# Patient Record
Sex: MALE | Race: WHITE | NOT HISPANIC OR LATINO | Employment: FULL TIME | URBAN - METROPOLITAN AREA
[De-identification: names, ages, dates, MRNs, and addresses within clinical notes are randomized per-mention and may not be internally consistent; named-entity substitution may affect disease eponyms.]

---

## 2018-04-12 ENCOUNTER — OFFICE VISIT (OUTPATIENT)
Dept: FAMILY MEDICINE | Facility: CLINIC | Age: 48
End: 2018-04-12
Payer: COMMERCIAL

## 2018-04-12 VITALS
SYSTOLIC BLOOD PRESSURE: 142 MMHG | RESPIRATION RATE: 18 BRPM | HEIGHT: 71 IN | TEMPERATURE: 98 F | WEIGHT: 315 LBS | HEART RATE: 84 BPM | DIASTOLIC BLOOD PRESSURE: 94 MMHG | BODY MASS INDEX: 44.1 KG/M2

## 2018-04-12 DIAGNOSIS — I10 ESSENTIAL HYPERTENSION: ICD-10-CM

## 2018-04-12 DIAGNOSIS — R31.29 MICROSCOPIC HEMATURIA: ICD-10-CM

## 2018-04-12 DIAGNOSIS — E66.01 MORBID OBESITY (H): ICD-10-CM

## 2018-04-12 DIAGNOSIS — Z86.0100 HISTORY OF COLONIC POLYPS: ICD-10-CM

## 2018-04-12 DIAGNOSIS — K76.0 HEPATIC STEATOSIS: ICD-10-CM

## 2018-04-12 DIAGNOSIS — R07.9 RIGHT-SIDED CHEST PAIN: ICD-10-CM

## 2018-04-12 DIAGNOSIS — R10.84 ABDOMINAL PAIN, GENERALIZED: Primary | ICD-10-CM

## 2018-04-12 PROCEDURE — 99214 OFFICE O/P EST MOD 30 MIN: CPT | Performed by: FAMILY MEDICINE

## 2018-04-12 RX ORDER — DULOXETIN HYDROCHLORIDE 30 MG/1
30 CAPSULE, DELAYED RELEASE ORAL DAILY
Qty: 30 CAPSULE | Refills: 1 | Status: SHIPPED | OUTPATIENT
Start: 2018-04-12 | End: 2018-06-01

## 2018-04-12 NOTE — PROGRESS NOTES
SUBJECTIVE:   Antonio Canseco is a 47 year old male who presents to clinic today for the following health issues:  Abdominal pain, generalized  (primary encounter diagnosis)  ABDOMINAL PAIN     Onset: x 1 week actually many years     Description:   Character: burning and sharp pain at times  Location: right and left flank  Radiation: into back    Intensity: 7-8/10-at its worst, 2-3/10-currently     Progression of Symptoms:  Worsening-more consistant    Accompanying Signs & Symptoms:  Fever/Chills?: no   Gas/Bloating: YES  Nausea: no   Vomitting: no   Diarrhea?: no   Constipation:no   Dysuria or Hematuria: YES-troubles emptying bladder at times    History:   Trauma: no   Previous similar pain: YES-kidney stones-   Previous tests done: CT    Precipitating factors:   Does the pain change with:     Food: no      BM: no     Urination: no     Therapies Tried and outcome: none          Pain    Onset: x 1 year actually many years    Description:   Location: under or in  right breast  Character: Sharp    Intensity: 3-4/10    Progression of Symptoms: worse    Accompanying Signs & Symptoms:  Other symptoms: none    History:   Previous similar pain: no       Precipitating factors:   Trauma or overuse: no     Alleviating factors:  Improved by: nothing palliatives and provocatives not noted    Therapies Tried and outcome: none    Morbid obesity (H)  Wt Readings from Last 5 Encounters:   04/12/18 (!) 151 kg (333 lb)   01/18/16 (!) 148.6 kg (327 lb 8 oz)   08/25/15 (!) 145.1 kg (319 lb 14.4 oz)   12/22/14 (!) 142.9 kg (315 lb)   10/25/14 (!) 140.2 kg (309 lb)     Microscopic hematuria this is been evaluated in the past.  He has forgotten his visit for cystoscopy.  Evaluation was negative  Hepatic steatosis found on previous studies  History of colonic polyps found on previous studies.    Essential hypertension  BP Readings from Last 6 Encounters:   04/12/18 (!) 142/94   01/18/16 148/88   08/25/15 128/83   12/22/14 156/90    10/25/14 120/80   06/25/14 134/84       Problem list and histories reviewed & adjusted, as indicated.  Additional history: as documented      Reviewed and updated as needed this visit by clinical staff  Tobacco  Allergies  Meds  Med Hx  Surg Hx  Fam Hx  Soc Hx       Past Medical History:   Diagnosis Date     CARDIOVASCULAR SCREENING; LDL GOAL LESS THAN 160 3/27/2012     Colon adenoma      Cough 2/4/2014     Degeneration of thoracic intervertebral disc 12/11/2013     Elevated blood pressure 12/22/2014     Hepatic steatosis 12/11/2013     History of colonic polyps 12/11/2013     Microscopic hematuria 12/19/2013     Nephrolithiasis      Obesity 3/27/2012     BRIAN on CPAP 3/27/2012     Pain in thoracic spine 5/9/2013     Sleep apnea        Past Surgical History:   Procedure Laterality Date     COLONOSCOPY       CYSTOSCOPY  2/11/2014    Procedure: CYSTOSCOPY;   Video Cystoscopy with Exam Under Anesthesia;  Surgeon: Chente Moeller MD;  Location: [facility] OR     wisdom teeth         Family History   Problem Relation Age of Onset     Family History Negative Mother      CANCER Father      lymphoma       Social History   Substance Use Topics     Smoking status: Former Smoker     Packs/day: 1.00     Types: Cigarettes     Quit date: 4/23/2013     Smokeless tobacco: Never Used      Comment: 1 sd/day     Alcohol use Yes      Comment: rarely       Reviewed and updated as needed this visit by Provider         ROS:  As above    This document serves as a record of the services and decisions personally performed and made by Mynor Mason MD. It was created on his behalf by Gisele Eden, a trained medical scribe.  The creation of this document is based on the scribe's personal observations and the provider's statements to the medical scribe.  Gisele Eden, April 12, 2018 8:14 AM    OBJECTIVE:     BP (!) 142/94 (BP Location: Right arm, Patient Position: Chair, Cuff Size: Adult Large)  Pulse 84  Temp 98  F (36.7  C) (Oral)   "Resp 18  Ht 1.803 m (5' 11\")  Wt (!) 151 kg (333 lb)  BMI 46.44 kg/m2  Body mass index is 46.44 kg/(m^2).    Exam  ABDOMEN without organomegaly nor tenderness to palpation  No gynecomastia  chest clear  Chest wall non tender    ASSESSMENT/PLAN:   ASSESSMENT / PLAN:  (R10.84) Abdominal pain, generalized  (primary encounter diagnosis)  Comment: This is most likely IBS.  Some of his evaluation has been duplicated over the years, all of it is becoming remote  Plan: CT Abdomen Pelvis w Contrast, CBC with         platelets and differential, Comprehensive         metabolic panel, TSH with free T4 reflex,         Lipase, *UA reflex to Microscopic, treat as IBS with DULoxetine         (CYMBALTA) 30 MG EC capsule            (E66.01) Morbid obesity (H)  Comment: increasing  Wt Readings from Last 5 Encounters:   04/12/18 (!) 151 kg (333 lb)   01/18/16 (!) 148.6 kg (327 lb 8 oz)   08/25/15 (!) 145.1 kg (319 lb 14.4 oz)   12/22/14 (!) 142.9 kg (315 lb)   10/25/14 (!) 140.2 kg (309 lb)       (R31.29) Microscopic hematuria  Comment: Evaluated previously, repeatedly  Plan: Likely to occur again    (K76.0) Hepatic steatosis  Comment: Incidental finding  Plan:     (Z86.010) History of colonic polyps  Comment: his colonoscopy repeat is due this year  Plan: He would like to delay until summer.  That is reasonable    (I10) Essential hypertension  Comment: No interventions for now, recheck with institution of therapy        (R07.9) Right-sided chest pain  Comment: likely referred. No gallstones at last imaging.  Plan: assess Duloxetine response      RTC 1 mo    Mynor Mason MD        The information in this document, created by the medical scribe for me, accurately reflects the services I personally performed and the decisions made by me. I have reviewed and approved this document for accuracy prior to leaving the patient care area.  Mynor Mason MD April 12, 2018 8:14 AM    Mynor Mason MD  [facility]"

## 2018-04-12 NOTE — MR AVS SNAPSHOT
After Visit Summary   4/12/2018    Antonio Canseco    MRN: [id number]           Patient Information     Date Of Birth          1970        Visit Information        Provider Department      4/12/2018 8:00 AM Mynor Mason MD [facility]        Today's Diagnoses     Abdominal pain, generalized    -  1    Microscopic hematuria        Hepatic steatosis        History of colonic polyps        Essential hypertension        Morbid obesity (H)           Follow-ups after your visit        Follow-up notes from your care team     Return in about 4 weeks (around 5/10/2018).      Future tests that were ordered for you today     Open Future Orders        Priority Expected Expires Ordered    CBC with platelets and differential Routine  4/12/2019 4/12/2018    Comprehensive metabolic panel Routine  4/12/2019 4/12/2018    TSH with free T4 reflex Routine  4/12/2019 4/12/2018    Lipase Routine  4/12/2019 4/12/2018    *UA reflex to Microscopic Routine  4/12/2019 4/12/2018    CT Abdomen Pelvis w Contrast Routine  4/12/2019 4/12/2018            Who to contact     If you have questions or need follow up information about today's clinic visit or your schedule please contact [facility] directly at [phone].  Normal or non-critical lab and imaging results will be communicated to you by MyChart, letter or phone within 4 business days after the clinic has received the results. If you do not hear from us within 7 days, please contact the clinic through fastDovehart or phone. If you have a critical or abnormal lab result, we will notify you by phone as soon as possible.  Submit refill requests through [employer]ortia or call your pharmacy and they will forward the refill request to us. Please allow 3 business days for your refill to be completed.          Additional Information About Your Visit        MyChart Information     [employer] gives you secure access to your electronic health record. If you see  "a primary care provider, you can also send messages to your care team and make appointments. If you have questions, please call your primary care clinic.  If you do not have a primary care provider, please call [phone] and they will assist you.        Care EveryWhere ID     This is your Care EveryWhere ID. This could be used by other organizations to access your [facility] medical records  [id number]        Your Vitals Were     Pulse Temperature Respirations Height BMI (Body Mass Index)       84 98  F (36.7  C) (Oral) 18 5' 11\" (1.803 m) 46.44 kg/m2        Blood Pressure from Last 3 Encounters:   04/12/18 (!) 142/94   01/18/16 148/88   08/25/15 128/83    Weight from Last 3 Encounters:   04/12/18 (!) 333 lb (151 kg)   01/18/16 (!) 327 lb 8 oz (148.6 kg)   08/25/15 (!) 319 lb 14.4 oz (145.1 kg)                 Today's Medication Changes          These changes are accurate as of 4/12/18  8:41 PM.  If you have any questions, ask your nurse or doctor.               Start taking these medicines.        Dose/Directions    DULoxetine 30 MG EC capsule   Commonly known as:  CYMBALTA   Used for:  Abdominal pain, generalized   Started by:  Mynor Mason MD        Dose:  30 mg   Take 1 capsule (30 mg) by mouth daily   Quantity:  30 capsule   Refills:  1            Where to get your medicines      These medications were sent to [facility] [address]  [id number] [place] [address]     Phone:  [phone]     DULoxetine 30 MG EC capsule                Primary Care Provider Office Phone # Fax #    Mynor Mason [phone] [address] [facility] [address]        Equal Access to Services     MARILU ALBRIGHT : Lisa Perdomo, waaxda luqadaha, qaybta kaalmada lauren, brad hodgse. So [facility] [phone].    ATENCIÓN: Si habla español, tiene a mora disposición servicios gratuitos de asistencia lingüística. " Misti garcia [phone].    We comply with applicable federal civil rights laws and [place] laws. We do not discriminate on the basis of race, color, national origin, age, disability, sex, sexual orientation, or gender identity.            Thank you!     Thank you for choosing [facility]  for your care. Our goal is always to provide you with excellent care. Hearing back from our patients is one way we can continue to improve our services. Please take a few minutes to complete the written survey that you may receive in the mail after your visit with us. Thank you!             Your Updated Medication List - Protect others around you: Learn how to safely use, store and throw away your medicines at www.disposemymeds.org.          This list is accurate as of 4/12/18  8:41 PM.  Always use your most recent med list.                   Brand Name Dispense Instructions for use Diagnosis    DULoxetine 30 MG EC capsule    CYMBALTA    30 capsule    Take 1 capsule (30 mg) by mouth daily    Abdominal pain, generalized

## 2018-04-13 PROBLEM — R07.9 RIGHT-SIDED CHEST PAIN: Status: ACTIVE | Noted: 2018-04-13

## 2018-05-21 ENCOUNTER — HOSPITAL ENCOUNTER (OUTPATIENT)
Dept: CT IMAGING | Facility: CLINIC | Age: 48
Discharge: HOME | End: 2018-05-21
Attending: FAMILY MEDICINE | Admitting: FAMILY MEDICINE
Payer: COMMERCIAL

## 2018-05-21 DIAGNOSIS — R10.84 ABDOMINAL PAIN, GENERALIZED: ICD-10-CM

## 2018-05-21 PROCEDURE — 25000128 H RX IP 250 OP 636: Performed by: RADIOLOGY

## 2018-05-21 PROCEDURE — 74177 CT ABD & PELVIS W/CONTRAST: CPT

## 2018-05-21 RX ORDER — IOPAMIDOL 755 MG/ML
500 INJECTION, SOLUTION INTRAVASCULAR ONCE
Status: COMPLETED | OUTPATIENT
Start: 2018-05-21 | End: 2018-05-21

## 2018-05-21 RX ADMIN — SODIUM CHLORIDE 55 ML: 9 INJECTION, SOLUTION INTRAVENOUS at 08:08

## 2018-05-21 RX ADMIN — IOPAMIDOL 100 ML: 755 INJECTION, SOLUTION INTRAVENOUS at 08:08

## 2018-05-22 PROBLEM — K76.89 HEPATIC CYST: Status: ACTIVE | Noted: 2018-05-22

## 2018-05-22 PROBLEM — K58.8 OTHER IRRITABLE BOWEL SYNDROME: Status: ACTIVE | Noted: 2018-05-22

## 2018-06-01 ENCOUNTER — OFFICE VISIT (OUTPATIENT)
Dept: FAMILY MEDICINE | Facility: CLINIC | Age: 48
End: 2018-06-01
Payer: COMMERCIAL

## 2018-06-01 VITALS
HEART RATE: 98 BPM | TEMPERATURE: 98.3 F | RESPIRATION RATE: 18 BRPM | WEIGHT: 315 LBS | SYSTOLIC BLOOD PRESSURE: 152 MMHG | DIASTOLIC BLOOD PRESSURE: 98 MMHG | BODY MASS INDEX: 45.89 KG/M2

## 2018-06-01 DIAGNOSIS — R91.1 PULMONARY NODULE: ICD-10-CM

## 2018-06-01 DIAGNOSIS — R10.84 ABDOMINAL PAIN, GENERALIZED: ICD-10-CM

## 2018-06-01 DIAGNOSIS — K58.9 IRRITABLE BOWEL SYNDROME WITHOUT DIARRHEA: ICD-10-CM

## 2018-06-01 DIAGNOSIS — K76.0 HEPATIC STEATOSIS: Primary | ICD-10-CM

## 2018-06-01 DIAGNOSIS — I10 ESSENTIAL HYPERTENSION: ICD-10-CM

## 2018-06-01 DIAGNOSIS — K76.89 HEPATIC CYST: ICD-10-CM

## 2018-06-01 LAB — FERRITIN SERPL-MCNC: 103 NG/ML (ref 26–388)

## 2018-06-01 PROCEDURE — 99214 OFFICE O/P EST MOD 30 MIN: CPT | Performed by: FAMILY MEDICINE

## 2018-06-01 PROCEDURE — 36415 COLL VENOUS BLD VENIPUNCTURE: CPT | Performed by: FAMILY MEDICINE

## 2018-06-01 PROCEDURE — 82728 ASSAY OF FERRITIN: CPT | Performed by: FAMILY MEDICINE

## 2018-06-01 PROCEDURE — 82390 ASSAY OF CERULOPLASMIN: CPT | Performed by: FAMILY MEDICINE

## 2018-06-01 PROCEDURE — 86704 HEP B CORE ANTIBODY TOTAL: CPT | Performed by: FAMILY MEDICINE

## 2018-06-01 PROCEDURE — 86803 HEPATITIS C AB TEST: CPT | Performed by: FAMILY MEDICINE

## 2018-06-01 RX ORDER — DULOXETIN HYDROCHLORIDE 60 MG/1
60 CAPSULE, DELAYED RELEASE ORAL DAILY
Qty: 30 CAPSULE | Refills: 3 | Status: SHIPPED | OUTPATIENT
Start: 2018-06-01 | End: 2018-10-05

## 2018-06-01 RX ORDER — LISINOPRIL 20 MG/1
20 TABLET ORAL DAILY
Qty: 30 TABLET | Refills: 3 | Status: SHIPPED | OUTPATIENT
Start: 2018-06-01 | End: 2018-10-05

## 2018-06-01 NOTE — MR AVS SNAPSHOT
After Visit Summary   6/1/2018    Antonio Canseco    MRN: [id number]           Patient Information     Date Of Birth          1970        Visit Information        Provider Department      6/1/2018 8:15 AM Mynor Mason MD [facility]        Today's Diagnoses     Hepatic steatosis    -  1    Hepatic cyst        Irritable bowel syndrome without diarrhea        Essential hypertension        Abdominal pain, generalized        Pulmonary nodule           Follow-ups after your visit        Follow-up notes from your care team     Return in about 6 weeks (around 7/13/2018).      Who to contact     If you have questions or need follow up information about today's clinic visit or your schedule please contact [facility] directly at [phone].  Normal or non-critical lab and imaging results will be communicated to you by MyChart, letter or phone within 4 business days after the clinic has received the results. If you do not hear from us within 7 days, please contact the clinic through Sweeperyhart or phone. If you have a critical or abnormal lab result, we will notify you by phone as soon as possible.  Submit refill requests through GiftLauncher or call your pharmacy and they will forward the refill request to us. Please allow 3 business days for your refill to be completed.          Additional Information About Your Visit        MyChart Information     G[employer] gives you secure access to your electronic health record. If you see a primary care provider, you can also send messages to your care team and make appointments. If you have questions, please call your primary care clinic.  If you do not have a primary care provider, please call [phone] and they will assist you.        Care EveryWhere ID     This is your Care EveryWhere ID. This could be used by other organizations to access your [facility] medical records  [id number]        Your Vitals Were     Pulse Temperature  Respirations BMI (Body Mass Index)          98 98.3  F (36.8  C) (Oral) 18 45.89 kg/m2         Blood Pressure from Last 3 Encounters:   06/01/18 (!) 152/98   04/12/18 (!) 142/94   01/18/16 148/88    Weight from Last 3 Encounters:   06/01/18 329 lb (149.2 kg)   04/12/18 (!) 333 lb (151 kg)   01/18/16 (!) 327 lb 8 oz (148.6 kg)              We Performed the Following     Ceruloplasmin     Ferritin     Hepatitis B core antibody     Hepatitis C antibody          Today's Medication Changes          These changes are accurate as of 6/1/18 11:59 PM.  If you have any questions, ask your nurse or doctor.               Start taking these medicines.        Dose/Directions    lisinopril 20 MG tablet   Commonly known as:  PRINIVIL/ZESTRIL   Used for:  Essential hypertension   Started by:  Mynor Mason MD        Dose:  20 mg   Take 1 tablet (20 mg) by mouth daily   Quantity:  30 tablet   Refills:  3         These medicines have changed or have updated prescriptions.        Dose/Directions    DULoxetine 60 MG EC capsule   Commonly known as:  CYMBALTA   This may have changed:    - medication strength  - how much to take   Used for:  Abdominal pain, generalized, Irritable bowel syndrome without diarrhea   Changed by:  Mynor Mason MD        Dose:  60 mg   Take 1 capsule (60 mg) by mouth daily   Quantity:  30 capsule   Refills:  3            Where to get your medicines      These medications were sent to [address]  [address] [facility] [address]     Phone:  [phone]     DULoxetine 60 MG EC capsule    lisinopril 20 MG tablet                Primary Care Provider Office Phone # Fax #    Mynor Mason [phone]  [phone]        Equal Access to Services     CARLTON ALBRIGHT : Lisa dubois Somichele, waaxda luqadaha, qaybta kaalmada adeegyada, brad hodges. So [facility]  [phone].    ATENCIÓN: Si bobla kaylan, tiene a mora disposición servicios gratuitos de asistencia lingüística. Misti garcia [phone].    We comply with applicable federal civil rights laws and [place] laws. We do not discriminate on the basis of race, color, national origin, age, disability, sex, sexual orientation, or gender identity.            Thank you!     Thank you for choosing [facility]  for your care. Our goal is always to provide you with excellent care. Hearing back from our patients is one way we can continue to improve our services. Please take a few minutes to complete the written survey that you may receive in the mail after your visit with us. Thank you!             Your Updated Medication List - Protect others around you: Learn how to safely use, store and throw away your medicines at www.disposemymeds.org.          This list is accurate as of 6/1/18 11:59 PM.  Always use your most recent med list.                   Brand Name Dispense Instructions for use Diagnosis    DULoxetine 60 MG EC capsule    CYMBALTA    30 capsule    Take 1 capsule (60 mg) by mouth daily    Abdominal pain, generalized, Irritable bowel syndrome without diarrhea       lisinopril 20 MG tablet    PRINIVIL/ZESTRIL    30 tablet    Take 1 tablet (20 mg) by mouth daily    Essential hypertension

## 2018-06-01 NOTE — PROGRESS NOTES
SUBJECTIVE:   Antonio Canseco is a 47 year old male who presents to clinic today for the following health issues:    He had abdominal imaging  Hepatic steatosis  (primary encounter diagnosis), nature discussed  Hepatic cyst, radiology feels this may be expanding  Pulmonary nodule,  incidental, a low risk dditional scan in 6-12  Mo  is recommended  Irritable bowel syndrome without diarrhea, is his diagnosis  Essential hypertension, BP elevated    BP Readings from Last 6 Encounters:   06/01/18 (!) 152/98   04/12/18 (!) 142/94   01/18/16 148/88   08/25/15 128/83   12/22/14 156/90   10/25/14 120/80       Abdominal pain, generalized, Follow up on previous visit.   Somewhat better with low-dose duloxetine    Problem list and histories reviewed & adjusted, as indicated.  Additional history: as documented    Past Medical History:   Diagnosis Date     CARDIOVASCULAR SCREENING; LDL GOAL LESS THAN 160 3/27/2012     Colon adenoma      Cough 2/4/2014     Degeneration of thoracic intervertebral disc 12/11/2013     Elevated blood pressure 12/22/2014     Hepatic cyst 5/22/2018     Hepatic steatosis 12/11/2013     History of colonic polyps 12/11/2013     Irritable bowel syndrome without diarrhea 6/1/2018     Microscopic hematuria 12/19/2013     Nephrolithiasis      Obesity 3/27/2012     BRIAN on CPAP 3/27/2012     Other irritable bowel syndrome 5/22/2018     Pain in thoracic spine 5/9/2013     Pulmonary nodule 5/22/2018     Right-sided chest pain 4/13/2018     Sleep apnea        Past Surgical History:   Procedure Laterality Date     COLONOSCOPY       CYSTOSCOPY  2/11/2014    Procedure: CYSTOSCOPY;   Video Cystoscopy with Exam Under Anesthesia;  Surgeon: Chente Moeller MD;  Location: [facility] OR     wisdom teeth         Family History   Problem Relation Age of Onset     Family History Negative Mother      CANCER Father      lymphoma       Social History   Substance Use Topics     Smoking status: Former Smoker     Packs/day: 1.00      Types: Cigarettes     Quit date: 4/23/2013     Smokeless tobacco: Never Used      Comment: 1 sd/day     Alcohol use Yes      Comment: rarely         Reviewed and updated as needed this visit by clinical staff  Tobacco  Allergies  Meds  Med Hx  Surg Hx  Fam Hx  Soc Hx      Reviewed and updated as needed this visit by Provider         ROS:  POSITIVE for sharp burning intermittent abdominal pain in patients right and left flank. Negative for diarrhea or constipation.  No dysuria no icterus    This document serves as a record of the services and decisions personally performed and made by Mynor Mason MD. It was created on his behalf by Blair Sahu, a trained medical scribe.  The creation of this document is based on the scribe's personal observations and the provider's statements to the medical scribe.  Blair Sahu, June 1, 2018 8:36 AM    OBJECTIVE:     BP (!) 152/98 (BP Location: Right arm, Patient Position: Chair, Cuff Size: Adult Large)  Pulse 98  Temp 98.3  F (36.8  C) (Oral)  Resp 18  Wt 149.2 kg (329 lb)  BMI 45.89 kg/m2  Body mass index is 45.89 kg/(m^2).    GENERAL: Healthy, Alert, No acute distress       Lab work   ASSESSMENT/PLAN:   ASSESSMENT / PLAN:  (K76.0) Hepatic steatosis  (primary encounter diagnosis)  Comment: CT shows fatty liver. Will check Iron, copper metabolism, evidence of Hep C and B.   Plan: Ferritin, Ceruloplasmin, Hepatitis C antibody,         Hepatitis B core antibody            (K76.89) Hepatic cyst  Comment: Likely congenital  Plan: Continue to monitor     (K58.9) Irritable bowel syndrome without diarrhea  Comment: Increase duloxetine   Plan: DULoxetine (CYMBALTA) 60 MG EC capsule            (I10) Essential hypertension  Comment: Diagnosis treat  BP Readings from Last 3 Encounters:   06/01/18 (!) 152/98   04/12/18 (!) 142/94   01/18/16 148/88   Plan: lisinopril (PRINIVIL/ZESTRIL) 20 MG tablet            (R10.84) Abdominal pain, generalized  Comment: This is irritable  bowel  Plan: DULoxetine (CYMBALTA) 60 MG EC capsule            (R91.1) Pulmonary nodule  Comment:  incidental lung shefali.  Plan: We shall repeat in 6 months, shorter timeframe      RTC 6 weeks check duloxetine response    Mynor Mason MD      The information in this document, created by the medical scribe for me, accurately reflects the services I personally performed and the decisions made by me. I have reviewed and approved this document for accuracy prior to leaving the patient care area.  Mynor Mason MD June 1, 2018 8:41 AM

## 2018-06-04 LAB
HBV CORE AB SERPL QL IA: NONREACTIVE
HCV AB SERPL QL IA: NONREACTIVE

## 2018-06-07 LAB — CERULOPLASMIN SERPL-MCNC: 37 MG/DL (ref 23–53)

## 2018-07-30 ENCOUNTER — HEALTH MAINTENANCE LETTER (OUTPATIENT)
Age: 48
End: 2018-07-30

## 2018-09-06 ENCOUNTER — TELEPHONE (OUTPATIENT)
Dept: FAMILY MEDICINE | Facility: CLINIC | Age: 48
End: 2018-09-06

## 2018-09-06 NOTE — TELEPHONE ENCOUNTER
Panel Management Review      Patient has the following on his problem list:     Hypertension   Last three blood pressure readings:  BP Readings from Last 3 Encounters:   06/01/18 (!) 152/98   04/12/18 (!) 142/94   01/18/16 148/88     Blood pressure: FAILED    HTN Guidelines:  Age 18-59 BP range:  Less than 140/90  Age 60-85 with Diabetes:  Less than 140/90  Age 60-85 without Diabetes:  less than 150/90      Composite cancer screening  Chart review shows that this patient is due/due soon for the following None  Summary:    Patient is due/failing the following:   BP CHECK    Action needed:   Patient needs office visit for f/u BP check.    Type of outreach:    panel pool    Questions for provider review:    None                                                                                                                                    Amber Weber/BAM  [place]---[facility]       Chart routed to Care Team .

## 2018-09-07 NOTE — TELEPHONE ENCOUNTER
Type of outreach:  Phone, spoke to patient.  he will schedule at a later date needs to check his schedule first.    Hilda Eldridge MA on 9/7/2018 at 9:46 AM

## 2018-10-05 ENCOUNTER — OFFICE VISIT (OUTPATIENT)
Dept: FAMILY MEDICINE | Facility: CLINIC | Age: 48
End: 2018-10-05
Payer: COMMERCIAL

## 2018-10-05 VITALS
SYSTOLIC BLOOD PRESSURE: 117 MMHG | HEART RATE: 64 BPM | WEIGHT: 315 LBS | DIASTOLIC BLOOD PRESSURE: 78 MMHG | TEMPERATURE: 97.9 F | BODY MASS INDEX: 46.72 KG/M2 | OXYGEN SATURATION: 98 % | RESPIRATION RATE: 16 BRPM

## 2018-10-05 DIAGNOSIS — I10 ESSENTIAL HYPERTENSION: ICD-10-CM

## 2018-10-05 DIAGNOSIS — E66.01 MORBID OBESITY (H): Primary | ICD-10-CM

## 2018-10-05 DIAGNOSIS — Z11.4 SCREENING FOR HIV (HUMAN IMMUNODEFICIENCY VIRUS): ICD-10-CM

## 2018-10-05 DIAGNOSIS — K58.9 IRRITABLE BOWEL SYNDROME WITHOUT DIARRHEA: ICD-10-CM

## 2018-10-05 DIAGNOSIS — Z86.0100 HISTORY OF COLONIC POLYPS: ICD-10-CM

## 2018-10-05 PROBLEM — K58.8 OTHER IRRITABLE BOWEL SYNDROME: Status: RESOLVED | Noted: 2018-05-22 | Resolved: 2018-10-05

## 2018-10-05 PROCEDURE — 99214 OFFICE O/P EST MOD 30 MIN: CPT | Performed by: FAMILY MEDICINE

## 2018-10-05 RX ORDER — DULOXETIN HYDROCHLORIDE 60 MG/1
60 CAPSULE, DELAYED RELEASE ORAL DAILY
Qty: 90 CAPSULE | Refills: 1 | Status: SHIPPED | OUTPATIENT
Start: 2018-10-05 | End: 2019-01-07

## 2018-10-05 RX ORDER — LISINOPRIL 20 MG/1
20 TABLET ORAL DAILY
Qty: 90 TABLET | Refills: 1 | Status: SHIPPED | OUTPATIENT
Start: 2018-10-05 | End: 2019-01-07

## 2018-10-05 RX ORDER — BUPROPION HYDROCHLORIDE 150 MG/1
150 TABLET ORAL EVERY MORNING
Qty: 90 TABLET | Refills: 0 | Status: SHIPPED | OUTPATIENT
Start: 2018-10-05 | End: 2019-01-07

## 2018-10-05 NOTE — MR AVS SNAPSHOT
After Visit Summary   10/5/2018    Antonio Canseco    MRN: [id number]           Patient Information     Date Of Birth          1970        Visit Information        Provider Department      10/5/2018 2:45 PM Mynor Mason MD [facility]        Today's Diagnoses     Morbid obesity (H)    -  1    Irritable bowel syndrome without diarrhea        Essential hypertension        History of colonic polyps        Screening for HIV (human immunodeficiency virus)          Care Instructions    Multidisciplinary program.   30 grams fiber (3 servings vegetables /  fruits each meal)  1 serving carbohydrate (bread potatoes) daily            Follow-ups after your visit        Additional Services     BARIATRIC ADULT REFERRAL       Your provider has referred you to: [facility]: [facility]  [place] [phone]  [email or web address]    Please be aware that coverage of these services is subject to the terms and limitations of your health insurance plan.  Call member services at your health plan with any benefit or coverage questions.      Please bring the following with you to your appointment:      (1) List of current medications   (2) This referral request   (3) Any documents/labs given to you for this referral            GASTROENTEROLOGY ADULT REF PROCEDURE ONLY Morenita Cotaierge [phone]; No Provider Preference       Last Lab Result: Creatinine (mg/dL)       Date                     Value                 12/22/2014               0.86             ----------  Body mass index is 46.72 kg/(m^2).     Needed:  No  Language:  English    Patient will be contacted to schedule procedure.     Please be aware that coverage of these services is subject to the terms and limitations of your health insurance plan.  Call member services at your health plan with any benefit or coverage  questions.  Any procedures must be performed at a [facility] facility OR coordinated by your clinic's referral office.    Please bring the following with you to your appointment:    (1) Any X-Rays, CTs or MRIs which have been performed.  Contact the facility where they were done to arrange for  prior to your scheduled appointment.    (2) List of current medications   (3) This referral request   (4) Any documents/labs given to you for this referral                  Who to contact     If you have questions or need follow up information about today's clinic visit or your schedule please contact [facility] directly at [phone].  Normal or non-critical lab and imaging results will be communicated to you by Acrisurehart, letter or phone within 4 business days after the clinic has received the results. If you do not hear from us within 7 days, please contact the clinic through Acrisurehart or phone. If you have a critical or abnormal lab result, we will notify you by phone as soon as possible.  Submit refill requests through [employer] or call your pharmacy and they will forward the refill request to us. Please allow 3 business days for your refill to be completed.          Additional Information About Your Visit        [email or web address] Information     [employer] gives you secure access to your electronic health record. If you see a primary care provider, you can also send messages to your care team and make appointments. If you have questions, please call your primary care clinic.  If you do not have a primary care provider, please call [phone] and they will assist you.        Care EveryWhere ID     This is your Care EveryWhere ID. This could be used by other organizations to access your [facility] medical records  [id number]        Your Vitals Were     Pulse Temperature Respirations Pulse Oximetry BMI (Body Mass Index)       64 97.9  F (36.6  C) (Oral) 16 98% 46.72 kg/m2        Blood Pressure from Last 3  Encounters:   10/05/18 117/78   06/01/18 (!) 152/98   04/12/18 (!) 142/94    Weight from Last 3 Encounters:   10/05/18 (!) 152 kg (335 lb)   06/01/18 149.2 kg (329 lb)   04/12/18 (!) 151 kg (333 lb)              We Performed the Following     BARIATRIC ADULT REFERRAL     Comprehensive metabolic panel     GASTROENTEROLOGY ADULT REF PROCEDURE ONLY Morenita Garcia [phone]; No Provider Preference     HIV Antigen Antibody Combo          Today's Medication Changes          These changes are accurate as of 10/5/18  3:03 PM.  If you have any questions, ask your nurse or doctor.               Start taking these medicines.        Dose/Directions    buPROPion 150 MG 24 hr tablet   Commonly known as:  WELLBUTRIN XL   Used for:  Irritable bowel syndrome without diarrhea   Started by:  Mynor Mason MD        Dose:  150 mg   Take 1 tablet (150 mg) by mouth every morning   Quantity:  90 tablet   Refills:  0            Where to get your medicines      These medications were sent to [facility] [address]  [address] [place], [place] [id number]     Phone:  [phone]     buPROPion 150 MG 24 hr tablet    DULoxetine 60 MG EC capsule    lisinopril 20 MG tablet                Primary Care Provider Office Phone # Fax #    Mynor Mason [phone] [address] [facility] [address]        Equal Access to Services     MARILU [facility] Center[facility]AYASHREE AH: Hadii aad ku hadasho Soomaali, waaxda luqadaha, qaybta kaalmada adeegyada, waxay rosettain hayaan dianelys barrera . So [facility] [phone].    ATENCIÓN: Si habla español, tiene a mora disposición servicios gratuitos de asistencia lingüística. Llame al [phone].    We comply with applicable federal civil rights laws and [place] laws. We do not discriminate on the basis of race, color, national origin, age, disability, sex, sexual orientation, or gender identity.            Thank you!     Thank you for choosing [facility]  for your care. Our goal is always to provide you with excellent care. Hearing back from our patients is one way we can continue to improve our services. Please take a few minutes to complete the written survey that you may receive in the mail after your visit with us. Thank you!             Your Updated Medication List - Protect others around you: Learn how to safely use, store and throw away your medicines at www.disposemymeds.org.          This list is accurate as of 10/5/18  3:03 PM.  Always use your most recent med list.                   Brand Name Dispense Instructions for use Diagnosis    buPROPion 150 MG 24 hr tablet    WELLBUTRIN XL    90 tablet    Take 1 tablet (150 mg) by mouth every morning    Irritable bowel syndrome without diarrhea       DULoxetine 60 MG EC capsule    CYMBALTA    90 capsule    Take 1 capsule (60 mg) by mouth daily    Irritable bowel syndrome without diarrhea       lisinopril 20 MG tablet    PRINIVIL/ZESTRIL    90 tablet    Take 1 tablet (20 mg) by mouth daily    Essential hypertension

## 2018-10-05 NOTE — PATIENT INSTRUCTIONS
Multidisciplinary program.   30 grams fiber (3 servings vegetables /  fruits each meal)  1 serving carbohydrate (bread potatoes) daily

## 2018-10-22 ENCOUNTER — TELEPHONE (OUTPATIENT)
Dept: FAMILY MEDICINE | Facility: CLINIC | Age: 48
End: 2018-10-22

## 2018-10-22 DIAGNOSIS — R91.8 PULMONARY NODULES: Primary | ICD-10-CM

## 2018-11-01 ENCOUNTER — HOSPITAL ENCOUNTER (OUTPATIENT)
Dept: CT IMAGING | Facility: CLINIC | Age: 48
Discharge: HOME | End: 2018-11-01
Attending: FAMILY MEDICINE | Admitting: FAMILY MEDICINE
Payer: COMMERCIAL

## 2018-11-01 DIAGNOSIS — R91.8 PULMONARY NODULES: ICD-10-CM

## 2018-11-01 PROCEDURE — 71260 CT THORAX DX C+: CPT

## 2018-11-01 PROCEDURE — 25000128 H RX IP 250 OP 636: Performed by: RADIOLOGY

## 2018-11-01 RX ORDER — IOPAMIDOL 755 MG/ML
500 INJECTION, SOLUTION INTRAVASCULAR ONCE
Status: COMPLETED | OUTPATIENT
Start: 2018-11-01 | End: 2018-11-01

## 2018-11-01 RX ADMIN — SODIUM CHLORIDE 60 ML: 9 INJECTION, SOLUTION INTRAVENOUS at 08:08

## 2018-11-01 RX ADMIN — IOPAMIDOL 80 ML: 755 INJECTION, SOLUTION INTRAVENOUS at 08:08

## 2018-11-05 NOTE — PROGRESS NOTES
Everything is stable. You will need a final CT in 1 year. If that remains stable, no further follow up is needed  Mynor Mason MD

## 2018-12-20 ENCOUNTER — OFFICE VISIT (OUTPATIENT)
Dept: FAMILY MEDICINE | Facility: CLINIC | Age: 48
End: 2018-12-20
Payer: COMMERCIAL

## 2018-12-20 VITALS
RESPIRATION RATE: 16 BRPM | DIASTOLIC BLOOD PRESSURE: 82 MMHG | HEART RATE: 94 BPM | BODY MASS INDEX: 46.3 KG/M2 | WEIGHT: 315 LBS | TEMPERATURE: 98.3 F | SYSTOLIC BLOOD PRESSURE: 122 MMHG

## 2018-12-20 DIAGNOSIS — H93.13 TINNITUS, BILATERAL: Primary | ICD-10-CM

## 2018-12-20 DIAGNOSIS — Z12.11 SCREEN FOR COLON CANCER: ICD-10-CM

## 2018-12-20 LAB
ALBUMIN SERPL-MCNC: 3.6 G/DL (ref 3.4–5)
ALP SERPL-CCNC: 41 U/L (ref 40–150)
ALT SERPL W P-5'-P-CCNC: 37 U/L (ref 0–70)
ANION GAP SERPL CALCULATED.3IONS-SCNC: 8 MMOL/L (ref 3–14)
AST SERPL W P-5'-P-CCNC: 22 U/L (ref 0–45)
BILIRUB SERPL-MCNC: 0.7 MG/DL (ref 0.2–1.3)
BUN SERPL-MCNC: 12 MG/DL (ref 7–30)
CALCIUM SERPL-MCNC: 8.9 MG/DL (ref 8.5–10.1)
CHLORIDE SERPL-SCNC: 106 MMOL/L (ref 94–109)
CO2 SERPL-SCNC: 24 MMOL/L (ref 20–32)
CREAT SERPL-MCNC: 0.83 MG/DL (ref 0.66–1.25)
ERYTHROCYTE [DISTWIDTH] IN BLOOD BY AUTOMATED COUNT: 14.5 % (ref 10–15)
GFR SERPL CREATININE-BSD FRML MDRD: >90 ML/MIN/{1.73_M2}
GLUCOSE SERPL-MCNC: 147 MG/DL (ref 70–99)
HCT VFR BLD AUTO: 47 % (ref 40–53)
HGB BLD-MCNC: 15 G/DL (ref 13.3–17.7)
MCH RBC QN AUTO: 29.2 PG (ref 26.5–33)
MCHC RBC AUTO-ENTMCNC: 31.9 G/DL (ref 31.5–36.5)
MCV RBC AUTO: 91 FL (ref 78–100)
PLATELET # BLD AUTO: 249 10E9/L (ref 150–450)
POTASSIUM SERPL-SCNC: 4.4 MMOL/L (ref 3.4–5.3)
PROT SERPL-MCNC: 7.1 G/DL (ref 6.8–8.8)
RBC # BLD AUTO: 5.14 10E12/L (ref 4.4–5.9)
SODIUM SERPL-SCNC: 138 MMOL/L (ref 133–144)
TSH SERPL DL<=0.005 MIU/L-ACNC: 2.73 MU/L (ref 0.4–4)
WBC # BLD AUTO: 7.3 10E9/L (ref 4–11)

## 2018-12-20 PROCEDURE — 80053 COMPREHEN METABOLIC PANEL: CPT | Performed by: PHYSICIAN ASSISTANT

## 2018-12-20 PROCEDURE — 85027 COMPLETE CBC AUTOMATED: CPT | Performed by: PHYSICIAN ASSISTANT

## 2018-12-20 PROCEDURE — 84443 ASSAY THYROID STIM HORMONE: CPT | Performed by: PHYSICIAN ASSISTANT

## 2018-12-20 PROCEDURE — 36415 COLL VENOUS BLD VENIPUNCTURE: CPT | Performed by: PHYSICIAN ASSISTANT

## 2018-12-20 PROCEDURE — 99214 OFFICE O/P EST MOD 30 MIN: CPT | Performed by: PHYSICIAN ASSISTANT

## 2018-12-20 RX ORDER — FLUTICASONE PROPIONATE 50 MCG
1-2 SPRAY, SUSPENSION (ML) NASAL DAILY
Qty: 16 G | Refills: 3 | Status: SHIPPED | OUTPATIENT
Start: 2018-12-20 | End: 2019-09-06

## 2018-12-20 NOTE — PROGRESS NOTES
SUBJECTIVE:   Antonio Canseco is a 48 year old male who presents to clinic today for the following health issues:      Acute Illness   Acute illness concerns: Ears ringing  Onset: ~1 month or so    Fever: no     Chills/Sweats: no     Headache (location?): no     Sinus Pressure:no    Conjunctivitis:  no    Ear Pain: YES- bilateral ears ringing; worse at pm    Rhinorrhea: no     Congestion: no     Sore Throat: no      Cough: no    Wheeze: no     Decreased Appetite: no     Nausea: no     Vomiting: no    Diarrhea:  no    Dysuria/Freq.: no    Fatigue/Achiness: no    Sick/Strep Exposure: no     Therapies Tried and outcome: none      wellbutrin started in October. Improved abdominal pain symptoms.     Problem list and histories reviewed & adjusted, as indicated.  Additional history: as documented    Patient Active Problem List   Diagnosis     CARDIOVASCULAR SCREENING; LDL GOAL LESS THAN 160     BRIAN on CPAP     Degeneration of thoracic intervertebral disc     History of colonic polyps     Hepatic steatosis     Microscopic hematuria     Cough     Plantar fasciitis     Morbid obesity (H)     Right-sided chest pain     Pulmonary nodule     Hepatic cyst     Irritable bowel syndrome without diarrhea     Past Surgical History:   Procedure Laterality Date     COLONOSCOPY       CYSTOSCOPY  2014    Procedure: CYSTOSCOPY;   Video Cystoscopy with Exam Under Anesthesia;  Surgeon: Chente Moeller MD;  Location: [facility] OR     wisdom teeth         Social History     Tobacco Use     Smoking status: Former Smoker     Packs/day: 1.00     Types: Cigarettes     Last attempt to quit: 2013     Years since quittin.6     Smokeless tobacco: Never Used     Tobacco comment: 1 sd/day   Substance Use Topics     Alcohol use: Yes     Comment: rarely     Family History   Problem Relation Age of Onset     Family History Negative Mother      Cancer Father         lymphoma         Current Outpatient Medications   Medication Sig Dispense Refill      buPROPion (WELLBUTRIN XL) 150 MG 24 hr tablet Take 1 tablet (150 mg) by mouth every morning 90 tablet 0     DULoxetine (CYMBALTA) 60 MG EC capsule Take 1 capsule (60 mg) by mouth daily 90 capsule 1     fluticasone (FLONASE) 50 MCG/ACT nasal spray Spray 1-2 sprays into both nostrils daily 16 g 3     lisinopril (PRINIVIL/ZESTRIL) 20 MG tablet Take 1 tablet (20 mg) by mouth daily 90 tablet 1     Allergies   Allergen Reactions     Kiwi Itching     BP Readings from Last 3 Encounters:   12/20/18 122/82   10/05/18 117/78   06/01/18 (!) 152/98    Wt Readings from Last 3 Encounters:   12/20/18 (!) 150.6 kg (332 lb)   10/05/18 (!) 152 kg (335 lb)   06/01/18 149.2 kg (329 lb)                    Reviewed and updated as needed this visit by clinical staff  Tobacco  Allergies  Meds  Med Hx  Surg Hx  Fam Hx  Soc Hx      Reviewed and updated as needed this visit by Provider         ROS:  Constitutional, HEENT, neuro, psych, cardiovascular, pulmonary, gi and gu systems are negative, except as otherwise noted.    OBJECTIVE:     /82 (BP Location: Right arm, Patient Position: Chair, Cuff Size: Adult Large)   Pulse 94   Temp 98.3  F (36.8  C) (Oral)   Resp 16   Wt (!) 150.6 kg (332 lb)   BMI 46.30 kg/m    Body mass index is 46.3 kg/m .  GENERAL: healthy, alert and no distress  EYES: Eyes grossly normal to inspection, PERRL and conjunctivae and sclerae normal  HENT: normal cephalic/atraumatic, both ears: clear effusion, nose and mouth without ulcers or lesions, oropharynx clear and oral mucous membranes moist  RESP: lungs clear to auscultation - no rales, rhonchi or wheezes  CV: regular rates and rhythm, normal S1 S2, no S3 or S4 and no murmur, click or rub  MS: no gross musculoskeletal defects noted, no edema  NEURO: Normal strength and tone, sensory exam grossly normal, mentation intact, speech normal, cranial nerves 2-12 intact, gait normal including heel/toe/tandem walking, Romberg normal, rapid alternating  movements normal and finger to nose normal. EOM grossly intact.   PSYCH: mentation appears normal, affect normal/bright    Diagnostic Test Results:  none     ASSESSMENT/PLAN:     (H93.13) Tinnitus, bilateral  (primary encounter diagnosis)  Comment: unclear of exact cause. Differentials vast. No evidence of mass effect at this time on exam. Does have fluid on ears, eustachian tube dysfunction is possible. wellbutrin does rarely produce tinnitus and given recent start, this is possible as well. Presbycusis is possible also along with metabolic disorder or possible thyroid dysfunction. Labs pending. Recommending flonase daily. If no improvement in 2 weeks, 1 week holiday from wellbutrin. If no improvement with these changes, will have see ENT.   Plan: COMPREHENSIVE METABOLIC PANEL, GASTROENTEROLOGY        ADULT REF PROCEDURE ONLY Morenita Garcia [phone]; [facility], TSH with         free T4 reflex, fluticasone (FLONASE) 50         MCG/ACT nasal spray        -Medication use and side effects discussed with the patient. Patient is in complete understanding and agreement with plan.       (Z12.11) Screen for colon cancer  Comment:   Plan: CBC with platelets              Follow up: as above     Fortunato Guzman PA-C  [facility]

## 2018-12-20 NOTE — PATIENT INSTRUCTIONS
For the ringing in the ears. It is felt in order of likely to be eustachian tube dysfunction, possibly wellbutrin side effect, or possible hearing loss. We are getting labs today to further rule out other causes. I have sent flonase to your pharmacy to be taken once daily for eustachain tube dysfunction. If no improvement in 2 weeks, I recommend taking a holiday from your wellbutrin for 1 week. If no improvement with this, we will have you see ENT. Please call and I will give you the number.

## 2019-01-07 ENCOUNTER — MYC REFILL (OUTPATIENT)
Dept: FAMILY MEDICINE | Facility: CLINIC | Age: 49
End: 2019-01-07

## 2019-01-07 DIAGNOSIS — I10 ESSENTIAL HYPERTENSION: ICD-10-CM

## 2019-01-07 DIAGNOSIS — K58.9 IRRITABLE BOWEL SYNDROME WITHOUT DIARRHEA: ICD-10-CM

## 2019-01-07 NOTE — TELEPHONE ENCOUNTER
"Requested Prescriptions   Pending Prescriptions Disp Refills     DULoxetine (CYMBALTA) 60 MG capsule  Last Written Prescription Date:  10/5/2018  Last Fill Quantity: 90 capsule,  # refills: 1   Last office visit: 12/20/2018 with prescribing provider:  Megan   Future Office Visit:     90 capsule 1     Sig: Take 1 capsule (60 mg) by mouth daily    Serotonin-Norepinephrine Reuptake Inhibitors  Passed - 1/7/2019  4:41 PM       Passed - Blood pressure under 140/90 in past 12 months    BP Readings from Last 3 Encounters:   12/20/18 122/82   10/05/18 117/78   06/01/18 (!) 152/98                Passed - Recent (12 mo) or future (30 days) visit within the authorizing provider's specialty    Patient had office visit in the last 12 months or has a visit in the next 30 days with authorizing provider or within the authorizing provider's specialty.  See \"Patient Info\" tab in inbasket, or \"Choose Columns\" in Meds & Orders section of the refill encounter.             Passed - Medication is active on med list       Passed - Patient is age 18 or older        lisinopril (PRINIVIL/ZESTRIL) 20 MG tablet  Last Written Prescription Date:  10/5/2018  Last Fill Quantity: 90 tablet,  # refills: 1   Last office visit: 12/20/2018 with prescribing provider:  Megan   Future Office Visit:     90 tablet 1     Sig: Take 1 tablet (20 mg) by mouth daily    ACE Inhibitors (Including Combos) Protocol Passed - 1/7/2019  4:41 PM       Passed - Blood pressure under 140/90 in past 12 months    BP Readings from Last 3 Encounters:   12/20/18 122/82   10/05/18 117/78   06/01/18 (!) 152/98                Passed - Recent (12 mo) or future (30 days) visit within the authorizing provider's specialty    Patient had office visit in the last 12 months or has a visit in the next 30 days with authorizing provider or within the authorizing provider's specialty.  See \"Patient Info\" tab in inbasket, or \"Choose Columns\" in Meds & Orders section of the refill encounter.  " "           Passed - Medication is active on med list       Passed - Patient is age 18 or older       Passed - Normal serum creatinine on file in past 12 months    Recent Labs   Lab Test 12/20/18  0823   CR 0.83            Passed - Normal serum potassium on file in past 12 months    Recent Labs   Lab Test 12/20/18  0823   POTASSIUM 4.4             buPROPion (WELLBUTRIN XL) 150 MG 24 hr tablet  Last Written Prescription Date:  10/5/2018  Last Fill Quantity: 90 tablet,  # refills: 0   Last office visit: 12/20/2018 with prescribing provider:  Megan   Future Office Visit:     90 tablet 0     Sig: Take 1 tablet (150 mg) by mouth every morning    SSRIs Protocol Passed - 1/7/2019  4:41 PM       Passed - Recent (12 mo) or future (30 days) visit within the authorizing provider's specialty    Patient had office visit in the last 12 months or has a visit in the next 30 days with authorizing provider or within the authorizing provider's specialty.  See \"Patient Info\" tab in inbasket, or \"Choose Columns\" in Meds & Orders section of the refill encounter.             Passed - Medication is Bupropion    If the medication is Bupropion (Wellbutrin), and the patient is taking for smoking cessation; OK to refill.         Passed - Medication is active on med list       Passed - Patient is age 18 or older          "

## 2019-01-08 RX ORDER — BUPROPION HYDROCHLORIDE 150 MG/1
150 TABLET ORAL EVERY MORNING
Qty: 90 TABLET | Refills: 0 | Status: SHIPPED | OUTPATIENT
Start: 2019-01-08 | End: 2019-04-05

## 2019-01-08 RX ORDER — LISINOPRIL 20 MG/1
20 TABLET ORAL DAILY
Status: SHIPPED
Start: 2019-01-08 | End: 2019-04-12

## 2019-01-08 RX ORDER — DULOXETIN HYDROCHLORIDE 60 MG/1
60 CAPSULE, DELAYED RELEASE ORAL DAILY
Status: SHIPPED
Start: 2019-01-08 | End: 2019-04-12

## 2019-01-08 NOTE — TELEPHONE ENCOUNTER
6 month RX sent of Duloxetine and Lisinopril 10/5/18.  Bupropion just 3 month RX sent 10/5/18.  Not PSO diagnosis.  Please advise when you want to see next.  Shayna Olivo RN    12/20/18  ASSESSMENT/PLAN:      (H93.13) Tinnitus, bilateral  (primary encounter diagnosis)  Comment: unclear of exact cause. Differentials vast. No evidence of mass effect at this time on exam. Does have fluid on ears, eustachian tube dysfunction is possible. wellbutrin does rarely produce tinnitus and given recent start, this is possible as well. Presbycusis is possible also along with metabolic disorder or possible thyroid dysfunction. Labs pending. Recommending flonase daily. If no improvement in 2 weeks, 1 week holiday from wellbutrin. If no improvement with these changes, will have see ENT.   Plan: COMPREHENSIVE METABOLIC PANEL, GASTROENTEROLOGY        ADULT REF PROCEDURE ONLY Morenita Garcia [phone]; [facility], TSH with         free T4 reflex, fluticasone (FLONASE) 50         MCG/ACT nasal spray        -Medication use and side effects discussed with the patient. Patient is in complete understanding and agreement with plan.         (Z12.11) Screen for colon cancer  Comment:   Plan: CBC with platelets                Follow up: as above      Fortunato Guzman PA-C  [facility]         Instructions         Return in about 4 months (around 4/20/2019) for Routine Visit, with pcp.       10/5/18  ASSESSMENT/PLAN:   (E66.01) Morbid obesity (H)  (primary encounter diagnosis)  Comment: Multidisciplinary weight loss program discussed.  Plan: BARIATRIC ADULT REFERRAL        30 grams fiber (3 servings vegetables /  fruits each meal)        1 serving carbohydrate (bread potatoes) daily     (K58.9) Irritable bowel syndrome without diarrhea  Comment: Refilled   Plan: DULoxetine (CYMBALTA) 60 MG EC capsule,        Add BuPROPion (WELLBUTRIN XL) 150 MG 24 hr tablet          (I10) Essential  hypertension  Comment: Controlled   Plan: lisinopril (PRINIVIL/ZESTRIL) 20 MG tablet,         Comprehensive metabolic panel          (Z86.010) History of colonic polyps  Comment: Patient agreed to schedule   Plan: GASTROENTEROLOGY ADULT REF PROCEDURE ONLY         Morenita Garcia [phone]; No Provider         Preference          (Z11.4) Screening for HIV (human immunodeficiency virus)  Comment: Universal HIV testing introduced, rationale reviewed, all questions answered, permission granted to test  Plan: HIV Antigen Antibody Combo          The information in this document, created by the medical scribe for me, accurately reflects the services I personally performed and the decisions made by me. I have reviewed and approved this document for accuracy prior to leaving the patient care area.  Mynor Mason MD October 5, 2018 2:57 PM     Mynor Mason MD  [facility]       Instructions         Return in about 3 months (around 1/5/2019).

## 2019-04-05 DIAGNOSIS — I10 ESSENTIAL HYPERTENSION: ICD-10-CM

## 2019-04-05 DIAGNOSIS — K58.9 IRRITABLE BOWEL SYNDROME WITHOUT DIARRHEA: ICD-10-CM

## 2019-04-05 RX ORDER — DULOXETIN HYDROCHLORIDE 60 MG/1
CAPSULE, DELAYED RELEASE ORAL
Status: SHIPPED
Start: 2019-04-05 | End: 2019-04-12

## 2019-04-05 RX ORDER — LISINOPRIL 20 MG/1
TABLET ORAL
Status: SHIPPED
Start: 2019-04-05 | End: 2019-04-12

## 2019-04-05 RX ORDER — BUPROPION HYDROCHLORIDE 150 MG/1
TABLET ORAL
Status: SHIPPED
Start: 2019-04-05 | End: 2019-04-12

## 2019-04-05 NOTE — TELEPHONE ENCOUNTER
Visit was due around 1/5/19.  Called patient.  Scheduled for 4/12/19 8 am.  Has enough medications til visit.  Will address refills at visit.  Note to pharmacy.  Shayna Olivo RN    Associated Diagnoses     Irritable bowel syndrome without diarrhea [K58.9]         10/5/18  ASSESSMENT/PLAN:   (E66.01) Morbid obesity (H)  (primary encounter diagnosis)  Comment: Multidisciplinary weight loss program discussed.  Plan: BARIATRIC ADULT REFERRAL        30 grams fiber (3 servings vegetables /  fruits each meal)        1 serving carbohydrate (bread potatoes) daily     (K58.9) Irritable bowel syndrome without diarrhea  Comment: Refilled   Plan: DULoxetine (CYMBALTA) 60 MG EC capsule,        Add BuPROPion (WELLBUTRIN XL) 150 MG 24 hr tablet          (I10) Essential hypertension  Comment: Controlled   Plan: lisinopril (PRINIVIL/ZESTRIL) 20 MG tablet,         Comprehensive metabolic panel          (Z86.010) History of colonic polyps  Comment: Patient agreed to schedule   Plan: GASTROENTEROLOGY ADULT REF PROCEDURE ONLY         Morenita Garcia [phone]; No Provider         Preference          (Z11.4) Screening for HIV (human immunodeficiency virus)  Comment: Universal HIV testing introduced, rationale reviewed, all questions answered, permission granted to test  Plan: HIV Antigen Antibody Combo          The information in this document, created by the medical scribe for me, accurately reflects the services I personally performed and the decisions made by me. I have reviewed and approved this document for accuracy prior to leaving the patient care area.  Mynor Mason MD October 5, 2018 2:57 PM     Mynor Mason MD  [facility]       Instructions         Return in about 3 months (around 1/5/2019).   Multidisciplinary program.   30 grams fiber (3 servings vegetables /  fruits each meal)  1 serving carbohydrate (bread potatoes) daily

## 2019-04-05 NOTE — TELEPHONE ENCOUNTER
"Requested Prescriptions   Pending Prescriptions Disp Refills     lisinopril (PRINIVIL/ZESTRIL) 20 MG tablet [Pharmacy Med Name: LISINOPRIL 20MG TABS] 90 tablet 1     Sig: TAKE ONE TABLET BY MOUTH EVERY DAY    ACE Inhibitors (Including Combos) Protocol Passed - 4/5/2019 11:22 AM       Passed - Blood pressure under 140/90 in past 12 months    BP Readings from Last 3 Encounters:   12/20/18 122/82   10/05/18 117/78   06/01/18 (!) 152/98                Passed - Recent (12 mo) or future (30 days) visit within the authorizing provider's specialty    Patient had office visit in the last 12 months or has a visit in the next 30 days with authorizing provider or within the authorizing provider's specialty.  See \"Patient Info\" tab in inbasket, or \"Choose Columns\" in Meds & Orders section of the refill encounter.             Passed - Medication is active on med list       Passed - Patient is age 18 or older       Passed - Normal serum creatinine on file in past 12 months    Recent Labs   Lab Test 12/20/18  0823   CR 0.83            Passed - Normal serum potassium on file in past 12 months    Recent Labs   Lab Test 12/20/18  0823   POTASSIUM 4.4             DULoxetine (CYMBALTA) 60 MG capsule [Pharmacy Med Name: DULOXETINE HCL 60MG CPEP] 90 capsule 1     Sig: TAKE ONE CAPSULE BY MOUTH EVERY DAY    Serotonin-Norepinephrine Reuptake Inhibitors  Passed - 4/5/2019 11:22 AM       Passed - Blood pressure under 140/90 in past 12 months    BP Readings from Last 3 Encounters:   12/20/18 122/82   10/05/18 117/78   06/01/18 (!) 152/98                Passed - Recent (12 mo) or future (30 days) visit within the authorizing provider's specialty    Patient had office visit in the last 12 months or has a visit in the next 30 days with authorizing provider or within the authorizing provider's specialty.  See \"Patient Info\" tab in inbasket, or \"Choose Columns\" in Meds & Orders section of the refill encounter.             Passed - Medication is " "active on med list       Passed - Patient is age 18 or older        buPROPion (WELLBUTRIN XL) 150 MG 24 hr tablet [Pharmacy Med Name: BUPROPION HCL ER (XL) 150MG TB24] 90 tablet 0     Sig: TAKE ONE TABLET BY MOUTH EVERY MORNING    SSRIs Protocol Passed - 4/5/2019 11:22 AM       Passed - Recent (12 mo) or future (30 days) visit within the authorizing provider's specialty    Patient had office visit in the last 12 months or has a visit in the next 30 days with authorizing provider or within the authorizing provider's specialty.  See \"Patient Info\" tab in inbasket, or \"Choose Columns\" in Meds & Orders section of the refill encounter.             Passed - Medication is Bupropion    If the medication is Bupropion (Wellbutrin), and the patient is taking for smoking cessation; OK to refill.         Passed - Medication is active on med list       Passed - Patient is age 18 or older        Last Written Prescription Date:  10/08/19-Lisinopril  Last Fill Quantity: Oral,  # refills: Oral   Last office visit: 12/20/2018 with prescribing provider:  Levi Chacko Office Visit:      Last Written Prescription Date:  01/08/19-Duloxetine  Last Fill Quantity: Oral,  # refills: Oral   Last office visit: 12/20/2018 with prescribing provider:  Levi Chacko Office Visit:      Last Written Prescription Date:  01/08/19-Bupropion  Last Fill Quantity: 90,  # refills: 0   Last office visit: 12/20/2018 with prescribing provider:  Levi Chacko Office Visit:          "

## 2019-04-12 ENCOUNTER — OFFICE VISIT (OUTPATIENT)
Dept: FAMILY MEDICINE | Facility: CLINIC | Age: 49
End: 2019-04-12
Payer: COMMERCIAL

## 2019-04-12 VITALS
DIASTOLIC BLOOD PRESSURE: 85 MMHG | RESPIRATION RATE: 16 BRPM | SYSTOLIC BLOOD PRESSURE: 128 MMHG | TEMPERATURE: 98.1 F | WEIGHT: 315 LBS | BODY MASS INDEX: 44.1 KG/M2 | HEIGHT: 71 IN | HEART RATE: 99 BPM

## 2019-04-12 DIAGNOSIS — Z86.0100 HISTORY OF COLONIC POLYPS: ICD-10-CM

## 2019-04-12 DIAGNOSIS — R91.1 PULMONARY NODULE: ICD-10-CM

## 2019-04-12 DIAGNOSIS — K58.9 IRRITABLE BOWEL SYNDROME WITHOUT DIARRHEA: Primary | ICD-10-CM

## 2019-04-12 DIAGNOSIS — H69.93 DYSFUNCTION OF BOTH EUSTACHIAN TUBES: ICD-10-CM

## 2019-04-12 DIAGNOSIS — Z12.11 SPECIAL SCREENING FOR MALIGNANT NEOPLASMS, COLON: ICD-10-CM

## 2019-04-12 DIAGNOSIS — H93.19 TINNITUS, UNSPECIFIED LATERALITY: ICD-10-CM

## 2019-04-12 DIAGNOSIS — I10 ESSENTIAL HYPERTENSION: ICD-10-CM

## 2019-04-12 DIAGNOSIS — R68.82 LOW LIBIDO: ICD-10-CM

## 2019-04-12 PROCEDURE — 99214 OFFICE O/P EST MOD 30 MIN: CPT | Performed by: FAMILY MEDICINE

## 2019-04-12 RX ORDER — LISINOPRIL 20 MG/1
20 TABLET ORAL DAILY
Qty: 90 TABLET | Refills: 1 | Status: SHIPPED | OUTPATIENT
Start: 2019-04-12 | End: 2019-09-16

## 2019-04-12 RX ORDER — DULOXETIN HYDROCHLORIDE 60 MG/1
60 CAPSULE, DELAYED RELEASE ORAL DAILY
Qty: 90 CAPSULE | Refills: 1 | Status: SHIPPED | OUTPATIENT
Start: 2019-04-12 | End: 2019-09-16

## 2019-04-12 ASSESSMENT — MIFFLIN-ST. JEOR: SCORE: 2443.43

## 2019-04-12 NOTE — PROGRESS NOTES
SUBJECTIVE:   Antonio Canseco is a 48 year old male who presents to clinic today for the following health issues:      HPI  Medication Followup     Taking Medication as prescribed: yes    Side Effects:  None    Medication Helping Symptoms:  yes       Irritable bowel syndrome without diarrhea  (primary encounter diagnosis)- Patient is feeling well today. No problems at this time. Using stomach medication every other day.    Dysfunction of both eustachian tubes- Not using Flonase at this time.     Tinnitus, unspecified laterality- Recurring slight ringing in ears.    Essential hypertension-   BP Readings from Last 3 Encounters:   04/12/19 128/85   12/20/18 122/82   10/05/18 117/78       Low libido- Concerns about recent low libido, wonders if medications are the cause.    Pulmonary nodule-he will need a repeat CT scan in future however these are all incidental    Special screening for malignant neoplasms, colon- Due.     History of colonic polyps-due        Additional history: none    Reviewed and updated as needed this visit by clinical staff  Tobacco  Allergies  Meds  Med Hx  Surg Hx  Fam Hx  Soc Hx        Reviewed and updated as needed this visit by Provider       Past Medical History:   Diagnosis Date     CARDIOVASCULAR SCREENING; LDL GOAL LESS THAN 160 3/27/2012     Colon adenoma      Cough 2/4/2014     Degeneration of thoracic intervertebral disc 12/11/2013     Dysfunction of both eustachian tubes 4/12/2019     Elevated blood pressure 12/22/2014     Hepatic cyst 5/22/2018     Hepatic steatosis 12/11/2013     History of colonic polyps 12/11/2013     Irritable bowel syndrome without diarrhea 6/1/2018     Microscopic hematuria 12/19/2013     Nephrolithiasis      Obesity 3/27/2012     BRIAN on CPAP 3/27/2012     Other irritable bowel syndrome 5/22/2018     Pain in thoracic spine 5/9/2013     Pulmonary nodule 5/22/2018     Right-sided chest pain 4/13/2018     Sleep apnea      Tinnitus, unspecified laterality  "2019       Past Surgical History:   Procedure Laterality Date     COLONOSCOPY       CYSTOSCOPY  2014    Procedure: CYSTOSCOPY;   Video Cystoscopy with Exam Under Anesthesia;  Surgeon: Chente Moeller MD;  Location: [facility] OR     wisdom teeth         Family History   Problem Relation Age of Onset     Family History Negative Mother      Cancer Father         lymphoma       Social History     Tobacco Use     Smoking status: Former Smoker     Packs/day: 1.00     Types: Cigarettes     Last attempt to quit: 2013     Years since quittin.9     Smokeless tobacco: Never Used     Tobacco comment: 1 sd/day   Substance Use Topics     Alcohol use: Yes     Comment: rarely         ROS:  GI: NEGATIVE for nausea, abdominal pain, heartburn, or change in bowel habits, fevers    This document serves as a record of the services and decisions personally performed and made by Mynor Mason MD. It was created on his behalf by Gary Dinh, a trained medical scribe. The creation of this document is based on the provider's statements to the medical scribe.  Gary Dinh 2019 8:20 AM    OBJECTIVE:     /85 (BP Location: Right arm, Patient Position: Chair, Cuff Size: Adult Large)   Pulse 99   Temp 98.1  F (36.7  C) (Oral)   Resp 16   Ht 1.803 m (5' 11\")   Wt (!) 155.1 kg (342 lb)   BMI 47.70 kg/m    Body mass index is 47.7 kg/m .  Abdomen benign  HENT: ear canals and TM's normal, nose and mouth without ulcers or lesions    ASSESSMENT/PLAN:   (K58.9) Irritable bowel syndrome without diarrhea  (primary encounter diagnosis)  Comment: Therapy is effective  Plan: DULoxetine (CYMBALTA) 60 MG capsule stop Wellbutrin.  See below             (H69.83) Dysfunction of both eustachian tubes  Comment: Clinically not at this time      (H93.19) Tinnitus, unspecified laterality  Comment: Discussed relationship of the above to this and hearing loss no interventions      (I10) Essential hypertension  Comment: Controlled, " continue  Plan: lisinopril (PRINIVIL/ZESTRIL) 20 MG tablet            (R68.82) Low libido  Comment: Discussed medical causes of low libido interventions available for erectile dysfunction.  We should stop his Wellbutrin at this time      (R91.1) Pulmonary nodule  Comment: Discussed.      (Z12.11) Special screening for malignant neoplasms, colon  Comment: Due, Referred  Plan: GASTROENTEROLOGY ADULT REF PROCEDURE ONLY         [facility]  [phone]; No Provider         Preference            (Z86.010) History of colonic polyps  Comment: Referred  Plan: GASTROENTEROLOGY ADULT REF PROCEDURE ONLY         [facility]  [phone]; No Provider         Preference             The information in this document, created by the medical scribe for me, accurately reflects the services I personally performed and the decisions made by me. I have reviewed and approved this document for accuracy prior to leaving the patient care area.  April 12, 2019 8:20 AM      Mynor Mason MD  [facility]

## 2019-08-29 ENCOUNTER — OFFICE VISIT (OUTPATIENT)
Dept: FAMILY MEDICINE | Facility: CLINIC | Age: 49
End: 2019-08-29
Payer: COMMERCIAL

## 2019-08-29 VITALS
DIASTOLIC BLOOD PRESSURE: 87 MMHG | TEMPERATURE: 98.4 F | BODY MASS INDEX: 44.1 KG/M2 | WEIGHT: 315 LBS | HEART RATE: 112 BPM | SYSTOLIC BLOOD PRESSURE: 129 MMHG | OXYGEN SATURATION: 95 % | RESPIRATION RATE: 16 BRPM | HEIGHT: 71 IN

## 2019-08-29 DIAGNOSIS — K60.30 ANAL FISTULA: ICD-10-CM

## 2019-08-29 PROCEDURE — 10060 I&D ABSCESS SIMPLE/SINGLE: CPT | Performed by: FAMILY MEDICINE

## 2019-08-29 RX ORDER — CEPHALEXIN 500 MG/1
CAPSULE ORAL
COMMUNITY
Start: 2019-08-27 | End: 2019-09-06

## 2019-08-29 RX ORDER — CLINDAMYCIN HCL 300 MG
300 CAPSULE ORAL 4 TIMES DAILY
Qty: 40 CAPSULE | Refills: 0 | Status: SHIPPED | OUTPATIENT
Start: 2019-08-29 | End: 2019-09-06

## 2019-08-29 RX ORDER — HYDROCODONE BITARTRATE AND ACETAMINOPHEN 5; 325 MG/1; MG/1
1 TABLET ORAL EVERY 6 HOURS PRN
Qty: 25 TABLET | Refills: 0 | Status: SHIPPED | OUTPATIENT
Start: 2019-08-29 | End: 2019-09-06

## 2019-08-29 ASSESSMENT — MIFFLIN-ST. JEOR: SCORE: 2452.04

## 2019-08-29 NOTE — PROGRESS NOTES
"Subjective     Antonio Canseco is a 49 year old male who presents to clinic today for the following health issues:    HPI   Concern - INFECTION ON LEFT THIGH   Onset: 3 DAYS     Description:   SORE, WARM TO THE TOUCH     Intensity:     Progression of Symptoms:  worsening    Accompanying Signs & Symptoms:      Previous history of similar problem:       Precipitating factors:   Worsened by:     Alleviating factors:  Improved by:     Therapies Tried and outcome:       Patient complains of a left thigh infection for 3 days. It is sore on palpation, warm to the touch. He was evaluated at urgent care and told to see PCP if symptoms do not resolve. Rx there cephalexin    ROS Positive unmeasured fevers.  No pain on urination.               Past Medical History:   Diagnosis Date     Anal fistula 8/29/2019     CARDIOVASCULAR SCREENING; LDL GOAL LESS THAN 160 3/27/2012     Colon adenoma      Cough 2/4/2014     Degeneration of thoracic intervertebral disc 12/11/2013     Dysfunction of both eustachian tubes 4/12/2019     Elevated blood pressure 12/22/2014     Hepatic cyst 5/22/2018     Hepatic steatosis 12/11/2013     History of colonic polyps 12/11/2013     Irritable bowel syndrome without diarrhea 6/1/2018     Low libido 4/12/2019     Microscopic hematuria 12/19/2013     Nephrolithiasis      Obesity 3/27/2012     BRIAN on CPAP 3/27/2012     Other irritable bowel syndrome 5/22/2018     Pain in thoracic spine 5/9/2013     Pulmonary nodule 5/22/2018     Right-sided chest pain 4/13/2018     Sleep apnea      Tinnitus, unspecified laterality 4/12/2019     He reports previous episodes of same symptoms, always minor, \"a pimple\" resolved post squeeze  Past Surgical History:   Procedure Laterality Date     COLONOSCOPY       CYSTOSCOPY  2/11/2014    Procedure: CYSTOSCOPY;   Video Cystoscopy with Exam Under Anesthesia;  Surgeon: Chente Moeller MD;  Location: [facility] OR     wisdom teeth         Family History   Problem Relation Age of Onset " "    Family History Negative Mother      Cancer Father         lymphoma       Social History     Tobacco Use     Smoking status: Former Smoker     Packs/day: 1.00     Types: Cigarettes     Last attempt to quit: 2013     Years since quittin.3     Smokeless tobacco: Never Used     Tobacco comment: 1 sd/day   Substance Use Topics     Alcohol use: Yes     Comment: rarely         Reviewed and updated as needed this visit by Provider       Review of Systems       This document serves as a record of the services and decisions personally performed and made by Mynor Mason MD. It was created on his behalf by Gary Dinh, a trained medical scribe. The creation of this document is based on the provider's statements to the medical scribe.  Gary Dinh 2019 11:10 AM          Objective    /87 (BP Location: Right arm, Patient Position: Chair, Cuff Size: Adult Large)   Pulse 112   Temp 98.4  F (36.9  C) (Oral)   Resp 16   Ht 1.803 m (5' 11\")   Wt (!) 156.5 kg (345 lb)   SpO2 95%   BMI 48.12 kg/m    Body mass index is 48.12 kg/m .     Physical Exam   SKIN: L inner thigh with large indurated area, red, 2 papules with subcut white material. Similar old papular scar noted    Spontaneous bleeding. Informed consent reviewed, questions answered, and signed  Time Out.  Marcaine anesthesia, 2 papule lanced, yielding blood, feculunt discharge. Drained, loculations broken, dressed    Diagnostic Test Results:  Labs reviewed in Epic  No results found for this or any previous visit (from the past 24 hour(s)).        Assessment & Plan   Problem List Items Addressed This Visit        Digestive    Anal fistula     Recurrent. Referred, change antibiotic. Pathophysiology siri history discussed. To ED if unresponsive or systemic symptoms recur         Relevant Medications    HYDROcodone-acetaminophen (NORCO) 5-325 MG tablet    clindamycin (CLEOCIN) 300 MG capsule    Other Relevant Orders    COLORECTAL SURGERY REFERRAL " "   DRAIN SKIN ABSCESS SIMPLE/SINGLE (Completed)             BMI:   Estimated body mass index is 48.12 kg/m  as calculated from the following:    Height as of this encounter: 1.803 m (5' 11\").    Weight as of this encounter: 156.5 kg (345 lb).     No follow-ups on file.    The information in this document, created by the medical scribe for me, accurately reflects the services I personally performed and the decisions made by me. I have reviewed and approved this document for accuracy prior to leaving the patient care area.  August 29, 2019 11:11 AM    Mynor Mason MD  [facility]  "

## 2019-08-30 NOTE — ASSESSMENT & PLAN NOTE
Recurrent. Referred, change antibiotic. Pathophysiology siri history discussed. To ED if unresponsive or systemic symptoms recur

## 2019-08-31 ENCOUNTER — TRANSFERRED RECORDS (OUTPATIENT)
Dept: HEALTH INFORMATION MANAGEMENT | Facility: CLINIC | Age: 49
End: 2019-08-31

## 2019-09-05 NOTE — PROGRESS NOTES
"Future Appointments   Date Time Provider Department Center   9/6/2019  2:30 PM Frantz Ramirez MD [facility]     Appointment Notes for this encounter:   Er surgery follow up    Admitted 8/31 - 9/2 abscess of left thigh/cellulitis     Health Maintenance Due   Topic Date Due     ANNUAL REVIEW OF HM ORDERS  1970     HIV SCREENING  07/06/1985     PREVENTIVE CARE VISIT  05/03/2014     COLONOSCOPY  05/29/2018     PHQ-2  01/01/2019     INFLUENZA VACCINE (1) 09/01/2019     Pre-visit planning reviewed items:   Reviewed [facility]BP for upcoming orders in Health Maintenance., Reviewed [facility] for due questionnaires. and BestPractice Alerts.    Patient preferred phone number: [phone]   Patient contacted. did not contact patient - visit same day     Actions completed:   Confirmed that the visit type and provider(s) are appropriate for the pt's reason for visit.  Assigned any additional questionnaires.  Marked \"Pre-visit Planning Complete\" via Schedule activity.  My Chart Active      Janis Morris, Registered Nurse   [facility]   Subjective     Antonio Canseco is a 49 year old male who presents to clinic today for the following health issues:    History of Present Illness        He eats 0-1 servings of fruits and vegetables daily.He consumes 3 sweetened beverage(s) daily.  He is taking medications regularly.     ED/UC Followup: Abscess, Left Thigh    Facility: [facility] ER  Date of visit: 8/31/2019  Reason for visit: Abscess rupture, previously evaluated by Dr. Mason  Current Status: post surgery, recovering well.  Site is still open, changing dressing 2 X daily, still taking anti biotic.            Hospital Follow-up Visit:    Hospital/Nursing Home/IP Rehab Facility: [facility].  Date of Admission: 8/31  Date of Discharge: 9/2  Reason(s) for Admission: abscess, s/p surgical I&D with cellulitis of the leg.            Problems taking medications regularly:  None       Medication changes since discharge: None       Problems " adhering to non-medication therapy:  None    Summary of hospitalization:  CareEverywhere information obtained and reviewed  Diagnostic Tests/Treatments reviewed.  Follow up needed: surgery specialty   Other Healthcare Providers Involved in Patient s Care:         wife is a nurse and she is changing his dressing regularly.  Update since discharge: improved.     Post Discharge Medication Reconciliation: discharge medications reconciled, continue medications without change.  Plan of care communicated with patient     Coding guidelines for this visit:  Type of Medical   Decision Making Face-to-Face Visit       within 7 Days of discharge Face-to-Face Visit        within 14 days of discharge   Moderate Complexity [id number]   High Complexity 27548 [id number]              Patient Active Problem List   Diagnosis     CARDIOVASCULAR SCREENING; LDL GOAL LESS THAN 160     BRIAN on CPAP     Degeneration of thoracic intervertebral disc     History of colonic polyps     Hepatic steatosis     Microscopic hematuria     Cough     Plantar fasciitis     Morbid obesity (H)     Right-sided chest pain     Pulmonary nodule     Hepatic cyst     Irritable bowel syndrome without diarrhea     Tinnitus, unspecified laterality     Dysfunction of both eustachian tubes     Low libido     Anal fistula     Past Surgical History:   Procedure Laterality Date     COLONOSCOPY       CYSTOSCOPY  2014    Procedure: CYSTOSCOPY;   Video Cystoscopy with Exam Under Anesthesia;  Surgeon: Chente Moeller MD;  Location:  OR     wisdom teeth         Social History     Tobacco Use     Smoking status: Former Smoker     Packs/day: 1.00     Types: Cigarettes     Last attempt to quit: 2013     Years since quittin.3     Smokeless tobacco: Never Used     Tobacco comment: 1 sd/day   Substance Use Topics     Alcohol use: Yes     Comment: rarely     Family History   Problem Relation Age of Onset     Family History Negative Mother      Cancer Father          lymphoma         Current Outpatient Medications   Medication Sig Dispense Refill     sulfamethoxazole-trimethoprim (BACTRIM DS/SEPTRA DS) 800-160 MG tablet Take 1 tablet by mouth 2 times daily       DULoxetine (CYMBALTA) 60 MG capsule Take 1 capsule (60 mg) by mouth daily 90 capsule 1     lisinopril (PRINIVIL/ZESTRIL) 20 MG tablet Take 1 tablet (20 mg) by mouth daily 90 tablet 1     Allergies   Allergen Reactions     Kiwi Itching     Recent Labs   Lab Test 09/06/19  1532 12/20/18  0823 08/25/15  0902 12/22/14  1543  12/11/13  1044 06/01/13  0448 05/03/13  1043 03/27/12  0937   A1C 7.1*  --  5.6  --   --  6.0  --   --   --    LDL  --   --  56  --   --   --   --  65 58   HDL  --   --  45  --   --   --   --  42 41   TRIG  --   --  133  --   --   --   --  105 105   ALT  --  37  --  44  --   --  57 46 59   CR  --  0.83  --  0.86   < >  --  0.85 0.90 0.96   GFRESTIMATED  --  >90  --  >90  Non  GFR Calc     < >  --  >90 >90 86   GFRESTBLACK  --  >90  --  >90  African American GFR Calc     < >  --  >90 >90 >90   POTASSIUM  --  4.4  --  3.8   < >  --  4.3 4.5 4.7   TSH  --  2.73 3.45  --    < >  --   --  3.67 3.52    < > = values in this interval not displayed.        Reviewed and updated as needed this visit by Provider         Review of Systems   ROS COMP: CONSTITUTIONAL: NEGATIVE for fever, chills, change in weight  RESP: NEGATIVE for significant cough or SOB  CV: NEGATIVE for chest pain, palpitations or peripheral edema      Objective    There were no vitals taken for this visit.  There is no height or weight on file to calculate BMI.  Physical Exam   GENERAL: healthy, alert and no distress  RESP: lungs clear to auscultation - no rales, rhonchi or wheezes  CV: regular rate and rhythm, normal S1 S2, no S3 or S4, no murmur, click or rub, no peripheral edema and peripheral pulses strong  There is a surgical wound with wet /dry dressing in place, about 5 cm long with 2 cm width, on the Lt upper thigh.with  "surrounding mild erythema that is much improved from last time according to patient, the erythema spread on 15 cm diameter approximately     Diagnostic Test Results:  Results for orders placed or performed in visit on 09/06/19 (from the past 24 hour(s))   Hemoglobin A1c   Result Value Ref Range    Hemoglobin A1C 7.1 (H) 0 - 5.6 %           Assessment & Plan     1. Screen for colon cancer    - REVIEW OF HEALTH MAINTENANCE PROTOCOL ORDERS    2. Screening for HIV (human immunodeficiency virus)    - REVIEW OF HEALTH MAINTENANCE PROTOCOL ORDERS  - HIV Antigen Antibody Combo    3. Need for influenza vaccination    - REVIEW OF HEALTH MAINTENANCE PROTOCOL ORDERS    4. Essential hypertension  Controlled,     5. Abscess of anal and rectal regions  Much improved, continue on bactrim, wet to dry dressing, and follow up with surgery  Noticed that pt does have new onset DM, pt to follow up with primary in 1 week or so.  - Hemoglobin A1c     BMI:   Estimated body mass index is 48.4 kg/m  as calculated from the following:    Height as of 8/29/19: 1.803 m (5' 11\").    Weight as of this encounter: 157.4 kg (347 lb).               Return in about 5 days (around 9/11/2019), or if symptoms worsen or fail to improve.    Frantz Ramirez MD  [facility]"

## 2019-09-06 ENCOUNTER — OFFICE VISIT (OUTPATIENT)
Dept: FAMILY MEDICINE | Facility: CLINIC | Age: 49
End: 2019-09-06
Payer: COMMERCIAL

## 2019-09-06 VITALS
WEIGHT: 315 LBS | TEMPERATURE: 98.6 F | BODY MASS INDEX: 48.4 KG/M2 | DIASTOLIC BLOOD PRESSURE: 89 MMHG | SYSTOLIC BLOOD PRESSURE: 139 MMHG | RESPIRATION RATE: 16 BRPM | HEART RATE: 105 BPM

## 2019-09-06 DIAGNOSIS — Z23 NEED FOR INFLUENZA VACCINATION: ICD-10-CM

## 2019-09-06 DIAGNOSIS — Z12.11 SCREEN FOR COLON CANCER: Primary | ICD-10-CM

## 2019-09-06 DIAGNOSIS — K61.2 ABSCESS OF ANAL AND RECTAL REGIONS: ICD-10-CM

## 2019-09-06 DIAGNOSIS — Z11.4 SCREENING FOR HIV (HUMAN IMMUNODEFICIENCY VIRUS): ICD-10-CM

## 2019-09-06 DIAGNOSIS — I10 ESSENTIAL HYPERTENSION: ICD-10-CM

## 2019-09-06 LAB — HBA1C MFR BLD: 7.1 % (ref 0–5.6)

## 2019-09-06 PROCEDURE — 36415 COLL VENOUS BLD VENIPUNCTURE: CPT | Performed by: FAMILY MEDICINE

## 2019-09-06 PROCEDURE — 87389 HIV-1 AG W/HIV-1&-2 AB AG IA: CPT | Performed by: FAMILY MEDICINE

## 2019-09-06 PROCEDURE — 83036 HEMOGLOBIN GLYCOSYLATED A1C: CPT | Performed by: FAMILY MEDICINE

## 2019-09-06 PROCEDURE — 99214 OFFICE O/P EST MOD 30 MIN: CPT | Performed by: FAMILY MEDICINE

## 2019-09-06 RX ORDER — LISINOPRIL 20 MG/1
20 TABLET ORAL DAILY
Qty: 90 TABLET | Refills: 1 | Status: CANCELLED | OUTPATIENT
Start: 2019-09-06

## 2019-09-06 RX ORDER — SULFAMETHOXAZOLE/TRIMETHOPRIM 800-160 MG
1 TABLET ORAL 2 TIMES DAILY
COMMUNITY
Start: 2019-09-02 | End: 2019-09-16

## 2019-09-09 LAB — HIV 1+2 AB+HIV1 P24 AG SERPL QL IA: NONREACTIVE

## 2019-09-16 ENCOUNTER — OFFICE VISIT (OUTPATIENT)
Dept: FAMILY MEDICINE | Facility: CLINIC | Age: 49
End: 2019-09-16
Payer: COMMERCIAL

## 2019-09-16 VITALS
HEART RATE: 84 BPM | TEMPERATURE: 98.3 F | OXYGEN SATURATION: 96 % | DIASTOLIC BLOOD PRESSURE: 80 MMHG | SYSTOLIC BLOOD PRESSURE: 134 MMHG | WEIGHT: 315 LBS | BODY MASS INDEX: 47.7 KG/M2

## 2019-09-16 DIAGNOSIS — K58.9 IRRITABLE BOWEL SYNDROME WITHOUT DIARRHEA: ICD-10-CM

## 2019-09-16 DIAGNOSIS — Z98.890 HISTORY OF DRAINAGE OF ABSCESS: ICD-10-CM

## 2019-09-16 DIAGNOSIS — R73.09 ELEVATED HEMOGLOBIN A1C: ICD-10-CM

## 2019-09-16 DIAGNOSIS — I10 ESSENTIAL HYPERTENSION: ICD-10-CM

## 2019-09-16 PROBLEM — K60.30 ANAL FISTULA: Status: RESOLVED | Noted: 2019-08-29 | Resolved: 2019-09-16

## 2019-09-16 PROCEDURE — 99214 OFFICE O/P EST MOD 30 MIN: CPT | Performed by: FAMILY MEDICINE

## 2019-09-16 RX ORDER — DULOXETIN HYDROCHLORIDE 60 MG/1
60 CAPSULE, DELAYED RELEASE ORAL DAILY
Qty: 90 CAPSULE | Refills: 1 | Status: SHIPPED | OUTPATIENT
Start: 2019-09-16 | End: 2019-12-09

## 2019-09-16 RX ORDER — LISINOPRIL 20 MG/1
20 TABLET ORAL DAILY
Qty: 90 TABLET | Refills: 1 | Status: SHIPPED | OUTPATIENT
Start: 2019-09-16 | End: 2019-12-09

## 2019-09-16 NOTE — PROGRESS NOTES
Subjective     Antonio Canseco is a 49 year old male who presents to clinic today for the following health issues:    Getting Flu vaccine at work tomorrow  Declines Colonoscopy    HPI       Hospital Follow-up Visit:    Hospital/Nursing Home/IP Rehab Facility:  [facility]  Date of Admission: 8/31/19  Date of Discharge: 9/2/19  Reason(s) for Admission: Abscess of left thigh              Problems taking medications regularly:  None       Medication changes since discharge: None       Problems adhering to non-medication therapy:  None  Summary of hospitalization:  [facility] discharge summary reviewed  Diagnostic Tests/Treatments reviewed.  Follow up needed: none  Other Healthcare Providers Involved in Patient s Care:         None  Update since discharge: improved.     Post Discharge Medication Reconciliation: discharge medications reconciled, continue medications without change.  Plan of care communicated with patient     Coding guidelines for this visit:  Type of Medical   Decision Making Face-to-Face Visit       within 7 Days of discharge Face-to-Face Visit        within 14 days of discharge   Moderate Complexity [id number]   High Complexity [id number]          Irritable bowel syndrome without diarrhea improved lately.  He blames his antibiotics  Essential hypertension   BP Readings from Last 6 Encounters:   09/16/19 134/80   09/06/19 139/89   08/29/19 129/87   04/12/19 128/85   12/20/18 122/82   10/05/18 117/78       History of drainage of abscess it was the opinion of colorectal that this was not an anal fistula.  He is having episodes yearly.  This is consistent with hidradenitis suppurativa  Elevated hemoglobin A1c   Lab Results   Component Value Date    A1C 7.1 09/06/2019    A1C 5.6 08/25/2015    A1C 6.0 12/11/2013         Past Medical History:   Diagnosis Date     CARDIOVASCULAR SCREENING; LDL GOAL LESS THAN 160 3/27/2012     Colon adenoma      Cough 2/4/2014     Degeneration of thoracic  intervertebral disc 2013     Dysfunction of both eustachian tubes 2019     Elevated blood pressure 2014     Elevated hemoglobin A1c 9/16/2019    X 1     Hepatic cyst 2018     Hepatic steatosis 2013     History of colonic polyps 2013     History of drainage of abscess 2019     Irritable bowel syndrome without diarrhea 2018     Low libido 2019     Microscopic hematuria 2013     Nephrolithiasis      Obesity 3/27/2012     BRIAN on CPAP 3/27/2012     Other irritable bowel syndrome 2018     Pain in thoracic spine 2013     Pulmonary nodule 2018     Right-sided chest pain 2018     Sleep apnea      Tinnitus, unspecified laterality 2019       Past Surgical History:   Procedure Laterality Date     COLONOSCOPY       CYSTOSCOPY  2014    Procedure: CYSTOSCOPY;   Video Cystoscopy with Exam Under Anesthesia;  Surgeon: Chente Moeller MD;  Location: [facility] OR     wisdom teeth         Family History   Problem Relation Age of Onset     Family History Negative Mother      Cancer Father         lymphoma       Social History     Tobacco Use     Smoking status: Former Smoker     Packs/day: 1.00     Types: Cigarettes     Last attempt to quit: 2013     Years since quittin.4     Smokeless tobacco: Never Used     Tobacco comment: 1 sd/day   Substance Use Topics     Alcohol use: Yes     Comment: rarely     Reviewed and updated as needed this visit by Provider       Review of Systems   ROS COMP: No fevers no bloating.  His wife is packing his wound daily.  She is a nurse.  Pain reduced      Objective    /80 (BP Location: Right arm, Patient Position: Sitting, Cuff Size: Adult Large)   Pulse 84   Temp 98.3  F (36.8  C) (Oral)   Wt (!) 155.1 kg (342 lb)   SpO2 96%   BMI 47.70 kg/m    Body mass index is 47.7 kg/m .   /80 (BP Location: Right arm, Patient Position: Sitting, Cuff Size: Adult Large)   Pulse 84   Temp 98.3  F (36.8  C) (Oral)    "Wt (!) 155.1 kg (342 lb)   SpO2 96%   BMI 47.70 kg/m      Physical Exam   GENERAL: healthy, alert and no distress  SKIN: Medial thigh post inflammatory; no induration, no drainage.  Well packed wound    PSYCH: mentation appears normal, affect normal/bright    Diagnostic Test Results:  Labs reviewed in Epic  No results found for this or any previous visit (from the past 24 hour(s)).        Assessment & Plan   Problem List Items Addressed This Visit        Digestive    Irritable bowel syndrome without diarrhea     Discussed. Continue with current regimen.         Relevant Medications    DULoxetine (CYMBALTA) 60 MG capsule       Circulatory    Essential hypertension     Slightly elevated BP, continue current regimen    BP Readings from Last 6 Encounters:   09/16/19 134/80   09/06/19 139/89   08/29/19 129/87   04/12/19 128/85   12/20/18 122/82   10/05/18 117/78              Relevant Medications    lisinopril (PRINIVIL/ZESTRIL) 20 MG tablet       Hematologic    Elevated hemoglobin A1c     Formal diagnosis of diabetes requires 2 abnormals.  Plan repeat 6 weeks            Other    History of drainage of abscess     post inflammatory; no induration, no drainage. Dressing changed. Patient reports twice daily dressing changes at home.                   BMI:   Estimated body mass index is 47.7 kg/m  as calculated from the following:    Height as of 8/29/19: 1.803 m (5' 11\").    Weight as of this encounter: 155.1 kg (342 lb).     Return in about 3 weeks (around 10/7/2019) for appt, go.  Scribe Disclosure:   I, Med Christiansen RN, am serving as a scribe; to document services personally performed by Mynor Mason MD- -based on data collection and the provider's statements to me.     Provider Disclosure:  I agree with above History, Review of Systems, Physical exam and Plan.  I have reviewed the content of the documentation and have edited it as needed. I have personally performed the services documented here and the " documentation accurately represents those services and the decisions I have made.      Electronically signed by:  Mynor Mason MD  [facility]

## 2019-09-16 NOTE — ASSESSMENT & PLAN NOTE
Slightly elevated BP, continue current regimen    BP Readings from Last 6 Encounters:   09/16/19 134/80   09/06/19 139/89   08/29/19 129/87   04/12/19 128/85   12/20/18 122/82   10/05/18 117/78

## 2019-09-16 NOTE — ASSESSMENT & PLAN NOTE
post inflammatory; no induration, no drainage. Dressing changed. Patient reports twice daily dressing changes at home.

## 2019-09-18 ENCOUNTER — NURSE TRIAGE (OUTPATIENT)
Dept: NURSING | Facility: CLINIC | Age: 49
End: 2019-09-18

## 2019-09-19 ENCOUNTER — OFFICE VISIT (OUTPATIENT)
Dept: FAMILY MEDICINE | Facility: CLINIC | Age: 49
End: 2019-09-19
Payer: COMMERCIAL

## 2019-09-19 VITALS
DIASTOLIC BLOOD PRESSURE: 79 MMHG | HEART RATE: 95 BPM | BODY MASS INDEX: 48.12 KG/M2 | TEMPERATURE: 98.6 F | SYSTOLIC BLOOD PRESSURE: 129 MMHG | OXYGEN SATURATION: 96 % | WEIGHT: 315 LBS

## 2019-09-19 DIAGNOSIS — L03.116 CELLULITIS OF LEFT LOWER EXTREMITY: ICD-10-CM

## 2019-09-19 DIAGNOSIS — Z98.890 HISTORY OF DRAINAGE OF ABSCESS: Primary | ICD-10-CM

## 2019-09-19 PROCEDURE — 99214 OFFICE O/P EST MOD 30 MIN: CPT | Performed by: PHYSICIAN ASSISTANT

## 2019-09-19 RX ORDER — CEPHALEXIN 500 MG/1
500 CAPSULE ORAL 3 TIMES DAILY
Qty: 21 CAPSULE | Refills: 0 | Status: SHIPPED | OUTPATIENT
Start: 2019-09-19 | End: 2019-10-04

## 2019-09-19 RX ORDER — SULFAMETHOXAZOLE/TRIMETHOPRIM 800-160 MG
1 TABLET ORAL 2 TIMES DAILY
Qty: 14 TABLET | Refills: 0 | Status: SHIPPED | OUTPATIENT
Start: 2019-09-19 | End: 2019-10-04

## 2019-09-19 NOTE — TELEPHONE ENCOUNTER
Wife calls to say patient is discharged from the hospital after a 3 days stay for abscess/cellulitis of left leg.  Patient was on Keflex and Bactrim and is off antibiotics since Saturday. Wife is continuuing to do wet to dry dressing and seems to be healing.  Wife noticed increase serosanguinous discharg and fishy odor.  Patient reports it being a bit more sensitive; wife says pain is around 2-3/10.  She is requesting an antibiotic to be sent to [facility] (in [place] on [address]) tonight.  Reviewed care advice with caller that patient could be seen tomorrow which is within the guideline.  Wife would like patient to be started on antibiotic tonight and requested on-call doctor be paged.    Paged on-call provider, Dr. Love, at 10:13 pm via Smart Web to call FNA back re: Antonio Canseco MRN: [id number] : 70 PCP: Marlon. Wife is requesting abx tonight for increase wound drainage and odor. Marni [phone].  Dr. Love returned call and says patient needs to be seen first.  FNA advised wife of doctor's recommendation and she verbalized understanding.        Reason for Disposition    [1] Pus or cloudy fluid draining from wound AND [2] no fever    Additional Information    Negative: [1] Widespread rash AND [2] bright red, sunburn-like AND [3] too weak to stand    Negative: Sounds like a life-threatening emergency to the triager    Negative: Stitches (or staples) and  not  infected    Negative: Skin glue used to close wound and not infected    Negative:  surgical wound infection suspected    Negative: Surgical wound infection suspected (post-op)    Negative: Animal bite wound infection suspected    Negative: [1] Widespread rash AND [2] bright red, sunburn-like    Negative: Severe pain in the wound    Negative: Black (necrotic) or blisters develop in wound    Negative: Patient sounds very sick or weak to the triager    Negative: [1] Looks infected (spreading redness, red streak, pus) AND [2]  fever    Negative: [1] Red streak runs from the wound AND [2] longer than 1 inch (2.5 cm)    Negative: [1] Skin around the wound has become red AND [2] larger than 2 inches (5 cm)    Negative: [1] Face wound AND [2] looks infected (e.g., spreading redness)    Negative: [1] Finger wound AND [2] entire finger swollen    Negative: [1] Red area or streak AND [2] no fever    Protocols used: WOUND INFECTION-A-AH

## 2019-10-07 ENCOUNTER — OFFICE VISIT (OUTPATIENT)
Dept: FAMILY MEDICINE | Facility: CLINIC | Age: 49
End: 2019-10-07
Payer: COMMERCIAL

## 2019-10-07 VITALS
RESPIRATION RATE: 16 BRPM | HEART RATE: 87 BPM | OXYGEN SATURATION: 96 % | DIASTOLIC BLOOD PRESSURE: 88 MMHG | TEMPERATURE: 98.7 F | BODY MASS INDEX: 44.1 KG/M2 | WEIGHT: 315 LBS | SYSTOLIC BLOOD PRESSURE: 129 MMHG | HEIGHT: 71 IN

## 2019-10-07 DIAGNOSIS — Z23 NEED FOR PROPHYLACTIC VACCINATION AND INOCULATION AGAINST INFLUENZA: ICD-10-CM

## 2019-10-07 DIAGNOSIS — E11.9 CONTROLLED TYPE 2 DIABETES MELLITUS WITHOUT COMPLICATION, WITHOUT LONG-TERM CURRENT USE OF INSULIN (H): ICD-10-CM

## 2019-10-07 DIAGNOSIS — G47.33 OSA ON CPAP: ICD-10-CM

## 2019-10-07 DIAGNOSIS — Z12.11 SCREEN FOR COLON CANCER: ICD-10-CM

## 2019-10-07 DIAGNOSIS — E66.01 MORBID OBESITY (H): Primary | ICD-10-CM

## 2019-10-07 DIAGNOSIS — I10 ESSENTIAL HYPERTENSION: ICD-10-CM

## 2019-10-07 DIAGNOSIS — Z98.890 HISTORY OF DRAINAGE OF ABSCESS: ICD-10-CM

## 2019-10-07 LAB
CHOLEST SERPL-MCNC: 138 MG/DL
HBA1C MFR BLD: 6.6 % (ref 0–5.6)
HDLC SERPL-MCNC: 47 MG/DL
LDLC SERPL CALC-MCNC: 71 MG/DL
NONHDLC SERPL-MCNC: 91 MG/DL
TRIGL SERPL-MCNC: 102 MG/DL
TSH SERPL DL<=0.005 MIU/L-ACNC: 3.07 MU/L (ref 0.4–4)

## 2019-10-07 PROCEDURE — 36415 COLL VENOUS BLD VENIPUNCTURE: CPT | Performed by: FAMILY MEDICINE

## 2019-10-07 PROCEDURE — 90471 IMMUNIZATION ADMIN: CPT | Performed by: FAMILY MEDICINE

## 2019-10-07 PROCEDURE — 83036 HEMOGLOBIN GLYCOSYLATED A1C: CPT | Performed by: FAMILY MEDICINE

## 2019-10-07 PROCEDURE — 80061 LIPID PANEL: CPT | Performed by: FAMILY MEDICINE

## 2019-10-07 PROCEDURE — 84443 ASSAY THYROID STIM HORMONE: CPT | Performed by: FAMILY MEDICINE

## 2019-10-07 PROCEDURE — 99214 OFFICE O/P EST MOD 30 MIN: CPT | Mod: 25 | Performed by: FAMILY MEDICINE

## 2019-10-07 PROCEDURE — 90686 IIV4 VACC NO PRSV 0.5 ML IM: CPT | Performed by: FAMILY MEDICINE

## 2019-10-07 RX ORDER — ASPIRIN 81 MG/1
81 TABLET, CHEWABLE ORAL DAILY
Qty: 100 TABLET | Refills: 3 | Status: SHIPPED | OUTPATIENT
Start: 2019-10-07 | End: 2021-02-11

## 2019-10-07 ASSESSMENT — MIFFLIN-ST. JEOR: SCORE: 2452.04

## 2019-10-07 NOTE — PROGRESS NOTES
Subjective     Antonio Canseco is a 49 year old male who presents to clinic today for the following health issues:    HPI   Concern - Leg Infection  Onset: 8/31/2019      Description: Cellulitis of the Left leg    Progression of Symptoms: resolved    Therapies Tried and outcome: surgery on 8/31/2019  Elevated hemoglobin A1c  (primary encounter diagnosis) DM dx today  Morbid obesity (H) Body mass index is 48.12 kg/m .    Controlled type 2 diabetes mellitus without complication, without long-term current use of insulin (H) dx today    Screen for colon cancer discussed  Need for prophylactic vaccination and inoculation against influenza offered  Essential hypertension   BP Readings from Last 1 Encounters:   10/07/19 129/88       BRIAN on CPAP compliant      Past Medical History:   Diagnosis Date     CARDIOVASCULAR SCREENING; LDL GOAL LESS THAN 160 3/27/2012     Colon adenoma      Controlled type 2 diabetes mellitus without complication, without long-term current use of insulin (H) 9/16/2019    X 1     Cough 2/4/2014     Degeneration of thoracic intervertebral disc 12/11/2013     Dysfunction of both eustachian tubes 4/12/2019     Elevated blood pressure 12/22/2014     Elevated hemoglobin A1c 9/16/2019    X 1     Hepatic cyst 5/22/2018     Hepatic steatosis 12/11/2013     History of colonic polyps 12/11/2013     History of drainage of abscess 9/16/2019     Irritable bowel syndrome without diarrhea 6/1/2018     Low libido 4/12/2019     Microscopic hematuria 12/19/2013     Nephrolithiasis      Obesity 3/27/2012     BRIAN on CPAP 3/27/2012     Other irritable bowel syndrome 5/22/2018     Pain in thoracic spine 5/9/2013     Pulmonary nodule 5/22/2018     Right-sided chest pain 4/13/2018     Sleep apnea      Tinnitus, unspecified laterality 4/12/2019       Past Surgical History:   Procedure Laterality Date     COLONOSCOPY       CYSTOSCOPY  2/11/2014    Procedure: CYSTOSCOPY;   Video Cystoscopy with Exam Under Anesthesia;  Surgeon:  "Chente Moeller MD;  Location: [facility] OR     wisdom teeth         Family History   Problem Relation Age of Onset     Family History Negative Mother      Cancer Father         lymphoma       Social History     Tobacco Use     Smoking status: Former Smoker     Packs/day: 1.00     Types: Cigarettes     Last attempt to quit: 2013     Years since quittin.4     Smokeless tobacco: Never Used     Tobacco comment: 1 sd/day   Substance Use Topics     Alcohol use: Yes     Comment: rarely         Reviewed and updated as needed this visit by Provider         Review of Systems   No fever, drainage pain      Objective    There were no vitals taken for this visit.  There is no height or weight on file to calculate BMI.  Physical Exam   /88 (BP Location: Right arm, Patient Position: Chair, Cuff Size: Adult Large)   Pulse 87   Temp 98.7  F (37.1  C) (Oral)   Resp 16   Ht 1.803 m (5' 11\")   Wt (!) 156.5 kg (345 lb)   SpO2 96%   BMI 48.12 kg/m      Infection site has closed. No induration, no residual            Assessment & Plan   Problem List Items Addressed This Visit        Respiratory    BRIAN on CPAP     Compliant. May resolve with successful weight loss            Digestive    Morbid obesity (H)     Body mass index is 48.12 kg/m .DM BRIAN. Refer to bariatric surgery. Discussed           Relevant Orders    BARIATRIC ADULT REFERRAL       Endocrine    Controlled type 2 diabetes mellitus without complication, without long-term current use of insulin (H) - Primary     Dx today. Refer for education, recommend eye exam.         Relevant Medications    aspirin (ASA) 81 MG chewable tablet    Other Relevant Orders    Hemoglobin A1c (Completed)    TSH with free T4 reflex (Completed)    BARIATRIC ADULT REFERRAL    AMBULATORY ADULT DIABETES EDUCATOR REFERRAL (Completed)    Lipid panel reflex to direct LDL Non-fasting (Completed)    Albumin Random Urine Quantitative with Creat Ratio       Circulatory    Essential " "hypertension    Relevant Orders    Albumin Random Urine Quantitative with Creat Ratio       Other    History of drainage of abscess     Not anal gland. Recurrence suggests sebaceous cyst           Other Visit Diagnoses     Screen for colon cancer        Relevant Orders    Fecal colorectal cancer screen FIT - Future (S+30)    Need for prophylactic vaccination and inoculation against influenza        Relevant Orders    INFLUENZA VACCINE IM > 6 MONTHS VALENT IIV4 [70567] (Completed)    Vaccine Administration, Initial [71843] (Completed)             BMI:   Estimated body mass index is 48.12 kg/m  as calculated from the following:    Height as of this encounter: 1.803 m (5' 11\").    Weight as of this encounter: 156.5 kg (345 lb).             Return in about 3 months (around 1/7/2020).    Mynor Mason MD  [facility]    "

## 2019-11-04 ENCOUNTER — ALLIED HEALTH/NURSE VISIT (OUTPATIENT)
Dept: EDUCATION SERVICES | Facility: CLINIC | Age: 49
End: 2019-11-04
Payer: COMMERCIAL

## 2019-11-04 VITALS — WEIGHT: 315 LBS | BODY MASS INDEX: 48.3 KG/M2

## 2019-11-04 DIAGNOSIS — E11.9 CONTROLLED TYPE 2 DIABETES MELLITUS WITHOUT COMPLICATION, WITHOUT LONG-TERM CURRENT USE OF INSULIN (H): Primary | ICD-10-CM

## 2019-11-04 PROCEDURE — G0108 DIAB MANAGE TRN  PER INDIV: HCPCS

## 2019-11-04 RX ORDER — LANCETS
1 EACH MISCELLANEOUS DAILY
Qty: 100 EACH | Refills: 11 | Status: SHIPPED | OUTPATIENT
Start: 2019-11-04 | End: 2020-10-29

## 2019-11-04 NOTE — LETTER
"    11/4/2019         RE: Antonio Canseco  [address] [address]        Dear Colleague,    Thank you for referring your patient, Antonio Canseco, to the [facility]. Please see a copy of my visit note below.    Diabetes Self-Management Education & Support    Diabetes Education Self Management & Training    SUBJECTIVE/OBJECTIVE:  Presents for: Initial Assessment for new diagnosis  Accompanied by: Self  Diabetes education in the past 24mo: No  Focus of Visit: Patient Unsure  Diabetes type: Type 2  Date of diagnosis: October 2019  Disease course: Stable  How confident are you filling out medical forms by yourself:: Quite a bit  Diabetes management related comments/concerns: no  Transportation concerns: No  Other concerns:: None- has a referral to bariatric surgery  Cultural Influences/Ethnic Background:  American    Diabetes Symptoms & Complications  Blurred vision: Yes  Fatigue: Yes  Neuropathy: No  Foot ulcerations: No  Polydipsia: No  Polyphagia: No  Polyuria: No  Visual change: No  Weakness: No  Weight loss: No  Slow healing wounds: No  Weight trend: Stable  Autonomic neuropathy: No  CVA: No  Heart disease: No  Nephropathy: No  Peripheral neuropathy: No  Peripheral Vascular Disease: No  Retinopathy: No  Sexual dysfunction: Yes    Patient Problem List and Family Medical History reviewed for relevant medical history, current medical status, and diabetes risk factors.    Vitals:  Wt (!) 157.1 kg (346 lb 4.8 oz)   BMI 48.30 kg/m     Estimated body mass index is 48.3 kg/m  as calculated from the following:    Height as of 10/7/19: 1.803 m (5' 11\").    Weight as of this encounter: 157.1 kg (346 lb 4.8 oz).   Last 3 BP:   BP Readings from Last 3 Encounters:   10/07/19 129/88   09/19/19 129/79   09/16/19 134/80       History   Smoking Status     Former Smoker     Packs/day: 1.00     Types: Cigarettes     Quit date: 4/23/2013   Smokeless Tobacco     Never Used     Comment: 1 " sd/day       Labs:  Lab Results   Component Value Date    A1C 6.6 10/07/2019     Lab Results   Component Value Date     12/20/2018     Lab Results   Component Value Date    LDL 71 10/07/2019     HDL Cholesterol   Date Value Ref Range Status   10/07/2019 47 >39 mg/dL Final   ]  GFR Estimate   Date Value Ref Range Status   12/20/2018 >90 >60 mL/min/[1.73_m2] Final     Comment:     Non  GFR Calc  Starting 12/18/2018, serum creatinine based estimated GFR (eGFR) will be   calculated using the Chronic Kidney Disease Epidemiology Collaboration   (CKD-EPI) equation.       GFR Estimate If Black   Date Value Ref Range Status   12/20/2018 >90 >60 mL/min/[1.73_m2] Final     Comment:      GFR Calc  Starting 12/18/2018, serum creatinine based estimated GFR (eGFR) will be   calculated using the Chronic Kidney Disease Epidemiology Collaboration   (CKD-EPI) equation.       Lab Results   Component Value Date    CR 0.83 12/20/2018     No results found for: MICROALBUMIN    Healthy Eating  Healthy Eating Assessed Today: Yes  Cultural/Episcopalian diet restrictions?: No  Patient on a regular basis: Eats 3 meals a day, Eats out more than once a week  Meal planning: None  Meals include: Breakfast, Lunch, Dinner, Other  Breakfast: gas station-bbreakfast sandwich and fruit cup, coffee/energy drink  Lunch: soup and sandwich   Dinner: steak, baked pot, beans Or chinese food Or eat out  Beverages: Coffee, Milk, Soda, Energy drinks  Has patient met with a dietitian in the past?: No    Being Active  Being Active Assessed Today: Yes  Exercise:: Currently not exercising  Barrier to exercise: None, Time, Physical limitation    Monitoring  Monitoring Assessed Today: Yes  Did patient bring glucose meter to appointment? : No(new dx - needs meter)  Home Glucose (Sugar) Monitoring: Never      Taking Medications  Taking Medication Assessed Today: Yes  Current Treatments: None    Problem Solving  Problem Solving  Assessed Today: Yes  Patient carries a carbohydrate source: No  Medical alert: No  Severe weather/disaster plan for diabetes management?: No  DKA prevention plan?: No  Sick day plan for diabetes management?: (P) No    Hypoglycemia symptoms  Confusion: No  Dizziness or Light-Headedness: No  Headaches: No  Hunger: No  Mood changes: No  Nervousness/Anxiety: No  Sleepiness: No  Speech difficulty: No  Sweats: No  Tremors: No    Hypoglycemia Complications  Blackouts: No  Hospitalization: No  Nocturnal hypoglycemia: No  Required assistance: No  Required glucagon injection: No  Seizures: No    Reducing Risks  Reducing Risks Assessed Today: Yes  Diabetes Risks: Age over 45 years, Sedentary Lifestyle  CAD Risks: Obesity, Sedentary lifestyle, Diabetes Mellitus, Male sex  Has dilated eye exam at least once a year?: No  Sees dentist every 6 months?: Yes  Sees podiatrist (foot doctor)?: No    Healthy Coping  Healthy Coping Assessed Today: Yes  Emotional response to diabetes: Ready to learn  Informal Support system:: Spouse  Stage of change: PREPARATION (Decided to change - considering how)  Patient Activation Measure Survey Score:  SARAH Score (Last Two) 3/23/2012   SARAH Raw Score 45   Activation Score 73.1   SARAH Level 4       ASSESSMENT:  Patient motivated to make lifestyle change to improve diabetes, he is also considering bariatric surgery and has an appointment this week.  He would benefit from healthy eating and adding activity.     Goals Addressed as of 11/4/2019 at 9:48 AM       Being Active (pt-stated)     Added 11/4/19 by Teresa Ray RN     My Goal: I will add 15 minutes of activity daily    What I need to meet my goal: walk or use exercise gym at work    I plan to meet my goal by this date: 12/2/19            Patient's most recent   Lab Results   Component Value Date    A1C 6.6 10/07/2019    is meeting goal of <7.0    INTERVENTION:   Diabetes knowledge and skills assessment:     Patient is knowledgeable in diabetes  management concepts related to: NA    Patient needs further education on the following diabetes management concepts: Healthy Eating, Being Active, Monitoring, Taking Medication, Problem Solving, Reducing Risks and Healthy Coping    Based on learning assessment above, most appropriate setting for further diabetes education would be: Group class or Individual setting.    Education provided today on:  AADE Self-Care Behaviors:  Diabetes Pathophysiology  Healthy Eating: consistency in amount, composition, and timing of food intake, weight reduction, portion control and plate planning method  Being Active: relationship to blood glucose, describe appropriate activity program and precautions to take  Monitoring: purpose, proper technique, log and interpret results, individual blood glucose targets, frequency of monitoring, use of glucose control solution and proper sharps disposal  Reducing Risks: A1C - goals, relating to blood glucose levels, how often to check  Patient was instructed on Accu-Chek Guide meter and was able to provide an accurate return demonstration. Patient's blood glucose reading today was 141 mg/dL.    Opportunities for ongoing education and support in diabetes-self management were discussed.    Pt verbalized understanding of concepts discussed and recommendations provided today.       Education Materials Provided:  [place] Understanding Diabetes Booklet, Safe Disposal Options for Needles & Syringes, BG Log Sheet and My Plate Planner    PLAN:  See Patient Instructions for co-developed, patient-stated behavior change goals.  AVS printed and provided to patient today. See Follow-Up section for recommended follow-up.    Teresa Ray RN,CDE   Time Spent: 70 minutes  Encounter Type: Individual    Any diabetes medication dose changes were made via the CDE Protocol and Collaborative Practice Agreement with the patient's referring provider. A copy of this encounter was shared with the provider.

## 2019-11-04 NOTE — PATIENT INSTRUCTIONS
Care Plan:  Meal Plan Recommendation: eat 3 meals a day, have small snacks between meals, if needed, use portion control and use plate planning method  - avoid sugar sweetened beverages- diet is ok  Exercise / activity plan: add 15 minutes daily- try walking at work or going the the gym at work   Check blood sugars 1-2 times a week- test before a meals and 2 hours after the start of that meal (rotate between breakfast, lunch and supper)       Follow up:  Follow-up diabetes education appointment scheduled on 12/2/19 at 8:30am.      Bring blood glucose meter and logbook with you to all doctor and follow-up appointments.     [facility] for the [facility] Area:  For Your Diabetes or Nutrition Education Appointments Call:  [phone]   For Diabetes or Nutrition Related Questions Call or Email:   [phone]  [email or web address]  Fax: [phone]   If you need a medication refill please contact your pharmacy. Please allow 3 business days for your refills to be completed.     Instructions for emailing the Diabetes Educators    If you need to communicate a non-urgent message to a Diabetes Educator via email, please send to [email or web address].    Please follow the following email guidelines:    Subject line: Secure: your clinic name (example: Secure: [email or web address])  In the email please include: First name, middle initial, last name and date of birth.    We will be in touch with you within one (1) business day.

## 2019-11-04 NOTE — PROGRESS NOTES
"Diabetes Self-Management Education & Support    Diabetes Education Self Management & Training    SUBJECTIVE/OBJECTIVE:  Presents for: Initial Assessment for new diagnosis  Accompanied by: Self  Diabetes education in the past 24mo: No  Focus of Visit: Patient Unsure  Diabetes type: Type 2  Date of diagnosis: October 2019  Disease course: Stable  How confident are you filling out medical forms by yourself:: Quite a bit  Diabetes management related comments/concerns: no  Transportation concerns: No  Other concerns:: None- has a referral to bariatric surgery  Cultural Influences/Ethnic Background:  American    Diabetes Symptoms & Complications  Blurred vision: Yes  Fatigue: Yes  Neuropathy: No  Foot ulcerations: No  Polydipsia: No  Polyphagia: No  Polyuria: No  Visual change: No  Weakness: No  Weight loss: No  Slow healing wounds: No  Weight trend: Stable  Autonomic neuropathy: No  CVA: No  Heart disease: No  Nephropathy: No  Peripheral neuropathy: No  Peripheral Vascular Disease: No  Retinopathy: No  Sexual dysfunction: Yes    Patient Problem List and Family Medical History reviewed for relevant medical history, current medical status, and diabetes risk factors.    Vitals:  Wt (!) 157.1 kg (346 lb 4.8 oz)   BMI 48.30 kg/m    Estimated body mass index is 48.3 kg/m  as calculated from the following:    Height as of 10/7/19: 1.803 m (5' 11\").    Weight as of this encounter: 157.1 kg (346 lb 4.8 oz).   Last 3 BP:   BP Readings from Last 3 Encounters:   10/07/19 129/88   09/19/19 129/79   09/16/19 134/80       History   Smoking Status     Former Smoker     Packs/day: 1.00     Types: Cigarettes     Quit date: 4/23/2013   Smokeless Tobacco     Never Used     Comment: 1 sd/day       Labs:  Lab Results   Component Value Date    A1C 6.6 10/07/2019     Lab Results   Component Value Date     12/20/2018     Lab Results   Component Value Date    LDL 71 10/07/2019     HDL Cholesterol   Date Value Ref Range Status "   10/07/2019 47 >39 mg/dL Final   ]  GFR Estimate   Date Value Ref Range Status   12/20/2018 >90 >60 mL/min/[1.73_m2] Final     Comment:     Non  GFR Calc  Starting 12/18/2018, serum creatinine based estimated GFR (eGFR) will be   calculated using the Chronic Kidney Disease Epidemiology Collaboration   (CKD-EPI) equation.       GFR Estimate If Black   Date Value Ref Range Status   12/20/2018 >90 >60 mL/min/[1.73_m2] Final     Comment:      GFR Calc  Starting 12/18/2018, serum creatinine based estimated GFR (eGFR) will be   calculated using the Chronic Kidney Disease Epidemiology Collaboration   (CKD-EPI) equation.       Lab Results   Component Value Date    CR 0.83 12/20/2018     No results found for: MICROALBUMIN    Healthy Eating  Healthy Eating Assessed Today: Yes  Cultural/Jain diet restrictions?: No  Patient on a regular basis: Eats 3 meals a day, Eats out more than once a week  Meal planning: None  Meals include: Breakfast, Lunch, Dinner, Other  Breakfast: gas station-bbreakfast sandwich and fruit cup, coffee/energy drink  Lunch: soup and sandwich   Dinner: steak, baked pot, beans Or chinese food Or eat out  Beverages: Coffee, Milk, Soda, Energy drinks  Has patient met with a dietitian in the past?: No    Being Active  Being Active Assessed Today: Yes  Exercise:: Currently not exercising  Barrier to exercise: None, Time, Physical limitation    Monitoring  Monitoring Assessed Today: Yes  Did patient bring glucose meter to appointment? : No(new dx - needs meter)  Home Glucose (Sugar) Monitoring: Never      Taking Medications  Taking Medication Assessed Today: Yes  Current Treatments: None    Problem Solving  Problem Solving Assessed Today: Yes  Patient carries a carbohydrate source: No  Medical alert: No  Severe weather/disaster plan for diabetes management?: No  DKA prevention plan?: No  Sick day plan for diabetes management?: (P) No    Hypoglycemia symptoms  Confusion:  No  Dizziness or Light-Headedness: No  Headaches: No  Hunger: No  Mood changes: No  Nervousness/Anxiety: No  Sleepiness: No  Speech difficulty: No  Sweats: No  Tremors: No    Hypoglycemia Complications  Blackouts: No  Hospitalization: No  Nocturnal hypoglycemia: No  Required assistance: No  Required glucagon injection: No  Seizures: No    Reducing Risks  Reducing Risks Assessed Today: Yes  Diabetes Risks: Age over 45 years, Sedentary Lifestyle  CAD Risks: Obesity, Sedentary lifestyle, Diabetes Mellitus, Male sex  Has dilated eye exam at least once a year?: No  Sees dentist every 6 months?: Yes  Sees podiatrist (foot doctor)?: No    Healthy Coping  Healthy Coping Assessed Today: Yes  Emotional response to diabetes: Ready to learn  Informal Support system:: Spouse  Stage of change: PREPARATION (Decided to change - considering how)  Patient Activation Measure Survey Score:  SARAH Score (Last Two) 3/23/2012   SARAH Raw Score 45   Activation Score 73.1   SARAH Level 4       ASSESSMENT:  Patient motivated to make lifestyle change to improve diabetes, he is also considering bariatric surgery and has an appointment this week.  He would benefit from healthy eating and adding activity.     Goals Addressed as of 11/4/2019 at 9:48 AM       Being Active (pt-stated)     Added 11/4/19 by Teresa Ray RN     My Goal: I will add 15 minutes of activity daily    What I need to meet my goal: walk or use exercise gym at work    I plan to meet my goal by this date: 12/2/19            Patient's most recent   Lab Results   Component Value Date    A1C 6.6 10/07/2019    is meeting goal of <7.0    INTERVENTION:   Diabetes knowledge and skills assessment:     Patient is knowledgeable in diabetes management concepts related to: NA    Patient needs further education on the following diabetes management concepts: Healthy Eating, Being Active, Monitoring, Taking Medication, Problem Solving, Reducing Risks and Healthy Coping    Based on learning  assessment above, most appropriate setting for further diabetes education would be: Group class or Individual setting.    Education provided today on:  AADE Self-Care Behaviors:  Diabetes Pathophysiology  Healthy Eating: consistency in amount, composition, and timing of food intake, weight reduction, portion control and plate planning method  Being Active: relationship to blood glucose, describe appropriate activity program and precautions to take  Monitoring: purpose, proper technique, log and interpret results, individual blood glucose targets, frequency of monitoring, use of glucose control solution and proper sharps disposal  Reducing Risks: A1C - goals, relating to blood glucose levels, how often to check  Patient was instructed on Accu-Chek Guide meter and was able to provide an accurate return demonstration. Patient's blood glucose reading today was 141 mg/dL.    Opportunities for ongoing education and support in diabetes-self management were discussed.    Pt verbalized understanding of concepts discussed and recommendations provided today.       Education Materials Provided:  [employer] Understanding Diabetes Booklet, Safe Disposal Options for Needles & Syringes, BG Log Sheet and My Plate Planner    PLAN:  See Patient Instructions for co-developed, patient-stated behavior change goals.  AVS printed and provided to patient today. See Follow-Up section for recommended follow-up.    Teresa Ray RN,CDE   Time Spent: 70 minutes  Encounter Type: Individual    Any diabetes medication dose changes were made via the CDE Protocol and Collaborative Practice Agreement with the patient's referring provider. A copy of this encounter was shared with the provider.

## 2019-11-07 ENCOUNTER — HEALTH MAINTENANCE LETTER (OUTPATIENT)
Age: 49
End: 2019-11-07

## 2019-11-22 ENCOUNTER — TELEPHONE (OUTPATIENT)
Dept: SURGERY | Facility: CLINIC | Age: 49
End: 2019-11-22

## 2019-11-22 NOTE — TELEPHONE ENCOUNTER
Called pt in regards to his appt with sarkis horton on 11/25/2019. There is a questionnare that needs to be completed before his appointment at 8:30. If he can sign into his mychart to do those that would be great. Please help pt get set up on mychart if he cannot login. Thank you    Edilma Zacarias MA

## 2019-11-25 ENCOUNTER — OFFICE VISIT (OUTPATIENT)
Dept: SURGERY | Facility: CLINIC | Age: 49
End: 2019-11-25
Payer: COMMERCIAL

## 2019-11-25 VITALS — WEIGHT: 315 LBS | BODY MASS INDEX: 44.1 KG/M2 | HEIGHT: 71 IN

## 2019-11-25 VITALS
TEMPERATURE: 97.8 F | DIASTOLIC BLOOD PRESSURE: 65 MMHG | WEIGHT: 315 LBS | BODY MASS INDEX: 44.1 KG/M2 | HEART RATE: 75 BPM | OXYGEN SATURATION: 99 % | SYSTOLIC BLOOD PRESSURE: 114 MMHG | HEIGHT: 71 IN

## 2019-11-25 DIAGNOSIS — G47.33 OSA ON CPAP: ICD-10-CM

## 2019-11-25 DIAGNOSIS — Z13.29 SCREENING FOR ENDOCRINE, NUTRITIONAL, METABOLIC AND IMMUNITY DISORDER: ICD-10-CM

## 2019-11-25 DIAGNOSIS — K76.0 FATTY LIVER: ICD-10-CM

## 2019-11-25 DIAGNOSIS — I10 ESSENTIAL HYPERTENSION: ICD-10-CM

## 2019-11-25 DIAGNOSIS — Z13.228 SCREENING FOR ENDOCRINE, NUTRITIONAL, METABOLIC AND IMMUNITY DISORDER: ICD-10-CM

## 2019-11-25 DIAGNOSIS — E66.01 MORBID OBESITY (H): Primary | ICD-10-CM

## 2019-11-25 DIAGNOSIS — Z13.21 SCREENING FOR ENDOCRINE, NUTRITIONAL, METABOLIC AND IMMUNITY DISORDER: ICD-10-CM

## 2019-11-25 DIAGNOSIS — E66.01 MORBID OBESITY (H): ICD-10-CM

## 2019-11-25 DIAGNOSIS — E11.9 CONTROLLED TYPE 2 DIABETES MELLITUS WITHOUT COMPLICATION, WITHOUT LONG-TERM CURRENT USE OF INSULIN (H): ICD-10-CM

## 2019-11-25 DIAGNOSIS — Z13.0 SCREENING FOR IRON DEFICIENCY ANEMIA: ICD-10-CM

## 2019-11-25 DIAGNOSIS — Z13.0 SCREENING FOR ENDOCRINE, NUTRITIONAL, METABOLIC AND IMMUNITY DISORDER: ICD-10-CM

## 2019-11-25 PROCEDURE — 97802 MEDICAL NUTRITION INDIV IN: CPT | Performed by: DIETITIAN, REGISTERED

## 2019-11-25 PROCEDURE — 99204 OFFICE O/P NEW MOD 45 MIN: CPT | Performed by: PHYSICIAN ASSISTANT

## 2019-11-25 ASSESSMENT — MIFFLIN-ST. JEOR
SCORE: 2466.1
SCORE: 2466.1

## 2019-11-25 NOTE — Clinical Note
Thank you for referring this patient to our bariatric clinic for an initial evaluation.  I look forward to working with you during this process.  Here is a copy of my visit.  Sincerely,Shanel Vincent PA-C

## 2019-11-25 NOTE — PATIENT INSTRUCTIONS
Please refer to your task list created today at your appointment.  Make appointment to return to clinic in 1 month to see the dietician and JUN.

## 2019-11-25 NOTE — PROGRESS NOTES
"New Bariatric Surgery Consultation Note    2019    RE: Antonio Canseco  MR#: [id number]  : 1970      Referring provider:       2019   Who referred you? Dr Mason       Chief Complaint/Reason for visit: evaluation for possible weight loss surgery    Dear Marlon, Mynor STEPHEN MD (General),    I had the pleasure of seeing your patient, Antonio Canseco, to evaluate his obesity and consider him for possible weight loss surgery. As you know, Antonio Canseco is 49 year old.  He has a height of 5' 11\", a weight of 348 lbs 1.6 oz, and calculated Body mass index is 48.55 kg/m .    HISTORY OF PRESENT ILLNESS:  Weight Loss History Reviewed with Patient 2019   How long have you been overweight? Since late 20's to early 40's   What is the most that you have ever weighed? 350   What is the most weight you have lost? 45   I have tried the following methods to lose weight Watching portions or calories   I have tried the following weight loss medications? (Check all that apply) None   Have you ever had weight loss surgery? No       CO-MORBIDITIES OF OBESITY INCLUDE:     2019   I have the following co-morbidities associated with obesity: Type II Diabetes, Sleep Apnea   What was your most recent hemoglobin a1c  6.3   How many years have you had diabetes? 1, diet controlled   Do you use a CPAP? Yes   Are you taking daily medication for heartburn, acid reflux, or GERD (acid reflux disease)? Yes       PAST MEDICAL HISTORY:  Past Medical History:   Diagnosis Date     CARDIOVASCULAR SCREENING; LDL GOAL LESS THAN 160 3/27/2012     Colon adenoma      Controlled type 2 diabetes mellitus without complication, without long-term current use of insulin (H) 9/16/2019    X 1     Cough 2014     Degeneration of thoracic intervertebral disc 2013     Dysfunction of both eustachian tubes 2019     Elevated blood pressure 2014     Elevated hemoglobin A1c 9/16/2019    X 1     Hepatic cyst 2018     " Hepatic steatosis- CT scan 12/11/2013     History of colonic polyps 12/11/2013     History of drainage of abscess 9/16/2019     Low libido 4/12/2019     Microscopic hematuria 12/19/2013     Nephrolithiasis      Obesity 3/27/2012     BRIAN on CPAP 3/27/2012     Other irritable bowel syndrome 5/22/2018     Pain in thoracic spine 5/9/2013     Pulmonary nodule 5/22/2018     Right-sided chest pain 4/13/2018     Sleep apnea      Tinnitus, unspecified laterality 4/12/2019       PAST SURGICAL HISTORY: No history of bleeding, clotting, malignant hyperthermia, or other anesthesia problems with surgery. Denies PONV.  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 Yes, 2 kids 13 and 10 year old   Who do you have in your support network that can be available to help you for the first 2 weeks after surgery? yes   Who can you count on for support throughout your weight loss surgery journey? wife   Can you afford 3 meals a day?  Yes   Can you afford 50-60 dollars a month for vitamins? Yes       HABITS:     11/24/2019   How often do you drink alcohol? 2-4 times a month   If you do drink alcohol, how many drinks might you have in a day? (one drink = 5 oz. wine, 1 can/bottle of beer, 1 shot liquor) 1 or 2   Have you ever used any of the following nicotine products? Cigarettes    If you previously used any of these products, what year did you quit? 2014   Have you or are you currently using street drugs or prescription strength medication for which you do not have a prescription for? No   Do you have a history of chemical dependency (alcohol or drug abuse)? No       PSYCHOLOGICAL HISTORY:   Psychological History Reviewed With Patient 11/24/2019   Have you ever attempted suicide? Never.   Have you had thoughts of suicide in the past year? No   Have you ever been hospitalized for mental illness or a suicide attempt? Never.   Do you have a history of chronic pain? Yes, Thoracic back pain.  Cymbalta helps.   Have you ever been diagnosed with fibromyalgia? No   Are you currently seeing a therapist or counselor?  No   Are you currently seeing a psychiatrist? No       ROS:     11/24/2019   Skin:  None of the above   HEENT: Headaches   Musculoskeletal: None   Cardiovascular: Shortness of breath with activity   Pulmonary: Shortness of breath with activity, Snoring   Gastrointestinal: None of the above   Genitourinary: Erectile dysfunction   Hematological: None of the above   Neurological: None of the above     EATING BEHAVIORS:     11/24/2019   Have you or anyone else thought that you had an eating disorder? No   Do you currently binge eat (eat a large amount of food in a short time)? No   Are you an emotional eater? No   Do you get up to eat after falling asleep? No       EXERCISE:     11/24/2019   How often do you exercise? Less than 1 time per week   What is the duration of your exercise (in minutes)? 45 Minutes   What types of exercise do you do? walking, climbing stairs at work   What keeps you from being more active?  Shortness of breath       MEDICATIONS:  Current Outpatient Medications   Medication Sig Dispense Refill     aspirin (ASA) 81 MG chewable tablet Take 1 tablet (81 mg) by mouth daily 100 tablet 3     blood glucose (NO BRAND SPECIFIED) test strip accuchek guide- Use to test blood  "sugar 1 times daily or as directed. 100 strip 11     blood glucose monitoring (ACCU-CHEK FASTCLIX) lancets 1 each daily Accuchek guide 100 each 11     DULoxetine (CYMBALTA) 60 MG capsule Take 1 capsule (60 mg) by mouth daily 90 capsule 1     lisinopril (PRINIVIL/ZESTRIL) 20 MG tablet Take 1 tablet (20 mg) by mouth daily 90 tablet 1       ALLERGIES:  Allergies   Allergen Reactions     Kiwi Itching       LABS/IMAGING/MEDICAL RECORDS REVIEW:   Component      Latest Ref Rng & Units 12/20/2018 9/6/2019 10/7/2019   Sodium      133 - 144 mmol/L 138     Potassium      3.4 - 5.3 mmol/L 4.4     Chloride      94 - 109 mmol/L 106     Carbon Dioxide      20 - 32 mmol/L 24     Anion Gap      3 - 14 mmol/L 8     Glucose      70 - 99 mg/dL 147 (H)     Urea Nitrogen      7 - 30 mg/dL 12     Creatinine      0.66 - 1.25 mg/dL 0.83     GFR Estimate      >60 mL/min/1.73:m2 >90     GFR Estimate If Black      >60 mL/min/1.73:m2 >90     Calcium      8.5 - 10.1 mg/dL 8.9     Bilirubin Total      0.2 - 1.3 mg/dL 0.7     Albumin      3.4 - 5.0 g/dL 3.6     Protein Total      6.8 - 8.8 g/dL 7.1     Alkaline Phosphatase      40 - 150 U/L 41     ALT      0 - 70 U/L 37     AST      0 - 45 U/L 22     Cholesterol      <200 mg/dL   138   Triglycerides      <150 mg/dL   102   HDL Cholesterol      >39 mg/dL   47   LDL Cholesterol Calculated      <100 mg/dL   71   Non HDL Cholesterol      <130 mg/dL   91   Hemoglobin A1C      0 - 5.6 %  7.1 (H) 6.6 (H)   TSH      0.40 - 4.00 mU/L   3.07       PHYSICAL EXAM:  /65   Pulse 75   Temp 97.8  F (36.6  C) (Oral)   Ht 5' 11\" (1.803 m)   Wt (!) 348 lb 1.6 oz (157.9 kg)   SpO2 99%   BMI 48.55 kg/m     GENERAL:  Good development and normal affect in no acute distress. Patient is alert and orientated x 3 and answers all questions appropriately.  HEENT: Head is normocephalic, nontraumatic. Pupils equal and round without conjunctival injection. Neck is supple without lymphadenopathy, thyroidmegaly, or " mass. Trachea midline. Dentition WNL.   CARDIOVASCULAR:  Regular rate and rhythm without murmurs, rubs, or gallops.  RESPIRATORY: Lungs are clear to auscultation bilaterally with good breath sounds.  GASTROINTESTINAL: Abdomen is obese, nondistended, soft, nontender, without organomegaly or masses. No bruits.  LOWER EXTREMITIES: 2+ pitting LE edema bilaterally, no cyanosis, ulceration, or chronic venous stasis noted.  MUSCULOSKELETAL:  Moves all 4 extremities equal and strong. Has a normal gait.   NEUROLOGIC: Cranial nerves II-XII grossly intact.  SKIN: No intertriginous irritation or rash.       Lose 5 lbs prior to surgery.  Goal Weight: 343.1 lbs    Have preoperative laboratory tests drawn.     Psychological Evaluation with MMPI and clearance for weight loss surgery.    Achieve clearance from dietitian to see surgeon.     phone call program    Once the patient has completed the requirements in the above tasklist and there are no further recommendations, pt will see the surgeon for consultation for bariatric surgery.  Patient verbalizes understanding of the process to surgery and the post operative schedule. He felt a little overwhelmed near the end of the visit so we ended early. He will see me in follow up next month to review bariatric education regarding the gastric sleeve and/or gastric bypass surgery.  He is leaning towards the sleeve surgery. All questions were answered.  A goal sheet and support group handout were given to the patient.  Patient contract was signed.     Sincerely,       Shanel Vincent PA-C    I spent a total of 50 minutes face to face with the patient during today's office visit. Over 50% of this time was spent counseling the patient and/or coordinating care.

## 2019-11-25 NOTE — LETTER
Antonio Canseco  November 25, 2019        Bariatric Task List      Required Weight loss:    Lose 5 lbs prior to surgery.  Goal Weight: 343.1 lbs  Tasks:  Have preoperative laboratory tests drawn.     Psychological Evaluation with MMPI and clearance for weight loss surgery.    Achieve clearance from dietitian to see surgeon.     phone call program

## 2019-11-25 NOTE — PROGRESS NOTES
"New Bariatric Nutrition Consultation Note    Reason For Visit: Nutrition Assessment    Antonio Canseco is a 49 year old presenting today for new bariatric nutrition consult.  Pt is interested in laparoscopic (undecided).  Patient is accompanied by self.    Support System Reviewed With Patient 11/24/2019   Who do you have in your support network that can be available to help you for the first 2 weeks after surgery? yes   Who can you count on for support throughout your weight loss surgery journey? wife       ANTHROPOMETRICS:  Estimated body mass index is 48.55 kg/m  as calculated from the following:    Height as of this encounter: 1.803 m (5' 11\").    Weight as of this encounter: 157.9 kg (348 lb 1.6 oz).    Required weight loss goal pre-op: 5 lbs from initial consult weight (goal weight 343.1 lbs or less before surgery)       11/24/2019   I have tried the following methods to lose weight Watching portions or calories       Weight Loss Questions Reviewed With Patient 11/24/2019   How long have you been overweight? Since late 20's to early 40's       NUTRITION HISTORY:  Recall Diet Questions Reviewed With Patient 11/24/2019   Describe what you typically consume for breakfast (typical or most recent): breakfast sandwich, coffee   Describe what you typically consume for lunch (typical or most recent): soup & sandwich   Describe what you typically consume for supper (typical or most recent): meatloaf stuffing green beans   Describe what you typically consume as snacks (typical or most recent): meat stick   How many ounces of water, or other low calorie drinks, do you drink daily (8 oz=1 glass)? 32 oz   How many ounces of caffeine (coffee, tea, pop) do you drink daily (8 oz=1 glass)? 32 oz   How many ounces of carbonated (pop, beer, sparkling water) drinks do you drinky daily (8 oz=1 glass)? 16 oz   How many ounces of milk do you drink daily (8 oz=1 glass) 16 oz   Please indicate the type of milk: 2%   How often do you " "drink alcohol? 2-4 times a month   If you do drink alcohol, how many drinks might you have in a day? (one drink = 5 oz. wine, 1 can/bottle of beer, 1 shot liquor) 1 or 2       Eating Habits 11/24/2019   Do you have any dietary restrictions? No   Do you currently binge eat (eat a large amount of food in a short time)? Yes   Are you an emotional eater? No   Do you get up to eat after falling asleep? No       ADDITIONAL INFORMATION:  Pt has been considering weight loss surgery per PCP's advisement r/t new diagnosis of T2DM. Answered \"yes\" to binge eating on questionnaire; does not meet criteria for BED per PA-C - overeats but does not binge. Note allergy to kiwi.     Dining Out History Reviewed With Patient 11/24/2019   How often do you dine out? Nearly every day.   Where do you dine out? (select all that apply) other   What types of food do you order when you dine out? whatever sounds good       Physical Activity Reviewed With Patient 11/24/2019   How often do you exercise? Less than 1 time per week   What is the duration of your exercise (in minutes)? 45 Minutes   What types of exercise do you do? walking, climbing stairs at work   What keeps you from being more active?  Shortness of breath       NUTRITION DIAGNOSIS:  Obesity r/t long history of self-monitoring deficit and excessive energy intake aeb BMI >30 kg/m2.    INTERVENTION:  Intervention Provided/Education Provided on post-op diet guidelines, vitamins/minerals essential post-operatively, GI anatomy of bariatric surgeries, ways to help prepare for post-op diet guidelines pre-operatively, portion/calorie-control, mindful eating.  Provided pt with list of goals RD contact information.      Questions Reviewed With Patient 11/24/2019   How ready are you to make changes regarding your weight? Number 1 = Not ready at all to make changes up to 10 = very ready. 9   How confident are you that you can change? 1 = Not confident that you will be successful making changes " up to 10 = very confident. 7       Patient Understanding: good  Expected Compliance: good    GOALS:  Begin taking multivitamin, calcium and vitamin D per guidelines  Separate fluids and meals by 30 minutes  Practice non-food alternatives to evening snacking    Follow-Up:   Recommend 2-3 follow up visits to assist with lifestyle changes or per insurance.      Time spent with patient: 55 minutes.    Christina Parks RD, LD  Clinical Dietitian

## 2019-12-02 ENCOUNTER — ALLIED HEALTH/NURSE VISIT (OUTPATIENT)
Dept: EDUCATION SERVICES | Facility: CLINIC | Age: 49
End: 2019-12-02
Payer: COMMERCIAL

## 2019-12-02 VITALS — BODY MASS INDEX: 48.48 KG/M2 | WEIGHT: 315 LBS

## 2019-12-02 DIAGNOSIS — E11.9 CONTROLLED TYPE 2 DIABETES MELLITUS WITHOUT COMPLICATION, WITHOUT LONG-TERM CURRENT USE OF INSULIN (H): Primary | ICD-10-CM

## 2019-12-02 PROCEDURE — G0108 DIAB MANAGE TRN  PER INDIV: HCPCS

## 2019-12-02 NOTE — PATIENT INSTRUCTIONS
Care Plan:  Meal Plan Recommendation: eat 3 meals a day, have small snacks between meals, if needed, use portion control and use plate planning method  - continue to avoid sugar sweetened beverages- diet is ok  - add more non-starchy veggies to meals and snacks  Exercise / activity plan: add 15 minutes daily- try walking at work or going to the gym at work     Check blood sugars 1-2 times a week- test before a meals and 2 hours after the start of that meal (rotate between breakfast, lunch and supper)      Review the medication information on Ozempic - this medication will improve blood sugar and often aids in weight loss    Schedule your eye exam  Check your feet daily     Follow up:  Follow-up diabetes education appointment 2-3 months or sooner if needed     Bring blood glucose meter and logbook with you to all doctor and follow-up appointments.     [facility]:  For Your Diabetes or Nutrition Education Appointments Call:  [phone]   For Diabetes or Nutrition Related Questions Call or Email:   [phone]  [email or web address]  Fax: [phone]   If you need a medication refill please contact your pharmacy. Please allow 3 business days for your refills to be completed.     Instructions for emailing the Diabetes Educators    If you need to communicate a non-urgent message to a Diabetes Educator via email, please send to [email or web address].    Please follow the following email guidelines:    Subject line: Secure: your clinic name (example: Secure: [email or web address])  In the email please include: First name, middle initial, last name and date of birth.    We will be in touch with you within one (1) business day.

## 2019-12-02 NOTE — LETTER
"    12/2/2019         RE: Antonio Canseco  [address] [facility] [address]        Dear Colleague,    Thank you for referring your patient, Antonio Canseco, to the [facility]. Please see a copy of my visit note below.    Diabetes Self-Management Education & Support    Diabetes Education Self Management & Training    SUBJECTIVE/OBJECTIVE:  Presents for: Follow-up  Accompanied by: Self  Diabetes education in the past 24mo: Yes  Focus of Visit: Patient Unsure  Diabetes type: Type 2  Date of diagnosis: October 2019  Disease course: Stable  How confident are you filling out medical forms by yourself:: Quite a bit  Diabetes management related comments/concerns: not sure about having the bariatric surgery forweight loss. Working on some diet changes  Transportation concerns: No  Other concerns:: None  Cultural Influences/Ethnic Background:  American    Diabetes Symptoms & Complications  Blurred vision: Yes  Fatigue: Yes  Neuropathy: No  Foot ulcerations: No  Polydipsia: No  Polyphagia: No  Polyuria: No  Visual change: No  Weakness: No  Weight loss: No  Slow healing wounds: No  Weight trend: Stable  Autonomic neuropathy: No  CVA: No  Heart disease: No  Nephropathy: No  Peripheral neuropathy: No  Peripheral Vascular Disease: No  Retinopathy: No  Sexual dysfunction: Yes    Patient Problem List and Family Medical History reviewed for relevant medical history, current medical status, and diabetes risk factors.    Vitals:  Wt (!) 157.7 kg (347 lb 9.6 oz)   BMI 48.48 kg/m     Estimated body mass index is 48.48 kg/m  as calculated from the following:    Height as of 11/25/19: 1.803 m (5' 11\").    Weight as of this encounter: 157.7 kg (347 lb 9.6 oz).   Last 3 BP:   BP Readings from Last 3 Encounters:   11/25/19 114/65   10/07/19 129/88   09/19/19 129/79       History   Smoking Status     Former Smoker     Packs/day: 1.00     Years: 25.00     Types: Cigarettes     Start date: 4/23/1988     " Quit date: 4/23/2013   Smokeless Tobacco     Never Used     Comment: 1 sd/day       Labs:  Lab Results   Component Value Date    A1C 6.6 10/07/2019     Lab Results   Component Value Date     12/20/2018     Lab Results   Component Value Date    LDL 71 10/07/2019     HDL Cholesterol   Date Value Ref Range Status   10/07/2019 47 >39 mg/dL Final   ]  GFR Estimate   Date Value Ref Range Status   12/20/2018 >90 >60 mL/min/[1.73_m2] Final     Comment:     Non  GFR Calc  Starting 12/18/2018, serum creatinine based estimated GFR (eGFR) will be   calculated using the Chronic Kidney Disease Epidemiology Collaboration   (CKD-EPI) equation.       GFR Estimate If Black   Date Value Ref Range Status   12/20/2018 >90 >60 mL/min/[1.73_m2] Final     Comment:      GFR Calc  Starting 12/18/2018, serum creatinine based estimated GFR (eGFR) will be   calculated using the Chronic Kidney Disease Epidemiology Collaboration   (CKD-EPI) equation.       Lab Results   Component Value Date    CR 0.83 12/20/2018     No results found for: MICROALBUMIN    Healthy Eating  Healthy Eating Assessed Today: Yes  Cultural/Jew diet restrictions?: No  Meal planning: None  Meals include: Breakfast, Lunch, Dinner, Other  Breakfast: gas station-breakfast sandwich and fruit cup, coffee/energy drink-unsweetened  Lunch: soup and sandwich   Dinner: steak, baked pot, beans Or chinese food Or eat out  Snacks: almonds  Beverages: Coffee, Milk, Energy drinks, Diet soda(sugar free)  Has patient met with a dietitian in the past?: No    Being Active  Being Active Assessed Today: Yes  Exercise:: Yes  Days per week of moderate to strenuous exercise (like a brisk walk): 4  On average, minutes per day of exercise at this level: 20  How intense was your typical exercise? : Light (like stretching or slow walking)  Exercise Minutes per Week: 80  Barrier to exercise: None, Time, Physical limitation    Monitoring  Monitoring Assessed  Today: Yes  Did patient bring glucose meter to appointment? : No(new dx - needs meter)  Blood Glucose Meter: Accu-check  Home Glucose (Sugar) Monitorin-2 times per week          Taking Medications      Taking Medication Assessed Today: Yes  Current Treatments: None, Diet    Problem Solving  Problem Solving Assessed Today: Yes  Hypoglycemia Frequency: Never  Patient carries a carbohydrate source: No  Medical alert: No  Severe weather/disaster plan for diabetes management?: No  DKA prevention plan?: No    Hypoglycemia symptoms  Confusion: No  Dizziness or Light-Headedness: No  Headaches: No  Hunger: No  Mood changes: No  Nervousness/Anxiety: No  Sleepiness: No  Speech difficulty: No  Sweats: No  Tremors: No    Hypoglycemia Complications  Blackouts: No  Hospitalization: No  Nocturnal hypoglycemia: No  Required assistance: No  Required glucagon injection: No  Seizures: No    Reducing Risks  Reducing Risks Assessed Today: Yes  Diabetes Risks: Age over 45 years, Sedentary Lifestyle  CAD Risks: Obesity, Sedentary lifestyle, Diabetes Mellitus, Male sex  Has dilated eye exam at least once a year?: No  Sees dentist every 6 months?: Yes  Sees podiatrist (foot doctor)?: No    Healthy Coping  Healthy Coping Assessed Today: Yes  Emotional response to diabetes: Ready to learn  Informal Support system:: Spouse  Stage of change: ACTION (Actively working towards change)  Patient Activation Measure Survey Score:  SARAH Score (Last Two) 3/23/2012   SARAH Raw Score 45   Activation Score 73.1   SARAH Level 4       ASSESSMENT:  Patient glucose average doing well, he did have 2 of the 4 post meal glucose readings above goal.   He is working on some dietary changes and adding in some activity.  He would like to lose weight but is unsure about bariatric surgery. Recommend he discuss concerns with his doctor. May want to consider medication such as GLP-1 Ozempic for glucose and weight management.         Patient's most recent   Lab Results    Component Value Date    A1C 6.6 10/07/2019    is meeting goal of <7.0    INTERVENTION:   Diabetes knowledge and skills assessment:     Patient is knowledgeable in diabetes management concepts related to: Healthy Eating- working towards, Monitoring     Patient needs further education on the following diabetes management concepts: Healthy Eating, Being Active, Monitoring, Taking Medication, Problem Solving, Reducing Risks and Healthy Coping    Based on learning assessment above, most appropriate setting for further diabetes education would be: Group class or Individual setting.    Education provided today on:  AADE Self-Care Behaviors:  Healthy Eating: consistency in amount, composition, and timing of food intake, weight reduction, portion control and plate planning method  Being Active: relationship to blood glucose  Monitoring: individual blood glucose targets and frequency of monitoring  Medication: benefits of medication interventions if needed  Problem Solving: high blood glucose - causes, signs/symptoms, treatment and prevention and low blood glucose - causes, signs/symptoms, treatment and prevention  Reducing Risks: major complications of diabetes, prevention, early diagnostic measures and treatment of complications, foot care, appropriate dental care, annual eye exam, A1C - goals, relating to blood glucose levels, how often to check and lipids levels and goals  Healthy Coping: benefits of making appropriate lifestyle changes and utilize support systems  Opportunities for ongoing education and support in diabetes-self management were discussed.    Pt verbalized understanding of concepts discussed and recommendations provided today.       Education Materials Provided:  BG Log Sheet, My Plate Planner and foot care tips    PLAN:  See Patient Instructions for co-developed, patient-stated behavior change goals.  AVS printed and provided to patient today. See Follow-Up section for recommended follow-up.    Teresa  Cory BAI,CDE   Time Spent: 60 minutes  Encounter Type: Individual    Any diabetes medication dose changes were made via the CDE Protocol and Collaborative Practice Agreement with the patient's referring provider. A copy of this encounter was shared with the provider.

## 2019-12-02 NOTE — PROGRESS NOTES
"Diabetes Self-Management Education & Support    Diabetes Education Self Management & Training    SUBJECTIVE/OBJECTIVE:  Presents for: Follow-up  Accompanied by: Self  Diabetes education in the past 24mo: Yes  Focus of Visit: Patient Unsure  Diabetes type: Type 2  Date of diagnosis: October 2019  Disease course: Stable  How confident are you filling out medical forms by yourself:: Quite a bit  Diabetes management related comments/concerns: not sure about having the bariatric surgery forweight loss. Working on some diet changes  Transportation concerns: No  Other concerns:: None  Cultural Influences/Ethnic Background:  American    Diabetes Symptoms & Complications  Blurred vision: Yes  Fatigue: Yes  Neuropathy: No  Foot ulcerations: No  Polydipsia: No  Polyphagia: No  Polyuria: No  Visual change: No  Weakness: No  Weight loss: No  Slow healing wounds: No  Weight trend: Stable  Autonomic neuropathy: No  CVA: No  Heart disease: No  Nephropathy: No  Peripheral neuropathy: No  Peripheral Vascular Disease: No  Retinopathy: No  Sexual dysfunction: Yes    Patient Problem List and Family Medical History reviewed for relevant medical history, current medical status, and diabetes risk factors.    Vitals:  Wt (!) 157.7 kg (347 lb 9.6 oz)   BMI 48.48 kg/m    Estimated body mass index is 48.48 kg/m  as calculated from the following:    Height as of 11/25/19: 1.803 m (5' 11\").    Weight as of this encounter: 157.7 kg (347 lb 9.6 oz).   Last 3 BP:   BP Readings from Last 3 Encounters:   11/25/19 114/65   10/07/19 129/88   09/19/19 129/79       History   Smoking Status     Former Smoker     Packs/day: 1.00     Years: 25.00     Types: Cigarettes     Start date: 4/23/1988     Quit date: 4/23/2013   Smokeless Tobacco     Never Used     Comment: 1 sd/day       Labs:  Lab Results   Component Value Date    A1C 6.6 10/07/2019     Lab Results   Component Value Date     12/20/2018     Lab Results   Component Value Date    LDL 71 " 10/07/2019     HDL Cholesterol   Date Value Ref Range Status   10/07/2019 47 >39 mg/dL Final   ]  GFR Estimate   Date Value Ref Range Status   2018 >90 >60 mL/min/[1.73_m2] Final     Comment:     Non  GFR Calc  Starting 2018, serum creatinine based estimated GFR (eGFR) will be   calculated using the Chronic Kidney Disease Epidemiology Collaboration   (CKD-EPI) equation.       GFR Estimate If Black   Date Value Ref Range Status   2018 >90 >60 mL/min/[1.73_m2] Final     Comment:      GFR Calc  Starting 2018, serum creatinine based estimated GFR (eGFR) will be   calculated using the Chronic Kidney Disease Epidemiology Collaboration   (CKD-EPI) equation.       Lab Results   Component Value Date    CR 0.83 2018     No results found for: MICROALBUMIN    Healthy Eating  Healthy Eating Assessed Today: Yes  Cultural/[employer] diet restrictions?: No  Meal planning: None  Meals include: Breakfast, Lunch, Dinner, Other  Breakfast: gas station-breakfast sandwich and fruit cup, coffee/energy drink-unsweetened  Lunch: soup and sandwich   Dinner: steak, baked pot, beans Or chinese food Or eat out  Snacks: almonds  Beverages: Coffee, Milk, Energy drinks, Diet soda(sugar free)  Has patient met with a dietitian in the past?: No    Being Active  Being Active Assessed Today: Yes  Exercise:: Yes  Days per week of moderate to strenuous exercise (like a brisk walk): 4  On average, minutes per day of exercise at this level: 20  How intense was your typical exercise? : Light (like stretching or slow walking)  Exercise Minutes per Week: 80  Barrier to exercise: None, Time, Physical limitation    Monitoring  Monitoring Assessed Today: Yes  Did patient bring glucose meter to appointment? : No(new dx - needs meter)  Blood Glucose Meter: Accu-check  Home Glucose (Sugar) Monitorin-2 times per week          Taking Medications      Taking Medication Assessed Today: Yes  Current  Treatments: None, Diet    Problem Solving  Problem Solving Assessed Today: Yes  Hypoglycemia Frequency: Never  Patient carries a carbohydrate source: No  Medical alert: No  Severe weather/disaster plan for diabetes management?: No  DKA prevention plan?: No    Hypoglycemia symptoms  Confusion: No  Dizziness or Light-Headedness: No  Headaches: No  Hunger: No  Mood changes: No  Nervousness/Anxiety: No  Sleepiness: No  Speech difficulty: No  Sweats: No  Tremors: No    Hypoglycemia Complications  Blackouts: No  Hospitalization: No  Nocturnal hypoglycemia: No  Required assistance: No  Required glucagon injection: No  Seizures: No    Reducing Risks  Reducing Risks Assessed Today: Yes  Diabetes Risks: Age over 45 years, Sedentary Lifestyle  CAD Risks: Obesity, Sedentary lifestyle, Diabetes Mellitus, Male sex  Has dilated eye exam at least once a year?: No  Sees dentist every 6 months?: Yes  Sees podiatrist (foot doctor)?: No    Healthy Coping  Healthy Coping Assessed Today: Yes  Emotional response to diabetes: Ready to learn  Informal Support system:: Spouse  Stage of change: ACTION (Actively working towards change)  Patient Activation Measure Survey Score:  SARAH Score (Last Two) 3/23/2012   SARAH Raw Score 45   Activation Score 73.1   SARAH Level 4       ASSESSMENT:  Patient glucose average doing well, he did have 2 of the 4 post meal glucose readings above goal.   He is working on some dietary changes and adding in some activity.  He would like to lose weight but is unsure about bariatric surgery. Recommend he discuss concerns with his doctor. May want to consider medication such as GLP-1 Ozempic for glucose and weight management.         Patient's most recent   Lab Results   Component Value Date    A1C 6.6 10/07/2019    is meeting goal of <7.0    INTERVENTION:   Diabetes knowledge and skills assessment:     Patient is knowledgeable in diabetes management concepts related to: Healthy Eating- working towards, Monitoring      Patient needs further education on the following diabetes management concepts: Healthy Eating, Being Active, Monitoring, Taking Medication, Problem Solving, Reducing Risks and Healthy Coping    Based on learning assessment above, most appropriate setting for further diabetes education would be: Group class or Individual setting.    Education provided today on:  AADE Self-Care Behaviors:  Healthy Eating: consistency in amount, composition, and timing of food intake, weight reduction, portion control and plate planning method  Being Active: relationship to blood glucose  Monitoring: individual blood glucose targets and frequency of monitoring  Medication: benefits of medication interventions if needed  Problem Solving: high blood glucose - causes, signs/symptoms, treatment and prevention and low blood glucose - causes, signs/symptoms, treatment and prevention  Reducing Risks: major complications of diabetes, prevention, early diagnostic measures and treatment of complications, foot care, appropriate dental care, annual eye exam, A1C - goals, relating to blood glucose levels, how often to check and lipids levels and goals  Healthy Coping: benefits of making appropriate lifestyle changes and utilize support systems  Opportunities for ongoing education and support in diabetes-self management were discussed.    Pt verbalized understanding of concepts discussed and recommendations provided today.       Education Materials Provided:  BG Log Sheet, My Plate Planner and foot care tips    PLAN:  See Patient Instructions for co-developed, patient-stated behavior change goals.  AVS printed and provided to patient today. See Follow-Up section for recommended follow-up.    Teresa Ray RN,CDE   Time Spent: 60 minutes  Encounter Type: Individual    Any diabetes medication dose changes were made via the CDE Protocol and Collaborative Practice Agreement with the patient's referring provider. A copy of this encounter was shared  with the provider.

## 2019-12-09 ENCOUNTER — MYC REFILL (OUTPATIENT)
Dept: FAMILY MEDICINE | Facility: CLINIC | Age: 49
End: 2019-12-09

## 2019-12-09 DIAGNOSIS — I10 ESSENTIAL HYPERTENSION: ICD-10-CM

## 2019-12-09 DIAGNOSIS — K58.9 IRRITABLE BOWEL SYNDROME WITHOUT DIARRHEA: ICD-10-CM

## 2019-12-10 RX ORDER — LISINOPRIL 20 MG/1
20 TABLET ORAL DAILY
Qty: 90 TABLET | Refills: 1 | Status: SHIPPED | OUTPATIENT
Start: 2019-12-10 | End: 2020-06-15

## 2019-12-10 RX ORDER — DULOXETIN HYDROCHLORIDE 60 MG/1
60 CAPSULE, DELAYED RELEASE ORAL DAILY
Qty: 90 CAPSULE | Refills: 1 | Status: SHIPPED | OUTPATIENT
Start: 2019-12-10 | End: 2020-06-15

## 2019-12-10 NOTE — TELEPHONE ENCOUNTER
"Requested Prescriptions   Pending Prescriptions Disp Refills     DULoxetine (CYMBALTA) 60 MG capsule 90 capsule 1     Sig: Take 1 capsule (60 mg) by mouth daily   Last Written Prescription Date:  9/16/19  Last Fill Quantity: 90,  # refills: 1   Last office visit: 10/7/2019 with prescribing provider   Future Office Visit:        Serotonin-Norepinephrine Reuptake Inhibitors  Passed - 12/9/2019  1:19 PM        Passed - Blood pressure under 140/90 in past 12 months     BP Readings from Last 3 Encounters:   11/25/19 114/65   10/07/19 129/88 09/19/19 129/79                 Passed - Recent (12 mo) or future (30 days) visit within the authorizing provider's specialty     Patient has had an office visit with the authorizing provider or a provider within the authorizing providers department within the previous 12 mos or has a future within next 30 days. See \"Patient Info\" tab in inbasket, or \"Choose Columns\" in Meds & Orders section of the refill encounter.              Passed - Medication is active on med list        Passed - Patient is age 18 or older        lisinopril (PRINIVIL/ZESTRIL) 20 MG tablet 90 tablet 1     Sig: Take 1 tablet (20 mg) by mouth daily   Last Written Prescription Date:  9/16/19  Last Fill Quantity: 90,  # refills: 0   Last office visit: 10/7/2019 with prescribing provider   Future Office Visit:        ACE Inhibitors (Including Combos) Protocol Passed - 12/9/2019  1:19 PM        Passed - Blood pressure under 140/90 in past 12 months     BP Readings from Last 3 Encounters:   11/25/19 114/65   10/07/19 129/88 09/19/19 129/79                 Passed - Recent (12 mo) or future (30 days) visit within the authorizing provider's specialty     Patient has had an office visit with the authorizing provider or a provider within the authorizing providers department within the previous 12 mos or has a future within next 30 days. See \"Patient Info\" tab in inbasket, or \"Choose Columns\" in Meds & Orders section of " the refill encounter.              Passed - Medication is active on med list        Passed - Patient is age 18 or older        Passed - Normal serum creatinine on file in past 12 months     Recent Labs   Lab Test 12/20/18  0823   CR 0.83             Passed - Normal serum potassium on file in past 12 months     Recent Labs   Lab Test 12/20/18  0823   POTASSIUM 4.4             Prescription approved per [facility] [place] [place] Refill Protocol.  Esther Quezada RN on 12/10/2019 at 10:58 AM

## 2019-12-26 DIAGNOSIS — Z13.0 SCREENING FOR IRON DEFICIENCY ANEMIA: ICD-10-CM

## 2019-12-26 DIAGNOSIS — Z13.228 SCREENING FOR ENDOCRINE, NUTRITIONAL, METABOLIC AND IMMUNITY DISORDER: ICD-10-CM

## 2019-12-26 DIAGNOSIS — E66.01 MORBID OBESITY (H): ICD-10-CM

## 2019-12-26 DIAGNOSIS — Z13.0 SCREENING FOR ENDOCRINE, NUTRITIONAL, METABOLIC AND IMMUNITY DISORDER: ICD-10-CM

## 2019-12-26 DIAGNOSIS — E11.9 CONTROLLED TYPE 2 DIABETES MELLITUS WITHOUT COMPLICATION, WITHOUT LONG-TERM CURRENT USE OF INSULIN (H): ICD-10-CM

## 2019-12-26 DIAGNOSIS — Z13.21 SCREENING FOR ENDOCRINE, NUTRITIONAL, METABOLIC AND IMMUNITY DISORDER: ICD-10-CM

## 2019-12-26 DIAGNOSIS — Z13.29 SCREENING FOR ENDOCRINE, NUTRITIONAL, METABOLIC AND IMMUNITY DISORDER: ICD-10-CM

## 2019-12-26 DIAGNOSIS — G47.33 OSA ON CPAP: ICD-10-CM

## 2019-12-26 LAB
ERYTHROCYTE [DISTWIDTH] IN BLOOD BY AUTOMATED COUNT: 14.3 % (ref 10–15)
HCT VFR BLD AUTO: 44.6 % (ref 40–53)
HGB BLD-MCNC: 14.2 G/DL (ref 13.3–17.7)
MCH RBC QN AUTO: 28.6 PG (ref 26.5–33)
MCHC RBC AUTO-ENTMCNC: 31.8 G/DL (ref 31.5–36.5)
MCV RBC AUTO: 90 FL (ref 78–100)
PLATELET # BLD AUTO: 237 10E9/L (ref 150–450)
RBC # BLD AUTO: 4.96 10E12/L (ref 4.4–5.9)
WBC # BLD AUTO: 8.6 10E9/L (ref 4–11)

## 2019-12-26 PROCEDURE — 82306 VITAMIN D 25 HYDROXY: CPT | Performed by: PHYSICIAN ASSISTANT

## 2019-12-26 PROCEDURE — 85027 COMPLETE CBC AUTOMATED: CPT | Performed by: PHYSICIAN ASSISTANT

## 2019-12-26 PROCEDURE — 80053 COMPREHEN METABOLIC PANEL: CPT | Performed by: PHYSICIAN ASSISTANT

## 2019-12-26 PROCEDURE — 36415 COLL VENOUS BLD VENIPUNCTURE: CPT | Performed by: PHYSICIAN ASSISTANT

## 2019-12-27 LAB
ALBUMIN SERPL-MCNC: 3.5 G/DL (ref 3.4–5)
ALP SERPL-CCNC: 48 U/L (ref 40–150)
ALT SERPL W P-5'-P-CCNC: 47 U/L (ref 0–70)
ANION GAP SERPL CALCULATED.3IONS-SCNC: 6 MMOL/L (ref 3–14)
AST SERPL W P-5'-P-CCNC: 21 U/L (ref 0–45)
BILIRUB SERPL-MCNC: 0.5 MG/DL (ref 0.2–1.3)
BUN SERPL-MCNC: 14 MG/DL (ref 7–30)
CALCIUM SERPL-MCNC: 8.4 MG/DL (ref 8.5–10.1)
CHLORIDE SERPL-SCNC: 105 MMOL/L (ref 94–109)
CO2 SERPL-SCNC: 27 MMOL/L (ref 20–32)
CREAT SERPL-MCNC: 0.92 MG/DL (ref 0.66–1.25)
DEPRECATED CALCIDIOL+CALCIFEROL SERPL-MC: 19 UG/L (ref 20–75)
GFR SERPL CREATININE-BSD FRML MDRD: >90 ML/MIN/{1.73_M2}
GLUCOSE SERPL-MCNC: 140 MG/DL (ref 70–99)
POTASSIUM SERPL-SCNC: 4 MMOL/L (ref 3.4–5.3)
PROT SERPL-MCNC: 7.3 G/DL (ref 6.8–8.8)
SODIUM SERPL-SCNC: 138 MMOL/L (ref 133–144)

## 2019-12-30 ENCOUNTER — OFFICE VISIT (OUTPATIENT)
Dept: SURGERY | Facility: CLINIC | Age: 49
End: 2019-12-30
Payer: COMMERCIAL

## 2019-12-30 VITALS
BODY MASS INDEX: 44.1 KG/M2 | DIASTOLIC BLOOD PRESSURE: 74 MMHG | WEIGHT: 315 LBS | SYSTOLIC BLOOD PRESSURE: 138 MMHG | HEIGHT: 71 IN | HEART RATE: 105 BPM | OXYGEN SATURATION: 97 %

## 2019-12-30 VITALS — HEIGHT: 71 IN | BODY MASS INDEX: 44.1 KG/M2 | WEIGHT: 315 LBS

## 2019-12-30 DIAGNOSIS — E11.9 DIABETES MELLITUS WITHOUT COMPLICATION (H): ICD-10-CM

## 2019-12-30 DIAGNOSIS — E55.9 VITAMIN D DEFICIENCY: ICD-10-CM

## 2019-12-30 DIAGNOSIS — E66.01 MORBID OBESITY (H): Primary | ICD-10-CM

## 2019-12-30 DIAGNOSIS — Z71.89 ENCOUNTER FOR PRE-BARIATRIC SURGERY COUNSELING AND EDUCATION: ICD-10-CM

## 2019-12-30 DIAGNOSIS — E66.01 MORBID OBESITY (H): ICD-10-CM

## 2019-12-30 PROCEDURE — 97803 MED NUTRITION INDIV SUBSEQ: CPT | Performed by: DIETITIAN, REGISTERED

## 2019-12-30 PROCEDURE — 99215 OFFICE O/P EST HI 40 MIN: CPT | Performed by: PHYSICIAN ASSISTANT

## 2019-12-30 ASSESSMENT — MIFFLIN-ST. JEOR
SCORE: 2460.66
SCORE: 2460.66

## 2019-12-30 NOTE — PROGRESS NOTES
Bariatric Education Visit    CC: Obesity    HPI: I had the pleasure of seeing Antonio in the office today to go over bariatric education.  I saw the patient last month to evaluate obesity and consider them for possible weight loss surgery.  [place] last visit was a little overwhelming so we will review the process to surgery and post op follow up today. Was leaning towards the sleeve but will review both sleeve and bypass today.    Wt Readings from Last 4 Encounters:   12/30/19 (!) 346 lb 14.4 oz (157.4 kg)   12/30/19 (!) 346 lb 14.4 oz (157.4 kg)   12/02/19 (!) 347 lb 9.6 oz (157.7 kg)   11/25/19 (!) 348 lb 1.6 oz (157.9 kg)       BP Readings from Last 3 Encounters:   12/30/19 138/74   11/25/19 114/65   10/07/19 129/88     BARIATRIC METRICS:  Current Weight: (!) 346 lb 14.4 oz (157.4 kg)  Body mass index is 48.38 kg/m .   Wt change since last visit (lbs): -1.2  Cumulative weight loss (lbs): 1.2    LABS:  Component      Latest Ref Rng & Units 12/26/2019   Sodium      133 - 144 mmol/L 138   Potassium      3.4 - 5.3 mmol/L 4.0   Chloride      94 - 109 mmol/L 105   Carbon Dioxide      20 - 32 mmol/L 27   Anion Gap      3 - 14 mmol/L 6   Glucose      70 - 99 mg/dL 140 (H)   Urea Nitrogen      7 - 30 mg/dL 14   Creatinine      0.66 - 1.25 mg/dL 0.92   GFR Estimate      >60 mL/min/1.73:m2 >90   GFR Estimate If Black      >60 mL/min/1.73:m2 >90   Calcium      8.5 - 10.1 mg/dL 8.4 (L)   Bilirubin Total      0.2 - 1.3 mg/dL 0.5   Albumin      3.4 - 5.0 g/dL 3.5   Protein Total      6.8 - 8.8 g/dL 7.3   Alkaline Phosphatase      40 - 150 U/L 48   ALT      0 - 70 U/L 47   AST      0 - 45 U/L 21   WBC      4.0 - 11.0 10e9/L 8.6   RBC Count      4.4 - 5.9 10e12/L 4.96   Hemoglobin      13.3 - 17.7 g/dL 14.2   Hematocrit      40.0 - 53.0 % 44.6   MCV      78 - 100 fl 90   MCH      26.5 - 33.0 pg 28.6   MCHC      31.5 - 36.5 g/dL 31.8   RDW      10.0 - 15.0 % 14.3   Platelet Count      150 - 450 10e9/L 237   Vitamin D Deficiency  "screening      20 - 75 ug/L 19 (L)       PE:  /74   Pulse 105   Ht 5' 11\" (1.803 m)   Wt (!) 346 lb 14.4 oz (157.4 kg)   SpO2 97%   BMI 48.38 kg/m    GENERAL Pt in NAD.  HEART: No JVD  LUNGS: Breathing unlabored.  MUSC: Gait normal  NEURO: Alert and oriented x3.     ASSESSMENT AND PLAN:      Encounter for pre-bariatric surgery counseling and education  Morbid obesity (H)    Reviewed tasklist     Lose 5 lbs prior to surgery.  Goal Weight: 343.1 lbs pending, down 2 lbs      Have preoperative laboratory tests drawn. - completed       Psychological Evaluation with MMPI and clearance for weight loss surgery. -pending      Achieve clearance from dietitian to see surgeon.-pending      HP phone call program -pending-RICHARDSON Berg sent message to Edilma to check that pt is enrolled in program today    Today in the office we discussed gastric sleeve and gastric bypass surgery. Preoperative, perioperative, and postoperative processes, management, and follow up were addressed.  Risks and benefits were outlined including the risk of death, PE, DVT, ulcer, worsening GERD with the sleeve, N/V, stricture, hernia, wound infection, weight regain, and vitamin deficiencies. I emphasized exercise and activity along with appropriate food choice as the main foundation for weight loss with surgery providing surgical reinforcement of this.      Once the patient has completed their task list and there are no further recommendations, they will see the surgeon for consultation for bariatric surgery.  All questions were answered. Patient verbalizes understanding of the process to surgery and expectations for the postoperative period including the need for lifelong lifestyle changes, vitamin supplementation, and laboratory monitoring.    Vitamin D deficiency    Vitamin D is low. Start vitamin D to 5000 Int Units daily.   Repeat Vit D in 3 months.    - Vitamin D Deficiency; Future    This was a 40 minute visit with greater than 50% spent " on counseling and care coordination.

## 2019-12-30 NOTE — PROGRESS NOTES
"PRE SURGICAL WEIGHT LOSS NUTRITION APPOINTMENT    Antonio Canseco  1970  male  [id number]  49 year old    ASSESSMENT    Desired Surgical Procedure: undecided    REASON FOR VISIT:  Antonio Canseco is a 49 year old year old male presents today for a pre-surgical weight loss follow-up appointment. Patient accompanied by self.    DIAGNOSIS:  Weight Status Obesity Grade III BMI >40    ANTHROPOMETRICS:  Height: 180.3 cm (5' 11\")  Initial Weight: 348.1 lbs     Weight last visit: 346.9 lbs    Current weight: (!) 157.4 kg (346 lb 14.4 oz)  BMI: 48.38 kg/(m^2).    VITAMINS AND MINERALS:  None     NUTRITION HISTORY:  Breakfast: [wakes at 7:30am, eats at 8:30-9am] breakfast sandwich (sausage, egg, cheese on croissant) + fruit cup   Lunch: [1pm] restaurant - soup and sandwich   Supper: [7pm] steak + green beans   Snacks: [afternoons] salami, nuts  [evenings] cheese and crackers   Fluids Consumed: Water (rare), coffee (16-24oz, black), SF energy drinks (occasional), diet soda (occasional)  Eating slower: Yes/Sometimes  Chewing foods thoroughly: No  Take 20-30 minutes to consume each meal: No  Fluids and meals separate by at least 30 minutes: No  Carbonation: yes  Caffeine: yes  Additional Information: Pt has been working on avoiding second portions and reducing pop. Admits initial evaluation was somewhat overwhelming regarding the amount of information. Reviewed rational behind  fluids and meals. Pt would benefit from meeting with TABITHA SOSA for mor extensive education on BG management and diet.      PHYSICAL ACTIVITY:  None    DIAGNOSIS:  Previous Nutrition Diagnosis: Obesity related to long history of self- monitoring deficit and excessive energy intake evidenced by BMI of 48.55 kg/m2  No change, modified below    Previous goals:   Begin taking multivitamin, calcium and vitamin D per guidelines - not met  Separate fluids and meals by 30 minutes - not met  Practice non-food alternatives to evening snacking - " improving    Current Nutrition Diagnosis: Obesity related to long history of self-monitoring deficit and excessive energy intake as evidenced by BMI of 48.38 kg/m2.    INTERVENTION:  Nutrition Prescription: Recommended energy/nutrient modification.    GOALS:  Begin taking multivitamin, calcium and vitamin D per guidelines  Separate fluids and meals by 30 minutes  Chew food to applesauce consistency    Intervention:  - Discussed progress towards previous goals.  - Reinforced importance of making behavior changes in preparation for bariatric surgery.   - Assessed learning needs and learning preferences       NUTRITION MONITORING AND EVALUATION:  Anticipated compliance: fair-good  Patient demonstrated fair-good understanding.     Follow up: Continue to monitor patient closely regarding weight loss and diet.  # of visits needed: 3-4  Cleared by RD: No     TIME SPENT WITH PATIENT: 25 minutes    Christina Parks RD, LD  Clinical Dietitian

## 2020-01-31 ENCOUNTER — OFFICE VISIT (OUTPATIENT)
Dept: SURGERY | Facility: CLINIC | Age: 50
End: 2020-01-31
Payer: COMMERCIAL

## 2020-01-31 VITALS — HEIGHT: 71 IN | BODY MASS INDEX: 44.1 KG/M2 | WEIGHT: 315 LBS

## 2020-01-31 DIAGNOSIS — E66.01 MORBID OBESITY (H): ICD-10-CM

## 2020-01-31 PROCEDURE — 97803 MED NUTRITION INDIV SUBSEQ: CPT | Performed by: DIETITIAN, REGISTERED

## 2020-01-31 ASSESSMENT — MIFFLIN-ST. JEOR: SCORE: 2473.36
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
## 2020-02-17 ENCOUNTER — HEALTH MAINTENANCE LETTER (OUTPATIENT)
Age: 50
End: 2020-02-17

## 2020-02-19 NOTE — PROGRESS NOTES
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 Day 14 of Induction cycle.     APML  -BMB at OSH positive for 15/17 translocation, defining her as APML, standard risk  -Continue ATRA/Arsenic. Day 14  -Daily CBC, PT/PTT,  CMP every 3 days, Weekly EKG and lipids on thursdays      R MCA stroke and venous thrombosis of legs  -monitor for changes, currently neurologically in tact  -seen by stroke team here  - holding anticoagulants     Pancytopenia  - ANC is 300 today  -Check CBC daily  - if febrile , do blood culture and start cefepime.   - transfusion goal is Hb<7 and Plt < 20 (  7.7 and 62  today )    CINV  - no zofran as EKG had prolonged QT with arsenic.   -benadryl and ativan PRN  -can start kytril if persistent vomiting    Chest pain  - left sided chest pain. The pretest probability for PE according to wells criteria is more than 4. But CTA negative for any thromboemboli in the proximal main vessels. EKG shows sinus tachy, with [id number].   - will repeat CTA today if pain is persistent. Also will follow D Dimer and coag profile.    Subjective     Antonio Canseco is a 49 year old male who presents to clinic today for the following health issues:    HPI   Concern - Wound infection  Onset: 09/18/19    Description:   Cloudy and fishy odor   Location- Left upper thigh    Intensity: not sure     Progression of Symptoms:  improving    Accompanying Signs & Symptoms:  Wound is puffy     Previous history of similar problem:   None     Precipitating factors:   Worsened by: none     Alleviating factors:  Improved by: none     Therapies Tried and outcome: antibiotic after the surgery     Patient is a 49-year-old male who presents today for follow-up hospital visit.  The patient had a incision and drainage at the end of August.  Initially he was started on Keflex.  He was also referred to colorectal surgery due to concern for perirectal abscess.  Patient was seen by colorectal surgery who did not believe patient's abscess was a perirectal abscess however his antibiotics were changed to clindamycin.  Patient presented to the ER due to increasing redness thigh on 8/31/2019.  He was admitted and discharged on 9/2/2019.  Patient was treated with vancomycin during his hospital stay.  He was discharged on cephalexin and Bactrim at the end of his hospital stay.    He has had improvement of the wound and followed up in primary care clinic as well as with surgery. Wife has been packing the wound twice daily. She noticed a foul smelling discharge and more swelling and thus recommended patient come to be evaluated today. He denies any significant pain in the left thigh.    Of note patient was found to have an elevated AC1 to 7.1% 2 weeks ago.    Reviewed and updated as needed this visit by Provider  Tobacco  Allergies  Meds  Problems         Review of Systems   GENERAL:  No fevers  MUSCULOSKELETAL: As noted in HPI        Objective    /79 (BP Location: Right arm, Patient Position: Chair, Cuff Size: Adult Large)   Pulse 95   Temp 98.6  F (37  C) (Oral)   Wt  (!) 156.5 kg (345 lb)   SpO2 96%   BMI 48.12 kg/m    Body mass index is 48.12 kg/m .  Physical Exam   GENERAL: No acute distress  HEENT: Normocephalic  SKIN: 3 cm incision with packing over the posterior and superior left thigh. Mild edema without significant erythema or warmth surrounding.  NEURO: Alert and non-focal          Assessment & Plan     1. History of drainage of abscess  Patient's wound appears to be healing well. I recommended he continue with packing twice daily and plans for follow up in several weeks. There is a small amount of edema and minimal erythema on exam. Wife noted a fishy odor to the discharge. Due to this I did opt to go ahead and treat patient empirically for possible return of infection. Follow up as needed if worsening, otherwise plan on follow up as previously scheduled.  - cephALEXin (KEFLEX) 500 MG capsule; Take 1 capsule (500 mg) by mouth 3 times daily for 7 days  Dispense: 21 capsule; Refill: 0  - sulfamethoxazole-trimethoprim (BACTRIM DS/SEPTRA DS) 800-160 MG tablet; Take 1 tablet by mouth 2 times daily for 7 days  Dispense: 14 tablet; Refill: 0    2. Cellulitis of left lower extremity  As noted above.  - cephALEXin (KEFLEX) 500 MG capsule; Take 1 capsule (500 mg) by mouth 3 times daily for 7 days  Dispense: 21 capsule; Refill: 0  - sulfamethoxazole-trimethoprim (BACTRIM DS/SEPTRA DS) 800-160 MG tablet; Take 1 tablet by mouth 2 times daily for 7 days  Dispense: 14 tablet; Refill: 0    Nancy Darby PA-C  [facility]

## 2020-06-08 DIAGNOSIS — K58.9 IRRITABLE BOWEL SYNDROME WITHOUT DIARRHEA: ICD-10-CM

## 2020-06-08 DIAGNOSIS — I10 ESSENTIAL HYPERTENSION: ICD-10-CM

## 2020-06-09 NOTE — TELEPHONE ENCOUNTER
Routing to MA/TC pool. The Pt is due for an OV with PCP. Please call and help them schedule. Please assess if the Pt has enough medication to get them through until their upcoming future visit, or if they need a refill.     Please route back to refill pool with response    Liya Rudolph RN on 6/9/2020 at 9:58 AM

## 2020-06-15 RX ORDER — LISINOPRIL 20 MG/1
TABLET ORAL
Qty: 90 TABLET | Refills: 1 | Status: SHIPPED | OUTPATIENT
Start: 2020-06-15 | End: 2020-10-29

## 2020-06-15 RX ORDER — DULOXETIN HYDROCHLORIDE 60 MG/1
CAPSULE, DELAYED RELEASE ORAL
Qty: 90 CAPSULE | Refills: 1 | Status: SHIPPED | OUTPATIENT
Start: 2020-06-15 | End: 2020-10-29

## 2020-06-15 NOTE — TELEPHONE ENCOUNTER
These are still pending from 6/8    Thanks    Mihaela Chicas, Pharmacy [facility]  [phone]

## 2020-09-03 ENCOUNTER — TELEPHONE (OUTPATIENT)
Dept: FAMILY MEDICINE | Facility: CLINIC | Age: 50
End: 2020-09-03

## 2020-09-03 NOTE — TELEPHONE ENCOUNTER
Patient Quality Outreach Summary      Summary:    Patient is due/failing the following:   Eye Exam and Diabetic Follow-Up Visit    Type of outreach:    Phone, left message for patient/parent to call back.    Questions for provider review:    None                                                                                                                    FRANSISCA Lilly       Chart routed to Care Team.

## 2020-10-29 ENCOUNTER — MYC REFILL (OUTPATIENT)
Dept: FAMILY MEDICINE | Facility: CLINIC | Age: 50
End: 2020-10-29

## 2020-10-29 DIAGNOSIS — I10 ESSENTIAL HYPERTENSION: ICD-10-CM

## 2020-10-29 DIAGNOSIS — K58.9 IRRITABLE BOWEL SYNDROME WITHOUT DIARRHEA: ICD-10-CM

## 2020-10-29 DIAGNOSIS — E11.9 CONTROLLED TYPE 2 DIABETES MELLITUS WITHOUT COMPLICATION, WITHOUT LONG-TERM CURRENT USE OF INSULIN (H): ICD-10-CM

## 2020-10-29 RX ORDER — LISINOPRIL 20 MG/1
20 TABLET ORAL DAILY
Qty: 90 TABLET | Refills: 0 | Status: SHIPPED | OUTPATIENT
Start: 2020-10-29 | End: 2021-01-29

## 2020-10-29 RX ORDER — DULOXETIN HYDROCHLORIDE 60 MG/1
60 CAPSULE, DELAYED RELEASE ORAL DAILY
Qty: 90 CAPSULE | Refills: 0 | Status: SHIPPED | OUTPATIENT
Start: 2020-10-29 | End: 2021-02-11

## 2020-10-29 RX ORDER — LANCETS
1 EACH MISCELLANEOUS DAILY
Qty: 100 EACH | Refills: 0 | Status: SHIPPED | OUTPATIENT
Start: 2020-10-29 | End: 2023-02-23 | Stop reason: ALTCHOICE

## 2020-10-29 NOTE — TELEPHONE ENCOUNTER
Annual visit due, see mychart request, sent appointment message  Medication is being filled for 1 time refill only due to:  Patient needs to be seen because it has been more than one year since last visit.   Prescription approved per [facility] [place].  Rosa Maria Gutierrez RN, BSN  Message handled by CLINIC NURSE.

## 2020-11-29 ENCOUNTER — HEALTH MAINTENANCE LETTER (OUTPATIENT)
Age: 50
End: 2020-11-29

## 2021-01-21 DIAGNOSIS — I10 ESSENTIAL HYPERTENSION: ICD-10-CM

## 2021-01-21 DIAGNOSIS — K58.9 IRRITABLE BOWEL SYNDROME WITHOUT DIARRHEA: ICD-10-CM

## 2021-01-21 RX ORDER — LISINOPRIL 20 MG/1
20 TABLET ORAL DAILY
Qty: 90 TABLET | Refills: 0 | OUTPATIENT
Start: 2021-01-21

## 2021-01-21 RX ORDER — DULOXETIN HYDROCHLORIDE 60 MG/1
CAPSULE, DELAYED RELEASE ORAL
Qty: 90 CAPSULE | Refills: 0 | OUTPATIENT
Start: 2021-01-21

## 2021-01-21 NOTE — TELEPHONE ENCOUNTER
Please call patient. He is overdue for a follow-up appointment. I will give limited fill if he schedules an appointment.    Michael Shaw PA-C on 1/21/2021 at 4:08 PM

## 2021-01-21 NOTE — TELEPHONE ENCOUNTER
Routing refill request to provider for review/approval because:  Marta given x1 and patient did not follow up, please advise  Labs not current:  All  Patient needs to be seen because it has been more than 1 year since last office visit.  BP not in parameters for [facility] protocol for RN to refill  PHQ-9 out of [facility] protocol range for RN to refill

## 2021-01-21 NOTE — TELEPHONE ENCOUNTER
lvm for pt to call back to the clinic and schedule a F2F with PCP for further refills.  Holly Ha MA on 1/21/2021 at 4:12 PM

## 2021-01-27 DIAGNOSIS — I10 ESSENTIAL HYPERTENSION: ICD-10-CM

## 2021-01-27 NOTE — TELEPHONE ENCOUNTER
Pt scheduled for 2/11 with PCP for F2F follow up and med check. No further questions at this time.    Sadaf Hester on 1/27/2021 at 2:10 PM

## 2021-01-29 RX ORDER — LISINOPRIL 20 MG/1
20 TABLET ORAL DAILY
Qty: 14 TABLET | Refills: 0 | Status: SHIPPED | OUTPATIENT
Start: 2021-01-29 | End: 2021-02-11

## 2021-01-29 NOTE — TELEPHONE ENCOUNTER
Limited supply sent per below.  RICHARDSON Day, Michael LOPEZ PA-C         4:09 PM  Note     Please call patient. He is overdue for a follow-up appointment. I will give limited fill if he schedules an appointment.     Michael Shaw PA-C on 1/21/2021 at 4:08 PM

## 2021-02-11 ENCOUNTER — OFFICE VISIT (OUTPATIENT)
Dept: FAMILY MEDICINE | Facility: CLINIC | Age: 51
End: 2021-02-11
Payer: COMMERCIAL

## 2021-02-11 VITALS
WEIGHT: 315 LBS | BODY MASS INDEX: 47.74 KG/M2 | DIASTOLIC BLOOD PRESSURE: 60 MMHG | OXYGEN SATURATION: 99 % | HEIGHT: 68 IN | SYSTOLIC BLOOD PRESSURE: 100 MMHG | HEART RATE: 78 BPM | RESPIRATION RATE: 18 BRPM | TEMPERATURE: 98.2 F

## 2021-02-11 DIAGNOSIS — Z23 NEED FOR PROPHYLACTIC VACCINATION AND INOCULATION AGAINST INFLUENZA: ICD-10-CM

## 2021-02-11 DIAGNOSIS — Z12.11 SCREEN FOR COLON CANCER: ICD-10-CM

## 2021-02-11 DIAGNOSIS — B35.3 TINEA PEDIS OF BOTH FEET: ICD-10-CM

## 2021-02-11 DIAGNOSIS — I10 ESSENTIAL HYPERTENSION: ICD-10-CM

## 2021-02-11 DIAGNOSIS — Z13.6 CARDIOVASCULAR SCREENING; LDL GOAL LESS THAN 160: ICD-10-CM

## 2021-02-11 DIAGNOSIS — Z86.0100 HISTORY OF COLONIC POLYPS: ICD-10-CM

## 2021-02-11 DIAGNOSIS — E11.9 CONTROLLED TYPE 2 DIABETES MELLITUS WITHOUT COMPLICATION, WITHOUT LONG-TERM CURRENT USE OF INSULIN (H): Primary | ICD-10-CM

## 2021-02-11 DIAGNOSIS — K58.9 IRRITABLE BOWEL SYNDROME WITHOUT DIARRHEA: ICD-10-CM

## 2021-02-11 DIAGNOSIS — L91.8 ACROCHORDON: ICD-10-CM

## 2021-02-11 DIAGNOSIS — E66.01 MORBID OBESITY (H): ICD-10-CM

## 2021-02-11 LAB — HBA1C MFR BLD: 6.6 % (ref 0–5.6)

## 2021-02-11 PROCEDURE — 17110 DESTRUCTION B9 LES UP TO 14: CPT | Performed by: FAMILY MEDICINE

## 2021-02-11 PROCEDURE — 99214 OFFICE O/P EST MOD 30 MIN: CPT | Mod: 25 | Performed by: FAMILY MEDICINE

## 2021-02-11 PROCEDURE — 80053 COMPREHEN METABOLIC PANEL: CPT | Performed by: FAMILY MEDICINE

## 2021-02-11 PROCEDURE — 80061 LIPID PANEL: CPT | Performed by: FAMILY MEDICINE

## 2021-02-11 PROCEDURE — 36415 COLL VENOUS BLD VENIPUNCTURE: CPT | Performed by: FAMILY MEDICINE

## 2021-02-11 PROCEDURE — 82043 UR ALBUMIN QUANTITATIVE: CPT | Performed by: FAMILY MEDICINE

## 2021-02-11 PROCEDURE — 99207 PR FOOT EXAM NO CHARGE: CPT | Mod: 25 | Performed by: FAMILY MEDICINE

## 2021-02-11 PROCEDURE — 90471 IMMUNIZATION ADMIN: CPT | Performed by: FAMILY MEDICINE

## 2021-02-11 PROCEDURE — 83036 HEMOGLOBIN GLYCOSYLATED A1C: CPT | Performed by: FAMILY MEDICINE

## 2021-02-11 PROCEDURE — 90686 IIV4 VACC NO PRSV 0.5 ML IM: CPT | Performed by: FAMILY MEDICINE

## 2021-02-11 RX ORDER — LISINOPRIL 20 MG/1
20 TABLET ORAL DAILY
Qty: 90 TABLET | Refills: 1 | Status: SHIPPED | OUTPATIENT
Start: 2021-02-11 | End: 2021-08-06

## 2021-02-11 RX ORDER — ASPIRIN 81 MG/1
81 TABLET, CHEWABLE ORAL DAILY
Qty: 100 TABLET | Refills: 3 | Status: SHIPPED | OUTPATIENT
Start: 2021-02-11 | End: 2022-05-17

## 2021-02-11 RX ORDER — DULOXETIN HYDROCHLORIDE 60 MG/1
60 CAPSULE, DELAYED RELEASE ORAL DAILY
Qty: 90 CAPSULE | Refills: 1 | Status: SHIPPED | OUTPATIENT
Start: 2021-02-11 | End: 2021-10-28

## 2021-02-11 ASSESSMENT — MIFFLIN-ST. JEOR: SCORE: 2376.73

## 2021-02-11 NOTE — PROGRESS NOTES
Assessment & Plan   Problem List Items Addressed This Visit     Acrochordon     Right flank, typical.  Discussed pathophysiology, treatment options.  He elects treatment today with cryotherapy         Relevant Orders    DESTRUCT BENIGN LESION, UP TO 14 (Completed)    CARDIOVASCULAR SCREENING; LDL GOAL LESS THAN 160     LDL Cholesterol Calculated   Date Value Ref Range Status   10/07/2019 71 <100 mg/dL Final     Comment:     Desirable:       <100 mg/dl     No statin         Controlled type 2 diabetes mellitus without complication, without long-term current use of insulin (H) - Primary     Status unknown. Broaden data base  .         Relevant Medications    STATIN NOT PRESCRIBED (INTENTIONAL)    aspirin (ASA) 81 MG chewable tablet    Other Relevant Orders    Albumin Random Urine Quantitative with Creat Ratio (Completed)    HEMOGLOBIN A1C (Completed)    COMPREHENSIVE METABOLIC PANEL (Completed)    FOOT EXAM (Completed)    Lipid panel reflex to direct LDL Non-fasting (Completed)    Essential hypertension     Controlled, perhaps overly so.  No symptoms.  Continue         Relevant Medications    lisinopril (ZESTRIL) 20 MG tablet    History of colonic polyps     He is a bit overdue for his repeat colonoscopy         Irritable bowel syndrome without diarrhea     Completely controlled.  Continue duloxetine         Relevant Medications    DULoxetine (CYMBALTA) 60 MG capsule    Morbid obesity (H)     Previously in process for bariatric surgery, this was interrupted by the pandemic.  Rerefer         Relevant Orders    COMPREHENSIVE WEIGHT MANAGEMENT    Tinea pedis of both feet     Asymptomatic.  Recommend OTC antifungals.  Discussed diabetic foot care         Relevant Medications    aspirin (ASA) 81 MG chewable tablet      Other Visit Diagnoses     Screen for colon cancer        Relevant Orders    GASTROENTEROLOGY ADULT REF PROCEDURE ONLY    Need for prophylactic vaccination and inoculation against influenza        Relevant  "Orders    INFLUENZA VACCINE IM > 6 MONTHS VALENT IIV4 [53010] (Completed)                    BMI:   Estimated body mass index is 51.7 kg/m  as calculated from the following:    Height as of this encounter: 1.727 m (5' 8\").    Weight as of this encounter: 154.2 kg (340 lb).   Weight management plan: Specific weight management program called Bariatric surgery discussed        Return in about 6 months (around 2021) for recheck.    Mynor Mason MD  [facility]    Joyce Alvarez is a 50 year old who presents for the following health issues     History of Present Illness       He eats 0-1 servings of fruits and vegetables daily.He consumes 3 sweetened beverage(s) daily.He exercises with enough effort to increase his heart rate 9 or less minutes per day.  He exercises with enough effort to increase his heart rate 3 or less days per week. He is missing 2 dose(s) of medications per week.  He is not taking prescribed medications regularly due to remembering to take.         Hypertension Follow-up      Do you check your blood pressure regularly outside of the clinic? No     Are you following a low salt diet? Yes    Are your blood pressures ever more than 140 on the top number (systolic) OR more   than 90 on the bottom number (diastolic), for example 140/90? No          Social History     Tobacco Use     Smoking status: Former Smoker     Packs/day: 1.00     Years: 25.00     Pack years: 25.00     Types: Cigarettes     Start date: 1988     Quit date: 2013     Years since quittin.8     Smokeless tobacco: Never Used     Tobacco comment: 1 sd/day   Substance Use Topics     Alcohol use: Yes     Comment: rarely     Drug use: No     No flowsheet data found.  No flowsheet data found.  No flowsheet data found.  No flowsheet data found.            Review of Systems   No visual symptoms no systemic symptoms no pulmonary symptoms no neuropathic symptoms.  He has a skin tag longstanding on " "his right flank.  Like removed        Objective    There were no vitals taken for this visit.  There is no height or weight on file to calculate BMI.   /60 (BP Location: Right arm, Patient Position: Sitting, Cuff Size: Adult Large)   Pulse 78   Temp 98.2  F (36.8  C) (Oral)   Resp 18   Ht 1.727 m (5' 8\")   Wt (!) 154.2 kg (340 lb)   SpO2 99%   BMI 51.70 kg/m      Physical Exam   Large typical acrochordon right flank.  Feet warm and dry.  Pulses not palpable.  Tinea pedis between his lateral toes bilaterally    After discussion, cryotherapy applied to acrochordon.  Care discussed    He will accept flu shot    Mynor Mason MD          "

## 2021-02-11 NOTE — ASSESSMENT & PLAN NOTE
Right flank, typical.  Discussed pathophysiology, treatment options.  He elects treatment today with cryotherapy

## 2021-02-13 LAB
ALBUMIN SERPL-MCNC: 3.7 G/DL (ref 3.4–5)
ALP SERPL-CCNC: 43 U/L (ref 40–150)
ALT SERPL W P-5'-P-CCNC: 43 U/L (ref 0–70)
ANION GAP SERPL CALCULATED.3IONS-SCNC: 6 MMOL/L (ref 3–14)
AST SERPL W P-5'-P-CCNC: 23 U/L (ref 0–45)
BILIRUB SERPL-MCNC: 0.7 MG/DL (ref 0.2–1.3)
BUN SERPL-MCNC: 13 MG/DL (ref 7–30)
CALCIUM SERPL-MCNC: 9.1 MG/DL (ref 8.5–10.1)
CHLORIDE SERPL-SCNC: 107 MMOL/L (ref 94–109)
CHOLEST SERPL-MCNC: 139 MG/DL
CO2 SERPL-SCNC: 27 MMOL/L (ref 20–32)
CREAT SERPL-MCNC: 0.94 MG/DL (ref 0.66–1.25)
CREAT UR-MCNC: 171 MG/DL
GFR SERPL CREATININE-BSD FRML MDRD: >90 ML/MIN/{1.73_M2}
GLUCOSE SERPL-MCNC: 135 MG/DL (ref 70–99)
HDLC SERPL-MCNC: 46 MG/DL
LDLC SERPL CALC-MCNC: 49 MG/DL
MICROALBUMIN UR-MCNC: 11 MG/L
MICROALBUMIN/CREAT UR: 6.43 MG/G CR (ref 0–17)
NONHDLC SERPL-MCNC: 93 MG/DL
POTASSIUM SERPL-SCNC: 4.4 MMOL/L (ref 3.4–5.3)
PROT SERPL-MCNC: 7.3 G/DL (ref 6.8–8.8)
SODIUM SERPL-SCNC: 140 MMOL/L (ref 133–144)
TRIGL SERPL-MCNC: 222 MG/DL

## 2021-02-24 DIAGNOSIS — Z11.59 ENCOUNTER FOR SCREENING FOR OTHER VIRAL DISEASES: ICD-10-CM

## 2021-03-07 SDOH — ECONOMIC STABILITY: INCOME INSECURITY: IN THE LAST 12 MONTHS, WAS THERE A TIME WHEN YOU WERE NOT ABLE TO PAY THE MORTGAGE OR RENT ON TIME?: NO

## 2021-03-09 DIAGNOSIS — Z11.59 ENCOUNTER FOR SCREENING FOR OTHER VIRAL DISEASES: ICD-10-CM

## 2021-03-09 LAB
SARS-COV-2 RNA RESP QL NAA+PROBE: NORMAL
SPECIMEN SOURCE: NORMAL

## 2021-03-09 PROCEDURE — U0005 INFEC AGEN DETEC AMPLI PROBE: HCPCS | Performed by: SURGERY

## 2021-03-09 PROCEDURE — U0003 INFECTIOUS AGENT DETECTION BY NUCLEIC ACID (DNA OR RNA); SEVERE ACUTE RESPIRATORY SYNDROME CORONAVIRUS 2 (SARS-COV-2) (CORONAVIRUS DISEASE [COVID-19]), AMPLIFIED PROBE TECHNIQUE, MAKING USE OF HIGH THROUGHPUT TECHNOLOGIES AS DESCRIBED BY CMS-2020-01-R: HCPCS | Performed by: SURGERY

## 2021-03-10 LAB
LABORATORY COMMENT REPORT: NORMAL
SARS-COV-2 RNA RESP QL NAA+PROBE: NEGATIVE
SPECIMEN SOURCE: NORMAL

## 2021-03-11 ENCOUNTER — OFFICE VISIT (OUTPATIENT)
Dept: FAMILY MEDICINE | Facility: CLINIC | Age: 51
End: 2021-03-11
Payer: COMMERCIAL

## 2021-03-11 VITALS
WEIGHT: 315 LBS | TEMPERATURE: 98.3 F | DIASTOLIC BLOOD PRESSURE: 62 MMHG | HEART RATE: 88 BPM | SYSTOLIC BLOOD PRESSURE: 110 MMHG | OXYGEN SATURATION: 97 % | BODY MASS INDEX: 51.26 KG/M2

## 2021-03-11 DIAGNOSIS — E11.9 CONTROLLED TYPE 2 DIABETES MELLITUS WITHOUT COMPLICATION, WITHOUT LONG-TERM CURRENT USE OF INSULIN (H): ICD-10-CM

## 2021-03-11 DIAGNOSIS — Z86.0100 HISTORY OF COLONIC POLYPS: ICD-10-CM

## 2021-03-11 DIAGNOSIS — G47.33 OSA ON CPAP: ICD-10-CM

## 2021-03-11 DIAGNOSIS — Z01.818 PREOP GENERAL PHYSICAL EXAM: Primary | ICD-10-CM

## 2021-03-11 PROCEDURE — 99214 OFFICE O/P EST MOD 30 MIN: CPT | Performed by: FAMILY MEDICINE

## 2021-03-11 NOTE — PATIENT INSTRUCTIONS

## 2021-03-11 NOTE — ASSESSMENT & PLAN NOTE
Hemoglobin A1C   Date Value Ref Range Status   02/11/2021 6.6 (H) 0 - 5.6 % Final     Comment:     Normal <5.7% Prediabetes 5.7-6.4%  Diabetes 6.5% or higher - adopted from ADA   consensus guidelines.     Diet

## 2021-03-11 NOTE — H&P (VIEW-ONLY)
[facility]  [address] [place] [id number]  Phone: [phone]  Primary Provider: Mynor Mason        PREOPERATIVE EVALUATION:  Today's date: 3/11/2021    Antonio Canseco is a 50 year old male who presents for a preoperative evaluation.    Surgical Information:  Surgery/Procedure: colonoscopy  Surgery Location: [facility]  Surgeon: Dr. Alcantara  Surgery Date: 03/12/21  Time of Surgery: 11:00am   Where patient plans to recover: At home with family  Fax number for surgical facility: Note does not need to be faxed, will be available electronically in Epic.    Type of Anesthesia Anticipated: to be determined    Assessment & Plan     The proposed surgical procedure is considered LOW risk.    Problem List Items Addressed This Visit     Controlled type 2 diabetes mellitus without complication, without long-term current use of insulin (H)     Hemoglobin A1C   Date Value Ref Range Status   02/11/2021 6.6 (H) 0 - 5.6 % Final     Comment:     Normal <5.7% Prediabetes 5.7-6.4%  Diabetes 6.5% or higher - adopted from ADA   consensus guidelines.     Diet           History of colonic polyps     Periodic colonoscopy recommended         BRIAN on CPAP     Advised to bring his CPAP to facilitate recovery           Other Visit Diagnoses     Preop general physical exam    -  Primary                   Patient will hold all meds AM of surgery    RECOMMENDATION:    Recommend proceed with colonoscopy as planned                    Subjective     HPI related to upcoming procedure: History of colonic polyps    Preop Questions 3/7/2021   1. Have you ever had a heart attack or stroke? No   2. Have you ever had surgery on your heart or blood vessels, such as a stent placement, a coronary artery bypass, or surgery on an artery in your head, neck, heart, or legs? No   3. Do you have chest pain with activity? No   4. Do you have a history of  heart failure? No   5. Do you  currently have a cold, bronchitis or symptoms of other infection? No   6. Do you have a cough, shortness of breath, or wheezing? No   7. Do you or anyone in your family have previous history of blood clots? No   8. Do you or does anyone in your family have a serious bleeding problem such as prolonged bleeding following surgeries or cuts? No   9. Have you ever had problems with anemia or been told to take iron pills? No   10. Have you had any abnormal blood loss such as black, tarry or bloody stools? No   11. Have you ever had a blood transfusion? No   12. Are you willing to have a blood transfusion if it is medically needed before, during, or after your surgery? Yes   13. Have you or any of your relatives ever had problems with anesthesia? No   14. Do you have sleep apnea, excessive snoring or daytime drowsiness? YES - BRIAN   14a. Do you have a CPAP machine? Yes   15. Do you have any artifical heart valves or other implanted medical devices like a pacemaker, defibrillator, or continuous glucose monitor? No   16. Do you have artificial joints? No   17. Are you allergic to latex? No       Health Care Directive:  Patient does not have a Health Care Directive or Living Will:     Preoperative Review of :   reviewed - no record of controlled substances prescribed.      Past Medical History:   Diagnosis Date     Acrochordon 2/11/2021     CARDIOVASCULAR SCREENING; LDL GOAL LESS THAN 160 3/27/2012     Colon adenoma      Controlled type 2 diabetes mellitus without complication, without long-term current use of insulin (H) 9/16/2019    X 1     Cough 2/4/2014     Degeneration of thoracic intervertebral disc 12/11/2013     Dysfunction of both eustachian tubes 4/12/2019     Elevated blood pressure 12/22/2014     Elevated hemoglobin A1c 9/16/2019    X 1     Gastroesophageal reflux disease      Hepatic cyst 5/22/2018     Hepatic steatosis 12/11/2013     History of colonic polyps 12/11/2013     History of drainage of abscess  2019     Hypertension      Irritable bowel syndrome without diarrhea 2018     Low libido 2019     Microscopic hematuria 2013     Nephrolithiasis      Obesity 3/27/2012     BRIAN on CPAP 3/27/2012     Other irritable bowel syndrome 2018     Pain in thoracic spine 2013     Pulmonary nodule 2018     Right-sided chest pain 2018     Sleep apnea      Tinea pedis of both feet 2021     Tinnitus, unspecified laterality 2019       Past Surgical History:   Procedure Laterality Date     COLONOSCOPY       CYSTOSCOPY  2014    Procedure: CYSTOSCOPY;   Video Cystoscopy with Exam Under Anesthesia;  Surgeon: Chente Moeller MD;  Location: [facility] OR     wisdom teeth         Family History   Problem Relation Age of Onset     Family History Negative Mother      Obesity Mother      Cancer Father         lymphoma     Other Cancer Father      Other Cancer Paternal Grandfather        Social History     Tobacco Use     Smoking status: Former Smoker     Packs/day: 1.00     Years: 25.00     Pack years: 25.00     Types: Cigarettes     Start date: 1988     Quit date: 2013     Years since quittin.8     Smokeless tobacco: Never Used     Tobacco comment: 1 sd/day   Substance Use Topics     Alcohol use: Yes     Frequency: Monthly or less     Drinks per session: 1 or 2     Binge frequency: Never     Comment: one drink every 6 months         Review of Systems  CONSTITUTIONAL: NEGATIVE for fever, chills, change in weight  ENT/MOUTH: NEGATIVE for ear, mouth and throat problems  RESP: NEGATIVE for significant cough or SOB  CV: NEGATIVE for chest pain, palpitations or peripheral edema    Patient Active Problem List    Diagnosis Date Noted     Acrochordon 2021     Priority: Medium     Tinea pedis of both feet 2021     Priority: Medium     Essential hypertension 2019     Priority: Medium     Controlled type 2 diabetes mellitus without complication, without long-term current  use of insulin (H) 09/16/2019     Priority: Medium              Tinnitus, unspecified laterality 04/12/2019     Priority: Medium     Dysfunction of both eustachian tubes 04/12/2019     Priority: Medium     Low libido 04/12/2019     Priority: Medium     Irritable bowel syndrome without diarrhea 06/01/2018     Priority: Medium     Pulmonary nodule 05/22/2018     Priority: Medium     Repeat CT Nov 2019       Hepatic cyst 05/22/2018     Priority: Medium     Right-sided chest pain 04/13/2018     Priority: Medium     Plantar fasciitis 10/25/2014     Priority: Medium     Morbid obesity (H) 10/25/2014     Priority: Medium     Cough 02/04/2014     Priority: Medium     BRIAN NESTOR       Microscopic hematuria 12/19/2013     Priority: Medium     Degeneration of thoracic intervertebral disc 12/11/2013     Priority: Medium     History of colonic polyps 12/11/2013     Priority: Medium     Hepatic steatosis 12/11/2013     Priority: Medium     CARDIOVASCULAR SCREENING; LDL GOAL LESS THAN 160 03/27/2012     Priority: Medium     BRIAN on CPAP 03/27/2012     Priority: Medium      Past Medical History:   Diagnosis Date     Acrochordon 2/11/2021     CARDIOVASCULAR SCREENING; LDL GOAL LESS THAN 160 3/27/2012     Colon adenoma      Controlled type 2 diabetes mellitus without complication, without long-term current use of insulin (H) 9/16/2019    X 1     Cough 2/4/2014     Degeneration of thoracic intervertebral disc 12/11/2013     Dysfunction of both eustachian tubes 4/12/2019     Elevated blood pressure 12/22/2014     Elevated hemoglobin A1c 9/16/2019    X 1     Gastroesophageal reflux disease      Hepatic cyst 5/22/2018     Hepatic steatosis 12/11/2013     History of colonic polyps 12/11/2013     History of drainage of abscess 9/16/2019     Hypertension      Irritable bowel syndrome without diarrhea 6/1/2018     Low libido 4/12/2019     Microscopic hematuria 12/19/2013     Nephrolithiasis      Obesity 3/27/2012     BRIAN on CPAP 3/27/2012      Other irritable bowel syndrome 2018     Pain in thoracic spine 2013     Pulmonary nodule 2018     Right-sided chest pain 2018     Sleep apnea      Tinea pedis of both feet 2021     Tinnitus, unspecified laterality 2019     Past Surgical History:   Procedure Laterality Date     COLONOSCOPY       CYSTOSCOPY  2014    Procedure: CYSTOSCOPY;   Video Cystoscopy with Exam Under Anesthesia;  Surgeon: Chente Moeller MD;  Location: [facility] OR     wisdom teeth       Current Outpatient Medications   Medication Sig Dispense Refill     aspirin (ASA) 81 MG chewable tablet Take 1 tablet (81 mg) by mouth daily 100 tablet 3     blood glucose (NO BRAND SPECIFIED) test strip accuchek guide- Use to test blood sugar 1 times daily or as directed. 100 strip 0     blood glucose monitoring (ACCU-CHEK FASTCLIX) lancets 1 each daily Accuchek guide 100 each 0     DULoxetine (CYMBALTA) 60 MG capsule Take 1 capsule (60 mg) by mouth daily 90 capsule 1     lisinopril (ZESTRIL) 20 MG tablet Take 1 tablet (20 mg) by mouth daily 90 tablet 1     STATIN NOT PRESCRIBED (INTENTIONAL) Please choose reason not prescribed from choices below.         Allergies   Allergen Reactions     Kiwi Itching        Social History     Tobacco Use     Smoking status: Former Smoker     Packs/day: 1.00     Years: 25.00     Pack years: 25.00     Types: Cigarettes     Start date: 1988     Quit date: 2013     Years since quittin.8     Smokeless tobacco: Never Used     Tobacco comment: 1 sd/day   Substance Use Topics     Alcohol use: Yes     Frequency: Monthly or less     Drinks per session: 1 or 2     Binge frequency: Never     Comment: one drink every 6 months       History   Drug Use No         Objective     /62 (BP Location: Right arm, Patient Position: Chair, Cuff Size: Adult Large)   Pulse 88   Temp 98.3  F (36.8  C) (Oral)   Wt (!) 152.9 kg (337 lb 2 oz)   SpO2 97%   BMI 51.26 kg/m      Physical  Exam  Constitutional:       Appearance: Normal appearance. He is obese.   HENT:      Head: Atraumatic.      Right Ear: Tympanic membrane normal.      Left Ear: Tympanic membrane normal.      Nose: Nose normal.      Mouth/Throat:      Mouth: Mucous membranes are moist.   Eyes:      Pupils: Pupils are equal, round, and reactive to light.   Neck:      Musculoskeletal: Neck supple.   Cardiovascular:      Rate and Rhythm: Normal rate and regular rhythm.      Heart sounds: Normal heart sounds.   Pulmonary:      Effort: Pulmonary effort is normal.      Breath sounds: Normal breath sounds.   Abdominal:      General: Bowel sounds are normal.   Skin:     General: Skin is warm and dry.   Neurological:      General: No focal deficit present.      Mental Status: He is alert.   Psychiatric:         Mood and Affect: Mood normal.           Recent Labs   Lab Test 02/11/21  1622 12/26/19  1745 10/07/19  0903   HGB  --  14.2  --    PLT  --  237  --     138  --    POTASSIUM 4.4 4.0  --    CR 0.94 0.92  --    A1C 6.6*  --  6.6*        Diagnostics:  No labs were ordered during this visit.   No EKG required, no history of coronary heart disease, significant arrhythmia, peripheral arterial disease or other structural heart disease.    Revised Cardiac Risk Index (RCRI):  The patient has the following serious cardiovascular risks for perioperative complications:   - No serious cardiac risks = 0 points     RCRI Interpretation: 0 points: Class I (very low risk - 0.4% complication rate)           Signed Electronically by: Mynor Mason MD  Copy of this evaluation report is provided to requesting physician.

## 2021-03-11 NOTE — PROGRESS NOTES
Pre-Visit Planning :     Future Appointments   Date Time Provider Department Center   3/11/2021  4:10 PM Mynor Mason MD [facility]      Arrival Time for this Appointment:  3:45 PM      Appointment Notes for this encounter:    Required physical   DOS: 3/12/2021 [facility]; PROCEDURE: Colonoscopy;Screen for colon cancer ( with possible biopsy and/or polypectomy ); Ean Alcantara MD     Questionnaires Reviewed/Assigned:   N/A    Are there any additional questions or concerns you'd like to review with your provider during your visit? N/A      Last OV with provider:  2/11/2021; Controlled type 2 diabetes mellitus without complication, without long-term current use of insulin (H); Screen for colon cancer; Morbid obesity (H); Essential hypertension; History of colonic polyps; Irritable bowel syndrome without diarrhea; Need for prophylactic vaccination and inoculation against influenza; Acrochordon; Tinea pedis of both feet; CARDIOVASCULAR SCREENING; LDL GOAL LESS THAN 160        Hospital ER Visits:  1/6/2020; [facility]/Trauma/Neuro 2;Mynor High MD:Glenn Atkinson MD: Diagnoses:  Closed fracture of temporal bone, initial encounter (**)      Is the visit preventive? No~ Pre-op    Specialty Visits:  1/31/2020;[facility] ; Jorgito Love RD, LD (Registered Dietitian)     Nutrition; Morbid obesity (H); PRE SURGICAL WEIGHT LOSS NUTRITION APPOINTMENT      Imaging and Lab Review:2/13/2021; Trig-[facility] High ;                                              2/11/2021; Hgb A1C- 6.6 %High;             Recent Procedures:  9/1/2019;[facility]; Jefry Worrell MD ;  IRRIGATION AND DEBRIDEMENT LOWER EXTREMITY;4 INFECTED/DIRTY  ;Discharge Diagnosis:1. Left Thigh Abscess and associated cellulitis 2. Hypertension         3. Morbid Obesity     MEDS ;    Current Outpatient Medications   Medication     aspirin (ASA) 81 MG chewable tablet     blood glucose (NO BRAND  "SPECIFIED) test strip     blood glucose monitoring (ACCU-CHEK FASTCLIX) lancets     DULoxetine (CYMBALTA) 60 MG capsule     lisinopril (ZESTRIL) 20 MG tablet     STATIN NOT PRESCRIBED (INTENTIONAL)     No current facility-administered medications for this visit.       Is there anything on your medication list that needs to be updated?  Ask pt @ check-in     Health Maintenance Due   Topic Date Due     EYE EXAM  Never done     PREVENTIVE CARE VISIT  05/03/2014     COLORECTAL CANCER SCREENING  05/29/2018     ZOSTER IMMUNIZATION (1 of 2) 07/06/2020     Preferred pharmacy: [facility] [id number]     MyChart:  YES    Questionnaire Review :  Lastly, it appears there are some questions your care team has for you.    If MyChart ACTIVE \"If you get a chance to do your questions on [email or web address], your appointment will be much faster and smoother so feel free to sign in and go through those, otherwise please plan on arriving early so that you have time to complete them.    If [employer] INACTIVE \"Please plan on arriving early so that you have time to complete your questionnaires prior to seeing your provider.      Patient preferred phone number: [phone]    Katty Sparrow, Registered Nurse, Patient [facility]   [phone]    "

## 2021-03-11 NOTE — PROGRESS NOTES
[facility]  [id number] [place] [address]  Phone: [phone]  Primary Provider: Mynor Mason        PREOPERATIVE EVALUATION:  Today's date: 3/11/2021    Antonio Canseco is a 50 year old male who presents for a preoperative evaluation.    Surgical Information:  Surgery/Procedure: colonoscopy  Surgery Location: [facility]  Surgeon: Dr. Alcantara  Surgery Date: 03/12/21  Time of Surgery: 11:00am   Where patient plans to recover: At home with family  Fax number for surgical facility: Note does not need to be faxed, will be available electronically in Epic.    Type of Anesthesia Anticipated: to be determined    Assessment & Plan     The proposed surgical procedure is considered LOW risk.    Problem List Items Addressed This Visit     Controlled type 2 diabetes mellitus without complication, without long-term current use of insulin (H)     Hemoglobin A1C   Date Value Ref Range Status   02/11/2021 6.6 (H) 0 - 5.6 % Final     Comment:     Normal <5.7% Prediabetes 5.7-6.4%  Diabetes 6.5% or higher - adopted from ADA   consensus guidelines.     Diet           History of colonic polyps     Periodic colonoscopy recommended         BRIAN on CPAP     Advised to bring his CPAP to facilitate recovery           Other Visit Diagnoses     Preop general physical exam    -  Primary                   Patient will hold all meds AM of surgery    RECOMMENDATION:    Recommend proceed with colonoscopy as planned                    Subjective     HPI related to upcoming procedure: History of colonic polyps    Preop Questions 3/7/2021   1. Have you ever had a heart attack or stroke? No   2. Have you ever had surgery on your heart or blood vessels, such as a stent placement, a coronary artery bypass, or surgery on an artery in your head, neck, heart, or legs? No   3. Do you have chest pain with activity? No   4. Do you have a history of  heart failure? No   5. Do you  currently have a cold, bronchitis or symptoms of other infection? No   6. Do you have a cough, shortness of breath, or wheezing? No   7. Do you or anyone in your family have previous history of blood clots? No   8. Do you or does anyone in your family have a serious bleeding problem such as prolonged bleeding following surgeries or cuts? No   9. Have you ever had problems with anemia or been told to take iron pills? No   10. Have you had any abnormal blood loss such as black, tarry or bloody stools? No   11. Have you ever had a blood transfusion? No   12. Are you willing to have a blood transfusion if it is medically needed before, during, or after your surgery? Yes   13. Have you or any of your relatives ever had problems with anesthesia? No   14. Do you have sleep apnea, excessive snoring or daytime drowsiness? YES - BRIAN   14a. Do you have a CPAP machine? Yes   15. Do you have any artifical heart valves or other implanted medical devices like a pacemaker, defibrillator, or continuous glucose monitor? No   16. Do you have artificial joints? No   17. Are you allergic to latex? No       Health Care Directive:  Patient does not have a Health Care Directive or Living Will:     Preoperative Review of :   reviewed - no record of controlled substances prescribed.      Past Medical History:   Diagnosis Date     Acrochordon 2/11/2021     CARDIOVASCULAR SCREENING; LDL GOAL LESS THAN 160 3/27/2012     Colon adenoma      Controlled type 2 diabetes mellitus without complication, without long-term current use of insulin (H) 9/16/2019    X 1     Cough 2/4/2014     Degeneration of thoracic intervertebral disc 12/11/2013     Dysfunction of both eustachian tubes 4/12/2019     Elevated blood pressure 12/22/2014     Elevated hemoglobin A1c 9/16/2019    X 1     Gastroesophageal reflux disease      Hepatic cyst 5/22/2018     Hepatic steatosis 12/11/2013     History of colonic polyps 12/11/2013     History of drainage of abscess  2019     Hypertension      Irritable bowel syndrome without diarrhea 2018     Low libido 2019     Microscopic hematuria 2013     Nephrolithiasis      Obesity 3/27/2012     BRIAN on CPAP 3/27/2012     Other irritable bowel syndrome 2018     Pain in thoracic spine 2013     Pulmonary nodule 2018     Right-sided chest pain 2018     Sleep apnea      Tinea pedis of both feet 2021     Tinnitus, unspecified laterality 2019       Past Surgical History:   Procedure Laterality Date     COLONOSCOPY       CYSTOSCOPY  2014    Procedure: CYSTOSCOPY;   Video Cystoscopy with Exam Under Anesthesia;  Surgeon: Chente Moeller MD;  Location: [facility] OR     wisdom teeth         Family History   Problem Relation Age of Onset     Family History Negative Mother      Obesity Mother      Cancer Father         lymphoma     Other Cancer Father      Other Cancer Paternal Grandfather        Social History     Tobacco Use     Smoking status: Former Smoker     Packs/day: 1.00     Years: 25.00     Pack years: 25.00     Types: Cigarettes     Start date: 1988     Quit date: 2013     Years since quittin.8     Smokeless tobacco: Never Used     Tobacco comment: 1 sd/day   Substance Use Topics     Alcohol use: Yes     Frequency: Monthly or less     Drinks per session: 1 or 2     Binge frequency: Never     Comment: one drink every 6 months         Review of Systems  CONSTITUTIONAL: NEGATIVE for fever, chills, change in weight  ENT/MOUTH: NEGATIVE for ear, mouth and throat problems  RESP: NEGATIVE for significant cough or SOB  CV: NEGATIVE for chest pain, palpitations or peripheral edema    Patient Active Problem List    Diagnosis Date Noted     Acrochordon 2021     Priority: Medium     Tinea pedis of both feet 2021     Priority: Medium     Essential hypertension 2019     Priority: Medium     Controlled type 2 diabetes mellitus without complication, without long-term current  use of insulin (H) 09/16/2019     Priority: Medium              Tinnitus, unspecified laterality 04/12/2019     Priority: Medium     Dysfunction of both eustachian tubes 04/12/2019     Priority: Medium     Low libido 04/12/2019     Priority: Medium     Irritable bowel syndrome without diarrhea 06/01/2018     Priority: Medium     Pulmonary nodule 05/22/2018     Priority: Medium     Repeat CT Nov 2019       Hepatic cyst 05/22/2018     Priority: Medium     Right-sided chest pain 04/13/2018     Priority: Medium     Plantar fasciitis 10/25/2014     Priority: Medium     Morbid obesity (H) 10/25/2014     Priority: Medium     Cough 02/04/2014     Priority: Medium     BRIAN NESTOR       Microscopic hematuria 12/19/2013     Priority: Medium     Degeneration of thoracic intervertebral disc 12/11/2013     Priority: Medium     History of colonic polyps 12/11/2013     Priority: Medium     Hepatic steatosis 12/11/2013     Priority: Medium     CARDIOVASCULAR SCREENING; LDL GOAL LESS THAN 160 03/27/2012     Priority: Medium     BRIAN on CPAP 03/27/2012     Priority: Medium      Past Medical History:   Diagnosis Date     Acrochordon 2/11/2021     CARDIOVASCULAR SCREENING; LDL GOAL LESS THAN 160 3/27/2012     Colon adenoma      Controlled type 2 diabetes mellitus without complication, without long-term current use of insulin (H) 9/16/2019    X 1     Cough 2/4/2014     Degeneration of thoracic intervertebral disc 12/11/2013     Dysfunction of both eustachian tubes 4/12/2019     Elevated blood pressure 12/22/2014     Elevated hemoglobin A1c 9/16/2019    X 1     Gastroesophageal reflux disease      Hepatic cyst 5/22/2018     Hepatic steatosis 12/11/2013     History of colonic polyps 12/11/2013     History of drainage of abscess 9/16/2019     Hypertension      Irritable bowel syndrome without diarrhea 6/1/2018     Low libido 4/12/2019     Microscopic hematuria 12/19/2013     Nephrolithiasis      Obesity 3/27/2012     BRIAN on CPAP 3/27/2012      Other irritable bowel syndrome 2018     Pain in thoracic spine 2013     Pulmonary nodule 2018     Right-sided chest pain 2018     Sleep apnea      Tinea pedis of both feet 2021     Tinnitus, unspecified laterality 2019     Past Surgical History:   Procedure Laterality Date     COLONOSCOPY       CYSTOSCOPY  2014    Procedure: CYSTOSCOPY;   Video Cystoscopy with Exam Under Anesthesia;  Surgeon: Chente Moeller MD;  Location: [facility] OR     wisdom teeth       Current Outpatient Medications   Medication Sig Dispense Refill     aspirin (ASA) 81 MG chewable tablet Take 1 tablet (81 mg) by mouth daily 100 tablet 3     blood glucose (NO BRAND SPECIFIED) test strip accuchek guide- Use to test blood sugar 1 times daily or as directed. 100 strip 0     blood glucose monitoring (ACCU-CHEK FASTCLIX) lancets 1 each daily Accuchek guide 100 each 0     DULoxetine (CYMBALTA) 60 MG capsule Take 1 capsule (60 mg) by mouth daily 90 capsule 1     lisinopril (ZESTRIL) 20 MG tablet Take 1 tablet (20 mg) by mouth daily 90 tablet 1     STATIN NOT PRESCRIBED (INTENTIONAL) Please choose reason not prescribed from choices below.         Allergies   Allergen Reactions     Kiwi Itching        Social History     Tobacco Use     Smoking status: Former Smoker     Packs/day: 1.00     Years: 25.00     Pack years: 25.00     Types: Cigarettes     Start date: 1988     Quit date: 2013     Years since quittin.8     Smokeless tobacco: Never Used     Tobacco comment: 1 sd/day   Substance Use Topics     Alcohol use: Yes     Frequency: Monthly or less     Drinks per session: 1 or 2     Binge frequency: Never     Comment: one drink every 6 months       History   Drug Use No         Objective     /62 (BP Location: Right arm, Patient Position: Chair, Cuff Size: Adult Large)   Pulse 88   Temp 98.3  F (36.8  C) (Oral)   Wt (!) 152.9 kg (337 lb 2 oz)   SpO2 97%   BMI 51.26 kg/m      Physical  Exam  Constitutional:       Appearance: Normal appearance. He is obese.   HENT:      Head: Atraumatic.      Right Ear: Tympanic membrane normal.      Left Ear: Tympanic membrane normal.      Nose: Nose normal.      Mouth/Throat:      Mouth: Mucous membranes are moist.   Eyes:      Pupils: Pupils are equal, round, and reactive to light.   Neck:      Musculoskeletal: Neck supple.   Cardiovascular:      Rate and Rhythm: Normal rate and regular rhythm.      Heart sounds: Normal heart sounds.   Pulmonary:      Effort: Pulmonary effort is normal.      Breath sounds: Normal breath sounds.   Abdominal:      General: Bowel sounds are normal.   Skin:     General: Skin is warm and dry.   Neurological:      General: No focal deficit present.      Mental Status: He is alert.   Psychiatric:         Mood and Affect: Mood normal.           Recent Labs   Lab Test 02/11/21  1622 12/26/19  1745 10/07/19  0903   HGB  --  14.2  --    PLT  --  237  --     138  --    POTASSIUM 4.4 4.0  --    CR 0.94 0.92  --    A1C 6.6*  --  6.6*        Diagnostics:  No labs were ordered during this visit.   No EKG required, no history of coronary heart disease, significant arrhythmia, peripheral arterial disease or other structural heart disease.    Revised Cardiac Risk Index (RCRI):  The patient has the following serious cardiovascular risks for perioperative complications:   - No serious cardiac risks = 0 points     RCRI Interpretation: 0 points: Class I (very low risk - 0.4% complication rate)           Signed Electronically by: Mynor Mason MD  Copy of this evaluation report is provided to requesting physician.

## 2021-03-12 ENCOUNTER — ANESTHESIA (OUTPATIENT)
Dept: SURGERY | Facility: CLINIC | Age: 51
End: 2021-03-12
Payer: COMMERCIAL

## 2021-03-12 ENCOUNTER — HOSPITAL ENCOUNTER (OUTPATIENT)
Facility: CLINIC | Age: 51
Discharge: HOME | End: 2021-03-12
Attending: SURGERY | Admitting: SURGERY
Payer: COMMERCIAL

## 2021-03-12 ENCOUNTER — ANESTHESIA EVENT (OUTPATIENT)
Dept: SURGERY | Facility: CLINIC | Age: 51
End: 2021-03-12
Payer: COMMERCIAL

## 2021-03-12 VITALS
BODY MASS INDEX: 47.74 KG/M2 | WEIGHT: 315 LBS | TEMPERATURE: 97.6 F | DIASTOLIC BLOOD PRESSURE: 84 MMHG | SYSTOLIC BLOOD PRESSURE: 143 MMHG | HEIGHT: 68 IN | HEART RATE: 92 BPM | OXYGEN SATURATION: 98 % | RESPIRATION RATE: 15 BRPM

## 2021-03-12 DIAGNOSIS — Z86.0100 HISTORY OF COLONIC POLYPS: Primary | ICD-10-CM

## 2021-03-12 LAB
COLONOSCOPY: NORMAL
GLUCOSE BLDC GLUCOMTR-MCNC: 80 MG/DL (ref 70–99)
GLUCOSE BLDC GLUCOMTR-MCNC: 97 MG/DL (ref 70–99)

## 2021-03-12 PROCEDURE — 710N000012 HC RECOVERY PHASE 2, PER MINUTE: Performed by: SURGERY

## 2021-03-12 PROCEDURE — 258N000003 HC RX IP 258 OP 636: Performed by: ANESTHESIOLOGY

## 2021-03-12 PROCEDURE — 250N000009 HC RX 250: Performed by: NURSE ANESTHETIST, CERTIFIED REGISTERED

## 2021-03-12 PROCEDURE — 45381 COLONOSCOPY SUBMUCOUS NJX: CPT | Mod: PT | Performed by: SURGERY

## 2021-03-12 PROCEDURE — 999N000141 HC STATISTIC PRE-PROCEDURE NURSING ASSESSMENT: Performed by: SURGERY

## 2021-03-12 PROCEDURE — 93010 ELECTROCARDIOGRAM REPORT: CPT | Performed by: INTERNAL MEDICINE

## 2021-03-12 PROCEDURE — 45385 COLONOSCOPY W/LESION REMOVAL: CPT | Mod: PT | Performed by: SURGERY

## 2021-03-12 PROCEDURE — 272N000001 HC OR GENERAL SUPPLY STERILE: Performed by: SURGERY

## 2021-03-12 PROCEDURE — 88305 TISSUE EXAM BY PATHOLOGIST: CPT | Mod: TC | Performed by: SURGERY

## 2021-03-12 PROCEDURE — 370N000017 HC ANESTHESIA TECHNICAL FEE, PER MIN: Performed by: SURGERY

## 2021-03-12 PROCEDURE — 360N000075 HC SURGERY LEVEL 2, PER MIN: Performed by: SURGERY

## 2021-03-12 PROCEDURE — 250N000011 HC RX IP 250 OP 636: Performed by: NURSE ANESTHETIST, CERTIFIED REGISTERED

## 2021-03-12 PROCEDURE — 88305 TISSUE EXAM BY PATHOLOGIST: CPT | Mod: 26 | Performed by: PATHOLOGY

## 2021-03-12 PROCEDURE — 82962 GLUCOSE BLOOD TEST: CPT

## 2021-03-12 PROCEDURE — 99152 MOD SED SAME PHYS/QHP 5/>YRS: CPT | Mod: 59 | Performed by: SURGERY

## 2021-03-12 RX ORDER — SODIUM CHLORIDE, SODIUM LACTATE, POTASSIUM CHLORIDE, CALCIUM CHLORIDE 600; 310; 30; 20 MG/100ML; MG/100ML; MG/100ML; MG/100ML
INJECTION, SOLUTION INTRAVENOUS CONTINUOUS
Status: DISCONTINUED | OUTPATIENT
Start: 2021-03-12 | End: 2021-03-12 | Stop reason: HOSPADM

## 2021-03-12 RX ORDER — NALOXONE HYDROCHLORIDE 0.4 MG/ML
0.4 INJECTION, SOLUTION INTRAMUSCULAR; INTRAVENOUS; SUBCUTANEOUS
Status: DISCONTINUED | OUTPATIENT
Start: 2021-03-12 | End: 2021-03-12 | Stop reason: HOSPADM

## 2021-03-12 RX ORDER — PROPOFOL 10 MG/ML
INJECTION, EMULSION INTRAVENOUS PRN
Status: DISCONTINUED | OUTPATIENT
Start: 2021-03-12 | End: 2021-03-12

## 2021-03-12 RX ORDER — ONDANSETRON 4 MG/1
4 TABLET, ORALLY DISINTEGRATING ORAL EVERY 30 MIN PRN
Status: DISCONTINUED | OUTPATIENT
Start: 2021-03-12 | End: 2021-03-12 | Stop reason: HOSPADM

## 2021-03-12 RX ORDER — FLUMAZENIL 0.1 MG/ML
0.2 INJECTION, SOLUTION INTRAVENOUS
Status: DISCONTINUED | OUTPATIENT
Start: 2021-03-12 | End: 2021-03-12 | Stop reason: HOSPADM

## 2021-03-12 RX ORDER — KETAMINE HYDROCHLORIDE 10 MG/ML
INJECTION INTRAMUSCULAR; INTRAVENOUS PRN
Status: DISCONTINUED | OUTPATIENT
Start: 2021-03-12 | End: 2021-03-12

## 2021-03-12 RX ORDER — NALOXONE HYDROCHLORIDE 0.4 MG/ML
0.2 INJECTION, SOLUTION INTRAMUSCULAR; INTRAVENOUS; SUBCUTANEOUS
Status: DISCONTINUED | OUTPATIENT
Start: 2021-03-12 | End: 2021-03-12 | Stop reason: HOSPADM

## 2021-03-12 RX ORDER — LIDOCAINE 40 MG/G
CREAM TOPICAL
Status: DISCONTINUED | OUTPATIENT
Start: 2021-03-12 | End: 2021-03-12 | Stop reason: HOSPADM

## 2021-03-12 RX ORDER — MEPERIDINE HYDROCHLORIDE 25 MG/ML
12.5 INJECTION INTRAMUSCULAR; INTRAVENOUS; SUBCUTANEOUS
Status: DISCONTINUED | OUTPATIENT
Start: 2021-03-12 | End: 2021-03-12 | Stop reason: HOSPADM

## 2021-03-12 RX ORDER — ONDANSETRON 4 MG/1
4 TABLET, ORALLY DISINTEGRATING ORAL EVERY 6 HOURS PRN
Status: DISCONTINUED | OUTPATIENT
Start: 2021-03-12 | End: 2021-03-12 | Stop reason: HOSPADM

## 2021-03-12 RX ORDER — LIDOCAINE HYDROCHLORIDE 10 MG/ML
INJECTION, SOLUTION INFILTRATION; PERINEURAL PRN
Status: DISCONTINUED | OUTPATIENT
Start: 2021-03-12 | End: 2021-03-12

## 2021-03-12 RX ORDER — PROPOFOL 10 MG/ML
INJECTION, EMULSION INTRAVENOUS CONTINUOUS PRN
Status: DISCONTINUED | OUTPATIENT
Start: 2021-03-12 | End: 2021-03-12

## 2021-03-12 RX ORDER — ONDANSETRON 2 MG/ML
4 INJECTION INTRAMUSCULAR; INTRAVENOUS EVERY 6 HOURS PRN
Status: DISCONTINUED | OUTPATIENT
Start: 2021-03-12 | End: 2021-03-12 | Stop reason: HOSPADM

## 2021-03-12 RX ORDER — ONDANSETRON 2 MG/ML
4 INJECTION INTRAMUSCULAR; INTRAVENOUS EVERY 30 MIN PRN
Status: DISCONTINUED | OUTPATIENT
Start: 2021-03-12 | End: 2021-03-12 | Stop reason: HOSPADM

## 2021-03-12 RX ORDER — ONDANSETRON 2 MG/ML
4 INJECTION INTRAMUSCULAR; INTRAVENOUS
Status: DISCONTINUED | OUTPATIENT
Start: 2021-03-12 | End: 2021-03-12 | Stop reason: HOSPADM

## 2021-03-12 RX ORDER — PROCHLORPERAZINE MALEATE 10 MG
10 TABLET ORAL EVERY 6 HOURS PRN
Status: DISCONTINUED | OUTPATIENT
Start: 2021-03-12 | End: 2021-03-12 | Stop reason: HOSPADM

## 2021-03-12 RX ADMIN — Medication 20 MG: at 14:05

## 2021-03-12 RX ADMIN — SODIUM CHLORIDE, POTASSIUM CHLORIDE, SODIUM LACTATE AND CALCIUM CHLORIDE: 600; 310; 30; 20 INJECTION, SOLUTION INTRAVENOUS at 14:05

## 2021-03-12 RX ADMIN — PROPOFOL 30 MG: 10 INJECTION, EMULSION INTRAVENOUS at 14:10

## 2021-03-12 RX ADMIN — LIDOCAINE HYDROCHLORIDE 30 MG: 10 INJECTION, SOLUTION INFILTRATION; PERINEURAL at 14:10

## 2021-03-12 RX ADMIN — PROPOFOL 140 MCG/KG/MIN: 10 INJECTION, EMULSION INTRAVENOUS at 14:10

## 2021-03-12 RX ADMIN — MIDAZOLAM 2 MG: 1 INJECTION INTRAMUSCULAR; INTRAVENOUS at 14:05

## 2021-03-12 ASSESSMENT — MIFFLIN-ST. JEOR: SCORE: 2355.86

## 2021-03-12 ASSESSMENT — LIFESTYLE VARIABLES: TOBACCO_USE: 1

## 2021-03-12 NOTE — LETTER
February 15, 2021      Antonio Canseco  [address] [address]        Dear Antonio,     Thank you for choosing [facility]. You are scheduled for the following service(s).   Please be aware that coverage of these services is subject to the terms and limitations of your health insurance plan.  Call member services at your health plan with any benefit or coverage questions.    You will need to have a History and Physical Exam done within 30 days of this scheduled procedure. Please arrange this with your primary care physician.    Date:  3/12/2021   Friday    Procedure:   COLONOSCOPY    Doctor:  Quinton                     Arrival Time:  11:00 am *check in at the Surgery Center desk*     Procedure Time: 12:30 pm      Location:   [facility]      Surgery Center (on the south side of the [facility])       [address] [place], [place] [address]              MIRALAX -GATORADE  PREP  Colonoscopy is the most accurate test to detect colon polyps and colon cancer; and the only test where polyps can be removed. During this procedure, a doctor examines the lining of your large intestine and rectum through a flexible tube.     Transportation  You must arrange for a ride for the day of your procedure with a responsible adult. A taxi , Uber, etc, is not an option unless you are accompanied by a responsible adult. If you fail to arrange transportation with a responsible adult, your procedure will be cancelled and rescheduled.      Purchase the  following supplies at your local pharmacy:  - 2 (two) bisacodyl tablets: each tablet contains 5 mg.  (Dulcolax  laxative NOT Dulcolax  stool softener)   - 1 (one) 8.3 oz bottle of Polyethylene Glycol (PEG) 3350 Powder   (MiraLAX , Smooth LAX , ClearLAX  or equivalent)  - 64 oz Gatorade    Regular Gatorade, Gatorade G2 , Powerade , Powerade Zero  or Pedialyte  is acceptable. Red colored flavors are not allowed; all other  colors (yellow, green, orange, purple and blue) are okay. It is also okay to buy two 2.12 oz packets of powdered Gatorade that can be mixed with water to a total volume of 64 oz of liquid.  - 1 (one) 10 oz bottle of Magnesium Citrate (Red colored flavors are not allowed)  It is also okay for you to use a 0.5 oz package of powdered magnesium citrate (17 g) mixed with 10 oz of water.      PREPARATION FOR COLONOSCOPY    7 days before:    Discontinue fiber supplements and medications containing iron. This includes Metamucil  and Fibercon ; and multivitamins with iron.  3 days before:    Begin a low-fiber diet. A low-fiber diet helps making the cleanout more effective.     Examples of a low-fiber diet include (but are not limited to): white bread, white rice, pasta, crackers, fish, chicken, eggs, ground beef, creamy peanut butter, cooked/steamed/boiled vegetables, canned fruit, bananas, melons, milk, plain yogurt cheese, salad dressing and other condiments.     The following are not allowed on a low-fiber diet: seeds, nuts, popcorn, bran, whole wheat, corn, quinoa, raw fruits and vegetables, berries and dried fruit, beans and lentils.    For additional details on low-fiber diet, please refer to the table on the last page.  2 days before:    Continue the low-fiber diet.     Drink at least 8 glasses of water throughout the day.     Stop eating solid foods at 11:45 pm.  1 day before:    In the morning: begin a clear liquid diet (liquids you can see through).     Examples of a clear liquid diet include: water, clear broth or bouillon, Gatorade, Pedialyte or Powerade, carbonated and non-carbonated soft drinks (Sprite , 7-Up , ginger ale), strained fruit juices without pulp (apple, white grape, white cranberry), Jell-O  and popsicles.     The following are not allowed on a clear liquid diet: red liquids, alcoholic beverages,  dairy products (milk, creamer, and yogurt), protein shakes,  juice with pulp and chewing  tobacco.    At noon: take 2 (two) bisacodyl tablets     At 4 (and no later than 6pm): start drinking the Miralax-Gatorade preparation (8.3 oz of Miralax mixed with 64 oz of Gatorade in a large pitcher). Drink 1(one) 8 oz glass every 15 minutes thereafter, until the mixture is gone.    COLON CLEANSING TIPS: drink adequate amounts of fluids before and after your colon cleansing to prevent dehydration. Stay near a toilet because you will have diarrhea. Even if you are sitting on the toilet, continue to drink the cleansing solution every 15 minutes. If you feel nauseous or vomit, rinse your mouth with water, take a 15 to 30-minute-break and then continue drinking the solution. You will be uncomfortable until the stool has flushed from your colon (in about 2 to 4 hours). You may feel chilled.    Day of your procedure  You may take all of your morning medications including blood pressure medications, blood thinners (if you have not been instructed to stop these by our office), methadone, anti-seizure medications with sips of water 3 hours prior to your procedure or earlier. Do not take insulin or vitamins prior to your procedure. Continue the clear liquid diet.   4 hours prior: drink 10 oz of magnesium citrate. It may be easier to drink it with a straw.    STOP consuming all liquids after that.     Do not take anything by mouth during this time.     Allow extra time to travel to your procedure as you may need to stop and use a restroom along the way.  You are ready for the procedure, if you followed all instructions and your stool is no longer formed, but clear or yellow liquid. If you are unsure whether your colon is clean, please call our office at [phone] before you leave for your appointment.    Bring the following to your procedure:  - Insurance Card/Photo ID.   - List of current medications including over-the-counter medications and supplements.   - Your rescue inhaler if you currently use one to control  asthma.    Canceling or rescheduling your appointment:   If you must cancel or reschedule your appointment, please call [phone] as soon as possible.    COLONOSCOPY PRE-PROCEDURE CHECKLIST  If you have diabetes, ask your regular doctor for diet and medication restrictions.  If you take an anticoagulant or anti-platelet medication (such as Coumadin , Lovenox , Pradaxa , Xarelto , Eliquis , etc.), please call your primary doctor for advice on holding this medication.  If you take aspirin you may continue to do so.  If you are or may be pregnant, please discuss the risks and benefits of this procedure with your doctor.    What happens during a colonoscopy?  Plan to spend up to two hours, starting at registration time, at the endoscopy center the day of your procedure. The colonoscopy takes an average of 15 to 30 minutes. Recovery time is about 30 minutes.    Before the exam:    You will change into a gown.    Your medical history and medication list will be reviewed with you, unless that has been done over the phone prior to the procedure.     A nurse will insert an intravenous (IV) line into your hand or arm.    The doctor will meet with you and will give you a consent form to sign.    During the exam:     Medicine will be given through the IV line to help you relax.     Your heart rate and oxygen levels will be monitored. If your blood pressure is low, you may be given fluids through the IV line.     The doctor will insert a flexible hollow tube, called a colonoscope, into your rectum. The scope will be advanced slowly through the large intestine (colon).    You may have a feeling of fullness or pressure.     If an abnormal tissue or a polyp is found, the doctor may remove it through the endoscope for closer examination, or biopsy. Tissue removal is painless    After the exam:           Any tissue samples removed during the exam will be sent to a lab for evaluation. It may take 5-7 working days for you to be  notified of the results.     A nurse will provide you with complete discharge instructions before you leave the endoscopy center. Be sure to ask the nurse for specific instructions if you take blood thinners such as Aspirin, Coumadin or Plavix.     The doctor will prepare a full report for you and for the physician who referred you for the procedure.     Your doctor will talk with you about the initial results of your exam.      Medication given during the exam will prohibit you from driving for the rest of the day.     Following the exam, you may resume your normal diet. Your first meal should be light, no greasy foods. Avoid alcohol until the next day.     You may resume your regular activities the day after the procedure.     LOW-FIBER DIET  Foods RECOMMENDED Foods to AVOID   Breads, Cereal, Rice and Pasta:   White bread, rolls, biscuits, croissant and karen toast.   Waffles, Nepalese toast and pancakes.   White rice, noodles, pasta, macaroni and peeled cooked potatoes.   Plain crackers and saltines.   Cooked cereals: farina, cream of rice.   Cold cereals: Puffed Rice , Rice Krispies , Corn Flakes  and Special K    Breads, Cereal, Rice and Pasta:   Breads or rolls with nuts, seeds or fruit.   Whole wheat, pumpernickel, rye breads and cornbread.   Potatoes with skin, brown or wild rice, and kasha (buckwheat).     Vegetables:   Tender cooked and canned vegetables without seeds: carrots, asparagus tips, green or wax beans, pumpkin, spinach, lima beans. Vegetables:   Raw or steamed vegetables.   Vegetables with seeds.   Sauerkraut.   Winter squash, peas, broccoli, Brussel sprouts, cabbage, onions, cauliflower, baked beans, peas and corn.   Fruits:   Strained fruit juice.   Canned fruit, except pineapple.   Ripe bananas and melon. Fruits:   Prunes and prune juice.   Raw fruits.   Dried fruits: figs, dates and raisins.   Milk/Dairy:   Milk: plain or flavored.   Yogurt, custard and ice cream.   Cheese and cottage cheese  Milk/Dairy:     Meat and other proteins:   ground, well-cooked tender beef, lamb, ham, veal, pork, fish, poultry and organ meats.   Eggs.   Peanut butter without nuts. Meat and other proteins:   Tough, fibrous meats with gristle.   Dry beans, peas and lentils.   Peanut butter with nuts.   Tofu.   Fats, Snack, Sweets, Condiments and Beverages:   Margarine, butter, oils, mayonnaise, sour cream and salad dressing, plain gravy.   Sugar, hard candy, clear jelly, honey and syrup.   Spices, cooked herbs, bouillon, broth and soups made with allowed vegetable, ketchup and mustard.   Coffee, tea and carbonated drinks.   Plain cakes, cookies and pretzels.   Gelatin, plain puddings, custard, ice cream, sherbet and popsicles. Fats, Snack, Sweets, Condiments and Beverages:   Nuts, seeds and coconut.   Jam, marmalade and preserves.   Pickles, olives, relish and horseradish.   All desserts containing nuts, seeds, dried fruit and coconut; or made from whole grains or bran.   Candy made with nuts or seeds.   Popcorn.     DIRECTIONS TO THE SURGERY CENTER  From the north ([facility])  Take [address] south, exit on [address] [address]. Get into the left hand inder, turn left (east), go one-half mile to [address] and turn left. Go north to the first stoplight, take a right on [address] and follow it to the Surgery Center entrance.  From the south ([facility])  Take 35N to the 35E split and exit on [address] [address]. On [address] [address], turn left (west) to [address]. Turn right (north) on [address]. Go north to the first stoplight, take a right on [address] and follow it to the Surgery Center entrance.  From the east via 35E ([facility])  Take [address]E south to [address] [address] exit. Turn right on [address] [address]. Go west to [address]. Turn right (north) on [address]. Go to the first stoplight, take a right and follow on [address] to the Surgery Center entrance.  From the east  via [address] ([place])  Take [address] west to [address]. Turn left (south) on [address] to [address]. Turn left (east) on [address] and follow it to the Surgery Center entrance.  From the west via [address] ([place])  Take [address] east to [address]. Turn right (south) on [address] to [address]. Turn left (east) on [address] and follow it to the Surgery Center entrance.

## 2021-03-12 NOTE — ANESTHESIA PREPROCEDURE EVALUATION
Anesthesia Pre-Procedure Evaluation    Patient: Antonio Canseco   MRN: [id number] : 1970        Preoperative Diagnosis: Screen for colon cancer [Z12.11]   Procedure : Procedure(s):  COLONOSCOPY (fv)     Past Medical History:   Diagnosis Date     Acrochordon 2021     CARDIOVASCULAR SCREENING; LDL GOAL LESS THAN 160 3/27/2012     Colon adenoma      Controlled type 2 diabetes mellitus without complication, without long-term current use of insulin (H) 9/16/2019    X 1     Cough 2014     Degeneration of thoracic intervertebral disc 2013     Dysfunction of both eustachian tubes 2019     Elevated blood pressure 2014     Elevated hemoglobin A1c 9/16/2019    X 1     Gastroesophageal reflux disease      Hepatic cyst 2018     Hepatic steatosis 2013     History of colonic polyps 2013     History of drainage of abscess 2019     Hypertension      Irritable bowel syndrome without diarrhea 2018     Low libido 2019     Microscopic hematuria 2013     Nephrolithiasis      Obesity 3/27/2012     BRIAN on CPAP 3/27/2012     Other irritable bowel syndrome 2018     Pain in thoracic spine 2013     Pulmonary nodule 2018     Right-sided chest pain 2018     Sleep apnea      Tinea pedis of both feet 2021     Tinnitus, unspecified laterality 2019      Past Surgical History:   Procedure Laterality Date     COLONOSCOPY       CYSTOSCOPY  2014    Procedure: CYSTOSCOPY;   Video Cystoscopy with Exam Under Anesthesia;  Surgeon: Chente Moeller MD;  Location: [facility]     wisdom teeth        Allergies   Allergen Reactions     Kiwi Itching      Social History     Tobacco Use     Smoking status: Former Smoker     Packs/day: 1.00     Years: 25.00     Pack years: 25.00     Types: Cigarettes     Start date: 1988     Quit date: 2013     Years since quittin.8     Smokeless tobacco: Never Used     Tobacco comment: 1 sd/day   Substance Use Topics      Alcohol use: Yes     Frequency: Monthly or less     Drinks per session: 1 or 2     Binge frequency: Never     Comment: one drink every 6 months      Wt Readings from Last 1 Encounters:   03/12/21 (!) 152.1 kg (335 lb 6.4 oz)        Anesthesia Evaluation            ROS/MED HX  ENT/Pulmonary:     (+) sleep apnea, uses CPAP, tobacco use, Past use,     Neurologic:  - neg neurologic ROS     Cardiovascular:  - neg cardiovascular ROS   (+) hypertension-----    METS/Exercise Tolerance:     Hematologic:  - neg hematologic  ROS     Musculoskeletal:  - neg musculoskeletal ROS     GI/Hepatic:     (+) liver disease,     Renal/Genitourinary:     (+) Nephrolithiasis ,     Endo: Comment: .Body mass index is 51 kg/m .\   - neg endo ROS   (+) type II DM, Obesity,     Psychiatric/Substance Use:  - neg psychiatric ROS     Infectious Disease:  - neg infectious disease ROS     Malignancy:  - neg malignancy ROS     Other: Comment: .Lab Test        12/26/19 12/20/18 02/11/14                       1745          0823          1340          WBC          8.6          7.3          7.5           HGB          14.2         15.0         15.5          MCV          90           91           90            PLT          237          249          250            Lab Test        02/11/21 12/26/19 12/20/18                       1622          1745          0823          NA           140          138          138           POTASSIUM    4.4          4.0          4.4           CHLORIDE     107          105          106           CO2          27           27           24            BUN          13           14           12            CR           0.94         0.92         0.83          ANIONGAP     6            6            8             DORIAN          9.1          8.4*         8.9           GLC          135*         140*         147*                     Physical Exam    Airway        Mallampati: III    Neck ROM: full     Respiratory Devices  and Support         Dental           Cardiovascular   cardiovascular exam normal       Rhythm and rate: regular     Pulmonary           (+) decreased breath sounds           OUTSIDE LABS:  CBC:   Lab Results   Component Value Date    WBC 8.6 12/26/2019    WBC 7.3 12/20/2018    HGB 14.2 12/26/2019    HGB 15.0 12/20/2018    HCT 44.6 12/26/2019    HCT 47.0 12/20/2018     12/26/2019     12/20/2018     BMP:   Lab Results   Component Value Date     02/11/2021     12/26/2019    POTASSIUM 4.4 02/11/2021    POTASSIUM 4.0 12/26/2019    CHLORIDE 107 02/11/2021    CHLORIDE 105 12/26/2019    CO2 27 02/11/2021    CO2 27 12/26/2019    BUN 13 02/11/2021    BUN 14 12/26/2019    CR 0.94 02/11/2021    CR 0.92 12/26/2019     (H) 02/11/2021     (H) 12/26/2019     COAGS: No results found for: PTT, INR, FIBR  POC: No results found for: BGM, HCG, HCGS  HEPATIC:   Lab Results   Component Value Date    ALBUMIN 3.7 02/11/2021    PROTTOTAL 7.3 02/11/2021    ALT 43 02/11/2021    AST 23 02/11/2021    ALKPHOS 43 02/11/2021    BILITOTAL 0.7 02/11/2021     OTHER:   Lab Results   Component Value Date    A1C 6.6 (H) 02/11/2021    DORIAN 9.1 02/11/2021    TSH 3.07 10/07/2019       Anesthesia Plan    ASA Status:  3      Anesthesia Type: General.   Induction: Intravenous, Propofol.   Maintenance: Balanced.        Consents    Anesthesia Plan(s) and associated risks, benefits, and realistic alternatives discussed. Questions answered and patient/representative(s) expressed understanding.     - Discussed with:  Patient         Postoperative Care    Pain management: IV analgesics, Oral pain medications, Multi-modal analgesia.   PONV prophylaxis: Ondansetron (or other 5HT-3), Dexamethasone or Solumedrol     Comments:                Scooby Brooks DO

## 2021-03-12 NOTE — ANESTHESIA CARE TRANSFER NOTE
Patient: Antonio Canseco    Procedure(s):  COLONOSCOPY (fv)    Diagnosis: Screen for colon cancer [Z12.11]  Diagnosis Additional Information: No value filed.    Anesthesia Type:   General     Note:    Oropharynx: spontaneously breathing  Level of Consciousness: drowsy  Oxygen Supplementation: face mask  Level of Supplemental Oxygen (L/min / FiO2): 4  Independent Airway: airway patency satisfactory and stable  Dentition: dentition unchanged  Vital Signs Stable: post-procedure vital signs reviewed and stable  Report to RN Given: handoff report given  Patient transferred to: Phase II    Handoff Report: Identifed the Patient, Identified the Reponsible Provider, Reviewed the pertinent medical history, Discussed the surgical course, Reviewed Intra-OP anesthesia mangement and issues during anesthesia, Set expectations for post-procedure period and Allowed opportunity for questions and acknowledgement of understanding      Vitals: (Last set prior to Anesthesia Care Transfer)  CRNA VITALS  3/12/2021 1410 - 3/12/2021 1444      3/12/2021             NIBP:  126/70    Pulse:  76    NIBP Mean:  89    SpO2:  97 %        Electronically Signed By: JEFFREY Alegria CRNA  March 12, 2021  2:44 PM

## 2021-03-12 NOTE — ANESTHESIA POSTPROCEDURE EVALUATION
Patient: Antonio Canseco    Procedure(s):  COLONOSCOPY (fv)    Diagnosis:Screen for colon cancer [Z12.11]  Diagnosis Additional Information: No value filed.    Anesthesia Type:  General    Note:     Postop Pain Control: Uneventful            Sign Out: Well controlled pain   PONV: No   Neuro/Psych: Uneventful            Sign Out: Acceptable/Baseline neuro status   Airway/Respiratory: Uneventful            Sign Out: Acceptable/Baseline resp. status   CV/Hemodynamics: Uneventful            Sign Out: Acceptable CV status   Other NRE: NONE   DID A NON-ROUTINE EVENT OCCUR? No         Last vitals:  Vitals:    03/12/21 1207 03/12/21 1443 03/12/21 1445   BP: 135/81 120/75 124/82   Pulse: 80 71 68   Resp: 16 16 17   Temp: 97.5  F (36.4  C) 97.5  F (36.4  C)    SpO2: 95% 98% 100%       Last vitals prior to Anesthesia Care Transfer:  CRNA VITALS  3/12/2021 1410 - 3/12/2021 1452      3/12/2021             NIBP:  126/70    Pulse:  76    NIBP Mean:  89    SpO2:  97 %          Electronically Signed By: Scooby Brooks DO  March 12, 2021  2:52 PM

## 2021-03-12 NOTE — LETTER
"March 22, 2021      Angeles Loya  [address] [facility] [address]        Dear ,    We are writing to inform you of your test results.    All the polyps from the colon ended up being normal colon tissue or hyperplastic polyps which do not have any cancer risk. Next colonoscopy can be in 5 years due to the history of the adenoma type polyp previously    Resulted Orders   Surgical pathology exam   Result Value Ref Range    Copath Report       Patient Name: ANGELES LOYA  MR#: [id number]  Specimen #: [id number]  Collected: 3/12/2021  Received: 3/12/2021  Reported: 3/15/2021 14:16  Ordering Phy(s): DAVIDA MCCARTY    For improved result formatting, select 'View Enhanced Report Format' under   Linked Documents section.    SPECIMEN(S):  A: Colon polyp, dstal transverse  B: Splenic flexure polyps x 5  C: Sigmoid colon polyps x 9    FINAL DIAGNOSIS:  A.  Colon, distal transverse, polypectomy:  - No evidence of hyperplastic change, adenoma or malignancy.  - Lymphoid aggregates.    B.  Colon, splenic flexure, polypectomies (5):  - Fragments of hyperplastic polyps.  - Negative for adenoma and malignancy.  - Lymphoid aggregates.    C.  Colon, sigmoid, polypectomies (9):  - Multiple hyperplastic polyps.  - Negative for adenoma and malignancy.    Electronically signed out by:    Rob Hyde M.D.    CLINICAL HISTORY:  Screening for colon cancer.    GROSS:  A: The specimen is received in formalin with proper patient identif ication   labeled \"distal transverse colon  polyp\".  The specimen consists of three tan polypoid structures ranging   from 0.3 cm up to 0.7 x 0.4 x 0.2 cm.  The specimen is entirely submitted in one cassette.    B: The specimen is received in formalin with proper patient identification   labeled \"splenic flexure polyp x5\".   The specimen consists of five pink-red polypoid structures ranging from   0.3 cm up to 0.7 x 0.4 x 0.3 cm.  The  specimen is entirely submitted in one " "cassette.    C: The specimen is received in formalin with proper patient identification   labeled \"sigmoid polyp x9\".  The  specimen consists of numerous tan polypoid structure is ranging from 0.2   cm up to 1.3 x 0.2 x 0.2 cm.  The  specimen is entirely submitted in one cassette. (Dictated by: Rubens Bolton 3/12/2021 03:24 PM)    MICROSCOPIC:  A, B and C.  Microscopic examination was performed.    The technical component of this testing was completed at the [facility], with the professional component performed   at the [facility], [address], [place]  [address]   ([phone])    CPT Codes:  A: 56842-WW6  B: 65792-PT4  C: 08725-ZQ9    COLLECTION SITE:  Client: [facility]  Location: [facility] (R)         If you have any questions or concerns, please call the clinic at the number listed above.       Sincerely,      Ean Alcantara MD          "

## 2021-03-12 NOTE — DISCHARGE INSTRUCTIONS
Dr. Pandya [phone]    COLONOSCOPY DISCHARGE INSTRUCTIONS    You may not drive, use heavy equipment or consume alcohol for 24 hours because the drugs you were given may cause dizziness, drowsiness, forgetfulness and slower reaction time.    You may resume your regular diet and medications.    If you had a biopsy or polypectomy done, do not take aspirin, aleve (naproxen) or ibuprofen products for the next 10 days.  Tylenol (acetaminophen) is safe to take.    What to watch for:    Problems rarely occur after the exam.  It is important for you to be aware of the early signs of a possible complication.  Call your doctor immediately if you notice any of the followin.  Unusual or persistent abdominal pain (pain that does not move around like a gas pain).    2.  Passing bright red blood from your rectum.    3.  Black or bloody stools.    4.  Temperature above 100.6 degrees F (37.5 degrees C)    If you feel this has become a medical emergency please call 911.      GENERAL ANESTHESIA OR SEDATION ADULT DISCHARGE INSTRUCTIONS   SPECIAL PRECAUTIONS FOR 24 HOURS AFTER SURGERY    IT IS NOT UNUSUAL TO FEEL LIGHT-HEADED OR FAINT, UP TO 24 HOURS AFTER SURGERY OR WHILE TAKING PAIN MEDICATION.  IF YOU HAVE THESE SYMPTOMS; SIT FOR A FEW MINUTES BEFORE STANDING AND HAVE SOMEONE ASSIST YOU WHEN YOU GET UP TO WALK OR USE THE BATHROOM.    YOU SHOULD REST AND RELAX FOR THE NEXT 24 HOURS AND YOU MUST MAKE ARRANGEMENTS TO HAVE SOMEONE STAY WITH YOU FOR AT LEAST 24 HOURS AFTER YOUR DISCHARGE.  AVOID HAZARDOUS AND STRENUOUS ACTIVITIES.  DO NOT MAKE IMPORTANT DECISIONS FOR 24 HOURS.    DO NOT DRIVE ANY VEHICLE OR OPERATE MECHANICAL EQUIPMENT FOR 24 HOURS FOLLOWING THE END OF YOUR SURGERY.  EVEN THOUGH YOU MAY FEEL NORMAL, YOUR REACTIONS MAY BE AFFECTED BY THE MEDICATION YOU HAVE RECEIVED.    DO NOT DRINK ALCOHOLIC BEVERAGES FOR 24 HOURS FOLLOWING YOUR SURGERY.    DRINK CLEAR LIQUIDS (APPLE JUICE, GINGER ALE, 7-UP, BROTH, ETC.).   PROGRESS TO YOUR REGULAR DIET AS YOU FEEL ABLE.    YOU MAY HAVE A DRY MOUTH, A SORE THROAT, MUSCLES ACHES OR TROUBLE SLEEPING.  THESE SHOULD GO AWAY AFTER 24 HOURS.    CALL YOUR DOCTOR FOR ANY OF THE FOLLOWING:  SIGNS OF INFECTION (FEVER, GROWING TENDERNESS AT THE SURGERY SITE, A LARGE AMOUNT OF DRAINAGE OR BLEEDING, SEVERE PAIN, FOUL-SMELLING DRAINAGE, REDNESS OR SWELLING.    IT HAS BEEN OVER 8 TO 10 HOURS SINCE SURGERY AND YOU ARE STILL NOT ABLE TO URINATE (PASS WATER).

## 2021-03-15 LAB — COPATH REPORT: NORMAL

## 2021-03-17 LAB — INTERPRETATION ECG - MUSE: NORMAL

## 2021-03-24 ENCOUNTER — TRANSFERRED RECORDS (OUTPATIENT)
Dept: HEALTH INFORMATION MANAGEMENT | Facility: CLINIC | Age: 51
End: 2021-03-24

## 2021-03-24 LAB — RETINOPATHY: NEGATIVE

## 2021-04-02 ENCOUNTER — MYC MEDICAL ADVICE (OUTPATIENT)
Dept: FAMILY MEDICINE | Facility: CLINIC | Age: 51
End: 2021-04-02

## 2021-04-02 ENCOUNTER — TELEPHONE (OUTPATIENT)
Dept: FAMILY MEDICINE | Facility: CLINIC | Age: 51
End: 2021-04-02

## 2021-04-02 NOTE — TELEPHONE ENCOUNTER
Summary:    Patient is due/failing the following:   Eye exam    Reviewed:  [x] CARE EVERYWHERE  [x] LAST OV NOTE INCLUDING ENDO  [x] FYI TAB  [x] MYCHART ACTIVE?  [x] LAST PANEL ENCOUNTER  [x] FUTURE APPTS  [x] IMMUNIZATIONS          Action needed:   Patient needs office visit for eye exam.    Type of outreach:    Sent [employer] message.                                                                               Amber Weber/[facility]---[facility]

## 2021-04-06 NOTE — TELEPHONE ENCOUNTER
Pt states had eye exam at [facility] in [place] [phone]    Contacted clinic and they state will fax over report    Amber Weber/[facility]  [place]---[facility]

## 2021-04-27 NOTE — TELEPHONE ENCOUNTER
Report received and was signed off for negative for diabetic retinopathy and sent to abstraction    Amber Weber/[facility]---[facility]

## 2021-04-27 NOTE — TELEPHONE ENCOUNTER
Faxed over form to be completed from eye clinic on status of retinopathy    Amber Weber/[facility]  [place]---[facility]

## 2021-05-11 NOTE — TELEPHONE ENCOUNTER
Pt currently passing eye exam per quality    Amber Weber/[facility]  [place]---[facility]

## 2021-05-25 ENCOUNTER — RECORDS - HEALTHEAST (OUTPATIENT)
Dept: ADMINISTRATIVE | Facility: CLINIC | Age: 51
End: 2021-05-25

## 2021-06-05 ENCOUNTER — HEALTH MAINTENANCE LETTER (OUTPATIENT)
Age: 51
End: 2021-06-05

## 2021-08-06 DIAGNOSIS — I10 ESSENTIAL HYPERTENSION: ICD-10-CM

## 2021-08-06 RX ORDER — LISINOPRIL 20 MG/1
20 TABLET ORAL DAILY
Qty: 90 TABLET | Refills: 0 | Status: SHIPPED | OUTPATIENT
Start: 2021-08-06 | End: 2021-11-05

## 2021-08-06 NOTE — TELEPHONE ENCOUNTER
Routing refill request to provider for review/approval because:  Failing bp    Shayna Olivo, RN

## 2021-09-25 ENCOUNTER — HEALTH MAINTENANCE LETTER (OUTPATIENT)
Age: 51
End: 2021-09-25

## 2021-11-05 DIAGNOSIS — I10 ESSENTIAL HYPERTENSION: ICD-10-CM

## 2021-11-05 RX ORDER — LISINOPRIL 20 MG/1
20 TABLET ORAL DAILY
Qty: 30 TABLET | Refills: 0 | Status: SHIPPED | OUTPATIENT
Start: 2021-11-05 | End: 2021-12-28

## 2021-11-05 NOTE — TELEPHONE ENCOUNTER
Patient due for F2F visit per PCP.   Covering for Dr. Mason while out of office.   JEFFREY Story CNP on 11/5/2021 at 5:26 PM

## 2021-11-05 NOTE — TELEPHONE ENCOUNTER
Routing refill request to provider for review/approval because:  Marta given x1 and patient did not follow up, please advise    Failing BP goal.     Mary Lenz, RN   [facility]  -- Triage Nurse

## 2021-12-07 DIAGNOSIS — K58.9 IRRITABLE BOWEL SYNDROME WITHOUT DIARRHEA: ICD-10-CM

## 2021-12-07 DIAGNOSIS — I10 ESSENTIAL HYPERTENSION: ICD-10-CM

## 2021-12-08 RX ORDER — DULOXETIN HYDROCHLORIDE 60 MG/1
60 CAPSULE, DELAYED RELEASE ORAL DAILY
Qty: 30 CAPSULE | Refills: 0 | OUTPATIENT
Start: 2021-12-08

## 2021-12-08 RX ORDER — LISINOPRIL 20 MG/1
20 TABLET ORAL DAILY
Qty: 30 TABLET | Refills: 0 | OUTPATIENT
Start: 2021-12-08

## 2021-12-08 NOTE — TELEPHONE ENCOUNTER
Routing refill request to provider for review/approval because:  Marta given x1 and patient did not follow up, please advise  A break in medication  Patient needs to be seen because:  Failing visit  Failing bp    Shayna Olivo RN

## 2021-12-28 ENCOUNTER — OFFICE VISIT (OUTPATIENT)
Dept: FAMILY MEDICINE | Facility: CLINIC | Age: 51
End: 2021-12-28
Payer: COMMERCIAL

## 2021-12-28 VITALS
RESPIRATION RATE: 16 BRPM | TEMPERATURE: 98.6 F | BODY MASS INDEX: 52.46 KG/M2 | SYSTOLIC BLOOD PRESSURE: 131 MMHG | WEIGHT: 315 LBS | DIASTOLIC BLOOD PRESSURE: 80 MMHG | OXYGEN SATURATION: 94 % | HEART RATE: 98 BPM

## 2021-12-28 DIAGNOSIS — Z23 COVID-19 VACCINE ADMINISTERED: ICD-10-CM

## 2021-12-28 DIAGNOSIS — K58.9 IRRITABLE BOWEL SYNDROME WITHOUT DIARRHEA: ICD-10-CM

## 2021-12-28 DIAGNOSIS — I10 ESSENTIAL HYPERTENSION: ICD-10-CM

## 2021-12-28 DIAGNOSIS — E66.01 MORBID OBESITY (H): ICD-10-CM

## 2021-12-28 DIAGNOSIS — Z87.891 PERSONAL HISTORY OF TOBACCO USE: ICD-10-CM

## 2021-12-28 DIAGNOSIS — E11.9 CONTROLLED TYPE 2 DIABETES MELLITUS WITHOUT COMPLICATION, WITHOUT LONG-TERM CURRENT USE OF INSULIN (H): Primary | ICD-10-CM

## 2021-12-28 LAB
ALBUMIN SERPL-MCNC: 3.5 G/DL (ref 3.4–5)
ALP SERPL-CCNC: 47 U/L (ref 40–150)
ALT SERPL W P-5'-P-CCNC: 50 U/L (ref 0–70)
ANION GAP SERPL CALCULATED.3IONS-SCNC: 5 MMOL/L (ref 3–14)
AST SERPL W P-5'-P-CCNC: 22 U/L (ref 0–45)
BILIRUB SERPL-MCNC: 0.8 MG/DL (ref 0.2–1.3)
BUN SERPL-MCNC: 13 MG/DL (ref 7–30)
CALCIUM SERPL-MCNC: 8.9 MG/DL (ref 8.5–10.1)
CHLORIDE BLD-SCNC: 106 MMOL/L (ref 94–109)
CHOLEST SERPL-MCNC: 150 MG/DL
CO2 SERPL-SCNC: 28 MMOL/L (ref 20–32)
CREAT SERPL-MCNC: 0.85 MG/DL (ref 0.66–1.25)
FASTING STATUS PATIENT QL REPORTED: NO
GFR SERPL CREATININE-BSD FRML MDRD: >90 ML/MIN/1.73M2
GLUCOSE BLD-MCNC: 177 MG/DL (ref 70–99)
HBA1C MFR BLD: 7.1 % (ref 0–5.6)
HDLC SERPL-MCNC: 51 MG/DL
LDLC SERPL CALC-MCNC: 74 MG/DL
NONHDLC SERPL-MCNC: 99 MG/DL
POTASSIUM BLD-SCNC: 4.8 MMOL/L (ref 3.4–5.3)
PROT SERPL-MCNC: 7.6 G/DL (ref 6.8–8.8)
SODIUM SERPL-SCNC: 139 MMOL/L (ref 133–144)
TRIGL SERPL-MCNC: 123 MG/DL
TSH SERPL DL<=0.005 MIU/L-ACNC: 2.11 MU/L (ref 0.4–4)

## 2021-12-28 PROCEDURE — 99214 OFFICE O/P EST MOD 30 MIN: CPT | Performed by: FAMILY MEDICINE

## 2021-12-28 PROCEDURE — 0064A COVID-19,PF,MODERNA (18+ YRS BOOSTER .25ML): CPT | Performed by: FAMILY MEDICINE

## 2021-12-28 PROCEDURE — 80053 COMPREHEN METABOLIC PANEL: CPT | Performed by: FAMILY MEDICINE

## 2021-12-28 PROCEDURE — 36415 COLL VENOUS BLD VENIPUNCTURE: CPT | Performed by: FAMILY MEDICINE

## 2021-12-28 PROCEDURE — 83036 HEMOGLOBIN GLYCOSYLATED A1C: CPT | Performed by: FAMILY MEDICINE

## 2021-12-28 PROCEDURE — 80061 LIPID PANEL: CPT | Performed by: FAMILY MEDICINE

## 2021-12-28 PROCEDURE — 91306 COVID-19,PF,MODERNA (18+ YRS BOOSTER .25ML): CPT | Performed by: FAMILY MEDICINE

## 2021-12-28 PROCEDURE — 84443 ASSAY THYROID STIM HORMONE: CPT | Performed by: FAMILY MEDICINE

## 2021-12-28 RX ORDER — LISINOPRIL 20 MG/1
20 TABLET ORAL DAILY
Qty: 90 TABLET | Refills: 1 | Status: SHIPPED | OUTPATIENT
Start: 2021-12-28 | End: 2022-06-16

## 2021-12-28 RX ORDER — DULOXETIN HYDROCHLORIDE 60 MG/1
60 CAPSULE, DELAYED RELEASE ORAL DAILY
Qty: 90 CAPSULE | Refills: 1 | Status: SHIPPED | OUTPATIENT
Start: 2021-12-28 | End: 2022-06-16

## 2021-12-28 NOTE — PROGRESS NOTES
Lung Cancer Screening Shared Decision Making Visit     Antonio Canseco is eligible for lung cancer screening on the basis of the information provided in my signed lung cancer screening order.     I have discussed with patient the risks and benefits of screening for lung cancer with low-dose CT.     The risks include:  radiation exposure: one low dose chest CT has as much ionizing radiation as about 15 chest x-rays or 6 months of background radiation living in [place]    false positives: 96% of positive findings/nodules are NOT cancer, but some might still require additional diagnostic evaluation, including biopsy  over-diagnosis: some slow growing cancers that might never have been clinically significant will be detected and treated unnecessarily     The benefit of early detection of lung cancer is contingent upon adherence to annual screening or more frequent follow up if indicated.     Furthermore, reaping the benefits of screening requires Antonio Canseco to be willing and physically able to undergo diagnostic procedures, if indicated. Although no specific guide is available for determining severity of comorbidities, it is reasonable to withhold screening in patients who have greater mortality risk from other diseases.     We did discuss that the only way to prevent lung cancer is to not smoke. Smoking cessation counseling was not given.      I did not offer risk estimation using a calculator such as this one:    ShouldIScreen        Answers for HPI/ROS submitted by the patient on 12/27/2021  How many servings of fruits and vegetables do you eat daily?: 2-3  On average, how many sweetened beverages do you drink each day (Examples: soda, juice, sweet tea, etc.  Do NOT count diet or artificially sweetened beverages)?: 0  How many minutes a day do you exercise enough to make your heart beat faster?: 10 to 19  How many days a week do you exercise enough to make your heart beat faster?: 3 or less  How many days per  week do you miss taking your medication?: 1  What makes it hard for you to take your medication every day?: remembering to take      Lung Cancer Screening Shared Decision Making Visit     Antonio Canseco is eligible for lung cancer screening on the basis of the information provided in my signed lung cancer screening order.     I have discussed with patient the risks and benefits of screening for lung cancer with low-dose CT.     The risks include:  radiation exposure: one low dose chest CT has as much ionizing radiation as about 15 chest x-rays or 6 months of background radiation living in [place]    false positives: 96% of positive findings/nodules are NOT cancer, but some might still require additional diagnostic evaluation, including biopsy  over-diagnosis: some slow growing cancers that might never have been clinically significant will be detected and treated unnecessarily     The benefit of early detection of lung cancer is contingent upon adherence to annual screening or more frequent follow up if indicated.     Furthermore, reaping the benefits of screening requires Antonio Canseco to be willing and physically able to undergo diagnostic procedures, if indicated. Although no specific guide is available for determining severity of comorbidities, it is reasonable to withhold screening in patients who have greater mortality risk from other diseases.     We did discuss that the only way to prevent lung cancer is to not smoke. Smoking cessation counseling was not given.      I Did not offer risk estimation using a calculator such as this one:    ShouldIScreen      Mynor Mason MD

## 2021-12-28 NOTE — PATIENT INSTRUCTIONS
Lung Cancer Screening   Frequently Asked Questions  If you are at high-risk for lung cancer, getting screened with low-dose computed tomography (LDCT) every year can help save your life. This handout offers answers to some of the most common questions about lung cancer screening. If you have other questions, please call [phone] ([phone]).     What is it?  Lung cancer screening uses special X-ray technology to create an image of your lung tissue. The exam is quick and easy and takes less than 10 seconds. We don t give you any medicine or use any needles. You can eat before and after the exam. You don t need to change your clothes as long as the clothing on your chest doesn t contain metal. But, you do need to be able to hold your breath for at least 6 seconds during the exam.    What is the goal of lung cancer screening?  The goal of lung cancer screening is to save lives. Many times, lung cancer is not found until a person starts having physical symptoms. Lung cancer screening can help detect lung cancer in the earliest stages when it may be easier to treat.    Who should be screened for lung cancer?  We suggest lung cancer screening for anyone who is at high-risk for lung cancer. You are in the high-risk group if you:      are between the ages of 55 and 79, and    have smoked at least 1 pack of cigarettes a day for 30 or more years, and    still smoke or have quit within the past 15 years.    However, if you have a new cough or shortness of breath, you should talk to your doctor before being screened.    Some national lung health advocacy groups also recommend screening for people ages 50 to 79 who have smoked an average of 1 pack of cigarettes a day for 20 years. They must also have at least 1 other risk factor for lung cancer, not including exposure to secondhand smoke. Other risk factors are having had cancer in the past, emphysema, pulmonary fibrosis, COPD, a family history of lung cancer, or  exposure to certain materials such as arsenic, asbestos, beryllium, cadmium, chromium, diesel fumes, nickel, radon or silica. Your care team can help you know if you have one of these risk factors.     Why does it matter if I have symptoms?  Certain symptoms can be a sign that you have a condition in your lungs that should be checked and treated by your doctor. These symptoms include fever, chest pain, a new or changing cough, shortness of breath that you have never felt before, coughing up blood or unexplained weight loss. Having any of these symptoms can greatly affect the results of lung cancer screening.       Should all smokers get an LDCT lung cancer screening exam?  It depends. Lung cancer screening is for a very specific group of men and women who have a history of heavy smoking over a long period of time (see  Who should be screened for lung cancer  above).  I am in the high-risk group, but have been diagnosed with cancer in the past. Is LDCT lung cancer screening right for me?  In some cases, you should not have LDCT lung screening, such as when your doctor is already following your cancer with CT scan studies. Your doctor will help you decide if LDCT lung screening is right for you.  Do I need to have a screening exam every year?  Yes. If you are in the high-risk group described earlier, you should get an LDCT lung cancer screening exam every year until you are 79, or are no longer willing or able to undergo screening and possible procedures to diagnose and treat lung cancer.  How effective is LDCT at preventing death from lung cancer?  Studies have shown that LDCT lung cancer screening can lower the risk of death from lung cancer by 20 percent in people who are at high-risk.  What are the risks?  There are some risks and limitations of LDCT lung cancer screening. We want to make sure you understand the risks and benefits, so please let us know if you have any questions. Your doctor may want to talk with  you more about these risks.    Radiation exposure: As with any exam that uses radiation, there is a very small increased risk of cancer. The amount of radiation in LDCT is small--about the same amount a person would get from a mammogram. Your doctor orders the exam when he or she feels the potential benefits outweigh the risks.    False negatives: No test is perfect, including LDCT. It is possible that you may have a medical condition, including lung cancer, that is not found during your exam. This is called a false negative result.    False positives and more testing: LDCT very often finds something in the lung that could be cancer, but in fact is not. This is called a false positive result. False positive tests often cause anxiety. To make sure these findings are not cancer, you may need to have more tests. These tests will be done only if you give us permission. Sometimes patients need a treatment that can have side effects, such as a biopsy. For more information on false positives, see  What can I expect from the results?     Findings not related to lung cancer: Your LDCT exam also takes pictures of areas of your body next to your lungs. In a very small number of cases, the CT scan will show an abnormal finding in one of these areas, such as your kidneys, adrenal glands, liver or thyroid. This finding may not be serious, but you may need more tests. Your doctor can help you decide what other tests you may need, if any.  What can I expect from the results?  About 1 out of 4 LDCT exams will find something that may need more tests. Most of the time, these findings are lung nodules. Lung nodules are very small collections of tissue in the lung. These nodules are very common, and the vast majority--more than 97 percent--are not cancer (benign). Most are normal lymph nodes or small areas of scarring from past infections.  But, if a small lung nodule is found to be cancer, the cancer can be cured more than 90 percent  of the time. To know if the nodule is cancer, we may need to get more images before your next yearly screening exam. If the nodule has suspicious features (for example, it is large, has an odd shape or grows over time), we will refer you to a specialist for further testing.  Will my doctor also get the results?  Yes. Your doctor will get a copy of your results.  Is it okay to keep smoking now that there s a cancer screening exam?  No. Tobacco is one of the strongest cancer-causing agents. It causes not only lung cancer, but other cancers and cardiovascular (heart) diseases as well. The damage caused by smoking builds over time. This means that the longer you smoke, the higher your risk of disease. While it is never too late to quit, the sooner you quit, the better.  Where can I find help to quit smoking?  The best way to prevent lung cancer is to stop smoking. If you have already quit smoking, congratulations and keep it up! For help on quitting smoking, please call [employer] at 4-940-654-USHH ([phone]) or the American Cancer Society at 1-514.708.4534 to find local resources near you.  One-on-one health coaching:  If you d prefer to work individually with a health care provider on tobacco cessation, we offer:      Medication Therapy Management:  Our specially trained pharmacists work closely with you and your doctor to help you quit smoking.  Call [phone] or [phone] (toll free).     Can Do: Health coaching offered by [facility].  [email or web address]    Lung Cancer Screening   Frequently Asked Questions  If you are at high-risk for lung cancer, getting screened with low-dose computed tomography (LDCT) every year can help save your life. This handout offers answers to some of the most common questions about lung cancer screening. If you have other questions, please call [phone] ([phone]).     What is it?  Lung cancer screening uses special X-ray technology to create  an image of your lung tissue. The exam is quick and easy and takes less than 10 seconds. We don t give you any medicine or use any needles. You can eat before and after the exam. You don t need to change your clothes as long as the clothing on your chest doesn t contain metal. But, you do need to be able to hold your breath for at least 6 seconds during the exam.    What is the goal of lung cancer screening?  The goal of lung cancer screening is to save lives. Many times, lung cancer is not found until a person starts having physical symptoms. Lung cancer screening can help detect lung cancer in the earliest stages when it may be easier to treat.    Who should be screened for lung cancer?  We suggest lung cancer screening for anyone who is at high-risk for lung cancer. You are in the high-risk group if you:      are between the ages of 55 and 79, and    have smoked at least 1 pack of cigarettes a day for 30 or more years, and    still smoke or have quit within the past 15 years.    However, if you have a new cough or shortness of breath, you should talk to your doctor before being screened.    Some national lung health advocacy groups also recommend screening for people ages 50 to 79 who have smoked an average of 1 pack of cigarettes a day for 20 years. They must also have at least 1 other risk factor for lung cancer, not including exposure to secondhand smoke. Other risk factors are having had cancer in the past, emphysema, pulmonary fibrosis, COPD, a family history of lung cancer, or exposure to certain materials such as arsenic, asbestos, beryllium, cadmium, chromium, diesel fumes, nickel, radon or silica. Your care team can help you know if you have one of these risk factors.     Why does it matter if I have symptoms?  Certain symptoms can be a sign that you have a condition in your lungs that should be checked and treated by your doctor. These symptoms include fever, chest pain, a new or changing cough,  shortness of breath that you have never felt before, coughing up blood or unexplained weight loss. Having any of these symptoms can greatly affect the results of lung cancer screening.       Should all smokers get an LDCT lung cancer screening exam?  It depends. Lung cancer screening is for a very specific group of men and women who have a history of heavy smoking over a long period of time (see  Who should be screened for lung cancer  above).  I am in the high-risk group, but have been diagnosed with cancer in the past. Is LDCT lung cancer screening right for me?  In some cases, you should not have LDCT lung screening, such as when your doctor is already following your cancer with CT scan studies. Your doctor will help you decide if LDCT lung screening is right for you.  Do I need to have a screening exam every year?  Yes. If you are in the high-risk group described earlier, you should get an LDCT lung cancer screening exam every year until you are 79, or are no longer willing or able to undergo screening and possible procedures to diagnose and treat lung cancer.  How effective is LDCT at preventing death from lung cancer?  Studies have shown that LDCT lung cancer screening can lower the risk of death from lung cancer by 20 percent in people who are at high-risk.  What are the risks?  There are some risks and limitations of LDCT lung cancer screening. We want to make sure you understand the risks and benefits, so please let us know if you have any questions. Your doctor may want to talk with you more about these risks.    Radiation exposure: As with any exam that uses radiation, there is a very small increased risk of cancer. The amount of radiation in LDCT is small--about the same amount a person would get from a mammogram. Your doctor orders the exam when he or she feels the potential benefits outweigh the risks.    False negatives: No test is perfect, including LDCT. It is possible that you may have a medical  condition, including lung cancer, that is not found during your exam. This is called a false negative result.    False positives and more testing: LDCT very often finds something in the lung that could be cancer, but in fact is not. This is called a false positive result. False positive tests often cause anxiety. To make sure these findings are not cancer, you may need to have more tests. These tests will be done only if you give us permission. Sometimes patients need a treatment that can have side effects, such as a biopsy. For more information on false positives, see  What can I expect from the results?     Findings not related to lung cancer: Your LDCT exam also takes pictures of areas of your body next to your lungs. In a very small number of cases, the CT scan will show an abnormal finding in one of these areas, such as your kidneys, adrenal glands, liver or thyroid. This finding may not be serious, but you may need more tests. Your doctor can help you decide what other tests you may need, if any.  What can I expect from the results?  About 1 out of 4 LDCT exams will find something that may need more tests. Most of the time, these findings are lung nodules. Lung nodules are very small collections of tissue in the lung. These nodules are very common, and the vast majority--more than 97 percent--are not cancer (benign). Most are normal lymph nodes or small areas of scarring from past infections.  But, if a small lung nodule is found to be cancer, the cancer can be cured more than 90 percent of the time. To know if the nodule is cancer, we may need to get more images before your next yearly screening exam. If the nodule has suspicious features (for example, it is large, has an odd shape or grows over time), we will refer you to a specialist for further testing.  Will my doctor also get the results?  Yes. Your doctor will get a copy of your results.  Is it okay to keep smoking now that there s a cancer screening  exam?  No. Tobacco is one of the strongest cancer-causing agents. It causes not only lung cancer, but other cancers and cardiovascular (heart) diseases as well. The damage caused by smoking builds over time. This means that the longer you smoke, the higher your risk of disease. While it is never too late to quit, the sooner you quit, the better.  Where can I find help to quit smoking?  The best way to prevent lung cancer is to stop smoking. If you have already quit smoking, congratulations and keep it up! For help on quitting smoking, please call [employer] at 5-432-513-ITGY ([phone]) or the American Cancer Society at 1-333.397.9696 to find local resources near you.  One-on-one health coaching:  If you d prefer to work individually with a health care provider on tobacco cessation, we offer:      Medication Therapy Management:  Our specially trained pharmacists work closely with you and your doctor to help you quit smoking.  Call [phone] or [phone] (toll free).     Can Do: Health coaching offered by [facility].  [email or web address]

## 2021-12-28 NOTE — PROGRESS NOTES
"  Assessment & Plan   Problem List Items Addressed This Visit     Controlled type 2 diabetes mellitus without complication, without long-term current use of insulin (H) - Primary     Controlled with diet.  ASA not being taken, recommended.  On ACE.  Discussed weight loss.  Assess twice yearly if controlled         Relevant Orders    HEMOGLOBIN A1C (Completed)    TSH WITH FREE T4 REFLEX (Completed)    Comprehensive metabolic panel (BMP + Alb, Alk Phos, ALT, AST, Total. Bili, TP) (Completed)    Lipid panel reflex to direct LDL Non-fasting (Completed)    COVID-19 vaccine administered     Discussed, offered, accepted         Relevant Orders    COVID-19,PF,MODERNA (18+ YRS BOOSTER .25ML) (Completed)    Essential hypertension     Controlled.  No changes         Relevant Medications    lisinopril (ZESTRIL) 20 MG tablet    Irritable bowel syndrome without diarrhea     Quiescent.  No changes         Relevant Medications    DULoxetine (CYMBALTA) 60 MG capsule    Morbid obesity (H)     He eschews weight loss surgery.  He will consider lifestyle changes.         Relevant Orders    Comprehensive Weight Management    Personal history of tobacco use     Discussed lung cancer screening.  He is interested.  Computer suggest both that insurance covers and does not.  We shall order, suggest he contact his insurance and verify coverage before proceeding         Relevant Orders    Prof Fee: Shared Decision Making Visit for Lung Cancer Screening (Completed)    Prof Fee: Shared Decision Making Visit for Lung Cancer Screening (Completed)    CT Chest Lung Cancer Scrn Low Dose wo                    BMI:   Estimated body mass index is 52.46 kg/m  as calculated from the following:    Height as of 3/12/21: 1.727 m (5' 8\").    Weight as of this encounter: 156.5 kg (345 lb).   Weight management plan: He is skeptical.  He will accept information about programs.  He eschews the idea of surgery        Return in about 6 months (around 6/28/2022) for " Moderna # 3 lab appt go, Physical Exam.    Mynor Mason MD  [facility] ACE Alvarez is a 51 year old who presents for the following health issues     History of Present Illness       He eats 2-3 servings of fruits and vegetables daily.He consumes 0 sweetened beverage(s) daily.He exercises with enough effort to increase his heart rate 10 to 19 minutes per day.  He exercises with enough effort to increase his heart rate 3 or less days per week. He is missing 1 dose(s) of medications per week.  He is not taking prescribed medications regularly due to remembering to take.       Hypertension Follow-up      Do you check your blood pressure regularly outside of the clinic? No     Are you following a low salt diet? No    Are your blood pressures ever more than 140 on the top number (systolic) OR more   than 90 on the bottom number (diastolic), for example 140/90? nOT SURE DOES NOT CHECK         Review of Systems   No complaints      Objective    /80 (BP Location: Right arm, Patient Position: Sitting, Cuff Size: Adult Large)   Pulse 98   Temp 98.6  F (37  C) (Oral)   Resp 16   Wt (!) 156.5 kg (345 lb)   SpO2 94%   BMI 52.46 kg/m    Body mass index is 52.46 kg/m .  Physical Exam   Obese  No distress  No edema          Mynor Mason MD

## 2021-12-29 NOTE — ASSESSMENT & PLAN NOTE
Controlled with diet.  ASA not being taken, recommended.  On ACE.  Discussed weight loss.  Assess twice yearly if controlled

## 2021-12-29 NOTE — ASSESSMENT & PLAN NOTE
Discussed lung cancer screening.  He is interested.  Computer suggest both that insurance covers and does not.  We shall order, suggest he contact his insurance and verify coverage before proceeding

## 2022-01-15 ENCOUNTER — HEALTH MAINTENANCE LETTER (OUTPATIENT)
Age: 52
End: 2022-01-15

## 2022-01-20 ENCOUNTER — TRANSFERRED RECORDS (OUTPATIENT)
Dept: HEALTH INFORMATION MANAGEMENT | Facility: CLINIC | Age: 52
End: 2022-01-20
Payer: COMMERCIAL

## 2022-03-08 ENCOUNTER — HOSPITAL ENCOUNTER (OUTPATIENT)
Dept: CT IMAGING | Facility: CLINIC | Age: 52
Discharge: HOME | End: 2022-03-08
Attending: FAMILY MEDICINE | Admitting: FAMILY MEDICINE
Payer: COMMERCIAL

## 2022-03-08 DIAGNOSIS — Z87.891 PERSONAL HISTORY OF TOBACCO USE: ICD-10-CM

## 2022-03-08 PROCEDURE — 71271 CT THORAX LUNG CANCER SCR C-: CPT

## 2022-04-24 ENCOUNTER — HOSPITAL ENCOUNTER (EMERGENCY)
Facility: CLINIC | Age: 52
Discharge: HOME | End: 2022-04-24
Attending: EMERGENCY MEDICINE | Admitting: EMERGENCY MEDICINE
Payer: COMMERCIAL

## 2022-04-24 ENCOUNTER — APPOINTMENT (OUTPATIENT)
Dept: GENERAL RADIOLOGY | Facility: CLINIC | Age: 52
End: 2022-04-24
Attending: EMERGENCY MEDICINE
Payer: COMMERCIAL

## 2022-04-24 VITALS
TEMPERATURE: 99.6 F | OXYGEN SATURATION: 93 % | BODY MASS INDEX: 44.1 KG/M2 | RESPIRATION RATE: 15 BRPM | WEIGHT: 315 LBS | DIASTOLIC BLOOD PRESSURE: 79 MMHG | HEIGHT: 71 IN | SYSTOLIC BLOOD PRESSURE: 129 MMHG | HEART RATE: 90 BPM

## 2022-04-24 DIAGNOSIS — J20.9 ACUTE BRONCHITIS, UNSPECIFIED ORGANISM: ICD-10-CM

## 2022-04-24 DIAGNOSIS — J44.1 COPD EXACERBATION (H): ICD-10-CM

## 2022-04-24 LAB
ALBUMIN SERPL-MCNC: 3.4 G/DL (ref 3.4–5)
ALP SERPL-CCNC: 44 U/L (ref 40–150)
ALT SERPL W P-5'-P-CCNC: 43 U/L (ref 0–70)
ANION GAP SERPL CALCULATED.3IONS-SCNC: 2 MMOL/L (ref 3–14)
AST SERPL W P-5'-P-CCNC: 21 U/L (ref 0–45)
BASOPHILS # BLD AUTO: 0 10E3/UL (ref 0–0.2)
BASOPHILS NFR BLD AUTO: 1 %
BILIRUB SERPL-MCNC: 1.7 MG/DL (ref 0.2–1.3)
BUN SERPL-MCNC: 11 MG/DL (ref 7–30)
CALCIUM SERPL-MCNC: 8.8 MG/DL (ref 8.5–10.1)
CHLORIDE BLD-SCNC: 101 MMOL/L (ref 94–109)
CO2 SERPL-SCNC: 29 MMOL/L (ref 20–32)
CREAT SERPL-MCNC: 0.78 MG/DL (ref 0.66–1.25)
EOSINOPHIL # BLD AUTO: 0.2 10E3/UL (ref 0–0.7)
EOSINOPHIL NFR BLD AUTO: 3 %
ERYTHROCYTE [DISTWIDTH] IN BLOOD BY AUTOMATED COUNT: 14.5 % (ref 10–15)
GFR SERPL CREATININE-BSD FRML MDRD: >90 ML/MIN/1.73M2
GLUCOSE BLD-MCNC: 143 MG/DL (ref 70–99)
HCT VFR BLD AUTO: 47.7 % (ref 40–53)
HGB BLD-MCNC: 14.8 G/DL (ref 13.3–17.7)
HOLD SPECIMEN: NORMAL
HOLD SPECIMEN: NORMAL
IMM GRANULOCYTES # BLD: 0 10E3/UL
IMM GRANULOCYTES NFR BLD: 0 %
LYMPHOCYTES # BLD AUTO: 1 10E3/UL (ref 0.8–5.3)
LYMPHOCYTES NFR BLD AUTO: 16 %
MCH RBC QN AUTO: 28.3 PG (ref 26.5–33)
MCHC RBC AUTO-ENTMCNC: 31 G/DL (ref 31.5–36.5)
MCV RBC AUTO: 91 FL (ref 78–100)
MONOCYTES # BLD AUTO: 0.9 10E3/UL (ref 0–1.3)
MONOCYTES NFR BLD AUTO: 14 %
NEUTROPHILS # BLD AUTO: 4.3 10E3/UL (ref 1.6–8.3)
NEUTROPHILS NFR BLD AUTO: 66 %
NRBC # BLD AUTO: 0 10E3/UL
NRBC BLD AUTO-RTO: 0 /100
NT-PROBNP SERPL-MCNC: 9 PG/ML (ref 0–900)
PLATELET # BLD AUTO: 228 10E3/UL (ref 150–450)
POTASSIUM BLD-SCNC: 4.1 MMOL/L (ref 3.4–5.3)
PROT SERPL-MCNC: 7.8 G/DL (ref 6.8–8.8)
RBC # BLD AUTO: 5.23 10E6/UL (ref 4.4–5.9)
SARS-COV-2 RNA RESP QL NAA+PROBE: NEGATIVE
SODIUM SERPL-SCNC: 132 MMOL/L (ref 133–144)
TROPONIN I SERPL HS-MCNC: 5 NG/L
WBC # BLD AUTO: 6.4 10E3/UL (ref 4–11)

## 2022-04-24 PROCEDURE — 94640 AIRWAY INHALATION TREATMENT: CPT

## 2022-04-24 PROCEDURE — 99285 EMERGENCY DEPT VISIT HI MDM: CPT | Mod: 25

## 2022-04-24 PROCEDURE — U0005 INFEC AGEN DETEC AMPLI PROBE: HCPCS | Performed by: EMERGENCY MEDICINE

## 2022-04-24 PROCEDURE — 250N000009 HC RX 250: Performed by: EMERGENCY MEDICINE

## 2022-04-24 PROCEDURE — 85025 COMPLETE CBC W/AUTO DIFF WBC: CPT | Performed by: EMERGENCY MEDICINE

## 2022-04-24 PROCEDURE — 80053 COMPREHEN METABOLIC PANEL: CPT | Performed by: EMERGENCY MEDICINE

## 2022-04-24 PROCEDURE — 250N000013 HC RX MED GY IP 250 OP 250 PS 637: Performed by: EMERGENCY MEDICINE

## 2022-04-24 PROCEDURE — 93005 ELECTROCARDIOGRAM TRACING: CPT

## 2022-04-24 PROCEDURE — C9803 HOPD COVID-19 SPEC COLLECT: HCPCS

## 2022-04-24 PROCEDURE — 71045 X-RAY EXAM CHEST 1 VIEW: CPT

## 2022-04-24 PROCEDURE — 250N000012 HC RX MED GY IP 250 OP 636 PS 637: Performed by: EMERGENCY MEDICINE

## 2022-04-24 PROCEDURE — 84484 ASSAY OF TROPONIN QUANT: CPT | Performed by: EMERGENCY MEDICINE

## 2022-04-24 PROCEDURE — 83880 ASSAY OF NATRIURETIC PEPTIDE: CPT | Performed by: EMERGENCY MEDICINE

## 2022-04-24 PROCEDURE — 36415 COLL VENOUS BLD VENIPUNCTURE: CPT | Performed by: EMERGENCY MEDICINE

## 2022-04-24 RX ORDER — IPRATROPIUM BROMIDE AND ALBUTEROL SULFATE 2.5; .5 MG/3ML; MG/3ML
3 SOLUTION RESPIRATORY (INHALATION) ONCE
Status: COMPLETED | OUTPATIENT
Start: 2022-04-24 | End: 2022-04-24

## 2022-04-24 RX ORDER — PREDNISONE 50 MG/1
50 TABLET ORAL DAILY
Qty: 5 TABLET | Refills: 0 | Status: SHIPPED | OUTPATIENT
Start: 2022-04-24 | End: 2022-04-29

## 2022-04-24 RX ORDER — ACETAMINOPHEN 500 MG
1000 TABLET ORAL ONCE
Status: COMPLETED | OUTPATIENT
Start: 2022-04-24 | End: 2022-04-24

## 2022-04-24 RX ORDER — PREDNISONE 20 MG/1
60 TABLET ORAL ONCE
Status: COMPLETED | OUTPATIENT
Start: 2022-04-24 | End: 2022-04-24

## 2022-04-24 RX ORDER — ALBUTEROL SULFATE 90 UG/1
2 AEROSOL, METERED RESPIRATORY (INHALATION) EVERY 6 HOURS PRN
Qty: 18 G | Refills: 0 | Status: SHIPPED | OUTPATIENT
Start: 2022-04-24 | End: 2023-01-18

## 2022-04-24 RX ADMIN — PREDNISONE 60 MG: 20 TABLET ORAL at 14:17

## 2022-04-24 RX ADMIN — IPRATROPIUM BROMIDE AND ALBUTEROL SULFATE 3 ML: 2.5; .5 SOLUTION RESPIRATORY (INHALATION) at 14:17

## 2022-04-24 RX ADMIN — ACETAMINOPHEN 1000 MG: 500 TABLET, FILM COATED ORAL at 12:47

## 2022-04-24 ASSESSMENT — ENCOUNTER SYMPTOMS
COUGH: 1
SHORTNESS OF BREATH: 1
FEVER: 1
WHEEZING: 0

## 2022-04-24 NOTE — ED TRIAGE NOTES
A&O x4, ABCs intact. Pt presents with concern for SOB and other cold symptoms that started last Thursday. Pt states that he's had bloody nose several times, cough, congestion, and SOB. He is unsure if he's been exposed to COVID, but he is vaccinated for COVID. Pt denies chest pain

## 2022-04-24 NOTE — ED PROVIDER NOTES
"  History     Chief Complaint:  Shortness of Breath       HPI   Antonio Canseco is a 51 year old male who presents with shortness of breath and a cough is been present for about 1 to 2 weeks.  He tells me that he has emphysema at baseline, with a mild cough, but the cough is gotten worse.  He tells me he is here \"because my wife made me come in\".  He denies any chest pain, no fainting, does endorse worsening shortness of breath and tells me that he normally is able to go up the stairs in their home without being short of breath, but now he has been quite short of breath.    Denies any fainting, does endorse a fever from yesterday.  Is vaccinated against COVID.  No sore throat, no orthopnea.    ROS:  Review of Systems   Constitutional: Positive for fever.   Respiratory: Positive for cough and shortness of breath. Negative for wheezing.    Cardiovascular: Negative for chest pain.   All other systems reviewed and are negative.       Allergies:  Kiwi     Medications:    aspirin (ASA) 81 MG chewable tablet  blood glucose (NO BRAND SPECIFIED) test strip  blood glucose monitoring (ACCU-CHEK FASTCLIX) lancets  DULoxetine (CYMBALTA) 60 MG capsule  lisinopril (ZESTRIL) 20 MG tablet  STATIN NOT PRESCRIBED (INTENTIONAL)        Past Medical History:    Past Medical History:   Diagnosis Date     Acrochordon 2/11/2021     CARDIOVASCULAR SCREENING; LDL GOAL LESS THAN 160 3/27/2012     Colon adenoma      Controlled type 2 diabetes mellitus without complication, without long-term current use of insulin (H) 9/16/2019     Cough 2/4/2014     Degeneration of thoracic intervertebral disc 12/11/2013     Dysfunction of both eustachian tubes 4/12/2019     Elevated blood pressure 12/22/2014     Elevated hemoglobin A1c 9/16/2019     Gastroesophageal reflux disease      Hepatic cyst 5/22/2018     Hepatic steatosis 12/11/2013     History of colonic polyps 12/11/2013     History of drainage of abscess 9/16/2019     Hypertension      Irritable " bowel syndrome without diarrhea 6/1/2018     Low libido 4/12/2019     Microscopic hematuria 12/19/2013     Nephrolithiasis      Obesity 3/27/2012     BRIAN on CPAP 3/27/2012     Other irritable bowel syndrome 5/22/2018     Pain in thoracic spine 5/9/2013     Pulmonary nodule 5/22/2018     Right-sided chest pain 4/13/2018     Sleep apnea      Tinea pedis of both feet 2/11/2021     Tinnitus, unspecified laterality 4/12/2019     Patient Active Problem List   Diagnosis     CARDIOVASCULAR SCREENING; LDL GOAL LESS THAN 160     BRIAN on CPAP     Degeneration of thoracic intervertebral disc     History of colonic polyps     Hepatic steatosis     Microscopic hematuria     Cough     Plantar fasciitis     Morbid obesity (H)     Right-sided chest pain     Pulmonary nodule     Hepatic cyst     Irritable bowel syndrome without diarrhea     Tinnitus, unspecified laterality     Dysfunction of both eustachian tubes     Low libido     Essential hypertension     Controlled type 2 diabetes mellitus without complication, without long-term current use of insulin (H)     Acrochordon     Tinea pedis of both feet     COVID-19 vaccine administered     Personal history of tobacco use        Past Surgical History:    Past Surgical History:   Procedure Laterality Date     COLONOSCOPY       COLONOSCOPY N/A 3/12/2021    Procedure: COLONOSCOPY (fv);  Surgeon: Ean Alcantara MD;  Location: [facility] OR     CYSTOSCOPY  2/11/2014    Procedure: CYSTOSCOPY;   Video Cystoscopy with Exam Under Anesthesia;  Surgeon: Chente Moeller MD;  Location: [facility] OR     wisdom teeth          Family History:    family history includes Cancer in his father; Family History Negative in his mother; Obesity in his mother; Other Cancer in his father and paternal grandfather.    Social History:   reports that he quit smoking about 9 years ago. His smoking use included cigarettes. He started smoking about 34 years ago. He has a 25.00 pack-year smoking history. He has never used  "smokeless tobacco. He reports current alcohol use. He reports that he does not use drugs.  PCP: Mynor Mason     Physical Exam     Patient Vitals for the past 24 hrs:   BP Temp Temp src Pulse Resp SpO2 Height Weight   04/24/22 1158 (!) 158/95 99.6  F (37.6  C) Oral 106 20 98 % 1.803 m (5' 11\") (!) 158.8 kg (350 lb)        Physical Exam  Vitals: reviewed by me  General: Pt seen on [facility], cooperative, and alert to conversation  Eyes: Tracking well, clear conjunctiva BL  ENT: MMM, midline trachea.   Lungs: Minimal tachypnea, no accessory muscle use, no significant respiratory distress.  Good air movement in both sides, faint scattered rhonchi noted  CV: Rate as above, normal S1-S2, no additional heart sounds heard  Abd: Soft, non tender, no guarding, no rebound. Non distended  MSK: no joint effusion.  No evidence of trauma  Skin: No rash  Neuro: Clear speech and no facial droop.  Psych: Not RIS, no e/o AH/VH      Emergency Department Course   ECG:  ECG taken at 1234, ECG read at 1234  Normal sinus rhythm    Normal ECG   Rate 97 bpm. RI interval 124 ms. QRS duration 96 ms. QT/QTc 346/439 ms. P-R-T axes 57 11 49.     Imaging:  XR Chest Port 1 View   Final Result   IMPRESSION: Negative chest.  The lungs are clear and there are no pleural effusions. Normal heart size.           Laboratory:  Labs Ordered and Resulted from Time of ED Arrival to Time of ED Departure   COMPREHENSIVE METABOLIC PANEL - Abnormal       Result Value    Sodium 132 (*)     Potassium 4.1      Chloride 101      Carbon Dioxide (CO2) 29      Anion Gap 2 (*)     Urea Nitrogen 11      Creatinine 0.78      Calcium 8.8      Glucose 143 (*)     Alkaline Phosphatase 44      AST 21      ALT 43      Protein Total 7.8      Albumin 3.4      Bilirubin Total 1.7 (*)     GFR Estimate >90     CBC WITH PLATELETS AND DIFFERENTIAL - Abnormal    WBC Count 6.4      RBC Count 5.23      Hemoglobin 14.8      Hematocrit 47.7      MCV 91      MCH 28.3      " MCHC 31.0 (*)     RDW 14.5      Platelet Count 228      % Neutrophils 66      % Lymphocytes 16      % Monocytes 14      % Eosinophils 3      % Basophils 1      % Immature Granulocytes 0      NRBCs per 100 WBC 0      Absolute Neutrophils 4.3      Absolute Lymphocytes 1.0      Absolute Monocytes 0.9      Absolute Eosinophils 0.2      Absolute Basophils 0.0      Absolute Immature Granulocytes 0.0      Absolute NRBCs 0.0     TROPONIN I - Normal    Troponin I High Sensitivity 5     COVID-19 VIRUS (CORONAVIRUS) BY PCR - Normal    SARS CoV2 PCR Negative     NT PROBNP INPATIENT - Normal    N terminal Pro BNP Inpatient 9          Emergency Department Course:    Reviewed:  I reviewed nursing notes, vitals and past medical history    Interventions:  Medications   acetaminophen (TYLENOL) tablet 1,000 mg (1,000 mg Oral Given 4/24/22 1247)   ipratropium - albuterol 0.5 mg/2.5 mg/3 mL (DUONEB) neb solution 3 mL (3 mLs Nebulization Given 4/24/22 1417)   predniSONE (DELTASONE) tablet 60 mg (60 mg Oral Given 4/24/22 1417)        Disposition:  The patient was discharged to home.     Impression & Plan      Covid-19  Antonio Canseco was evaluated during a global COVID-19 pandemic, which necessitated consideration that the patient might be at risk for infection with the SARS-CoV-2 virus that causes COVID-19.   Applicable protocols for evaluation were followed during the patient's care.   COVID-19 was considered as part of the patient's evaluation. The plan for testing is:  a test was obtained during this visit.    Medical Decision Making:  This is a pleasant 51-year-old male presents emergency room with shortness of breath and cough.  He has no chest pain, certainly has respiratory symptoms like runny nose, sinus pressure and a fever.  No evidence of pneumonia found, and since he has a history of COPD, I did give him a nebulizer here as well as oral prednisone.  He feels comfortable going home, and is thankful that his work-up is  negative.  His COVID test is negative as well and I do think that there is likely a viral trigger causing a mild COPD exacerbation here.  He has no white count, no purulent cough, and no x-ray findings to indicate that antibiotics would be beneficial.  We went over red flags for when to come back to the ER, and we discussed that his oxygen saturation here is still not 100% even after the DuoNeb.  He still feels comfortable going home, and I gave him a pulse oximeter to go home with as well.  He knows to come back if his pulse oximeter shows 90% or below, or if he has any other concerns or if he develops any chest pain.  We will plan for discharge as above, red flags for when to come back to the ER were discussed.    Diagnosis:    ICD-10-CM    1. Acute bronchitis, unspecified organism  J20.9    2. COPD exacerbation (H)  J44.1         Discharge Medications:  New Prescriptions    ALBUTEROL (PROAIR HFA/PROVENTIL HFA/VENTOLIN HFA) 108 (90 BASE) MCG/ACT INHALER    Inhale 2 puffs into the lungs every 6 hours as needed for shortness of breath / dyspnea or wheezing    PREDNISONE (DELTASONE) 50 MG TABLET    Take 1 tablet (50 mg) by mouth daily for 5 days        4/24/2022   Yoan Boudreaux*        Yoan Boudreaux MD  04/24/22 8231

## 2022-04-24 NOTE — DISCHARGE INSTRUCTIONS
As we discussed, no clear cause of your symptoms was found, though I do suspect you likely have a viral illness causing bronchitis.  This would explain your cough and shortness of breath as well, however here in the ER we can tell you that your COVID test is negative, and your labs are otherwise reassuring.  Since you do not need oxygen here, you are stable to go home, do come back to the ER with any worsening symptoms, or if you have any new symptoms like chest pain or if you become too short of breath to do your normal activities at home.  Please use the pulse oximeter that we have given you, and if your oxygen saturation gets to be 90% or below, he should come back to the ER as you may need to be admitted at that point.  We are also giving you steroids given your history of emphysema.

## 2022-04-25 LAB
ATRIAL RATE - MUSE: 97 BPM
DIASTOLIC BLOOD PRESSURE - MUSE: NORMAL MMHG
INTERPRETATION ECG - MUSE: NORMAL
P AXIS - MUSE: 57 DEGREES
PR INTERVAL - MUSE: 124 MS
QRS DURATION - MUSE: 96 MS
QT - MUSE: 346 MS
QTC - MUSE: 439 MS
R AXIS - MUSE: 11 DEGREES
SYSTOLIC BLOOD PRESSURE - MUSE: NORMAL MMHG
T AXIS - MUSE: 49 DEGREES
VENTRICULAR RATE- MUSE: 97 BPM

## 2022-05-17 ENCOUNTER — OFFICE VISIT (OUTPATIENT)
Dept: FAMILY MEDICINE | Facility: CLINIC | Age: 52
End: 2022-05-17
Payer: COMMERCIAL

## 2022-05-17 ENCOUNTER — TELEPHONE (OUTPATIENT)
Dept: FAMILY MEDICINE | Facility: CLINIC | Age: 52
End: 2022-05-17

## 2022-05-17 VITALS — HEART RATE: 96 BPM | TEMPERATURE: 98.6 F | OXYGEN SATURATION: 96 %

## 2022-05-17 DIAGNOSIS — E11.9 CONTROLLED TYPE 2 DIABETES MELLITUS WITHOUT COMPLICATION, WITHOUT LONG-TERM CURRENT USE OF INSULIN (H): ICD-10-CM

## 2022-05-17 DIAGNOSIS — E66.01 MORBID OBESITY (H): ICD-10-CM

## 2022-05-17 DIAGNOSIS — G47.33 OSA ON CPAP: ICD-10-CM

## 2022-05-17 DIAGNOSIS — Z23 COVID-19 VACCINE ADMINISTERED: ICD-10-CM

## 2022-05-17 DIAGNOSIS — R05.9 COUGH: Primary | ICD-10-CM

## 2022-05-17 PROCEDURE — 99214 OFFICE O/P EST MOD 30 MIN: CPT | Mod: 25 | Performed by: FAMILY MEDICINE

## 2022-05-17 PROCEDURE — 0064A COVID-19,PF,MODERNA (18+ YRS BOOSTER .25ML): CPT | Performed by: FAMILY MEDICINE

## 2022-05-17 PROCEDURE — 91306 COVID-19,PF,MODERNA (18+ YRS BOOSTER .25ML): CPT | Performed by: FAMILY MEDICINE

## 2022-05-17 RX ORDER — PANTOPRAZOLE SODIUM 40 MG/1
40 TABLET, DELAYED RELEASE ORAL DAILY
Qty: 30 TABLET | Refills: 3 | Status: SHIPPED | OUTPATIENT
Start: 2022-05-17 | End: 2022-12-19

## 2022-05-17 RX ORDER — FLUTICASONE PROPIONATE 50 MCG
1 SPRAY, SUSPENSION (ML) NASAL DAILY
Qty: 16 G | Refills: 1 | Status: SHIPPED | OUTPATIENT
Start: 2022-05-17 | End: 2022-09-15

## 2022-05-17 RX ORDER — BENZONATATE 200 MG/1
200 CAPSULE ORAL 3 TIMES DAILY PRN
Qty: 30 CAPSULE | Refills: 1 | Status: SHIPPED | OUTPATIENT
Start: 2022-05-17 | End: 2022-12-19

## 2022-05-17 NOTE — PROGRESS NOTES
Assessment & Plan   Problem List Items Addressed This Visit     Controlled type 2 diabetes mellitus without complication, without long-term current use of insulin (H)     Controlled at last measure           Cough - Primary     Longstanding.  Restrictive pattern on previous spirometry, not COPD.  Attributed to NESTOR postnasal drip.  Recent illness with negative chest x-ray.  Cough continues.  Discussed differential.  Multipronged treatment           Relevant Medications    fluticasone (FLONASE) 50 MCG/ACT nasal spray    pantoprazole (PROTONIX) 40 MG EC tablet    benzonatate (TESSALON) 200 MG capsule    fluticasone-vilanterol (BREO ELLIPTA) 100-25 MCG/INH inhaler    COVID-19 vaccine administered     offered           Relevant Orders    COVID-19,PF,MODERNA (18+ YRS BOOSTER .25ML) (Completed)    Morbid obesity (H)     Weight decreased, has returned           BRIAN on CPAP     He is compliant with his CPAP                               Return in about 6 weeks (around 6/28/2022) for Physical Exam.    Mynor Mason MD  [facility]    Joyce Alvaerz is a 51 year old who presents for the following health issues     HPI     Acute Illness  Acute illness concerns: Cough   Onset/Duration: over one month   Symptoms:  Fever: no  Chills/Sweats: no  Headache (location?): no  Sinus Pressure: no  Conjunctivitis:  no  Ear Pain: no  Rhinorrhea: YES  Congestion: no  Sore Throat: YES- from coughing   Cough: YES-non-productive, productive of clear sputum  Wheeze: no  Decreased Appetite: no  Nausea: no  Vomiting: no  Diarrhea: no  Dysuria/Freq.: no  Dysuria or Hematuria: no  Fatigue/Achiness: no  Sick/Strep Exposure: no  Therapies tried and outcome: prednisone, inhaler with no relief       Review of Systems   Fevers have resolved      Objective    Pulse 96   Temp 98.6  F (37  C) (Oral)   SpO2 96%   There is no height or weight on file to calculate BMI.  Physical Exam   Chest ckeqar  Ears clear        Mynor  WILLIAM Mason MD

## 2022-05-17 NOTE — ASSESSMENT & PLAN NOTE
Longstanding.  Restrictive pattern on previous spirometry, not COPD.  Attributed to NESTOR postnasal drip.  Recent illness with negative chest x-ray.  Cough continues.  Discussed differential.  Multipronged treatment

## 2022-05-17 NOTE — TELEPHONE ENCOUNTER
This type of medication will need a prior authorization.    Please do not close this encounter until this has been addressed.  (prior auth approved/denied, prescriber refusal to complete prior auth or medication changed/discontinued)    Prior Authorization needed on: pantoprazole 40mg  Drug NDC: [address]-98    Insurance: drewa  Bin;  [id number]  Pcn:  [id number]  Member ID: [id number]   Insurance phone #: [phone]    Pharmacy NPI: [id number]  Pharmacy Phone #: [phone]  Pharmacy Fax #: [phone]    Please let us know if the PA gets approved or denied or if medication is changed    Thanks,  Jesika Lopez CPhT  [facility]  [phone]

## 2022-05-23 NOTE — TELEPHONE ENCOUNTER
PRIOR AUTHORIZATION DENIED    Medication: pantoprazole 40 MG EC tablet- not covered for patients age 13 and over.- PA DENIED    Denial Date: 5/23/2022    Denial Rational: Not covered for patient age-   No alternatives given.    Appeal Information:

## 2022-05-23 NOTE — TELEPHONE ENCOUNTER
PA Initiation    Medication: pantoprazole (PROTONIX) 40 MG EC tablet- Pantoprazole not covered for patients age 13 and over.  Insurance Company:    Pharmacy Filling the Rx: [place], [place] - [address]  Filling Pharmacy Phone: [phone]  Filling Pharmacy Fax:    Start Date: 5/23/2022

## 2022-06-16 DIAGNOSIS — I10 ESSENTIAL HYPERTENSION: ICD-10-CM

## 2022-06-16 DIAGNOSIS — K58.9 IRRITABLE BOWEL SYNDROME WITHOUT DIARRHEA: ICD-10-CM

## 2022-06-16 RX ORDER — DULOXETIN HYDROCHLORIDE 60 MG/1
60 CAPSULE, DELAYED RELEASE ORAL DAILY
Qty: 90 CAPSULE | Refills: 0 | Status: SHIPPED | OUTPATIENT
Start: 2022-06-16 | End: 2022-09-15

## 2022-06-16 RX ORDER — LISINOPRIL 20 MG/1
20 TABLET ORAL DAILY
Qty: 90 TABLET | Refills: 0 | Status: SHIPPED | OUTPATIENT
Start: 2022-06-16 | End: 2022-09-15

## 2022-06-16 NOTE — TELEPHONE ENCOUNTER
Medication is being filled for 1 time refill only due to:  Patient needs to be seen because due for px, diabetic exam..   05/17/2022 Mynor Mason MD Office Visit  Return in about 6 weeks (around 6/28/2022) for Physical Exam.     Juanita Weems RN

## 2022-07-17 DIAGNOSIS — R05.9 COUGH: ICD-10-CM

## 2022-07-20 NOTE — TELEPHONE ENCOUNTER
Prescription approved per [facility] Refill Protocol.    Miriam Regan RN on 7/20/2022 at 10:53 AM

## 2022-08-21 ENCOUNTER — HEALTH MAINTENANCE LETTER (OUTPATIENT)
Age: 52
End: 2022-08-21

## 2022-09-15 DIAGNOSIS — I10 ESSENTIAL HYPERTENSION: ICD-10-CM

## 2022-09-15 DIAGNOSIS — R05.9 COUGH: ICD-10-CM

## 2022-09-15 DIAGNOSIS — K58.9 IRRITABLE BOWEL SYNDROME WITHOUT DIARRHEA: ICD-10-CM

## 2022-09-15 RX ORDER — DULOXETIN HYDROCHLORIDE 60 MG/1
60 CAPSULE, DELAYED RELEASE ORAL DAILY
Qty: 90 CAPSULE | Refills: 1 | Status: SHIPPED | OUTPATIENT
Start: 2022-09-15 | End: 2023-01-23

## 2022-09-15 RX ORDER — FLUTICASONE PROPIONATE 50 MCG
1 SPRAY, SUSPENSION (ML) NASAL DAILY
Qty: 16 G | Refills: 5 | Status: SHIPPED | OUTPATIENT
Start: 2022-09-15 | End: 2022-12-19

## 2022-09-15 RX ORDER — LISINOPRIL 20 MG/1
20 TABLET ORAL DAILY
Qty: 90 TABLET | Refills: 1 | Status: SHIPPED | OUTPATIENT
Start: 2022-09-15 | End: 2023-01-23

## 2022-09-15 NOTE — TELEPHONE ENCOUNTER
Prescription approved per [facility] Refill Protocol.  Rebel HINOJOSA RN

## 2022-12-18 ASSESSMENT — PATIENT HEALTH QUESTIONNAIRE - PHQ9
SUM OF ALL RESPONSES TO PHQ QUESTIONS 1-9: 9
10. IF YOU CHECKED OFF ANY PROBLEMS, HOW DIFFICULT HAVE THESE PROBLEMS MADE IT FOR YOU TO DO YOUR WORK, TAKE CARE OF THINGS AT HOME, OR GET ALONG WITH OTHER PEOPLE: SOMEWHAT DIFFICULT
SUM OF ALL RESPONSES TO PHQ QUESTIONS 1-9: 9

## 2022-12-19 ENCOUNTER — PATIENT OUTREACH (OUTPATIENT)
Dept: FAMILY MEDICINE | Facility: CLINIC | Age: 52
End: 2022-12-19

## 2022-12-19 ENCOUNTER — VIRTUAL VISIT (OUTPATIENT)
Dept: FAMILY MEDICINE | Facility: CLINIC | Age: 52
End: 2022-12-19
Payer: COMMERCIAL

## 2022-12-19 DIAGNOSIS — Z23 ENCOUNTER FOR IMMUNIZATION: ICD-10-CM

## 2022-12-19 DIAGNOSIS — S76.311D HAMSTRING STRAIN, RIGHT, SUBSEQUENT ENCOUNTER: ICD-10-CM

## 2022-12-19 DIAGNOSIS — I10 ESSENTIAL HYPERTENSION: ICD-10-CM

## 2022-12-19 DIAGNOSIS — E11.9 CONTROLLED TYPE 2 DIABETES MELLITUS WITHOUT COMPLICATION, WITHOUT LONG-TERM CURRENT USE OF INSULIN (H): ICD-10-CM

## 2022-12-19 DIAGNOSIS — M54.16 LUMBAR RADICULOPATHY: Primary | ICD-10-CM

## 2022-12-19 DIAGNOSIS — E66.01 MORBID OBESITY (H): ICD-10-CM

## 2022-12-19 PROCEDURE — 99214 OFFICE O/P EST MOD 30 MIN: CPT | Mod: 95 | Performed by: FAMILY MEDICINE

## 2022-12-19 RX ORDER — GABAPENTIN 300 MG/1
300 CAPSULE ORAL 3 TIMES DAILY
Qty: 90 CAPSULE | Refills: 3 | Status: ON HOLD | OUTPATIENT
Start: 2022-12-19 | End: 2023-02-05

## 2022-12-19 RX ORDER — MELOXICAM 15 MG/1
15 TABLET ORAL DAILY
Qty: 90 TABLET | Refills: 0 | Status: ON HOLD | OUTPATIENT
Start: 2022-12-19 | End: 2023-02-05

## 2022-12-19 ASSESSMENT — PATIENT HEALTH QUESTIONNAIRE - PHQ9
SUM OF ALL RESPONSES TO PHQ QUESTIONS 1-9: 9
10. IF YOU CHECKED OFF ANY PROBLEMS, HOW DIFFICULT HAVE THESE PROBLEMS MADE IT FOR YOU TO DO YOUR WORK, TAKE CARE OF THINGS AT HOME, OR GET ALONG WITH OTHER PEOPLE: SOMEWHAT DIFFICULT

## 2022-12-19 NOTE — ASSESSMENT & PLAN NOTE
Orthopedic Dx. PT/NSAID with some  Improvement, deteriorated with NSAID cessation. Little/no back pain. Broaden data base refer

## 2022-12-19 NOTE — ASSESSMENT & PLAN NOTE
"Pain is in hamstring. Pt describes a \"lump\" in hamstring, still present, not appreciated by orthopedics. Broaden data base    "

## 2022-12-19 NOTE — TELEPHONE ENCOUNTER
SB 3 PAL Welcome Letter Sent    Chrissy LOPEZ RN   Patient Advocate Hannah HENSON RN  [facility]  [phone]

## 2022-12-19 NOTE — PROGRESS NOTES
"Antonio is a 52 year old who is being evaluated via a billable video visit.      How would you like to obtain your AVS? MyChart  If the video visit is dropped, the invitation should be resent by: Text to cell phone: [phone]  Will anyone else be joining your video visit? No        Assessment & Plan   Problem List Items Addressed This Visit     Controlled type 2 diabetes mellitus without complication, without long-term current use of insulin (H)     Database out dated. Update         Relevant Orders    Lipid panel reflex to direct LDL Non-fasting    Hemoglobin A1c    Albumin Random Urine Quantitative with Creat Ratio    Comprehensive metabolic panel (BMP + Alb, Alk Phos, ALT, AST, Total. Bili, TP)    Encounter for immunization     Discussed COVID vaccination         Relevant Orders    COVID-19 VACCINE BIVALENT BOOSTER 18+ (MODERNA)    INFLUENZA VACCINE >6 MONTHS (AFLURIA/FLUZONE)    Essential hypertension     Controlled at last measure Reassess         Hamstring strain, right, subsequent encounter     Pain is in hamstring. Pt describes a \"lump\" in hamstring, still present, not appreciated by orthopedics. Broaden data base           Relevant Orders    MR Femur Thigh Right wo & w Contrast    Lumbar radiculopathy - Primary     Orthopedic Dx. PT/NSAID with some  Improvement, deteriorated with NSAID cessation. Little/no back pain. Broaden data base refer          Relevant Orders    MR Lumbar Spine w Contrast    Spine  Referral    Morbid obesity (H)     Successful weight loss would be beneficial for DM Radiculopathy HTN. Discussed                      BMI:   Estimated body mass index is 48.82 kg/m  as calculated from the following:    Height as of 4/24/22: 1.803 m (5' 11\").    Weight as of 4/24/22: 158.8 kg (350 lb).   Weight management plan: introduced        No follow-ups on file.    Mynor Mason MD  [facility]    Subjective   Antonio is a 52 year old presenting for the following " health issues:  Back Pain      History of Present Illness       Back Pain:  He presents for follow up of back pain. Patient's back pain is a chronic problem.  Location of back pain:  Right lower back, right buttock and right hip  Description of back pain: cramping, dull ache and sharp  Back pain spreads: right buttocks, right thigh, right knee and right foot    Since patient first noticed back pain, pain is: always present, but gets better and worse  Does back pain interfere with his job:  Yes      He eats 0-1 servings of fruits and vegetables daily.He consumes 0 sweetened beverage(s) daily.He exercises with enough effort to increase his heart rate 9 or less minutes per day.  He exercises with enough effort to increase his heart rate 4 days per week. He is missing 1 dose(s) of medications per week.  He is not taking prescribed medications regularly due to remembering to take.    Today's PHQ-9         PHQ-9 Total Score: 9    PHQ-9 Q9 Thoughts of better off dead/self-harm past 2 weeks :   Not at all    How difficult have these problems made it for you to do your work, take care of things at home, or get along with other people: Somewhat difficult       Pain History:  Where in your body do you have pain? Back Pain  Onset/Duration: beginning of June  Description:   Location of pain: low back right and hip right  Character of pain: sharp and dull ache  Pain radiation: radiates into the right leg  New numbness or weakness in legs, not attributed to pain: YES- weaker  Intensity: at its worse 8/10, severe  Progression of Symptoms: intermittent  History:   Specific cause: all started like a big knot on my hamstring which is still there  Pain interferes with job: YES  History of back problems: went to urgent care and thought it was a herniated disc. Has gone to physical therapy and it has not improved  Any previous MRI or X-rays: Yes-- patient cant remember location .  Date early Augut  Sees a specialist for back pain:  "No  Alleviating factors:   Improved by: Advil. Meloxicam    Precipitating factors:  Worsened by: Sitting  Therapies tried and outcome: Tylenol, Advil, Meloxicam and  PT. The only thing that helped is Meloxicam     Accompanying Signs & Symptoms:  Risk of Fracture:   Risk of Cauda Equina:   Risk of Infection:   Risk of Cancer:   Risk of Ankylosing Spondylitis: Onset at age <35, male, AND morning back stiffness       On duloxetine        Review of Systems   No   sx.      Objective    Vitals - Patient Reported  Height (Patient Reported): 180.3 cm (5' 11\")      Vitals:  No vitals were obtained today due to virtual visit.    Physical Exam   GENERAL: Healthy, alert and no distress  EYES: Eyes grossly normal to inspection.  No discharge or erythema, or obvious scleral/conjunctival abnormalities.  RESP: No audible wheeze, cough, or visible cyanosis.  No visible retractions or increased work of breathing.    SKIN: Visible skin clear. No significant rash, abnormal pigmentation or lesions.  NEURO: Cranial nerves grossly intact.  Mentation and speech appropriate for age.  PSYCH: Mentation appears normal, affect normal/bright, judgement and insight intact, normal speech and appearance well-groomed.                Video-Visit Details    Video Start Time: 8:36    Type of service:  Video Visit    Video End Time:9:08 AM    Originating Location (pt. Location): Home    Distant Location (provider location):  Off-site    Platform used for Video Visit: Vishal Mason MD    "

## 2022-12-19 NOTE — LETTER
Antonio Canseco  [address] [place] [id number]    Neftali Alvarez,    Thank you for choosing [facility] today for your health care needs.     [facility] is transforming primary care  At [facility], we re dedicated to constantly improve how we serve the health care needs of our patients and communities. We re currently making changes to the way we deliver care.     Changes you ll notice include:    An emphasis on building a relationship with a primary care provider    Access to a PAL (patient advocate and liaison) to help guide you with your care needs    Appointment lengths tailored to your specific needs and greater access to a care team to help you and your provider improve and maintain your health and well-being    Improved online access to your care team    Benefits of a primary care provider  If you don t have a designated primary care provider, we encourage you to get to know our care team online and find a provider you d like to see. Most of our providers have a short video on their online provider page. Visit [email or web address] to explore our providers and locations.    Benefits of having a primary care provider include:      They get to know you - your health history, family history and goals, making it easier to make a health plan together.     You get to know them - making health-related conversations and decisions easier      Primary care doctors help you when you re sick or hurt - but also focus on keeping you healthy with preventive care and screenings.      A doctor who sees you regularly is more likely to notice changes in your health.     You ll be connected to a broad care team who partners with your provider to support you.    Patient Advocate Liaison (PAL)   To help make sure you get the right care, at the right time, we include PALs, or Patient Advocate Liaisons, as part of your care team. Your PAL will be your first line of contact. They ll advocate for your needs and  help you navigate our services, connecting you with care team members and community resources to ensure your care is well coordinated. You ll be introduced to a PAL in an upcoming visit.     Expanded care team access with tailored appointment lengths  Depending on your health care needs, you may have longer or shorter appointments and see additional care team providers - including Medication Therapy Management (MTM) pharmacists, diabetes educators, behavioral health clinicians, or social workers. At times, they may be included in your visit with your provider, or you may see them individually.     Online access to your health care records and care team  [employer] is our online tool that makes it easy to see your health care information and communicate with your care team.     [employer] allows you to:     View your health maintenance plan so you know when you re due for a preventive screening    Send secure messages to your care team    View your health history and visit summaries     Schedule appointments     Complete questionnaires and eCheck-in before appointments      Get care from your provider with an e-visit      View and pay your bill     Sign up at [employer]. Once you have an account, you also can download the mobile sean.     Connecting to fast and convenient care  When you need fast, convenient care - consider one of the following options:       Video Visit: A convenient care option for visiting with your provider out of the comfort of your own home. Most of the things you come to the clinic to address with your provider can now be done virtually through a video. This includes your chronic medication follow up, questions or concerns you may have, and even your annual Medicare Wellness Visit.       Phone Visit: Another convenient option for follow up of common problems that may require a more in-depth discussion with your provider.       E-visit: When you need acute care quickly, or have a quick  question about your medication, an E-visit is completed through [employer] and your provider will respond within one business day.                  Chrissy LOPEZ RN  Patient Advocate Hannah HENSON RN  [facility]  [phone]

## 2022-12-26 ENCOUNTER — HEALTH MAINTENANCE LETTER (OUTPATIENT)
Age: 52
End: 2022-12-26

## 2023-01-06 ENCOUNTER — HOSPITAL ENCOUNTER (OUTPATIENT)
Dept: MRI IMAGING | Facility: CLINIC | Age: 53
Discharge: HOME | End: 2023-01-06
Attending: FAMILY MEDICINE
Payer: COMMERCIAL

## 2023-01-06 ENCOUNTER — ANCILLARY ORDERS (OUTPATIENT)
Dept: FAMILY MEDICINE | Facility: CLINIC | Age: 53
End: 2023-01-06

## 2023-01-06 ENCOUNTER — TELEPHONE (OUTPATIENT)
Dept: FAMILY MEDICINE | Facility: CLINIC | Age: 53
End: 2023-01-06

## 2023-01-06 DIAGNOSIS — M54.16 LUMBAR RADICULOPATHY: ICD-10-CM

## 2023-01-06 DIAGNOSIS — S76.311D HAMSTRING STRAIN, RIGHT, SUBSEQUENT ENCOUNTER: ICD-10-CM

## 2023-01-06 DIAGNOSIS — C49.21 MALIGNANT NEOPLASM OF SOFT TISSUE OF RIGHT LOWER EXTREMITY (H): Primary | ICD-10-CM

## 2023-01-06 LAB — RADIOLOGIST FLAGS: NORMAL

## 2023-01-06 PROCEDURE — A9585 GADOBUTROL INJECTION: HCPCS | Performed by: FAMILY MEDICINE

## 2023-01-06 PROCEDURE — 255N000002 HC RX 255 OP 636: Performed by: FAMILY MEDICINE

## 2023-01-06 PROCEDURE — 73720 MRI LWR EXTREMITY W/O&W/DYE: CPT | Mod: 26 | Performed by: RADIOLOGY

## 2023-01-06 PROCEDURE — 73720 MRI LWR EXTREMITY W/O&W/DYE: CPT | Mod: RT

## 2023-01-06 PROCEDURE — 72158 MRI LUMBAR SPINE W/O & W/DYE: CPT

## 2023-01-06 RX ORDER — GADOBUTROL 604.72 MG/ML
15 INJECTION INTRAVENOUS ONCE
Status: COMPLETED | OUTPATIENT
Start: 2023-01-06 | End: 2023-01-06

## 2023-01-06 RX ADMIN — GADOBUTROL 15 ML: 604.72 INJECTION INTRAVENOUS at 07:08

## 2023-01-06 NOTE — TELEPHONE ENCOUNTER
Unable to reach patient. Basic call back message left.   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   Oncology referral placed   JEFFREY Story CNP on 1/6/2023 at 5:29 PM

## 2023-01-06 NOTE — TELEPHONE ENCOUNTER
[facility] calls re: imaging today, has FLAGGED for urgent review, BW out of office today, ROUTED TO , PLEASE REVIEW AND ADVISE    Rosa Maria Gutierrez RN, BSN  [facility]

## 2023-01-09 PROBLEM — C49.21: Status: ACTIVE | Noted: 2023-01-09

## 2023-01-09 NOTE — TELEPHONE ENCOUNTER
Pt calls, BW out in am and in clinic this pm, pt did view Mychart result message, pt leaving for flight in 3 hours and will be back Thursday night, pt now wonders if should cancel flight, will send BW teams message to see it he could call pt now to discuss/inform, routed high priority to BW, please call pt on cell  Telephone Information:   Mobile [phone]     Rosa Maria Gutierrez RN, BSN  [facility]

## 2023-01-10 ENCOUNTER — PRE VISIT (OUTPATIENT)
Dept: ORTHOPEDICS | Facility: CLINIC | Age: 53
End: 2023-01-10

## 2023-01-10 ENCOUNTER — VIRTUAL VISIT (OUTPATIENT)
Dept: ORTHOPEDICS | Facility: CLINIC | Age: 53
End: 2023-01-10
Payer: COMMERCIAL

## 2023-01-10 DIAGNOSIS — C49.9 SARCOMA OF SOFT TISSUE (H): Primary | ICD-10-CM

## 2023-01-10 PROCEDURE — 99203 OFFICE O/P NEW LOW 30 MIN: CPT | Mod: 95 | Performed by: ORTHOPAEDIC SURGERY

## 2023-01-10 ASSESSMENT — ENCOUNTER SYMPTOMS
TASTE DISTURBANCE: 0
HEMOPTYSIS: 0
NECK PAIN: 0
SPUTUM PRODUCTION: 1
BLOOD IN STOOL: 1
DYSPNEA ON EXERTION: 1
ABDOMINAL PAIN: 0
DIARRHEA: 0
FEVER: 0
SINUS PAIN: 0
HALLUCINATIONS: 0
SNORES LOUDLY: 1
DECREASED CONCENTRATION: 1
NAUSEA: 0
ARTHRALGIAS: 0
DIFFICULTY URINATING: 1
HOARSE VOICE: 0
SMELL DISTURBANCE: 0
COUGH: 1
DEPRESSION: 0
PANIC: 0
NERVOUS/ANXIOUS: 0
NIGHT SWEATS: 1
CHILLS: 0
INCREASED ENERGY: 1
NECK MASS: 0
HEARTBURN: 1
RECTAL PAIN: 0
ALTERED TEMPERATURE REGULATION: 1
COUGH DISTURBING SLEEP: 1
JOINT SWELLING: 0
MUSCLE CRAMPS: 1
POLYDIPSIA: 0
DYSURIA: 0
JAUNDICE: 0
CONSTIPATION: 0
SORE THROAT: 0
TROUBLE SWALLOWING: 0
VOMITING: 0
BACK PAIN: 0
MUSCLE WEAKNESS: 1
HEMATURIA: 0
BOWEL INCONTINENCE: 0
STIFFNESS: 0
POSTURAL DYSPNEA: 0
DECREASED APPETITE: 1
BLOATING: 1
FLANK PAIN: 0
INSOMNIA: 0
WEIGHT LOSS: 1
FATIGUE: 1
POLYPHAGIA: 0
WHEEZING: 1
SHORTNESS OF BREATH: 1
SINUS CONGESTION: 1
MYALGIAS: 1

## 2023-01-10 NOTE — PROGRESS NOTES
Patient is a 52-year-old male who noticed cramping or what he thought was a muscle pull in the right hamstrings in June.  Since that time he was evaluated in urgent care where an x-ray was interpreted to be negative.    His pain is gotten progressively worse and he contacted his primary care clinic insisting that further evaluation be performed.  A lumbar MRI scan and an MRI scan of the right thigh were performed.  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    His past history is outlined in the electronic health record as are his medications.  His medications do include Mobic and his comorbidities include obesity and diabetes.    I reported to Antonio that he has a large tumor in his posterior thigh and that a biopsy is the next step.  He understands this.  All his questions were answered.    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    Plan: 1.  Miles to arrange for the patient to see me on Thursday at noon for a Nguyễn-Cut biopsy or preferably to see Mr. Nascimento if he is available Thursday or Friday to perform the biopsy.    The visit began at 3:16 PM with review of imaging till 3:22 PM.  The video visit plus documentation ended at 3:34 p.m.  Total visit was 18 minutes.

## 2023-01-10 NOTE — TELEPHONE ENCOUNTER
DIAGNOSIS: RT Thigh Mass   APPOINTMENT DATE: 01/10/2023   NOTES STATUS DETAILS   OFFICE NOTE from referring provider Internal 12/19/2022 - Mynor Mason MD - [facility]   OFFICE NOTE from other specialist Care Everywhere 08/04/2022 - Marti Hughes MD - [facility]    07/23/2022 - Vitaliy Barrera DO - [facility]    OPERATIVE REPORT Care Everywhere 09/01/2019 - [facility] Thigh Abscess   MEDICATION LIST Internal  Care Everywhere    LABS     CBC/DIFF Internal 04/24/2022   MRI Internal

## 2023-01-10 NOTE — PROGRESS NOTES
Antonio is a 52 year old who is being evaluated via a billable video visit.      How would you like to obtain your AVS? [employer]  If the video visit is dropped, the invitation should be resent by: Text to cell phone: [phone]  Will anyone else be joining your video visit? No

## 2023-01-10 NOTE — LETTER
1/10/2023         RE: Antonio Canseco  [address] [address]        Dear Colleague,    Thank you for referring your patient, Antonio Canseco, to the [facility] [place]. Please see a copy of my visit note below.    Antonio is a 52 year old who is being evaluated via a billable video visit.      How would you like to obtain your AVS? MyChart  If the video visit is dropped, the invitation should be resent by: Text to cell phone: [phone]  Will anyone else be joining your video visit? No          Patient is a 52-year-old male who noticed cramping or what he thought was a muscle pull in the right hamstrings in June.  Since that time he was evaluated in urgent care where an x-ray was interpreted to be negative.    His pain is gotten progressively worse and he contacted his primary care clinic insisting that further evaluation be performed.  A lumbar MRI scan and an MRI scan of the right thigh were performed.  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    His past history is outlined in the electronic health record as are his medications.  His medications do include Mobic and his comorbidities include obesity and diabetes.    I reported to Antonio that he has a large tumor in his posterior thigh and that a biopsy is the next step.  He understands this.  All his questions were answered.    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    Plan: 1.  Miles to arrange for the patient to see me on Thursday at noon for a Nguyễn-Cut biopsy or preferably to see Mr. Nascimento if he is available Thursday or Friday to perform the biopsy.    The visit began at 3:16 PM with review of imaging till 3:22 PM.  The video visit plus documentation ended at 3:34 p.m.  Total visit was 18 minutes.      Again, thank you for allowing me to participate in the care of  your patient.        Sincerely,        Zach Salgado MD

## 2023-01-12 ENCOUNTER — OFFICE VISIT (OUTPATIENT)
Dept: ORTHOPEDICS | Facility: CLINIC | Age: 53
End: 2023-01-12
Payer: COMMERCIAL

## 2023-01-12 DIAGNOSIS — C49.9 SARCOMA OF SOFT TISSUE (H): Primary | ICD-10-CM

## 2023-01-12 PROCEDURE — 88368 INSITU HYBRIDIZATION MANUAL: CPT | Mod: 26 | Performed by: MEDICAL GENETICS

## 2023-01-12 PROCEDURE — 88307 TISSUE EXAM BY PATHOLOGIST: CPT | Mod: 26 | Performed by: PATHOLOGY

## 2023-01-12 PROCEDURE — 88341 IMHCHEM/IMCYTCHM EA ADD ANTB: CPT | Mod: 26 | Performed by: PATHOLOGY

## 2023-01-12 PROCEDURE — 88342 IMHCHEM/IMCYTCHM 1ST ANTB: CPT | Mod: 26 | Performed by: PATHOLOGY

## 2023-01-12 PROCEDURE — 88275 CYTOGENETICS 100-300: CPT | Performed by: PHYSICIAN ASSISTANT

## 2023-01-12 PROCEDURE — 88307 TISSUE EXAM BY PATHOLOGIST: CPT | Mod: TC | Performed by: PHYSICIAN ASSISTANT

## 2023-01-12 PROCEDURE — 99213 OFFICE O/P EST LOW 20 MIN: CPT | Mod: 25 | Performed by: PHYSICIAN ASSISTANT

## 2023-01-12 PROCEDURE — 20206 BIOPSY MUSCLE PERQ NEEDLE: CPT | Mod: RT | Performed by: PHYSICIAN ASSISTANT

## 2023-01-12 NOTE — LETTER
1/12/2023         RE: Antonio Canseco  [address] [facility] [address]        Dear Colleague,    Thank you for referring your patient, Antonio Canseco, to the [facility] [place]. Please see a copy of my visit note below.        [facility]  Orthopaedic Surgery  by Ari Nascimento PA-C    Antonio Canseco MRN# [id number]    YOB: 1970               Clinical History:   52 year old male with history of large soft tissue mass in the right medial hamstrings who presents today for core needle biopsy per request from Dr. Salgado.            Data:   All imaging studies reviewed by me    1/6/2023 MRI right femur thigh: Large soft tissue mass in the posterior compartment of the right thigh measuring 27 x 7.4 x 12 cm.  The mass lies primarily in the hamstring muscle belly.  Heterogeneous signal with areas of necrosis noted        Procedure:   After informed consent repeat scanning was performed to identify the mass and adjacent anatomical structures. The skin was prepped and draped in sterile fashion.  10 mL of 1% lidocaine/local anesthesia was administered.  Ultrasound guidance was used to pass a 14-gauge core biopsy needle into the mass for tissue biopsy. 6 passes were made. The specimen was immediately placed in formalin and sent to the lab. The patient tolerated the procedure well and there were no immediate complications following the procedure.    10 mL of local 1% lidocaine hydrochloride injection  Batch: [id number]  Expiration October 2024         Assessment and Plan:   Assessment:  52-year-old male with history of large soft tissue mass in the right medial hamstrings who presents today for core needle biopsy. The patient tolerated the procedure well     Plan:  The biopsy was performed today as above.  Patient tolerated the procedure well.  Patient may remove the bandage in 24 hours.  It is okay to get it wet after 24 hours but should avoid soaking for a week.   Instructed him to observe for signs of infection including redness, drainage and increased pain.  Given he is only been off meloxicam for 2 days I suspect he will have bruising and potentially hematoma formation.  I recommend he ice and compress for swelling.  I explained that the surgeon pathology can take up to 2 weeks to finalize.  A member of Dr. Salgado's team will contact them with the results and further treatment options.  All questions were answered and the patient and his wife were in agreement with the plan.     Ari Nascimento PA-C  Physician Assistant   Oncology and Adult Reconstructive Surgery  Dept Orthopaedic Surgery, [facility]     Total combined visit time and work time before and after the clinic visit equals 45 minutes.

## 2023-01-12 NOTE — PROGRESS NOTES
[facility]  Orthopaedic Surgery  by Ari Hailekaran Canseco MRN# [id number]    YOB: 1970               Clinical History:   52 year old male with history of large soft tissue mass in the right medial hamstrings who presents today for core needle biopsy per request from Dr. Salgado.            Data:   All imaging studies reviewed by me    1/6/2023 MRI right femur thigh: Large soft tissue mass in the posterior compartment of the right thigh measuring 27 x 7.4 x 12 cm.  The mass lies primarily in the hamstring muscle belly.  Heterogeneous signal with areas of necrosis noted        Procedure:   After informed consent repeat scanning was performed to identify the mass and adjacent anatomical structures. The skin was prepped and draped in sterile fashion.  10 mL of 1% lidocaine/local anesthesia was administered.  Ultrasound guidance was used to pass a 14-gauge core biopsy needle into the mass for tissue biopsy. 6 passes were made. The specimen was immediately placed in formalin and sent to the lab. The patient tolerated the procedure well and there were no immediate complications following the procedure.    10 mL of local 1% lidocaine hydrochloride injection  Batch: [id number]  Expiration October 2024         Assessment and Plan:   Assessment:  52-year-old male with history of large soft tissue mass in the right medial hamstrings who presents today for core needle biopsy. The patient tolerated the procedure well     Plan:  The biopsy was performed today as above.  Patient tolerated the procedure well.  Patient may remove the bandage in 24 hours.  It is okay to get it wet after 24 hours but should avoid soaking for a week.  Instructed him to observe for signs of infection including redness, drainage and increased pain.  Given he is only been off meloxicam for 2 days I suspect he will have bruising and potentially hematoma formation.  I recommend he ice and compress for swelling.  I  explained that the surgeon pathology can take up to 2 weeks to finalize.  A member of Dr. Salgado's team will contact them with the results and further treatment options.  All questions were answered and the patient and his wife were in agreement with the plan.     Ari Nascimento PA-C  Physician Assistant   Oncology and Adult Reconstructive Surgery  Dept Orthopaedic Surgery, [facility]     Total combined visit time and work time before and after the clinic visit equals 45 minutes.

## 2023-01-17 ENCOUNTER — TELEPHONE (OUTPATIENT)
Dept: ORTHOPEDICS | Facility: CLINIC | Age: 53
End: 2023-01-17
Payer: COMMERCIAL

## 2023-01-17 ENCOUNTER — PATIENT OUTREACH (OUTPATIENT)
Dept: ONCOLOGY | Facility: CLINIC | Age: 53
End: 2023-01-17
Payer: COMMERCIAL

## 2023-01-17 DIAGNOSIS — C49.9 SARCOMA OF SOFT TISSUE (H): Primary | ICD-10-CM

## 2023-01-17 LAB
PATH REPORT.COMMENTS IMP SPEC: ABNORMAL
PATH REPORT.COMMENTS IMP SPEC: YES
PATH REPORT.FINAL DX SPEC: ABNORMAL
PATH REPORT.GROSS SPEC: ABNORMAL
PATH REPORT.MICROSCOPIC SPEC OTHER STN: ABNORMAL
PATH REPORT.RELEVANT HX SPEC: ABNORMAL
PHOTO IMAGE: ABNORMAL

## 2023-01-17 NOTE — PROGRESS NOTES
New Patient Hematology / Oncology Nurse Navigator Note     Referral Date: 1/17/23    Referring provider: Ari Nascimento PA-C in Orthopedics    Referring Clinic/Organization: [facility]     Referred to: Medical Oncology    Requested provider (if applicable): First available - did not specify     Evaluation for :      Clinical History (per Nurse review of records provided):      1/12 Path and FISH (FISH still in process) -- BOOKMARKED    1/10 clinic visit with Dr Salgado and 1/12 visit with Shaq Nascimento for biopsy -- BOOKMARKED    Clinical Assessment / Barriers to Care (Per Nurse):    N/A      Records Location: [facility]     Records Needed:     PET-CT scheduled for 9:30 am at [facility]    Additional testing needed prior to consult:     See above    Referral updates and Plan:     Triage instructions updated and call warm transferred to NPS for completion.      Jessica Fraire, JARVISN, RN, PHN, OCN  Hematology/Oncology Nurse Navigator   [facility]   [phone]   [email or web address]

## 2023-01-17 NOTE — TELEPHONE ENCOUNTER
Contacted patient.  Advised that I have him set up for a PET scan tomorrow morning at the [address]    Arrival time 900 am, Scan at 930 am  Patient is not taking any type of insulin or diabetes medications.  Patient instructed to be NPO 6 hours prior to the exam.  He can drink water up until he arrives.  Patient advised that he should also expect a call from radiation oncology and medical oncology.  Patient had no further questions at this time.

## 2023-01-17 NOTE — PROGRESS NOTES
RECORDS STATUS - ALL OTHER DIAGNOSIS      RECORDS RECEIVED FROM: [facility]   DATE RECEIVED: 1/18/2023   NOTES STATUS DETAILS   OFFICE NOTE from referring provider Complete Epic   Ari Nascimento, PAYunierC   OPERATIVE REPORT Complete See Biopsy in EPIC   MEDICATION LIST Complete [facility]   CLINICAL TRIAL TREATMENTS TO DATE     LABS     PATHOLOGY REPORTS Internal biopsy in [facility] 1/12/2023  A.  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

## 2023-01-18 ENCOUNTER — ANCILLARY ORDERS (OUTPATIENT)
Dept: ORTHOPEDICS | Facility: CLINIC | Age: 53
End: 2023-01-18

## 2023-01-18 ENCOUNTER — PRE VISIT (OUTPATIENT)
Dept: ONCOLOGY | Facility: CLINIC | Age: 53
End: 2023-01-18
Payer: COMMERCIAL

## 2023-01-18 ENCOUNTER — ONCOLOGY VISIT (OUTPATIENT)
Dept: ONCOLOGY | Facility: CLINIC | Age: 53
End: 2023-01-18
Attending: PHYSICIAN ASSISTANT
Payer: COMMERCIAL

## 2023-01-18 VITALS
WEIGHT: 307 LBS | RESPIRATION RATE: 16 BRPM | BODY MASS INDEX: 42.82 KG/M2 | DIASTOLIC BLOOD PRESSURE: 85 MMHG | TEMPERATURE: 98 F | OXYGEN SATURATION: 98 % | SYSTOLIC BLOOD PRESSURE: 126 MMHG | HEART RATE: 98 BPM

## 2023-01-18 DIAGNOSIS — E11.65 UNCONTROLLED TYPE 2 DIABETES MELLITUS WITH HYPERGLYCEMIA, WITHOUT LONG-TERM CURRENT USE OF INSULIN (H): ICD-10-CM

## 2023-01-18 DIAGNOSIS — C49.9 SARCOMA OF SOFT TISSUE (H): Primary | ICD-10-CM

## 2023-01-18 DIAGNOSIS — C49.9 SARCOMA OF SOFT TISSUE (H): ICD-10-CM

## 2023-01-18 LAB
ALBUMIN SERPL BCG-MCNC: 3.6 G/DL (ref 3.5–5.2)
ALP SERPL-CCNC: 72 U/L (ref 40–129)
ALT SERPL W P-5'-P-CCNC: 10 U/L (ref 10–50)
ANION GAP SERPL CALCULATED.3IONS-SCNC: 10 MMOL/L (ref 7–15)
AST SERPL W P-5'-P-CCNC: 9 U/L (ref 10–50)
BASOPHILS # BLD AUTO: 0 10E3/UL (ref 0–0.2)
BASOPHILS NFR BLD AUTO: 0 %
BILIRUB SERPL-MCNC: 0.5 MG/DL
BUN SERPL-MCNC: 13.2 MG/DL (ref 6–20)
CALCIUM SERPL-MCNC: 9.6 MG/DL (ref 8.6–10)
CHLORIDE SERPL-SCNC: 98 MMOL/L (ref 98–107)
CREAT SERPL-MCNC: 0.68 MG/DL (ref 0.67–1.17)
DEPRECATED HCO3 PLAS-SCNC: 27 MMOL/L (ref 22–29)
EOSINOPHIL # BLD AUTO: 0.3 10E3/UL (ref 0–0.7)
EOSINOPHIL NFR BLD AUTO: 3 %
ERYTHROCYTE [DISTWIDTH] IN BLOOD BY AUTOMATED COUNT: 16.9 % (ref 10–15)
GFR SERPL CREATININE-BSD FRML MDRD: >90 ML/MIN/1.73M2
GLUCOSE SERPL-MCNC: 241 MG/DL (ref 70–99)
HBA1C MFR BLD: 11.7 %
HCT VFR BLD AUTO: 36.9 % (ref 40–53)
HGB BLD-MCNC: 11.1 G/DL (ref 13.3–17.7)
IMM GRANULOCYTES # BLD: 0 10E3/UL
IMM GRANULOCYTES NFR BLD: 0 %
LYMPHOCYTES # BLD AUTO: 2.1 10E3/UL (ref 0.8–5.3)
LYMPHOCYTES NFR BLD AUTO: 22 %
MAGNESIUM SERPL-MCNC: 1.9 MG/DL (ref 1.7–2.3)
MCH RBC QN AUTO: 23.1 PG (ref 26.5–33)
MCHC RBC AUTO-ENTMCNC: 30.1 G/DL (ref 31.5–36.5)
MCV RBC AUTO: 77 FL (ref 78–100)
MONOCYTES # BLD AUTO: 0.8 10E3/UL (ref 0–1.3)
MONOCYTES NFR BLD AUTO: 8 %
NEUTROPHILS # BLD AUTO: 6.4 10E3/UL (ref 1.6–8.3)
NEUTROPHILS NFR BLD AUTO: 67 %
NRBC # BLD AUTO: 0 10E3/UL
NRBC BLD AUTO-RTO: 0 /100
PHOSPHATE SERPL-MCNC: 3.5 MG/DL (ref 2.5–4.5)
PLATELET # BLD AUTO: 539 10E3/UL (ref 150–450)
POTASSIUM SERPL-SCNC: 4.8 MMOL/L (ref 3.4–5.3)
PROT SERPL-MCNC: 8.4 G/DL (ref 6.4–8.3)
RBC # BLD AUTO: 4.8 10E6/UL (ref 4.4–5.9)
SODIUM SERPL-SCNC: 135 MMOL/L (ref 136–145)
WBC # BLD AUTO: 9.7 10E3/UL (ref 4–11)

## 2023-01-18 PROCEDURE — G0463 HOSPITAL OUTPT CLINIC VISIT: HCPCS

## 2023-01-18 PROCEDURE — 99205 OFFICE O/P NEW HI 60 MIN: CPT | Performed by: STUDENT IN AN ORGANIZED HEALTH CARE EDUCATION/TRAINING PROGRAM

## 2023-01-18 PROCEDURE — 36415 COLL VENOUS BLD VENIPUNCTURE: CPT | Performed by: STUDENT IN AN ORGANIZED HEALTH CARE EDUCATION/TRAINING PROGRAM

## 2023-01-18 PROCEDURE — 83036 HEMOGLOBIN GLYCOSYLATED A1C: CPT | Performed by: STUDENT IN AN ORGANIZED HEALTH CARE EDUCATION/TRAINING PROGRAM

## 2023-01-18 PROCEDURE — 80053 COMPREHEN METABOLIC PANEL: CPT | Performed by: STUDENT IN AN ORGANIZED HEALTH CARE EDUCATION/TRAINING PROGRAM

## 2023-01-18 PROCEDURE — 83735 ASSAY OF MAGNESIUM: CPT | Performed by: STUDENT IN AN ORGANIZED HEALTH CARE EDUCATION/TRAINING PROGRAM

## 2023-01-18 PROCEDURE — 85025 COMPLETE CBC W/AUTO DIFF WBC: CPT | Performed by: STUDENT IN AN ORGANIZED HEALTH CARE EDUCATION/TRAINING PROGRAM

## 2023-01-18 PROCEDURE — 80061 LIPID PANEL: CPT | Performed by: STUDENT IN AN ORGANIZED HEALTH CARE EDUCATION/TRAINING PROGRAM

## 2023-01-18 PROCEDURE — 84100 ASSAY OF PHOSPHORUS: CPT | Performed by: STUDENT IN AN ORGANIZED HEALTH CARE EDUCATION/TRAINING PROGRAM

## 2023-01-18 RX ORDER — OXYCODONE HYDROCHLORIDE 5 MG/1
5 TABLET ORAL EVERY 6 HOURS PRN
Qty: 6 TABLET | Refills: 0 | Status: SHIPPED | OUTPATIENT
Start: 2023-01-18 | End: 2023-01-21

## 2023-01-18 ASSESSMENT — PAIN SCALES - GENERAL: PAINLEVEL: NO PAIN (0)

## 2023-01-18 NOTE — NURSING NOTE
"Oncology Rooming Note    January 18, 2023 10:54 AM   Antonio Canseco is a 52 year old male who presents for:    Chief Complaint   Patient presents with     Oncology Clinic Visit     Sarcoma of tissue      Initial Vitals: /85 (BP Location: Right arm, Patient Position: Sitting, Cuff Size: Adult Large)   Pulse 98   Temp 98  F (36.7  C) (Oral)   Resp 16   Wt 139.3 kg (307 lb)   SpO2 98%   BMI 42.82 kg/m   Estimated body mass index is 42.82 kg/m  as calculated from the following:    Height as of 4/24/22: 1.803 m (5' 11\").    Weight as of this encounter: 139.3 kg (307 lb). Body surface area is 2.64 meters squared.  No Pain (0) Comment: Data Unavailable   No LMP for male patient.  Allergies reviewed: Yes  Medications reviewed: Yes    Medications: Medication refills not needed today.  Pharmacy name entered into [facility]: [facility] [place], [place] - [address]    Clinical concerns: None stated.        Dina Greenwood LPN January 18, 2023 10:54 AM              "

## 2023-01-18 NOTE — NURSING NOTE
Patient labs drawn in clinic via venipuncture. Patient tolerated well and was discharged. Specimen(s) sent to first floor lab for processing. See flowsheet for details.      Marti Smith CMA on 1/18/2023 at 12:40 PM     No

## 2023-01-18 NOTE — LETTER
1/18/2023         RE: Antonio Canseco  [address] [facility] [address]        Dear Colleague,    Thank you for referring your patient, Antonio Canseco, to the [facility]. Please see a copy of my visit note below.    [facility]    NEW PATIENT VISIT NOTE    PATIENT NAME: Antonio Canseco MRN # [id number]  DATE OF VISIT: January 17, 2023 YOB: 1970    REFERRING PROVIDER: Ari Nascimento PA-C  [facility] [place] [address]    CANCER TYPE: High grade sarcoma       CHIEF COMPLAIN   Thigh pain     HISTORY OF PRESENT ILLNESS   52 year old male with PMH of DM and recent Dx of large 27 cm mass in the posterior R thigh. He reports that he first noticed a node in the posterior thigh on 5/2022, we was evaluated at OSH where he got an Xray and he was told that this was sciatica and was started on physical therapy. Tumor continued to grow rapidly during this time. He underwent biopsy by Dr. Salgado.   He reports having pain and limit mobility. He has been taking daily meloxican with some relief, however he needs to be resting most of the time to avoid pain.      PAST ONCOLOGIC HISTORY   None     PAST MEDICAL HISTORY   DM - untreated   Fatty liver disease  Hydradenitis supporativa    PAST SURGICAL HISTORY   HS infected surgery 2018     FAMILY HISTORY   Father: lymphoma, grandfather lung cancer     ALLERGIES      Allergies   Allergen Reactions     Kiwi Itching          SOCIAL HISTORY     Social History     Social History Narrative     Not on file     , no alcohol, tobacco, 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       CURRENT OUTPATIENT MEDICATIONS     Current Outpatient Medications   Medication Sig Dispense Refill     albuterol (PROAIR HFA/PROVENTIL HFA/VENTOLIN HFA) 108 (90 Base) MCG/ACT inhaler Inhale 2 puffs into the lungs every 6 hours as needed for shortness of breath / dyspnea or wheezing (Patient not taking: Reported on  2022) 18 g 0     blood glucose (NO BRAND SPECIFIED) test strip accuchek guide- Use to test blood sugar 1 times daily or as directed. 100 strip 0     blood glucose monitoring (ACCU-CHEK FASTCLIX) lancets 1 each daily Accuchek guide 100 each 0     BREO ELLIPTA 100-25 MCG/INH inhaler INHALE 1 PUFF INTO THE LUNGS DAILY 1 each 6     DULoxetine (CYMBALTA) 60 MG capsule Take 1 capsule (60 mg) by mouth daily 90 capsule 1     gabapentin (NEURONTIN) 300 MG capsule Take 1 capsule (300 mg) by mouth 3 times daily Take 1 at bedtime X 7 days, then 1 twice daily X 7 days, then 1 tid 90 capsule 3     lisinopril (ZESTRIL) 20 MG tablet Take 1 tablet (20 mg) by mouth daily 90 tablet 1     meloxicam (MOBIC) 15 MG tablet Take 1 tablet (15 mg) by mouth daily 90 tablet 0     STATIN NOT PRESCRIBED (INTENTIONAL) Please choose reason not prescribed from choices below.            REVIEW OF SYSTEMS   As above in the HPI, o/w complete 12-point ROS was negative.     PHYSICAL EXAM   There were no vitals taken for this visit.    EGO  GEN: NAD  HEENT: PERRL, EOMI, no icterus, injection or pallor. Oropharynx is clear.  NECK: no cervical or supraclavicular lymphadenopathy  LUNGS: clear bilaterally  CV: regular, no murmurs, rubs, or gallops  ABDOMEN: soft, non-tender, non-distended, normal bowel sounds, no hepatosplenomegaly by percussion or palpation  EXT: warm, well perfused, no edema, R leg circumference of 29.5 in.   NEURO: alert  SKIN: no rashes     LABORATORY AND IMAGING STUDIES     Recent Labs   Lab Test 22  1245 21  1202   * 139   POTASSIUM 4.1 4.8   CHLORIDE 101 106   CO2 29 28   ANIONGAP 2* 5   BUN 11 13   CR 0.78 0.85   * 177*   DORIAN 8.8 8.9     Recent Labs   Lab Test 22  1245 19  1745 18  0823   WBC 6.4 8.6 7.3   HGB 14.8 14.2 15.0    237 249   MCV 91 90 91   NEUTROPHIL 66  --   --      Recent Labs   Lab Test 22  1245 21  1202 21  1622   BILITOTAL 1.7* 0.8 0.7    ALKPHOS 44 47 43   ALT 43 50 43   AST 21 22 23   ALBUMIN 3.4 3.5 3.7     TSH   Date Value Ref Range Status   12/28/2021 2.11 0.40 - 4.00 mU/L Final   10/07/2019 3.07 0.40 - 4.00 mU/L Final       Results for orders placed or performed during the hospital encounter of 01/06/23   MR Femur Thigh Right wo & w Contrast     Value    Radiologist flags Large thigh mass    Narrative    EXAMINATION: MR FEMUR THIGH RIGHT W/O & W CONTRAST  1/6/2023  9:07 AM     CLINICAL HISTORY:  Hamstring strain, right, subsequent encounter     COMPARISON:    TECHNIQUE: Multiplanar multi-sequence MR imaging was obtained using  standard sequences in three orthogonal planes the administration of  intravenous or intra-articular gadolinium contrast agent.    FINDINGS:   Right femur:    There is a very large soft tissue mass measuring 27 x 7.4 x 12 cm in  maximal caudocranial, AP and transverse dimensions (series 20, image  21 and series 38, image 32), respectively.    Mass is originating just distal to the conjoined hamstring tendon and  the mass mostly occupies the space of the hamstring musculature.  Proximally the mass is located medial to the gluteus maximums muscle  belly and distally occupies partially the space of the biceps femoris  and the semimembranosus muscle bellies and entirely the  semitendinosus. The semitendinosus muscle belly is also involved the  furthest distally. Proximally the lesion protrudes anteriorly and  appears to invade the obturator externus muscle (series 38, image 27)    The large lesion reveals highly heterogeneous signal with mostly T2  hyperintense areas, mildly T1 hyperintense areas and septations  throughout. Following the administration of intravenous gadolinium  contrast, there is a central necrotic area occupying an area of about  7 x 5 x 14 cm in transverse, AP and CC dimensions.    At the level of the midshaft of the femur, there is very close  proximity and adherence to multiple prominent vessels (series  38,  image 49).    The sciatic nerve travels alongside the anterior margins of the lesion  over a distance of about 20 cm. However, there is a preserved fat  plane between the sciatic nerve and the tibial border of the mass.    Osseous structures:    The bilateral femora appear normal, this red marrow conversion. No  distinct osseous lesions are identified..    Hip joint: The hip joints are not well assessed at the edge of the  field-of-view. However, no significant abnormalities are identified.    Knee joint: The knee joint is not well assessed.    The left femur appears unremarkable. Including musculatures.      Impression    IMPRESSION:   1. Large soft tissue mass in the posterior compartment of the right  thigh measuring 27 x 7.4 x 12 cm in maximal caudocranial, AP and  transverse dimensions, respectively. The mass originates just distal  to the conjoined hamstring tendon, infiltrates the hamstring muscle  bellies and extends distally within the semitendinosus muscle belly  proximal to the knee joint. However, the semitendinosus tendon sheaths  appears to have increased signal to the level of the knee joint. A  dedicated knee MRI could be useful to identify at the most distal  extent of the lesion.  2. The lesion is heterogeneous, septated, with myxoid and lipoid  components, vividly enhancing with a large central area of necrosis  which measures 7 x 5 x 14 cm. Imaging findings are highly suggestive  of a malignant mass from the spectrum of myxoid liposarcomatous type.  Further evaluation at the [facility] Orthopedic oncology  is recommended.  3. The lesion occupies the posterior muscle compartment of the thigh  just posterior to the sciatic nerve with a preserved fat plane in  between.  4. At the level of the mid shaft of the femur there is very close  proximity and adherence to multiple vessels, that entered the mass,  best seen on series 38 image 49.    Mass is originating just distal to the  conjoined hamstring tendon and  the mass mostly occupies the space of the hamstring musculature.  Proximally the mass is located medial to the gluteus maximums muscle  belly and distally occupies partially the space of the biceps femoris  and the semimembranosus muscle bellies and entirely the  semitendinosus. The semitendinosus muscle belly is also involved the  furthest distally. Proximally the lesion protrudes anteriorly and  appears to invade the obturator externus muscle (series 38, image 27).    [Consider Follow Up: Large thigh mass     This report will be copied to the [facility] to ensure a  provider acknowledges the finding. Access Center is available Monday  through Friday 8am-3:30 pm.    JUTTA ELLERMANN, MD         SYSTEM ID:  [id number]     1/6/2023 MRI right femur thigh: Large soft tissue mass in the posterior compartment of the right thigh measuring 27 x 7.4 x 12 cm.  The mass lies primarily in the hamstring muscle belly.  Heterogeneous signal with areas of necrosis noted     PATHOLOGY         Final Diagnosis   A.  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   Electronically signed by Vineet Coello MD on 1/17/2023 at 10:17 AM   Comment   UUMAYO   The neoplasm is comprised of highly pleomorphic spindle cells with stromal hyalinization and extensive areas of necrosis.  Focal areas with scattered lipoblasts are seen.  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  MDM-2 gene amplification study by FISH is in progress and result will be provided separately.       I reviewed the medical records including medical records, laboratory studies, and have personally reviewed imaging including MRI of the R thigh which demonstrates a large 27 cm mass in the posterior compartment of the R thigh.      ASSESSMENT AND PLAN     Mr. Canseco is  a 51 yo male with PMH of untreated DM, obesity, fatty liver and recent Dx of large 27 cm high grade sarcoma in the posterior R thigh.     I discussed with the patient that sarcomas are rare tumors only representing 1% of all cancers and that they are very heterogeneous with over 170 different subtypes. When they present with localized disease they main approach to therapy is surgical resection. However, there are factors that influence the chances of recurrence and development of distant metastasis. In general tumors that are big in size (over 5 cm) and high grade (rapid cell division observed under the microscope) have higher probability of recurrence.   Given large size of the tumor, I recommend neoadjuvant chemotherapy with the goal of decreasing risk of metastatic disease and decrease tumor size to facilitate local control with surgery and radiation therapy.   I explained that chemotherapy in sarcomas is effective in about 4 out of 10 patients with sarcoma, however tumors that are high grade as in his case have better chances of response. The plan will be for 1-2 cycles of chemotherapy with doxil and ifosfamide and depending on response and tolerability we would consider completing a total of 4 cycles prior to surgery. If no adequate response, then he should proceed with local control with radiation and surgery. Case was reviewed on 1/19/23 on sarcoma tumor board and this plan in on agreement with the recommendations from the board.   I stressed the importance of diabetes control in order to decrease toxicities associated with chemotherapy.     Patient and wife agreed with this plan:    -STAT CT CAP (patient unable to get PET due to uncontrolled DM)  -Plan for tunneled cath placement, 2D ECHO   -Plan for C1 of chemo with Doxil/Ifosfamide on 1/26   -He has a lesion from HS in the R groin area, advised for topical antibiotic cream.   -After chemotherapy he should be on prophylaxis with levaquin and augmentin due  to his history of HS with an active lesion.   -Plan for labs 3xweek after chemo  -Prescribed PRN oxycodone 5 mg for pain.     60 minutes spent on the date of the encounter doing chart review, review of test results, interpretation of tests, patient visit, documentation and discussion with other provider(s)     Michael Laboy MD   of Medicine  Hematology, Oncology and Transplantation

## 2023-01-20 ENCOUNTER — TELEPHONE (OUTPATIENT)
Dept: FAMILY MEDICINE | Facility: CLINIC | Age: 53
End: 2023-01-20
Payer: COMMERCIAL

## 2023-01-20 ENCOUNTER — PATIENT OUTREACH (OUTPATIENT)
Dept: ONCOLOGY | Facility: CLINIC | Age: 53
End: 2023-01-20

## 2023-01-20 DIAGNOSIS — C49.9 SARCOMA (H): Primary | ICD-10-CM

## 2023-01-20 NOTE — TELEPHONE ENCOUNTER
Reason for Call:  Same Day Appointment, Requested Provider:  Marlon STEPHEN    PCP: Mynor Mason    Reason for visit: diabetes check    Duration of symptoms: n/a    Have you been treated for this in the past? No    Additional comments: needs sooner than March    Can we leave a detailed message on this number? YES    Phone number patient can be reached at: Cell number on file:    Telephone Information:   Mobile [phone]       Best Time: anytime    Call taken on 1/20/2023 at 2:48 PM by Bela Mancini

## 2023-01-22 NOTE — PROGRESS NOTES
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    Patient reports that he noticed pain in the back of his right thigh at the start of last .  At the time the patient thought he pulled a muscle and sought care at a local urgent care.  Workup at the time included an xray and the thought was that the pain was secondary to a herniated vertebral disc and/or sciatica.  The patient then worked with PT for a few months.  After no improvement in his pain, the patient sought out his PCPin 2022 who ultimately worked up the with patient with an MRI in January.    Prior to all of this,the patient lost approximately 45 pounds since the last spring.  The patient also reported night sweat which occurred this past summer and . However the patient's wife, Yari, confirmed the patient is typically a hot sleeper.   His was originally 345-350 pounds and is currently down to 305 pounds.  Per Yari, the patient actually lost roughly 40 pounds over the course of a month.      2023, MRI right thigh, MRI L-spine: Large soft tissue mass in the posterior component of the right thigh measuring 27 x 7.4 x 12 cm.  Mass lies primarily in the hamstring muscle belly.          1/10/2023, telephone encounter: The patient was referred to orthopedic oncology regarding the malignant neoplasm of the soft tissue in the right lower extremity    1/10/2023, virtual visit with Dr. Salgado: At the initial appointment, the patient reported that he initially thought he had pulled his right hamstring in May 2022.  He  followed this up with an urgent [facility] evaluation during which an x-ray was undertaken and showed to be negative.  The patient was told that it was just sciatica and was referred to physical therapy.  The tumor continued to grow rapidly during this time.  Over time his pain progressively worsened during which he contacted his PCP for further evaluation.  Further work-up included a lumbar scan and a right thigh MRI that identified a large mass in the posterior compartment of the right thigh most consistent with a soft tissue sarcoma.     1/12/2023: Ultrasound-guided core needle biopsy of the mass    1/17/2023, surgical pathology: High-grade sarcoma with extensive necrosis and hyalinization.  Specifically, the neoplasm is comprised of highly pleomorphic spindle cells with stromal hyalinization with extensive areas of necrosis.  There were focal areas with scattered lipoblasts.  MDM2 immunohistochemistry is negative.  Currently and MDM2 gene amplification via FISH is in progress, results pending.    1/18/2023, appointment with Dr. Price: Dr. Price recommended 4 cycles of neoadjuvant chemotherapy (Doxil/ifosfamide) to decrease tumor size to facilitate local control with surgery and radiation.  Further recommendations include a stat CT chest abdomen and pelvis.  Of note the patient was unable to get up PET scan due to uncontrolled diabetes.  Additionally the patient was noted to have a herpes simplex lesion in the right groin area and was advised to use topical antibiotic cream.  After chemotherapy the patient was recommended to be on prophylaxis with Levaquin and Augmentin due to his history of herpes simplex with an active lesion.  Additionally the patient was prescribed oxycodone 5 mg as needed for pain.    1/19/2023, sarcoma tumor board: Consensus was in agreement with Dr. Price's initial recommendation    1/26/2023, course 1 of Doxil/ifosfamide    1/26/2023, CT chest abdomen pelvis with contrast: Results  pending    On interview, the patient reports that his pain ranges from 3-8/10.  He treats the pain with gabapentin and meloxicam.  Describes the pain as dull and achey.  The pain radiates from his hip to his knee.     PMH:  Past Medical History:   Diagnosis Date     Acrochordon 02/11/2021     CARDIOVASCULAR SCREENING; LDL GOAL LESS THAN 160 03/27/2012     Colon adenoma      Controlled type 2 diabetes mellitus without complication, without long-term current use of insulin (H) 09/16/2019    X 1     Cough 02/04/2014     Degeneration of thoracic intervertebral disc 12/11/2013     Dysfunction of both eustachian tubes 04/12/2019     Elevated blood pressure 12/22/2014     Elevated hemoglobin A1c 09/16/2019    X 1     Gastroesophageal reflux disease      Hamstring strain, right, subsequent encounter 12/19/2022     Hepatic cyst 05/22/2018     Hepatic steatosis 12/11/2013     History of colonic polyps 12/11/2013     History of drainage of abscess 09/16/2019     Hypertension      Irritable bowel syndrome without diarrhea 06/01/2018     Low libido 04/12/2019     Lumbar radiculopathy 12/19/2022     Microscopic hematuria 12/19/2013     Nephrolithiasis      Obesity 03/27/2012     BRIAN on CPAP 03/27/2012     Other irritable bowel syndrome 05/22/2018     Pain in thoracic spine 05/09/2013     Pulmonary nodule 05/22/2018     Right-sided chest pain 04/13/2018     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     Sleep apnea      Tinea pedis of both feet 02/11/2021     Tinnitus, unspecified laterality 04/12/2019     Uncontrolled diabetes mellitus with hyperglycemia, without long-term current use of insulin (H) 01/23/2023     PSH:  Past Surgical History:   Procedure Laterality Date     COLONOSCOPY       COLONOSCOPY N/A 03/12/2021    Procedure: COLONOSCOPY (fv);  Surgeon: Ean Alcantara MD;  Location:  OR     CYSTOSCOPY  02/11/2014    Procedure: CYSTOSCOPY;   Video Cystoscopy with Exam Under Anesthesia;  Surgeon: Chente Moeller MD;  Location:  OR      LEG SURGERY Left 2019    Suregy r/t infection     wisdom teeth       FAMILY HISTORY:  Family History   Problem Relation Age of Onset     Family History Negative Mother      Obesity Mother      Cancer Father         lymphoma     Other Cancer Father      Other Cancer Paternal Grandfather      MEDICATIONS:   Current Outpatient Medications   Medication     omeprazole 20 MG tablet     blood glucose (NO BRAND SPECIFIED) test strip     blood glucose monitoring (ACCU-CHEK FASTCLIX) lancets     DULoxetine (CYMBALTA) 60 MG capsule     gabapentin (NEURONTIN) 300 MG capsule     lisinopril (ZESTRIL) 20 MG tablet     meloxicam (MOBIC) 15 MG tablet     metFORMIN (GLUCOPHAGE) 500 MG tablet     No current facility-administered medications for this visit.     ALLERGY:       Allergies   Allergen Reactions     Kiwi Itching     SH:   Social History     Tobacco Use     Smoking status: Former     Packs/day: 1.00     Years: 25.00     Pack years: 25.00     Types: Cigarettes     Start date: 1988     Quit date: 2013     Years since quittin.7     Smokeless tobacco: Never     Tobacco comments:     1 sd/day   Vaping Use     Vaping Use: Never used   Substance Use Topics     Alcohol use: Yes     Comment: one drink every 6 months     Drug use: No     ROS: The review of system was done by the nurse on this visit. The information on EPIC records were reviewed from today.  PE: Vitals: /75   Pulse 117   Wt 137.9 kg (304 lb)   SpO2 97%   BMI 42.40 kg/m    BMI= Body mass index is 42.4 kg/m .  GENERAL: alert and cooperative, and appears to be in no acute distress.   HEENT: normocephalic, EOMI, no nasal discharge/epistaxis.  LYMPH: no supraclavicular LAD  Lungs: Breathing room air in comfort.  CARD: Extremities are warm and well perfused.  SKIN: Exposed skin normal in color, texture and turgor with no lesions or eruptions.  NEURO: CN II-XII intact. Strength and sensation symmetric and intact throughout. Gait normal.  PSYCH:  "appropriate behavior, mood, and affect  MSK: firm, mobile palpable nodule approximately 5 cm in diameter on the posterior thigh    LAB:   CBC RESULTS:   Recent Labs   Lab Test 01/18/23  1236   WBC 9.7   RBC 4.80   HGB 11.1*   HCT 36.9*   MCV 77*   MCH 23.1*   MCHC 30.1*   RDW 16.9*   *     Recent Labs   Lab Test 01/18/23  1236 04/24/22  1245   * 132*   POTASSIUM 4.8 4.1   CHLORIDE 98 101   CO2 27 29   ANIONGAP 10 2*   * 143*   BUN 13.2 11   CR 0.68 0.78   DORIAN 9.6 8.8     IMAGING:  MR FEMUR THIGH RIGHT W/O & W CONTRAST  1/6/2023  IMPRESSION:   1. Large soft tissue mass in the posterior compartment of the right  thigh measuring 27 x 7.4 x 12 cm in maximal caudocranial, AP and  transverse dimensions, respectively. The mass originates just distal  to the conjoined hamstring tendon, infiltrates the hamstring muscle  bellies and extends distally within the semitendinosus muscle belly  proximal to the knee joint. However, the semitendinosus tendon sheaths  appears to have increased signal to the level of the knee joint. A  dedicated knee MRI could be useful to identify at the most distal  extent of the lesion.  2. The lesion is heterogeneous, septated, with myxoid and lipoid  components, vividly enhancing with a large central area of necrosis  which measures 7 x 5 x 14 cm. Imaging findings are highly suggestive  of a malignant mass from the spectrum of myxoid liposarcomatous type.  Further evaluation at the [facility] Orthopedic oncology  is recommended.+98  3. The lesion occupies the posterior muscle compartment of the thigh  just posterior to the sciatic nerve with a preserved fat plane in  between.  4. At the level of the mid shaft of the femur there is very close  proximity and adherence to multiple vessels, that entered the mass,  best seen on series 38 image 49.        \"extends distally within the semitendinosus muscle belly proximal to the knee joint. However, the semitendinosus " "tendon sheaths appears to have increased signal to the level of the knee joint.\"     IMPRESSION:  Antonio Canseco is a 52-year-old male with a past medical history of uncontrolled diabetes, and hydratinits supporotiva, who presents for adjuvant radiotherapy for a biopsy-confirmed 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    RECOMMENDATIONS:   Given the location of this tumor (right lower extremity), we recommend preoperative neoadjuvant radiotherapy consisting of 50 Gray in 25 fractions, as recommended in the Lancet 2002 study (O'Castillo et al). This will occur after the patient concludes his chemotherapy with medical oncology. The first step, however, will be to obtain an updated CT CAP to rule out metastatic disease.  Afterwards, presuming no metastatic disease sites,  the plan will be for the patient to return to clinic for a CT Simulation.  At the CT simulation the plan will be to scan down to the patient's right knee, given the above radiographic findings.  The risks and benefits of radiotherapy, long term and acute were explained to the patient.  The process of radiotherapy was explained to the patient.  The patient appeared to understand all components to his proposed radiotherapy.  All questions were answered.  Informed consent was obtained.    The patient was seen and discussed with my attending, Dr. Valle.    José Luis Moser MD MS  Resident, PGY-2  Department of Radiation Oncology  [facility]  Pager: [phone]    "

## 2023-01-23 ENCOUNTER — MYC MEDICAL ADVICE (OUTPATIENT)
Dept: INTERVENTIONAL RADIOLOGY/VASCULAR | Facility: CLINIC | Age: 53
End: 2023-01-23

## 2023-01-23 ENCOUNTER — OFFICE VISIT (OUTPATIENT)
Dept: FAMILY MEDICINE | Facility: CLINIC | Age: 53
End: 2023-01-23
Payer: COMMERCIAL

## 2023-01-23 VITALS
RESPIRATION RATE: 20 BRPM | HEIGHT: 71 IN | HEART RATE: 116 BPM | SYSTOLIC BLOOD PRESSURE: 130 MMHG | OXYGEN SATURATION: 97 % | BODY MASS INDEX: 42.56 KG/M2 | DIASTOLIC BLOOD PRESSURE: 85 MMHG | TEMPERATURE: 97.4 F | WEIGHT: 304 LBS

## 2023-01-23 DIAGNOSIS — E11.65 UNCONTROLLED TYPE 2 DIABETES MELLITUS WITH HYPERGLYCEMIA, WITHOUT LONG-TERM CURRENT USE OF INSULIN (H): ICD-10-CM

## 2023-01-23 DIAGNOSIS — E66.01 MORBID OBESITY (H): ICD-10-CM

## 2023-01-23 DIAGNOSIS — I10 ESSENTIAL HYPERTENSION: ICD-10-CM

## 2023-01-23 DIAGNOSIS — K58.9 IRRITABLE BOWEL SYNDROME WITHOUT DIARRHEA: ICD-10-CM

## 2023-01-23 DIAGNOSIS — Z23 ENCOUNTER FOR IMMUNIZATION: ICD-10-CM

## 2023-01-23 DIAGNOSIS — E11.9 DIABETES MELLITUS WITHOUT COMPLICATION (H): Primary | ICD-10-CM

## 2023-01-23 DIAGNOSIS — E11.65 UNCONTROLLED DIABETES MELLITUS WITH HYPERGLYCEMIA, WITHOUT LONG-TERM CURRENT USE OF INSULIN (H): ICD-10-CM

## 2023-01-23 DIAGNOSIS — C49.9 SARCOMA OF SOFT TISSUE (H): Primary | ICD-10-CM

## 2023-01-23 PROBLEM — S76.311D HAMSTRING STRAIN, RIGHT, SUBSEQUENT ENCOUNTER: Status: RESOLVED | Noted: 2022-12-19 | Resolved: 2023-01-23

## 2023-01-23 PROBLEM — C49.21: Status: RESOLVED | Noted: 2023-01-09 | Resolved: 2023-01-23

## 2023-01-23 PROBLEM — M54.16 LUMBAR RADICULOPATHY: Status: RESOLVED | Noted: 2022-12-19 | Resolved: 2023-01-23

## 2023-01-23 LAB
CHOLEST SERPL-MCNC: 133 MG/DL
HDLC SERPL-MCNC: 38 MG/DL
LDLC SERPL CALC-MCNC: 74 MG/DL
NONHDLC SERPL-MCNC: 95 MG/DL
TRIGL SERPL-MCNC: 103 MG/DL

## 2023-01-23 PROCEDURE — 90472 IMMUNIZATION ADMIN EACH ADD: CPT | Performed by: FAMILY MEDICINE

## 2023-01-23 PROCEDURE — 90471 IMMUNIZATION ADMIN: CPT | Performed by: FAMILY MEDICINE

## 2023-01-23 PROCEDURE — 90750 HZV VACC RECOMBINANT IM: CPT | Performed by: FAMILY MEDICINE

## 2023-01-23 PROCEDURE — 91313 COVID-19 VACCINE BIVALENT BOOSTER 18+ (MODERNA): CPT | Performed by: FAMILY MEDICINE

## 2023-01-23 PROCEDURE — 0134A COVID-19 VACCINE BIVALENT BOOSTER 18+ (MODERNA): CPT | Performed by: FAMILY MEDICINE

## 2023-01-23 PROCEDURE — 99214 OFFICE O/P EST MOD 30 MIN: CPT | Mod: 25 | Performed by: FAMILY MEDICINE

## 2023-01-23 PROCEDURE — 90682 RIV4 VACC RECOMBINANT DNA IM: CPT | Performed by: FAMILY MEDICINE

## 2023-01-23 RX ORDER — DULOXETIN HYDROCHLORIDE 60 MG/1
60 CAPSULE, DELAYED RELEASE ORAL DAILY
Qty: 90 CAPSULE | Refills: 1 | Status: SHIPPED | OUTPATIENT
Start: 2023-01-23 | End: 2023-03-13

## 2023-01-23 RX ORDER — LISINOPRIL 20 MG/1
20 TABLET ORAL DAILY
Qty: 90 TABLET | Refills: 1 | Status: ON HOLD | OUTPATIENT
Start: 2023-01-23 | End: 2023-02-05

## 2023-01-23 NOTE — PROGRESS NOTES
/  Assessment & Plan   Problem List Items Addressed This Visit     RESOLVED: Controlled type 2 diabetes mellitus without complication, without long-term current use of insulin (H)    Encounter for immunization     Discussed rationale, offered         Relevant Orders    ZOSTER VACCINE RECOMBINANT ADJUVANTED (SHINGRIX) (Completed)    INFLUENZA VACCINE 50-64 OR 18-64 W/EGG ALLERGY (FLUBLOK) (Completed)    COVID-19,PF,MODERNA BIVALENT (18+YRS) (Completed)    Essential hypertension     Controlled.  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  Oncology notes reviewed         Uncontrolled diabetes mellitus with hyperglycemia, without long-term current use of insulin (H)     Diet controlled at remote measure, now out of control.  Metformin has been started by oncology.  PET scan was delayed because of elevated A1c.  Refer to diabetic education for rapid glucose control so as to not delay his chemo.                          Return for immunizations, go.   Follow-up Visit   Expected date:  Mar 23, 2023 (Approximate)      Follow Up Appointment Details:     Follow-up with whom?: Me    Follow-Up for what?: Chronic Disease f/u    Chronic Disease f/u: Diabetes    How?: In Person    Is this an as-needed follow-up?: No                    Mynor Mason MD  [facility]    Joyce Alvarez is a 52 year old, presenting for the following health issues:    History of Present Illness       Diabetes:   He presents for follow up of diabetes.  He is checking home blood glucose two times daily. He checks blood glucose before and after meals.  Blood  "glucose is sometimes over 200 and never under 70. He is aware of hypoglycemia symptoms including shakiness. He is concerned about blood sugar frequently over 200.  He is having weight loss. The patient has not had a diabetic eye exam in the last 12 months.         He eats 0-1 servings of fruits and vegetables daily.He consumes 0 sweetened beverage(s) daily.He exercises with enough effort to increase his heart rate 9 or less minutes per day.  He exercises with enough effort to increase his heart rate 3 or less days per week. He is missing 1 dose(s) of medications per week.  He is not taking prescribed medications regularly due to remembering to take.           Review of Systems   He is maintaining his equilibrium      Objective    /85 (BP Location: Right arm, Patient Position: Sitting, Cuff Size: Adult Large)   Pulse 116   Temp 97.4  F (36.3  C) (Temporal)   Resp 20   Ht 1.803 m (5' 11\")   Wt 137.9 kg (304 lb)   SpO2 97%   BMI 42.40 kg/m    Body mass index is 42.4 kg/m .  Physical Exam   No distress        Mynor Mason MD              "

## 2023-01-24 ENCOUNTER — OFFICE VISIT (OUTPATIENT)
Dept: RADIATION ONCOLOGY | Facility: CLINIC | Age: 53
End: 2023-01-24
Attending: PHYSICIAN ASSISTANT
Payer: COMMERCIAL

## 2023-01-24 ENCOUNTER — TELEPHONE (OUTPATIENT)
Dept: FAMILY MEDICINE | Facility: CLINIC | Age: 53
End: 2023-01-24
Payer: COMMERCIAL

## 2023-01-24 VITALS
DIASTOLIC BLOOD PRESSURE: 75 MMHG | WEIGHT: 304 LBS | SYSTOLIC BLOOD PRESSURE: 128 MMHG | BODY MASS INDEX: 42.4 KG/M2 | OXYGEN SATURATION: 97 % | HEART RATE: 117 BPM

## 2023-01-24 DIAGNOSIS — C49.9 SARCOMA OF SOFT TISSUE (H): ICD-10-CM

## 2023-01-24 PROCEDURE — G0463 HOSPITAL OUTPT CLINIC VISIT: HCPCS | Performed by: RADIOLOGY

## 2023-01-24 PROCEDURE — 99205 OFFICE O/P NEW HI 60 MIN: CPT | Mod: GC | Performed by: RADIOLOGY

## 2023-01-24 RX ORDER — PROCHLORPERAZINE MALEATE 10 MG
10 TABLET ORAL EVERY 6 HOURS PRN
Status: CANCELLED
Start: 2023-01-30

## 2023-01-24 RX ORDER — LORAZEPAM 2 MG/ML
.5-1 INJECTION INTRAMUSCULAR EVERY 6 HOURS PRN
Status: CANCELLED | OUTPATIENT
Start: 2023-01-30

## 2023-01-24 RX ORDER — MEPERIDINE HYDROCHLORIDE 25 MG/ML
25 INJECTION INTRAMUSCULAR; INTRAVENOUS; SUBCUTANEOUS EVERY 30 MIN PRN
Status: CANCELLED | OUTPATIENT
Start: 2023-01-30

## 2023-01-24 RX ORDER — NICOTINE POLACRILEX 4 MG/1
20 GUM, CHEWING ORAL DAILY PRN
Status: ON HOLD | COMMUNITY
End: 2023-05-22

## 2023-01-24 RX ORDER — DIPHENHYDRAMINE HYDROCHLORIDE 50 MG/ML
50 INJECTION INTRAMUSCULAR; INTRAVENOUS
Status: CANCELLED
Start: 2023-01-30

## 2023-01-24 RX ORDER — DEXTROSE MONOHYDRATE 50 MG/ML
10-20 INJECTION, SOLUTION INTRAVENOUS
Status: CANCELLED | OUTPATIENT
Start: 2023-02-06

## 2023-01-24 RX ORDER — DEXAMETHASONE SODIUM PHOSPHATE 10 MG/ML
4 INJECTION, SOLUTION INTRAMUSCULAR; INTRAVENOUS ONCE
Status: CANCELLED
Start: 2023-01-30

## 2023-01-24 RX ORDER — EPINEPHRINE 1 MG/ML
0.3 INJECTION, SOLUTION INTRAMUSCULAR; SUBCUTANEOUS EVERY 5 MIN PRN
Status: CANCELLED | OUTPATIENT
Start: 2023-01-30

## 2023-01-24 RX ORDER — METHYLPREDNISOLONE SODIUM SUCCINATE 125 MG/2ML
125 INJECTION, POWDER, LYOPHILIZED, FOR SOLUTION INTRAMUSCULAR; INTRAVENOUS
Status: CANCELLED
Start: 2023-01-30

## 2023-01-24 RX ORDER — LORAZEPAM 0.5 MG/1
.5-1 TABLET ORAL EVERY 6 HOURS PRN
Status: CANCELLED
Start: 2023-01-30

## 2023-01-24 RX ORDER — ONDANSETRON 8 MG/1
8 TABLET, FILM COATED ORAL EVERY 8 HOURS
Status: CANCELLED
Start: 2023-01-30

## 2023-01-24 RX ORDER — ALBUTEROL SULFATE 0.83 MG/ML
2.5 SOLUTION RESPIRATORY (INHALATION)
Status: CANCELLED | OUTPATIENT
Start: 2023-01-30

## 2023-01-24 RX ORDER — ALBUTEROL SULFATE 90 UG/1
1-2 AEROSOL, METERED RESPIRATORY (INHALATION)
Status: CANCELLED
Start: 2023-01-30

## 2023-01-24 ASSESSMENT — ENCOUNTER SYMPTOMS
NERVOUS/ANXIOUS: 1
HEMATURIA: 0
INSOMNIA: 0
NAUSEA: 0
SEIZURES: 0
CONSTIPATION: 0
BLOOD IN STOOL: 0
FREQUENCY: 1
BACK PAIN: 0
SORE THROAT: 0
BLURRED VISION: 0
VOMITING: 0
NECK PAIN: 0
DOUBLE VISION: 0
CHILLS: 1
EYE PAIN: 0
SHORTNESS OF BREATH: 0
DIZZINESS: 0
HEADACHES: 0
WEIGHT LOSS: 1
FEVER: 1
TINGLING: 0
COUGH: 0
BRUISES/BLEEDS EASILY: 0
DIAPHORESIS: 1
DIARRHEA: 1
FALLS: 0
DEPRESSION: 0

## 2023-01-24 NOTE — LETTER
2023         RE: Antonio Canseco  [address] [facility] [address]        Dear Colleague,    Thank you for referring your patient, Antonio Canseco, to the [facility]. Please see a copy of my visit note below.    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    Patient reports that he noticed pain in the back of his right thigh at the start of last .  At the time the patient thought he pulled a muscle and sought care at a local urgent care.  Workup at the time included an xray and the thought was that the pain was secondary to a herniated vertebral disc and/or sciatica.  The patient then worked with PT for a few months.  After no improvement in his pain, the patient sought out his PCPin 2022 who ultimately worked up the with patient with an MRI in January.    Prior to all of this,the patient lost approximately 45 pounds since the last spring.  The patient also reported night sweat which occurred this past summer and . However the patient's wife, Yari, confirmed the patient is typically a hot sleeper.   His was originally 345-350 pounds and is currently down to 305 pounds.  Per Yari, the patient actually lost roughly 40 pounds over the course of a month.      2023, MRI right thigh, MRI L-spine: Large soft tissue mass in the posterior component of the right thigh measuring 27 x 7.4 x 12 cm.  Mass lies primarily in the hamstring muscle belly.          1/10/2023, telephone encounter: The patient was referred to orthopedic  oncology regarding the malignant neoplasm of the soft tissue in the right lower extremity    1/10/2023, virtual visit with Dr. Salgado: At the initial appointment, the patient reported that he initially thought he had pulled his right hamstring in May 2022.  He followed this up with an urgent [facility] evaluation during which an x-ray was undertaken and showed to be negative.  The patient was told that it was just sciatica and was referred to physical therapy.  The tumor continued to grow rapidly during this time.  Over time his pain progressively worsened during which he contacted his PCP for further evaluation.  Further work-up included a lumbar scan and a right thigh MRI that identified a large mass in the posterior compartment of the right thigh most consistent with a soft tissue sarcoma.     1/12/2023: Ultrasound-guided core needle biopsy of the mass    1/17/2023, surgical pathology: High-grade sarcoma with extensive necrosis and hyalinization.  Specifically, the neoplasm is comprised of highly pleomorphic spindle cells with stromal hyalinization with extensive areas of necrosis.  There were focal areas with scattered lipoblasts.  MDM2 immunohistochemistry is negative.  Currently and MDM2 gene amplification via FISH is in progress, results pending.    1/18/2023, appointment with Dr. Price: Dr. Price recommended 4 cycles of neoadjuvant chemotherapy (Doxil/ifosfamide) to decrease tumor size to facilitate local control with surgery and radiation.  Further recommendations include a stat CT chest abdomen and pelvis.  Of note the patient was unable to get up PET scan due to uncontrolled diabetes.  Additionally the patient was noted to have a herpes simplex lesion in the right groin area and was advised to use topical antibiotic cream.  After chemotherapy the patient was recommended to be on prophylaxis with Levaquin and Augmentin due to his history of herpes simplex with an active lesion.  Additionally the patient was  prescribed oxycodone 5 mg as needed for pain.    1/19/2023, sarcoma tumor board: Consensus was in agreement with Dr. Price's initial recommendation    1/26/2023, course 1 of Doxil/ifosfamide    1/26/2023, CT chest abdomen pelvis with contrast: Results pending    On interview, the patient reports that his pain ranges from 3-8/10.  He treats the pain with gabapentin and meloxicam.  Describes the pain as dull and achey.  The pain radiates from his hip to his knee.     PMH:  Past Medical History:   Diagnosis Date     Acrochordon 02/11/2021     CARDIOVASCULAR SCREENING; LDL GOAL LESS THAN 160 03/27/2012     Colon adenoma      Controlled type 2 diabetes mellitus without complication, without long-term current use of insulin (H) 09/16/2019    X 1     Cough 02/04/2014     Degeneration of thoracic intervertebral disc 12/11/2013     Dysfunction of both eustachian tubes 04/12/2019     Elevated blood pressure 12/22/2014     Elevated hemoglobin A1c 09/16/2019    X 1     Gastroesophageal reflux disease      Hamstring strain, right, subsequent encounter 12/19/2022     Hepatic cyst 05/22/2018     Hepatic steatosis 12/11/2013     History of colonic polyps 12/11/2013     History of drainage of abscess 09/16/2019     Hypertension      Irritable bowel syndrome without diarrhea 06/01/2018     Low libido 04/12/2019     Lumbar radiculopathy 12/19/2022     Microscopic hematuria 12/19/2013     Nephrolithiasis      Obesity 03/27/2012     BRIAN on CPAP 03/27/2012     Other irritable bowel syndrome 05/22/2018     Pain in thoracic spine 05/09/2013     Pulmonary nodule 05/22/2018     Right-sided chest pain 04/13/2018     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     Sleep apnea      Tinea pedis of both feet 02/11/2021     Tinnitus, unspecified laterality 04/12/2019     Uncontrolled diabetes mellitus with hyperglycemia, without long-term current use of insulin (H) 01/23/2023     PSH:  Past Surgical History:   Procedure Laterality Date     COLONOSCOPY       COLONOSCOPY  N/A 2021    Procedure: COLONOSCOPY (fv);  Surgeon: Ean Alcantara MD;  Location: [facility] OR     CYSTOSCOPY  2014    Procedure: CYSTOSCOPY;   Video Cystoscopy with Exam Under Anesthesia;  Surgeon: Chente Moeller MD;  Location: [facility] OR     LEG SURGERY Left 2019    Suregy r/t infection     wisdom teeth       FAMILY HISTORY:  Family History   Problem Relation Age of Onset     Family History Negative Mother      Obesity Mother      Cancer Father         lymphoma     Other Cancer Father      Other Cancer Paternal Grandfather      MEDICATIONS:   Current Outpatient Medications   Medication     omeprazole 20 MG tablet     blood glucose (NO BRAND SPECIFIED) test strip     blood glucose monitoring (ACCU-CHEK FASTCLIX) lancets     DULoxetine (CYMBALTA) 60 MG capsule     gabapentin (NEURONTIN) 300 MG capsule     lisinopril (ZESTRIL) 20 MG tablet     meloxicam (MOBIC) 15 MG tablet     metFORMIN (GLUCOPHAGE) 500 MG tablet     No current facility-administered medications for this visit.     ALLERGY:       Allergies   Allergen Reactions     Kiwi Itching     SH:   Social History     Tobacco Use     Smoking status: Former     Packs/day: 1.00     Years: 25.00     Pack years: 25.00     Types: Cigarettes     Start date: 1988     Quit date: 2013     Years since quittin.7     Smokeless tobacco: Never     Tobacco comments:     1 sd/day   Vaping Use     Vaping Use: Never used   Substance Use Topics     Alcohol use: Yes     Comment: one drink every 6 months     Drug use: No     ROS: The review of system was done by the nurse on this visit. The information on EPIC records were reviewed from today.  PE: Vitals: /75   Pulse 117   Wt 137.9 kg (304 lb)   SpO2 97%   BMI 42.40 kg/m    BMI= Body mass index is 42.4 kg/m .  GENERAL: alert and cooperative, and appears to be in no acute distress.   HEENT: normocephalic, EOMI, no nasal discharge/epistaxis.  LYMPH: no supraclavicular LAD  Lungs: Breathing room air  in comfort.  CARD: Extremities are warm and well perfused.  SKIN: Exposed skin normal in color, texture and turgor with no lesions or eruptions.  NEURO: CN II-XII intact. Strength and sensation symmetric and intact throughout. Gait normal.  PSYCH: appropriate behavior, mood, and affect  MSK: firm, mobile palpable nodule approximately 5 cm in diameter on the posterior thigh    LAB:   CBC RESULTS:   Recent Labs   Lab Test 01/18/23  1236   WBC 9.7   RBC 4.80   HGB 11.1*   HCT 36.9*   MCV 77*   MCH 23.1*   MCHC 30.1*   RDW 16.9*   *     Recent Labs   Lab Test 01/18/23  1236 04/24/22  1245   * 132*   POTASSIUM 4.8 4.1   CHLORIDE 98 101   CO2 27 29   ANIONGAP 10 2*   * 143*   BUN 13.2 11   CR 0.68 0.78   DORIAN 9.6 8.8     IMAGING:  MR FEMUR THIGH RIGHT W/O & W CONTRAST  1/6/2023  IMPRESSION:   1. Large soft tissue mass in the posterior compartment of the right  thigh measuring 27 x 7.4 x 12 cm in maximal caudocranial, AP and  transverse dimensions, respectively. The mass originates just distal  to the conjoined hamstring tendon, infiltrates the hamstring muscle  bellies and extends distally within the semitendinosus muscle belly  proximal to the knee joint. However, the semitendinosus tendon sheaths  appears to have increased signal to the level of the knee joint. A  dedicated knee MRI could be useful to identify at the most distal  extent of the lesion.  2. The lesion is heterogeneous, septated, with myxoid and lipoid  components, vividly enhancing with a large central area of necrosis  which measures 7 x 5 x 14 cm. Imaging findings are highly suggestive  of a malignant mass from the spectrum of myxoid liposarcomatous type.  Further evaluation at the [facility]  is recommended.+98  3. The lesion occupies the posterior muscle compartment of the thigh  just posterior to the sciatic nerve with a preserved fat plane in  between.  4. At the level of the mid shaft of the femur  "there is very close  proximity and adherence to multiple vessels, that entered the mass,  best seen on series 38 image 49.        \"extends distally within the semitendinosus muscle belly proximal to the knee joint. 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    RECOMMENDATIONS:   Given the location of this tumor (right lower extremity), we recommend preoperative neoadjuvant radiotherapy consisting of 50 Gray in 25 fractions, as recommended in the Lancet 2002 study (O'Castillo et al). This will occur after the patient concludes his chemotherapy with medical oncology. The first step, however, will be to obtain an updated CT CAP to rule out metastatic disease.  Afterwards, presuming no metastatic disease sites,  the plan will be for the patient to return to clinic for a CT Simulation.  At the CT simulation the plan will be to scan down to the patient's right knee, given the above radiographic findings.  The risks and benefits of radiotherapy, long term and acute were explained to the patient.  The process of radiotherapy was explained to the patient.  The patient appeared to understand all components to his proposed radiotherapy.  All questions were answered.  Informed consent was obtained.    The patient was seen and discussed with my attending, Dr. Valle.    José Luis Moser MD MS  Resident, PGY-2  Department of Radiation Oncology  [facility]  Pager: [phone]      Attestation signed by Sydney Valle at 2/1/2023  9:10 AM:  .      HPI  INITIAL PATIENT ASSESSMENT    Diagnosis: Sarcoma    Prior radiation therapy: None    Prior chemotherapy: None    Prior hormonal therapy:No    Pain Eval:  Current history " of pain associated with this visit:   Intensity: 8/10, without medication. 3-4 post medication  Current: dull, aching, feels like it digs. Some nerve type pain as well  Location: Most intense attumor site and radiates some above and below  Treatment: Melexicam and gabapentin    Psychosocial  Living arrangements: With family  Fall Risk: independent   referral needs: Not needed    Advanced Directive: No  Implantable Cardiac Device? No    Nurse face-to-face time: Level 4:  15 min face to face time    Review of Systems   Constitutional: Positive for chills, diaphoresis (Has been better, couple times per week), fever (Intermitent fevers), malaise/fatigue (Fatigue started in the fall 2022) and weight loss (Since last spring has lost about 45 lbs).   HENT: Negative for ear pain, nosebleeds and sore throat.    Eyes: Negative for blurred vision, double vision and pain.   Respiratory: Negative for cough and shortness of breath.    Cardiovascular: Negative for chest pain and leg swelling.   Gastrointestinal: Positive for diarrhea (Recently started metformin). Negative for blood in stool, constipation, nausea and vomiting.   Genitourinary: Positive for frequency. Negative for hematuria and urgency.        Dark urin recently but related it to being dehydrated   Musculoskeletal: Negative for back pain, falls, joint pain and neck pain.   Skin: Negative for rash.   Neurological: Negative for dizziness, tingling, seizures and headaches.   Endo/Heme/Allergies: Does not bruise/bleed easily.   Psychiatric/Behavioral: Negative for depression. The patient is nervous/anxious (With appointment and unknown). The patient does not have insomnia.                  Physician Attestation   I, Sydney Valle, saw this patient and agree with the findings and plan of care as documented in the note.   I was present for key portions of the history and physical exam. Briefly, this a 52 year old gentleman with a high-grade sarcoma in the  "right hamstring, cT4 (at least 27 cm), cN0, cM0, status post biopsy.  There are plans for preoperative chemotherapy.    We reviewed the natural history of this diagnosis and the role of radiation in the management of sarcomas. We had a discussion regarding the rationale of, logistics of, benefits of, alternatives to, and risks of radiation, both short and long term. We discussed that radiation therapy provides a local control benefit for soft tissue sarcoma but randomized data suggests that it does not provide an OS benefit (Nelson et al., JCO 1998). We discussed pre-operative vs post-operative radiotherapy, and that pre-operative radiotherapy was associated with lower rates of acute skin erythema, late fibrosis (31 vs 48%), joint stiffness (18 vs. 23%), edema (15% vs. 23%), albeit none were statistically significant, but has higher rates of acute wound complications (35 vs. 17%) (O'Castillo et al., Lancet 2002; Gene et al., Radiother Oncol 2005).    As was reviewed in multidisciplinary tumor board and visualized in the below MRI, the tumor appears to \"extend distally within the semitendinosus muscle belly proximal to the knee joint. However, the semitendinosus tendon sheaths appears to have increased signal to the level of the knee joint.\"  Given this extent of disease, I would specifically recommend preoperative radiotherapy in order to provide adequate margin around this extent of disease, which may be proved difficult to remove surgically.  Moreover extent of disease of this nature would involve radiating the knee joint, with dose constraints to the knee joint likely to be exceeded in the postoperative but not the preoperative setting.          We discussed the risks (short and long term as listed on consent) and alternatives of radiotherapy.  The risks of radiation include but are not limited to: skin irritation which may be dry or moist desquamation, hair loss in the area treated, fibrosis, joint stiffness, " edema, wound healing difficulties, sterility, increased risk of fracture, weakness of the extremity, and tumors caused by radiation.  We provided educational material regarding self care of the most common side effects.     In the pre-operative setting, we would prescribe radiation to 50 Gy in 25 fractions, daily, Monday-Friday, approximately 15-30 minutes per day. We would use daily image guidance and IMRT in an effort to reduce incidence of late toxicities as per RTOG 0630 (Cerda et al., JCO 2015).     Plan:  1) proceed with chemotherapy, with interval imaging and transition to radiotherapy after anticipated number of cycles or evidence of progression  2) Simulation will be performed in in the supine position, with the right leg straight on the left leg in frog-leg position.   3) We will likely need to repeat his MRI prior to initiating radiation planning      We appreciate the opportunity to participate in Mr. Canseco's care. He was encouraged to contact me with questions or concerns should they arise.    I personally reviewed the available MRI images. Laboratory data and pathology as noted above was reviewed. I reviewed previous medical records, which are summarized in the HPI. I spent a total of 60 minutes of time including face to face time and indirect time during this visit, and more than 50% of the time was spent in counseling and coordination of care.      Sydney Valle MD MPH PhD

## 2023-01-24 NOTE — PROGRESS NOTES
HPI  INITIAL PATIENT ASSESSMENT    Diagnosis: Sarcoma    Prior radiation therapy: None    Prior chemotherapy: None    Prior hormonal therapy:No    Pain Eval:  Current history of pain associated with this visit:   Intensity: 8/10, without medication. 3-4 post medication  Current: dull, aching, feels like it digs. Some nerve type pain as well  Location: Most intense attumor site and radiates some above and below  Treatment: Melexicam and gabapentin    Psychosocial  Living arrangements: With family  Fall Risk: independent   referral needs: Not needed    Advanced Directive: No  Implantable Cardiac Device? No    Nurse face-to-face time: Level 4:  15 min face to face time    Review of Systems   Constitutional: Positive for chills, diaphoresis (Has been better, couple times per week), fever (Intermitent fevers), malaise/fatigue (Fatigue started in the fall 2022) and weight loss (Since last spring has lost about 45 lbs).   HENT: Negative for ear pain, nosebleeds and sore throat.    Eyes: Negative for blurred vision, double vision and pain.   Respiratory: Negative for cough and shortness of breath.    Cardiovascular: Negative for chest pain and leg swelling.   Gastrointestinal: Positive for diarrhea (Recently started metformin). Negative for blood in stool, constipation, nausea and vomiting.   Genitourinary: Positive for frequency. Negative for hematuria and urgency.        Dark urin recently but related it to being dehydrated   Musculoskeletal: Negative for back pain, falls, joint pain and neck pain.   Skin: Negative for rash.   Neurological: Negative for dizziness, tingling, seizures and headaches.   Endo/Heme/Allergies: Does not bruise/bleed easily.   Psychiatric/Behavioral: Negative for depression. The patient is nervous/anxious (With appointment and unknown). The patient does not have insomnia.

## 2023-01-24 NOTE — TELEPHONE ENCOUNTER
Diabetes Education Scheduling Outreach #1:    Call to patient to schedule. Left message with phone number to call to schedule.    Also sent [employer] message for second attempt. Requested patient to call to schedule.    Anna Hemphill OnCall  Diabetes and Nutrition Scheduling

## 2023-01-24 NOTE — PROGRESS NOTES
[facility]: Cancer Care Initial Note                                    Discussion with Patient:                                                         Goals        General     Being Active (pt-stated)      Notes - Note created  11/4/2019  9:48 AM by Teresa Ray RN     My Goal: I will add 15 minutes of activity daily    What I need to meet my goal: walk or use exercise gym at work    I plan to meet my goal by this date: 12/2/19              Assessment:                                                      Initial       No assessment indicated    Intervention/Education provided during outreach:                                                       The patient was provided an  [facility] new patient folder as well as  [facility] hand-outs on doxil, ifosfamide, mesna and neulasta,  and contact phone numbers for the patient's RNCC, Triage and after-hours care.      Possible side effects were reviewed including neutropenia, anemia, thrombocytopenia, nausea, vomiting, diarrhea, constipation, fatigue, peripheral neuropathy, and hair loss.     The patient was encouraged to call for any questions or concerns.  The patient was encouraged to call for T > 100.4, cough, congestion, shortness of breath, feeling feverish/chilled, shaking chills, uncontrolled nausea, vomiting, diarrhea, constipation, soreness/redness/peeling of the hands or feet and for overwhelming fatigue.  The patient was encouraged to apply cream or lotion to his hands or feel 2 times per day.      Discussed that the first infusion will be done as an inpatient and if everything is tolerated well, then subsequent ones will be done as outpatient.  Let him know that he will be coming into the clinic to get the doxil and he will go home on a 7 day pump of ifosfamide and mesna.  He will also be getting neulasta at home.  Benefits will be checked to see if he has coverage for this.    He will need a Bob catheter and that will be done prior to admission.   If one can't be placed in a timely manner, then he will get a picc line.  The Bob will need to be flushed daily and a dressing change will need to be done weekly.    Follow up call in 1-2 weeks  Patient to follow up as scheduled at next appt  Patient to call/MyChart message with updates  Confirmed patient has clinic and triage numbers    Signature:  Ortiz Hastings RN

## 2023-01-24 NOTE — ASSESSMENT & PLAN NOTE
Diet controlled at remote measure, now out of control.  Metformin has been started by oncology.  PET scan was delayed because of elevated A1c.  Refer to diabetic education for rapid glucose control so as to not delay his chemo.

## 2023-01-24 NOTE — PROGRESS NOTES
"Physician Attestation   I, Sydney Valle, saw this patient and agree with the findings and plan of care as documented in the note.   I was present for key portions of the history and physical exam. 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  There are plans for preoperative chemotherapy.    We reviewed the natural history of this diagnosis and the role of radiation in the management of sarcomas. We had a discussion regarding the rationale of, logistics of, benefits of, alternatives to, and risks of radiation, both short and long term. We discussed that radiation therapy provides a local control benefit for soft tissue sarcoma but randomized data suggests that it does not provide an OS benefit (Nelson et al., JCO 1998). We discussed pre-operative vs post-operative radiotherapy, and that pre-operative radiotherapy was associated with lower rates of acute skin erythema, late fibrosis (31 vs 48%), joint stiffness (18 vs. 23%), edema (15% vs. 23%), albeit none were statistically significant, but has higher rates of acute wound complications (35 vs. 17%) (O'Castillo et al., Lancet 2002; Gene et al., Radiother Oncol 2005).    As was reviewed in multidisciplinary tumor board and visualized in the below MRI, the tumor appears to \"extend distally within the semitendinosus muscle belly proximal to the knee joint. However, the semitendinosus tendon sheaths appears to have increased signal to the level of the knee joint.\"  Given this extent of disease, I would specifically recommend preoperative radiotherapy in order to provide adequate margin around this extent of disease, which may be proved difficult to remove surgically.  Moreover extent of disease of this nature would involve radiating the knee joint, with dose constraints to the knee joint likely to be exceeded in the postoperative but not the preoperative setting.          We " discussed the risks (short and long term as listed on consent) and alternatives of radiotherapy.  The risks of radiation include but are not limited to: skin irritation which may be dry or moist desquamation, hair loss in the area treated, fibrosis, joint stiffness, edema, wound healing difficulties, sterility, increased risk of fracture, weakness of the extremity, and tumors caused by radiation.  We provided educational material regarding self care of the most common side effects.     In the pre-operative setting, we would prescribe radiation to 50 Gy in 25 fractions, daily, Monday-Friday, approximately 15-30 minutes per day. We would use daily image guidance and IMRT in an effort to reduce incidence of late toxicities as per RTOG 0630 (Cerda et al., JCO 2015).     Plan:  1) proceed with chemotherapy, with interval imaging and transition to radiotherapy after anticipated number of cycles or evidence of progression  2) Simulation will be performed in in the supine position, with the right leg straight on the left leg in frog-leg position.   3) We will likely need to repeat his MRI prior to initiating radiation planning      We appreciate the opportunity to participate in Mr. Canseco's care. He was encouraged to contact me with questions or concerns should they arise.    I personally reviewed the available MRI images. Laboratory data and pathology as noted above was reviewed. I reviewed previous medical records, which are summarized in the HPI. I spent a total of 60 minutes of time including face to face time and indirect time during this visit, and more than 50% of the time was spent in counseling and coordination of care.      Sydney Valle MD MPH PhD

## 2023-01-25 NOTE — TELEPHONE ENCOUNTER
Writer has spoken with Antonio regarding planned procedure with IR via telephone.  Antonio acknowledges understanding of pre-procedure instructions.     I have provided Antonio with IR number ([phone]) for questions or concerns in his [employer] messsage.    Marti MORGAN  Interventional Radiology RN   [phone]                 Abdominal Pain, N/V/D (Pediatric)

## 2023-01-26 ENCOUNTER — LAB (OUTPATIENT)
Dept: LAB | Facility: CLINIC | Age: 53
End: 2023-01-26
Payer: COMMERCIAL

## 2023-01-26 ENCOUNTER — APPOINTMENT (OUTPATIENT)
Dept: LAB | Facility: CLINIC | Age: 53
End: 2023-01-26
Payer: COMMERCIAL

## 2023-01-26 ENCOUNTER — VIRTUAL VISIT (OUTPATIENT)
Dept: ONCOLOGY | Facility: CLINIC | Age: 53
End: 2023-01-26
Attending: INTERNAL MEDICINE
Payer: COMMERCIAL

## 2023-01-26 ENCOUNTER — HOSPITAL ENCOUNTER (OUTPATIENT)
Dept: CARDIOLOGY | Facility: CLINIC | Age: 53
Discharge: HOME | End: 2023-01-26
Attending: STUDENT IN AN ORGANIZED HEALTH CARE EDUCATION/TRAINING PROGRAM
Payer: COMMERCIAL

## 2023-01-26 ENCOUNTER — HOSPITAL ENCOUNTER (OUTPATIENT)
Dept: CT IMAGING | Facility: CLINIC | Age: 53
Discharge: HOME | End: 2023-01-26
Attending: STUDENT IN AN ORGANIZED HEALTH CARE EDUCATION/TRAINING PROGRAM
Payer: COMMERCIAL

## 2023-01-26 VITALS — HEIGHT: 71 IN | BODY MASS INDEX: 42.56 KG/M2 | WEIGHT: 304 LBS

## 2023-01-26 DIAGNOSIS — C49.9 SARCOMA OF SOFT TISSUE (H): ICD-10-CM

## 2023-01-26 DIAGNOSIS — D64.9 ANEMIA, UNSPECIFIED TYPE: ICD-10-CM

## 2023-01-26 DIAGNOSIS — R50.9 FEVER, UNSPECIFIED FEVER CAUSE: ICD-10-CM

## 2023-01-26 DIAGNOSIS — C49.9 SARCOMA OF SOFT TISSUE (H): Primary | ICD-10-CM

## 2023-01-26 DIAGNOSIS — E11.65 UNCONTROLLED TYPE 2 DIABETES MELLITUS WITH HYPERGLYCEMIA, WITHOUT LONG-TERM CURRENT USE OF INSULIN (H): ICD-10-CM

## 2023-01-26 LAB
ALBUMIN SERPL BCG-MCNC: 3.3 G/DL (ref 3.5–5.2)
ALP SERPL-CCNC: 63 U/L (ref 40–129)
ALT SERPL W P-5'-P-CCNC: 9 U/L (ref 10–50)
ANION GAP SERPL CALCULATED.3IONS-SCNC: 13 MMOL/L (ref 7–15)
AST SERPL W P-5'-P-CCNC: 16 U/L (ref 10–50)
BILIRUB SERPL-MCNC: 0.5 MG/DL
BUN SERPL-MCNC: 11.5 MG/DL (ref 6–20)
CALCIUM SERPL-MCNC: 9.5 MG/DL (ref 8.6–10)
CHLORIDE SERPL-SCNC: 99 MMOL/L (ref 98–107)
CREAT SERPL-MCNC: 0.82 MG/DL (ref 0.67–1.17)
DEPRECATED HCO3 PLAS-SCNC: 26 MMOL/L (ref 22–29)
ERYTHROCYTE [DISTWIDTH] IN BLOOD BY AUTOMATED COUNT: 16.9 % (ref 10–15)
FERRITIN SERPL-MCNC: 1109 NG/ML (ref 31–409)
GFR SERPL CREATININE-BSD FRML MDRD: >90 ML/MIN/1.73M2
GLUCOSE SERPL-MCNC: 171 MG/DL (ref 70–99)
HCT VFR BLD AUTO: 35 % (ref 40–53)
HGB BLD-MCNC: 10.4 G/DL (ref 13.3–17.7)
INTERPRETATION: NORMAL
IRON BINDING CAPACITY (ROCHE): 148 UG/DL (ref 240–430)
IRON SATN MFR SERPL: 11 % (ref 15–46)
IRON SERPL-MCNC: 16 UG/DL (ref 61–157)
LVEF ECHO: NORMAL
MAGNESIUM SERPL-MCNC: 1.7 MG/DL (ref 1.7–2.3)
MCH RBC QN AUTO: 23 PG (ref 26.5–33)
MCHC RBC AUTO-ENTMCNC: 29.7 G/DL (ref 31.5–36.5)
MCV RBC AUTO: 77 FL (ref 78–100)
PHOSPHATE SERPL-MCNC: 3.6 MG/DL (ref 2.5–4.5)
PLATELET # BLD AUTO: 518 10E3/UL (ref 150–450)
POTASSIUM SERPL-SCNC: 4.6 MMOL/L (ref 3.4–5.3)
PROT SERPL-MCNC: 8.2 G/DL (ref 6.4–8.3)
RBC # BLD AUTO: 4.53 10E6/UL (ref 4.4–5.9)
SODIUM SERPL-SCNC: 138 MMOL/L (ref 136–145)
WBC # BLD AUTO: 8.7 10E3/UL (ref 4–11)

## 2023-01-26 PROCEDURE — 83550 IRON BINDING TEST: CPT | Mod: GT,95 | Performed by: STUDENT IN AN ORGANIZED HEALTH CARE EDUCATION/TRAINING PROGRAM

## 2023-01-26 PROCEDURE — 99417 PROLNG OP E/M EACH 15 MIN: CPT | Performed by: STUDENT IN AN ORGANIZED HEALTH CARE EDUCATION/TRAINING PROGRAM

## 2023-01-26 PROCEDURE — 99215 OFFICE O/P EST HI 40 MIN: CPT | Mod: 95 | Performed by: STUDENT IN AN ORGANIZED HEALTH CARE EDUCATION/TRAINING PROGRAM

## 2023-01-26 PROCEDURE — G0463 HOSPITAL OUTPT CLINIC VISIT: HCPCS | Mod: PN,GT | Performed by: STUDENT IN AN ORGANIZED HEALTH CARE EDUCATION/TRAINING PROGRAM

## 2023-01-26 PROCEDURE — 82728 ASSAY OF FERRITIN: CPT | Performed by: STUDENT IN AN ORGANIZED HEALTH CARE EDUCATION/TRAINING PROGRAM

## 2023-01-26 PROCEDURE — 83735 ASSAY OF MAGNESIUM: CPT

## 2023-01-26 PROCEDURE — 93306 TTE W/DOPPLER COMPLETE: CPT | Mod: 26 | Performed by: INTERNAL MEDICINE

## 2023-01-26 PROCEDURE — 999N000208 ECHOCARDIOGRAM COMPLETE

## 2023-01-26 PROCEDURE — 250N000011 HC RX IP 250 OP 636: Performed by: STUDENT IN AN ORGANIZED HEALTH CARE EDUCATION/TRAINING PROGRAM

## 2023-01-26 PROCEDURE — 84100 ASSAY OF PHOSPHORUS: CPT

## 2023-01-26 PROCEDURE — 250N000009 HC RX 250: Performed by: STUDENT IN AN ORGANIZED HEALTH CARE EDUCATION/TRAINING PROGRAM

## 2023-01-26 PROCEDURE — 85027 COMPLETE CBC AUTOMATED: CPT

## 2023-01-26 PROCEDURE — 80053 COMPREHEN METABOLIC PANEL: CPT

## 2023-01-26 PROCEDURE — 255N000002 HC RX 255 OP 636: Performed by: STUDENT IN AN ORGANIZED HEALTH CARE EDUCATION/TRAINING PROGRAM

## 2023-01-26 PROCEDURE — 74177 CT ABD & PELVIS W/CONTRAST: CPT

## 2023-01-26 PROCEDURE — 36415 COLL VENOUS BLD VENIPUNCTURE: CPT

## 2023-01-26 RX ORDER — IOPAMIDOL 755 MG/ML
135 INJECTION, SOLUTION INTRAVASCULAR ONCE
Status: COMPLETED | OUTPATIENT
Start: 2023-01-26 | End: 2023-01-26

## 2023-01-26 RX ORDER — FERROUS SULFATE 325(65) MG
325 TABLET ORAL
Qty: 30 TABLET | Refills: 1 | Status: SHIPPED | OUTPATIENT
Start: 2023-01-26 | End: 2023-02-20

## 2023-01-26 RX ADMIN — HUMAN ALBUMIN MICROSPHERES AND PERFLUTREN 9 ML: 10; .22 INJECTION, SOLUTION INTRAVENOUS at 10:22

## 2023-01-26 RX ADMIN — SODIUM CHLORIDE 79 ML: 9 INJECTION, SOLUTION INTRAVENOUS at 12:39

## 2023-01-26 RX ADMIN — IOPAMIDOL 135 ML: 755 INJECTION, SOLUTION INTRAVENOUS at 12:39

## 2023-01-26 ASSESSMENT — PAIN SCALES - GENERAL: PAINLEVEL: MILD PAIN (2)

## 2023-01-26 NOTE — PROGRESS NOTES
Antonio is a 52 year old who is being evaluated via a billable video visit.      How would you like to obtain your AVS? MyChart  If the video visit is dropped, the invitation should be resent by: Send to e-mail at: [email or web address]  Will anyone else be joining your video visit? Yes: N/A. How would they like to receive their invitation? Other e-mail: N/A--wife present at home for visit      Jannie Anthoyn      Video-Visit Details    Type of service:  Video Visit     Originating Location (pt. Location): Home  Distant Location (provider location):  On-site   Platform used for Video Visit: [facility]    ONCOLOGY VISIT NOTE    PATIENT NAME: Antonio Canseco MRN # [id number]  DATE OF VISIT: Jan 26, 2023   YOB: 1970    CANCER TYPE: High grade sarcoma      ONCOLOGY HISTORY   52 year old male with PMH of DM and recent Dx of large 27 cm mass in the posterior R thigh. He reports that he first noticed a node in the posterior thigh on 5/2022, we was evaluated at OSH where he got an Xray and he was told that this was sciatica and was started on physical therapy. Tumor continued to grow rapidly during this time. He underwent biopsy by Dr. Salgado. 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 Plan is to begin treatment with Doxil and ifosfamide.    Interval History  -Antonio presents today via video visit with his spouse.   -Antonio is feeling ok today. Somewhat tired in general. Had the covid booster and shingles vaccine on Monday.  -Right leg pain is about the same. Has stopped meloxicam for port placement. Now taking gabapentin and oxycodone. Pain is tolerable but does limit mobility if he doesn't take pain medication. No associated numbness or tingling in the right leg.  -States he has had some low grade temperatures, generally around 100.8 for about 1 month. This week a bit higher at 101 (max of 101.8) which he attributed to the vaccine. Denies any  "new cough (has had chronic cough for about 1 year). No shortness of breath or chest pain. No sore throat or ear pain. No dysuria. No rashes.   -Not taking metformin. Was not sure what he was supposed to take after taking this. Saw his PCP and got diabetic educator referral but no openings soon.  -No bleeding in stool or urine.  -No overt symptoms of reflux but does have occasional phlegm. Takes omeprazole once daily.   -Mood is ok.         CURRENT OUTPATIENT MEDICATIONS     Current Outpatient Medications   Medication Sig Dispense Refill     blood glucose (NO BRAND SPECIFIED) test strip accuchek guide- Use to test blood sugar 1 times daily or as directed. 100 strip 0     blood glucose monitoring (ACCU-CHEK FASTCLIX) lancets 1 each daily Accuchek guide 100 each 0     DULoxetine (CYMBALTA) 60 MG capsule Take 1 capsule (60 mg) by mouth daily 90 capsule 1     ferrous sulfate (FEROSUL) 325 (65 Fe) MG tablet Take 1 tablet (325 mg) by mouth daily (with breakfast) 30 tablet 1     gabapentin (NEURONTIN) 300 MG capsule Take 1 capsule (300 mg) by mouth 3 times daily Take 1 at bedtime X 7 days, then 1 twice daily X 7 days, then 1 tid 90 capsule 3     lisinopril (ZESTRIL) 20 MG tablet Take 1 tablet (20 mg) by mouth daily 90 tablet 1     meloxicam (MOBIC) 15 MG tablet Take 1 tablet (15 mg) by mouth daily 90 tablet 0     metFORMIN (GLUCOPHAGE) 500 MG tablet Take 2 tablets (1,000 mg) by mouth 2 times daily (with meals) 30 tablet 3     omeprazole 20 MG tablet Take 20 mg by mouth daily            REVIEW OF SYSTEMS     Patient denies any of the following except if noted above: fevers, chills, difficulty with energy, vision or hearing changes, chest pain, dyspnea, abdominal pain, nausea, vomiting, diarrhea, constipation, urinary concerns, headaches, numbness, tingling, issues with sleep or mood. He also denies lumps, bumps, rashes or skin lesions, bleeding or bruising issues.     PHYSICAL EXAM   Ht 1.803 m (5' 11\")   Wt 137.9 kg (304 " lb)   BMI 42.40 kg/m    Video physical exam  General: Patient appears well in no acute distress.   Skin: No visualized rash or lesions on visualized skin  Eyes: EOMI, no erythema, sclera icterus or discharge noted. Wearing glasses.  Resp: Appears to be breathing comfortably without accessory muscle usage, speaking in full sentences, no cough  MSK: Appears to have normal range of motion based on visualized movements  Neurologic: No apparent tremors, facial movements symmetric  Psych: affect appropriate, alert and oriented.     LABORATORY AND IMAGING STUDIES     Most Recent 3 CBC's:  Recent Labs   Lab Test 01/26/23  1101 01/18/23  1236 04/24/22  1245   WBC 8.7 9.7 6.4   HGB 10.4* 11.1* 14.8   MCV 77* 77* 91   * 539* 228   ANEUTAUTO  --  6.4 4.3     Most Recent 3 BMP's:  Recent Labs   Lab Test 01/26/23  1101 01/18/23  1236 04/24/22  1245    135* 132*   POTASSIUM 4.6 4.8 4.1   CHLORIDE 99 98 101   CO2 26 27 29   BUN 11.5 13.2 11   CR 0.82 0.68 0.78   ANIONGAP 13 10 2*   DORIAN 9.5 9.6 8.8   * 241* 143*   PROTTOTAL 8.2 8.4* 7.8   ALBUMIN 3.3* 3.6 3.4    Most Recent 3 LFT's:  Recent Labs   Lab Test 01/26/23  1101 01/18/23  1236 04/24/22  1245   AST 16 9* 21   ALT 9* 10 43   ALKPHOS 63 72 44   BILITOTAL 0.5 0.5 1.7*    Most Recent 2 TSH and T4:  Recent Labs   Lab Test 12/28/21  1202 10/07/19  0903   TSH 2.11 3.07      Notes    Component Ref Range & Units 11:01 AM 4 yr ago    Ferritin 31 - 409 ng/mL 1,109 High          0 Result Notes   Component Ref Range & Units 11:01 AM     Iron 61 - 157 ug/dL 16 Low      Iron Binding Capacity 240 - 430 ug/dL 148 Low      Iron Sat Index 15 - 46 % 11 Low         Phosphorus: 3.6  Magnesium: 1.7    I reviewed the above labs today.      ASSESSMENT AND PLAN     Mr. 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     Tentative plan will be for 1-2 cycles of chemotherapy with doxil  and ifosfamide and depending on response and tolerability we would consider completing a total of 4 cycles prior to surgery. If no adequate response, then he should proceed with local control with radiation and surgery. He is planned to be admitted inpatient for cycle 1 on 1/30/23.     Echo today with EF of 55%.    CT CAP from today awaiting final read but otherwise still with pulmonary nodules in chest but no evidence of disease in abdomen or pelvis. We briefly reviewed these findings during our visit but noted that I am awaiting final read from radiology.     Antonio is doing ok today but is noting 1 month history of low grade temperatures and his Hgb has dropped in the last couple of weeks.     Plan:  -Work up of fever and anemia as detailed below; will confer with Dr. Price on timeline of workup given plan for chemo on Monday  -Proceed with port placement tomorrow  -Admission on 1/30 for chemo with cycle 1 of Doxil/ifosfamide  -Following chemo needs toxicity check with [facility] or Dr. Price 1 week after chemo  (inpatient team, please request)  -Needs labs 3 times weekly (CBC with diff, CMP, Phosphorus, Magnesium) for 2-3 weeks after discharge to monitor for lab abnormalities (inpatient team, please request)  -After chemotherapy he should be on prophylaxis with levaquin and augmentin due to his history of HS with an active lesion (IB message sent to Dr. Price to clarify dosing) (inpatient team please ensure these are ordered at discharge)    Anemia  -Decreased in the last 2 weeks. Asymptomatic at present with exception of fatigue which is likely multifactorial. Has not had treatment yet. Down to 10.4 today from 11.1 last week. Labs consistent with iron deficiency anemia except elevated ferritin. Expect ferritin is elevated from cancer. Unclear source of iron loss. Denies overt bleeding. Could be slow bleed of GI or tumor origin, does have some chronic phlegm and cough which could be reflux. On omeprazole. Was  recently started on metformin. Will check haptoglobin to r/o hemolysis from metformin and FIT test to check for blood in stool. Educated to seek care if evidence of bleeding.  -Can recheck Hgb when admitted on Monday and follow trend.     ADDENDUM:  Spoke with Dr. Price after visit. Plan will be to proceed with port line placement tomorrow given low suspicion for bacteremia. Obtain blood culture with labs tomorrow, if negative, suspect tumor fever. If positive need to treat accordingly and hold chemo Monday. While checking labs tomorrow will also check haptoglobin and FIT test and in the meantime will send in prescription for oral iron (ferrous sulfate 325mg) once daily while we continue work up. May need to change dosing or switch to IV pending tolerance during chemo and pending work up. Will have patient restart metformin if ok with IR team post port placement tomorrow. Would recommend endocrinology consult while inpatient for further recs on BG management. My Chart message sent to patient to update him on plan as I was not able to reach him by phone and prefers messages as communication.     Fevers  Unclear etiology. Does not appear to have infectious concerns on CT today. No other localizing symptoms. Given that they have been present for 1 month, seems more likely for tumor fever but will check blood cultures. If blood cultures negative, likely suspect tumor fever.     Diabetes  Currently off metformin. Will reach out to PCP to advise on recommendations for BG management    Right leg pain  Continue gapapentin and oxycodone prn for pain.     Amber Scheierl, CNP    100 minutes spent on the date of the encounter doing chart review, review of test results, interpretation of tests, patient visit, documentation and discussion with other provider(s)

## 2023-01-26 NOTE — LETTER
1/26/2023         RE: Antonio Canseco  [address] [facility] [address]        Dear Colleague,    Thank you for referring your patient, Antonio Canseco, to the [facility]. Please see a copy of my visit note below.    Antonio is a 52 year old who is being evaluated via a billable video visit.      How would you like to obtain your AVS? MyChart  If the video visit is dropped, the invitation should be resent by: Send to e-mail at: [email or web address]  Will anyone else be joining your video visit? Yes: N/A. How would they like to receive their invitation? Other e-mail: N/A--wife present at home for visit      Jannie Cruz      Video-Visit Details    Type of service:  Video Visit     Originating Location (pt. Location): Home  Distant Location (provider location):  On-site   Platform used for Video Visit: [facility]    ONCOLOGY VISIT NOTE    PATIENT NAME: Antonio Canseco MRN # [id number]  DATE OF VISIT: Jan 26, 2023   YOB: 1970    CANCER TYPE: High grade sarcoma      ONCOLOGY HISTORY   52 year old male with PMH of DM and recent Dx of large 27 cm mass in the posterior R thigh. He reports that he first noticed a node in the posterior thigh on 5/2022, we was evaluated at OSH where he got an Xray and he was told that this was sciatica and was started on physical therapy. Tumor continued to grow rapidly during this time. He underwent biopsy by Dr. Salgado. 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 Plan is to begin treatment with Doxil and ifosfamide.    Interval History  -Antonio presents today via video visit with his spouse.   -Antonio is feeling ok today. Somewhat tired in general. Had the covid booster and shingles vaccine on Monday.  -Right leg pain is about the same. Has stopped meloxicam for port placement. Now taking gabapentin and oxycodone. Pain is tolerable but does limit mobility if he doesn't  take pain medication. No associated numbness or tingling in the right leg.  -States he has had some low grade temperatures, generally around 100.8 for about 1 month. This week a bit higher at 101 (max of 101.8) which he attributed to the vaccine. Denies any new cough (has had chronic cough for about 1 year). No shortness of breath or chest pain. No sore throat or ear pain. No dysuria. No rashes.   -Not taking metformin. Was not sure what he was supposed to take after taking this. Saw his PCP and got diabetic educator referral but no openings soon.  -No bleeding in stool or urine.  -No overt symptoms of reflux but does have occasional phlegm. Takes omeprazole once daily.   -Mood is ok.         CURRENT OUTPATIENT MEDICATIONS     Current Outpatient Medications   Medication Sig Dispense Refill     blood glucose (NO BRAND SPECIFIED) test strip accuchek guide- Use to test blood sugar 1 times daily or as directed. 100 strip 0     blood glucose monitoring (ACCU-CHEK FASTCLIX) lancets 1 each daily Accuchek guide 100 each 0     DULoxetine (CYMBALTA) 60 MG capsule Take 1 capsule (60 mg) by mouth daily 90 capsule 1     ferrous sulfate (FEROSUL) 325 (65 Fe) MG tablet Take 1 tablet (325 mg) by mouth daily (with breakfast) 30 tablet 1     gabapentin (NEURONTIN) 300 MG capsule Take 1 capsule (300 mg) by mouth 3 times daily Take 1 at bedtime X 7 days, then 1 twice daily X 7 days, then 1 tid 90 capsule 3     lisinopril (ZESTRIL) 20 MG tablet Take 1 tablet (20 mg) by mouth daily 90 tablet 1     meloxicam (MOBIC) 15 MG tablet Take 1 tablet (15 mg) by mouth daily 90 tablet 0     metFORMIN (GLUCOPHAGE) 500 MG tablet Take 2 tablets (1,000 mg) by mouth 2 times daily (with meals) 30 tablet 3     omeprazole 20 MG tablet Take 20 mg by mouth daily            REVIEW OF SYSTEMS     Patient denies any of the following except if noted above: fevers, chills, difficulty with energy, vision or hearing changes, chest pain, dyspnea, abdominal pain,  "nausea, vomiting, diarrhea, constipation, urinary concerns, headaches, numbness, tingling, issues with sleep or mood. He also denies lumps, bumps, rashes or skin lesions, bleeding or bruising issues.     PHYSICAL EXAM   Ht 1.803 m (5' 11\")   Wt 137.9 kg (304 lb)   BMI 42.40 kg/m    Video physical exam  General: Patient appears well in no acute distress.   Skin: No visualized rash or lesions on visualized skin  Eyes: EOMI, no erythema, sclera icterus or discharge noted. Wearing glasses.  Resp: Appears to be breathing comfortably without accessory muscle usage, speaking in full sentences, no cough  MSK: Appears to have normal range of motion based on visualized movements  Neurologic: No apparent tremors, facial movements symmetric  Psych: affect appropriate, alert and oriented.     LABORATORY AND IMAGING STUDIES     Most Recent 3 CBC's:  Recent Labs   Lab Test 01/26/23  1101 01/18/23  1236 04/24/22  1245   WBC 8.7 9.7 6.4   HGB 10.4* 11.1* 14.8   MCV 77* 77* 91   * 539* 228   ANEUTAUTO  --  6.4 4.3     Most Recent 3 BMP's:  Recent Labs   Lab Test 01/26/23  1101 01/18/23  1236 04/24/22  1245    135* 132*   POTASSIUM 4.6 4.8 4.1   CHLORIDE 99 98 101   CO2 26 27 29   BUN 11.5 13.2 11   CR 0.82 0.68 0.78   ANIONGAP 13 10 2*   DORIAN 9.5 9.6 8.8   * 241* 143*   PROTTOTAL 8.2 8.4* 7.8   ALBUMIN 3.3* 3.6 3.4    Most Recent 3 LFT's:  Recent Labs   Lab Test 01/26/23  1101 01/18/23  1236 04/24/22  1245   AST 16 9* 21   ALT 9* 10 43   ALKPHOS 63 72 44   BILITOTAL 0.5 0.5 1.7*    Most Recent 2 TSH and T4:  Recent Labs   Lab Test 12/28/21  1202 10/07/19  0903   TSH 2.11 3.07      Notes    Component Ref Range & Units 11:01 AM 4 yr ago    Ferritin 31 - 409 ng/mL 1,109 High          0 Result Notes   Component Ref Range & Units 11:01 AM     Iron 61 - 157 ug/dL 16 Low      Iron Binding Capacity 240 - 430 ug/dL 148 Low      Iron Sat Index 15 - 46 % 11 Low         Phosphorus: 3.6  Magnesium: 1.7    I reviewed the above " labs today.      ASSESSMENT AND PLAN     Mr. 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     Tentative plan will be for 1-2 cycles of chemotherapy with doxil and ifosfamide and depending on response and tolerability we would consider completing a total of 4 cycles prior to surgery. If no adequate response, then he should proceed with local control with radiation and surgery. He is planned to be admitted inpatient for cycle 1 on 1/30/23.     Echo today with EF of 55%.    CT CAP from today awaiting final read but otherwise still with pulmonary nodules in chest but no evidence of disease in abdomen or pelvis. We briefly reviewed these findings during our visit but noted that I am awaiting final read from radiology.     Antonio is doing ok today but is noting 1 month history of low grade temperatures and his Hgb has dropped in the last couple of weeks.     Plan:  -Work up of fever and anemia as detailed below; will confer with Dr. Price on timeline of workup given plan for chemo on Monday  -Proceed with port placement tomorrow  -Admission on 1/30 for chemo with cycle 1 of Doxil/ifosfamide  -Following chemo needs toxicity check with [facility] or Dr. Price 1 week after chemo  (inpatient team, please request)  -Needs labs 3 times weekly (CBC with diff, CMP, Phosphorus, Magnesium) for 2-3 weeks after discharge to monitor for lab abnormalities (inpatient team, please request)  -After chemotherapy he should be on prophylaxis with levaquin and augmentin due to his history of HS with an active lesion (IB message sent to Dr. Price to clarify dosing) (inpatient team please ensure these are ordered at discharge)    Anemia  -Decreased in the last 2 weeks. Asymptomatic at present with exception of fatigue which is likely multifactorial. Has not had treatment yet. Down to 10.4 today from 11.1 last week. Labs consistent with iron deficiency  anemia except elevated ferritin. Expect ferritin is elevated from cancer. Unclear source of iron loss. Denies overt bleeding. Could be slow bleed of GI or tumor origin, does have some chronic phlegm and cough which could be reflux. On omeprazole. Was recently started on metformin. Will check haptoglobin to r/o hemolysis from metformin and FIT test to check for blood in stool. Educated to seek care if evidence of bleeding.  -Can recheck Hgb when admitted on Monday and follow trend.     ADDENDUM:  Spoke with Dr. Price after visit. Plan will be to proceed with port line placement tomorrow given low suspicion for bacteremia. Obtain blood culture with labs tomorrow, if negative, suspect tumor fever. If positive need to treat accordingly and hold chemo Monday. While checking labs tomorrow will also check haptoglobin and FIT test and in the meantime will send in prescription for oral iron (ferrous sulfate 325mg) once daily while we continue work up. May need to change dosing or switch to IV pending tolerance during chemo and pending work up. Will have patient restart metformin if ok with IR team post port placement tomorrow. Would recommend endocrinology consult while inpatient for further recs on BG management. My Chart message sent to patient to update him on plan as I was not able to reach him by phone and prefers messages as communication.     Fevers  Unclear etiology. Does not appear to have infectious concerns on CT today. No other localizing symptoms. Given that they have been present for 1 month, seems more likely for tumor fever but will check blood cultures. If blood cultures negative, likely suspect tumor fever.     Diabetes  Currently off metformin. Will reach out to PCP to advise on recommendations for BG management    Right leg pain  Continue gapapentin and oxycodone prn for pain.     Amber Scheierl, CNP    100 minutes spent on the date of the encounter doing chart review, review of test results,  interpretation of tests, patient visit, documentation and discussion with other provider(s)

## 2023-01-27 ENCOUNTER — APPOINTMENT (OUTPATIENT)
Dept: INTERVENTIONAL RADIOLOGY/VASCULAR | Facility: CLINIC | Age: 53
End: 2023-01-27
Attending: STUDENT IN AN ORGANIZED HEALTH CARE EDUCATION/TRAINING PROGRAM
Payer: COMMERCIAL

## 2023-01-27 ENCOUNTER — APPOINTMENT (OUTPATIENT)
Dept: MEDSURG UNIT | Facility: CLINIC | Age: 53
End: 2023-01-27
Attending: RADIOLOGY
Payer: COMMERCIAL

## 2023-01-27 ENCOUNTER — HOSPITAL ENCOUNTER (OUTPATIENT)
Facility: CLINIC | Age: 53
Discharge: HOME | End: 2023-01-27
Attending: RADIOLOGY | Admitting: RADIOLOGY
Payer: COMMERCIAL

## 2023-01-27 VITALS
SYSTOLIC BLOOD PRESSURE: 113 MMHG | RESPIRATION RATE: 18 BRPM | TEMPERATURE: 98.9 F | DIASTOLIC BLOOD PRESSURE: 72 MMHG | HEIGHT: 71 IN | OXYGEN SATURATION: 93 % | BODY MASS INDEX: 42.42 KG/M2 | WEIGHT: 303 LBS | HEART RATE: 89 BPM

## 2023-01-27 DIAGNOSIS — C49.9 SARCOMA OF SOFT TISSUE (H): ICD-10-CM

## 2023-01-27 LAB
GLUCOSE BLDC GLUCOMTR-MCNC: 158 MG/DL (ref 70–99)
INR PPP: 1.35 (ref 0.85–1.15)
SARS-COV-2 RNA RESP QL NAA+PROBE: NEGATIVE

## 2023-01-27 PROCEDURE — 250N000011 HC RX IP 250 OP 636: Performed by: PHYSICIAN ASSISTANT

## 2023-01-27 PROCEDURE — 76937 US GUIDE VASCULAR ACCESS: CPT | Mod: 26 | Performed by: RADIOLOGY

## 2023-01-27 PROCEDURE — C1769 GUIDE WIRE: HCPCS

## 2023-01-27 PROCEDURE — 85610 PROTHROMBIN TIME: CPT | Performed by: NURSE PRACTITIONER

## 2023-01-27 PROCEDURE — 36415 COLL VENOUS BLD VENIPUNCTURE: CPT | Performed by: NURSE PRACTITIONER

## 2023-01-27 PROCEDURE — 99152 MOD SED SAME PHYS/QHP 5/>YRS: CPT | Mod: GC | Performed by: RADIOLOGY

## 2023-01-27 PROCEDURE — 272N000504 HC NEEDLE CR4

## 2023-01-27 PROCEDURE — 258N000003 HC RX IP 258 OP 636: Performed by: NURSE PRACTITIONER

## 2023-01-27 PROCEDURE — 99152 MOD SED SAME PHYS/QHP 5/>YRS: CPT

## 2023-01-27 PROCEDURE — 250N000011 HC RX IP 250 OP 636: Performed by: RADIOLOGY

## 2023-01-27 PROCEDURE — 77001 FLUOROGUIDE FOR VEIN DEVICE: CPT | Mod: 26 | Performed by: RADIOLOGY

## 2023-01-27 PROCEDURE — 250N000009 HC RX 250: Performed by: PHYSICIAN ASSISTANT

## 2023-01-27 PROCEDURE — C1751 CATH, INF, PER/CENT/MIDLINE: HCPCS

## 2023-01-27 PROCEDURE — 272N000602 HC WOUND GLUE CR1

## 2023-01-27 PROCEDURE — 82962 GLUCOSE BLOOD TEST: CPT

## 2023-01-27 PROCEDURE — U0003 INFECTIOUS AGENT DETECTION BY NUCLEIC ACID (DNA OR RNA); SEVERE ACUTE RESPIRATORY SYNDROME CORONAVIRUS 2 (SARS-COV-2) (CORONAVIRUS DISEASE [COVID-19]), AMPLIFIED PROBE TECHNIQUE, MAKING USE OF HIGH THROUGHPUT TECHNOLOGIES AS DESCRIBED BY CMS-2020-01-R: HCPCS | Performed by: STUDENT IN AN ORGANIZED HEALTH CARE EDUCATION/TRAINING PROGRAM

## 2023-01-27 PROCEDURE — 36558 INSERT TUNNELED CV CATH: CPT | Mod: RT | Performed by: RADIOLOGY

## 2023-01-27 RX ORDER — NALOXONE HYDROCHLORIDE 0.4 MG/ML
0.2 INJECTION, SOLUTION INTRAMUSCULAR; INTRAVENOUS; SUBCUTANEOUS
Status: DISCONTINUED | OUTPATIENT
Start: 2023-01-27 | End: 2023-01-27 | Stop reason: HOSPADM

## 2023-01-27 RX ORDER — FLUMAZENIL 0.1 MG/ML
0.2 INJECTION, SOLUTION INTRAVENOUS
Status: DISCONTINUED | OUTPATIENT
Start: 2023-01-27 | End: 2023-01-27 | Stop reason: HOSPADM

## 2023-01-27 RX ORDER — SODIUM CHLORIDE 9 MG/ML
INJECTION, SOLUTION INTRAVENOUS CONTINUOUS
Status: DISCONTINUED | OUTPATIENT
Start: 2023-01-27 | End: 2023-01-27 | Stop reason: HOSPADM

## 2023-01-27 RX ORDER — CEFAZOLIN SODIUM IN 0.9 % NACL 3 G/100 ML
3 INTRAVENOUS SOLUTION, PIGGYBACK (ML) INTRAVENOUS
Status: DISCONTINUED | OUTPATIENT
Start: 2023-01-27 | End: 2023-01-27

## 2023-01-27 RX ORDER — NALOXONE HYDROCHLORIDE 0.4 MG/ML
0.4 INJECTION, SOLUTION INTRAMUSCULAR; INTRAVENOUS; SUBCUTANEOUS
Status: DISCONTINUED | OUTPATIENT
Start: 2023-01-27 | End: 2023-01-27 | Stop reason: HOSPADM

## 2023-01-27 RX ORDER — FENTANYL CITRATE 50 UG/ML
25-50 INJECTION, SOLUTION INTRAMUSCULAR; INTRAVENOUS EVERY 5 MIN PRN
Status: DISCONTINUED | OUTPATIENT
Start: 2023-01-27 | End: 2023-01-27 | Stop reason: HOSPADM

## 2023-01-27 RX ORDER — LIDOCAINE 40 MG/G
CREAM TOPICAL
Status: DISCONTINUED | OUTPATIENT
Start: 2023-01-27 | End: 2023-01-27 | Stop reason: HOSPADM

## 2023-01-27 RX ORDER — DEXTROSE MONOHYDRATE 25 G/50ML
25-50 INJECTION, SOLUTION INTRAVENOUS
Status: DISCONTINUED | OUTPATIENT
Start: 2023-01-27 | End: 2023-01-27 | Stop reason: HOSPADM

## 2023-01-27 RX ORDER — HEPARIN SODIUM (PORCINE) LOCK FLUSH IV SOLN 100 UNIT/ML 100 UNIT/ML
5 SOLUTION INTRAVENOUS
Status: COMPLETED | OUTPATIENT
Start: 2023-01-27 | End: 2023-01-27

## 2023-01-27 RX ORDER — HEPARIN SODIUM,PORCINE 10 UNIT/ML
5-20 VIAL (ML) INTRAVENOUS EVERY 24 HOURS
Status: DISCONTINUED | OUTPATIENT
Start: 2023-01-27 | End: 2023-01-27 | Stop reason: HOSPADM

## 2023-01-27 RX ORDER — HEPARIN SODIUM,PORCINE 10 UNIT/ML
5 VIAL (ML) INTRAVENOUS
Status: DISCONTINUED | OUTPATIENT
Start: 2023-01-27 | End: 2023-01-27 | Stop reason: DRUGHIGH

## 2023-01-27 RX ORDER — METFORMIN HCL 500 MG
1000 TABLET, EXTENDED RELEASE 24 HR ORAL
Qty: 60 TABLET | Refills: 3 | Status: SHIPPED | OUTPATIENT
Start: 2023-01-27 | End: 2023-02-23

## 2023-01-27 RX ORDER — HEPARIN SODIUM,PORCINE 10 UNIT/ML
5-20 VIAL (ML) INTRAVENOUS
Status: DISCONTINUED | OUTPATIENT
Start: 2023-01-27 | End: 2023-01-27 | Stop reason: HOSPADM

## 2023-01-27 RX ORDER — OXYCODONE HYDROCHLORIDE 5 MG/1
5 TABLET ORAL EVERY 6 HOURS PRN
Status: ON HOLD | COMMUNITY
End: 2023-02-05

## 2023-01-27 RX ORDER — NICOTINE POLACRILEX 4 MG
15-30 LOZENGE BUCCAL
Status: DISCONTINUED | OUTPATIENT
Start: 2023-01-27 | End: 2023-01-27 | Stop reason: HOSPADM

## 2023-01-27 RX ORDER — CEFAZOLIN SODIUM/WATER 3 G/30 ML
3 SYRINGE (ML) INTRAVENOUS
Status: COMPLETED | OUTPATIENT
Start: 2023-01-27 | End: 2023-01-27

## 2023-01-27 RX ADMIN — Medication 2 ML: at 10:20

## 2023-01-27 RX ADMIN — Medication 3 G: at 08:40

## 2023-01-27 RX ADMIN — MIDAZOLAM HYDROCHLORIDE 1 MG: 1 INJECTION, SOLUTION INTRAMUSCULAR; INTRAVENOUS at 09:53

## 2023-01-27 RX ADMIN — Medication 2 ML: at 10:17

## 2023-01-27 RX ADMIN — LIDOCAINE HYDROCHLORIDE 8 ML: 10 INJECTION, SOLUTION EPIDURAL; INFILTRATION; INTRACAUDAL; PERINEURAL at 10:02

## 2023-01-27 RX ADMIN — SODIUM CHLORIDE: 9 INJECTION, SOLUTION INTRAVENOUS at 08:40

## 2023-01-27 RX ADMIN — MIDAZOLAM HYDROCHLORIDE 1 MG: 1 INJECTION, SOLUTION INTRAMUSCULAR; INTRAVENOUS at 09:58

## 2023-01-27 RX ADMIN — FENTANYL CITRATE 50 MCG: 50 INJECTION, SOLUTION INTRAMUSCULAR; INTRAVENOUS at 09:53

## 2023-01-27 RX ADMIN — FENTANYL CITRATE 50 MCG: 50 INJECTION, SOLUTION INTRAMUSCULAR; INTRAVENOUS at 09:58

## 2023-01-27 ASSESSMENT — ACTIVITIES OF DAILY LIVING (ADL)
ADLS_ACUITY_SCORE: 35
ADLS_ACUITY_SCORE: 35

## 2023-01-27 NOTE — PROGRESS NOTES
Pt arrived to 2A from home for CVC tunnel placement. VSS. Report right leg pain. Consent obtained  . Awaiting on result. H&P current. Allergies reviewed with pt. Appropriately NPO.  Prep completed. Yari wife at bedside; will be transporting patient to home. [phone]

## 2023-01-27 NOTE — PROCEDURES
[facility]    Procedure: IR CVC TUNNEL PLACEMENT    Date/Time: 1/27/2023 10:31 AM  Performed by: Marcelo Martinez MD  Authorized by: Renaldo Falcon PA-C IR Fellow Physician:  Other(s) attending procedure: ISREAL Vergara attending, Terrie assisting      UNIVERSAL PROTOCOL   Site Marked: No  Prior Images Obtained and Reviewed:  Yes  Required items: Required blood products, implants, devices and special equipment available    Patient identity confirmed:  Verbally with patient, arm band, provided demographic data and hospital-assigned identification number  Patient was reevaluated immediately before administering moderate or deep sedation or anesthesia  Confirmation Checklist:  Patient's identity using two indicators, relevant allergies, procedure was appropriate and matched the consent or emergent situation and correct equipment/implants were available  Time out: Immediately prior to the procedure a time out was called    Universal Protocol: the Joint Commission Universal Protocol was followed    Preparation: Patient was prepped and draped in usual sterile fashion       ANESTHESIA    Anesthesia: Local infiltration  Local Anesthetic:  Lidocaine 1% without epinephrine  Anesthetic Total (mL):  10      SEDATION  Patient Sedated: Yes    Sedation Type:  Moderate (conscious) sedation  Sedation:  Fentanyl and midazolam  Vital signs: Vital signs monitored during sedation    Fluoroscopy Time: 1 minute(s)  See dictated procedure note for full details.  Findings: 100 mcg and 2 mg, 30 minutes, tolerated well    Specimens: none    Complications: None    Condition: Stable    Plan: 1 hour monitored recovery.      PROCEDURE  Describe Procedure: 9.5 Fr 24 cm double lumen tunneled central venous catheter via right IJ with tip in high right atrium. Functions well, heparin locked and ready for use.  Patient Tolerance:  Patient tolerated the procedure well with no immediate complications  Length  of time physician/provider present for 1:1 monitoring during sedation: 30

## 2023-01-27 NOTE — PRE-PROCEDURE
GENERAL PRE-PROCEDURE:   Procedure:  IR CVC TUNNEL PLACEMENT  Date/Time:  1/27/2023 8:18 AM    Verbal consent obtained?: Yes    Written consent obtained?: Yes    Risks and benefits: Risks, benefits and alternatives were discussed    Consent given by:  Patient  Patient states understanding of procedure being performed: Yes    Patient's understanding of procedure matches consent: Yes    Procedure consent matches procedure scheduled: Yes    Expected level of sedation:  Moderate  Appropriately NPO:  Yes  ASA Class:  2  Mallampati  :  Grade 1- soft palate, uvula, tonsillar pillars, and posterior pharyngeal wall visible  Lungs:  Lungs clear with good breath sounds bilaterally  Heart:  Normal heart sounds and rate  History & Physical reviewed:  History and physical reviewed and no updates needed  Statement of review:  I have reviewed the lab findings, diagnostic data, medications, and the plan for sedation

## 2023-01-27 NOTE — PROGRESS NOTES
Patient Name: Antonio Canseco  Medical Record Number: [id number]  Today's Date: 1/27/2023    Procedure: Tunneled Central Venous Catheter Placement   Proceduralist: Dr. Vergara, Dr. Martinez, Renaldo Falcon PA-C   Pathology present: N/A    Procedure Start: 09:52  Procedure end: 10:21    Sedation Start: 09:53  Sedation End: 10:21  Total Sedation Time: 28 minutes    Sedation medications administered:  Midazolam 2 mg, Fentanyl 100 mcg    Other medication administered by MD:  Heparin 2 mL in each lumen  (100 units/mL concentration per Renaldo Falcon PA-C d/t outpatient status).     Report given to: RICHARDSON Castillo 2A  : N/A    Other Notes: Pt arrived to IR room 2 from . Consent reviewed. Pt denies any questions or concerns regarding procedure. Pt positioned supine and monitored per protocol. Right Double-Lumen Bob placed.    Pt tolerated procedure without any noted complications. Pt transferred back to .

## 2023-01-27 NOTE — PROGRESS NOTES
PIV removed. Discharge paperwork reviewed with patient, pt stated understanding. Yari (spouce) will transport patient home

## 2023-01-27 NOTE — DISCHARGE INSTRUCTIONS
Interventional Radiology                                                                   Discharge Instructions                                                                Following Tunneled Catheter Placement    Your site(s) has been closed with:     The Catheter is sutured  to skin at  insertion site    Derma Baker (Skin Glue) on neck incision  Do not apply any ointments over site  This thin layer will slough off in 7-10 days               May gently remove Derma Baker in 10 days if still present         Tunneled catheter is always covered with a Sterile Transparent dressing  Keep site clean and dry   Cover with an occlusive dressing for showering  Weekly transparent dressing changes or when it becomes wet or dirty     If there is any oozing or bleeding from the site, apply direct pressure for 5-10 minutes with a gauze pad.  If bleeding continues after 10 minutes call your primary doctor.  If bleeding cannot be controlled with direct pressure, call 911.    Call your Doctor if:  Bleeding as above  Swelling in your neck or arm  Sudden onset of shortness of breath, lightheadedness, or heart palpitations..  Fever greater than 100.5  F  Other signs of infection such as, redness, tenderness, or drainage from the wound.    If you were given sedation:  We recommend an adult stay with you for the first 24 hours.  No driving or alcoholic beverages for 24 hours.    ADDITIONAL INSTRUCTIONS:          If you are on Coumadin you may restart tonight.  Follow up Coumadin Clinic or Primary Care MD         To have your INR rechecked.           No heavy lifting greater than 10 lbs for 3 days.           May use ice pack for pain or minor swelling for 15 min 3-4 times per day.    [facility] Interventional Radiology Department    Physician:   Juan                                 Date:January 27, 2023  Telephone Numbers:  [phone]      Monday-Friday 7:30 am to 4:00  pm  Hospital : [phone]....After hours, Weekends, & Holidays.  Ask for the Interventional Radiologist on call.  Someone is on call 24 hours a day.   Emergency Department:  [phone]

## 2023-01-27 NOTE — PROGRESS NOTES
Patient arrived to room via litter with IR RN s/p CVC tunnel placement . VSS. Deniespain. Pt alert and oriented x4. CVC incretion site CDI.

## 2023-01-30 ENCOUNTER — HOSPITAL ENCOUNTER (INPATIENT)
Facility: CLINIC | Age: 53
LOS: 7 days | Discharge: HOME | DRG: 847 | End: 2023-02-06
Attending: STUDENT IN AN ORGANIZED HEALTH CARE EDUCATION/TRAINING PROGRAM | Admitting: INTERNAL MEDICINE
Payer: COMMERCIAL

## 2023-01-30 DIAGNOSIS — D70.1 CHEMOTHERAPY-INDUCED NEUTROPENIA (H): ICD-10-CM

## 2023-01-30 DIAGNOSIS — G89.3 CANCER RELATED PAIN: ICD-10-CM

## 2023-01-30 DIAGNOSIS — M54.16 LUMBAR RADICULOPATHY: ICD-10-CM

## 2023-01-30 DIAGNOSIS — C49.9 SARCOMA (H): ICD-10-CM

## 2023-01-30 DIAGNOSIS — Z45.2 ENCOUNTER FOR CENTRAL LINE CARE: ICD-10-CM

## 2023-01-30 DIAGNOSIS — C49.9 SARCOMA OF SOFT TISSUE (H): Primary | ICD-10-CM

## 2023-01-30 DIAGNOSIS — R11.0 CHEMOTHERAPY-INDUCED NAUSEA: ICD-10-CM

## 2023-01-30 DIAGNOSIS — D64.9 ANEMIA, UNSPECIFIED TYPE: ICD-10-CM

## 2023-01-30 DIAGNOSIS — T45.1X5A CHEMOTHERAPY-INDUCED NEUTROPENIA (H): ICD-10-CM

## 2023-01-30 DIAGNOSIS — T45.1X5A CHEMOTHERAPY-INDUCED NAUSEA: ICD-10-CM

## 2023-01-30 DIAGNOSIS — I10 ESSENTIAL HYPERTENSION: ICD-10-CM

## 2023-01-30 DIAGNOSIS — L73.2 HIDRADENITIS SUPPURATIVA: ICD-10-CM

## 2023-01-30 LAB
ABO/RH(D): NORMAL
ALBUMIN SERPL BCG-MCNC: 2.9 G/DL (ref 3.5–5.2)
ALBUMIN UR-MCNC: 100 MG/DL
ALP SERPL-CCNC: 64 U/L (ref 40–129)
ALT SERPL W P-5'-P-CCNC: 9 U/L (ref 10–50)
ANION GAP SERPL CALCULATED.3IONS-SCNC: 13 MMOL/L (ref 7–15)
ANTIBODY SCREEN: NEGATIVE
APPEARANCE UR: CLEAR
AST SERPL W P-5'-P-CCNC: 15 U/L (ref 10–50)
BASOPHILS # BLD AUTO: 0.1 10E3/UL (ref 0–0.2)
BASOPHILS # BLD AUTO: 0.1 10E3/UL (ref 0–0.2)
BASOPHILS NFR BLD AUTO: 1 %
BASOPHILS NFR BLD AUTO: 1 %
BILIRUB SERPL-MCNC: 0.4 MG/DL
BILIRUB UR QL STRIP: NEGATIVE
BUN SERPL-MCNC: 10.8 MG/DL (ref 6–20)
CALCIUM SERPL-MCNC: 8.6 MG/DL (ref 8.6–10)
CHLORIDE SERPL-SCNC: 101 MMOL/L (ref 98–107)
COLOR UR AUTO: YELLOW
CREAT SERPL-MCNC: 0.75 MG/DL (ref 0.67–1.17)
DEPRECATED HCO3 PLAS-SCNC: 24 MMOL/L (ref 22–29)
EOSINOPHIL # BLD AUTO: 0.2 10E3/UL (ref 0–0.7)
EOSINOPHIL # BLD AUTO: 0.3 10E3/UL (ref 0–0.7)
EOSINOPHIL NFR BLD AUTO: 3 %
EOSINOPHIL NFR BLD AUTO: 3 %
ERYTHROCYTE [DISTWIDTH] IN BLOOD BY AUTOMATED COUNT: 17.2 % (ref 10–15)
ERYTHROCYTE [DISTWIDTH] IN BLOOD BY AUTOMATED COUNT: 17.4 % (ref 10–15)
FIBRINOGEN PPP-MCNC: 1123 MG/DL (ref 170–490)
FOLATE SERPL-MCNC: 4 NG/ML (ref 4.6–34.8)
GFR SERPL CREATININE-BSD FRML MDRD: >90 ML/MIN/1.73M2
GLUCOSE BLDC GLUCOMTR-MCNC: 236 MG/DL (ref 70–99)
GLUCOSE BLDC GLUCOMTR-MCNC: 297 MG/DL (ref 70–99)
GLUCOSE SERPL-MCNC: 214 MG/DL (ref 70–99)
GLUCOSE UR STRIP-MCNC: NEGATIVE MG/DL
HCT VFR BLD AUTO: 29.8 % (ref 40–53)
HCT VFR BLD AUTO: 30.9 % (ref 40–53)
HGB BLD-MCNC: 8.5 G/DL (ref 13.3–17.7)
HGB BLD-MCNC: 8.8 G/DL (ref 13.3–17.7)
HGB UR QL STRIP: ABNORMAL
IMM GRANULOCYTES # BLD: 0 10E3/UL
IMM GRANULOCYTES # BLD: 0.1 10E3/UL
IMM GRANULOCYTES NFR BLD: 1 %
IMM GRANULOCYTES NFR BLD: 1 %
INR PPP: 1.37 (ref 0.85–1.15)
KETONES UR STRIP-MCNC: 20 MG/DL
LDH SERPL L TO P-CCNC: 193 U/L (ref 0–250)
LEUKOCYTE ESTERASE UR QL STRIP: NEGATIVE
LYMPHOCYTES # BLD AUTO: 1.1 10E3/UL (ref 0.8–5.3)
LYMPHOCYTES # BLD AUTO: 1.3 10E3/UL (ref 0.8–5.3)
LYMPHOCYTES NFR BLD AUTO: 13 %
LYMPHOCYTES NFR BLD AUTO: 15 %
MAGNESIUM SERPL-MCNC: 1.8 MG/DL (ref 1.7–2.3)
MCH RBC QN AUTO: 22.6 PG (ref 26.5–33)
MCH RBC QN AUTO: 22.7 PG (ref 26.5–33)
MCHC RBC AUTO-ENTMCNC: 28.5 G/DL (ref 31.5–36.5)
MCHC RBC AUTO-ENTMCNC: 28.5 G/DL (ref 31.5–36.5)
MCV RBC AUTO: 79 FL (ref 78–100)
MCV RBC AUTO: 80 FL (ref 78–100)
MONOCYTES # BLD AUTO: 0.9 10E3/UL (ref 0–1.3)
MONOCYTES # BLD AUTO: 0.9 10E3/UL (ref 0–1.3)
MONOCYTES NFR BLD AUTO: 10 %
MONOCYTES NFR BLD AUTO: 10 %
MUCOUS THREADS #/AREA URNS LPF: PRESENT /LPF
NEUTROPHILS # BLD AUTO: 6.2 10E3/UL (ref 1.6–8.3)
NEUTROPHILS # BLD AUTO: 6.2 10E3/UL (ref 1.6–8.3)
NEUTROPHILS NFR BLD AUTO: 70 %
NEUTROPHILS NFR BLD AUTO: 72 %
NITRATE UR QL: NEGATIVE
NRBC # BLD AUTO: 0 10E3/UL
NRBC # BLD AUTO: 0 10E3/UL
NRBC BLD AUTO-RTO: 0 /100
NRBC BLD AUTO-RTO: 0 /100
PH UR STRIP: 6 [PH] (ref 5–7)
PHOSPHATE SERPL-MCNC: 3.4 MG/DL (ref 2.5–4.5)
PLATELET # BLD AUTO: 506 10E3/UL (ref 150–450)
PLATELET # BLD AUTO: 530 10E3/UL (ref 150–450)
POTASSIUM SERPL-SCNC: 4.2 MMOL/L (ref 3.4–5.3)
PROT SERPL-MCNC: 7.1 G/DL (ref 6.4–8.3)
RBC # BLD AUTO: 3.74 10E6/UL (ref 4.4–5.9)
RBC # BLD AUTO: 3.9 10E6/UL (ref 4.4–5.9)
RBC URINE: 7 /HPF
RETICS # AUTO: 0.06 10E6/UL (ref 0.03–0.1)
RETICS/RBC NFR AUTO: 1.7 % (ref 0.5–2)
SODIUM SERPL-SCNC: 138 MMOL/L (ref 136–145)
SP GR UR STRIP: 1.03 (ref 1–1.03)
SPECIMEN EXPIRATION DATE: NORMAL
SQUAMOUS EPITHELIAL: 1 /HPF
UROBILINOGEN UR STRIP-MCNC: 2 MG/DL
VIT B12 SERPL-MCNC: 515 PG/ML (ref 232–1245)
WBC # BLD AUTO: 8.5 10E3/UL (ref 4–11)
WBC # BLD AUTO: 8.8 10E3/UL (ref 4–11)
WBC URINE: 2 /HPF

## 2023-01-30 PROCEDURE — 258N000003 HC RX IP 258 OP 636: Performed by: STUDENT IN AN ORGANIZED HEALTH CARE EDUCATION/TRAINING PROGRAM

## 2023-01-30 PROCEDURE — 250N000013 HC RX MED GY IP 250 OP 250 PS 637: Performed by: STUDENT IN AN ORGANIZED HEALTH CARE EDUCATION/TRAINING PROGRAM

## 2023-01-30 PROCEDURE — 83735 ASSAY OF MAGNESIUM: CPT | Performed by: PHYSICIAN ASSISTANT

## 2023-01-30 PROCEDURE — 3E04305 INTRODUCTION OF OTHER ANTINEOPLASTIC INTO CENTRAL VEIN, PERCUTANEOUS APPROACH: ICD-10-PCS | Performed by: INTERNAL MEDICINE

## 2023-01-30 PROCEDURE — 120N000002 HC R&B MED SURG/OB UMMC

## 2023-01-30 PROCEDURE — G0463 HOSPITAL OUTPT CLINIC VISIT: HCPCS

## 2023-01-30 PROCEDURE — 36415 COLL VENOUS BLD VENIPUNCTURE: CPT | Performed by: PHYSICIAN ASSISTANT

## 2023-01-30 PROCEDURE — 250N000012 HC RX MED GY IP 250 OP 636 PS 637: Performed by: PHYSICIAN ASSISTANT

## 2023-01-30 PROCEDURE — 86901 BLOOD TYPING SEROLOGIC RH(D): CPT | Performed by: PHYSICIAN ASSISTANT

## 2023-01-30 PROCEDURE — 86850 RBC ANTIBODY SCREEN: CPT | Performed by: PHYSICIAN ASSISTANT

## 2023-01-30 PROCEDURE — 81001 URINALYSIS AUTO W/SCOPE: CPT | Performed by: PHYSICIAN ASSISTANT

## 2023-01-30 PROCEDURE — 83010 ASSAY OF HAPTOGLOBIN QUANT: CPT | Performed by: PHYSICIAN ASSISTANT

## 2023-01-30 PROCEDURE — 250N000013 HC RX MED GY IP 250 OP 250 PS 637: Performed by: PHYSICIAN ASSISTANT

## 2023-01-30 PROCEDURE — 250N000011 HC RX IP 250 OP 636: Performed by: STUDENT IN AN ORGANIZED HEALTH CARE EDUCATION/TRAINING PROGRAM

## 2023-01-30 PROCEDURE — 93010 ELECTROCARDIOGRAM REPORT: CPT | Performed by: INTERNAL MEDICINE

## 2023-01-30 PROCEDURE — 83615 LACTATE (LD) (LDH) ENZYME: CPT | Performed by: PHYSICIAN ASSISTANT

## 2023-01-30 PROCEDURE — 87040 BLOOD CULTURE FOR BACTERIA: CPT | Performed by: PHYSICIAN ASSISTANT

## 2023-01-30 PROCEDURE — 82607 VITAMIN B-12: CPT | Performed by: PHYSICIAN ASSISTANT

## 2023-01-30 PROCEDURE — 82746 ASSAY OF FOLIC ACID SERUM: CPT | Performed by: PHYSICIAN ASSISTANT

## 2023-01-30 PROCEDURE — 85025 COMPLETE CBC W/AUTO DIFF WBC: CPT | Performed by: PHYSICIAN ASSISTANT

## 2023-01-30 PROCEDURE — 84100 ASSAY OF PHOSPHORUS: CPT | Performed by: PHYSICIAN ASSISTANT

## 2023-01-30 PROCEDURE — 80053 COMPREHEN METABOLIC PANEL: CPT | Performed by: PHYSICIAN ASSISTANT

## 2023-01-30 PROCEDURE — 93005 ELECTROCARDIOGRAM TRACING: CPT

## 2023-01-30 PROCEDURE — 250N000009 HC RX 250: Performed by: STUDENT IN AN ORGANIZED HEALTH CARE EDUCATION/TRAINING PROGRAM

## 2023-01-30 PROCEDURE — 85045 AUTOMATED RETICULOCYTE COUNT: CPT | Performed by: PHYSICIAN ASSISTANT

## 2023-01-30 PROCEDURE — 250N000011 HC RX IP 250 OP 636: Performed by: PHYSICIAN ASSISTANT

## 2023-01-30 PROCEDURE — 85384 FIBRINOGEN ACTIVITY: CPT | Performed by: PHYSICIAN ASSISTANT

## 2023-01-30 PROCEDURE — 258N000002 HC RX IP 258 OP 250: Performed by: STUDENT IN AN ORGANIZED HEALTH CARE EDUCATION/TRAINING PROGRAM

## 2023-01-30 PROCEDURE — 85610 PROTHROMBIN TIME: CPT | Performed by: PHYSICIAN ASSISTANT

## 2023-01-30 PROCEDURE — 85060 BLOOD SMEAR INTERPRETATION: CPT | Performed by: PATHOLOGY

## 2023-01-30 RX ORDER — PROCHLORPERAZINE MALEATE 5 MG
5 TABLET ORAL EVERY 6 HOURS PRN
Status: DISCONTINUED | OUTPATIENT
Start: 2023-01-30 | End: 2023-01-30

## 2023-01-30 RX ORDER — PANTOPRAZOLE SODIUM 40 MG/1
40 TABLET, DELAYED RELEASE ORAL DAILY
Status: DISCONTINUED | OUTPATIENT
Start: 2023-01-30 | End: 2023-02-06 | Stop reason: HOSPADM

## 2023-01-30 RX ORDER — SIMETHICONE 80 MG
80 TABLET,CHEWABLE ORAL EVERY 6 HOURS PRN
Status: DISCONTINUED | OUTPATIENT
Start: 2023-01-30 | End: 2023-02-06 | Stop reason: HOSPADM

## 2023-01-30 RX ORDER — ONDANSETRON 2 MG/ML
4-8 INJECTION INTRAMUSCULAR; INTRAVENOUS EVERY 8 HOURS PRN
Status: DISCONTINUED | OUTPATIENT
Start: 2023-01-30 | End: 2023-02-06 | Stop reason: HOSPADM

## 2023-01-30 RX ORDER — HEPARIN SODIUM,PORCINE 10 UNIT/ML
5-10 VIAL (ML) INTRAVENOUS
Status: DISCONTINUED | OUTPATIENT
Start: 2023-01-30 | End: 2023-02-06 | Stop reason: HOSPADM

## 2023-01-30 RX ORDER — OLANZAPINE 2.5 MG/1
5 TABLET, FILM COATED ORAL AT BEDTIME
Status: DISCONTINUED | OUTPATIENT
Start: 2023-01-30 | End: 2023-02-06 | Stop reason: HOSPADM

## 2023-01-30 RX ORDER — EPINEPHRINE 1 MG/ML
0.3 INJECTION, SOLUTION, CONCENTRATE INTRAVENOUS EVERY 5 MIN PRN
Status: DISCONTINUED | OUTPATIENT
Start: 2023-01-30 | End: 2023-02-06 | Stop reason: HOSPADM

## 2023-01-30 RX ORDER — NALOXONE HYDROCHLORIDE 0.4 MG/ML
0.4 INJECTION, SOLUTION INTRAMUSCULAR; INTRAVENOUS; SUBCUTANEOUS
Status: DISCONTINUED | OUTPATIENT
Start: 2023-01-30 | End: 2023-02-06 | Stop reason: HOSPADM

## 2023-01-30 RX ORDER — HEPARIN SODIUM,PORCINE 10 UNIT/ML
5-10 VIAL (ML) INTRAVENOUS EVERY 24 HOURS
Status: DISCONTINUED | OUTPATIENT
Start: 2023-01-30 | End: 2023-02-06 | Stop reason: HOSPADM

## 2023-01-30 RX ORDER — DEXTROSE MONOHYDRATE 25 G/50ML
25-50 INJECTION, SOLUTION INTRAVENOUS
Status: DISCONTINUED | OUTPATIENT
Start: 2023-01-30 | End: 2023-02-06 | Stop reason: HOSPADM

## 2023-01-30 RX ORDER — HEPARIN SODIUM (PORCINE) LOCK FLUSH IV SOLN 100 UNIT/ML 100 UNIT/ML
5-10 SOLUTION INTRAVENOUS
Status: DISCONTINUED | OUTPATIENT
Start: 2023-01-30 | End: 2023-02-06 | Stop reason: HOSPADM

## 2023-01-30 RX ORDER — ONDANSETRON 8 MG/1
8 TABLET, FILM COATED ORAL EVERY 8 HOURS
Status: COMPLETED | OUTPATIENT
Start: 2023-01-30 | End: 2023-02-05

## 2023-01-30 RX ORDER — LORAZEPAM 2 MG/ML
.5-1 INJECTION INTRAMUSCULAR EVERY 6 HOURS PRN
Status: DISCONTINUED | OUTPATIENT
Start: 2023-01-30 | End: 2023-02-06 | Stop reason: HOSPADM

## 2023-01-30 RX ORDER — LORAZEPAM 0.5 MG/1
.5-1 TABLET ORAL EVERY 6 HOURS PRN
Status: DISCONTINUED | OUTPATIENT
Start: 2023-01-30 | End: 2023-02-06 | Stop reason: HOSPADM

## 2023-01-30 RX ORDER — DEXAMETHASONE SODIUM PHOSPHATE 4 MG/ML
4 INJECTION, SOLUTION INTRA-ARTICULAR; INTRALESIONAL; INTRAMUSCULAR; INTRAVENOUS; SOFT TISSUE ONCE
Status: COMPLETED | OUTPATIENT
Start: 2023-01-30 | End: 2023-01-30

## 2023-01-30 RX ORDER — PROCHLORPERAZINE MALEATE 10 MG
10 TABLET ORAL EVERY 6 HOURS PRN
Status: DISCONTINUED | OUTPATIENT
Start: 2023-01-30 | End: 2023-02-06 | Stop reason: HOSPADM

## 2023-01-30 RX ORDER — CLINDAMYCIN PHOSPHATE 10 MG/G
GEL TOPICAL 2 TIMES DAILY
Status: DISCONTINUED | OUTPATIENT
Start: 2023-01-30 | End: 2023-02-06 | Stop reason: HOSPADM

## 2023-01-30 RX ORDER — ONDANSETRON 4 MG/1
4-8 TABLET, ORALLY DISINTEGRATING ORAL EVERY 8 HOURS PRN
Status: DISCONTINUED | OUTPATIENT
Start: 2023-01-30 | End: 2023-02-06 | Stop reason: HOSPADM

## 2023-01-30 RX ORDER — ALBUTEROL SULFATE 90 UG/1
1-2 AEROSOL, METERED RESPIRATORY (INHALATION)
Status: DISCONTINUED | OUTPATIENT
Start: 2023-01-30 | End: 2023-02-06 | Stop reason: HOSPADM

## 2023-01-30 RX ORDER — FERROUS SULFATE 325(65) MG
325 TABLET ORAL
Status: DISCONTINUED | OUTPATIENT
Start: 2023-01-30 | End: 2023-02-06 | Stop reason: HOSPADM

## 2023-01-30 RX ORDER — METHYLPREDNISOLONE SODIUM SUCCINATE 125 MG/2ML
125 INJECTION, POWDER, LYOPHILIZED, FOR SOLUTION INTRAMUSCULAR; INTRAVENOUS
Status: DISCONTINUED | OUTPATIENT
Start: 2023-01-30 | End: 2023-02-06 | Stop reason: HOSPADM

## 2023-01-30 RX ORDER — AMOXICILLIN 250 MG
2 CAPSULE ORAL 2 TIMES DAILY PRN
Status: DISCONTINUED | OUTPATIENT
Start: 2023-01-30 | End: 2023-02-06 | Stop reason: HOSPADM

## 2023-01-30 RX ORDER — DEXTROMETHORPHAN POLISTIREX 30 MG/5ML
30 SUSPENSION ORAL 2 TIMES DAILY PRN
Status: DISCONTINUED | OUTPATIENT
Start: 2023-01-30 | End: 2023-02-06 | Stop reason: HOSPADM

## 2023-01-30 RX ORDER — DIPHENHYDRAMINE HYDROCHLORIDE 50 MG/ML
50 INJECTION INTRAMUSCULAR; INTRAVENOUS
Status: COMPLETED | OUTPATIENT
Start: 2023-01-30 | End: 2023-01-30

## 2023-01-30 RX ORDER — ONDANSETRON 4 MG/1
4-8 TABLET, FILM COATED ORAL EVERY 8 HOURS PRN
Status: DISCONTINUED | OUTPATIENT
Start: 2023-01-30 | End: 2023-02-06 | Stop reason: HOSPADM

## 2023-01-30 RX ORDER — NICOTINE POLACRILEX 4 MG
15-30 LOZENGE BUCCAL
Status: DISCONTINUED | OUTPATIENT
Start: 2023-01-30 | End: 2023-02-06 | Stop reason: HOSPADM

## 2023-01-30 RX ORDER — AMOXICILLIN 250 MG
1 CAPSULE ORAL 2 TIMES DAILY PRN
Status: DISCONTINUED | OUTPATIENT
Start: 2023-01-30 | End: 2023-02-06 | Stop reason: HOSPADM

## 2023-01-30 RX ORDER — LANOLIN ALCOHOL/MO/W.PET/CERES
400 CREAM (GRAM) TOPICAL DAILY
Status: DISCONTINUED | OUTPATIENT
Start: 2023-01-30 | End: 2023-02-06 | Stop reason: HOSPADM

## 2023-01-30 RX ORDER — MEPERIDINE HYDROCHLORIDE 25 MG/ML
25 INJECTION INTRAMUSCULAR; INTRAVENOUS; SUBCUTANEOUS EVERY 30 MIN PRN
Status: DISCONTINUED | OUTPATIENT
Start: 2023-01-30 | End: 2023-02-06 | Stop reason: HOSPADM

## 2023-01-30 RX ORDER — OXYCODONE HYDROCHLORIDE 5 MG/1
5 TABLET ORAL EVERY 6 HOURS PRN
Status: DISCONTINUED | OUTPATIENT
Start: 2023-01-30 | End: 2023-02-03

## 2023-01-30 RX ORDER — GABAPENTIN 300 MG/1
300 CAPSULE ORAL 3 TIMES DAILY
Status: DISCONTINUED | OUTPATIENT
Start: 2023-01-30 | End: 2023-02-06 | Stop reason: HOSPADM

## 2023-01-30 RX ORDER — BENZONATATE 100 MG/1
100 CAPSULE ORAL 3 TIMES DAILY PRN
Status: DISCONTINUED | OUTPATIENT
Start: 2023-01-30 | End: 2023-02-06 | Stop reason: HOSPADM

## 2023-01-30 RX ORDER — LISINOPRIL 20 MG/1
20 TABLET ORAL DAILY
Status: DISCONTINUED | OUTPATIENT
Start: 2023-01-30 | End: 2023-02-06 | Stop reason: HOSPADM

## 2023-01-30 RX ORDER — DEXTROSE MONOHYDRATE 50 MG/ML
10-20 INJECTION, SOLUTION INTRAVENOUS
Status: DISCONTINUED | OUTPATIENT
Start: 2023-02-06 | End: 2023-02-06 | Stop reason: HOSPADM

## 2023-01-30 RX ORDER — ACETAMINOPHEN 325 MG/1
650 TABLET ORAL EVERY 4 HOURS PRN
Status: DISCONTINUED | OUTPATIENT
Start: 2023-01-30 | End: 2023-02-06 | Stop reason: HOSPADM

## 2023-01-30 RX ORDER — LANOLIN ALCOHOL/MO/W.PET/CERES
3-6 CREAM (GRAM) TOPICAL
Status: DISCONTINUED | OUTPATIENT
Start: 2023-01-30 | End: 2023-02-06 | Stop reason: HOSPADM

## 2023-01-30 RX ORDER — NALOXONE HYDROCHLORIDE 0.4 MG/ML
0.2 INJECTION, SOLUTION INTRAMUSCULAR; INTRAVENOUS; SUBCUTANEOUS
Status: DISCONTINUED | OUTPATIENT
Start: 2023-01-30 | End: 2023-02-06 | Stop reason: HOSPADM

## 2023-01-30 RX ORDER — POLYETHYLENE GLYCOL 3350 17 G/17G
17 POWDER, FOR SOLUTION ORAL DAILY PRN
Status: DISCONTINUED | OUTPATIENT
Start: 2023-01-30 | End: 2023-02-06 | Stop reason: HOSPADM

## 2023-01-30 RX ORDER — ENOXAPARIN SODIUM 100 MG/ML
40 INJECTION SUBCUTANEOUS EVERY 12 HOURS
Status: DISCONTINUED | OUTPATIENT
Start: 2023-01-31 | End: 2023-02-06 | Stop reason: HOSPADM

## 2023-01-30 RX ORDER — ALBUTEROL SULFATE 0.83 MG/ML
2.5 SOLUTION RESPIRATORY (INHALATION)
Status: DISCONTINUED | OUTPATIENT
Start: 2023-01-30 | End: 2023-02-06 | Stop reason: HOSPADM

## 2023-01-30 RX ORDER — SENNOSIDES 8.6 MG
1 TABLET ORAL DAILY PRN
COMMUNITY
End: 2023-06-23

## 2023-01-30 RX ORDER — DULOXETIN HYDROCHLORIDE 60 MG/1
60 CAPSULE, DELAYED RELEASE ORAL DAILY
Status: DISCONTINUED | OUTPATIENT
Start: 2023-01-30 | End: 2023-02-06 | Stop reason: HOSPADM

## 2023-01-30 RX ORDER — ENOXAPARIN SODIUM 100 MG/ML
40 INJECTION SUBCUTANEOUS EVERY 24 HOURS
Status: DISCONTINUED | OUTPATIENT
Start: 2023-01-30 | End: 2023-01-30

## 2023-01-30 RX ADMIN — INSULIN ASPART 2 UNITS: 100 INJECTION, SOLUTION INTRAVENOUS; SUBCUTANEOUS at 18:17

## 2023-01-30 RX ADMIN — OXYCODONE HYDROCHLORIDE 5 MG: 5 TABLET ORAL at 21:47

## 2023-01-30 RX ADMIN — THIAMINE HCL TAB 100 MG 100 MG: 100 TAB at 12:06

## 2023-01-30 RX ADMIN — Medication 400 MCG: at 16:24

## 2023-01-30 RX ADMIN — DEXAMETHASONE SODIUM PHOSPHATE 4 MG: 4 INJECTION, SOLUTION INTRA-ARTICULAR; INTRALESIONAL; INTRAMUSCULAR; INTRAVENOUS; SOFT TISSUE at 14:00

## 2023-01-30 RX ADMIN — GABAPENTIN 300 MG: 300 CAPSULE ORAL at 16:23

## 2023-01-30 RX ADMIN — CLINDAMYCIN PHOSPHATE: 10 GEL TOPICAL at 16:39

## 2023-01-30 RX ADMIN — DULOXETINE HYDROCHLORIDE 60 MG: 60 CAPSULE, DELAYED RELEASE ORAL at 12:06

## 2023-01-30 RX ADMIN — ONDANSETRON HYDROCHLORIDE 8 MG: 8 TABLET, FILM COATED ORAL at 14:05

## 2023-01-30 RX ADMIN — THIAMINE HCL TAB 100 MG 100 MG: 100 TAB at 21:46

## 2023-01-30 RX ADMIN — ONDANSETRON HYDROCHLORIDE 8 MG: 8 TABLET, FILM COATED ORAL at 21:46

## 2023-01-30 RX ADMIN — Medication 5 ML: at 10:19

## 2023-01-30 RX ADMIN — MESNA 3780 MG: 100 INJECTION, SOLUTION INTRAVENOUS at 19:48

## 2023-01-30 RX ADMIN — DOXORUBICIN HYDROCHLORIDE 113 MG: 2 INJECTABLE, LIPOSOMAL INTRAVENOUS at 16:16

## 2023-01-30 RX ADMIN — FERROUS SULFATE TAB 325 MG (65 MG ELEMENTAL FE) 325 MG: 325 (65 FE) TAB at 12:06

## 2023-01-30 RX ADMIN — FOSAPREPITANT 150 MG: 150 INJECTION, POWDER, LYOPHILIZED, FOR SOLUTION INTRAVENOUS at 13:58

## 2023-01-30 RX ADMIN — METHYLPREDNISOLONE SODIUM SUCCINATE 125 MG: 125 INJECTION, POWDER, FOR SOLUTION INTRAMUSCULAR; INTRAVENOUS at 18:12

## 2023-01-30 RX ADMIN — Medication 5 ML: at 21:42

## 2023-01-30 RX ADMIN — PANTOPRAZOLE SODIUM 40 MG: 40 TABLET, DELAYED RELEASE ORAL at 12:06

## 2023-01-30 RX ADMIN — LISINOPRIL 20 MG: 20 TABLET ORAL at 12:06

## 2023-01-30 RX ADMIN — DIPHENHYDRAMINE HYDROCHLORIDE 50 MG: 50 INJECTION, SOLUTION INTRAMUSCULAR; INTRAVENOUS at 14:10

## 2023-01-30 RX ADMIN — GABAPENTIN 300 MG: 300 CAPSULE ORAL at 19:41

## 2023-01-30 RX ADMIN — POTASSIUM CHLORIDE: 2 INJECTION, SOLUTION, CONCENTRATE INTRAVENOUS at 17:02

## 2023-01-30 RX ADMIN — OLANZAPINE 5 MG: 2.5 TABLET, FILM COATED ORAL at 21:50

## 2023-01-30 ASSESSMENT — ACTIVITIES OF DAILY LIVING (ADL)
ADLS_ACUITY_SCORE: 20

## 2023-01-30 NOTE — PROGRESS NOTES
Nursing Focus: Chemotherapy  D: Positive brisk bright red blood return via PICC. Insertion site is clean/dry/intact, dressing intact with no complaints of pain.  Urine output is recorded in intake Doc Flowsheet.    I: Premedications given per order (see electronic medical administration record). Dose #1 of Doxil started to infuse @ 100 ml/hr. Reviewed pt teaching on chemotherapy side effects.  Pt denies need for further teaching. Chemotherapy double checked per protocol by two chemotherapy competent RN's.   A: Tolerating chemo well. Denies nausea.   P: Continue to monitor urine output and symptoms of nausea. Screen for symptoms of toxicity.

## 2023-01-30 NOTE — CONSULTS
[facility]  WO Nurse Inpatient Assessment     Consulted for: Right thigh/groin       Patient History (according to provider note(s):      Antonio Canseco is a 52 year old male who presents with past medical history of T2DM, HTN, GERD, Hidrenitis Suppurativa, IBS and 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 Currently being admitted for Cycle 1 Doxil/Ifosfamide.     Areas Assessed:      Areas visualized during today's visit: Right rear thigh    Wound location: Right Rear Thigh      Last photo: 1/30/23  Wound due to: Hidradenitis Suppurativa  Wound history/plan of care: Wife reports there is an active lesion on the right side, at home they were using topical clindamycin that wife had on hand and she has noted improvement in size of lesion. Per Hem/onc Continue topical Clindamycin BID. Patient also has sarcoma on the right posterior thigh- not visible, but may be contributing to some additional 'swelling' noted in this region.  Wound base: lesion     Palpation of the wound bed: firm      Drainage: none     Description of drainage: none     Measurements (length x width x depth, in cm): 1.5  x 1  x  0 cm   Periwound skin: Intact      Color: normal and consistent with surrounding tissue      Temperature: normal   Odor: none  Pain: denies , none  Pain interventions prior to dressing change: N/A  Treatment goal: Infection control/prevention and Protection  STATUS: initial assessment  Supplies ordered: ordered vashe    Treatment Plan:     Right rear thigh lesion wound(s): BID  Apply vashe moistened guaze and allow to soak for 10min. Then pat dry. Cover with dry gauze as needed if draining.     Orders: Written    RECOMMEND PRIMARY TEAM ORDER: Dermatology consult already talked with provider.   Education provided: plan of care and Infection prevention   Discussed plan of care with: Patient and Family  [facility] nurse follow-up plan: weekly Recommend consult to  dermatology Hidrenitis Suppurativa lesion on back of right thigh. Provider   Notify WOC if wound(s) deteriorate.  Nursing to notify the Provider(s) and re-consult the WOC Nurse if new skin concern.    DATA:     Current support surface: Standard  Standard gel/foam mattress (IsoFlex, Atmos air, etc)  Containment of urine/stool: Continent of bladder and Continent of bowel  BMI: Body mass index is 41.83 kg/m .   Active diet order: Orders Placed This Encounter      Regular Diet Adult     Output: No intake/output data recorded.     Labs: Recent Labs   Lab 01/30/23  1152 01/30/23  1025   ALBUMIN  --  2.9*   HGB 8.5* 8.8*   INR  --  1.37*   WBC 8.8 8.5     Pressure injury risk assessment:   Sensory Perception: 4-->no impairment  Moisture: 4-->rarely moist  Activity: 4-->walks frequently  Mobility: 4-->no limitation  Nutrition: 3-->adequate  Friction and Shear: 3-->no apparent problem  Ger Score: 22    Gisele Griffith RN CWOCN  Dept. Pager: [phone]  Dept. Office Number: [phone]

## 2023-01-30 NOTE — PROGRESS NOTES
Rapid Response Team Note    Assessment   In assessment a rapid response was called on Antonio Canseco due to hypersensitivity reaction. This presentation is likely due to Emend infusion.     Plan   -  Agree with IV benadryl and stopping Emend. Low threshold to call another RRT with any clinical changes  -  The Hematology/Oncology primary team was at bedside.  -  Disposition: The patient will remain on the current unit. We will continue to monitor this patient closely.  -  Reassessment and plan follow-up will be performed by the primary team    Lena Monge PA-C  [facility] RRT [facility] Job Code Contact #[phone]  [facility] Paging/Directory    Hospital Course   Brief Summary of events leading to rapid response:   Patient developed flushing and either passed out or nearly passed out in the setting of getting Emend. Medication was stopped and patient was treated with IV Benadryl. Denies ongoing issues with AMS, chest pain, dyspnea, chest tightness, difficulty breathing/swallowing/cough. Has chronic cough which is at BL    Admission Diagnosis:   Sarcoma (H) [C49.9]    Physical Exam   Temp: 98.6  F (37  C) Temp  Min: 98.6  F (37  C)  Max: 98.6  F (37  C)  Resp: 20 Resp  Min: 16  Max: 20  SpO2: 96 % SpO2  Min: 89 %  Max: 96 %  Pulse: 84 Pulse  Min: 84  Max: 91    No data recorded  BP: 109/73 Systolic (24hrs), Av , Min:108 , Max:162   Diastolic (24hrs), Av, Min:62, Max:96     I/Os: No intake/output data recorded.     Exam:   General: acutely ill appearing  Mental Status: AAOx4.  CV: RRR, S1, S2  Resp: CTAB  Abdomen: Soft, non-tender with active bowel sounds  Skin: Well perfused, warm. No diaphoresis or rash  HEENT: No edema of the tongue or oropharynx noted    Significant Results and Procedures   Lactic Acid: No results for input(s): LACT, LACTS in the last 56811 hours.  CBC:   Recent Labs   Lab Test 23  1152 23  1025 23  1101   WBC 8.8 8.5 8.7   HGB 8.5* 8.8* 10.4*   HCT 29.8* 30.9* 35.0*   PLT  506* 530* 518*        Sepsis Evaluation   The patient is not known to have an infection.  NO EVIDENCE OF SEPSIS at this time.  Vital sign, physical exam, and lab findings are due to see above.

## 2023-01-30 NOTE — PLAN OF CARE
Goal Outcome Evaluation:      Plan of Care Reviewed With: patient, spouse        Pt admitted for C1D1 Doxil Ifosfamide/[place]. Right CVC in place. Pt and wife oriented to the unit and plan of care. Education provided on chemotherapy and side effects HGB 8.5- down from previous labs. Blood cultures drawn as he has been febrile at home. Afebrile upon admission. UA ordered. WOC and dermatology consults ordered for HS lesion management.     5 min after starting Emend, pt was swallowing his Zofran pill and started coughing and wasn't catching his breath. He then turned very pale and passed out for a few seconds. Emend was stopped, staff alert was pressed, and hypersensitivity kit was obtained. Within 3 seconds he opened his eyes and was talking. Stated he was SOB. Oxygen applied and IV Benadryl given. RRT called and assessed patient. Sats were 88% on RA and up to the high 90's on O2. Emend was added to his allergy list in Epic. This didn't seem like an aspiration event-just coincided with swallowing his pill.     Antonio is now looking back to his baseline. Denies SOB. Plan to hold off on Doxil until around 1600 to give him time to recover. He notified his wife of the event.

## 2023-01-30 NOTE — PROGRESS NOTES
BRIEF EVENT NOTE  Notified by RN that patient was having an allergic reaction with Emend infusion. During the infusion patient began having more intense cough, SOB and per bedside RN appeared of have passed out briefly.    I went to evaluate patient immediately who was alert and orientated upon entering the room. He was pale and clammy. RRR, no increased work of breathing on 3L NC, lungs CTA bilaterally and throughout, continued to have clearing cough. Mucus membranes dry but no tongue swelling appreciated. Patient was talking in full sentences.  Patient endorse ongoing SOB. Patient denies chest pain, difficulty swallowing, swelling of throat or tongue.    Patient received IV benadryl and rapid response was called. Discussed with bedside RN that we will terminate Emend infusion and not re-trial. Patient was onboard with this plan.     Erika Mar PA-C ([facility])  Hematology/Oncology  Pager #[phone]

## 2023-01-30 NOTE — PROVIDER NOTIFICATION
01/30/23 1400   Call Information   Date of Call 01/30/23   Time of Call 1409   Name of person requesting the team Yany DORSEY   Title of person requesting team RN   RRT Arrival time 1411   Time RRT ended 1432   Reason for call   Type of RRT Adult   Primary reason for call Sudden or unanticipated change in patient condition   Was patient transferred from the ED, ICU, or PACU within last 24 hours prior to RRT call? No   SBAR   Situation Hypersensitivity reaction to premedicaton   Background Recent diagnosis of sarcoma in thigh. Admitted for first cycle of chemotherapy   Notable History/Conditions Cancer   Assessment Pt recovered quickly after bedside RN stopped the Emend infusion. A+O, VSS, RA, NAD   Interventions Meds  (Emend stopped. Benadryl given)   Patient Outcome   Patient Outcome Stabilized on unit   RRT Team   Attending/Primary/Covering Physician Heme/Onc   Date Attending Physician notified 01/30/23   Time Attending Physician notified 1408   Physician(s) Lena Monge PA-C   Lead RICHARDSON LOPEZ   RT n/a   Post RRT Intervention Assessment   Post RRT Assessment Stable/Improved   Date Follow Up Done 01/30/23   Time Follow Up Done 4255

## 2023-01-30 NOTE — PHARMACY-ADMISSION MEDICATION HISTORY
Admission Medication History Completed by Pharmacy    See [facility] Admission Navigator for allergy information, preferred outpatient pharmacy, prior to admission medications and immunization status.     Medication History Sources:     Patient, fill history    Changes made to PTA medication list (reason):    Added: None    Deleted: Metformin IR tablets - No longer taking and takes ER tablets    Changed:   o Omeprazole - 20 mg PO daily PRN heartburn    Additional Information:    Current gabapentin dose is 300 mg PO TID    Breo Ellipta was prescribed last year as a trial for SOB and coughing; no longer takes    Prior to Admission medications    Medication Sig Last Dose Taking? Auth Provider Long Term End Date   DULoxetine (CYMBALTA) 60 MG capsule Take 1 capsule (60 mg) by mouth daily 1/29/2023 at PM Yes Mynor Mason MD Yes    ferrous sulfate (FEROSUL) 325 (65 Fe) MG tablet Take 1 tablet (325 mg) by mouth daily (with breakfast) 1/29/2023 at AM Yes Scheierl, Amber J, APRN CNP     gabapentin (NEURONTIN) 300 MG capsule Take 1 capsule (300 mg) by mouth 3 times daily Take 1 at bedtime X 7 days, then 1 twice daily X 7 days, then 1 tid 1/30/2023 at AM; 1 dose/3 Yes Mynor Mason MD Yes    lisinopril (ZESTRIL) 20 MG tablet Take 1 tablet (20 mg) by mouth daily 1/29/2023 at PM Yes Mynor Mason MD Yes    meloxicam (MOBIC) 15 MG tablet Take 1 tablet (15 mg) by mouth daily 1/30/2023 at AM Yes Mynor Mason MD Yes    metFORMIN (GLUCOPHAGE XR) 500 MG 24 hr tablet Take 2 tablets (1,000 mg) by mouth daily (with dinner) for 30 days 1/29/2023 at PM Yes Michael Laboy MD Yes 2/26/23   omeprazole 20 MG tablet Take 20 mg by mouth daily as needed Past Week at Unknown Yes Reported, Patient     oxyCODONE (ROXICODONE) 5 MG tablet Take 5 mg by mouth every 6 hours as needed for severe pain (7-10) Past Week at Unknown Yes Reported, Patient     sennosides (SENOKOT) 8.6 MG tablet Take 1 tablet by mouth daily as needed for constipation  1/29/2023 at PM Yes Reported, Patient     blood glucose (NO BRAND SPECIFIED) test strip accuchek guide- Use to test blood sugar 1 times daily or as directed.   Mynor Mason MD     blood glucose monitoring (ACCU-CHEK FASTCLIX) lancets 1 each daily Accuchek guide   Mynor Mason MD         Date completed: 01/30/23    Medication history completed by: Cesar Mcmahan

## 2023-01-30 NOTE — H&P
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 Currently being admitted for Cycle 1 Doxil/Ifosfamide.     ONC   # High Grade Sarcoma R Thigh   This is a patient of Dr. Price. Patient initially noted right hamstring strain in June 2022, he was treated for sciatica. Pain continued to worsen and went for MRI of L spine and right thigh, which showed a large mass in the posterior compartment of the right thigh. He underwent an US guided biopsy and pathology c/w high grade sarcoma. CT CAP with pulmonary nodules but no site of metastatic disease. Currently being admitted for Cycle 1 Doxil/Ifos.   - Baseline Echo 1/26 EF 55%  - Bob PTA  - Per outpatient notes patient will need to discharge on prophylaxis Levaquin and Augmentin (d/t HS)       Therapy Plan: Doxil/Ifosfamide/Mesna (C1D1=1/30/23)    - Doxil 45 mg/m2 (113 mg) IV -- D1    - Ifosfamide 1,500 mg/m2 (3,780 mg) IV -- D1-6     - Mesna 1,500 mg/m2 (3,780 mg) IV -- D7    - Neulasta 6 mg subcutaneous -- D8 will need to arrange     - Premeds: Thiamine, Emend, Zofran, Dexamethasone     # Iron Deficiency Anemia   # Acute on Chronic Anemia   Hgb on admission was noted to be 8.8; last was 10.4 on 1/26. Patient denies any evidence of over bleeding.   - Will complete additional work up with B12, folate, haptoglobin, LDH and peripheral smear   - Continue PTA Iron supplement daily   - Transfuse to keep Hgb >7     # Cancer-related Pain   - PTA Gabapentin 300 mg TID   - PTA Oxycodone 5 mg q6h PRN     CARDS   # HTN   - PTA Lisinopril     PULM   # Chronic Cough   # Pulmonary Nodules   Patient's wife reports patient has had  "chronic cough for approximately last 10 months. He has tried several OTC medications which did not help, he was recently started on PPI with no improvement of cough. CT CAP obtained for staging shows multiple bilateral pulmonary nodules   - Monitor while inpatient   - Tessalon Pearls, Delsym, and Cepacol lozenges PRN     ENDO   # T2DM   Follows with PCP Dr. Mynor Mason; last Hgb A1C 11.7 on 1/18/23  - Hold PTA Metformin; anticipate resuming on discharge   - Medium intensity sliding scale insulin   - Hypoglycemia protocol onboard      GI   # GERD   - PTA Omeprazole     # Irritable Bowel Syndrome   - PTA Duloxetine     DERM   # Hidrenitis Suppurativa   Wife reports there is an active lesion on the right side, at home they were using topical clindamycin that wife had on hand and she has noted improvement in size of lesion.   - Continue topical Clindamycin BID   - WOC consulted on admission, appreciate recs     FEN:  -IVF per chemo protocol   -PRN lyte replacement  -RDAT    Prophy/Misc:  -VTE: PPx Lovenox BID; hold if platelets   -GI/PUD: PTA Omeprazole   -Bowels: PRN Senna and Miralax     Clinically Significant Risk Factors Present on Admission              # Hypoalbuminemia: Lowest albumin = 2.9 g/dL at 1/30/2023 10:25 AM, will monitor as appropriate  # Coagulation Defect: INR = 1.37 (Ref range: 0.85 - 1.15) and/or PTT = N/A, will monitor for bleeding    # Hypertension: home medication list includes antihypertensive(s)     # DMII: A1C = 11.7 % (Ref range: <5.7 %) within past 3 months    # Severe Obesity: Estimated body mass index is 41.83 kg/m  as calculated from the following:    Height as of 1/27/23: 1.803 m (5' 11\").    Weight as of this encounter: 136 kg (299 lb 14.4 oz).           This patient was discussed with Dr. Jasso    I spent >60 minutes face-to-face or coordinating care of Antonio Canseco. Over 50% of our time on the unit was spent counseling the patient and/or coordinating care.    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 Currently being admitted for Cycle 1 Doxil/Ifosfamide.    On admission, Antonio is overall feeling well. He recently had a dumont placed and reports it is doing well. He has no immediate questions regarding chemotherapy plan. Wife at bedside and asked several good questions mostly regarding fevers and chronic cough. Reviewed common side effects associated with this chemo regimen including but not limited to nausea/vomiting, neurotoxicity and fatigue. All questions answered at this time.     A comprehensive review of systems was obtained and is negative other than noted here or in the HPI.      Past Medical History    I have reviewed this patient's medical history and updated it with pertinent information if needed.   Past Medical History:   Diagnosis Date     Acrochordon 02/11/2021     CARDIOVASCULAR SCREENING; LDL GOAL LESS THAN 160 03/27/2012     Colon adenoma      Controlled type 2 diabetes mellitus without complication, without long-term current use of insulin (H) 09/16/2019    X 1     Cough 02/04/2014     Degeneration of thoracic intervertebral disc 12/11/2013     Dysfunction of both eustachian tubes 04/12/2019     Elevated blood pressure 12/22/2014     Elevated hemoglobin A1c 09/16/2019    X 1     Gastroesophageal reflux disease      Hamstring strain, right, subsequent encounter 12/19/2022     Hepatic cyst 05/22/2018     Hepatic steatosis 12/11/2013     History of colonic polyps 12/11/2013     History of drainage of abscess 09/16/2019     Hypertension       Irritable bowel syndrome without diarrhea 2018     Low libido 2019     Lumbar radiculopathy 2022     Microscopic hematuria 2013     Nephrolithiasis      Obesity 2012     BRIAN on CPAP 2012     Other irritable bowel syndrome 2018     Pain in thoracic spine 2013     Pulmonary nodule 2018     Right-sided chest pain 2018     Sarcoma (H) 2023     Sleep apnea      Tinea pedis of both feet 2021     Tinnitus, unspecified laterality 2019     Uncontrolled diabetes mellitus with hyperglycemia, without long-term current use of insulin (H) 2023       Past Surgical History   I have reviewed this patient's surgical history and updated it with pertinent information if needed.  Past Surgical History:   Procedure Laterality Date     COLONOSCOPY       COLONOSCOPY N/A 2021    Procedure: COLONOSCOPY (fv);  Surgeon: Ean Alcantara MD;  Location: [facility] OR     CYSTOSCOPY  2014    Procedure: CYSTOSCOPY;   Video Cystoscopy with Exam Under Anesthesia;  Surgeon: Chente Moeller MD;  Location: [facility] OR     IR CVC TUNNEL PLACEMENT > 5 YRS OF AGE  2023     LEG SURGERY Left 2019    Suregy r/t infection     wisdom teeth         Prior to Admission Medications   Prior to Admission Medications   Prescriptions Last Dose Informant Patient Reported? Taking?   DULoxetine (CYMBALTA) 60 MG capsule 2023 at PM  No Yes   Sig: Take 1 capsule (60 mg) by mouth daily   blood glucose (NO BRAND SPECIFIED) test strip   No No   Sig: accuchek guide- Use to test blood sugar 1 times daily or as directed.   blood glucose monitoring (ACCU-CHEK FASTCLIX) lancets   No No   Si each daily Accuchek guide   ferrous sulfate (FEROSUL) 325 (65 Fe) MG tablet 2023 at AM  No Yes   Sig: Take 1 tablet (325 mg) by mouth daily (with breakfast)   gabapentin (NEURONTIN) 300 MG capsule 2023 at AM; 1 dose/3  No Yes   Sig: Take 1 capsule (300 mg) by mouth 3 times daily  Take 1 at bedtime X 7 days, then 1 twice daily X 7 days, then 1 tid   lisinopril (ZESTRIL) 20 MG tablet 1/29/2023 at PM  No Yes   Sig: Take 1 tablet (20 mg) by mouth daily   meloxicam (MOBIC) 15 MG tablet 1/30/2023 at AM  No Yes   Sig: Take 1 tablet (15 mg) by mouth daily   metFORMIN (GLUCOPHAGE XR) 500 MG 24 hr tablet 1/29/2023 at PM  No Yes   Sig: Take 2 tablets (1,000 mg) by mouth daily (with dinner) for 30 days   omeprazole 20 MG tablet Past Week at Unknown  Yes Yes   Sig: Take 20 mg by mouth daily as needed   oxyCODONE (ROXICODONE) 5 MG tablet Past Week at Unknown  Yes Yes   Sig: Take 5 mg by mouth every 6 hours as needed for severe pain (7-10)   sennosides (SENOKOT) 8.6 MG tablet 1/29/2023 at PM  Yes Yes   Sig: Take 1 tablet by mouth daily as needed for constipation      Facility-Administered Medications: None     Allergies   Allergies   Allergen Reactions     Kiwi Itching       Social History   I have reviewed this patient's social history and updated it with pertinent information if needed. Antonio Canseco  reports that he quit smoking about 9 years ago. His smoking use included cigarettes. He started smoking about 34 years ago. He has a 25.00 pack-year smoking history. He has never used smokeless tobacco. He reports that he does not currently use alcohol. He reports that he does not use drugs.    Family History   I have reviewed this patient's family history and updated it with pertinent information if needed.   Family History   Problem Relation Age of Onset     Family History Negative Mother      Obesity Mother      Cancer Father         lymphoma     Other Cancer Father      Other Cancer Paternal Grandfather        Review of Systems   A comprehensive review of symptoms was obtained and negative unless noted above.    Physical Exam                      Vital Signs with Ranges     299 lbs 14.4 oz    Constitutional: Pleasant male seen resting comfortably in bed in NAD. Alert and interactive.   HEENT: NCAT.  PERRL, EOMI, anicteric sclera. Oral mucosa pink and moist with no lesions or thrush.  Respiratory: Non-labored breathing, good air exchange, lungs clear to auscultation bilaterally. Occasional dry cough noted.  Cardiovascular: Regular rate and rhythm. No murmur or rub.   GI: Normoactive bowel sounds. Abdomen soft, non-distended, and non-tender.   Skin: Warm and dry. No concerning lesions or rash on exposed surfaces.  Musculoskeletal: Extremities grossly normal, non-tender, no edema. Strong peripheral pulses. Good strength and ROM in bed.   Neuropsychiatric: Mentation and affect appear normal/appropriate.      Data   Results for orders placed or performed during the hospital encounter of 01/30/23 (from the past 24 hour(s))   CBC with platelets differential    Narrative    The following orders were created for panel order CBC with platelets differential.  Procedure                               Abnormality         Status                     ---------                               -----------         ------                     CBC with platelets and d...[[id number]]  Abnormal            Final result                 Please view results for these tests on the individual orders.   Comprehensive metabolic panel   Result Value Ref Range    Sodium 138 136 - 145 mmol/L    Potassium 4.2 3.4 - 5.3 mmol/L    Chloride 101 98 - 107 mmol/L    Carbon Dioxide (CO2) 24 22 - 29 mmol/L    Anion Gap 13 7 - 15 mmol/L    Urea Nitrogen 10.8 6.0 - 20.0 mg/dL    Creatinine 0.75 0.67 - 1.17 mg/dL    Calcium 8.6 8.6 - 10.0 mg/dL    Glucose 214 (H) 70 - 99 mg/dL    Alkaline Phosphatase 64 40 - 129 U/L    AST 15 10 - 50 U/L    ALT 9 (L) 10 - 50 U/L    Protein Total 7.1 6.4 - 8.3 g/dL    Albumin 2.9 (L) 3.5 - 5.2 g/dL    Bilirubin Total 0.4 <=1.2 mg/dL    GFR Estimate >90 >60 mL/min/1.73m2   Magnesium   Result Value Ref Range    Magnesium 1.8 1.7 - 2.3 mg/dL   Phosphorus   Result Value Ref Range    Phosphorus 3.4 2.5 - 4.5 mg/dL   INR   Result  Value Ref Range    INR 1.37 (H) 0.85 - 1.15   Fibrinogen activity   Result Value Ref Range    Fibrinogen Activity 1,123 (H) 170 - 490 mg/dL   ABO/RH Type and Screen     Narrative    The following orders were created for panel order ABO/RH Type and Screen .  Procedure                               Abnormality         Status                     ---------                               -----------         ------                     Adult Type and Screen[[id number]]                            In process                   Please view results for these tests on the individual orders.   CBC with platelets and differential   Result Value Ref Range    WBC Count 8.5 4.0 - 11.0 10e3/uL    RBC Count 3.90 (L) 4.40 - 5.90 10e6/uL    Hemoglobin 8.8 (L) 13.3 - 17.7 g/dL    Hematocrit 30.9 (L) 40.0 - 53.0 %    MCV 79 78 - 100 fL    MCH 22.6 (L) 26.5 - 33.0 pg    MCHC 28.5 (L) 31.5 - 36.5 g/dL    RDW 17.4 (H) 10.0 - 15.0 %    Platelet Count 530 (H) 150 - 450 10e3/uL    % Neutrophils 72 %    % Lymphocytes 13 %    % Monocytes 10 %    % Eosinophils 3 %    % Basophils 1 %    % Immature Granulocytes 1 %    NRBCs per 100 WBC 0 <1 /100    Absolute Neutrophils 6.2 1.6 - 8.3 10e3/uL    Absolute Lymphocytes 1.1 0.8 - 5.3 10e3/uL    Absolute Monocytes 0.9 0.0 - 1.3 10e3/uL    Absolute Eosinophils 0.2 0.0 - 0.7 10e3/uL    Absolute Basophils 0.1 0.0 - 0.2 10e3/uL    Absolute Immature Granulocytes 0.1 <=0.4 10e3/uL    Absolute NRBCs 0.0 10e3/uL

## 2023-01-30 NOTE — PROGRESS NOTES
Care Management Follow Up    Per provider, [facility] placed a PLC order for Bob line cares.    Awilda Iniguez RN, BSN  Care Coordinator 7D  Office: [phone]  Pager: [phone]    To contact the weekend [facility]  [place] (0800 - 1630) Saturday and Sunday    Units: 4A, 4C, 4E, 5A and 5B- Pager 1: [phone]    Units: 6A, 6B, 6C, 6D- Pager 2: [phone]    Units: 7A, 7B, 7C, 7D, and 5C-Pager 3: [phone]

## 2023-01-31 LAB
ALBUMIN SERPL BCG-MCNC: 2.5 G/DL (ref 3.5–5.2)
ALP SERPL-CCNC: 54 U/L (ref 40–129)
ALT SERPL W P-5'-P-CCNC: 8 U/L (ref 10–50)
ANION GAP SERPL CALCULATED.3IONS-SCNC: 9 MMOL/L (ref 7–15)
AST SERPL W P-5'-P-CCNC: 14 U/L (ref 10–50)
ATRIAL RATE - MUSE: 92 BPM
BASOPHILS # BLD AUTO: 0 10E3/UL (ref 0–0.2)
BASOPHILS NFR BLD AUTO: 0 %
BILIRUB SERPL-MCNC: 0.2 MG/DL
BUN SERPL-MCNC: 17.9 MG/DL (ref 6–20)
CALCIUM SERPL-MCNC: 8.2 MG/DL (ref 8.6–10)
CHLORIDE SERPL-SCNC: 101 MMOL/L (ref 98–107)
CREAT SERPL-MCNC: 0.67 MG/DL (ref 0.67–1.17)
DEPRECATED HCO3 PLAS-SCNC: 26 MMOL/L (ref 22–29)
DIASTOLIC BLOOD PRESSURE - MUSE: NORMAL MMHG
EOSINOPHIL # BLD AUTO: 0 10E3/UL (ref 0–0.7)
EOSINOPHIL NFR BLD AUTO: 0 %
ERYTHROCYTE [DISTWIDTH] IN BLOOD BY AUTOMATED COUNT: 17.2 % (ref 10–15)
FIBRINOGEN PPP-MCNC: 872 MG/DL (ref 170–490)
GFR SERPL CREATININE-BSD FRML MDRD: >90 ML/MIN/1.73M2
GLUCOSE BLDC GLUCOMTR-MCNC: 237 MG/DL (ref 70–99)
GLUCOSE BLDC GLUCOMTR-MCNC: 242 MG/DL (ref 70–99)
GLUCOSE BLDC GLUCOMTR-MCNC: 270 MG/DL (ref 70–99)
GLUCOSE BLDC GLUCOMTR-MCNC: 286 MG/DL (ref 70–99)
GLUCOSE SERPL-MCNC: 291 MG/DL (ref 70–99)
HAPTOGLOB SERPL-MCNC: 576 MG/DL (ref 32–197)
HCT VFR BLD AUTO: 34.2 % (ref 40–53)
HGB BLD-MCNC: 9.7 G/DL (ref 13.3–17.7)
IMM GRANULOCYTES # BLD: 0.1 10E3/UL
IMM GRANULOCYTES NFR BLD: 1 %
INR PPP: 1.44 (ref 0.85–1.15)
INTERPRETATION ECG - MUSE: NORMAL
LYMPHOCYTES # BLD AUTO: 0.8 10E3/UL (ref 0.8–5.3)
LYMPHOCYTES NFR BLD AUTO: 8 %
MAGNESIUM SERPL-MCNC: 2 MG/DL (ref 1.7–2.3)
MCH RBC QN AUTO: 22.9 PG (ref 26.5–33)
MCHC RBC AUTO-ENTMCNC: 28.4 G/DL (ref 31.5–36.5)
MCV RBC AUTO: 81 FL (ref 78–100)
MONOCYTES # BLD AUTO: 0.4 10E3/UL (ref 0–1.3)
MONOCYTES NFR BLD AUTO: 3 %
NEUTROPHILS # BLD AUTO: 9.3 10E3/UL (ref 1.6–8.3)
NEUTROPHILS NFR BLD AUTO: 88 %
NRBC # BLD AUTO: 0 10E3/UL
NRBC BLD AUTO-RTO: 0 /100
P AXIS - MUSE: 68 DEGREES
PHOSPHATE SERPL-MCNC: 4.5 MG/DL (ref 2.5–4.5)
PLATELET # BLD AUTO: 530 10E3/UL (ref 150–450)
POTASSIUM SERPL-SCNC: 4.9 MMOL/L (ref 3.4–5.3)
PR INTERVAL - MUSE: 116 MS
PROT SERPL-MCNC: 6.3 G/DL (ref 6.4–8.3)
QRS DURATION - MUSE: 86 MS
QT - MUSE: 370 MS
QTC - MUSE: 457 MS
R AXIS - MUSE: 2 DEGREES
RBC # BLD AUTO: 4.23 10E6/UL (ref 4.4–5.9)
SODIUM SERPL-SCNC: 136 MMOL/L (ref 136–145)
SYSTOLIC BLOOD PRESSURE - MUSE: NORMAL MMHG
T AXIS - MUSE: 36 DEGREES
VENTRICULAR RATE- MUSE: 92 BPM
WBC # BLD AUTO: 10.5 10E3/UL (ref 4–11)

## 2023-01-31 PROCEDURE — 258N000002 HC RX IP 258 OP 250: Performed by: STUDENT IN AN ORGANIZED HEALTH CARE EDUCATION/TRAINING PROGRAM

## 2023-01-31 PROCEDURE — 250N000011 HC RX IP 250 OP 636: Performed by: STUDENT IN AN ORGANIZED HEALTH CARE EDUCATION/TRAINING PROGRAM

## 2023-01-31 PROCEDURE — 258N000003 HC RX IP 258 OP 636: Performed by: STUDENT IN AN ORGANIZED HEALTH CARE EDUCATION/TRAINING PROGRAM

## 2023-01-31 PROCEDURE — 36415 COLL VENOUS BLD VENIPUNCTURE: CPT | Performed by: INTERNAL MEDICINE

## 2023-01-31 PROCEDURE — 85025 COMPLETE CBC W/AUTO DIFF WBC: CPT | Performed by: PHYSICIAN ASSISTANT

## 2023-01-31 PROCEDURE — 250N000013 HC RX MED GY IP 250 OP 250 PS 637: Performed by: STUDENT IN AN ORGANIZED HEALTH CARE EDUCATION/TRAINING PROGRAM

## 2023-01-31 PROCEDURE — 250N000013 HC RX MED GY IP 250 OP 250 PS 637: Performed by: PHYSICIAN ASSISTANT

## 2023-01-31 PROCEDURE — 84100 ASSAY OF PHOSPHORUS: CPT | Performed by: INTERNAL MEDICINE

## 2023-01-31 PROCEDURE — 85384 FIBRINOGEN ACTIVITY: CPT | Performed by: PHYSICIAN ASSISTANT

## 2023-01-31 PROCEDURE — 83735 ASSAY OF MAGNESIUM: CPT | Performed by: INTERNAL MEDICINE

## 2023-01-31 PROCEDURE — 85610 PROTHROMBIN TIME: CPT | Performed by: PHYSICIAN ASSISTANT

## 2023-01-31 PROCEDURE — 250N000011 HC RX IP 250 OP 636: Performed by: PHYSICIAN ASSISTANT

## 2023-01-31 PROCEDURE — 250N000009 HC RX 250: Performed by: STUDENT IN AN ORGANIZED HEALTH CARE EDUCATION/TRAINING PROGRAM

## 2023-01-31 PROCEDURE — 80053 COMPREHEN METABOLIC PANEL: CPT | Performed by: PHYSICIAN ASSISTANT

## 2023-01-31 PROCEDURE — 120N000002 HC R&B MED SURG/OB UMMC

## 2023-01-31 RX ORDER — HYDROXYZINE HYDROCHLORIDE 25 MG/1
50 TABLET, FILM COATED ORAL EVERY 6 HOURS PRN
Status: DISCONTINUED | OUTPATIENT
Start: 2023-01-31 | End: 2023-02-06 | Stop reason: HOSPADM

## 2023-01-31 RX ORDER — HYDROXYZINE HYDROCHLORIDE 25 MG/1
25 TABLET, FILM COATED ORAL EVERY 6 HOURS PRN
Status: DISCONTINUED | OUTPATIENT
Start: 2023-01-31 | End: 2023-02-06 | Stop reason: HOSPADM

## 2023-01-31 RX ADMIN — INSULIN ASPART 3 UNITS: 100 INJECTION, SOLUTION INTRAVENOUS; SUBCUTANEOUS at 18:13

## 2023-01-31 RX ADMIN — CLINDAMYCIN PHOSPHATE: 10 GEL TOPICAL at 10:04

## 2023-01-31 RX ADMIN — INSULIN ASPART 2 UNITS: 100 INJECTION, SOLUTION INTRAVENOUS; SUBCUTANEOUS at 13:01

## 2023-01-31 RX ADMIN — GABAPENTIN 300 MG: 300 CAPSULE ORAL at 20:08

## 2023-01-31 RX ADMIN — THIAMINE HCL TAB 100 MG 100 MG: 100 TAB at 05:57

## 2023-01-31 RX ADMIN — PANTOPRAZOLE SODIUM 40 MG: 40 TABLET, DELAYED RELEASE ORAL at 10:02

## 2023-01-31 RX ADMIN — OLANZAPINE 5 MG: 2.5 TABLET, FILM COATED ORAL at 22:05

## 2023-01-31 RX ADMIN — CLINDAMYCIN PHOSPHATE: 10 GEL TOPICAL at 20:08

## 2023-01-31 RX ADMIN — SENNOSIDES AND DOCUSATE SODIUM 1 TABLET: 50; 8.6 TABLET ORAL at 10:47

## 2023-01-31 RX ADMIN — ONDANSETRON HYDROCHLORIDE 8 MG: 8 TABLET, FILM COATED ORAL at 13:08

## 2023-01-31 RX ADMIN — DULOXETINE HYDROCHLORIDE 60 MG: 60 CAPSULE, DELAYED RELEASE ORAL at 10:02

## 2023-01-31 RX ADMIN — ONDANSETRON HYDROCHLORIDE 8 MG: 8 TABLET, FILM COATED ORAL at 05:57

## 2023-01-31 RX ADMIN — GABAPENTIN 300 MG: 300 CAPSULE ORAL at 13:08

## 2023-01-31 RX ADMIN — ONDANSETRON HYDROCHLORIDE 8 MG: 8 TABLET, FILM COATED ORAL at 22:05

## 2023-01-31 RX ADMIN — ENOXAPARIN SODIUM 40 MG: 40 INJECTION SUBCUTANEOUS at 00:57

## 2023-01-31 RX ADMIN — HYDROXYZINE HYDROCHLORIDE 25 MG: 25 TABLET, FILM COATED ORAL at 20:07

## 2023-01-31 RX ADMIN — SODIUM CHLORIDE, PRESERVATIVE FREE 5 ML: 5 INJECTION INTRAVENOUS at 10:00

## 2023-01-31 RX ADMIN — GABAPENTIN 300 MG: 300 CAPSULE ORAL at 10:01

## 2023-01-31 RX ADMIN — FERROUS SULFATE TAB 325 MG (65 MG ELEMENTAL FE) 325 MG: 325 (65 FE) TAB at 10:02

## 2023-01-31 RX ADMIN — POTASSIUM CHLORIDE: 2 INJECTION, SOLUTION, CONCENTRATE INTRAVENOUS at 03:43

## 2023-01-31 RX ADMIN — MESNA 3780 MG: 100 INJECTION, SOLUTION INTRAVENOUS at 19:04

## 2023-01-31 RX ADMIN — Medication 400 MCG: at 10:02

## 2023-01-31 RX ADMIN — POTASSIUM CHLORIDE: 2 INJECTION, SOLUTION, CONCENTRATE INTRAVENOUS at 13:58

## 2023-01-31 RX ADMIN — ENOXAPARIN SODIUM 40 MG: 40 INJECTION SUBCUTANEOUS at 13:01

## 2023-01-31 RX ADMIN — THIAMINE HCL TAB 100 MG 100 MG: 100 TAB at 20:08

## 2023-01-31 RX ADMIN — THIAMINE HCL TAB 100 MG 100 MG: 100 TAB at 13:01

## 2023-01-31 ASSESSMENT — ACTIVITIES OF DAILY LIVING (ADL)
ADLS_ACUITY_SCORE: 20

## 2023-01-31 NOTE — PROGRESS NOTES
Ifosfamide Toxicity Assessment      Patient is Alert & Oriented x4. There are signs of lethargy, confusion or hallucinations- NO    Patient is having new incontinence of bowel or bladder- NO      Pincer Grasp intact- YES    Tremors present- NO    Will continue to monitor for s/sx of ifosfamide toxicity: hallucinations, AMS, emotional lability, somnolence, myoclonic jerks, tremors, coordination difficulties. If these occur, please call the APPs/Attending on days and fellow on-call for evening and nights for recommendations and further instruction.

## 2023-01-31 NOTE — PROGRESS NOTES
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 Currently being admitted for Cycle 1 Doxil/Ifosfamide.     TODAY D2  - Continue chemotherapy per protocol, monitor closely for s/sx of Ifos toxicity   - Continue supportive cares for nausea/vomiting PRN antiemetics available   - Anticipate discharge 2/6/23, pending completion of chemotherapy      ONC   # High Grade Sarcoma R Thigh   This is a patient of Dr. Price. Patient initially noted right hamstring strain in June 2022, he was treated for sciatica. Pain continued to worsen and went for MRI of L spine and right thigh, which showed a large mass in the posterior compartment of the right thigh. He underwent an US guided biopsy and pathology c/w high grade sarcoma. CT CAP with pulmonary nodules but no site of metastatic disease. Currently being admitted for Cycle 1 Doxil/Ifos.   - Baseline Echo 1/26 EF 55%  - Bob PTA, PLC requested   - Per outpatient notes patient will need to discharge on prophylaxis Levaquin and Augmentin (d/t HS)   - Patient had hypersensitivity reaction with 5-min into Emend infusion; this was not completed. Instead scheduled Zyprexa 5 mg at bedtime.                                Therapy Plan: Doxil/Ifosfamide/Mesna (C1D1=1/30/23)                            - Doxil 45 mg/m2 (113 mg) IV -- D1                            - Ifosfamide 1,500 mg/m2 (3,780 mg) IV -- D1-6                             - Mesna 1,500 mg/m2 (3,780 mg) IV -- D7                            - Neulasta 6 mg subcutaneous -- D8 requested                              - Premeds: Thiamine, Emend, Zofran,  Dexamethasone      # Iron Deficiency Anemia   # Acute on Chronic Anemia   Hgb on admission was noted to be 8.8; last was 10.4 on 1/26. Patient denies any evidence of over bleeding.   - LDH, B12 WNL   - Folate low at 4.0; started Folic acid supplment   - Haptoglobin,and peripheral smear - pending   - Continue PTA Iron supplement daily   - Transfuse to keep Hgb >7      # Cancer-related Pain   - PTA Gabapentin 300 mg TID   - PTA Oxycodone 5 mg q6h PRN     ID   # H/o Fevers   Patient and family report ~ 1 month history of fevers at home.   - BCx obtained prior to initiation of chemo   - Will monitor vitals and proceed with further infectious work up if febrile      CARDS   # HTN   - PTA Lisinopril, hold parameters in place      PULM   # Chronic Cough   # Pulmonary Nodules   Patient's wife reports patient has had chronic cough for approximately last 10 months. He has tried several OTC medications which did not help, he was recently started on PPI with no improvement of cough. CT CAP obtained for staging shows multiple bilateral pulmonary nodules   - Monitor while inpatient   - Tessalon Pearls, Delsym, and Cepacol lozenges PRN      ENDO   # T2DM   Follows with PCP Dr. Mynor Mason; last Hgb A1C 11.7 on 1/18/23  - Hold PTA Metformin; anticipate resuming on discharge   - Medium intensity sliding scale insulin   - Hypoglycemia protocol onboard       GI   # GERD   - PTA Omeprazole      # Irritable Bowel Syndrome   - PTA Duloxetine      DERM   # Hidrenitis Suppurativa   Wife reports there is an active lesion on the right side, at home they were using topical clindamycin that wife had on hand and she has noted improvement in size of lesion.   - Continue topical Clindamycin BID   - WOC consulted on admission, appreciate recs   - Discussed with dermatology who recommend continuation of topical treatments since lesion is improving. If antibiotic is warranted they typically start with Doxycycline.       FEN:  -IVF per chemo protocol  "  -PRN lyte replacement  -RDAT     Prophy/Misc:  -VTE: PPx Lovenox BID; hold if platelets   -GI/PUD: PTA Omeprazole   -Bowels: PRN Senna and Miralax     Clinically Significant Risk Factors              # Hypoalbuminemia: Lowest albumin = 2.5 g/dL at 1/31/2023  6:05 AM, will monitor as appropriate          # DMII: A1C = 11.7 % (Ref range: <5.7 %) within past 3 months, PRESENT ON ADMISSION  # Severe Obesity: Estimated body mass index is 42.13 kg/m  as calculated from the following:    Height as of 1/27/23: 1.803 m (5' 11\").    Weight as of this encounter: 137 kg (302 lb 1.6 oz)., PRESENT ON ADMISSION         This patient was discussed with Dr. Jasso     I spent >40 minutes face-to-face or coordinating care of Antoniokaran Canseco. Over 50% of our time on the unit was spent counseling the patient and/or coordinating care.    Erika Mar PA-C ([facility])   Hematology/ Oncology   Pager [phone]    Interval History   Nursing notes reviewed. Antonio was noted to have chest tightness and SOB with Doxil infusion, he received solu-medrol and he completed the infusion at a slower rate. This morning he is overall feeling well. A bit tired. He felt that maybe some component of anxiety, gas pain/ constipation may have played a role. He denies any SOB this morning during our visit. No s/sx of ifosfamide neurotoxicity. All questions answered at this time.     Physical Exam   Temp: 97.5  F (36.4  C) Temp src: Oral BP: 105/59 Pulse: 63   Resp: 18 SpO2: 94 % O2 Device: Nasal cannula Oxygen Delivery: 2 LPM  Vitals:    01/30/23 0950 01/31/23 1014   Weight: 136 kg (299 lb 14.4 oz) 137 kg (302 lb 1.6 oz)     Vital Signs with Ranges  Temp:  [97.4  F (36.3  C)-98.6  F (37  C)] 97.5  F (36.4  C)  Pulse:  [] 63  Resp:  [16-20] 18  BP: ()/(46-96) 105/59  SpO2:  [86 %-99 %] 94 %  I/O last 3 completed shifts:  In: 1314 [P.O.:240; I.V.:686; IV Piggyback:388]  Out: -     Constitutional: Pleasant male seen resting comfortably in bed in " NAD. Alert and interactive.   HEENT: NCAT. PERRL, EOMI, anicteric sclera. Oral mucosa pink and moist with no lesions or thrush.  Respiratory: Non-labored breathing, good air exchange, lungs clear to auscultation bilaterally. No cough or wheeze noted.   Cardiovascular: Regular rate and rhythm. No murmur or rub.   GI: Normoactive bowel sounds. Abdomen soft, non-distended, and non-tender. No palpable masses or organomegaly.  Skin: Warm and dry. No concerning lesions or rash on exposed surfaces.  Musculoskeletal: Extremities grossly normal, non-tender, no edema. Strong peripheral pulses. Good strength and ROM in bed.   Neuropsychiatric: Mentation and affect appear normal/appropriate.  Vascular Access: Bob is CDI without erythema, swelling, or discharge.     Medications   Current Facility-Administered Medications   Medication     0.9% sodium chloride BOLUS     acetaminophen (TYLENOL) tablet 650 mg     albuterol (PROVENTIL HFA/VENTOLIN HFA) inhaler     albuterol (PROVENTIL) neb solution 2.5 mg     benzocaine-menthol (CHLORASEPTIC MAX) 15-10 MG lozenge 1 lozenge     benzonatate (TESSALON) capsule 100 mg     Chemotherapy Infusing-Continuous Infusion     clindamycin (CLINDAMAX) 1 % topical gel     dextromethorphan (DELSYM) liquid 30 mg     [START ON 2/6/2023] dextrose 5 % flush PRE/POST medication     [START ON 2/6/2023] dextrose 5 % flush PRE/POST medication     glucose gel 15-30 g    Or     dextrose 50 % injection 25-50 mL    Or     glucagon injection 1 mg     DULoxetine (CYMBALTA) DR capsule 60 mg     enoxaparin ANTICOAGULANT (LOVENOX) injection 40 mg     EPINEPHrine PF (ADRENALIN) injection 0.3 mg     famotidine (PEPCID) injection 20 mg     ferrous sulfate (FEROSUL) tablet 325 mg     [START ON 2/6/2023] filgrastim-aafi (NIVESTYM) 480 mcg in D5W 25 mL infusion     folic acid (FOLVITE) tablet 400 mcg     gabapentin (NEURONTIN) capsule 300 mg     heparin 100 UNIT/ML injection 5-10 mL     heparin lock flush 10 UNIT/ML  injection 5-10 mL     heparin lock flush 10 UNIT/ML injection 5-10 mL     Ifosfamide (IFEX) 3,780 mg, mesna (MESNEX) 3,780 mg in sodium chloride 0.9 % 1,163.4 mL infusion     insulin aspart (NovoLOG) injection (RAPID ACTING)     insulin aspart (NovoLOG) injection (RAPID ACTING)     lisinopril (ZESTRIL) tablet 20 mg     LORazepam (ATIVAN) injection 0.5-1 mg     LORazepam (ATIVAN) tablet 0.5-1 mg     melatonin tablet 3-6 mg     meperidine (DEMEROL) injection 25 mg     [START ON 2/5/2023] mesna (MESNEX) 3,780 mg in sodium chloride 0.9 % 1,087.8 mL infusion     methylPREDNISolone sodium succinate (solu-MEDROL) injection 125 mg     naloxone (NARCAN) injection 0.2 mg    Or     naloxone (NARCAN) injection 0.4 mg    Or     naloxone (NARCAN) injection 0.2 mg    Or     naloxone (NARCAN) injection 0.4 mg     No rectal suppositories if WBC less than 1000/microliters or platelets less than 50,000/ L     OLANZapine (zyPREXA) tablet 5 mg     ondansetron (ZOFRAN) tablet 4-8 mg    Or     ondansetron (ZOFRAN ODT) ODT tab 4-8 mg    Or     ondansetron (ZOFRAN) injection 4-8 mg     ondansetron (ZOFRAN) tablet 8 mg     oxyCODONE (ROXICODONE) tablet 5 mg     pantoprazole (PROTONIX) EC tablet 40 mg     polyethylene glycol (MIRALAX) Packet 17 g     prochlorperazine (COMPAZINE) injection 10 mg     prochlorperazine (COMPAZINE) tablet 10 mg     senna-docusate (SENOKOT-S/PERICOLACE) 8.6-50 MG per tablet 1 tablet    Or     senna-docusate (SENOKOT-S/PERICOLACE) 8.6-50 MG per tablet 2 tablet     simethicone (MYLICON) chewable tablet 80 mg     sodium bicarbonate 100 mEq, potassium chloride 20 mEq in NaCl 0.45 % 1,000 mL infusion     sodium chloride (PF) 0.9% PF flush 10-20 mL     sodium chloride (PF) 0.9% PF flush 10-20 mL     sodium chloride (PF) 0.9% PF flush 10-20 mL     thiamine (B-1) tablet 100 mg       Data   CBC  Recent Labs   Lab 01/31/23  0605 01/30/23  1152 01/30/23  1025 01/26/23  1101   WBC 10.5 8.8 8.5 8.7   RBC 4.23* 3.74* 3.90* 4.53    HGB 9.7* 8.5* 8.8* 10.4*   HCT 34.2* 29.8* 30.9* 35.0*   MCV 81 80 79 77*   MCH 22.9* 22.7* 22.6* 23.0*   MCHC 28.4* 28.5* 28.5* 29.7*   RDW 17.2* 17.2* 17.4* 16.9*   * 506* 530* 518*     CMP  Recent Labs   Lab 01/31/23  0605 01/31/23  0158 01/30/23 2125 01/30/23  1807 01/30/23  1025 01/27/23  0829 01/26/23  1101     --   --   --  138  --  138   POTASSIUM 4.9  --   --   --  4.2  --  4.6   CHLORIDE 101  --   --   --  101  --  99   CO2 26  --   --   --  24  --  26   ANIONGAP 9  --   --   --  13  --  13   * 270* 297* 236* 214*   < > 171*   BUN 17.9  --   --   --  10.8  --  11.5   CR 0.67  --   --   --  0.75  --  0.82   GFRESTIMATED >90  --   --   --  >90  --  >90   DORIAN 8.2*  --   --   --  8.6  --  9.5   MAG 2.0  --   --   --  1.8  --  1.7   PHOS 4.5  --   --   --  3.4  --  3.6   PROTTOTAL 6.3*  --   --   --  7.1  --  8.2   ALBUMIN 2.5*  --   --   --  2.9*  --  3.3*   BILITOTAL 0.2  --   --   --  0.4  --  0.5   ALKPHOS 54  --   --   --  64  --  63   AST 14  --   --   --  15  --  16   ALT 8*  --   --   --  9*  --  9*    < > = values in this interval not displayed.     INR  Recent Labs   Lab 01/31/23  0605 01/30/23  1025 01/27/23  0827   INR 1.44* 1.37* 1.35*       Results for orders placed or performed during the hospital encounter of 01/27/23   IR CVC Tunnel Placement > 5 Yrs of Age    Narrative    PROCEDURES 1/27/2023 10:14 AM:  1. Ultrasound guidance for venous access  2. Placement centrally inserted catheter (age > 5 yrs.) tunneled, no  port, no pump  3. Fluoroscopic guidance placement    CLINICAL HISTORY: TCVC placement - double lumen dumont 9.5 Fij[place];  Sarcoma of soft tissue (H). Catheter does not need to be dialysis or  apheresis capable.    COMPARISONS: CT 1/26/2023    REFERRING PROVIDER: RASHID TAPIA    STAFF RADIOLOGIST: SEAN Luis MD    FELLOW(S)/RESIDENT(S): BROOKE Contreras    ASSISTANT: JUN Girard, ANGELES LUIS MD, attest that I was present for all critical portions  of  the procedure and was immediately available to provide guidance and  assistance during the remainder of the procedure.    MEDICATIONS: The patient was placed on continuous monitoring.  Intravenous sedation was administered. 2 mg Versed and 100 mcg  Fentanyl IV. Vital signs and sedation were monitored by nursing staff  under Interventional Radiologist's supervision. The patient remained  stable throughout the procedure.    TOTAL SEDATION TIME: 28 minutes    ATTENDING FACE-TO-FACE SEDATION TIME: less than 5 minutes    FLUOROSCOPY TIME: 36.9 seconds.    DOSE: 11.16 mGy    PROCEDURE: The patient understood the limitations, alternatives, and  risks of the procedure and requested the procedure be performed. Both  written and oral consent were obtained.    The right neck and upper chest were prepped and draped in the usual  sterile fashion. 1% lidocaine without epinephrine was used for local  anesthesia.    Under ultrasound guidance, right internal jugular venotomy was made  with a micropuncture needle. Ultrasound image documenting jugular  patency and needle venotomy was saved in the patient's record.    Micropuncture needle exchanged over guidewire for the micropuncture  sheath under fluoroscopic guidance. Catheter length measured with the  0.018 guidewire. Micropuncture sheath saline locked. 9.5 [place] BD  Bard PowerHickman was subcutaneously tunneled from the right anterior  chest to the right internal jugular venotomy site. Catheter cut on the  24 cm marker. Micropuncture sheath exchanged over guidewire for the  peel-away sheath. Guidewire removed. Under fluoroscopic guidance, the  catheter was placed through the peel-away sheath and positioned with  its tip in the superior atriocaval junction. Both lumens flushed and  aspirated adequately. Each lumen heparin locked with 100:1 heparin  solution. Catheter secured with StatLock and sterile dressing. Right  internal jugular venotomy site closed with [employer]  ExoFin  tissue adhesive. No immediate complication.    Fluoroscopic image documenting catheter placement and final position  was saved in the patient's record.      Impression    IMPRESSION:   1. Ultrasound guided right internal jugular venotomy.    2. 24 cm 9.5 [place] BD Bard PowerHickman tunneled central venous  catheter placed with the tip in the superior atriocaval junction. Both  lumens heparin locked and ready for use.    I have personally reviewed the examination and initial interpretation  and I agree with the findings.    ANGELES LUIS MD         SYSTEM ID:  [id number]

## 2023-01-31 NOTE — PLAN OF CARE
A&Ox4. VSS. Denies N/V. Pain present in right posterior thigh. 5 mg Oxycodone given x1. Dose #1 of Doxil completed. Pt reported tightness in chest and facial flushing with 1/3 of the bag remaining after bedside RN increased rate to ordered rate. Temporarily stopped. Solu-medrol administered with minor relief. Attending and cross-cover paged for recommendation. Restarted at 100mL/hr for remainder of infusion. Ifosfomide/mesna infusing over 24 hours. Pt has cough at baseline. Denies N/V and SOB currently. Continue to monitor.

## 2023-01-31 NOTE — PLAN OF CARE
Goal Outcome Evaluation:                                                 Ifosfamide Toxicity Assessment      Patient is Alert & Oriented x4 There are signs of lethargy, confusion or hallucinations :NO     patient is having new incontinence of bowel or bladder: NO    Tremors present : NO    Provider was notified : NO      Will continue to monitor for s/sx of ifosfamide toxicity: hallucinations, AMS, emotional lability, somnolence, myoclonic jerks, tremors, coordination difficulties. If these occur, please call the APPs/Attending on days and fellow on-call for evening and nights for recommendations and further instruction.     00:00-07:00  AF Mildly soft BP but stable, 90's/60's at resting/sleeping at baseline.  OVSS  On O2 2L/NC for comfort at night with good O2 sat.  Currently receiving Day 1 of Ifosfamide/mesna , tolerating chemo well thus far --see above toxicity assessment.  Pain in right thigh very minimal at resting and denied need for pain med.  Denied nausea, SOB, and chest pain.   at 02:00 am.  Voiding spontaneously, not saving and LBM 1/30.  Pt endorsed doing well and no new acute event overnight.  Continue w/POC

## 2023-01-31 NOTE — PLAN OF CARE
0700- 1500    /51 (BP Location: Left arm)   Pulse 74   Temp 97.7  F (36.5  C) (Oral)   Resp 18   Wt 137 kg (302 lb 1.6 oz)   SpO2 96%   BMI 42.13 kg/m      Soft Bps in AM, OVSS on RA, Afebrile. Pt using up to 2 L NC as support after ambulating in the hallways. Denies N/V, pain. Reports dyspnea on exertion. MIVF BiCarb running 100 ml/hr. CIVI Ifosf running; good blood return and tolerating infusion well. No signs of ifosf toxicity. Held Lisinopril d/t soft Bps. Senna given for mild constipation, last BM on 1/30. Reports good UOP. BS- 237. Continue POC.

## 2023-01-31 NOTE — PROVIDER NOTIFICATION
Heme fellow on call paged (Dr. Garcia):   [id number] ISREAL Canseco  Doxil was infusing for 2 hrs, patient had tightness in chest and SOB and solu-medrol was given. Doxil is paused, symptoms have not completely resolved - please advise.   Tish BAI [id number]

## 2023-01-31 NOTE — PROGRESS NOTES
Nursing Focus: Chemotherapy  D: Positive blood return via PICC. Insertion site is clean/dry/intact, dressing intact with no complaints of pain.  Urine output is recorded in intake in Doc Flowsheet.    I: Premedications given per order (see electronic medical administration record). Dose #1 of Ifosfamide/mesna started to infuse over 24hours/minutes. Reviewed pt teaching on chemotherapy side effects.  Pt denies need for further teaching. Chemotherapy double checked per protocol by two chemotherapy competent RN's.   A: Tolerating procedure well. Denies nausea and or pain.   P: Continue to monitor urine output and symptoms of nausea. Screen for symptoms of toxicity.                                                 Ifosfamide Toxicity Assessment      Neuro asssessment completed Yes, no significant findings at baseline     Patient is Alert & Oriented x4. There are No signs of lethargy, confusion or hallucinations.     Patient is having new incontinence of bowel or bladder No.       Pincer Grasp intact Yes     No Tremors @ baseline      -- Monitor for s/sx of ifosfamide toxicity: hallucinations, AMS, emotional lability, somnolence, myoclonic jerks, tremors, coordination difficulties, etc. If these occur, please call the fellow on call for recommendations

## 2023-02-01 LAB
ALBUMIN SERPL BCG-MCNC: 2.4 G/DL (ref 3.5–5.2)
ALP SERPL-CCNC: 49 U/L (ref 40–129)
ALT SERPL W P-5'-P-CCNC: 9 U/L (ref 10–50)
ANION GAP SERPL CALCULATED.3IONS-SCNC: 7 MMOL/L (ref 7–15)
AST SERPL W P-5'-P-CCNC: 14 U/L (ref 10–50)
BASOPHILS # BLD AUTO: 0 10E3/UL (ref 0–0.2)
BASOPHILS NFR BLD AUTO: 0 %
BILIRUB SERPL-MCNC: 0.2 MG/DL
BUN SERPL-MCNC: 14.6 MG/DL (ref 6–20)
CALCIUM SERPL-MCNC: 7.8 MG/DL (ref 8.6–10)
CHLORIDE SERPL-SCNC: 103 MMOL/L (ref 98–107)
CREAT SERPL-MCNC: 0.61 MG/DL (ref 0.67–1.17)
DEPRECATED HCO3 PLAS-SCNC: 31 MMOL/L (ref 22–29)
EOSINOPHIL # BLD AUTO: 0.1 10E3/UL (ref 0–0.7)
EOSINOPHIL NFR BLD AUTO: 1 %
ERYTHROCYTE [DISTWIDTH] IN BLOOD BY AUTOMATED COUNT: 17.2 % (ref 10–15)
GFR SERPL CREATININE-BSD FRML MDRD: >90 ML/MIN/1.73M2
GLUCOSE BLDC GLUCOMTR-MCNC: 102 MG/DL (ref 70–99)
GLUCOSE BLDC GLUCOMTR-MCNC: 103 MG/DL (ref 70–99)
GLUCOSE BLDC GLUCOMTR-MCNC: 167 MG/DL (ref 70–99)
GLUCOSE BLDC GLUCOMTR-MCNC: 203 MG/DL (ref 70–99)
GLUCOSE SERPL-MCNC: 110 MG/DL (ref 70–99)
HCT VFR BLD AUTO: 27 % (ref 40–53)
HGB BLD-MCNC: 7.7 G/DL (ref 13.3–17.7)
IMM GRANULOCYTES # BLD: 0.1 10E3/UL
IMM GRANULOCYTES NFR BLD: 1 %
LYMPHOCYTES # BLD AUTO: 2.2 10E3/UL (ref 0.8–5.3)
LYMPHOCYTES NFR BLD AUTO: 22 %
MAGNESIUM SERPL-MCNC: 1.9 MG/DL (ref 1.7–2.3)
MCH RBC QN AUTO: 23.1 PG (ref 26.5–33)
MCHC RBC AUTO-ENTMCNC: 28.5 G/DL (ref 31.5–36.5)
MCV RBC AUTO: 81 FL (ref 78–100)
MONOCYTES # BLD AUTO: 0.8 10E3/UL (ref 0–1.3)
MONOCYTES NFR BLD AUTO: 8 %
NEUTROPHILS # BLD AUTO: 6.7 10E3/UL (ref 1.6–8.3)
NEUTROPHILS NFR BLD AUTO: 68 %
NRBC # BLD AUTO: 0 10E3/UL
NRBC BLD AUTO-RTO: 0 /100
PATH REPORT.COMMENTS IMP SPEC: NORMAL
PATH REPORT.COMMENTS IMP SPEC: NORMAL
PATH REPORT.FINAL DX SPEC: NORMAL
PATH REPORT.MICROSCOPIC SPEC OTHER STN: NORMAL
PATH REPORT.MICROSCOPIC SPEC OTHER STN: NORMAL
PATH REPORT.RELEVANT HX SPEC: NORMAL
PHOSPHATE SERPL-MCNC: 2.8 MG/DL (ref 2.5–4.5)
PLATELET # BLD AUTO: 474 10E3/UL (ref 150–450)
POTASSIUM SERPL-SCNC: 4.1 MMOL/L (ref 3.4–5.3)
PROT SERPL-MCNC: 5.6 G/DL (ref 6.4–8.3)
RBC # BLD AUTO: 3.34 10E6/UL (ref 4.4–5.9)
SODIUM SERPL-SCNC: 141 MMOL/L (ref 136–145)
WBC # BLD AUTO: 9.9 10E3/UL (ref 4–11)

## 2023-02-01 PROCEDURE — 85004 AUTOMATED DIFF WBC COUNT: CPT | Performed by: PHYSICIAN ASSISTANT

## 2023-02-01 PROCEDURE — 250N000011 HC RX IP 250 OP 636: Performed by: PHYSICIAN ASSISTANT

## 2023-02-01 PROCEDURE — 99233 SBSQ HOSP IP/OBS HIGH 50: CPT | Performed by: INTERNAL MEDICINE

## 2023-02-01 PROCEDURE — 36592 COLLECT BLOOD FROM PICC: CPT | Performed by: PHYSICIAN ASSISTANT

## 2023-02-01 PROCEDURE — 84100 ASSAY OF PHOSPHORUS: CPT | Performed by: INTERNAL MEDICINE

## 2023-02-01 PROCEDURE — 250N000013 HC RX MED GY IP 250 OP 250 PS 637: Performed by: STUDENT IN AN ORGANIZED HEALTH CARE EDUCATION/TRAINING PROGRAM

## 2023-02-01 PROCEDURE — 250N000009 HC RX 250: Performed by: STUDENT IN AN ORGANIZED HEALTH CARE EDUCATION/TRAINING PROGRAM

## 2023-02-01 PROCEDURE — 120N000002 HC R&B MED SURG/OB UMMC

## 2023-02-01 PROCEDURE — 250N000011 HC RX IP 250 OP 636: Performed by: STUDENT IN AN ORGANIZED HEALTH CARE EDUCATION/TRAINING PROGRAM

## 2023-02-01 PROCEDURE — 80053 COMPREHEN METABOLIC PANEL: CPT | Performed by: PHYSICIAN ASSISTANT

## 2023-02-01 PROCEDURE — 250N000013 HC RX MED GY IP 250 OP 250 PS 637: Performed by: PHYSICIAN ASSISTANT

## 2023-02-01 PROCEDURE — 258N000003 HC RX IP 258 OP 636: Performed by: STUDENT IN AN ORGANIZED HEALTH CARE EDUCATION/TRAINING PROGRAM

## 2023-02-01 PROCEDURE — 83735 ASSAY OF MAGNESIUM: CPT | Performed by: INTERNAL MEDICINE

## 2023-02-01 PROCEDURE — 258N000002 HC RX IP 258 OP 250: Performed by: STUDENT IN AN ORGANIZED HEALTH CARE EDUCATION/TRAINING PROGRAM

## 2023-02-01 RX ORDER — FUROSEMIDE 10 MG/ML
20 INJECTION INTRAMUSCULAR; INTRAVENOUS ONCE
Status: COMPLETED | OUTPATIENT
Start: 2023-02-01 | End: 2023-02-01

## 2023-02-01 RX ADMIN — GABAPENTIN 300 MG: 300 CAPSULE ORAL at 20:51

## 2023-02-01 RX ADMIN — PANTOPRAZOLE SODIUM 40 MG: 40 TABLET, DELAYED RELEASE ORAL at 08:48

## 2023-02-01 RX ADMIN — OXYCODONE HYDROCHLORIDE 5 MG: 5 TABLET ORAL at 11:38

## 2023-02-01 RX ADMIN — ONDANSETRON HYDROCHLORIDE 8 MG: 8 TABLET, FILM COATED ORAL at 14:20

## 2023-02-01 RX ADMIN — OLANZAPINE 5 MG: 2.5 TABLET, FILM COATED ORAL at 20:52

## 2023-02-01 RX ADMIN — POTASSIUM CHLORIDE: 2 INJECTION, SOLUTION, CONCENTRATE INTRAVENOUS at 20:51

## 2023-02-01 RX ADMIN — FUROSEMIDE 20 MG: 10 INJECTION, SOLUTION INTRAVENOUS at 11:27

## 2023-02-01 RX ADMIN — THIAMINE HCL TAB 100 MG 100 MG: 100 TAB at 14:20

## 2023-02-01 RX ADMIN — ENOXAPARIN SODIUM 40 MG: 40 INJECTION SUBCUTANEOUS at 11:27

## 2023-02-01 RX ADMIN — THIAMINE HCL TAB 100 MG 100 MG: 100 TAB at 20:51

## 2023-02-01 RX ADMIN — ENOXAPARIN SODIUM 40 MG: 40 INJECTION SUBCUTANEOUS at 23:41

## 2023-02-01 RX ADMIN — Medication 400 MCG: at 08:48

## 2023-02-01 RX ADMIN — ACETAMINOPHEN 650 MG: 325 TABLET ORAL at 17:34

## 2023-02-01 RX ADMIN — MESNA 3780 MG: 100 INJECTION, SOLUTION INTRAVENOUS at 18:29

## 2023-02-01 RX ADMIN — CLINDAMYCIN PHOSPHATE: 10 GEL TOPICAL at 11:27

## 2023-02-01 RX ADMIN — POTASSIUM CHLORIDE: 2 INJECTION, SOLUTION, CONCENTRATE INTRAVENOUS at 11:11

## 2023-02-01 RX ADMIN — GABAPENTIN 300 MG: 300 CAPSULE ORAL at 08:48

## 2023-02-01 RX ADMIN — FERROUS SULFATE TAB 325 MG (65 MG ELEMENTAL FE) 325 MG: 325 (65 FE) TAB at 08:47

## 2023-02-01 RX ADMIN — THIAMINE HCL TAB 100 MG 100 MG: 100 TAB at 05:40

## 2023-02-01 RX ADMIN — OXYCODONE HYDROCHLORIDE 5 MG: 5 TABLET ORAL at 17:33

## 2023-02-01 RX ADMIN — DULOXETINE HYDROCHLORIDE 60 MG: 60 CAPSULE, DELAYED RELEASE ORAL at 08:47

## 2023-02-01 RX ADMIN — ONDANSETRON HYDROCHLORIDE 8 MG: 8 TABLET, FILM COATED ORAL at 20:53

## 2023-02-01 RX ADMIN — POTASSIUM CHLORIDE: 2 INJECTION, SOLUTION, CONCENTRATE INTRAVENOUS at 00:34

## 2023-02-01 RX ADMIN — GABAPENTIN 300 MG: 300 CAPSULE ORAL at 14:20

## 2023-02-01 RX ADMIN — ENOXAPARIN SODIUM 40 MG: 40 INJECTION SUBCUTANEOUS at 00:36

## 2023-02-01 RX ADMIN — ONDANSETRON HYDROCHLORIDE 8 MG: 8 TABLET, FILM COATED ORAL at 05:40

## 2023-02-01 ASSESSMENT — ACTIVITIES OF DAILY LIVING (ADL)
ADLS_ACUITY_SCORE: 20

## 2023-02-01 NOTE — PROGRESS NOTES
Ifosfamide Toxicity Assessment       Patient is Alert & Oriented x 4. There are signs of lethargy, confusion or hallucinations No.      Patient is having new incontinence of bowel or bladder No.                   Pincer Grasp intact Yes     Tremors present No        Will continue to monitor for s/sx of ifosfamide toxicity: hallucinations, AMS, emotional lability, somnolence, myoclonic jerks, tremors, coordination difficulties. If these occur, please call the APPs/Attending on days and fellow on-call for evening and nights for recommendations and further instruction.

## 2023-02-01 NOTE — PLAN OF CARE
Ifosfamide Toxicity Assessment      Patient is Alert & Oriented x4. There are signs of lethargy, confusion or hallucinations NO.     Patient is having new incontinence of bowel or bladder NO      Pincer Grasp intact YES    Tremors present NO     Will continue to monitor for s/sx of ifosfamide toxicity: hallucinations, AMS, emotional lability, somnolence, myoclonic jerks, tremors, coordination difficulties. If these occur, please call the APPs/Attending on days and fellow on-call for evening and nights for recommendations and further instruction.

## 2023-02-01 NOTE — PROGRESS NOTES
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 Currently being admitted for Cycle 1 Doxil/Ifosfamide.     TODAY D3  - Continue chemotherapy per protocol, monitor closely for s/sx of Ifos toxicity   - Continue supportive cares for nausea/vomiting PRN antiemetics available   - Anticipate discharge 2/6/23, pending completion of chemotherapy      ONC   # High Grade Sarcoma R Thigh   This is a patient of Dr. Price. Patient initially noted right hamstring strain in June 2022, he was treated for sciatica. Pain continued to worsen and went for MRI of L spine and right thigh, which showed a large mass in the posterior compartment of the right thigh. He underwent an US guided biopsy and pathology c/w high grade sarcoma. CT CAP with pulmonary nodules but no site of metastatic disease. Currently being admitted for Cycle 1 Doxil/Ifos.   - Baseline Echo 1/26 EF 55%  - Bob PTA, PLC requested   - Per outpatient notes patient will need to discharge on prophylaxis Levaquin and Augmentin (d/t HS)   - Patient had hypersensitivity reaction with 5-min into Emend infusion; this was not completed. Instead scheduled Zyprexa 5 mg at bedtime.                                Therapy Plan: Doxil/Ifosfamide/Mesna (C1D1=1/30/23)                            - Doxil 45 mg/m2 (113 mg) IV -- D1                            - Ifosfamide 1,500 mg/m2 (3,780 mg) IV -- D1-6                             - Mesna 1,500 mg/m2 (3,780 mg) IV -- D7                            - Neulasta 6 mg subcutaneous -- D8 requested                              - Premeds: Thiamine, Emend, Zofran,  Dexamethasone      # Iron Deficiency Anemia   # Acute on Chronic Anemia   Hgb on admission was noted to be 8.8; last was 10.4 on 1/26. Patient denies any evidence of over bleeding.   - LDH, B12, Haptoglobin WNL   - Folate low at 4.0; started Folic acid supplment   - Peripheral smear - pending   - Continue PTA Iron supplement daily   - Transfuse to keep Hgb >7      # Cancer-related Pain   - PTA Gabapentin 300 mg TID   - PTA Oxycodone 5 mg q6h PRN     ID   # H/o Fevers   Patient and family report ~ 1 month history of fevers at home.   - BCx x2 1/30 -- NGTD   - Will monitor vitals and proceed with further infectious work up if febrile      CARDS   # Hypervolemia  Admission weight 299 lb, up roughly 11 lb as of 2/1/23.   - Lasix IV 20 mg today    # HTN   - PTA Lisinopril, hold parameters in place      PULM   # Chronic Cough   # Pulmonary Nodules   Patient's wife reports patient has had chronic cough for approximately last 10 months. He has tried several OTC medications which did not help, he was recently started on PPI with no improvement of cough. CT CAP obtained for staging shows multiple bilateral pulmonary nodules   - Monitor while inpatient   - Tessalon Pearls, Delsym, and Cepacol lozenges PRN      ENDO   # T2DM   Follows with PCP Dr. Mynor Mason; last Hgb A1C 11.7 on 1/18/23  - Hold PTA Metformin; anticipate resuming on discharge   - Medium intensity sliding scale insulin   - Hypoglycemia protocol onboard       GI   # GERD   - PTA Omeprazole      # Irritable Bowel Syndrome   - PTA Duloxetine      DERM   # Hidrenitis Suppurativa   Wife reports there is an active lesion on the right side, at home they were using topical clindamycin that wife had on hand and she has noted improvement in size of lesion.   - Continue topical Clindamycin BID   - WOC consulted on admission, appreciate recs   - Discussed with dermatology who recommend continuation of topical treatments since lesion is improving. If antibiotic is warranted  "they typically start with Doxycycline.       FEN:  -IVF per chemo protocol   -PRN lyte replacement  -RDAT     Prophy/Misc:  -VTE: PPx Lovenox BID; hold if platelets   -GI/PUD: PTA Omeprazole   -Bowels: PRN Senna and Miralax     Follow Up: APPT18 order placed 1/31 for;  1. Neulasta 2/7/23  2. LIBERTY follow up within 1 week of discharge   3. 3x weekly labs starting 2/7/23 for 2-3 weeks    Clinically Significant Risk Factors              # Hypoalbuminemia: Lowest albumin = 2.4 g/dL at 2/1/2023  6:48 AM, will monitor as appropriate          # DMII: A1C = 11.7 % (Ref range: <5.7 %) within past 3 months, PRESENT ON ADMISSION  # Severe Obesity: Estimated body mass index is 42.13 kg/m  as calculated from the following:    Height as of 1/27/23: 1.803 m (5' 11\").    Weight as of this encounter: 137 kg (302 lb 1.6 oz)., PRESENT ON ADMISSION         This patient was discussed with Dr. Perez    I spent >40 minutes face-to-face or coordinating care of Antonio Canseco. Over 50% of our time on the unit was spent counseling the patient and/or coordinating care.    Erika Mar PA-C ([facility])   Hematology/ Oncology   Pager [phone]    Interval History   Nursing notes reviewed. Antonio is feeling a bit sleepy this morning. He was still waking up. Reviewed that Zyprexa may attribute to further drowsiness. He denies any nausea. Continues to feel relatively well. Did not like the hospital hamburger yesterday, but finding things to eat. Planning on taking a shower toady.  All questions answered at this time.     Physical Exam   Temp: 97.5  F (36.4  C) Temp src: Oral BP: 96/60 Pulse: 80   Resp: 18 SpO2: 94 % O2 Device: None (Room air) Oxygen Delivery: 2.5 LPM  Vitals:    01/30/23 0950 01/31/23 1014   Weight: 136 kg (299 lb 14.4 oz) 137 kg (302 lb 1.6 oz)     Vital Signs with Ranges  Temp:  [97.5  F (36.4  C)-97.9  F (36.6  C)] 97.5  F (36.4  C)  Pulse:  [74-81] 80  Resp:  [18-20] 18  BP: ()/(51-82) 96/60  SpO2:  [94 %-100 %] 94 %  I/O " last 3 completed shifts:  In: 3316 [P.O.:920; I.V.:1620; IV Piggyback:776]  Out: -     Constitutional: Pleasant male seen resting comfortably in bed in NAD. Alert and interactive.   HEENT: NCAT. PERRL, EOMI, anicteric sclera. Oral mucosa pink and moist with no lesions or thrush.  Respiratory: Non-labored breathing, good air exchange, lungs clear to auscultation bilaterally. No cough or wheeze noted.   Cardiovascular: Regular rate and rhythm. No murmur or rub.   GI: Normoactive bowel sounds. Abdomen soft, non-distended, and non-tender. No palpable masses or organomegaly.  Skin: Warm and dry. No concerning lesions or rash on exposed surfaces.  Musculoskeletal: Extremities grossly normal, non-tender, no edema. Strong peripheral pulses. Good strength and ROM in bed.   Neuropsychiatric: Mentation and affect appear normal/appropriate.  Vascular Access: Bob is CDI without erythema, swelling, or discharge.     Medications   Current Facility-Administered Medications   Medication    0.9% sodium chloride BOLUS    acetaminophen (TYLENOL) tablet 650 mg    albuterol (PROVENTIL HFA/VENTOLIN HFA) inhaler    albuterol (PROVENTIL) neb solution 2.5 mg    benzocaine-menthol (CHLORASEPTIC MAX) 15-10 MG lozenge 1 lozenge    benzonatate (TESSALON) capsule 100 mg    Chemotherapy Infusing-Continuous Infusion    clindamycin (CLINDAMAX) 1 % topical gel    dextromethorphan (DELSYM) liquid 30 mg    [START ON 2/6/2023] dextrose 5 % flush PRE/POST medication    [START ON 2/6/2023] dextrose 5 % flush PRE/POST medication    glucose gel 15-30 g    Or    dextrose 50 % injection 25-50 mL    Or    glucagon injection 1 mg    DULoxetine (CYMBALTA) DR capsule 60 mg    enoxaparin ANTICOAGULANT (LOVENOX) injection 40 mg    EPINEPHrine PF (ADRENALIN) injection 0.3 mg    famotidine (PEPCID) injection 20 mg    ferrous sulfate (FEROSUL) tablet 325 mg    [START ON 2/6/2023] filgrastim-aafi (NIVESTYM) 480 mcg in D5W 25 mL infusion    folic acid (FOLVITE)  tablet 400 mcg    gabapentin (NEURONTIN) capsule 300 mg    heparin 100 UNIT/ML injection 5-10 mL    heparin lock flush 10 UNIT/ML injection 5-10 mL    heparin lock flush 10 UNIT/ML injection 5-10 mL    hydrOXYzine (ATARAX) tablet 25 mg    Or    hydrOXYzine (ATARAX) tablet 50 mg    Ifosfamide (IFEX) 3,780 mg, mesna (MESNEX) 3,780 mg in sodium chloride 0.9 % 1,163.4 mL infusion    insulin aspart (NovoLOG) injection (RAPID ACTING)    insulin aspart (NovoLOG) injection (RAPID ACTING)    lisinopril (ZESTRIL) tablet 20 mg    LORazepam (ATIVAN) injection 0.5-1 mg    LORazepam (ATIVAN) tablet 0.5-1 mg    melatonin tablet 3-6 mg    meperidine (DEMEROL) injection 25 mg    [START ON 2/5/2023] mesna (MESNEX) 3,780 mg in sodium chloride 0.9 % 1,087.8 mL infusion    methylPREDNISolone sodium succinate (solu-MEDROL) injection 125 mg    naloxone (NARCAN) injection 0.2 mg    Or    naloxone (NARCAN) injection 0.4 mg    Or    naloxone (NARCAN) injection 0.2 mg    Or    naloxone (NARCAN) injection 0.4 mg    No rectal suppositories if WBC less than 1000/microliters or platelets less than 50,000/ L    OLANZapine (zyPREXA) tablet 5 mg    ondansetron (ZOFRAN) tablet 4-8 mg    Or    ondansetron (ZOFRAN ODT) ODT tab 4-8 mg    Or    ondansetron (ZOFRAN) injection 4-8 mg    ondansetron (ZOFRAN) tablet 8 mg    oxyCODONE (ROXICODONE) tablet 5 mg    pantoprazole (PROTONIX) EC tablet 40 mg    polyethylene glycol (MIRALAX) Packet 17 g    prochlorperazine (COMPAZINE) injection 10 mg    prochlorperazine (COMPAZINE) tablet 10 mg    senna-docusate (SENOKOT-S/PERICOLACE) 8.6-50 MG per tablet 1 tablet    Or    senna-docusate (SENOKOT-S/PERICOLACE) 8.6-50 MG per tablet 2 tablet    simethicone (MYLICON) chewable tablet 80 mg    sodium bicarbonate 100 mEq, potassium chloride 20 mEq in NaCl 0.45 % 1,000 mL infusion    sodium chloride (PF) 0.9% PF flush 10-20 mL    sodium chloride (PF) 0.9% PF flush 10-20 mL    sodium chloride (PF) 0.9% PF flush 10-20 mL     thiamine (B-1) tablet 100 mg       Data   CBC  Recent Labs   Lab 02/01/23  0648 01/31/23  0605 01/30/23  1152 01/30/23  1025   WBC 9.9 10.5 8.8 8.5   RBC 3.34* 4.23* 3.74* 3.90*   HGB 7.7* 9.7* 8.5* 8.8*   HCT 27.0* 34.2* 29.8* 30.9*   MCV 81 81 80 79   MCH 23.1* 22.9* 22.7* 22.6*   MCHC 28.5* 28.4* 28.5* 28.5*   RDW 17.2* 17.2* 17.2* 17.4*   * 530* 506* 530*     CMP  Recent Labs   Lab 02/01/23  0648 02/01/23  0155 01/31/23  2053 01/31/23  1749 01/31/23  1137 01/31/23  0605 01/30/23  1807 01/30/23  1025 01/27/23  0829 01/26/23  1101     --   --   --   --  136  --  138  --  138   POTASSIUM 4.1  --   --   --   --  4.9  --  4.2  --  4.6   CHLORIDE 103  --   --   --   --  101  --  101  --  99   CO2 31*  --   --   --   --  26  --  24  --  26   ANIONGAP 7  --   --   --   --  9  --  13  --  13   * 167* 286* 242*   < > 291*   < > 214*   < > 171*   BUN 14.6  --   --   --   --  17.9  --  10.8  --  11.5   CR 0.61*  --   --   --   --  0.67  --  0.75  --  0.82   GFRESTIMATED >90  --   --   --   --  >90  --  >90  --  >90   DORIAN 7.8*  --   --   --   --  8.2*  --  8.6  --  9.5   MAG  --   --   --   --   --  2.0  --  1.8  --  1.7   PHOS  --   --   --   --   --  4.5  --  3.4  --  3.6   PROTTOTAL 5.6*  --   --   --   --  6.3*  --  7.1  --  8.2   ALBUMIN 2.4*  --   --   --   --  2.5*  --  2.9*  --  3.3*   BILITOTAL 0.2  --   --   --   --  0.2  --  0.4  --  0.5   ALKPHOS 49  --   --   --   --  54  --  64  --  63   AST 14  --   --   --   --  14  --  15  --  16   ALT 9*  --   --   --   --  8*  --  9*  --  9*    < > = values in this interval not displayed.     INR  Recent Labs   Lab 01/31/23  0605 01/30/23  1025 01/27/23  0827   INR 1.44* 1.37* 1.35*       Results for orders placed or performed during the hospital encounter of 01/27/23   IR CVC Tunnel Placement > 5 Yrs of Age    Narrative    PROCEDURES 1/27/2023 10:14 AM:  1. Ultrasound guidance for venous access  2. Placement centrally inserted catheter (age > 5 yrs.)  tunneled, no  port, no pump  3. Fluoroscopic guidance placement    CLINICAL HISTORY: TCVC placement - double lumen dumont 9.5 Urdu;  Sarcoma of soft tissue (H). Catheter does not need to be dialysis or  apheresis capable.    COMPARISONS: CT 1/26/2023    REFERRING PROVIDER: RASHID TAPIA    STAFF RADIOLOGIST: SEAN Luis MD    FELLOW(S)/RESIDENT(S): BROOKE Contreras    ASSISTANT: JUN Girard, ANGELES LUIS MD, attest that I was present for all critical portions  of the procedure and was immediately available to provide guidance and  assistance during the remainder of the procedure.    MEDICATIONS: The patient was placed on continuous monitoring.  Intravenous sedation was administered. 2 mg Versed and 100 mcg  Fentanyl IV. Vital signs and sedation were monitored by nursing staff  under Interventional Radiologist's supervision. The patient remained  stable throughout the procedure.    TOTAL SEDATION TIME: 28 minutes    ATTENDING FACE-TO-FACE SEDATION TIME: less than 5 minutes    FLUOROSCOPY TIME: 36.9 seconds.    DOSE: 11.16 mGy    PROCEDURE: The patient understood the limitations, alternatives, and  risks of the procedure and requested the procedure be performed. Both  written and oral consent were obtained.    The right neck and upper chest were prepped and draped in the usual  sterile fashion. 1% lidocaine without epinephrine was used for local  anesthesia.    Under ultrasound guidance, right internal jugular venotomy was made  with a micropuncture needle. Ultrasound image documenting jugular  patency and needle venotomy was saved in the patient's record.    Micropuncture needle exchanged over guidewire for the micropuncture  sheath under fluoroscopic guidance. Catheter length measured with the  0.018 guidewire. Micropuncture sheath saline locked. 9.5 [place] BD  Bard PowerHickman was subcutaneously tunneled from the right anterior  chest to the right internal jugular venotomy site. Catheter cut on  the  24 cm marker. Micropuncture sheath exchanged over guidewire for the  peel-away sheath. Guidewire removed. Under fluoroscopic guidance, the  catheter was placed through the peel-away sheath and positioned with  its tip in the superior atriocaval junction. Both lumens flushed and  aspirated adequately. Each lumen heparin locked with 100:1 heparin  solution. Catheter secured with StatLock and sterile dressing. Right  internal jugular venotomy site closed with Stripe Medical ExoFin  tissue adhesive. No immediate complication.    Fluoroscopic image documenting catheter placement and final position  was saved in the patient's record.      Impression    IMPRESSION:   1. Ultrasound guided right internal jugular venotomy.    2. 24 cm 9.5 Maltese BD Bard PowerHickman tunneled central venous  catheter placed with the tip in the superior atriocaval junction. Both  lumens heparin locked and ready for use.    I have personally reviewed the examination and initial interpretation  and I agree with the findings.    ANGELES LUIS MD         SYSTEM ID:  [id number]         ATTENDING PHYSICIAN ADDENDUM AND NOTE:  I evaluated this patient's case separately and also with Erika Mar PA-C. The note above reflects my assessment and plan. I have personally reviewed lab results, and vital and radiology results. >50% of time was spent in assessment and coordination of care; >=35 minutes.  Mr. Angeles Canseco is a 52 yr old gentleman from [place], [place]. On 1/12/23, he was diagnosed with a high-grade sarcoma of the right thigh; on histopathologic exam, the tumor had extensive necrosis. He has been admitted for initial systemic chemotherapy with Doxil/Ifosfamide; his primary oncologist is Dr. Laboy.  Other medical conditions include type II DM, hypertension and GERD. For 6+ months he was having back pain, initially thought to be due to sciatica; in January 2023, the biopsy noted above unfortunately confirmed sarcoma. During my  evaluation today, he is generally feeling well. He denies pain of any kind, including abdominal pain; also denies dysuria, hematuria, or other concerning symptoms. I performed physical exam including HEENT, CV, Pulmonary, abdominal and assessment of extremities. No concerning findings detected on exam or history.  The plan is to continue with the Cycle 1 Doxil and Ifosfamide as planned through Monday January 6, 2023 with careful monitoring as inpatient as described in detail above.  Junaid Perez MD, PhD  Associate Professor of Medicine, Hematology, Oncology and Transplantation

## 2023-02-01 NOTE — PROGRESS NOTES
Home Infusion  Antonio is expected to discharge on Mon 2/6  and will be going home with a tunneled catheter that will require daily flushing (when not seen in clinic) .   He has not done home IV therapy before but his wife is a nurse and she has a  PLC appt on Fri for line flush teaching.   Benefits for home IV abx therapy have been checked and pt will have coverage for his  supplies  through his [employer] policy for the drug, supplies and home nursing/therapies subject to meeting his deductible and OOP expenses.    Met with Antonio at bedside to discuss discharge plans, inform him of his coverage and offer choice of agency for the home infusion services.   Antonio will use [facility] for his supplies and will be going to clinic frequently so will have the dressing changes done here.  Provided him with information about IV abx therapy with [facility] services.   Informed Antonio about supplies and delivery of supplies, need for daily heparin flushing unless seen in clinic as well as 24/7 support from [facility] while managing his Bob at home.    Antonio verbalized understanding of all information given.   Questions answered.  Will continue to follow, assess wife's comfort level with flushing after PLC and update pt with final details once discharge is confirmed.    Hilda CARMICHAEL  Nurse Liaison  [facility] I [email or web address]  [address] [place] [address]  [email or web address]  [phone] M-F I [facility] main: [phone]

## 2023-02-01 NOTE — PROGRESS NOTES
Nursing Focus: Chemotherapy  D: Positive brisk bright red blood return via CVC Hickmann. Insertion site is clean/dry/intact, dressing intact with no complaints of pain.  Urine output is recorded in intake Doc Flowsheet.    I:  premedications given per order (see electronic medical administration record). Dose #2 of Ifosfamide/Mesna started to infuse over 24 hours. Reviewed pt teaching on chemotherapy side effects.  Pt denies need for further teaching. Chemotherapy double checked per protocol by two chemotherapy competent RN's.   A: Tolerating chemo well. Denies nausea.   P: Continue to monitor urine output and symptoms of nausea. Screen for symptoms of toxicity.                                                     Ifosfamide Toxicity Assessment      Patient is Alert & Oriented x 4. There are signs of lethargy, confusion or hallucinations No.     Patient is having new incontinence of bowel or bladder No.       Pincer Grasp intact No    Tremors present No @ baseline      Will continue to monitor for s/sx of ifosfamide toxicity: hallucinations, AMS, emotional lability, somnolence, myoclonic jerks, tremors, coordination difficulties. If these occur, please call the APPs/Attending on days and fellow on-call for evening and nights for recommendations and further instruction.

## 2023-02-01 NOTE — PLAN OF CARE
Goal Outcome Evaluation:  [id number]    Soft BPs, within parameters. OVSS. Pt was using 2.5L O2 overnight but took O2 off this morning and was stable on RA. Denies pain, nausea, and SOB. Reports REGAN. . D2 ifos/mesna infusing without signs of toxicity. Reports good urine output.

## 2023-02-01 NOTE — PLAN OF CARE
[id number]    VSS, continues to have soft SBP. Oxycodone given x1 for R leg pain w/ some relief. Denies nausea or SOB. D3 CIVI Ifosfamide infusing, good blood return. No s/s ifos toxicity. . Lasix given x1. Voiding spontaneously. UAL. Continue w/ POC.

## 2023-02-01 NOTE — CARE PLAN
1500 - 2330:     /60 (BP Location: Left arm)   Pulse 77   Temp 97.8  F (36.6  C) (Oral)   Resp 18   Wt 137 kg (302 lb 1.6 oz)   SpO2 100%   BMI 42.13 kg/m      C1 D #2 Ifosfamide/Mesna infusing via dumont with good blood return & tolerating well thus far.  A&O x 4, no s/s of neurotoxicity, AVSS.  Denies pain/nausea/sob on RA.  Sodium bicarb infusing at 100 ml/hr.  , insulin given per sliding scale.   UAL, voiding spontaneously/not saving, last bm 01/30.  Continue with poc...

## 2023-02-02 LAB
ABO/RH(D): NORMAL
ALBUMIN SERPL BCG-MCNC: 2.4 G/DL (ref 3.5–5.2)
ALP SERPL-CCNC: 47 U/L (ref 40–129)
ALT SERPL W P-5'-P-CCNC: 10 U/L (ref 10–50)
ANION GAP SERPL CALCULATED.3IONS-SCNC: 9 MMOL/L (ref 7–15)
ANTIBODY SCREEN: NEGATIVE
AST SERPL W P-5'-P-CCNC: 17 U/L (ref 10–50)
BASOPHILS # BLD AUTO: 0 10E3/UL (ref 0–0.2)
BASOPHILS NFR BLD AUTO: 0 %
BILIRUB SERPL-MCNC: 0.2 MG/DL
BUN SERPL-MCNC: 12.4 MG/DL (ref 6–20)
CALCIUM SERPL-MCNC: 8 MG/DL (ref 8.6–10)
CHLORIDE SERPL-SCNC: 101 MMOL/L (ref 98–107)
CREAT SERPL-MCNC: 0.77 MG/DL (ref 0.67–1.17)
DEPRECATED HCO3 PLAS-SCNC: 30 MMOL/L (ref 22–29)
EOSINOPHIL # BLD AUTO: 0.2 10E3/UL (ref 0–0.7)
EOSINOPHIL NFR BLD AUTO: 3 %
ERYTHROCYTE [DISTWIDTH] IN BLOOD BY AUTOMATED COUNT: 17.7 % (ref 10–15)
GFR SERPL CREATININE-BSD FRML MDRD: >90 ML/MIN/1.73M2
GLUCOSE BLDC GLUCOMTR-MCNC: 134 MG/DL (ref 70–99)
GLUCOSE BLDC GLUCOMTR-MCNC: 139 MG/DL (ref 70–99)
GLUCOSE BLDC GLUCOMTR-MCNC: 198 MG/DL (ref 70–99)
GLUCOSE BLDC GLUCOMTR-MCNC: 96 MG/DL (ref 70–99)
GLUCOSE SERPL-MCNC: 142 MG/DL (ref 70–99)
HCT VFR BLD AUTO: 27.8 % (ref 40–53)
HGB BLD-MCNC: 8 G/DL (ref 13.3–17.7)
IMM GRANULOCYTES # BLD: 0 10E3/UL
IMM GRANULOCYTES NFR BLD: 1 %
LYMPHOCYTES # BLD AUTO: 1.3 10E3/UL (ref 0.8–5.3)
LYMPHOCYTES NFR BLD AUTO: 19 %
MAGNESIUM SERPL-MCNC: 1.7 MG/DL (ref 1.7–2.3)
MCH RBC QN AUTO: 23 PG (ref 26.5–33)
MCHC RBC AUTO-ENTMCNC: 28.8 G/DL (ref 31.5–36.5)
MCV RBC AUTO: 80 FL (ref 78–100)
MONOCYTES # BLD AUTO: 0.7 10E3/UL (ref 0–1.3)
MONOCYTES NFR BLD AUTO: 11 %
NEUTROPHILS # BLD AUTO: 4.4 10E3/UL (ref 1.6–8.3)
NEUTROPHILS NFR BLD AUTO: 66 %
NRBC # BLD AUTO: 0 10E3/UL
NRBC BLD AUTO-RTO: 0 /100
PHOSPHATE SERPL-MCNC: 3.1 MG/DL (ref 2.5–4.5)
PLATELET # BLD AUTO: 454 10E3/UL (ref 150–450)
POTASSIUM SERPL-SCNC: 4.4 MMOL/L (ref 3.4–5.3)
PROT SERPL-MCNC: 5.9 G/DL (ref 6.4–8.3)
RBC # BLD AUTO: 3.48 10E6/UL (ref 4.4–5.9)
SODIUM SERPL-SCNC: 140 MMOL/L (ref 136–145)
SPECIMEN EXPIRATION DATE: NORMAL
WBC # BLD AUTO: 6.5 10E3/UL (ref 4–11)

## 2023-02-02 PROCEDURE — 36415 COLL VENOUS BLD VENIPUNCTURE: CPT | Performed by: PHYSICIAN ASSISTANT

## 2023-02-02 PROCEDURE — 250N000009 HC RX 250: Performed by: STUDENT IN AN ORGANIZED HEALTH CARE EDUCATION/TRAINING PROGRAM

## 2023-02-02 PROCEDURE — 258N000002 HC RX IP 258 OP 250: Performed by: STUDENT IN AN ORGANIZED HEALTH CARE EDUCATION/TRAINING PROGRAM

## 2023-02-02 PROCEDURE — 84100 ASSAY OF PHOSPHORUS: CPT | Performed by: INTERNAL MEDICINE

## 2023-02-02 PROCEDURE — 83735 ASSAY OF MAGNESIUM: CPT | Performed by: INTERNAL MEDICINE

## 2023-02-02 PROCEDURE — 250N000013 HC RX MED GY IP 250 OP 250 PS 637: Performed by: PHYSICIAN ASSISTANT

## 2023-02-02 PROCEDURE — 99232 SBSQ HOSP IP/OBS MODERATE 35: CPT | Performed by: INTERNAL MEDICINE

## 2023-02-02 PROCEDURE — 85025 COMPLETE CBC W/AUTO DIFF WBC: CPT | Performed by: PHYSICIAN ASSISTANT

## 2023-02-02 PROCEDURE — 250N000011 HC RX IP 250 OP 636: Performed by: PHYSICIAN ASSISTANT

## 2023-02-02 PROCEDURE — 258N000003 HC RX IP 258 OP 636: Performed by: STUDENT IN AN ORGANIZED HEALTH CARE EDUCATION/TRAINING PROGRAM

## 2023-02-02 PROCEDURE — 80053 COMPREHEN METABOLIC PANEL: CPT | Performed by: PHYSICIAN ASSISTANT

## 2023-02-02 PROCEDURE — 120N000002 HC R&B MED SURG/OB UMMC

## 2023-02-02 PROCEDURE — 86901 BLOOD TYPING SEROLOGIC RH(D): CPT | Performed by: PHYSICIAN ASSISTANT

## 2023-02-02 PROCEDURE — 250N000013 HC RX MED GY IP 250 OP 250 PS 637: Performed by: STUDENT IN AN ORGANIZED HEALTH CARE EDUCATION/TRAINING PROGRAM

## 2023-02-02 PROCEDURE — 250N000011 HC RX IP 250 OP 636: Performed by: STUDENT IN AN ORGANIZED HEALTH CARE EDUCATION/TRAINING PROGRAM

## 2023-02-02 RX ORDER — FUROSEMIDE 10 MG/ML
20 INJECTION INTRAMUSCULAR; INTRAVENOUS ONCE
Status: COMPLETED | OUTPATIENT
Start: 2023-02-02 | End: 2023-02-02

## 2023-02-02 RX ORDER — CALCIUM CARBONATE 500 MG/1
500 TABLET, CHEWABLE ORAL 2 TIMES DAILY PRN
Status: DISCONTINUED | OUTPATIENT
Start: 2023-02-02 | End: 2023-02-06 | Stop reason: HOSPADM

## 2023-02-02 RX ADMIN — ONDANSETRON HYDROCHLORIDE 8 MG: 8 TABLET, FILM COATED ORAL at 05:46

## 2023-02-02 RX ADMIN — POTASSIUM CHLORIDE: 2 INJECTION, SOLUTION, CONCENTRATE INTRAVENOUS at 15:55

## 2023-02-02 RX ADMIN — OXYCODONE HYDROCHLORIDE 5 MG: 5 TABLET ORAL at 09:16

## 2023-02-02 RX ADMIN — GABAPENTIN 300 MG: 300 CAPSULE ORAL at 13:19

## 2023-02-02 RX ADMIN — OXYCODONE HYDROCHLORIDE 5 MG: 5 TABLET ORAL at 18:44

## 2023-02-02 RX ADMIN — CALCIUM CARBONATE (ANTACID) CHEW TAB 500 MG 500 MG: 500 CHEW TAB at 20:23

## 2023-02-02 RX ADMIN — FERROUS SULFATE TAB 325 MG (65 MG ELEMENTAL FE) 325 MG: 325 (65 FE) TAB at 09:09

## 2023-02-02 RX ADMIN — OLANZAPINE 5 MG: 2.5 TABLET, FILM COATED ORAL at 22:14

## 2023-02-02 RX ADMIN — Medication 5 ML: at 06:05

## 2023-02-02 RX ADMIN — THIAMINE HCL TAB 100 MG 100 MG: 100 TAB at 05:46

## 2023-02-02 RX ADMIN — DULOXETINE HYDROCHLORIDE 60 MG: 60 CAPSULE, DELAYED RELEASE ORAL at 09:09

## 2023-02-02 RX ADMIN — FUROSEMIDE 20 MG: 10 INJECTION, SOLUTION INTRAVENOUS at 10:55

## 2023-02-02 RX ADMIN — ENOXAPARIN SODIUM 40 MG: 40 INJECTION SUBCUTANEOUS at 13:19

## 2023-02-02 RX ADMIN — THIAMINE HCL TAB 100 MG 100 MG: 100 TAB at 13:19

## 2023-02-02 RX ADMIN — ONDANSETRON HYDROCHLORIDE 8 MG: 8 TABLET, FILM COATED ORAL at 22:14

## 2023-02-02 RX ADMIN — CLINDAMYCIN PHOSPHATE: 10 GEL TOPICAL at 20:23

## 2023-02-02 RX ADMIN — POTASSIUM CHLORIDE: 2 INJECTION, SOLUTION, CONCENTRATE INTRAVENOUS at 06:28

## 2023-02-02 RX ADMIN — ONDANSETRON HYDROCHLORIDE 8 MG: 8 TABLET, FILM COATED ORAL at 13:19

## 2023-02-02 RX ADMIN — PANTOPRAZOLE SODIUM 40 MG: 40 TABLET, DELAYED RELEASE ORAL at 09:09

## 2023-02-02 RX ADMIN — Medication 400 MCG: at 09:09

## 2023-02-02 RX ADMIN — MESNA 3780 MG: 100 INJECTION, SOLUTION INTRAVENOUS at 18:45

## 2023-02-02 RX ADMIN — GABAPENTIN 300 MG: 300 CAPSULE ORAL at 20:23

## 2023-02-02 RX ADMIN — GABAPENTIN 300 MG: 300 CAPSULE ORAL at 09:08

## 2023-02-02 RX ADMIN — THIAMINE HCL TAB 100 MG 100 MG: 100 TAB at 20:23

## 2023-02-02 RX ADMIN — CLINDAMYCIN PHOSPHATE: 10 GEL TOPICAL at 09:17

## 2023-02-02 ASSESSMENT — ACTIVITIES OF DAILY LIVING (ADL)
ADLS_ACUITY_SCORE: 20
DEPENDENT_IADLS:: INDEPENDENT
ADLS_ACUITY_SCORE: 20

## 2023-02-02 NOTE — PROGRESS NOTES
Ifosfamide Toxicity Assessment      Patient is Alert & Oriented x4. There are signs of lethargy, confusion or hallucinations NO    Patient is having new incontinence of bowel or bladder- NO    Pincer Grasp intact- YES    Tremors present- NO      Will continue to monitor for s/sx of ifosfamide toxicity: hallucinations, AMS, emotional lability, somnolence, myoclonic jerks, tremors, coordination difficulties. If these occur, please call the APPs/Attending on days and fellow on-call for evening and nights for recommendations and further instruction.

## 2023-02-02 NOTE — PLAN OF CARE
Ifosfamide Toxicity Assessment      Patient is Alert & Oriented x4. There are signs of lethargy, confusion or hallucinations NO    Patient is having new incontinence of bowel or bladder NO       Pincer Grasp intact NO: patient having difficulty holding objects with his fingers for more than a few seconds    Tremors present YES intermittent tremors in hands    Provider was notified YES Pily ANDRADE was notified    Will continue to monitor for s/sx of ifosfamide toxicity: hallucinations, AMS, emotional lability, somnolence, myoclonic jerks, tremors, coordination difficulties. If these occur, please call the APPs/Attending on days and fellow on-call for evening and nights for recommendations and further instruction.

## 2023-02-02 NOTE — PROGRESS NOTES
0000  Ifosfamide Toxicity Assessment       Patient is Alert & Oriented x 4. There are signs of lethargy, confusion or hallucinations No.      Patient is having new incontinence of bowel or bladder No.                   Pincer Grasp intact Yes     Tremors present No, pt denies any events but reports he has been sleeping.        Will continue to monitor for s/sx of ifosfamide toxicity: hallucinations, AMS, emotional lability, somnolence, myoclonic jerks, tremors, coordination difficulties. If these occur, please call the APPs/Attending on days and fellow on-call for evening and nights for recommendations and further instruction.       0533  Brief reassessment- pt reported having occasional hand spasms overnight. Pincer grasp still intact.

## 2023-02-02 NOTE — PROGRESS NOTES
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 Currently being admitted for Cycle 1 Doxil/Ifosfamide.     TODAY D4  - Continue chemotherapy per protocol and close monitoring for worsening Ifos toxicity   - Ongoing diuresis as weight continues to uptrend   - Continue supportive cares for nausea/vomiting PRN antiemetics available   - Anticipate discharge 2/6/23, pending completion of chemotherapy      ONC   # High Grade Sarcoma R Thigh   This is a patient of Dr. Price. Patient initially noted right hamstring strain in June 2022, he was treated for sciatica. Pain continued to worsen and went for MRI of L spine and right thigh, which showed a large mass in the posterior compartment of the right thigh. He underwent an US guided biopsy and pathology c/w high grade sarcoma. CT CAP with pulmonary nodules but no site of metastatic disease. Currently being admitted for Cycle 1 Doxil/Ifos.   - Baseline Echo 1/26 EF 55%  - Bob PTA, PLC requested   - Per outpatient notes patient will need to discharge on prophylaxis Levaquin and Augmentin (d/t HS)   - Patient had hypersensitivity reaction with 5-min into Emend infusion; this was not completed. Instead scheduled Zyprexa 5 mg at bedtime.  - On 2/2 notified by nursing that patient is having intermittent hand spasms and is occasionally dropping his phone, no evidence of confusion or AMS. Will continue to closely monitor.                                Therapy Plan: Doxil/Ifosfamide/Mesna (C1D1=1/30/23)                            - Doxil 45 mg/m2 (113 mg) IV -- D1                            - Ifosfamide 1,500  mg/m2 (3,780 mg) IV -- D1-6                             - Mesna 1,500 mg/m2 (3,780 mg) IV -- D7                            - Neulasta 6 mg subcutaneous -- D8 requested                              - Premeds: Thiamine, Emend, Zofran, Dexamethasone      # Iron Deficiency Anemia   # Acute on Chronic Anemia   Hgb on admission was noted to be 8.8; last was 10.4 on 1/26. Patient denies any evidence of over bleeding.   - LDH, B12, Haptoglobin WNL   - Folate low at 4.0; started Folic acid supplment   - Peripheral smear - hypochromic. Normocytic anemia, rare elliptocytes, slight thrombocytosis, no evidence of hemolysis   - Continue PTA Iron supplement daily   - Transfuse to keep Hgb >7      # Cancer-related Pain   - PTA Gabapentin 300 mg TID   - PTA Oxycodone 5 mg q6h PRN     ID   # H/o Fevers   Patient and family report ~ 1 month history of fevers at home.   - BCx x2 1/30 -- NGTD   - Will monitor vitals and proceed with further infectious work up if febrile      CARDS   # Hypervolemia  Admission weight 299 lb, up roughly 11 lb as of 2/1/23.   - Lasix IV 20 mg today    # HTN   - PTA Lisinopril, hold parameters in place      PULM   # Chronic Cough   # Pulmonary Nodules   Patient's wife reports patient has had chronic cough for approximately last 10 months. He has tried several OTC medications which did not help, he was recently started on PPI with no improvement of cough. CT CAP obtained for staging shows multiple bilateral pulmonary nodules   - Monitor while inpatient   - Tessalon Pearls, Delsym, and Cepacol lozenges PRN      ENDO   # T2DM   Follows with PCP Dr. Mynor Mason; last Hgb A1C 11.7 on 1/18/23  - Hold PTA Metformin; anticipate resuming on discharge   - Medium intensity sliding scale insulin   - Hypoglycemia protocol onboard       GI   # GERD   - PTA Omeprazole      # Irritable Bowel Syndrome   - PTA Duloxetine      DERM   # Hidrenitis Suppurativa   Wife reports there is an active lesion on the right side, at home  "they were using topical clindamycin that wife had on hand and she has noted improvement in size of lesion.   - Continue topical Clindamycin BID   - WOC consulted on admission, appreciate recs   - Discussed with dermatology who recommend continuation of topical treatments since lesion is improving. If antibiotic is warranted they typically start with Doxycycline.       FEN:  -IVF per chemo protocol   -PRN lyte replacement  -RDAT     Prophy/Misc:  -VTE: PPx Lovenox BID; hold if platelets   -GI/PUD: PTA Omeprazole   -Bowels: PRN Senna and Miralax     Follow Up: APPT18 order placed 1/31 for;  1. Neulasta 2/7/23  2. LIBERTY follow up within 1 week of discharge   3. 3x weekly labs starting 2/7/23 for 2-3 weeks    Clinically Significant Risk Factors              # Hypoalbuminemia: Lowest albumin = 2.4 g/dL at 2/2/2023  6:10 AM, will monitor as appropriate          # DMII: A1C = 11.7 % (Ref range: <5.7 %) within past 3 months, PRESENT ON ADMISSION  # Severe Obesity: Estimated body mass index is 43.98 kg/m  as calculated from the following:    Height as of 1/27/23: 1.803 m (5' 11\").    Weight as of this encounter: 143 kg (315 lb 4.8 oz)., PRESENT ON ADMISSION         This patient was discussed with Dr. Perez    I spent >40 minutes face-to-face or coordinating care of Antonio Canseco. Over 50% of our time on the unit was spent counseling the patient and/or coordinating care.    Erika Mar (Johnson), PASHIMA   Hematology/ Oncology   Pager [phone]    Interval History   Nursing notes reviewed. Antonio continues to feel fatigued. He reports that he has occasionally had uncontrolled hand spasms resulting in dropping his phone. No tremor or spasms noticed during our conversation. Will continue to closely monitor. He denies nausea. Eating and drinking decently well. He continue have his weight trend up. Discussed further diuresis and patient was agreeable. All questions answered at this time.     Physical Exam   Temp: 99.2  F (37.3  C) " Temp src: Oral BP: 120/57 Pulse: 99   Resp: 18 SpO2: 94 % O2 Device: Nasal cannula Oxygen Delivery: 2 LPM  Vitals:    02/01/23 1054 02/01/23 1103 02/02/23 0748   Weight: 140.6 kg (310 lb) 140.7 kg (310 lb 3.2 oz) 143 kg (315 lb 4.8 oz)     Vital Signs with Ranges  Temp:  [99.1  F (37.3  C)-100.3  F (37.9  C)] 99.2  F (37.3  C)  Pulse:  [] 99  Resp:  [18-20] 18  BP: ()/(37-65) 120/57  SpO2:  [84 %-97 %] 94 %  I/O last 3 completed shifts:  In: 4734 [P.O.:1160; I.V.:2410; IV Piggyback:1164]  Out: -     Constitutional: Pleasant male seen resting comfortably in bed in NAD. Alert and interactive.   HEENT: NCAT. PERRL, EOMI, anicteric sclera. Oral mucosa pink and moist with no lesions or thrush.  Respiratory: Non-labored breathing, good air exchange, lungs clear to auscultation bilaterally. No cough or wheeze noted.   Cardiovascular: Regular rate and rhythm. No murmur or rub.   GI: Normoactive bowel sounds. Abdomen soft, non-distended, and non-tender. No palpable masses or organomegaly.  Skin: Warm and dry. No concerning lesions or rash on exposed surfaces.  Musculoskeletal: Extremities grossly normal, non-tender, no edema. Strong peripheral pulses. Good strength and ROM in bed.   Neuropsychiatric: Mentation and affect appear normal/appropriate.  Vascular Access: Bob is CDI without erythema, swelling, or discharge.     Medications   Current Facility-Administered Medications   Medication    0.9% sodium chloride BOLUS    acetaminophen (TYLENOL) tablet 650 mg    albuterol (PROVENTIL HFA/VENTOLIN HFA) inhaler    albuterol (PROVENTIL) neb solution 2.5 mg    benzocaine-menthol (CHLORASEPTIC MAX) 15-10 MG lozenge 1 lozenge    benzonatate (TESSALON) capsule 100 mg    Chemotherapy Infusing-Continuous Infusion    clindamycin (CLINDAMAX) 1 % topical gel    dextromethorphan (DELSYM) liquid 30 mg    [START ON 2/6/2023] dextrose 5 % flush PRE/POST medication    [START ON 2/6/2023] dextrose 5 % flush PRE/POST medication     glucose gel 15-30 g    Or    dextrose 50 % injection 25-50 mL    Or    glucagon injection 1 mg    DULoxetine (CYMBALTA) DR capsule 60 mg    enoxaparin ANTICOAGULANT (LOVENOX) injection 40 mg    EPINEPHrine PF (ADRENALIN) injection 0.3 mg    famotidine (PEPCID) injection 20 mg    ferrous sulfate (FEROSUL) tablet 325 mg    [START ON 2/6/2023] filgrastim-aafi (NIVESTYM) 480 mcg in D5W 25 mL infusion    folic acid (FOLVITE) tablet 400 mcg    gabapentin (NEURONTIN) capsule 300 mg    heparin 100 UNIT/ML injection 5-10 mL    heparin lock flush 10 UNIT/ML injection 5-10 mL    heparin lock flush 10 UNIT/ML injection 5-10 mL    hydrOXYzine (ATARAX) tablet 25 mg    Or    hydrOXYzine (ATARAX) tablet 50 mg    Ifosfamide (IFEX) 3,780 mg, mesna (MESNEX) 3,780 mg in sodium chloride 0.9 % 1,163.4 mL infusion    insulin aspart (NovoLOG) injection (RAPID ACTING)    insulin aspart (NovoLOG) injection (RAPID ACTING)    lisinopril (ZESTRIL) tablet 20 mg    LORazepam (ATIVAN) injection 0.5-1 mg    LORazepam (ATIVAN) tablet 0.5-1 mg    melatonin tablet 3-6 mg    meperidine (DEMEROL) injection 25 mg    [START ON 2/5/2023] mesna (MESNEX) 3,780 mg in sodium chloride 0.9 % 1,087.8 mL infusion    methylPREDNISolone sodium succinate (solu-MEDROL) injection 125 mg    naloxone (NARCAN) injection 0.2 mg    Or    naloxone (NARCAN) injection 0.4 mg    Or    naloxone (NARCAN) injection 0.2 mg    Or    naloxone (NARCAN) injection 0.4 mg    No rectal suppositories if WBC less than 1000/microliters or platelets less than 50,000/ L    OLANZapine (zyPREXA) tablet 5 mg    ondansetron (ZOFRAN) tablet 4-8 mg    Or    ondansetron (ZOFRAN ODT) ODT tab 4-8 mg    Or    ondansetron (ZOFRAN) injection 4-8 mg    ondansetron (ZOFRAN) tablet 8 mg    oxyCODONE (ROXICODONE) tablet 5 mg    pantoprazole (PROTONIX) EC tablet 40 mg    polyethylene glycol (MIRALAX) Packet 17 g    prochlorperazine (COMPAZINE) injection 10 mg    prochlorperazine (COMPAZINE) tablet 10 mg     senna-docusate (SENOKOT-S/PERICOLACE) 8.6-50 MG per tablet 1 tablet    Or    senna-docusate (SENOKOT-S/PERICOLACE) 8.6-50 MG per tablet 2 tablet    simethicone (MYLICON) chewable tablet 80 mg    sodium bicarbonate 100 mEq, potassium chloride 20 mEq in NaCl 0.45 % 1,000 mL infusion    sodium chloride (PF) 0.9% PF flush 10-20 mL    sodium chloride (PF) 0.9% PF flush 10-20 mL    sodium chloride (PF) 0.9% PF flush 10-20 mL    thiamine (B-1) tablet 100 mg       Data   CBC  Recent Labs   Lab 02/02/23  0610 02/01/23  0648 01/31/23  0605 01/30/23  1152   WBC 6.5 9.9 10.5 8.8   RBC 3.48* 3.34* 4.23* 3.74*   HGB 8.0* 7.7* 9.7* 8.5*   HCT 27.8* 27.0* 34.2* 29.8*   MCV 80 81 81 80   MCH 23.0* 23.1* 22.9* 22.7*   MCHC 28.8* 28.5* 28.4* 28.5*   RDW 17.7* 17.2* 17.2* 17.2*   * 474* 530* 506*     CMP  Recent Labs   Lab 02/02/23  1048 02/02/23  0610 02/02/23  0258 02/01/23  2207 02/01/23  1125 02/01/23  0648 01/31/23  1137 01/31/23  0605 01/30/23  1807 01/30/23  1025   NA  --  140  --   --   --  141  --  136  --  138   POTASSIUM  --  4.4  --   --   --  4.1  --  4.9  --  4.2   CHLORIDE  --  101  --   --   --  103  --  101  --  101   CO2  --  30*  --   --   --  31*  --  26  --  24   ANIONGAP  --  9  --   --   --  7  --  9  --  13   * 142* 139* 203*   < > 110*   < > 291*   < > 214*   BUN  --  12.4  --   --   --  14.6  --  17.9  --  10.8   CR  --  0.77  --   --   --  0.61*  --  0.67  --  0.75   GFRESTIMATED  --  >90  --   --   --  >90  --  >90  --  >90   DORIAN  --  8.0*  --   --   --  7.8*  --  8.2*  --  8.6   MAG  --  1.7  --   --   --  1.9  --  2.0  --  1.8   PHOS  --  3.1  --   --   --  2.8  --  4.5  --  3.4   PROTTOTAL  --  5.9*  --   --   --  5.6*  --  6.3*  --  7.1   ALBUMIN  --  2.4*  --   --   --  2.4*  --  2.5*  --  2.9*   BILITOTAL  --  0.2  --   --   --  0.2  --  0.2  --  0.4   ALKPHOS  --  47  --   --   --  49  --  54  --  64   AST  --  17  --   --   --  14  --  14  --  15   ALT  --  10  --   --   --  9*  --  8*   --  9*    < > = values in this interval not displayed.     INR  Recent Labs   Lab 01/31/23  0605 01/30/23  1025 01/27/23  0827   INR 1.44* 1.37* 1.35*       Results for orders placed or performed during the hospital encounter of 01/27/23   IR CVC Tunnel Placement > 5 Yrs of Age    Narrative    PROCEDURES 1/27/2023 10:14 AM:  1. Ultrasound guidance for venous access  2. Placement centrally inserted catheter (age > 5 yrs.) tunneled, no  port, no pump  3. Fluoroscopic guidance placement    CLINICAL HISTORY: TCVC placement - double lumen dmuont 9.5 Egyptian;  Sarcoma of soft tissue (H). Catheter does not need to be dialysis or  apheresis capable.    COMPARISONS: CT 1/26/2023    REFERRING PROVIDER: RASHID TAPIA    STAFF RADIOLOGIST: SEAN Luis MD    FELLOW(S)/RESIDENT(S): BROOKE Contreras    ASSISTANT: JUN Girard, ANGELES LUIS MD, attest that I was present for all critical portions  of the procedure and was immediately available to provide guidance and  assistance during the remainder of the procedure.    MEDICATIONS: The patient was placed on continuous monitoring.  Intravenous sedation was administered. 2 mg Versed and 100 mcg  Fentanyl IV. Vital signs and sedation were monitored by nursing staff  under Interventional Radiologist's supervision. The patient remained  stable throughout the procedure.    TOTAL SEDATION TIME: 28 minutes    ATTENDING FACE-TO-FACE SEDATION TIME: less than 5 minutes    FLUOROSCOPY TIME: 36.9 seconds.    DOSE: 11.16 mGy    PROCEDURE: The patient understood the limitations, alternatives, and  risks of the procedure and requested the procedure be performed. Both  written and oral consent were obtained.    The right neck and upper chest were prepped and draped in the usual  sterile fashion. 1% lidocaine without epinephrine was used for local  anesthesia.    Under ultrasound guidance, right internal jugular venotomy was made  with a micropuncture needle. Ultrasound image documenting  jugular  patency and needle venotomy was saved in the patient's record.    Micropuncture needle exchanged over guidewire for the micropuncture  sheath under fluoroscopic guidance. Catheter length measured with the  0.018 guidewire. Micropuncture sheath saline locked. 9.5 Sami BD  Bard PowerHickman was subcutaneously tunneled from the right anterior  chest to the right internal jugular venotomy site. Catheter cut on the  24 cm marker. Micropuncture sheath exchanged over guidewire for the  peel-away sheath. Guidewire removed. Under fluoroscopic guidance, the  catheter was placed through the peel-away sheath and positioned with  its tip in the superior atriocaval junction. Both lumens flushed and  aspirated adequately. Each lumen heparin locked with 100:1 heparin  solution. Catheter secured with StatLock and sterile dressing. Right  internal jugular venotomy site closed with [employer] Ex[employer]  tissue adhesive. No immediate complication.    Fluoroscopic image documenting catheter placement and final position  was saved in the patient's record.      Impression    IMPRESSION:   1. Ultrasound guided right internal jugular venotomy.    2. 24 cm 9.5 Sami BD Bard PowerHickman tunneled central venous  catheter placed with the tip in the superior atriocaval junction. Both  lumens heparin locked and ready for use.    I have personally reviewed the examination and initial interpretation  and I agree with the findings.    ANGELES LUIS MD         SYSTEM ID:  [id number]       ATTENDING PHYSICIAN ADDENDUM AND NOTE:  I evaluated this patient's case separately and also with Erika Mar PA-C. The note above reflects my assessment and plan. I have personally reviewed lab results, and vital and radiology results. >50% of time was spent in assessment and coordination of care; >=35 minutes.  Mr. Angeles Canseco is a 52 yr old gentleman from [place], [place]. On 1/12/23, he was diagnosed with a high-grade sarcoma of the right thigh;  on histopathologic exam, the tumor had extensive necrosis. He has been admitted for initial systemic chemotherapy with Doxil/Ifosfamide; his primary oncologist is Dr. Laboy.  Other medical conditions include type II DM, hypertension, iron deficiency anemia and GERD with chronic cough. For 6+ months he was having back pain, initially thought to be due to sciatica; in January 2023, the biopsy noted above unfortunately confirmed sarcoma. Today he is having some GERD-like symptoms; he has history of obstructive sleep apnea, and has been using CPAP at night for 15 years. He used the CPAP last night and felt short of breath briefly after waking up this morning. He is not yet walking actively or regularly in the hallways but he plans to sit up and be more active today and in the days ahead.  The plan is to continue with the Cycle 1 Doxil and Ifosfamide as planned through Monday January 6, 2023 with careful monitoring as inpatient as described in detail above.  Junaid Perez MD, PhD  Associate Professor of Medicine, Hematology, Oncology and Transplantation

## 2023-02-02 NOTE — PROGRESS NOTES
Ifosfamide Toxicity Assessment      Patient is Alert & Oriented x 4. There are no signs of lethargy, confusion or hallucinations.     Patient is having new incontinence of bowel or bladder: No    Pincer Grasp intact: Yes    Tremors present: No    Provider was not notified of any remarkable changes. No interventions at this time.     Will continue to monitor for s/sx of ifosfamide toxicity: hallucinations, AMS, emotional lability, somnolence, myoclonic jerks, tremors, coordination difficulties. If these occur, please call the APPs/Attending on days and fellow on-call for evening and nights for recommendations and further instruction.

## 2023-02-02 NOTE — PROGRESS NOTES
Nursing Focus: Chemotherapy  D: Positive blood return via PICC. Insertion site is clean/dry/intact, dressing intact with no complaints of pain.  Urine output is recorded in intake in Doc Flowsheet.    I: Premedications given per order (see electronic medical administration record). Dose #3 of Ifosfamide/Mesna started to infuse over 24 hours. Reviewed pt teaching on chemotherapy side effects.  Pt denies need for further teaching. Chemotherapy double checked per protocol by two chemotherapy competent RN's.   A: Tolerating procedure well. Denies nausea and or pain.   P: Continue to monitor urine output and symptoms of nausea. Screen for symptoms of toxicity.

## 2023-02-02 NOTE — PLAN OF CARE
[id number]    T-max 100.0, down to 98.7. On 2L O2 via NC. Given IV Lasix x1, good UOP. . CIVI Ifos infusing. Patient reports intermittent hand tremors and pincer grasp difficulty. Good appetite, denies nausea. Oxycodone given x1 for R leg pain w/ relief. UAL. Continue w/ POC.

## 2023-02-02 NOTE — PLAN OF CARE
Goal Outcome Evaluation:    [id number]     Soft BPs, improved when pt was laying on his his back vs side. BPs within parameters. Tm 100.3, temp recheck 99.6 without intervention.  Pts O2 dropped to 88% on 2L but recovered when increased to 2.5L. Dipped again to 84% when pt took off O2 and put his CPAP on. Lung sounds clear. Denies pain, nausea, and SOB. Reports REGAN. . D3 ifos/mesna infusing. Reports occasional hand spasms, no other signs of ifos toxicity. Reports good urine output.

## 2023-02-03 LAB
ALBUMIN SERPL BCG-MCNC: 2.5 G/DL (ref 3.5–5.2)
ALP SERPL-CCNC: 44 U/L (ref 40–129)
ALT SERPL W P-5'-P-CCNC: 10 U/L (ref 10–50)
ANION GAP SERPL CALCULATED.3IONS-SCNC: 8 MMOL/L (ref 7–15)
AST SERPL W P-5'-P-CCNC: 15 U/L (ref 10–50)
BASOPHILS # BLD AUTO: 0 10E3/UL (ref 0–0.2)
BASOPHILS NFR BLD AUTO: 0 %
BILIRUB SERPL-MCNC: 0.3 MG/DL
BUN SERPL-MCNC: 11.7 MG/DL (ref 6–20)
CALCIUM SERPL-MCNC: 8.1 MG/DL (ref 8.6–10)
CHLORIDE SERPL-SCNC: 101 MMOL/L (ref 98–107)
CREAT SERPL-MCNC: 0.74 MG/DL (ref 0.67–1.17)
DEPRECATED HCO3 PLAS-SCNC: 29 MMOL/L (ref 22–29)
EOSINOPHIL # BLD AUTO: 0.2 10E3/UL (ref 0–0.7)
EOSINOPHIL NFR BLD AUTO: 3 %
ERYTHROCYTE [DISTWIDTH] IN BLOOD BY AUTOMATED COUNT: 17.7 % (ref 10–15)
GFR SERPL CREATININE-BSD FRML MDRD: >90 ML/MIN/1.73M2
GLUCOSE BLDC GLUCOMTR-MCNC: 100 MG/DL (ref 70–99)
GLUCOSE BLDC GLUCOMTR-MCNC: 108 MG/DL (ref 70–99)
GLUCOSE BLDC GLUCOMTR-MCNC: 117 MG/DL (ref 70–99)
GLUCOSE BLDC GLUCOMTR-MCNC: 120 MG/DL (ref 70–99)
GLUCOSE BLDC GLUCOMTR-MCNC: 154 MG/DL (ref 70–99)
GLUCOSE SERPL-MCNC: 144 MG/DL (ref 70–99)
HCT VFR BLD AUTO: 28.4 % (ref 40–53)
HGB BLD-MCNC: 7.9 G/DL (ref 13.3–17.7)
IMM GRANULOCYTES # BLD: 0.1 10E3/UL
IMM GRANULOCYTES NFR BLD: 1 %
LYMPHOCYTES # BLD AUTO: 0.9 10E3/UL (ref 0.8–5.3)
LYMPHOCYTES NFR BLD AUTO: 16 %
MAGNESIUM SERPL-MCNC: 1.9 MG/DL (ref 1.7–2.3)
MCH RBC QN AUTO: 22.2 PG (ref 26.5–33)
MCHC RBC AUTO-ENTMCNC: 27.8 G/DL (ref 31.5–36.5)
MCV RBC AUTO: 80 FL (ref 78–100)
MONOCYTES # BLD AUTO: 0.2 10E3/UL (ref 0–1.3)
MONOCYTES NFR BLD AUTO: 4 %
NEUTROPHILS # BLD AUTO: 4.4 10E3/UL (ref 1.6–8.3)
NEUTROPHILS NFR BLD AUTO: 76 %
NRBC # BLD AUTO: 0 10E3/UL
NRBC BLD AUTO-RTO: 0 /100
PHOSPHATE SERPL-MCNC: 3.1 MG/DL (ref 2.5–4.5)
PLATELET # BLD AUTO: 433 10E3/UL (ref 150–450)
POTASSIUM SERPL-SCNC: 4.3 MMOL/L (ref 3.4–5.3)
PROT SERPL-MCNC: 5.9 G/DL (ref 6.4–8.3)
RBC # BLD AUTO: 3.56 10E6/UL (ref 4.4–5.9)
SODIUM SERPL-SCNC: 138 MMOL/L (ref 136–145)
WBC # BLD AUTO: 5.7 10E3/UL (ref 4–11)

## 2023-02-03 PROCEDURE — 36592 COLLECT BLOOD FROM PICC: CPT | Performed by: PHYSICIAN ASSISTANT

## 2023-02-03 PROCEDURE — 258N000003 HC RX IP 258 OP 636: Performed by: STUDENT IN AN ORGANIZED HEALTH CARE EDUCATION/TRAINING PROGRAM

## 2023-02-03 PROCEDURE — 83735 ASSAY OF MAGNESIUM: CPT | Performed by: INTERNAL MEDICINE

## 2023-02-03 PROCEDURE — 99233 SBSQ HOSP IP/OBS HIGH 50: CPT | Performed by: INTERNAL MEDICINE

## 2023-02-03 PROCEDURE — 85025 COMPLETE CBC W/AUTO DIFF WBC: CPT | Performed by: PHYSICIAN ASSISTANT

## 2023-02-03 PROCEDURE — 250N000009 HC RX 250: Performed by: STUDENT IN AN ORGANIZED HEALTH CARE EDUCATION/TRAINING PROGRAM

## 2023-02-03 PROCEDURE — 258N000002 HC RX IP 258 OP 250: Performed by: STUDENT IN AN ORGANIZED HEALTH CARE EDUCATION/TRAINING PROGRAM

## 2023-02-03 PROCEDURE — 120N000002 HC R&B MED SURG/OB UMMC

## 2023-02-03 PROCEDURE — 84100 ASSAY OF PHOSPHORUS: CPT | Performed by: INTERNAL MEDICINE

## 2023-02-03 PROCEDURE — 250N000013 HC RX MED GY IP 250 OP 250 PS 637: Performed by: PHYSICIAN ASSISTANT

## 2023-02-03 PROCEDURE — 250N000013 HC RX MED GY IP 250 OP 250 PS 637: Performed by: STUDENT IN AN ORGANIZED HEALTH CARE EDUCATION/TRAINING PROGRAM

## 2023-02-03 PROCEDURE — 80053 COMPREHEN METABOLIC PANEL: CPT | Performed by: PHYSICIAN ASSISTANT

## 2023-02-03 PROCEDURE — 250N000011 HC RX IP 250 OP 636: Performed by: PHYSICIAN ASSISTANT

## 2023-02-03 PROCEDURE — 250N000011 HC RX IP 250 OP 636: Performed by: STUDENT IN AN ORGANIZED HEALTH CARE EDUCATION/TRAINING PROGRAM

## 2023-02-03 RX ORDER — FUROSEMIDE 10 MG/ML
20 INJECTION INTRAMUSCULAR; INTRAVENOUS ONCE
Status: COMPLETED | OUTPATIENT
Start: 2023-02-03 | End: 2023-02-03

## 2023-02-03 RX ORDER — OXYCODONE HYDROCHLORIDE 5 MG/1
5-10 TABLET ORAL EVERY 4 HOURS PRN
Status: DISCONTINUED | OUTPATIENT
Start: 2023-02-03 | End: 2023-02-06 | Stop reason: HOSPADM

## 2023-02-03 RX ADMIN — FERROUS SULFATE TAB 325 MG (65 MG ELEMENTAL FE) 325 MG: 325 (65 FE) TAB at 09:09

## 2023-02-03 RX ADMIN — GABAPENTIN 300 MG: 300 CAPSULE ORAL at 09:09

## 2023-02-03 RX ADMIN — POTASSIUM CHLORIDE: 2 INJECTION, SOLUTION, CONCENTRATE INTRAVENOUS at 12:21

## 2023-02-03 RX ADMIN — OXYCODONE HYDROCHLORIDE 5 MG: 5 TABLET ORAL at 14:24

## 2023-02-03 RX ADMIN — FUROSEMIDE 20 MG: 10 INJECTION, SOLUTION INTRAVENOUS at 11:09

## 2023-02-03 RX ADMIN — PANTOPRAZOLE SODIUM 40 MG: 40 TABLET, DELAYED RELEASE ORAL at 09:09

## 2023-02-03 RX ADMIN — THIAMINE HCL TAB 100 MG 100 MG: 100 TAB at 06:09

## 2023-02-03 RX ADMIN — ENOXAPARIN SODIUM 40 MG: 40 INJECTION SUBCUTANEOUS at 13:19

## 2023-02-03 RX ADMIN — ONDANSETRON HYDROCHLORIDE 8 MG: 8 TABLET, FILM COATED ORAL at 21:59

## 2023-02-03 RX ADMIN — THIAMINE HCL TAB 100 MG 100 MG: 100 TAB at 20:28

## 2023-02-03 RX ADMIN — POTASSIUM CHLORIDE: 2 INJECTION, SOLUTION, CONCENTRATE INTRAVENOUS at 02:09

## 2023-02-03 RX ADMIN — POTASSIUM CHLORIDE: 2 INJECTION, SOLUTION, CONCENTRATE INTRAVENOUS at 22:00

## 2023-02-03 RX ADMIN — GABAPENTIN 300 MG: 300 CAPSULE ORAL at 14:24

## 2023-02-03 RX ADMIN — THIAMINE HCL TAB 100 MG 100 MG: 100 TAB at 13:19

## 2023-02-03 RX ADMIN — LISINOPRIL 20 MG: 20 TABLET ORAL at 09:09

## 2023-02-03 RX ADMIN — ENOXAPARIN SODIUM 40 MG: 40 INJECTION SUBCUTANEOUS at 00:28

## 2023-02-03 RX ADMIN — CALCIUM CARBONATE (ANTACID) CHEW TAB 500 MG 500 MG: 500 CHEW TAB at 17:41

## 2023-02-03 RX ADMIN — SIMETHICONE 80 MG: 80 TABLET, CHEWABLE ORAL at 14:24

## 2023-02-03 RX ADMIN — OXYCODONE HYDROCHLORIDE 5 MG: 5 TABLET ORAL at 06:48

## 2023-02-03 RX ADMIN — ONDANSETRON HYDROCHLORIDE 8 MG: 8 TABLET, FILM COATED ORAL at 06:09

## 2023-02-03 RX ADMIN — Medication 400 MCG: at 09:09

## 2023-02-03 RX ADMIN — CLINDAMYCIN PHOSPHATE: 10 GEL TOPICAL at 09:10

## 2023-02-03 RX ADMIN — ONDANSETRON HYDROCHLORIDE 8 MG: 8 TABLET, FILM COATED ORAL at 14:24

## 2023-02-03 RX ADMIN — DULOXETINE HYDROCHLORIDE 60 MG: 60 CAPSULE, DELAYED RELEASE ORAL at 09:09

## 2023-02-03 RX ADMIN — GABAPENTIN 300 MG: 300 CAPSULE ORAL at 20:28

## 2023-02-03 RX ADMIN — OLANZAPINE 5 MG: 2.5 TABLET, FILM COATED ORAL at 21:59

## 2023-02-03 RX ADMIN — SENNOSIDES AND DOCUSATE SODIUM 2 TABLET: 50; 8.6 TABLET ORAL at 14:24

## 2023-02-03 RX ADMIN — CLINDAMYCIN PHOSPHATE: 10 GEL TOPICAL at 20:28

## 2023-02-03 RX ADMIN — MESNA 3780 MG: 100 INJECTION, SOLUTION INTRAVENOUS at 18:11

## 2023-02-03 RX ADMIN — OXYCODONE HYDROCHLORIDE 5 MG: 5 TABLET ORAL at 17:41

## 2023-02-03 ASSESSMENT — ACTIVITIES OF DAILY LIVING (ADL)
ADLS_ACUITY_SCORE: 20

## 2023-02-03 NOTE — PROGRESS NOTES
Care Management Follow Up    Length of Stay (days): 4    Expected Discharge Date: 02/06/2023     Concerns to be Addressed:     discharge planning  Patient plan of care discussed at interdisciplinary rounds: Yes    Anticipated Discharge Disposition:  Home      Anticipated Discharge Services:  [facility] - dumont supplies.   Anticipated Discharge DME:        Additional Information:  During this admission 7D RNCC sent in referral to [facility] for dumont line supplies and cares. Per [facility] liaison, they can provide supplies and services. They are wondering how often pt will go to clinic and if he will have dressing changes there.     Per Pily, LIBERTY pt will be going to clinic at least weekly and she will place orders for the clinic to assist with dressing changes. Updated [facility].     Yany Valente RN, [place]  Float Care Coordinator  Covering 7D RNCC   Pager: [phone]

## 2023-02-03 NOTE — PROGRESS NOTES
Ifosfamide Toxicity Assessment      Patient is Alert & Oriented x4, pt reported feeling confused for a min while in bathroom thinking he is in the kitchen and going to cook something or grab something. Confusion resolved by itself. Will continue to closely monitor pt while on ifosfamide (chemo).  There are signs of lethargy, confusion or hallucinations No.     Patient is having new incontinence of bowel or bladder No.       Pincer Grasp intact Yes    Tremors present Yes; please explain: ongoing hand spasm visualized.      Provider was notified Yes     Will continue to monitor for s/sx of ifosfamide toxicity: hallucinations, AMS, emotional lability, somnolence, myoclonic jerks, tremors, coordination difficulties. If these occur, please call the APPs/Attending on days and fellow on-call for evening and nights for recommendations and further instruction.

## 2023-02-03 NOTE — PROGRESS NOTES
0200  Ifosfamide Toxicity Assessment       Patient is Alert & Oriented x 4. There are signs of lethargy, confusion or hallucinations No .      Patient is having new incontinence of bowel or bladder No.                   Pincer Grasp intact Yes     Tremors present Yes, hand spasms visualized. This is ongoing.        Will continue to monitor for s/sx of ifosfamide toxicity: hallucinations, AMS, emotional lability, somnolence, myoclonic jerks, tremors, coordination difficulties. If these occur, please call the APPs/Attending on days and fellow on-call for evening and nights for recommendations and further instruction.

## 2023-02-03 NOTE — PROGRESS NOTES
Nursing Focus: Chemotherapy  D: Positive blood return via Bob CVC. Insertion site is clean/dry/intact, dressing intact with no complaints of pain.  Urine output is recorded in intake in Doc Flowsheet.    I: Premedications given per order (see electronic medical administration record). Dose #4 of Ifosfamide/Mesna started to infuse over 24 hours. Reviewed pt teaching on chemotherapy side effects. Pt denies need for further teaching. Chemotherapy double checked per protocol by two chemotherapy competent RN's.   A: Tolerating procedure well. Denies nausea and or pain.   P: Continue to monitor urine output and symptoms of nausea. Screen for symptoms of toxicity.

## 2023-02-03 NOTE — PLAN OF CARE
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  Activity: UAL  Pain: Pt reporting up to 7/10 pain to R thigh, given PRN Oxycodone x1 with effect.   Neuro: AxOx4. Neuros intact.   Cardiac: Tmax 99.8. Normotensive.   Respiratory: O2 sats WDL on RA during shift, found desatting in upper 80s at HS. Placed on 3LNC with effect, O2 sats WDL.   GI/: WDL. LBM 2/1.   Diet: Regular diet, good appetite.   Lines: R Bob CVC intact, infusing Ifos/Mesna x1, Bicarb x1. Site WDL. Dressing changed following sterile technique.   Wounds: Wound cares to R groin per POC.   Labs/imaging: Reviewed. See chart.       New changes this shift: Started to infuse bag 4 Ifos/Mesna. Ifos toxicity assessment WDL. No tremors or loss of coordination noted though pt notes earlier documented mild intermittent tremors continue without worsening. AxOx4.      Continue to monitor and follow POC

## 2023-02-03 NOTE — PROGRESS NOTES
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 Currently being admitted for Cycle 1 Doxil/Ifosfamide.     TODAY D5  - Continue chemotherapy per protocol and close monitoring for worsening Ifos toxicity. Patient currently having intermittent hand spasm and one time episode of confusion last ~5 seconds and self resolved   - Ongoing diuresis as weight down slightly compared to yesterday   - Continue supportive cares for nausea/vomiting PRN antiemetics available   - Anticipate discharge 2/6/23, pending completion of chemotherapy      ONC   # High Grade Sarcoma R Thigh   This is a patient of Dr. Price. Patient initially noted right hamstring strain in June 2022, he was treated for sciatica. Pain continued to worsen and went for MRI of L spine and right thigh, which showed a large mass in the posterior compartment of the right thigh. He underwent an US guided biopsy and pathology c/w high grade sarcoma. CT CAP with pulmonary nodules but no site of metastatic disease. Currently being admitted for Cycle 1 Doxil/Ifos.   - Baseline Echo 1/26 EF 55%  - Bob PTA, PLC requested   - Per outpatient notes patient will need to discharge on prophylaxis Levaquin and Augmentin (d/t HS)   - Patient had hypersensitivity reaction with 5-min into Emend infusion; this was not completed. Instead scheduled Zyprexa 5 mg at bedtime.  - On 2/2 notified by nursing that patient is having intermittent hand spasms and is occasionally dropping his phone, no evidence of confusion or AMS. Will continue to closely monitor.   - On 2/3 notified by nursing that patient continues to  have intermittent hand spasms and had a self-resolving episode of confusion where he thought he was in the kitchen and was looking for supplies to make dinner. This lasted for only a few seconds. Talked with wife and provided an update on the phone and she has noticed that he has been a bit more angry and short via phone.                                Therapy Plan: Doxil/Ifosfamide/Mesna (C1D1=1/30/23)                            - Doxil 45 mg/m2 (113 mg) IV -- D1                            - Ifosfamide 1,500 mg/m2 (3,780 mg) IV -- D1-6                             - Mesna 1,500 mg/m2 (3,780 mg) IV -- D7                            - Neulasta 6 mg subcutaneous -- D8 requested                              - Premeds: Thiamine, Emend, Zofran, Dexamethasone      # Iron Deficiency Anemia   # Acute on Chronic Anemia   Hgb on admission was noted to be 8.8; last was 10.4 on 1/26. Patient denies any evidence of over bleeding.   - LDH, B12, Haptoglobin WNL   - Folate low at 4.0; started Folic acid supplment   - Peripheral smear - hypochromic. Normocytic anemia, rare elliptocytes, slight thrombocytosis, no evidence of hemolysis   - Continue PTA Iron supplement daily   - Transfuse to keep Hgb >7      # Cancer-related Pain   - PTA Gabapentin 300 mg TID   - PTA Oxycodone 5 mg q6h PRN     ID   # H/o Fevers   Patient and family report ~ 1 month history of fevers at home.   - BCx x2 1/30 -- NGTD   - Will monitor vitals and proceed with further infectious work up if febrile      CARDS   # Hypervolemia  Admission weight 299 lb, up roughly 11 lb as of 2/1/23.   - Lasix IV 20 mg today    # HTN   - PTA Lisinopril, hold parameters in place      PULM   # Chronic Cough   # Pulmonary Nodules   Patient's wife reports patient has had chronic cough for approximately last 10 months. He has tried several OTC medications which did not help, he was recently started on PPI with no improvement of cough. CT CAP obtained for staging shows multiple  "bilateral pulmonary nodules   - Monitor while inpatient   - Tessalon Pearls, Delsym, and Cepacol lozenges PRN      ENDO   # T2DM   Follows with PCP Dr. Mynor Mason; last Hgb A1C 11.7 on 1/18/23  - Hold PTA Metformin; anticipate resuming on discharge   - Medium intensity sliding scale insulin   - Hypoglycemia protocol onboard       GI   # GERD   - PTA Omeprazole      # Irritable Bowel Syndrome   - PTA Duloxetine      DERM   # Hidrenitis Suppurativa   Wife reports there is an active lesion on the right side, at home they were using topical clindamycin that wife had on hand and she has noted improvement in size of lesion.   - Continue topical Clindamycin BID   - WOC consulted on admission, appreciate recs   - Discussed with dermatology who recommend continuation of topical treatments since lesion is improving. If antibiotic is warranted they typically start with Doxycycline.       FEN:  -IVF per chemo protocol   -PRN lyte replacement  -RDAT     Prophy/Misc:  -VTE: PPx Lovenox BID; hold if platelets   -GI/PUD: PTA Omeprazole   -Bowels: PRN Senna and Miralax     Follow Up: APPT18 order placed 1/31 for;  1. Neulasta 2/7/23  2. LIBERTY follow up within 1 week of discharge   3. 3x weekly labs starting 2/7/23 for 2-3 weeks    Clinically Significant Risk Factors              # Hypoalbuminemia: Lowest albumin = 2.4 g/dL at 2/2/2023  6:10 AM, will monitor as appropriate          # DMII: A1C = 11.7 % (Ref range: <5.7 %) within past 3 months   # Severe Obesity: Estimated body mass index is 43.47 kg/m  as calculated from the following:    Height as of 1/27/23: 1.803 m (5' 11\").    Weight as of this encounter: 141.4 kg (311 lb 11.2 oz).          This patient was discussed with Dr. Perez    I spent >40 minutes face-to-face or coordinating care of Antonio Canseco. Over 50% of our time on the unit was spent counseling the patient and/or coordinating care.    Erika Mar (Johnson), PAYunierC   Hematology/ Oncology   Pager " [phone]    Interval History   Nursing notes reviewed. Antonio is doing well. Continues to have some fatigue. He denies any nausea. Continues having intermittent hand tremor, which is the same as yesterday. He had an episode of confusion this morning where he thought he was in the kitchen getting ready for dinner but he was unsure if it was the chemo or just waking up from a weird dream. Informed him to let us know if this happens again. He voiced understanding. Reviewed that I will continue to give him medicine to diuresis the extra fluid off since his weight is still up since admission. All questions answered at this time.     Physical Exam   Temp: 97.4  F (36.3  C) Temp src: Oral BP: 115/69 Pulse: 80   Resp: 18 SpO2: 95 % O2 Device: Nasal cannula Oxygen Delivery: 2 LPM  Vitals:    02/01/23 1103 02/02/23 0748 02/03/23 0923   Weight: 140.7 kg (310 lb 3.2 oz) 143 kg (315 lb 4.8 oz) 141.4 kg (311 lb 11.2 oz)     Vital Signs with Ranges  Temp:  [97.4  F (36.3  C)-100  F (37.8  C)] 97.4  F (36.3  C)  Pulse:  [] 80  Resp:  [18-20] 18  BP: (114-128)/(60-69) 115/69  SpO2:  [83 %-97 %] 95 %  I/O last 3 completed shifts:  In: 4484 [P.O.:920; I.V.:2400; IV Piggyback:1164]  Out: 425 [Urine:425]    Constitutional: Pleasant male seen resting comfortably in bed in NAD. Alert and interactive.   HEENT: NCAT. PERRL, EOMI, anicteric sclera. Oral mucosa pink and moist with no lesions or thrush.  Respiratory: Non-labored breathing, good air exchange, lungs clear to auscultation bilaterally. No cough or wheeze noted.   Cardiovascular: Regular rate and rhythm. No murmur or rub.   GI: Normoactive bowel sounds. Abdomen soft, non-distended, and non-tender. No palpable masses or organomegaly.  Skin: Warm and dry. No concerning lesions or rash on exposed surfaces.  Musculoskeletal: Extremities grossly normal, non-tender, no edema. Strong peripheral pulses. Good strength and ROM in bed.   Neuropsychiatric: Mentation and affect appear  normal/appropriate.  Vascular Access: Bob is CDI without erythema, swelling, or discharge.     Medications   Current Facility-Administered Medications   Medication    0.9% sodium chloride BOLUS    acetaminophen (TYLENOL) tablet 650 mg    albuterol (PROVENTIL HFA/VENTOLIN HFA) inhaler    albuterol (PROVENTIL) neb solution 2.5 mg    benzocaine-menthol (CHLORASEPTIC MAX) 15-10 MG lozenge 1 lozenge    benzonatate (TESSALON) capsule 100 mg    calcium carbonate (TUMS) chewable tablet 500 mg    Chemotherapy Infusing-Continuous Infusion    clindamycin (CLINDAMAX) 1 % topical gel    dextromethorphan (DELSYM) liquid 30 mg    [START ON 2/6/2023] dextrose 5 % flush PRE/POST medication    [START ON 2/6/2023] dextrose 5 % flush PRE/POST medication    glucose gel 15-30 g    Or    dextrose 50 % injection 25-50 mL    Or    glucagon injection 1 mg    DULoxetine (CYMBALTA) DR capsule 60 mg    enoxaparin ANTICOAGULANT (LOVENOX) injection 40 mg    EPINEPHrine PF (ADRENALIN) injection 0.3 mg    famotidine (PEPCID) injection 20 mg    ferrous sulfate (FEROSUL) tablet 325 mg    [START ON 2/6/2023] filgrastim-aafi (NIVESTYM) 480 mcg in D5W 25 mL infusion    folic acid (FOLVITE) tablet 400 mcg    gabapentin (NEURONTIN) capsule 300 mg    heparin 100 UNIT/ML injection 5-10 mL    heparin lock flush 10 UNIT/ML injection 5-10 mL    heparin lock flush 10 UNIT/ML injection 5-10 mL    hydrOXYzine (ATARAX) tablet 25 mg    Or    hydrOXYzine (ATARAX) tablet 50 mg    Ifosfamide (IFEX) 3,780 mg, mesna (MESNEX) 3,780 mg in sodium chloride 0.9 % 1,163.4 mL infusion    insulin aspart (NovoLOG) injection (RAPID ACTING)    insulin aspart (NovoLOG) injection (RAPID ACTING)    lisinopril (ZESTRIL) tablet 20 mg    LORazepam (ATIVAN) injection 0.5-1 mg    LORazepam (ATIVAN) tablet 0.5-1 mg    melatonin tablet 3-6 mg    meperidine (DEMEROL) injection 25 mg    [START ON 2/5/2023] mesna (MESNEX) 3,780 mg in sodium chloride 0.9 % 1,087.8 mL infusion     methylPREDNISolone sodium succinate (solu-MEDROL) injection 125 mg    naloxone (NARCAN) injection 0.2 mg    Or    naloxone (NARCAN) injection 0.4 mg    Or    naloxone (NARCAN) injection 0.2 mg    Or    naloxone (NARCAN) injection 0.4 mg    No rectal suppositories if WBC less than 1000/microliters or platelets less than 50,000/ L    OLANZapine (zyPREXA) tablet 5 mg    ondansetron (ZOFRAN) tablet 4-8 mg    Or    ondansetron (ZOFRAN ODT) ODT tab 4-8 mg    Or    ondansetron (ZOFRAN) injection 4-8 mg    ondansetron (ZOFRAN) tablet 8 mg    oxyCODONE (ROXICODONE) tablet 5-10 mg    pantoprazole (PROTONIX) EC tablet 40 mg    polyethylene glycol (MIRALAX) Packet 17 g    prochlorperazine (COMPAZINE) injection 10 mg    prochlorperazine (COMPAZINE) tablet 10 mg    senna-docusate (SENOKOT-S/PERICOLACE) 8.6-50 MG per tablet 1 tablet    Or    senna-docusate (SENOKOT-S/PERICOLACE) 8.6-50 MG per tablet 2 tablet    simethicone (MYLICON) chewable tablet 80 mg    sodium bicarbonate 100 mEq, potassium chloride 20 mEq in NaCl 0.45 % 1,000 mL infusion    sodium chloride (PF) 0.9% PF flush 10-20 mL    sodium chloride (PF) 0.9% PF flush 10-20 mL    sodium chloride (PF) 0.9% PF flush 10-20 mL    thiamine (B-1) tablet 100 mg       Data   CBC  Recent Labs   Lab 02/03/23  0640 02/02/23  0610 02/01/23  0648 01/31/23  0605   WBC 5.7 6.5 9.9 10.5   RBC 3.56* 3.48* 3.34* 4.23*   HGB 7.9* 8.0* 7.7* 9.7*   HCT 28.4* 27.8* 27.0* 34.2*   MCV 80 80 81 81   MCH 22.2* 23.0* 23.1* 22.9*   MCHC 27.8* 28.8* 28.5* 28.4*   RDW 17.7* 17.7* 17.2* 17.2*    454* 474* 530*     CMP  Recent Labs   Lab 02/03/23  1207 02/03/23  0912 02/03/23  0640 02/03/23  0216 02/02/23  1048 02/02/23  0610 02/01/23  1125 02/01/23  0648 01/31/23  1137 01/31/23  0605   NA  --   --  138  --   --  140  --  141  --  136   POTASSIUM  --   --  4.3  --   --  4.4  --  4.1  --  4.9   CHLORIDE  --   --  101  --   --  101  --  103  --  101   CO2  --   --  29  --   --  30*  --  31*  --  26    ANIONGAP  --   --  8  --   --  9  --  7  --  9   * 117* 144* 120*   < > 142*   < > 110*   < > 291*   BUN  --   --  11.7  --   --  12.4  --  14.6  --  17.9   CR  --   --  0.74  --   --  0.77  --  0.61*  --  0.67   GFRESTIMATED  --   --  >90  --   --  >90  --  >90  --  >90   DORIAN  --   --  8.1*  --   --  8.0*  --  7.8*  --  8.2*   MAG  --   --  1.9  --   --  1.7  --  1.9  --  2.0   PHOS  --   --  3.1  --   --  3.1  --  2.8  --  4.5   PROTTOTAL  --   --  5.9*  --   --  5.9*  --  5.6*  --  6.3*   ALBUMIN  --   --  2.5*  --   --  2.4*  --  2.4*  --  2.5*   BILITOTAL  --   --  0.3  --   --  0.2  --  0.2  --  0.2   ALKPHOS  --   --  44  --   --  47  --  49  --  54   AST  --   --  15  --   --  17  --  14  --  14   ALT  --   --  10  --   --  10  --  9*  --  8*    < > = values in this interval not displayed.     INR  Recent Labs   Lab 01/31/23  0605 01/30/23  1025   INR 1.44* 1.37*       Results for orders placed or performed during the hospital encounter of 01/27/23   IR CVC Tunnel Placement > 5 Yrs of Age    Narrative    PROCEDURES 1/27/2023 10:14 AM:  1. Ultrasound guidance for venous access  2. Placement centrally inserted catheter (age > 5 yrs.) tunneled, no  port, no pump  3. Fluoroscopic guidance placement    CLINICAL HISTORY: TCVC placement - double lumen dumont 9.5 Macedonian;  Sarcoma of soft tissue (H). Catheter does not need to be dialysis or  apheresis capable.    COMPARISONS: CT 1/26/2023    REFERRING PROVIDER: RASHID TAPIA    STAFF RADIOLOGIST: SEAN Luis MD    FELLOW(S)/RESIDENT(S): BROOKE Contreras    ASSISTANT: JUN Girard, ANGELES LUIS MD, attest that I was present for all critical portions  of the procedure and was immediately available to provide guidance and  assistance during the remainder of the procedure.    MEDICATIONS: The patient was placed on continuous monitoring.  Intravenous sedation was administered. 2 mg Versed and 100 mcg  Fentanyl IV. Vital signs and sedation were monitored  by nursing staff  under Interventional Radiologist's supervision. The patient remained  stable throughout the procedure.    TOTAL SEDATION TIME: 28 minutes    ATTENDING FACE-TO-FACE SEDATION TIME: less than 5 minutes    FLUOROSCOPY TIME: 36.9 seconds.    DOSE: 11.16 mGy    PROCEDURE: The patient understood the limitations, alternatives, and  risks of the procedure and requested the procedure be performed. Both  written and oral consent were obtained.    The right neck and upper chest were prepped and draped in the usual  sterile fashion. 1% lidocaine without epinephrine was used for local  anesthesia.    Under ultrasound guidance, right internal jugular venotomy was made  with a micropuncture needle. Ultrasound image documenting jugular  patency and needle venotomy was saved in the patient's record.    Micropuncture needle exchanged over guidewire for the micropuncture  sheath under fluoroscopic guidance. Catheter length measured with the  0.018 guidewire. Micropuncture sheath saline locked. 9.5 Malaysian BD  Bard PowerHickman was subcutaneously tunneled from the right anterior  chest to the right internal jugular venotomy site. Catheter cut on the  24 cm marker. Micropuncture sheath exchanged over guidewire for the  peel-away sheath. Guidewire removed. Under fluoroscopic guidance, the  catheter was placed through the peel-away sheath and positioned with  its tip in the superior atriocaval junction. Both lumens flushed and  aspirated adequately. Each lumen heparin locked with 100:1 heparin  solution. Catheter secured with StatLock and sterile dressing. Right  internal jugular venotomy site closed with Stat [employer] Medical ExoFin  tissue adhesive. No immediate complication.    Fluoroscopic image documenting catheter placement and final position  was saved in the patient's record.      Impression    IMPRESSION:   1. Ultrasound guided right internal jugular venotomy.    2. 24 cm 9.5 Malaysian BD Bard PowerHickman tunneled  central venous  catheter placed with the tip in the superior atriocaval junction. Both  lumens heparin locked and ready for use.    I have personally reviewed the examination and initial interpretation  and I agree with the findings.    ANGELES LUIS MD         SYSTEM ID:  [id number]       ATTENDING PHYSICIAN ADDENDUM AND NOTE:  I evaluated this patient's case separately and also with Erika Mar PA-C. The note above reflects my assessment and plan. I have personally reviewed lab results, and vital and radiology results. >50% of time was spent in assessment and coordination of care; >=35 minutes.  Mr. Angeles Canseco is a 52 yr old gentleman from [place], [place]. On 1/12/23, he was diagnosed with a high-grade sarcoma of the right thigh; on histopathologic exam, the tumor had extensive necrosis. He has been admitted for initial systemic chemotherapy with Doxil/Ifosfamide; his primary oncologist is Dr. Laboy.  Other medical conditions include type II DM, hypertension, iron deficiency anemia and GERD with chronic cough. For 6+ months he was having back pain, initially thought to be due to sciatica; in January 2023, the biopsy noted above unfortunately confirmed sarcoma. Overall he states he is doing some laps in the hallway and walking actively. Earlier this morning he was briefly disoriented (thought he was in a kitchen) but other than that mentation is completely normal. He endorses having some increased pain in his right leg, mostly alleviated by oxycodone that was administered at 6am.  The plan is to continue with the Cycle 1 Doxil and Ifosfamide as planned through Monday January 6, 2023 with careful monitoring as inpatient as described in detail above.  Junaid Perez MD, PhD  Associate Professor of Medicine, Hematology, Oncology and Transplantation

## 2023-02-03 NOTE — PLAN OF CARE
Goal Outcome Evaluation:  [id number]    VSS on 2L O2. O2 dropped to high 80s when on RA. Frequently removing nasal cannula. Denies nausea & SOB. Oxy x1 for R thigh pain. Around midnight pt seemed a little more lethargic than previous nights but this resolved and at 2am pt was up and walking to the bathroom without signs of lethargy. Reports REGAN. . D4 ifos/mesna infusing. Writer visualized occasional hand spasms, no other signs of ifos toxicity. Reports good urine output.

## 2023-02-04 LAB
ALBUMIN SERPL BCG-MCNC: 2.5 G/DL (ref 3.5–5.2)
ALP SERPL-CCNC: 45 U/L (ref 40–129)
ALT SERPL W P-5'-P-CCNC: 7 U/L (ref 10–50)
ANION GAP SERPL CALCULATED.3IONS-SCNC: 9 MMOL/L (ref 7–15)
AST SERPL W P-5'-P-CCNC: 15 U/L (ref 10–50)
BACTERIA BLD CULT: NO GROWTH
BACTERIA BLD CULT: NO GROWTH
BASOPHILS # BLD AUTO: 0 10E3/UL (ref 0–0.2)
BASOPHILS NFR BLD AUTO: 0 %
BILIRUB SERPL-MCNC: 0.4 MG/DL
BUN SERPL-MCNC: 11.6 MG/DL (ref 6–20)
CALCIUM SERPL-MCNC: 8.2 MG/DL (ref 8.6–10)
CHLORIDE SERPL-SCNC: 100 MMOL/L (ref 98–107)
CREAT SERPL-MCNC: 0.75 MG/DL (ref 0.67–1.17)
DEPRECATED HCO3 PLAS-SCNC: 26 MMOL/L (ref 22–29)
EOSINOPHIL # BLD AUTO: 0.2 10E3/UL (ref 0–0.7)
EOSINOPHIL NFR BLD AUTO: 4 %
ERYTHROCYTE [DISTWIDTH] IN BLOOD BY AUTOMATED COUNT: 17.6 % (ref 10–15)
GFR SERPL CREATININE-BSD FRML MDRD: >90 ML/MIN/1.73M2
GLUCOSE BLDC GLUCOMTR-MCNC: 108 MG/DL (ref 70–99)
GLUCOSE BLDC GLUCOMTR-MCNC: 112 MG/DL (ref 70–99)
GLUCOSE BLDC GLUCOMTR-MCNC: 117 MG/DL (ref 70–99)
GLUCOSE BLDC GLUCOMTR-MCNC: 122 MG/DL (ref 70–99)
GLUCOSE BLDC GLUCOMTR-MCNC: 217 MG/DL (ref 70–99)
GLUCOSE SERPL-MCNC: 132 MG/DL (ref 70–99)
HCT VFR BLD AUTO: 27.8 % (ref 40–53)
HGB BLD-MCNC: 8.1 G/DL (ref 13.3–17.7)
IMM GRANULOCYTES # BLD: 0.1 10E3/UL
IMM GRANULOCYTES NFR BLD: 1 %
LYMPHOCYTES # BLD AUTO: 0.7 10E3/UL (ref 0.8–5.3)
LYMPHOCYTES NFR BLD AUTO: 12 %
MCH RBC QN AUTO: 23.1 PG (ref 26.5–33)
MCHC RBC AUTO-ENTMCNC: 29.1 G/DL (ref 31.5–36.5)
MCV RBC AUTO: 79 FL (ref 78–100)
MONOCYTES # BLD AUTO: 0.1 10E3/UL (ref 0–1.3)
MONOCYTES NFR BLD AUTO: 1 %
NEUTROPHILS # BLD AUTO: 4.9 10E3/UL (ref 1.6–8.3)
NEUTROPHILS NFR BLD AUTO: 82 %
NRBC # BLD AUTO: 0 10E3/UL
NRBC BLD AUTO-RTO: 0 /100
PLATELET # BLD AUTO: 381 10E3/UL (ref 150–450)
POTASSIUM SERPL-SCNC: 4.4 MMOL/L (ref 3.4–5.3)
PROT SERPL-MCNC: 6 G/DL (ref 6.4–8.3)
RBC # BLD AUTO: 3.5 10E6/UL (ref 4.4–5.9)
SODIUM SERPL-SCNC: 135 MMOL/L (ref 136–145)
WBC # BLD AUTO: 5.9 10E3/UL (ref 4–11)

## 2023-02-04 PROCEDURE — 250N000013 HC RX MED GY IP 250 OP 250 PS 637: Performed by: STUDENT IN AN ORGANIZED HEALTH CARE EDUCATION/TRAINING PROGRAM

## 2023-02-04 PROCEDURE — 85025 COMPLETE CBC W/AUTO DIFF WBC: CPT | Performed by: PHYSICIAN ASSISTANT

## 2023-02-04 PROCEDURE — 250N000009 HC RX 250: Performed by: STUDENT IN AN ORGANIZED HEALTH CARE EDUCATION/TRAINING PROGRAM

## 2023-02-04 PROCEDURE — 120N000002 HC R&B MED SURG/OB UMMC

## 2023-02-04 PROCEDURE — 250N000011 HC RX IP 250 OP 636: Performed by: STUDENT IN AN ORGANIZED HEALTH CARE EDUCATION/TRAINING PROGRAM

## 2023-02-04 PROCEDURE — 250N000011 HC RX IP 250 OP 636: Performed by: PHYSICIAN ASSISTANT

## 2023-02-04 PROCEDURE — 36592 COLLECT BLOOD FROM PICC: CPT | Performed by: PHYSICIAN ASSISTANT

## 2023-02-04 PROCEDURE — 250N000013 HC RX MED GY IP 250 OP 250 PS 637: Performed by: PHYSICIAN ASSISTANT

## 2023-02-04 PROCEDURE — 80053 COMPREHEN METABOLIC PANEL: CPT | Performed by: PHYSICIAN ASSISTANT

## 2023-02-04 PROCEDURE — 258N000002 HC RX IP 258 OP 250: Performed by: STUDENT IN AN ORGANIZED HEALTH CARE EDUCATION/TRAINING PROGRAM

## 2023-02-04 PROCEDURE — 258N000003 HC RX IP 258 OP 636: Performed by: STUDENT IN AN ORGANIZED HEALTH CARE EDUCATION/TRAINING PROGRAM

## 2023-02-04 RX ORDER — FUROSEMIDE 10 MG/ML
20 INJECTION INTRAMUSCULAR; INTRAVENOUS ONCE
Status: COMPLETED | OUTPATIENT
Start: 2023-02-04 | End: 2023-02-04

## 2023-02-04 RX ORDER — SUCRALFATE ORAL 1 G/10ML
1 SUSPENSION ORAL
Status: DISCONTINUED | OUTPATIENT
Start: 2023-02-04 | End: 2023-02-06 | Stop reason: HOSPADM

## 2023-02-04 RX ADMIN — OXYCODONE HYDROCHLORIDE 10 MG: 5 TABLET ORAL at 10:55

## 2023-02-04 RX ADMIN — SODIUM CHLORIDE, PRESERVATIVE FREE 5 ML: 5 INJECTION INTRAVENOUS at 12:05

## 2023-02-04 RX ADMIN — ONDANSETRON HYDROCHLORIDE 8 MG: 8 TABLET, FILM COATED ORAL at 05:56

## 2023-02-04 RX ADMIN — SUCRALFATE 1 G: 1 SUSPENSION ORAL at 17:36

## 2023-02-04 RX ADMIN — DULOXETINE HYDROCHLORIDE 60 MG: 60 CAPSULE, DELAYED RELEASE ORAL at 08:28

## 2023-02-04 RX ADMIN — CALCIUM CARBONATE (ANTACID) CHEW TAB 500 MG 500 MG: 500 CHEW TAB at 04:27

## 2023-02-04 RX ADMIN — GABAPENTIN 300 MG: 300 CAPSULE ORAL at 08:29

## 2023-02-04 RX ADMIN — THIAMINE HCL TAB 100 MG 100 MG: 100 TAB at 21:24

## 2023-02-04 RX ADMIN — ENOXAPARIN SODIUM 40 MG: 40 INJECTION SUBCUTANEOUS at 01:01

## 2023-02-04 RX ADMIN — ENOXAPARIN SODIUM 40 MG: 40 INJECTION SUBCUTANEOUS at 23:39

## 2023-02-04 RX ADMIN — ONDANSETRON HYDROCHLORIDE 8 MG: 8 TABLET, FILM COATED ORAL at 21:24

## 2023-02-04 RX ADMIN — GABAPENTIN 300 MG: 300 CAPSULE ORAL at 21:23

## 2023-02-04 RX ADMIN — THIAMINE HCL TAB 100 MG 100 MG: 100 TAB at 12:04

## 2023-02-04 RX ADMIN — SUCRALFATE 1 G: 1 SUSPENSION ORAL at 12:05

## 2023-02-04 RX ADMIN — LISINOPRIL 20 MG: 20 TABLET ORAL at 08:28

## 2023-02-04 RX ADMIN — MESNA 3780 MG: 100 INJECTION, SOLUTION INTRAVENOUS at 18:12

## 2023-02-04 RX ADMIN — ONDANSETRON HYDROCHLORIDE 8 MG: 8 TABLET, FILM COATED ORAL at 14:11

## 2023-02-04 RX ADMIN — PANTOPRAZOLE SODIUM 40 MG: 40 TABLET, DELAYED RELEASE ORAL at 08:28

## 2023-02-04 RX ADMIN — POTASSIUM CHLORIDE: 2 INJECTION, SOLUTION, CONCENTRATE INTRAVENOUS at 07:55

## 2023-02-04 RX ADMIN — GABAPENTIN 300 MG: 300 CAPSULE ORAL at 14:18

## 2023-02-04 RX ADMIN — ENOXAPARIN SODIUM 40 MG: 40 INJECTION SUBCUTANEOUS at 12:05

## 2023-02-04 RX ADMIN — SUCRALFATE 1 G: 1 SUSPENSION ORAL at 21:24

## 2023-02-04 RX ADMIN — CLINDAMYCIN PHOSPHATE: 10 GEL TOPICAL at 08:29

## 2023-02-04 RX ADMIN — OXYCODONE HYDROCHLORIDE 10 MG: 5 TABLET ORAL at 18:36

## 2023-02-04 RX ADMIN — CLINDAMYCIN PHOSPHATE: 10 GEL TOPICAL at 21:29

## 2023-02-04 RX ADMIN — FERROUS SULFATE TAB 325 MG (65 MG ELEMENTAL FE) 325 MG: 325 (65 FE) TAB at 08:28

## 2023-02-04 RX ADMIN — SIMETHICONE 80 MG: 80 TABLET, CHEWABLE ORAL at 10:55

## 2023-02-04 RX ADMIN — POTASSIUM CHLORIDE: 2 INJECTION, SOLUTION, CONCENTRATE INTRAVENOUS at 18:10

## 2023-02-04 RX ADMIN — THIAMINE HCL TAB 100 MG 100 MG: 100 TAB at 04:23

## 2023-02-04 RX ADMIN — OXYCODONE HYDROCHLORIDE 5 MG: 5 TABLET ORAL at 04:20

## 2023-02-04 RX ADMIN — OLANZAPINE 5 MG: 2.5 TABLET, FILM COATED ORAL at 21:24

## 2023-02-04 RX ADMIN — Medication 400 MCG: at 08:28

## 2023-02-04 RX ADMIN — FUROSEMIDE 20 MG: 10 INJECTION, SOLUTION INTRAVENOUS at 12:04

## 2023-02-04 ASSESSMENT — ACTIVITIES OF DAILY LIVING (ADL)
ADLS_ACUITY_SCORE: 20

## 2023-02-04 NOTE — PLAN OF CARE
Goal Outcome Evaluation:    0700 - 2330:   /72   Pulse 80   Temp 99.7  F (37.6  C) (Oral)   Resp 20   Wt 141.4 kg (311 lb 11.2 oz)   SpO2 94%   BMI 43.47 kg/m        Pt had an episode of confusion in the morning around 9 AM where he was in the thinking he in kitchen & going to cook or grab something where as he was in bathroom. Confusion only last about about 5 seconds & self resolved  (RAYMOND Nascimento notified & ok to cont chemo).  On Day #5 Ifosfamide/Mesna infusing via Bob.   On MIVF sodium bicarb 100 ml/hr, gave onetime lasix 20 mg with good UOP.  , 100, 108, 154, doesn't need insulin per order, no appetite today, skipped all meals ex supper time.  SERG EWING, last bm 02/01, gave 2 senokot.  Anticipate discharge 02/06 after chemotherapy.  Continue to closely monitor for worsening ifosfamide toxicity.

## 2023-02-04 NOTE — PROGRESS NOTES
Ifosfamide Toxicity Assessment      Patient is Alert & Oriented x 4. There are signs of lethargy, confusion or hallucinations NO  Patient is having new incontinence of bowel or bladder NO      Pincer Grasp intact YES    Tremors present NO     Provider was notified NO, NO CHANGES noted during this shift.  Will continue to monitor for s/sx of ifosfamide toxicity: hallucinations, AMS, emotional lability, somnolence, myoclonic jerks, tremors, coordination difficulties. If these occur, please call the APPs/Attending on days and fellow on-call for evening and nights for recommendations and further instruction.

## 2023-02-04 NOTE — PLAN OF CARE
Pt is alert and oriented x4, tachy in the 100's, OVSS, afebrile on RA. Pt denies nausea, vomiting, numbness and tingling. BG aaround 2am was 122. Chemo infusing, blood return noted on line infusing chemo. Bicarb infusing at 100 ml/hr.  Bed alarm in place.

## 2023-02-04 NOTE — PROGRESS NOTES
Ifosfamide Toxicity Assessment      Patient is Alert & Oriented x 4. There are signs of lethargy, confusion or hallucinations No.     Patient is having new incontinence of bowel or bladder No.       Pincer Grasp intact No    Tremors present Yes; ongoing hand spasm visualized.    Provider was notified No (Team is aware of it).  Will continue to monitor for s/sx of ifosfamide toxicity: hallucinations, AMS, emotional lability, somnolence, myoclonic jerks, tremors, coordination difficulties. If these occur, please call the APPs/Attending on days and fellow on-call for evening and nights for recommendations and further instruction.

## 2023-02-04 NOTE — PROGRESS NOTES
.Nursing Focus: Chemotherapy  D: Positive brisk bright red blood return via Bob. Insertion site is clean/dry/intact, dressing intact with no complaints of pain.  Urine output is recorded in intake Doc Flowsheet.    I: On scheduled premedications given per order (see electronic medical administration record). Dose #5 of Ifosfamide/Mesna started to infuse over 24 hours. Reviewed pt teaching on chemotherapy side effects.  Pt denies need for further teaching. Chemotherapy double checked per protocol by two chemotherapy competent RN's.   A: Tolerating chemo well. Denies nausea.   P: Continue to monitor urine output and symptoms of nausea. Screen for symptoms of toxicity.

## 2023-02-05 ENCOUNTER — APPOINTMENT (OUTPATIENT)
Dept: EDUCATION SERVICES | Facility: CLINIC | Age: 53
DRG: 847 | End: 2023-02-05
Attending: PHYSICIAN ASSISTANT
Payer: COMMERCIAL

## 2023-02-05 LAB
ABO/RH(D): NORMAL
ALBUMIN SERPL BCG-MCNC: 2.5 G/DL (ref 3.5–5.2)
ALP SERPL-CCNC: 48 U/L (ref 40–129)
ALT SERPL W P-5'-P-CCNC: 7 U/L (ref 10–50)
ANION GAP SERPL CALCULATED.3IONS-SCNC: 8 MMOL/L (ref 7–15)
ANTIBODY SCREEN: NEGATIVE
AST SERPL W P-5'-P-CCNC: 15 U/L (ref 10–50)
BASOPHILS # BLD MANUAL: 0 10E3/UL (ref 0–0.2)
BASOPHILS NFR BLD MANUAL: 1 %
BILIRUB SERPL-MCNC: 0.5 MG/DL
BUN SERPL-MCNC: 12.5 MG/DL (ref 6–20)
CALCIUM SERPL-MCNC: 8.5 MG/DL (ref 8.6–10)
CHLORIDE SERPL-SCNC: 103 MMOL/L (ref 98–107)
CREAT SERPL-MCNC: 0.83 MG/DL (ref 0.67–1.17)
DEPRECATED HCO3 PLAS-SCNC: 26 MMOL/L (ref 22–29)
EOSINOPHIL # BLD MANUAL: 0 10E3/UL (ref 0–0.7)
EOSINOPHIL NFR BLD MANUAL: 0 %
ERYTHROCYTE [DISTWIDTH] IN BLOOD BY AUTOMATED COUNT: 17.2 % (ref 10–15)
GFR SERPL CREATININE-BSD FRML MDRD: >90 ML/MIN/1.73M2
GLUCOSE BLDC GLUCOMTR-MCNC: 108 MG/DL (ref 70–99)
GLUCOSE BLDC GLUCOMTR-MCNC: 112 MG/DL (ref 70–99)
GLUCOSE BLDC GLUCOMTR-MCNC: 132 MG/DL (ref 70–99)
GLUCOSE BLDC GLUCOMTR-MCNC: 168 MG/DL (ref 70–99)
GLUCOSE BLDC GLUCOMTR-MCNC: 98 MG/DL (ref 70–99)
GLUCOSE SERPL-MCNC: 106 MG/DL (ref 70–99)
HCT VFR BLD AUTO: 26.4 % (ref 40–53)
HGB BLD-MCNC: 7.6 G/DL (ref 13.3–17.7)
LYMPHOCYTES # BLD MANUAL: 0.3 10E3/UL (ref 0.8–5.3)
LYMPHOCYTES NFR BLD MANUAL: 6 %
MCH RBC QN AUTO: 22.8 PG (ref 26.5–33)
MCHC RBC AUTO-ENTMCNC: 28.8 G/DL (ref 31.5–36.5)
MCV RBC AUTO: 79 FL (ref 78–100)
MONOCYTES # BLD MANUAL: 0 10E3/UL (ref 0–1.3)
MONOCYTES NFR BLD MANUAL: 1 %
NEUTROPHILS # BLD MANUAL: 4.5 10E3/UL (ref 1.6–8.3)
NEUTROPHILS NFR BLD MANUAL: 92 %
PLAT MORPH BLD: ABNORMAL
PLATELET # BLD AUTO: 348 10E3/UL (ref 150–450)
POTASSIUM SERPL-SCNC: 4.2 MMOL/L (ref 3.4–5.3)
PROT SERPL-MCNC: 6.1 G/DL (ref 6.4–8.3)
RBC # BLD AUTO: 3.34 10E6/UL (ref 4.4–5.9)
RBC MORPH BLD: ABNORMAL
SODIUM SERPL-SCNC: 137 MMOL/L (ref 136–145)
SPECIMEN EXPIRATION DATE: NORMAL
WBC # BLD AUTO: 4.9 10E3/UL (ref 4–11)

## 2023-02-05 PROCEDURE — 250N000013 HC RX MED GY IP 250 OP 250 PS 637: Performed by: PHYSICIAN ASSISTANT

## 2023-02-05 PROCEDURE — 250N000011 HC RX IP 250 OP 636: Performed by: STUDENT IN AN ORGANIZED HEALTH CARE EDUCATION/TRAINING PROGRAM

## 2023-02-05 PROCEDURE — 80053 COMPREHEN METABOLIC PANEL: CPT | Performed by: PHYSICIAN ASSISTANT

## 2023-02-05 PROCEDURE — 250N000009 HC RX 250: Performed by: STUDENT IN AN ORGANIZED HEALTH CARE EDUCATION/TRAINING PROGRAM

## 2023-02-05 PROCEDURE — 85027 COMPLETE CBC AUTOMATED: CPT | Performed by: PHYSICIAN ASSISTANT

## 2023-02-05 PROCEDURE — 120N000002 HC R&B MED SURG/OB UMMC

## 2023-02-05 PROCEDURE — 85007 BL SMEAR W/DIFF WBC COUNT: CPT | Performed by: PHYSICIAN ASSISTANT

## 2023-02-05 PROCEDURE — 36415 COLL VENOUS BLD VENIPUNCTURE: CPT | Performed by: PHYSICIAN ASSISTANT

## 2023-02-05 PROCEDURE — 250N000013 HC RX MED GY IP 250 OP 250 PS 637: Performed by: STUDENT IN AN ORGANIZED HEALTH CARE EDUCATION/TRAINING PROGRAM

## 2023-02-05 PROCEDURE — 250N000011 HC RX IP 250 OP 636: Performed by: PHYSICIAN ASSISTANT

## 2023-02-05 PROCEDURE — 258N000003 HC RX IP 258 OP 636: Performed by: STUDENT IN AN ORGANIZED HEALTH CARE EDUCATION/TRAINING PROGRAM

## 2023-02-05 PROCEDURE — 86850 RBC ANTIBODY SCREEN: CPT | Performed by: PHYSICIAN ASSISTANT

## 2023-02-05 PROCEDURE — 86901 BLOOD TYPING SEROLOGIC RH(D): CPT | Performed by: PHYSICIAN ASSISTANT

## 2023-02-05 PROCEDURE — 258N000002 HC RX IP 258 OP 250: Performed by: STUDENT IN AN ORGANIZED HEALTH CARE EDUCATION/TRAINING PROGRAM

## 2023-02-05 RX ORDER — OXYCODONE HYDROCHLORIDE 5 MG/1
5-10 TABLET ORAL EVERY 4 HOURS PRN
Qty: 30 TABLET | Refills: 0 | Status: ON HOLD | OUTPATIENT
Start: 2023-02-05 | End: 2023-02-16

## 2023-02-05 RX ORDER — HEPARIN SODIUM,PORCINE 10 UNIT/ML
5-10 VIAL (ML) INTRAVENOUS EVERY 24 HOURS
Qty: 300 ML | Refills: 0 | Status: SHIPPED | OUTPATIENT
Start: 2023-02-05 | End: 2023-02-06

## 2023-02-05 RX ORDER — LISINOPRIL 20 MG/1
20 TABLET ORAL DAILY
Qty: 90 TABLET | Refills: 1 | Status: ON HOLD | COMMUNITY
Start: 2023-02-05 | End: 2023-02-16

## 2023-02-05 RX ORDER — GABAPENTIN 300 MG/1
300 CAPSULE ORAL AT BEDTIME
Qty: 90 CAPSULE | Refills: 3 | Status: ON HOLD | COMMUNITY
Start: 2023-02-05 | End: 2023-02-16

## 2023-02-05 RX ORDER — LEVOFLOXACIN 250 MG/1
250 TABLET, FILM COATED ORAL DAILY
Qty: 10 TABLET | Refills: 0 | Status: ON HOLD | OUTPATIENT
Start: 2023-02-05 | End: 2023-02-16

## 2023-02-05 RX ORDER — PROCHLORPERAZINE MALEATE 10 MG
5-10 TABLET ORAL EVERY 6 HOURS PRN
Qty: 30 TABLET | Refills: 3 | Status: ON HOLD | OUTPATIENT
Start: 2023-02-05 | End: 2023-02-16

## 2023-02-05 RX ORDER — DOXYCYCLINE HYCLATE 100 MG
100 TABLET ORAL DAILY
Qty: 10 TABLET | Refills: 0 | Status: ON HOLD | OUTPATIENT
Start: 2023-02-05 | End: 2023-02-16

## 2023-02-05 RX ORDER — ONDANSETRON 4 MG/1
4-8 TABLET, ORALLY DISINTEGRATING ORAL EVERY 8 HOURS PRN
Qty: 30 TABLET | Refills: 3 | Status: ON HOLD | OUTPATIENT
Start: 2023-02-05 | End: 2023-02-16

## 2023-02-05 RX ORDER — LANOLIN ALCOHOL/MO/W.PET/CERES
400 CREAM (GRAM) TOPICAL DAILY
Qty: 30 TABLET | Refills: 0 | Status: SHIPPED | OUTPATIENT
Start: 2023-02-06 | End: 2023-03-28

## 2023-02-05 RX ORDER — SUCRALFATE ORAL 1 G/10ML
1 SUSPENSION ORAL 4 TIMES DAILY PRN
Qty: 414 ML | Refills: 0 | Status: ON HOLD | OUTPATIENT
Start: 2023-02-05 | End: 2023-05-22

## 2023-02-05 RX ORDER — CLINDAMYCIN PHOSPHATE 10 MG/G
GEL TOPICAL 2 TIMES DAILY
Qty: 30 G | Refills: 0 | Status: ON HOLD | OUTPATIENT
Start: 2023-02-05 | End: 2023-05-22

## 2023-02-05 RX ADMIN — FERROUS SULFATE TAB 325 MG (65 MG ELEMENTAL FE) 325 MG: 325 (65 FE) TAB at 08:11

## 2023-02-05 RX ADMIN — SIMETHICONE 80 MG: 80 TABLET, CHEWABLE ORAL at 08:11

## 2023-02-05 RX ADMIN — Medication 400 MCG: at 08:11

## 2023-02-05 RX ADMIN — THIAMINE HCL TAB 100 MG 100 MG: 100 TAB at 12:00

## 2023-02-05 RX ADMIN — SUCRALFATE 1 G: 1 SUSPENSION ORAL at 18:01

## 2023-02-05 RX ADMIN — OXYCODONE HYDROCHLORIDE 5 MG: 5 TABLET ORAL at 05:24

## 2023-02-05 RX ADMIN — OXYCODONE HYDROCHLORIDE 10 MG: 5 TABLET ORAL at 13:40

## 2023-02-05 RX ADMIN — THIAMINE HCL TAB 100 MG 100 MG: 100 TAB at 05:24

## 2023-02-05 RX ADMIN — MESNA 3780 MG: 100 INJECTION, SOLUTION INTRAVENOUS at 17:50

## 2023-02-05 RX ADMIN — POTASSIUM CHLORIDE: 2 INJECTION, SOLUTION, CONCENTRATE INTRAVENOUS at 13:38

## 2023-02-05 RX ADMIN — GABAPENTIN 300 MG: 300 CAPSULE ORAL at 13:36

## 2023-02-05 RX ADMIN — THIAMINE HCL TAB 100 MG 100 MG: 100 TAB at 21:28

## 2023-02-05 RX ADMIN — POLYETHYLENE GLYCOL 3350 17 G: 17 POWDER, FOR SOLUTION ORAL at 09:37

## 2023-02-05 RX ADMIN — OXYCODONE HYDROCHLORIDE 10 MG: 5 TABLET ORAL at 23:12

## 2023-02-05 RX ADMIN — DULOXETINE HYDROCHLORIDE 60 MG: 60 CAPSULE, DELAYED RELEASE ORAL at 08:11

## 2023-02-05 RX ADMIN — INSULIN ASPART 1 UNITS: 100 INJECTION, SOLUTION INTRAVENOUS; SUBCUTANEOUS at 18:01

## 2023-02-05 RX ADMIN — SUCRALFATE 1 G: 1 SUSPENSION ORAL at 21:28

## 2023-02-05 RX ADMIN — SUCRALFATE 1 G: 1 SUSPENSION ORAL at 08:11

## 2023-02-05 RX ADMIN — GABAPENTIN 300 MG: 300 CAPSULE ORAL at 21:28

## 2023-02-05 RX ADMIN — ENOXAPARIN SODIUM 40 MG: 40 INJECTION SUBCUTANEOUS at 11:57

## 2023-02-05 RX ADMIN — PANTOPRAZOLE SODIUM 40 MG: 40 TABLET, DELAYED RELEASE ORAL at 08:10

## 2023-02-05 RX ADMIN — GABAPENTIN 300 MG: 300 CAPSULE ORAL at 08:11

## 2023-02-05 RX ADMIN — OXYCODONE HYDROCHLORIDE 10 MG: 5 TABLET ORAL at 09:38

## 2023-02-05 RX ADMIN — OLANZAPINE 5 MG: 2.5 TABLET, FILM COATED ORAL at 21:28

## 2023-02-05 RX ADMIN — POTASSIUM CHLORIDE: 2 INJECTION, SOLUTION, CONCENTRATE INTRAVENOUS at 04:16

## 2023-02-05 RX ADMIN — CLINDAMYCIN PHOSPHATE: 10 GEL TOPICAL at 08:10

## 2023-02-05 RX ADMIN — SUCRALFATE 1 G: 1 SUSPENSION ORAL at 11:57

## 2023-02-05 RX ADMIN — CLINDAMYCIN PHOSPHATE: 10 GEL TOPICAL at 21:28

## 2023-02-05 RX ADMIN — OXYCODONE HYDROCHLORIDE 10 MG: 5 TABLET ORAL at 18:05

## 2023-02-05 RX ADMIN — ONDANSETRON HYDROCHLORIDE 8 MG: 8 TABLET, FILM COATED ORAL at 05:24

## 2023-02-05 ASSESSMENT — ACTIVITIES OF DAILY LIVING (ADL)
ADLS_ACUITY_SCORE: 20

## 2023-02-05 NOTE — PROGRESS NOTES
Ifosfamide Toxicity Assessment      Patient is Alert & Oriented x 4. There are signs of lethargy, confusion or hallucinations YES, pt is lethargic but able to follow command    Patient is having new incontinence of bowel or bladder No.       Pincer Grasp intact Yes    Tremors present No    Provider was notified No of changes no changes from previous shift .    Will continue to monitor for s/sx of ifosfamide toxicity: hallucinations, AMS, emotional lability, somnolence, myoclonic jerks, tremors, coordination difficulties. If these occur, please call the APPs/Attending on days and fellow on-call for evening and nights for recommendations and further instruction.

## 2023-02-05 NOTE — PLAN OF CARE
[id number]  /68 (BP Location: Left arm)   Pulse 80   Temp 98.8  F (37.1  C) (Oral)   Resp 18   Wt 136.4 kg (300 lb 9.6 oz)   SpO2 96%   BMI 41.93 kg/m      Afebrile, VSS.   No acute event.   No s/s of neurotoxicity.   Ifosfamide completed and mesna infusing.     NEURO: Alert and oriented x4.      RESPIRATORY: Room Air, denies dizziness, and SOB. Lungs sound, clear, equal bilateral.     CARDIO: VSS, denies dizziness, and extremity pain.      GI/: Denies N/V, BS active, No BM reported, 1x Miralax given, AUOP.       SKIN: Intact.      ACTIVITY: Independent.      PAIN: Yes, PRN oxycodone given (see MAR).     DLA: CVC, infusing    BG: Yes.      PLAN:   TODAY D7  - Continue chemotherapy per protocol and close monitoring for worsening Ifos toxicity.   Hand spasms/tremor stable, no repeated episodes of confusion.   - Hold further diuresis today, assess weight tomorrow   - Continue supportive cares for nausea/vomiting PRN antiemetics available   - Anticipate discharge 2/6/23, pending completion of chemotherapy     Continue monitoring.     * Italic, from provider's note.        Goal Outcome Evaluation:      Plan of Care Reviewed With: patient                                                     Ifosfamide Toxicity Assessment      Patient is Alert & Oriented x1Srouf are signs of lethargy, confusion or hallucinations, NO    Patient is having new incontinence of bowel or bladder. No       Pincer Grasp intact, Yes    Tremors present , No    Provider was notified, NO    Will continue to monitor for s/sx of ifosfamide toxicity: hallucinations, AMS, emotional lability, somnolence, myoclonic jerks, tremors, coordination difficulties. If these occur, please call the APPs/Attending on days and fellow on-call for evening and nights for recommendations and further instruction.

## 2023-02-05 NOTE — PROGRESS NOTES
Nursing Focus: Chemotherapy  D: Positive blood return via CVC. Insertion site is clean/dry/intact, dressing intact with no complaints of pain.    Urine output is recorded in intake in Doc Flowsheet.    I: Premedications given per order (see electronic medical administration record). Dose #6 of Ifosamide started to infuse over 24hours.   Reviewed pt teaching on chemotherapy side effects.    Pt denies need for further teaching. Chemotherapy double checked per protocol by two chemotherapy competent RN's.   A: Tolerating the infusion well, no s/s of neurotoxicity.   Denies nausea, PRN pain medication given (see MAR).   P: Continue to monitor urine output and symptoms of nausea.   Screen for symptoms of toxicity.

## 2023-02-05 NOTE — CONSULTS
Central line class with PLC    Patient and spouse completed central line education. Wife Yari is a nurse and has performed all line cares in her profession but states it has been about 7 years and she wants a refresher class. Antonio rested between cares during most of class. Yari able to perform flushing, end cap change and bandage change on PLC model. All teachback questions answered and pt and wife verbaliized appreciation for the class. Waiting to here plan for discharge tomorrow and when they will receive supplies from [facility].    Literature given: Handwashing and Skin Care, Your Central Venous Catheter, Caring for Your Central Venous Catheter at Home, Changing the End Cap, Flushing the Line with Heparin, Saline or Citrate, Changing Your Bandage.

## 2023-02-05 NOTE — PROGRESS NOTES
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 Currently being admitted for Cycle 1 Doxil/Ifosfamide.     TODAY D7  - Continue chemotherapy per protocol and close monitoring for worsening Ifos toxicity. Hand spasms/tremor stable, no repeated episodes of confusion.   - Hold further diuresis today, assess weight tomorrow   - Continue supportive cares for nausea/vomiting PRN antiemetics available   - Anticipate discharge 2/6/23, pending completion of chemotherapy      ONC   # High Grade Sarcoma R Thigh   This is a patient of Dr. Price. Patient initially noted right hamstring strain in June 2022, he was treated for sciatica. Pain continued to worsen and went for MRI of L spine and right thigh, which showed a large mass in the posterior compartment of the right thigh. He underwent an US guided biopsy and pathology c/w high grade sarcoma. CT CAP with pulmonary nodules but no site of metastatic disease. Currently being admitted for Cycle 1 Doxil/Ifos.   - Baseline Echo 1/26 EF 55%  - Bob PTA, PLC requested   - Per outpatient notes patient will need to discharge on prophylaxis Levaquin and Augmentin (d/t HS)   - Patient had hypersensitivity reaction with 5-min into Emend infusion; this was not completed. Instead scheduled Zyprexa 5 mg at bedtime.  - On 2/2 notified by nursing that patient is having intermittent hand spasms and is occasionally dropping his phone, no evidence of confusion or AMS. Will continue to closely monitor.   - On 2/3 notified by nursing that patient continues to have intermittent hand spasms and had a self-resolving episode of  confusion where he thought he was in the kitchen and was looking for supplies to make dinner. This lasted for only a few seconds. Talked with wife and provided an update on the phone and she has noticed that he has been a bit more angry and short via phone.   - On 2/4-2/5 hand tremors stable and no further episodes of confusion                               Therapy Plan: Doxil/Ifosfamide/Mesna (C1D1=1/30/23)                            - Doxil 45 mg/m2 (113 mg) IV -- D1                            - Ifosfamide 1,500 mg/m2 (3,780 mg) IV -- D1-6                             - Mesna 1,500 mg/m2 (3,780 mg) IV -- D7                            - Neulasta 6 mg subcutaneous -- D8 requested                              - Premeds: Thiamine, Emend, Zofran, Dexamethasone      # Iron Deficiency Anemia   # Acute on Chronic Anemia   Hgb on admission was noted to be 8.8; last was 10.4 on 1/26. Patient denies any evidence of over bleeding.   - LDH, B12, Haptoglobin WNL   - Folate low at 4.0; started Folic acid supplment   - Peripheral smear - hypochromic. Normocytic anemia, rare elliptocytes, slight thrombocytosis, no evidence of hemolysis   - Continue PTA Iron supplement daily   - Transfuse to keep Hgb >7      # Cancer-related Pain   - PTA Gabapentin 300 mg TID   - PTA Oxycodone 5-10 mg q4h PRN -- increased during admission d/t uncontrolled pain    ID   # H/o Fevers   Patient and family report ~ 1 month history of fevers at home.   - BCx x2 1/30 -- NGTD   - Patient remained afebrile while inpatient      CARDS   # Hypervolemia, improving   Admission weight 299 lb, up 16 lbs around 2/2/23 weight has been down trending with diuresis   - Responds well to Lasix 20 mg IV      # HTN   - PTA Lisinopril, held with ongoing soft pressures     PULM   # Chronic Cough   # Pulmonary Nodules   Patient's wife reports patient has had chronic cough for approximately last 10 months. He has tried several OTC medications which did not help, he was recently  "started on PPI with no improvement of cough. CT CAP obtained for staging shows multiple bilateral pulmonary nodules   - Monitor while inpatient   - Tessalon Pearls, Delsym, and Cepacol lozenges PRN      ENDO   # T2DM   Follows with PCP Dr. Mynor Mason; last Hgb A1C 11.7 on 1/18/23  - Hold PTA Metformin; anticipate resuming on discharge   - Medium intensity sliding scale insulin   - Hypoglycemia protocol onboard       GI   # GERD   - PTA Omeprazole   - Carafate QID   - TUMS PRN   - GI Cocktail PRN      # Irritable Bowel Syndrome   - PTA Duloxetine      DERM   # Hidrenitis Suppurativa   Wife reports there is an active lesion on the right side, at home they were using topical clindamycin that wife had on hand and she has noted improvement in size of lesion.   - Continue topical Clindamycin BID   - WOC consulted on admission, appreciate recs   - Discussed with dermatology who recommend continuation of topical treatments since lesion is improving. If antibiotic is warranted they typically start with Doxycycline.       FEN:  -IVF per chemo protocol   -PRN lyte replacement  -RDAT     Prophy/Misc:  -VTE: PPx Lovenox BID; hold if platelets   -GI/PUD: PTA Omeprazole   -Bowels: PRN Senna and Miralax     Follow Up: APPT18 order placed 1/31 for;  1. Neulasta 2/7/23  2. LIBERTY follow up within 1 week of discharge   3. 3x weekly labs starting 2/7/23 for 2-3 weeks    Clinically Significant Risk Factors              # Hypoalbuminemia: Lowest albumin = 2.4 g/dL at 2/2/2023  6:10 AM, will monitor as appropriate          # DMII: A1C = 11.7 % (Ref range: <5.7 %) within past 3 months   # Severe Obesity: Estimated body mass index is 41.93 kg/m  as calculated from the following:    Height as of 1/27/23: 1.803 m (5' 11\").    Weight as of this encounter: 136.4 kg (300 lb 9.6 oz).          This patient was discussed with Dr. Hightower    I spent >40 minutes face-to-face or coordinating care of Antonio Canseco. Over 50% of our time on the unit was " spent counseling the patient and/or coordinating care.    Erika Mar PA-C ([facility])   Hematology/ Oncology   Pager [phone]    Interval History   Nursing notes reviewed. Antonio is doing well. He was able to sleep a bit better overnight with the 10 mg Oxycodone, he is feeling a bit sore in his right leg this morning. Reviewed timeline of chemotherapy and plan fos discharge tomorrow. Patient and wife voiced understanding. All questions answered at this time.     Physical Exam   Temp: 98.5  F (36.9  C) Temp src: Oral BP: 118/64 Pulse: 84   Resp: 18 SpO2: 92 % O2 Device: None (Room air)    Vitals:    02/03/23 0923 02/04/23 1100 02/05/23 0800   Weight: 141.4 kg (311 lb 11.2 oz) 138 kg (304 lb 4.8 oz) 136.4 kg (300 lb 9.6 oz)     Vital Signs with Ranges  Temp:  [98.3  F (36.8  C)-98.8  F (37.1  C)] 98.5  F (36.9  C)  Pulse:  [] 84  Resp:  [17-20] 18  BP: (108-134)/(62-71) 118/64  SpO2:  [91 %-99 %] 92 %  I/O last 3 completed shifts:  In: 4232.28 [P.O.:1000; I.V.:2360; IV Piggyback:872.28]  Out: 6175 [Urine:6175]    Constitutional: Pleasant male seen resting comfortably in bed in NAD. Alert and interactive.   HEENT: NCAT. PERRL, EOMI, anicteric sclera. Oral mucosa pink and moist with no lesions or thrush.  Respiratory: Non-labored breathing, good air exchange, lungs clear to auscultation bilaterally. No cough or wheeze noted.   Cardiovascular: Regular rate and rhythm. No murmur or rub.   GI: Normoactive bowel sounds. Abdomen soft, non-distended, and non-tender. No palpable masses or organomegaly.  Skin: Warm and dry. No concerning lesions or rash on exposed surfaces.  Musculoskeletal: Extremities grossly normal, non-tender, no edema. Strong peripheral pulses. Good strength and ROM in bed.   Neuropsychiatric: Mentation and affect appear normal/appropriate.  Vascular Access: Bob is CDI without erythema, swelling, or discharge.     Medications   Current Facility-Administered Medications   Medication     0.9%  sodium chloride BOLUS     acetaminophen (TYLENOL) tablet 650 mg     albuterol (PROVENTIL HFA/VENTOLIN HFA) inhaler     albuterol (PROVENTIL) neb solution 2.5 mg     benzocaine-menthol (CHLORASEPTIC MAX) 15-10 MG lozenge 1 lozenge     benzonatate (TESSALON) capsule 100 mg     calcium carbonate (TUMS) chewable tablet 500 mg     Chemotherapy Infusing-Continuous Infusion     clindamycin (CLINDAMAX) 1 % topical gel     dextromethorphan (DELSYM) liquid 30 mg     [START ON 2/6/2023] dextrose 5 % flush PRE/POST medication     [START ON 2/6/2023] dextrose 5 % flush PRE/POST medication     glucose gel 15-30 g    Or     dextrose 50 % injection 25-50 mL    Or     glucagon injection 1 mg     DULoxetine (CYMBALTA) DR capsule 60 mg     enoxaparin ANTICOAGULANT (LOVENOX) injection 40 mg     EPINEPHrine PF (ADRENALIN) injection 0.3 mg     famotidine (PEPCID) injection 20 mg     ferrous sulfate (FEROSUL) tablet 325 mg     [START ON 2/6/2023] filgrastim-aafi (NIVESTYM) 480 mcg in D5W 25 mL infusion     folic acid (FOLVITE) tablet 400 mcg     gabapentin (NEURONTIN) capsule 300 mg     heparin 100 UNIT/ML injection 5-10 mL     heparin lock flush 10 UNIT/ML injection 5-10 mL     heparin lock flush 10 UNIT/ML injection 5-10 mL     hydrOXYzine (ATARAX) tablet 25 mg    Or     hydrOXYzine (ATARAX) tablet 50 mg     Ifosfamide (IFEX) 3,780 mg, mesna (MESNEX) 3,780 mg in sodium chloride 0.9 % 1,163.4 mL infusion     insulin aspart (NovoLOG) injection (RAPID ACTING)     insulin aspart (NovoLOG) injection (RAPID ACTING)     lidocaine (viscous) (XYLOCAINE) 2 % 15 mL, alum & mag hydroxide-simethicone (MAALOX) 15 mL GI Cocktail     [Held by provider] lisinopril (ZESTRIL) tablet 20 mg     LORazepam (ATIVAN) injection 0.5-1 mg     LORazepam (ATIVAN) tablet 0.5-1 mg     melatonin tablet 3-6 mg     meperidine (DEMEROL) injection 25 mg     mesna (MESNEX) 3,780 mg in sodium chloride 0.9 % 1,087.8 mL infusion     methylPREDNISolone sodium succinate  (solu-MEDROL) injection 125 mg     naloxone (NARCAN) injection 0.2 mg    Or     naloxone (NARCAN) injection 0.4 mg    Or     naloxone (NARCAN) injection 0.2 mg    Or     naloxone (NARCAN) injection 0.4 mg     No rectal suppositories if WBC less than 1000/microliters or platelets less than 50,000/ L     OLANZapine (zyPREXA) tablet 5 mg     ondansetron (ZOFRAN) tablet 4-8 mg    Or     ondansetron (ZOFRAN ODT) ODT tab 4-8 mg    Or     ondansetron (ZOFRAN) injection 4-8 mg     oxyCODONE (ROXICODONE) tablet 5-10 mg     pantoprazole (PROTONIX) EC tablet 40 mg     polyethylene glycol (MIRALAX) Packet 17 g     prochlorperazine (COMPAZINE) injection 10 mg     prochlorperazine (COMPAZINE) tablet 10 mg     senna-docusate (SENOKOT-S/PERICOLACE) 8.6-50 MG per tablet 1 tablet    Or     senna-docusate (SENOKOT-S/PERICOLACE) 8.6-50 MG per tablet 2 tablet     simethicone (MYLICON) chewable tablet 80 mg     sodium bicarbonate 100 mEq, potassium chloride 20 mEq in NaCl 0.45 % 1,000 mL infusion     sodium chloride (PF) 0.9% PF flush 10-20 mL     sodium chloride (PF) 0.9% PF flush 10-20 mL     sodium chloride (PF) 0.9% PF flush 10-20 mL     sucralfate (CARAFATE) suspension 1 g     thiamine (B-1) tablet 100 mg       Data   CBC  Recent Labs   Lab 02/05/23  0601 02/04/23  0638 02/03/23  0640 02/02/23  0610   WBC 4.9 5.9 5.7 6.5   RBC 3.34* 3.50* 3.56* 3.48*   HGB 7.6* 8.1* 7.9* 8.0*   HCT 26.4* 27.8* 28.4* 27.8*   MCV 79 79 80 80   MCH 22.8* 23.1* 22.2* 23.0*   MCHC 28.8* 29.1* 27.8* 28.8*   RDW 17.2* 17.6* 17.7* 17.7*    381 433 454*     CMP  Recent Labs   Lab 02/05/23  0751 02/05/23  0601 02/05/23  0245 02/04/23  2138 02/04/23  0825 02/04/23  0638 02/03/23  0912 02/03/23  0640 02/02/23  1048 02/02/23  0610 02/01/23  1125 02/01/23  0648 01/31/23  1137 01/31/23  0605   NA  --  137  --   --   --  135*  --  138  --  140  --  141  --  136   POTASSIUM  --  4.2  --   --   --  4.4  --  4.3  --  4.4  --  4.1  --  4.9   CHLORIDE  --  103   --   --   --  100  --  101  --  101  --  103  --  101   CO2  --  26  --   --   --  26  --  29  --  30*  --  31*  --  26   ANIONGAP  --  8  --   --   --  9  --  8  --  9  --  7  --  9   * 106* 98 217*   < > 132*   < > 144*   < > 142*   < > 110*   < > 291*   BUN  --  12.5  --   --   --  11.6  --  11.7  --  12.4  --  14.6  --  17.9   CR  --  0.83  --   --   --  0.75  --  0.74  --  0.77  --  0.61*  --  0.67   GFRESTIMATED  --  >90  --   --   --  >90  --  >90  --  >90  --  >90  --  >90   DORIAN  --  8.5*  --   --   --  8.2*  --  8.1*  --  8.0*  --  7.8*  --  8.2*   MAG  --   --   --   --   --   --   --  1.9  --  1.7  --  1.9  --  2.0   PHOS  --   --   --   --   --   --   --  3.1  --  3.1  --  2.8  --  4.5   PROTTOTAL  --  6.1*  --   --   --  6.0*  --  5.9*  --  5.9*  --  5.6*  --  6.3*   ALBUMIN  --  2.5*  --   --   --  2.5*  --  2.5*  --  2.4*  --  2.4*  --  2.5*   BILITOTAL  --  0.5  --   --   --  0.4  --  0.3  --  0.2  --  0.2  --  0.2   ALKPHOS  --  48  --   --   --  45  --  44  --  47  --  49  --  54   AST  --  15  --   --   --  15  --  15  --  17  --  14  --  14   ALT  --  7*  --   --   --  7*  --  10  --  10  --  9*  --  8*    < > = values in this interval not displayed.     INR  Recent Labs   Lab 01/31/23  0605 01/30/23  1025   INR 1.44* 1.37*       Results for orders placed or performed during the hospital encounter of 01/27/23   IR CVC Tunnel Placement > 5 Yrs of Age    Narrative    PROCEDURES 1/27/2023 10:14 AM:  1. Ultrasound guidance for venous access  2. Placement centrally inserted catheter (age > 5 yrs.) tunneled, no  port, no pump  3. Fluoroscopic guidance placement    CLINICAL HISTORY: TCVC placement - double lumen dumont 9.5 Estonian;  Sarcoma of soft tissue (H). Catheter does not need to be dialysis or  apheresis capable.    COMPARISONS: CT 1/26/2023    REFERRING PROVIDER: RASHID TAPIA    STAFF RADIOLOGIST: SEAN Vergara MD    FELLOW(S)/RESIDENT(S): BROOKE Contreras    ASSISTANT: CLEO Falcon,  JUN BESS, ANGELES LUIS MD, attest that I was present for all critical portions  of the procedure and was immediately available to provide guidance and  assistance during the remainder of the procedure.    MEDICATIONS: The patient was placed on continuous monitoring.  Intravenous sedation was administered. 2 mg Versed and 100 mcg  Fentanyl IV. Vital signs and sedation were monitored by nursing staff  under Interventional Radiologist's supervision. The patient remained  stable throughout the procedure.    TOTAL SEDATION TIME: 28 minutes    ATTENDING FACE-TO-FACE SEDATION TIME: less than 5 minutes    FLUOROSCOPY TIME: 36.9 seconds.    DOSE: 11.16 mGy    PROCEDURE: The patient understood the limitations, alternatives, and  risks of the procedure and requested the procedure be performed. Both  written and oral consent were obtained.    The right neck and upper chest were prepped and draped in the usual  sterile fashion. 1% lidocaine without epinephrine was used for local  anesthesia.    Under ultrasound guidance, right internal jugular venotomy was made  with a micropuncture needle. Ultrasound image documenting jugular  patency and needle venotomy was saved in the patient's record.    Micropuncture needle exchanged over guidewire for the micropuncture  sheath under fluoroscopic guidance. Catheter length measured with the  0.018 guidewire. Micropuncture sheath saline locked. 9.5 [place] BD  Bard PowerHickman was subcutaneously tunneled from the right anterior  chest to the right internal jugular venotomy site. Catheter cut on the  24 cm marker. Micropuncture sheath exchanged over guidewire for the  peel-away sheath. Guidewire removed. Under fluoroscopic guidance, the  catheter was placed through the peel-away sheath and positioned with  its tip in the superior atriocaval junction. Both lumens flushed and  aspirated adequately. Each lumen heparin locked with 100:1 heparin  solution. Catheter secured with StatLock and  sterile dressing. Right  internal jugular venotomy site closed with [employer] ExoFin  tissue adhesive. No immediate complication.    Fluoroscopic image documenting catheter placement and final position  was saved in the patient's record.      Impression    IMPRESSION:   1. Ultrasound guided right internal jugular venotomy.    2. 24 cm 9.5 [place]ian BD Bard PowerHickman tunneled central venous  catheter placed with the tip in the superior atriocaval junction. Both  lumens heparin locked and ready for use.    I have personally reviewed the examination and initial interpretation  and I agree with the findings.    ANGELES LUIS MD         SYSTEM ID:  [id number]       ATTENDING PHYSICIAN ADDENDUM AND NOTE:  I evaluated this patient's case separately and also with Erika Mar PA-C. The note above reflects my assessment and plan. I have personally reviewed lab results, and vital and radiology results. >50% of time was spent in assessment and coordination of care; >=35 minutes.  Mr. Angeles Canseco is a 52 yr old gentleman from [place], [place]. On 1/12/23, he was diagnosed with a high-grade sarcoma of the right thigh; on histopathologic exam, the tumor had extensive necrosis. He has been admitted for initial systemic chemotherapy with Doxil/Ifosfamide; his primary oncologist is Dr. Laboy.  Other medical conditions include type II DM, hypertension, iron deficiency anemia and GERD with chronic cough. For 6+ months he was having back pain, initially thought to be due to sciatica; in January 2023, the biopsy noted above unfortunately confirmed sarcoma. Overall he states he is doing some laps in the hallway and walking actively. Earlier this morning he was briefly disoriented (thought he was in a kitchen) but other than that mentation is completely normal. He endorses having some increased pain in his right leg, mostly alleviated by oxycodone that was administered at 6am.  The plan is to continue with the Cycle 1 Doxil  and Ifosfamide as planned through Monday January 6, 2023 with careful monitoring as inpatient as described in detail above.  Junaid Perez MD, PhD  Associate Professor of Medicine, Hematology, Oncology and Transplantation

## 2023-02-05 NOTE — PLAN OF CARE
Today is day 7 of his chemo treatment plan. Pt is alert and oriented x4, AVSS, afebrile on RA. Lethargic overnight but pt is able to follow command. Pt denies nausea, vomiting, numbness and tingling. BG around 2am was 98. Chemo infusing, blood return noted on line infusing chemo. Bicarb infusing at 100 ml/hr.  Bed alarm in place.

## 2023-02-05 NOTE — PLAN OF CARE
[id number]  /62 (BP Location: Left arm)   Pulse 92   Temp 98.5  F (36.9  C) (Oral)   Resp 19   Wt 138 kg (304 lb 4.8 oz)   SpO2 99%   BMI 42.44 kg/m      Afebrile, VSS.   No s/s of neurotoxicity.   IV lasix given, with good result.       NEURO: Alert and oriented x4.      RESPIRATORY: Room Air, denies dizziness, and SOB. Lungs sound, clear, equal bilateral.     CARDIO: VSS, denies dizziness, and extremity pain.      GI/: Denies N/V, BS active, No BM reported today, AUOP ( see MAR).      SKIN: Intact.      ACTIVITY: Stand by assist.      PAIN: Denies pain.    DLA: CVC double lumen, both infusing.     BG: Yes.      PLAN:   TODAY D6  - Continue chemotherapy per protocol and close monitoring for worsening Ifos toxicity. Hand spasms/tremor stable, no repeated episodes of confusion.   - Ongoing diuresis continues to down trend nicely   - Continue supportive cares for nausea/vomiting PRN antiemetics available   - Anticipate discharge 2/6/23, pending completion of chemotherapy     Continue monitoring.     * Italic, from provider's note.        Goal Outcome Evaluation:      Plan of Care Reviewed With: patient                                                   Ifosfamide Toxicity Assessment      Patient is Alert & Oriented x4. There are signs of lethargy, confusion or hallucinations YES (only lethargic, possibly due to chemo and oxycodone)    Patient is having new incontinence of bowel or bladder NO      Pincer Grasp intact Yes    Tremors present NO    Provider was notified, NO change.     Will continue to monitor for s/sx of ifosfamide toxicity: hallucinations, AMS, emotional lability, somnolence, myoclonic jerks, tremors, coordination difficulties. If these occur, please call the APPs/Attending on days and fellow on-call for evening and nights for recommendations and further instruction.

## 2023-02-06 VITALS
BODY MASS INDEX: 42.01 KG/M2 | TEMPERATURE: 98.5 F | HEART RATE: 93 BPM | OXYGEN SATURATION: 97 % | SYSTOLIC BLOOD PRESSURE: 123 MMHG | RESPIRATION RATE: 18 BRPM | DIASTOLIC BLOOD PRESSURE: 68 MMHG | WEIGHT: 301.2 LBS

## 2023-02-06 LAB
ALBUMIN SERPL BCG-MCNC: 2.6 G/DL (ref 3.5–5.2)
ALP SERPL-CCNC: 58 U/L (ref 40–129)
ALT SERPL W P-5'-P-CCNC: 8 U/L (ref 10–50)
ANION GAP SERPL CALCULATED.3IONS-SCNC: 8 MMOL/L (ref 7–15)
AST SERPL W P-5'-P-CCNC: 15 U/L (ref 10–50)
BASOPHILS # BLD MANUAL: 0 10E3/UL (ref 0–0.2)
BASOPHILS NFR BLD MANUAL: 0 %
BILIRUB SERPL-MCNC: 0.5 MG/DL
BUN SERPL-MCNC: 11.9 MG/DL (ref 6–20)
CALCIUM SERPL-MCNC: 8.2 MG/DL (ref 8.6–10)
CHLORIDE SERPL-SCNC: 103 MMOL/L (ref 98–107)
CREAT SERPL-MCNC: 0.74 MG/DL (ref 0.67–1.17)
DEPRECATED HCO3 PLAS-SCNC: 25 MMOL/L (ref 22–29)
EOSINOPHIL # BLD MANUAL: 0.1 10E3/UL (ref 0–0.7)
EOSINOPHIL NFR BLD MANUAL: 2 %
ERYTHROCYTE [DISTWIDTH] IN BLOOD BY AUTOMATED COUNT: 17.5 % (ref 10–15)
GFR SERPL CREATININE-BSD FRML MDRD: >90 ML/MIN/1.73M2
GLUCOSE BLDC GLUCOMTR-MCNC: 109 MG/DL (ref 70–99)
GLUCOSE BLDC GLUCOMTR-MCNC: 114 MG/DL (ref 70–99)
GLUCOSE SERPL-MCNC: 131 MG/DL (ref 70–99)
HCT VFR BLD AUTO: 28.6 % (ref 40–53)
HGB BLD-MCNC: 8 G/DL (ref 13.3–17.7)
LYMPHOCYTES # BLD MANUAL: 0.6 10E3/UL (ref 0.8–5.3)
LYMPHOCYTES NFR BLD MANUAL: 17 %
MAGNESIUM SERPL-MCNC: 2.4 MG/DL (ref 1.7–2.3)
MCH RBC QN AUTO: 22.7 PG (ref 26.5–33)
MCHC RBC AUTO-ENTMCNC: 28 G/DL (ref 31.5–36.5)
MCV RBC AUTO: 81 FL (ref 78–100)
MONOCYTES # BLD MANUAL: 0 10E3/UL (ref 0–1.3)
MONOCYTES NFR BLD MANUAL: 0 %
NEUTROPHILS # BLD MANUAL: 2.7 10E3/UL (ref 1.6–8.3)
NEUTROPHILS NFR BLD MANUAL: 81 %
PHOSPHATE SERPL-MCNC: 2.2 MG/DL (ref 2.5–4.5)
PLAT MORPH BLD: ABNORMAL
PLATELET # BLD AUTO: 353 10E3/UL (ref 150–450)
POTASSIUM SERPL-SCNC: 4.1 MMOL/L (ref 3.4–5.3)
PROT SERPL-MCNC: 6.4 G/DL (ref 6.4–8.3)
RBC # BLD AUTO: 3.52 10E6/UL (ref 4.4–5.9)
RBC MORPH BLD: ABNORMAL
SODIUM SERPL-SCNC: 136 MMOL/L (ref 136–145)
WBC # BLD AUTO: 3.3 10E3/UL (ref 4–11)

## 2023-02-06 PROCEDURE — 250N000011 HC RX IP 250 OP 636: Performed by: STUDENT IN AN ORGANIZED HEALTH CARE EDUCATION/TRAINING PROGRAM

## 2023-02-06 PROCEDURE — 258N000002 HC RX IP 258 OP 250: Performed by: STUDENT IN AN ORGANIZED HEALTH CARE EDUCATION/TRAINING PROGRAM

## 2023-02-06 PROCEDURE — 85007 BL SMEAR W/DIFF WBC COUNT: CPT | Performed by: PHYSICIAN ASSISTANT

## 2023-02-06 PROCEDURE — 83735 ASSAY OF MAGNESIUM: CPT | Performed by: INTERNAL MEDICINE

## 2023-02-06 PROCEDURE — 250N000013 HC RX MED GY IP 250 OP 250 PS 637: Performed by: STUDENT IN AN ORGANIZED HEALTH CARE EDUCATION/TRAINING PROGRAM

## 2023-02-06 PROCEDURE — 80053 COMPREHEN METABOLIC PANEL: CPT | Performed by: PHYSICIAN ASSISTANT

## 2023-02-06 PROCEDURE — 250N000013 HC RX MED GY IP 250 OP 250 PS 637: Performed by: INTERNAL MEDICINE

## 2023-02-06 PROCEDURE — 85027 COMPLETE CBC AUTOMATED: CPT | Performed by: PHYSICIAN ASSISTANT

## 2023-02-06 PROCEDURE — 36415 COLL VENOUS BLD VENIPUNCTURE: CPT | Performed by: PHYSICIAN ASSISTANT

## 2023-02-06 PROCEDURE — 84100 ASSAY OF PHOSPHORUS: CPT | Performed by: INTERNAL MEDICINE

## 2023-02-06 PROCEDURE — 99239 HOSP IP/OBS DSCHRG MGMT >30: CPT | Performed by: INTERNAL MEDICINE

## 2023-02-06 PROCEDURE — 250N000011 HC RX IP 250 OP 636: Performed by: PHYSICIAN ASSISTANT

## 2023-02-06 PROCEDURE — 250N000013 HC RX MED GY IP 250 OP 250 PS 637: Performed by: PHYSICIAN ASSISTANT

## 2023-02-06 PROCEDURE — 250N000009 HC RX 250: Performed by: STUDENT IN AN ORGANIZED HEALTH CARE EDUCATION/TRAINING PROGRAM

## 2023-02-06 RX ORDER — HEPARIN SODIUM,PORCINE 10 UNIT/ML
5-10 VIAL (ML) INTRAVENOUS EVERY 24 HOURS
Qty: 300 ML | Refills: 0 | Status: SHIPPED | OUTPATIENT
Start: 2023-02-06 | End: 2023-02-06

## 2023-02-06 RX ORDER — HEPARIN SODIUM,PORCINE 10 UNIT/ML
3 VIAL (ML) INTRAVENOUS EVERY 24 HOURS
Qty: 180 ML | Refills: 0 | Status: ON HOLD | OUTPATIENT
Start: 2023-02-06 | End: 2023-05-22

## 2023-02-06 RX ORDER — OXYCODONE HYDROCHLORIDE 5 MG/1
5 TABLET ORAL ONCE
Status: COMPLETED | OUTPATIENT
Start: 2023-02-06 | End: 2023-02-06

## 2023-02-06 RX ADMIN — POTASSIUM & SODIUM PHOSPHATES POWDER PACK 280-160-250 MG 1 PACKET: 280-160-250 PACK at 11:47

## 2023-02-06 RX ADMIN — CALCIUM CARBONATE (ANTACID) CHEW TAB 500 MG 500 MG: 500 CHEW TAB at 03:51

## 2023-02-06 RX ADMIN — OXYCODONE HYDROCHLORIDE 10 MG: 5 TABLET ORAL at 08:48

## 2023-02-06 RX ADMIN — PANTOPRAZOLE SODIUM 40 MG: 40 TABLET, DELAYED RELEASE ORAL at 08:48

## 2023-02-06 RX ADMIN — ACETAMINOPHEN 650 MG: 325 TABLET ORAL at 02:02

## 2023-02-06 RX ADMIN — GABAPENTIN 300 MG: 300 CAPSULE ORAL at 12:43

## 2023-02-06 RX ADMIN — SODIUM CHLORIDE, PRESERVATIVE FREE 10 ML: 5 INJECTION INTRAVENOUS at 12:16

## 2023-02-06 RX ADMIN — SENNOSIDES AND DOCUSATE SODIUM 1 TABLET: 50; 8.6 TABLET ORAL at 09:03

## 2023-02-06 RX ADMIN — Medication 400 MCG: at 08:48

## 2023-02-06 RX ADMIN — DULOXETINE HYDROCHLORIDE 60 MG: 60 CAPSULE, DELAYED RELEASE ORAL at 08:49

## 2023-02-06 RX ADMIN — OXYCODONE HYDROCHLORIDE 10 MG: 5 TABLET ORAL at 12:43

## 2023-02-06 RX ADMIN — OXYCODONE HYDROCHLORIDE 10 MG: 5 TABLET ORAL at 03:47

## 2023-02-06 RX ADMIN — ENOXAPARIN SODIUM 40 MG: 40 INJECTION SUBCUTANEOUS at 00:29

## 2023-02-06 RX ADMIN — POTASSIUM CHLORIDE: 2 INJECTION, SOLUTION, CONCENTRATE INTRAVENOUS at 00:29

## 2023-02-06 RX ADMIN — POTASSIUM & SODIUM PHOSPHATES POWDER PACK 280-160-250 MG 1 PACKET: 280-160-250 PACK at 08:47

## 2023-02-06 RX ADMIN — FERROUS SULFATE TAB 325 MG (65 MG ELEMENTAL FE) 325 MG: 325 (65 FE) TAB at 08:48

## 2023-02-06 RX ADMIN — GABAPENTIN 300 MG: 300 CAPSULE ORAL at 08:48

## 2023-02-06 RX ADMIN — ACETAMINOPHEN 650 MG: 325 TABLET ORAL at 11:47

## 2023-02-06 RX ADMIN — OXYCODONE HYDROCHLORIDE 5 MG: 5 TABLET ORAL at 11:47

## 2023-02-06 RX ADMIN — CLINDAMYCIN PHOSPHATE: 10 GEL TOPICAL at 08:57

## 2023-02-06 RX ADMIN — THIAMINE HCL TAB 100 MG 100 MG: 100 TAB at 08:48

## 2023-02-06 RX ADMIN — SUCRALFATE 1 G: 1 SUSPENSION ORAL at 08:48

## 2023-02-06 ASSESSMENT — ACTIVITIES OF DAILY LIVING (ADL)
ADLS_ACUITY_SCORE: 20

## 2023-02-06 NOTE — DISCHARGE SUMMARY
[facility]    Discharge Summary  Hematology / Oncology    Date of Admission:  1/30/2023  Date of Discharge:  2/6/2023  Discharging Provider: Erika Mra PA-C  Date of Service (when I saw the patient): 02/06/23    Discharge Diagnoses    - High grade sarcoma R thigh    - Iron Deficiency anemia    - Acute on chronic anemia; stable   - Cancer -related pain     - H/o Fevers; resolved   - Hypervolemia    - HTN    - Chronic cough    - Pulmonary nodules    - T2DM    - GERD    - Irritable Bowel Syndrome    - Hidrenitis Suppurativa     History of Present Illness   Antonio Canseco is a 52 year old male who presents with past medical history of T2DM, HTN, GERD, Hidrenitis Suppurativa, IBS and 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 Currently being admitted for Cycle 1 Doxil/Ifosfamide. Hospitalization c/b hypersensitivity reaction to IV Emend & Doxil. He overall tolerated chemotherapy fairly well with some mild nausea/reflux symptoms, hypervolemia requiring diuresis and mild neurotoxicity with intermittent hand spasms and 1 episode of self resolving confusion. Ifosfamide was continued without interruption or dose reduction. On day of discharge, Antonio was overall feeling well he was excited to be able to go home today. Reviewed discharge medications and follow up. All questions answered at this time.     To Follow Outpatient   - Nausea/reflux control outpatient   - Pain control; discharged on oxycodone and gabapentin   - Anticipate need admission for Cycle 2 Doxil/Ifos     Discharge Medications   - Topical Clindamycin  - Doxycycline  - Folic acid   - Levaquin   - Narcan   - Zofran   - Compazine   - Carafate   - Oxycodone     Outpatient Follow Up   Future Appointments   Date Time Provider Department Center   2/7/2023 12:45 PM  [facility] LAB DRAW [facility]   2/7/2023  1:15 PM Scheierl, Amber J, APRN CNP [facility]   2/9/2023  1:00 PM  LAB DRAW 1  [facility]   2/10/2023 11:00 AM [facility] LAB DRAW 1 [facility]   2/13/2023  1:45 PM [facility] LAB DRAW 1 [facility]   2/15/2023  1:45 PM [facility] LAB DRAW 1 [facility]   2/17/2023  1:00 PM [facility] LAB DRAW 1 [facility]   2/20/2023 12:30 PM [facility] LAB DRAW 1 [facility]   2/22/2023  2:00 PM [facility] LAB DRAW 1 [facility]   2/24/2023  1:00 PM [facility] LAB DRAW 1 [facility]       Hospital Course   Antonio Canseco was admitted on 1/30/2023.  The following problems were addressed during his hospitalization:    ONC   # High Grade Sarcoma R Thigh   This is a patient of Dr. Price. Patient initially noted right hamstring strain in June 2022, he was treated for sciatica. Pain continued to worsen and went for MRI of L spine and right thigh, which showed a large mass in the posterior compartment of the right thigh. He underwent an US guided biopsy and pathology c/w high grade sarcoma. CT CAP with pulmonary nodules but no site of metastatic disease. Currently being admitted for Cycle 1 Doxil/Ifos.   - Baseline Echo 1/26 EF 55%  - Paulino PTA, PLC completed inpatient   - Per outpatient notes patient will need to discharge on prophylaxis Levaquin and Augmentin (d/t HS), opted for Doxycycline based on discussions with Derm.    - Patient had hypersensitivity reaction with 5-min into Emend infusion; this was not completed. Instead scheduled Zyprexa 5 mg at bedtime.  - On 2/2 notified by nursing that patient is having intermittent hand spasms and is occasionally dropping his phone, no evidence of confusion or AMS. Will continue to closely monitor.   - On 2/3 notified by nursing that patient continues to have intermittent hand spasms and had a self-resolving episode of confusion where he thought he was in the kitchen and was looking for supplies to make dinner. This lasted for only a few seconds. Talked with wife and provided an update on the phone and she has noticed that he has been a bit more angry and  short via phone.   - On 2/4-2/6 hand tremors stable and no further episodes of confusion                               Therapy Plan: Doxil/Ifosfamide/Mesna (C1D1=1/30/23)                            - Doxil 45 mg/m2 (113 mg) IV -- D1                            - Ifosfamide 1,500 mg/m2 (3,780 mg) IV -- D1-6                             - Mesna 1,500 mg/m2 (3,780 mg) IV -- D7                            - Neulasta 6 mg subcutaneous -- D8 requested                              - Premeds: Thiamine, Zofran, Dexamethasone      # Iron Deficiency Anemia   # Acute on Chronic Anemia   Hgb on admission was noted to be 8.8; last was 10.4 on 1/26. Patient denies any evidence of over bleeding.   - LDH, B12, Haptoglobin WNL   - Folate low at 4.0; started Folic acid supplment   - Peripheral smear - hypochromic. Normocytic anemia, rare elliptocytes, slight thrombocytosis, no evidence of hemolysis   - Continue PTA Iron supplement daily   - Transfuse to keep Hgb >7      # Cancer-related Pain   - PTA Gabapentin 300 mg TID   - PTA Oxycodone 5-10 mg q4h PRN -- increased during admission d/t uncontrolled pain     ID   # H/o Fevers, resolved    Patient and family report ~ 1 month history of fevers at home.   - BCx x2 1/30 -- NGTD   - Patient remained afebrile while inpatient      CARDS   # Hypervolemia, improving   Admission weight 299 lb, up 16 lbs around 2/2/23 weight has been down trending with diuresis   - Responds well to Lasix 20 mg IV       # HTN   - PTA Lisinopril, held on discharge      PULM   # Chronic Cough   # Pulmonary Nodules   Patient's wife reports patient has had chronic cough for approximately last 10 months. He has tried several OTC medications which did not help, he was recently started on PPI with no improvement of cough. CT CAP obtained for staging shows multiple bilateral pulmonary nodules   - Improved while inpatient     ENDO   # T2DM   Follows with PCP Dr. Mynor Mason; last Hgb A1C 11.7 on 1/18/23  - Resumed Metformin  on discharge      GI   # GERD   - PTA Omeprazole   - Carafate QID PRN   - TUMS PRN      # Irritable Bowel Syndrome   - PTA Duloxetine      DERM   # Hidrenitis Suppurativa   Wife reports there is an active lesion on the right side, at home they were using topical clindamycin that wife had on hand and she has noted improvement in size of lesion.   - Continue topical Clindamycin BID     This patient was seen and discussed with Dr. Ana Mar, PA-C  Hematology/ Oncology   Pager #[phone]     Significant Results and Procedures   None    Pending Results   None    Code Status   Full Code    Primary Care Physician   Mynor Mason    Physical Exam   Temp: 98.5  F (36.9  C) Temp src: Oral BP: 123/68 Pulse: 93   Resp: 18 SpO2: 97 % O2 Device: None (Room air)    Vitals:    02/04/23 1100 02/05/23 0800 02/06/23 0808   Weight: 138 kg (304 lb 4.8 oz) 136.4 kg (300 lb 9.6 oz) 136.6 kg (301 lb 3.2 oz)     Vital Signs with Ranges  Temp:  [98.2  F (36.8  C)-98.8  F (37.1  C)] 98.5  F (36.9  C)  Pulse:  [80-96] 93  Resp:  [18] 18  BP: (107-142)/(54-83) 123/68  SpO2:  [92 %-97 %] 97 %  I/O last 3 completed shifts:  In: 1773.2 [P.O.:490; I.V.:800; IV Piggyback:483.2]  Out: 3100 [Urine:3100]    Constitutional: Pleasant male seen resting comfortably in bed in NAD. Alert and interactive.   HEENT: NCAT. PERRL, EOMI, anicteric sclera. Oral mucosa pink and moist with no lesions or thrush.  Respiratory: Non-labored breathing, good air exchange, lungs clear to auscultation bilaterally. No cough or wheeze noted.   Cardiovascular: Regular rate and rhythm. No murmur or rub.   GI: Normoactive bowel sounds. Abdomen soft, non-distended, and non-tender. No palpable masses or organomegaly.  Skin: Warm and dry. No concerning lesions or rash on exposed surfaces.  Musculoskeletal: Extremities grossly normal, non-tender, no edema. Strong peripheral pulses. Good strength and ROM in bed.   Neuropsychiatric: Mentation and affect appear  normal/appropriate.  Vascular Access: Paulino is CDI without erythema, swelling, or discharge.    Time Spent on this Encounter   I, Erika Mra PA-C, personally saw the patient today and spent greater than 30 minutes discharging this patient.    Discharge Disposition   Discharged to home  Condition at discharge: Good    Consultations This Hospital Stay   WOUND OSTOMY CONTINENCE NURSE  IP CONSULT  DERMATOLOGY IP CONSULT  PATIENT LEARNING CENTER IP CONSULT    Discharge Orders      Home Infusion Referral      Reason for your hospital stay    You were admitted for chemotherapy     Adult [facility] Follow-up and recommended labs and tests    Please follow up with appointments listed.     Appointments on [place] and/or [facility] (with [facility] or [facility] provider or service). Call [phone] if you haven't heard regarding these appointments within 7 days of discharge.     Activity    Your activity upon discharge: activity as tolerated     When to contact your care team    [facility] cancer clinic triage line at [phone] for temp >100.4, uncontrolled nausea/vomiting/diarrhea/constipation, unrelieved pain, bleeding not relieved with pressure, dizziness, chest pain, shortness of breath, loss of consciousness, and any new or concerning symptoms. If you are concerned that your symptoms are life-threatening don't hesitate to call 911 or go to the nearest Emergency Department.     Diet    Follow this diet upon discharge: Regular     Check Out Appointment Request    Please arrange the following, anticipated discharge 2/6/23   1. Neulasta 2/7/23  2. [facility] follow up within 1 week of discharge   3. 3x weekly labs starting 2/7/23 for 2-3 weeks     Discharge Medications   Current Discharge Medication List        START taking these medications    Details   clindamycin (CLINDAMAX) 1 % external gel Apply topically 2 times daily , to HS lesion  Qty: 30 g, Refills: 0    Associated Diagnoses: Hidradenitis suppurativa       doxycycline hyclate (VIBRA-TABS) 100 MG tablet Take 1 tablet (100 mg) by mouth daily  Qty: 10 tablet, Refills: 0    Associated Diagnoses: Hidradenitis suppurativa; Chemotherapy-induced neutropenia (H)      folic acid (FOLVITE) 400 MCG tablet Take 1 tablet (400 mcg) by mouth daily  Qty: 30 tablet, Refills: 0    Associated Diagnoses: Anemia, unspecified type      heparin lock flush 10 UNIT/ML SOLN injection 3 mLs by Intracatheter route every 24 hours Flush each lumen with 3 mLs (total 6mL in 24 hours period)  Qty: 180 mL, Refills: 0    Comments: Please dispense is 3 or 6 mL syringes for line cares.  Associated Diagnoses: Encounter for central line care      levofloxacin (LEVAQUIN) 250 MG tablet Take 1 tablet (250 mg) by mouth daily  Qty: 10 tablet, Refills: 0    Associated Diagnoses: Chemotherapy-induced neutropenia (H)      naloxone (NARCAN) 4 MG/0.1ML nasal spray Spray 1 spray (4 mg) into one nostril alternating nostrils as needed for opioid reversal every 2-3 minutes until assistance arrives  Qty: 2 each, Refills: 1    Associated Diagnoses: Sarcoma of soft tissue (H); Cancer related pain      ondansetron (ZOFRAN ODT) 4 MG ODT tab Take 1-2 tablets (4-8 mg) by mouth every 8 hours as needed for nausea or vomiting  Qty: 30 tablet, Refills: 3    Associated Diagnoses: Chemotherapy-induced nausea      prochlorperazine (COMPAZINE) 10 MG tablet Take 0.5-1 tablets (5-10 mg) by mouth every 6 hours as needed for nausea or vomiting  Qty: 30 tablet, Refills: 3    Associated Diagnoses: Chemotherapy-induced nausea      sucralfate (CARAFATE) 1 GM/10ML suspension Take 10 mLs (1 g) by mouth 4 times daily as needed for nausea (or indigestion)  Qty: 414 mL, Refills: 0    Associated Diagnoses: Chemotherapy-induced nausea           CONTINUE these medications which have CHANGED    Details   gabapentin (NEURONTIN) 300 MG capsule Take 1 capsule (300 mg) by mouth 3 times daily  Qty: 90 capsule, Refills: 3    Associated Diagnoses: Lumbar  radiculopathy      lisinopril (ZESTRIL) 20 MG tablet Take 1 tablet (20 mg) by mouth daily , HOLD UNTIL INSTRUCTED TO RESUME BY OUTPATIENT TEAM  Qty: 90 tablet, Refills: 1    Associated Diagnoses: Essential hypertension      oxyCODONE (ROXICODONE) 5 MG tablet Take 1-2 tablets (5-10 mg) by mouth every 4 hours as needed for severe pain (7-10)  Qty: 30 tablet, Refills: 0    Associated Diagnoses: Sarcoma of soft tissue (H); Cancer related pain           CONTINUE these medications which have NOT CHANGED    Details   DULoxetine (CYMBALTA) 60 MG capsule Take 1 capsule (60 mg) by mouth daily  Qty: 90 capsule, Refills: 1    Associated Diagnoses: Irritable bowel syndrome without diarrhea      ferrous sulfate (FEROSUL) 325 (65 Fe) MG tablet Take 1 tablet (325 mg) by mouth daily (with breakfast)  Qty: 30 tablet, Refills: 1    Associated Diagnoses: Anemia, unspecified type      metFORMIN (GLUCOPHAGE XR) 500 MG 24 hr tablet Take 2 tablets (1,000 mg) by mouth daily (with dinner) for 30 days  Qty: 60 tablet, Refills: 3    Associated Diagnoses: Diabetes mellitus without complication (H)      omeprazole 20 MG tablet Take 20 mg by mouth daily as needed      sennosides (SENOKOT) 8.6 MG tablet Take 1 tablet by mouth daily as needed for constipation      blood glucose (NO BRAND SPECIFIED) test strip accuchek guide- Use to test blood sugar 1 times daily or as directed.  Qty: 100 strip, Refills: 0    Comments: Medication is being filled for 1 time refill only due to:  Patient needs to be seen because it has been more than one year since last visit.  Associated Diagnoses: Controlled type 2 diabetes mellitus without complication, without long-term current use of insulin (H)      blood glucose monitoring (ACCU-CHEK FASTCLIX) lancets 1 each daily Accuchek guide  Qty: 100 each, Refills: 0    Comments: Medication is being filled for 1 time refill only due to:  Patient needs to be seen because it has been more than one year since last  visit.  Associated Diagnoses: Controlled type 2 diabetes mellitus without complication, without long-term current use of insulin (H)           STOP taking these medications       meloxicam (MOBIC) 15 MG tablet Comments:   Reason for Stopping:             Allergies   Allergies   Allergen Reactions    Emend [Aprepitant] Shortness Of Breath     Couldn't breathe and started to pass out during 1st administration     Kiwi Itching     Data   Most Recent 3 CBC's:  Recent Labs   Lab Test 02/06/23  0705 02/05/23  0601 02/04/23  0638   WBC 3.3* 4.9 5.9   HGB 8.0* 7.6* 8.1*   MCV 81 79 79    348 381      Most Recent 3 BMP's:  Recent Labs   Lab Test 02/06/23  0753 02/06/23  0705 02/06/23  0208 02/05/23  0751 02/05/23  0601 02/04/23  0825 02/04/23  0638   NA  --  136  --   --  137  --  135*   POTASSIUM  --  4.1  --   --  4.2  --  4.4   CHLORIDE  --  103  --   --  103  --  100   CO2  --  25  --   --  26  --  26   BUN  --  11.9  --   --  12.5  --  11.6   CR  --  0.74  --   --  0.83  --  0.75   ANIONGAP  --  8  --   --  8  --  9   DORIAN  --  8.2*  --   --  8.5*  --  8.2*   * 131* 114*   < > 106*   < > 132*    < > = values in this interval not displayed.     Most Recent 2 LFT's:  Recent Labs   Lab Test 02/06/23 0705 02/05/23  0601   AST 15 15   ALT 8* 7*   ALKPHOS 58 48   BILITOTAL 0.5 0.5     Most Recent INR's and Anticoagulation Dosing History:  Anticoagulation Dose History       Recent Dosing and Labs Latest Ref Rng & Units 1/27/2023 1/30/2023 1/31/2023    INR 0.85 - 1.15 1.35(H) 1.37(H) 1.44(H)          Most Recent 3 Troponin's:No lab results found.  Most Recent Cholesterol Panel:  Recent Labs   Lab Test 01/18/23  1236   CHOL 133   LDL 74   HDL 38*   TRIG 103     Most Recent 6 Bacteria Isolates From Any Culture (See EPIC Reports for Culture Details):  Recent Labs   Lab Test 01/18/16  1849   CULT No growth     Most Recent TSH, T4 and A1c Labs:  Recent Labs   Lab Test 01/18/23  1236 12/28/21  1202   TSH  --  2.11   A1C  11.7* 7.1*       ATTENDING PHYSICIAN ADDENDUM AND NOTE:  I evaluated this patient's case separately and also with Erika Mar PA-C. The note above reflects my assessment and plan. I have personally reviewed lab results, and vital and radiology results. >50% of time was spent in assessment and coordination of care; >=35 minutes.  Mr. Antonio Canseco is a 52 yr old gentleman from [place], [place]. On 1/12/23, he was diagnosed with a high-grade sarcoma of the right thigh; on histopathologic exam, the tumor had extensive necrosis. He has been admitted for initial systemic chemotherapy with Doxil/Ifosfamide; his primary oncologist is Dr. Laboy.  Other medical conditions include type II DM, hypertension, iron deficiency anemia and GERD with chronic cough. For 6+ months he was having back pain, initially thought to be due to sciatica; in January 2023, the biopsy noted above unfortunately confirmed sarcoma. At this time he has completed Cycle 1 Doxil and Ifosfamide with some fatigue but overall he tolerated it well. Adverse effects attributed to chemotherapy during this hospitalization include mild to moderate hand tremors, and a temporary and relatively quick episode of confusion that self-resolved without further incident. The plan is to discharge with close outpatient follow-up  and oncology assessments as described above.  Junaid Perez MD, PhD  Associate Professor of Medicine, Hematology, Oncology and Transplantation

## 2023-02-06 NOTE — PLAN OF CARE
Goal Outcome Evaluation:  [id number]: Pt A&Ox4 with VSS on RA. No complaints of SOB or N/V. Continued pain in legs, given PRN oxy 10mg x2 with some relief and PRN tylenol x1 with minimal effect. Given PRN tums x1. BG at 0200 of 114. Mesna running at 60.4mL/hr, scheduled to finish around 12:00. Bicarb running at 100mL/hr. Up independently.GUOP, no BM this shift. Plan to discharge after completion of Mesna this afternoon.

## 2023-02-06 NOTE — PLAN OF CARE
Goal Outcome Evaluation:    Discharge  D: Orders for discharge and outpatient medications written.  I: Home medications and return to clinic schedule reviewed with patient. Discharge instructions and parameters for calling Health Care Provider reviewed. Patient left around 1300 accompanied by spouse.   A: Patient/family verbalized understanding and was ready for discharge.   P: Patient instructed to  medications in Pharmacy. Follow up as scheduled w/ outpatient clinic.     Pt completed mesna flush. Continues w/ R. Thigh cares. Pain managed w/ oxycodone 10mg given x2, tylenol x1 & one time order of 5mg oxycodone; some relief provided. Phosphorus 2.2, replaced w/ 2 out of 3 packets, provider aware of only partial replacement. . Central line caps changed & heparin locked.     -Discharge paperwork was discussed & all questions answered. Pt left w/ all belongings.

## 2023-02-07 ENCOUNTER — PATIENT OUTREACH (OUTPATIENT)
Dept: ONCOLOGY | Facility: CLINIC | Age: 53
End: 2023-02-07

## 2023-02-07 ENCOUNTER — TELEPHONE (OUTPATIENT)
Dept: ONCOLOGY | Facility: CLINIC | Age: 53
End: 2023-02-07

## 2023-02-07 ENCOUNTER — PATIENT OUTREACH (OUTPATIENT)
Dept: CARE COORDINATION | Facility: CLINIC | Age: 53
End: 2023-02-07
Payer: COMMERCIAL

## 2023-02-07 ENCOUNTER — LAB (OUTPATIENT)
Dept: LAB | Facility: CLINIC | Age: 53
End: 2023-02-07
Attending: STUDENT IN AN ORGANIZED HEALTH CARE EDUCATION/TRAINING PROGRAM
Payer: COMMERCIAL

## 2023-02-07 ENCOUNTER — ONCOLOGY VISIT (OUTPATIENT)
Dept: ONCOLOGY | Facility: CLINIC | Age: 53
End: 2023-02-07
Attending: STUDENT IN AN ORGANIZED HEALTH CARE EDUCATION/TRAINING PROGRAM
Payer: COMMERCIAL

## 2023-02-07 VITALS
DIASTOLIC BLOOD PRESSURE: 89 MMHG | OXYGEN SATURATION: 98 % | SYSTOLIC BLOOD PRESSURE: 145 MMHG | HEIGHT: 71 IN | TEMPERATURE: 98 F | RESPIRATION RATE: 18 BRPM | BODY MASS INDEX: 41.58 KG/M2 | HEART RATE: 114 BPM | WEIGHT: 297 LBS

## 2023-02-07 DIAGNOSIS — C49.9 SARCOMA (H): Primary | ICD-10-CM

## 2023-02-07 DIAGNOSIS — M79.604 PAIN OF RIGHT LOWER EXTREMITY: ICD-10-CM

## 2023-02-07 DIAGNOSIS — D64.9 ANEMIA, UNSPECIFIED TYPE: ICD-10-CM

## 2023-02-07 DIAGNOSIS — E83.39 HYPOPHOSPHATEMIA: ICD-10-CM

## 2023-02-07 DIAGNOSIS — Z51.11 ENCOUNTER FOR ANTINEOPLASTIC CHEMOTHERAPY: ICD-10-CM

## 2023-02-07 DIAGNOSIS — R00.0 TACHYCARDIA: ICD-10-CM

## 2023-02-07 DIAGNOSIS — C49.9 SARCOMA OF SOFT TISSUE (H): ICD-10-CM

## 2023-02-07 DIAGNOSIS — T45.1X5D ANTINEOPLASTIC DRUGS CAUSING ADVERSE EFFECT, SUBSEQUENT ENCOUNTER: ICD-10-CM

## 2023-02-07 LAB
ALBUMIN SERPL BCG-MCNC: 2.9 G/DL (ref 3.5–5.2)
ALP SERPL-CCNC: 82 U/L (ref 40–129)
ALT SERPL W P-5'-P-CCNC: 8 U/L (ref 10–50)
ANION GAP SERPL CALCULATED.3IONS-SCNC: 11 MMOL/L (ref 7–15)
AST SERPL W P-5'-P-CCNC: 12 U/L (ref 10–50)
BASOPHILS # BLD MANUAL: 0 10E3/UL (ref 0–0.2)
BASOPHILS NFR BLD MANUAL: 2 %
BILIRUB SERPL-MCNC: 0.5 MG/DL
BUN SERPL-MCNC: 14.4 MG/DL (ref 6–20)
BURR CELLS BLD QL SMEAR: ABNORMAL
CALCIUM SERPL-MCNC: 8.8 MG/DL (ref 8.6–10)
CHLORIDE SERPL-SCNC: 100 MMOL/L (ref 98–107)
CREAT SERPL-MCNC: 0.83 MG/DL (ref 0.67–1.17)
DEPRECATED HCO3 PLAS-SCNC: 22 MMOL/L (ref 22–29)
EOSINOPHIL # BLD MANUAL: 0 10E3/UL (ref 0–0.7)
EOSINOPHIL NFR BLD MANUAL: 1 %
ERYTHROCYTE [DISTWIDTH] IN BLOOD BY AUTOMATED COUNT: 17.3 % (ref 10–15)
GFR SERPL CREATININE-BSD FRML MDRD: >90 ML/MIN/1.73M2
GLUCOSE SERPL-MCNC: 170 MG/DL (ref 70–99)
HCT VFR BLD AUTO: 28 % (ref 40–53)
HGB BLD-MCNC: 8.3 G/DL (ref 13.3–17.7)
LYMPHOCYTES # BLD MANUAL: 0.4 10E3/UL (ref 0.8–5.3)
LYMPHOCYTES NFR BLD MANUAL: 22 %
MAGNESIUM SERPL-MCNC: 2.3 MG/DL (ref 1.7–2.3)
MCH RBC QN AUTO: 22.7 PG (ref 26.5–33)
MCHC RBC AUTO-ENTMCNC: 29.6 G/DL (ref 31.5–36.5)
MCV RBC AUTO: 77 FL (ref 78–100)
MONOCYTES # BLD MANUAL: 0 10E3/UL (ref 0–1.3)
MONOCYTES NFR BLD MANUAL: 0 %
NEUTROPHILS # BLD MANUAL: 1.3 10E3/UL (ref 1.6–8.3)
NEUTROPHILS NFR BLD MANUAL: 75 %
PHOSPHATE SERPL-MCNC: 1.5 MG/DL (ref 2.5–4.5)
PLAT MORPH BLD: ABNORMAL
PLATELET # BLD AUTO: 317 10E3/UL (ref 150–450)
POTASSIUM SERPL-SCNC: 4.2 MMOL/L (ref 3.4–5.3)
PROT SERPL-MCNC: 7.3 G/DL (ref 6.4–8.3)
RBC # BLD AUTO: 3.65 10E6/UL (ref 4.4–5.9)
RBC MORPH BLD: ABNORMAL
SODIUM SERPL-SCNC: 133 MMOL/L (ref 136–145)
WBC # BLD AUTO: 1.7 10E3/UL (ref 4–11)

## 2023-02-07 PROCEDURE — 99215 OFFICE O/P EST HI 40 MIN: CPT | Performed by: STUDENT IN AN ORGANIZED HEALTH CARE EDUCATION/TRAINING PROGRAM

## 2023-02-07 PROCEDURE — 85007 BL SMEAR W/DIFF WBC COUNT: CPT | Performed by: STUDENT IN AN ORGANIZED HEALTH CARE EDUCATION/TRAINING PROGRAM

## 2023-02-07 PROCEDURE — G0463 HOSPITAL OUTPT CLINIC VISIT: HCPCS

## 2023-02-07 PROCEDURE — 85014 HEMATOCRIT: CPT | Performed by: STUDENT IN AN ORGANIZED HEALTH CARE EDUCATION/TRAINING PROGRAM

## 2023-02-07 PROCEDURE — 83010 ASSAY OF HAPTOGLOBIN QUANT: CPT | Performed by: STUDENT IN AN ORGANIZED HEALTH CARE EDUCATION/TRAINING PROGRAM

## 2023-02-07 PROCEDURE — 83735 ASSAY OF MAGNESIUM: CPT | Performed by: STUDENT IN AN ORGANIZED HEALTH CARE EDUCATION/TRAINING PROGRAM

## 2023-02-07 PROCEDURE — G0463 HOSPITAL OUTPT CLINIC VISIT: HCPCS | Mod: 25 | Performed by: STUDENT IN AN ORGANIZED HEALTH CARE EDUCATION/TRAINING PROGRAM

## 2023-02-07 PROCEDURE — 36592 COLLECT BLOOD FROM PICC: CPT | Performed by: STUDENT IN AN ORGANIZED HEALTH CARE EDUCATION/TRAINING PROGRAM

## 2023-02-07 PROCEDURE — 250N000011 HC RX IP 250 OP 636: Performed by: STUDENT IN AN ORGANIZED HEALTH CARE EDUCATION/TRAINING PROGRAM

## 2023-02-07 PROCEDURE — 96372 THER/PROPH/DIAG INJ SC/IM: CPT | Performed by: STUDENT IN AN ORGANIZED HEALTH CARE EDUCATION/TRAINING PROGRAM

## 2023-02-07 PROCEDURE — 93010 ELECTROCARDIOGRAM REPORT: CPT | Performed by: INTERNAL MEDICINE

## 2023-02-07 PROCEDURE — 80053 COMPREHEN METABOLIC PANEL: CPT | Performed by: STUDENT IN AN ORGANIZED HEALTH CARE EDUCATION/TRAINING PROGRAM

## 2023-02-07 PROCEDURE — G0463 HOSPITAL OUTPT CLINIC VISIT: HCPCS | Mod: 25

## 2023-02-07 PROCEDURE — 99417 PROLNG OP E/M EACH 15 MIN: CPT | Performed by: STUDENT IN AN ORGANIZED HEALTH CARE EDUCATION/TRAINING PROGRAM

## 2023-02-07 PROCEDURE — 93005 ELECTROCARDIOGRAM TRACING: CPT | Mod: RTG

## 2023-02-07 PROCEDURE — 84100 ASSAY OF PHOSPHORUS: CPT | Performed by: STUDENT IN AN ORGANIZED HEALTH CARE EDUCATION/TRAINING PROGRAM

## 2023-02-07 RX ORDER — LIDOCAINE 50 MG/G
1 PATCH TOPICAL EVERY 24 HOURS
Qty: 14 PATCH | Refills: 1 | Status: SHIPPED | OUTPATIENT
Start: 2023-02-07 | End: 2023-04-20

## 2023-02-07 RX ORDER — HEPARIN SODIUM,PORCINE 10 UNIT/ML
5 VIAL (ML) INTRAVENOUS ONCE
Status: COMPLETED | OUTPATIENT
Start: 2023-02-07 | End: 2023-02-07

## 2023-02-07 RX ADMIN — Medication 5 ML: at 13:04

## 2023-02-07 RX ADMIN — PEGFILGRASTIM 6 MG: 6 INJECTION SUBCUTANEOUS at 14:49

## 2023-02-07 ASSESSMENT — PAIN SCALES - GENERAL: PAINLEVEL: SEVERE PAIN (7)

## 2023-02-07 NOTE — PROGRESS NOTES
[facility]    Background: Transitional Care Management program identified per system criteria and reviewed by [facility] team for possible outreach.    Assessment: Upon chart review, [facility] Team member will not proceed with patient outreach related to this episode of Transitional Care Management program due to reason below:    Patient has a follow up appointment with an appropriate provider today for hospital discharge    Plan: Transitional Care Management episode addressed appropriately per reason noted above.      Rupali White MA  [facility]    *Connected Care Resource Team does NOT follow patient ongoing. Referrals are identified based on internal discharge reports and the outreach is to ensure patient has an understanding of their discharge instructions.

## 2023-02-07 NOTE — PROGRESS NOTES
[facility] CANCER CLINIC    ONCOLOGY VISIT NOTE    PATIENT NAME: Antonio Canseco MRN # [id number]  DATE OF VISIT: Feb 7, 2023   YOB: 1970    CANCER TYPE: High grade sarcoma      ONCOLOGY HISTORY   52 year old male with PMH of DM and recent Dx of large 27 cm mass in the posterior R thigh. He reports that he first noticed a node in the posterior thigh on 5/2022, we was evaluated at OSH where he got an Xray and he was told that this was sciatica and was started on physical therapy. Tumor continued to grow rapidly during this time. He underwent biopsy by Dr. Salgado. 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 Plan is to begin treatment with Doxil and ifosfamide.    -He received cycle 1 of Doxil/ifosfamide inpatient from 1/30/23-2/6/23.     He presents today for a toxicity check. He is accompanied by his wife.     Interval History  -Antonio is doing ok today following discharge yesterday. His main complaint is right leg pain after he was advised to stop Meloxicam per inpatient team. Has been on oxycodone 10 mg every 4 hours and continues on gabapentin but pain still poorly controlled unless he's lying down. No swelling of extremities.  -Still with some lag in vision where images will drag on for a moment in his visual field and blur but reports overall this is improving since hospitalization. Still short delay in word finding but improving. Some confusion during the night last night, none today. Hand tremor is resolving.  -Reports no bowel movement for 6 days. Taking 2 senna per day.  -Occasional nausea but not persistent. Appetite somewhat lower due to taste changes.  -Some reflux but manageable. Taking carafate prn but not on daily omeprazole.  -No bleeding concerns.  -Reports a temp of 100.6 last night.   -No chest pain. Thinks he get more short of breath more easily but only if exerting like going up steps.  -No chest palpitations.       CURRENT OUTPATIENT  MEDICATIONS     Current Outpatient Medications   Medication Sig Dispense Refill     blood glucose (NO BRAND SPECIFIED) test strip accuchek guide- Use to test blood sugar 1 times daily or as directed. 100 strip 0     blood glucose monitoring (ACCU-CHEK FASTCLIX) lancets 1 each daily Accuchek guide 100 each 0     clindamycin (CLINDAMAX) 1 % external gel Apply topically 2 times daily , to HS lesion 30 g 0     doxycycline hyclate (VIBRA-TABS) 100 MG tablet Take 1 tablet (100 mg) by mouth daily 10 tablet 0     DULoxetine (CYMBALTA) 60 MG capsule Take 1 capsule (60 mg) by mouth daily 90 capsule 1     ferrous sulfate (FEROSUL) 325 (65 Fe) MG tablet Take 1 tablet (325 mg) by mouth daily (with breakfast) 30 tablet 1     folic acid (FOLVITE) 400 MCG tablet Take 1 tablet (400 mcg) by mouth daily 30 tablet 0     gabapentin (NEURONTIN) 300 MG capsule Take 1 capsule (300 mg) by mouth 3 times daily 90 capsule 3     heparin lock flush 10 UNIT/ML SOLN injection 3 mLs by Intracatheter route every 24 hours Flush each lumen with 3 mLs (total 6mL in 24 hours period) 180 mL 0     levofloxacin (LEVAQUIN) 250 MG tablet Take 1 tablet (250 mg) by mouth daily 10 tablet 0     lidocaine (LIDODERM) 5 % patch Place 1 patch onto the skin every 24 hours To prevent lidocaine toxicity, patient should be patch free for 12 hrs daily. 14 patch 1     metFORMIN (GLUCOPHAGE XR) 500 MG 24 hr tablet Take 2 tablets (1,000 mg) by mouth daily (with dinner) for 30 days 60 tablet 3     omeprazole 20 MG tablet Take 20 mg by mouth daily as needed       oxyCODONE (ROXICODONE) 5 MG tablet Take 1-2 tablets (5-10 mg) by mouth every 4 hours as needed for severe pain (7-10) 30 tablet 0     phosphorus tablet 250 mg 250 MG per tablet Take 2 tablets (500 mg) by mouth 3 times daily Take until directed otherwise 84 tablet 1     sennosides (SENOKOT) 8.6 MG tablet Take 1 tablet by mouth daily as needed for constipation       lisinopril (ZESTRIL) 20 MG tablet Take 1 tablet (20  "mg) by mouth daily , HOLD UNTIL INSTRUCTED TO RESUME BY OUTPATIENT TEAM (Patient not taking: Reported on 2/7/2023) 90 tablet 1     naloxone (NARCAN) 4 MG/0.1ML nasal spray Spray 1 spray (4 mg) into one nostril alternating nostrils as needed for opioid reversal every 2-3 minutes until assistance arrives (Patient not taking: Reported on 2/7/2023) 2 each 1     ondansetron (ZOFRAN ODT) 4 MG ODT tab Take 1-2 tablets (4-8 mg) by mouth every 8 hours as needed for nausea or vomiting (Patient not taking: Reported on 2/7/2023) 30 tablet 3     prochlorperazine (COMPAZINE) 10 MG tablet Take 0.5-1 tablets (5-10 mg) by mouth every 6 hours as needed for nausea or vomiting (Patient not taking: Reported on 2/7/2023) 30 tablet 3     sucralfate (CARAFATE) 1 GM/10ML suspension Take 10 mLs (1 g) by mouth 4 times daily as needed for nausea (or indigestion) 414 mL 0          REVIEW OF SYSTEMS     Patient denies any of the following except if noted above: fevers, chills, difficulty with energy, vision or hearing changes, chest pain, dyspnea, abdominal pain, nausea, vomiting, diarrhea, constipation, urinary concerns, headaches, numbness, tingling, issues with sleep or mood. He also denies lumps, bumps, rashes or skin lesions, bleeding or bruising issues.     PHYSICAL EXAM   BP (!) 145/89   Pulse 114   Temp 98  F (36.7  C)   Resp 18   Ht 1.803 m (5' 10.98\")   Wt 134.7 kg (297 lb)   SpO2 98%   BMI 41.44 kg/m    General: Well-appearing male in no acute distress. Lying on exam table.   Eyes: EOMI, PERRL. No scleral icterus.  Cardiovascular: Tachycardic. Rhythm regular. No murmurs, gallops, or rubs. No peripheral edema.  Respiratory: Some expiratory wheeze in right upper lobe. No crackles.  Gastrointestinal: BS +. Abdomen soft, non-tender.   Neurologic: Cranial nerves II through XII intact. PERRL. EOMI. Facial movement symmetric. No dysarthria. Tongue extension midline. Shoulder shrug equal.  strength 5/5. No pronation or drift. " Finger to nose coordinated.   Skin: No rashes, petechiae, or bruising noted on exposed skin. Port to right chest with superior incision open to air.   Psych: affect appropriate, alert and oriented.     LABORATORY AND IMAGING STUDIES     Most Recent 3 CBC's:  Recent Labs   Lab Test 02/07/23  1303 02/06/23  0705 02/05/23  0601 02/04/23  0638 02/03/23  0640 02/02/23  0610   WBC 1.7* 3.3* 4.9 5.9 5.7 6.5   HGB 8.3* 8.0* 7.6* 8.1* 7.9* 8.0*   MCV 77* 81 79 79 80 80    353 348 381 433 454*   ANEUTAUTO  --   --   --  4.9 4.4 4.4     Most Recent 3 BMP's:  Recent Labs   Lab Test 02/07/23  1303 02/06/23  0753 02/06/23  0705 02/05/23  0751 02/05/23  0601   *  --  136  --  137   POTASSIUM 4.2  --  4.1  --  4.2   CHLORIDE 100  --  103  --  103   CO2 22  --  25  --  26   BUN 14.4  --  11.9  --  12.5   CR 0.83  --  0.74  --  0.83   ANIONGAP 11  --  8  --  8   DORIAN 8.8  --  8.2*  --  8.5*   * 109* 131*   < > 106*   PROTTOTAL 7.3  --  6.4  --  6.1*   ALBUMIN 2.9*  --  2.6*  --  2.5*    < > = values in this interval not displayed.    Most Recent 3 LFT's:  Recent Labs   Lab Test 02/07/23  1303 02/06/23  0705 02/05/23  0601   AST 12 15 15   ALT 8* 8* 7*   ALKPHOS 82 58 48   BILITOTAL 0.5 0.5 0.5    Most Recent 2 TSH and T4:  Recent Labs   Lab Test 12/28/21  1202 10/07/19  0903   TSH 2.11 3.07     2/7/23:  Phosphorus: 1.5   Magnesium: 2.3     I reviewed the above labs today.    IMAGING  Imaging:  IR CVC Tunnel Placement > 5 Yrs of Age  Narrative: PROCEDURES 1/27/2023 10:14 AM:  1. Ultrasound guidance for venous access  2. Placement centrally inserted catheter (age > 5 yrs.) tunneled, no  port, no pump  3. Fluoroscopic guidance placement    CLINICAL HISTORY: TCVC placement - double lumen dumont 9.5 [place];  Sarcoma of soft tissue (H). Catheter does not need to be dialysis or  apheresis capable.    COMPARISONS: CT 1/26/2023    REFERRING PROVIDER: RASHID TAPIA    STAFF RADIOLOGIST: SEAN Vergara  MD    FELLOW(S)/RESIDENT(S): BROOKE Contreras    ASSISTANT: JUN Girard, ANGELES LUIS MD, attest that I was present for all critical portions  of the procedure and was immediately available to provide guidance and  assistance during the remainder of the procedure.    MEDICATIONS: The patient was placed on continuous monitoring.  Intravenous sedation was administered. 2 mg Versed and 100 mcg  Fentanyl IV. Vital signs and sedation were monitored by nursing staff  under Interventional Radiologist's supervision. The patient remained  stable throughout the procedure.    TOTAL SEDATION TIME: 28 minutes    ATTENDING FACE-TO-FACE SEDATION TIME: less than 5 minutes    FLUOROSCOPY TIME: 36.9 seconds.    DOSE: 11.16 mGy    PROCEDURE: The patient understood the limitations, alternatives, and  risks of the procedure and requested the procedure be performed. Both  written and oral consent were obtained.    The right neck and upper chest were prepped and draped in the usual  sterile fashion. 1% lidocaine without epinephrine was used for local  anesthesia.    Under ultrasound guidance, right internal jugular venotomy was made  with a micropuncture needle. Ultrasound image documenting jugular  patency and needle venotomy was saved in the patient's record.    Micropuncture needle exchanged over guidewire for the micropuncture  sheath under fluoroscopic guidance. Catheter length measured with the  0.018 guidewire. Micropuncture sheath saline locked. 9.5 Irish BD  Bard PowerHickman was subcutaneously tunneled from the right anterior  chest to the right internal jugular venotomy site. Catheter cut on the  24 cm marker. Micropuncture sheath exchanged over guidewire for the  peel-away sheath. Guidewire removed. Under fluoroscopic guidance, the  catheter was placed through the peel-away sheath and positioned with  its tip in the superior atriocaval junction. Both lumens flushed and  aspirated adequately. Each lumen heparin locked  with 100:1 heparin  solution. Catheter secured with StatLock and sterile dressing. Right  internal jugular venotomy site closed with [employer] Exo[employer]  tissue adhesive. No immediate complication.    Fluoroscopic image documenting catheter placement and final position  was saved in the patient's record.  Impression: IMPRESSION:   1. Ultrasound guided right internal jugular venotomy.    2. 24 cm 9.5 [place] BD Bard PowerHickman tunneled central venous  catheter placed with the tip in the superior atriocaval junction. Both  lumens heparin locked and ready for use.    I have personally reviewed the examination and initial interpretation  and I agree with the findings.    ANGELES LUIS MD         SYSTEM ID:  [id number]    I reviewed the above imaging today.      ASSESSMENT AND PLAN     Mr. 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     Tentative plan is for 1-2 cycles of chemotherapy with doxil and ifosfamide and depending on response and tolerability we would consider completing a total of 4 cycles prior to surgery. If no adequate response, then he should proceed with local control with radiation and surgery.     Echo on 1/26/23  with EF of 55%.    CT CAP on 1/26/23 with indeterminate pulmonary nodules and no evidence of metastatic disease in abdomen or pelvis.      Port placed on 1/27/23.     He was admitted inpatient for cycle 1 from 1/30/23-2/6/23. Per the discharge summary: During his stay he developed a hypersensitivity to emend which was then stopped and changed to Zyprexa. He also had some volume overload and was diuresed. There was some concern for neurotoxicity with hand spasms and episode of confusion but was able to receive full course of ifosfamide.     Labs today with phosphorus low at 1.5, not currently on replacement. Na slightly low at 133. WBC down at 1.7; ANC 1.3. Hgb stable at 8.3.     Oncology  Plan:  -Neulasta today  -3x weekly labs for 3 labs to monitor lytes and hematologic status. Monitor for need for any transfusions or electrolyte replacement.   -Tentative plan for admission for cycle 2 in 3 weeks  -Will confirm repeat imaging interval with Dr. Price  -Other management as detailed below    #Tachycardia  -Given comorbidities, EKG checked today. EKG showing sinus tachycardia. Suspect s/t pain.   -Echo on 1/26/23 with possible LVH. Noted on EKG today. In absence of symptoms, will monitor clinically for now, low threshold to repeat echo.   -EKG today also with nonspecific T wave abnormality. No chest pain. No shortness of breath at rest, some with activity. T wave abnormality could be s/t anemia or ifosfamide or doxil. Need to follow closely clinically. Should likely repeat EKG before next cycle.  -Will plan to also review with Dr. Price.    # Intermittent Fever  -Was having fevers prior to admission. Highest in the 101's, infectious work up was negative (blood culture from 1/30/23 with no growth; UA negative for infection). Could have been/be tumor fever. Seemingly resolved while inpatient per treatment team notes. Reports a temp of 100.6 last night at home but none today. Afebrile in clinic. Cough has improved since stopping lisinopril. WBC low yesterday but no neutropenia. Further decrease in counts today, ANC now 1.3. It will be somewhat challenging to interpret fever given this history but given new neutropenia today will need to work up fevers should they recur. Educated on neutropenic precautions and need to notify us if temp is 100.4 or seek care in ED if not during clinic hours for further evaluation. He is currently on prophylactic antibiotics and no concerns for infection on exam today.   -Giving Neulasta today in clinic.     # Prophylaxis and Infection prevention  -Continue 10 day course of Levaquin and doxycycline    # Right leg pain  -Worsening while off meloxicam; was instructed to stop  this per his report due to bleeding risk from what I can discern.  -Will trial lidocaine patches for pain  -Continue gapapentin and oxycodone prn for pain.   -Can also try tylenol prn  -If no relief from above, ok for low dose ibuprofen (200-400mg once or twice daily) but try to use only if absolutely necessary.     # Constipation  -No BM for 6 days. Abdomen soft on exam and audible BS.   -Increase senna to 2 tabs BID. Add Miralax.  -If no BM by Thursday, let us know as we would need to consider adding something like mag citrate or dulcolax.    # HTN   - PTA Lisinopril, held on discharge per inpatient team seems due to cough and soft BP's. Systolic in 140's today. Recommend checking daily at home. Advised to let us know if systolic >150 or diastolic >90 for 2 to 3 days. If so, will need to look at adding an antihypertensive. Would likely avoid lisinopril as this seemed to contribute to cough which is improving off this medication.  -IB message sent to CC to call patient on Thursday for update on BP, constipation and neuro symptoms    #Hypophosphatemia  -1.5 today, not currently on replacement  -Start phospha-neutral 500mg TID  -Recheck labs in 2 days     #Neuro  -Mild neurotoxicity per inpatient notes with ifosfamide.  -Visual hallucinations, confusion and hand tremor are improving.  -advised them to update us if symptoms worsen as they should continue to improve    # Anemia  -Stable today  -Haptoglobin and LDH from 1/30/23 not consistent with hemolysis. B12 normal.  -Folate low; started on folic acid by inpatient team  -Peripheral smear on 1/30/23 with:  hypochromic. Normocytic anemia, rare elliptocytes, slight thrombocytosis, no evidence of hemolysis   -Looks like FIT testing not done but reports no bleeding concerns today; will hold off on this at this time.  -Iron studies on 1/26/23 suspicious for IAM. Plan to continue ferrous sulfate daily and trend CBC.    # Port  -Placed on 1/27/23. Wife reports incision  superior to port had glue come off. Incision not fully sealed off so no need to keep covered. Educated on need to keep clean dry and intact (cover during showers) until site fully seals off and monitor for signs of infection. Placed bandage during visit today.     # GERD   -Pre-dates treatment  - Restart PTA Omeprazole   - Carafate QID PRN   - TUMS PRN     # Diabetes  -Continue metformin    # Hidrenitis Suppurativa   - Continue topical Clindamycin BID      Provided patient's spouse with clinic phone number.    ADDENDUM:   Discussed EKG and pain with Dr. Price. Plan for close follow up with [facility] next week. Consider repeat EKG pending symptoms. Plan for right thigh MRI week of 2/20 given increase in right leg pain.     Amber Scheierl, CNP    150 minutes spent on the date of the encounter doing chart review, review of test results, interpretation of tests, patient visit and documentation

## 2023-02-07 NOTE — NURSING NOTE
"Oncology Rooming Note    February 7, 2023 1:19 PM   Antonio Canseco is a 52 year old male who presents for:    Chief Complaint   Patient presents with     Oncology Clinic Visit     P RETURN - [facility]     Blood Draw     Labs drawn by RN via CVC, vitals taken.     Initial Vitals: BP (!) 145/89   Pulse 114   Temp 98  F (36.7  C)   Resp 18   Ht 1.803 m (5' 10.98\")   Wt 134.7 kg (297 lb)   SpO2 98%   BMI 41.44 kg/m   Estimated body mass index is 41.44 kg/m  as calculated from the following:    Height as of this encounter: 1.803 m (5' 10.98\").    Weight as of this encounter: 134.7 kg (297 lb). Body surface area is 2.6 meters squared.  Severe Pain (7) Comment: Data Unavailable   No LMP for male patient.  Allergies reviewed: Yes  Medications reviewed: Yes    Medications: Medication refills not needed today.  Pharmacy name entered into [facility]:    [facility] - [place], [place] [facility] [address] [facility] [id number] [place] [place] - [address] [address] LPN            "

## 2023-02-07 NOTE — TELEPHONE ENCOUNTER
PRIOR AUTHORIZATION DENIED    Medication: lidocaine (LIDODERM) 5 % patch - EPA DENIED    Denial Date: 2/7/2023    Denial Rational:       Appeal Information:

## 2023-02-07 NOTE — NURSING NOTE
Chief Complaint   Patient presents with     Oncology Clinic Visit     sarcoma     Blood Draw     Labs drawn by RN via CVC, vitals taken.     Labs collected from CVC by RN, both lines flushed with saline and heparin.  Vitals taken. Pt checked in for appointment(s).    Nellie aDngelo RN

## 2023-02-07 NOTE — LETTER
2/7/2023         RE: Antonio Canseco  [address] [facility] [address]        Dear Colleague,    Thank you for referring your patient, Antonio Canseco, to the [facility]. Please see a copy of my visit note below.      [facility]    ONCOLOGY VISIT NOTE    PATIENT NAME: Antonio Canseco MRN # [id number]  DATE OF VISIT: Feb 7, 2023   YOB: 1970    CANCER TYPE: High grade sarcoma      ONCOLOGY HISTORY   52 year old male with PMH of DM and recent Dx of large 27 cm mass in the posterior R thigh. He reports that he first noticed a node in the posterior thigh on 5/2022, we was evaluated at OSH where he got an Xray and he was told that this was sciatica and was started on physical therapy. Tumor continued to grow rapidly during this time. He underwent biopsy by Dr. Salgado. 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 Plan is to begin treatment with Doxil and ifosfamide.    -He received cycle 1 of Doxil/ifosfamide inpatient from 1/30/23-2/6/23.     He presents today for a toxicity check. He is accompanied by his wife.     Interval History  -Antonio is doing ok today following discharge yesterday. His main complaint is right leg pain after he was advised to stop Meloxicam per inpatient team. Has been on oxycodone 10 mg every 4 hours and continues on gabapentin but pain still poorly controlled unless he's lying down. No swelling of extremities.  -Still with some lag in vision where images will drag on for a moment in his visual field and blur but reports overall this is improving since hospitalization. Still short delay in word finding but improving. Some confusion during the night last night, none today. Hand tremor is resolving.  -Reports no bowel movement for 6 days. Taking 2 senna per day.  -Occasional nausea but not persistent. Appetite somewhat lower due to taste changes.  -Some reflux but manageable. Taking  carafate prn but not on daily omeprazole.  -No bleeding concerns.  -Reports a temp of 100.6 last night.   -No chest pain. Thinks he get more short of breath more easily but only if exerting like going up steps.  -No chest palpitations.       CURRENT OUTPATIENT MEDICATIONS     Current Outpatient Medications   Medication Sig Dispense Refill     blood glucose (NO BRAND SPECIFIED) test strip accuchek guide- Use to test blood sugar 1 times daily or as directed. 100 strip 0     blood glucose monitoring (ACCU-CHEK FASTCLIX) lancets 1 each daily Accuchek guide 100 each 0     clindamycin (CLINDAMAX) 1 % external gel Apply topically 2 times daily , to HS lesion 30 g 0     doxycycline hyclate (VIBRA-TABS) 100 MG tablet Take 1 tablet (100 mg) by mouth daily 10 tablet 0     DULoxetine (CYMBALTA) 60 MG capsule Take 1 capsule (60 mg) by mouth daily 90 capsule 1     ferrous sulfate (FEROSUL) 325 (65 Fe) MG tablet Take 1 tablet (325 mg) by mouth daily (with breakfast) 30 tablet 1     folic acid (FOLVITE) 400 MCG tablet Take 1 tablet (400 mcg) by mouth daily 30 tablet 0     gabapentin (NEURONTIN) 300 MG capsule Take 1 capsule (300 mg) by mouth 3 times daily 90 capsule 3     heparin lock flush 10 UNIT/ML SOLN injection 3 mLs by Intracatheter route every 24 hours Flush each lumen with 3 mLs (total 6mL in 24 hours period) 180 mL 0     levofloxacin (LEVAQUIN) 250 MG tablet Take 1 tablet (250 mg) by mouth daily 10 tablet 0     lidocaine (LIDODERM) 5 % patch Place 1 patch onto the skin every 24 hours To prevent lidocaine toxicity, patient should be patch free for 12 hrs daily. 14 patch 1     metFORMIN (GLUCOPHAGE XR) 500 MG 24 hr tablet Take 2 tablets (1,000 mg) by mouth daily (with dinner) for 30 days 60 tablet 3     omeprazole 20 MG tablet Take 20 mg by mouth daily as needed       oxyCODONE (ROXICODONE) 5 MG tablet Take 1-2 tablets (5-10 mg) by mouth every 4 hours as needed for severe pain (7-10) 30 tablet 0     phosphorus tablet 250 mg  "250 MG per tablet Take 2 tablets (500 mg) by mouth 3 times daily Take until directed otherwise 84 tablet 1     sennosides (SENOKOT) 8.6 MG tablet Take 1 tablet by mouth daily as needed for constipation       lisinopril (ZESTRIL) 20 MG tablet Take 1 tablet (20 mg) by mouth daily , HOLD UNTIL INSTRUCTED TO RESUME BY OUTPATIENT TEAM (Patient not taking: Reported on 2/7/2023) 90 tablet 1     naloxone (NARCAN) 4 MG/0.1ML nasal spray Spray 1 spray (4 mg) into one nostril alternating nostrils as needed for opioid reversal every 2-3 minutes until assistance arrives (Patient not taking: Reported on 2/7/2023) 2 each 1     ondansetron (ZOFRAN ODT) 4 MG ODT tab Take 1-2 tablets (4-8 mg) by mouth every 8 hours as needed for nausea or vomiting (Patient not taking: Reported on 2/7/2023) 30 tablet 3     prochlorperazine (COMPAZINE) 10 MG tablet Take 0.5-1 tablets (5-10 mg) by mouth every 6 hours as needed for nausea or vomiting (Patient not taking: Reported on 2/7/2023) 30 tablet 3     sucralfate (CARAFATE) 1 GM/10ML suspension Take 10 mLs (1 g) by mouth 4 times daily as needed for nausea (or indigestion) 414 mL 0          REVIEW OF SYSTEMS     Patient denies any of the following except if noted above: fevers, chills, difficulty with energy, vision or hearing changes, chest pain, dyspnea, abdominal pain, nausea, vomiting, diarrhea, constipation, urinary concerns, headaches, numbness, tingling, issues with sleep or mood. He also denies lumps, bumps, rashes or skin lesions, bleeding or bruising issues.     PHYSICAL EXAM   BP (!) 145/89   Pulse 114   Temp 98  F (36.7  C)   Resp 18   Ht 1.803 m (5' 10.98\")   Wt 134.7 kg (297 lb)   SpO2 98%   BMI 41.44 kg/m    General: Well-appearing male in no acute distress. Lying on exam table.   Eyes: EOMI, PERRL. No scleral icterus.  Cardiovascular: Tachycardic. Rhythm regular. No murmurs, gallops, or rubs. No peripheral edema.  Respiratory: Some expiratory wheeze in right upper lobe. No " crackles.  Gastrointestinal: BS +. Abdomen soft, non-tender.   Neurologic: Cranial nerves II through XII intact. PERRL. EOMI. Facial movement symmetric. No dysarthria. Tongue extension midline. Shoulder shrug equal.  strength 5/5. No pronation or drift. Finger to nose coordinated.   Skin: No rashes, petechiae, or bruising noted on exposed skin. Port to right chest with superior incision open to air.   Psych: affect appropriate, alert and oriented.     LABORATORY AND IMAGING STUDIES     Most Recent 3 CBC's:  Recent Labs   Lab Test 02/07/23  1303 02/06/23  0705 02/05/23  0601 02/04/23  0638 02/03/23  0640 02/02/23  0610   WBC 1.7* 3.3* 4.9 5.9 5.7 6.5   HGB 8.3* 8.0* 7.6* 8.1* 7.9* 8.0*   MCV 77* 81 79 79 80 80    353 348 381 433 454*   ANEUTAUTO  --   --   --  4.9 4.4 4.4     Most Recent 3 BMP's:  Recent Labs   Lab Test 02/07/23  1303 02/06/23  0753 02/06/23  0705 02/05/23  0751 02/05/23  0601   *  --  136  --  137   POTASSIUM 4.2  --  4.1  --  4.2   CHLORIDE 100  --  103  --  103   CO2 22  --  25  --  26   BUN 14.4  --  11.9  --  12.5   CR 0.83  --  0.74  --  0.83   ANIONGAP 11  --  8  --  8   DORIAN 8.8  --  8.2*  --  8.5*   * 109* 131*   < > 106*   PROTTOTAL 7.3  --  6.4  --  6.1*   ALBUMIN 2.9*  --  2.6*  --  2.5*    < > = values in this interval not displayed.    Most Recent 3 LFT's:  Recent Labs   Lab Test 02/07/23  1303 02/06/23  0705 02/05/23  0601   AST 12 15 15   ALT 8* 8* 7*   ALKPHOS 82 58 48   BILITOTAL 0.5 0.5 0.5    Most Recent 2 TSH and T4:  Recent Labs   Lab Test 12/28/21  1202 10/07/19  0903   TSH 2.11 3.07     2/7/23:  Phosphorus: 1.5   Magnesium: 2.3     I reviewed the above labs today.    IMAGING  Imaging:  IR CVC Tunnel Placement > 5 Yrs of Age  Narrative: PROCEDURES 1/27/2023 10:14 AM:  1. Ultrasound guidance for venous access  2. Placement centrally inserted catheter (age > 5 yrs.) tunneled, no  port, no pump  3. Fluoroscopic guidance placement    CLINICAL HISTORY: TCVC  placement - double lumen dumont 9.5 [place]an;  Sarcoma of soft tissue (H). Catheter does not need to be dialysis or  apheresis capable.    COMPARISONS: CT 1/26/2023    REFERRING PROVIDER: RASHID TAPIA    STAFF RADIOLOGIST: SEAN Luis MD    FELLOW(S)/RESIDENT(S): BROOKE Contreras    ASSISTANT: JUN Girard, ANGELES LUIS MD, attest that I was present for all critical portions  of the procedure and was immediately available to provide guidance and  assistance during the remainder of the procedure.    MEDICATIONS: The patient was placed on continuous monitoring.  Intravenous sedation was administered. 2 mg Versed and 100 mcg  Fentanyl IV. Vital signs and sedation were monitored by nursing staff  under Interventional Radiologist's supervision. The patient remained  stable throughout the procedure.    TOTAL SEDATION TIME: 28 minutes    ATTENDING FACE-TO-FACE SEDATION TIME: less than 5 minutes    FLUOROSCOPY TIME: 36.9 seconds.    DOSE: 11.16 mGy    PROCEDURE: The patient understood the limitations, alternatives, and  risks of the procedure and requested the procedure be performed. Both  written and oral consent were obtained.    The right neck and upper chest were prepped and draped in the usual  sterile fashion. 1% lidocaine without epinephrine was used for local  anesthesia.    Under ultrasound guidance, right internal jugular venotomy was made  with a micropuncture needle. Ultrasound image documenting jugular  patency and needle venotomy was saved in the patient's record.    Micropuncture needle exchanged over guidewire for the micropuncture  sheath under fluoroscopic guidance. Catheter length measured with the  0.018 guidewire. Micropuncture sheath saline locked. 9.5 [place]an BD  Bard [employer]ickman was subcutaneously tunneled from the right anterior  chest to the right internal jugular venotomy site. Catheter cut on the  24 cm marker. Micropuncture sheath exchanged over guidewire for the  peel-away sheath.  Guidewire removed. Under fluoroscopic guidance, the  catheter was placed through the peel-away sheath and positioned with  its tip in the superior atriocaval junction. Both lumens flushed and  aspirated adequately. Each lumen heparin locked with 100:1 heparin  solution. Catheter secured with StatLock and sterile dressing. Right  internal jugular venotomy site closed with [employer] ExoFin  tissue adhesive. No immediate complication.    Fluoroscopic image documenting catheter placement and final position  was saved in the patient's record.  Impression: IMPRESSION:   1. Ultrasound guided right internal jugular venotomy.    2. 24 cm 9.5 Kiswahili BD Bard PowerHickman tunneled central venous  catheter placed with the tip in the superior atriocaval junction. Both  lumens heparin locked and ready for use.    I have personally reviewed the examination and initial interpretation  and I agree with the findings.    ANGELES LUIS MD         SYSTEM ID:  [id number]    I reviewed the above imaging today.      ASSESSMENT AND PLAN     Mr. 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     Tentative plan is for 1-2 cycles of chemotherapy with doxil and ifosfamide and depending on response and tolerability we would consider completing a total of 4 cycles prior to surgery. If no adequate response, then he should proceed with local control with radiation and surgery.     Echo on 1/26/23  with EF of 55%.    CT CAP on 1/26/23 with indeterminate pulmonary nodules and no evidence of metastatic disease in abdomen or pelvis.      Port placed on 1/27/23.     He was admitted inpatient for cycle 1 from 1/30/23-2/6/23. Per the discharge summary: During his stay he developed a hypersensitivity to emend which was then stopped and changed to Zyprexa. He also had some volume overload and was diuresed. There was some concern for neurotoxicity with hand  spasms and episode of confusion but was able to receive full course of ifosfamide.     Labs today with phosphorus low at 1.5, not currently on replacement. Na slightly low at 133. WBC down at 1.7; ANC 1.3. Hgb stable at 8.3.     Oncology Plan:  -Neulasta today  -3x weekly labs for 3 labs to monitor lytes and hematologic status. Monitor for need for any transfusions or electrolyte replacement.   -Tentative plan for admission for cycle 2 in 3 weeks  -Will confirm repeat imaging interval with Dr. Price  -Other management as detailed below    #Tachycardia  -Given comorbidities, EKG checked today. EKG showing sinus tachycardia. Suspect s/t pain.   -Echo on 1/26/23 with possible LVH. Noted on EKG today. In absence of symptoms, will monitor clinically for now, low threshold to repeat echo.   -EKG today also with nonspecific T wave abnormality. No chest pain. No shortness of breath at rest, some with activity. T wave abnormality could be s/t anemia or ifosfamide or doxil. Need to follow closely clinically. Should likely repeat EKG before next cycle.  -Will plan to also review with Dr. Price.    # Intermittent Fever  -Was having fevers prior to admission. Highest in the 101's, infectious work up was negative (blood culture from 1/30/23 with no growth; UA negative for infection). Could have been/be tumor fever. Seemingly resolved while inpatient per treatment team notes. Reports a temp of 100.6 last night at home but none today. Afebrile in clinic. Cough has improved since stopping lisinopril. WBC low yesterday but no neutropenia. Further decrease in counts today, ANC now 1.3. It will be somewhat challenging to interpret fever given this history but given new neutropenia today will need to work up fevers should they recur. Educated on neutropenic precautions and need to notify us if temp is 100.4 or seek care in ED if not during clinic hours for further evaluation. He is currently on prophylactic antibiotics and no concerns  for infection on exam today.   -Giving Neulasta today in clinic.     # Prophylaxis and Infection prevention  -Continue 10 day course of Levaquin and doxycycline    # Right leg pain  -Worsening while off meloxicam; was instructed to stop this per his report due to bleeding risk from what I can discern.  -Will trial lidocaine patches for pain  -Continue gapapentin and oxycodone prn for pain.   -Can also try tylenol prn  -If no relief from above, ok for low dose ibuprofen (200-400mg once or twice daily) but try to use only if absolutely necessary.     # Constipation  -No BM for 6 days. Abdomen soft on exam and audible BS.   -Increase senna to 2 tabs BID. Add Miralax.  -If no BM by Thursday, let us know as we would need to consider adding something like mag citrate or dulcolax.    # HTN   - PTA Lisinopril, held on discharge per inpatient team seems due to cough and soft BP's. Systolic in 140's today. Recommend checking daily at home. Advised to let us know if systolic >150 or diastolic >90 for 2 to 3 days. If so, will need to look at adding an antihypertensive. Would likely avoid lisinopril as this seemed to contribute to cough which is improving off this medication.  -IB message sent to RN[facility] to call patient on Thursday for update on BP, constipation and neuro symptoms    #Hypophosphatemia  -1.5 today, not currently on replacement  -Start phospha-neutral 500mg TID  -Recheck labs in 2 days     #Neuro  -Mild neurotoxicity per inpatient notes with ifosfamide.  -Visual hallucinations, confusion and hand tremor are improving.  -advised them to update us if symptoms worsen as they should continue to improve    # Anemia  -Stable today  -Haptoglobin and LDH from 1/30/23 not consistent with hemolysis. B12 normal.  -Folate low; started on folic acid by inpatient team  -Peripheral smear on 1/30/23 with:  hypochromic. Normocytic anemia, rare elliptocytes, slight thrombocytosis, no evidence of hemolysis   -Looks like FIT testing not  done but reports no bleeding concerns today; will hold off on this at this time.  -Iron studies on 1/26/23 suspicious for IAM. Plan to continue ferrous sulfate daily and trend CBC.    # Port  -Placed on 1/27/23. Wife reports incision superior to port had glue come off. Incision not fully sealed off so no need to keep covered. Educated on need to keep clean dry and intact (cover during showers) until site fully seals off and monitor for signs of infection. Placed bandage during visit today.     # GERD   -Pre-dates treatment  - Restart PTA Omeprazole   - Carafate QID PRN   - TUMS PRN     # Diabetes  -Continue metformin    # Hidrenitis Suppurativa   - Continue topical Clindamycin BID      Provided patient's spouse with clinic phone number.    Amber Scheierl, CNP    150 minutes spent on the date of the encounter doing chart review, review of test results, interpretation of tests, patient visit and documentation

## 2023-02-07 NOTE — NURSING NOTE
6 mg of Neulasta given in left lower abdomen per provider request. Mediation verified in MAR and pt last name and  verified. Pt tolerated well. See MAR.\    Pt aware of possible side effects.     Maryanne Mathews, BAM on 2023 at 2:53 PM

## 2023-02-08 DIAGNOSIS — C49.9 SARCOMA OF SOFT TISSUE (H): Primary | ICD-10-CM

## 2023-02-08 LAB
ATRIAL RATE - MUSE: 103 BPM
DIASTOLIC BLOOD PRESSURE - MUSE: NORMAL MMHG
HAPTOGLOB SERPL-MCNC: 551 MG/DL (ref 32–197)
INTERPRETATION ECG - MUSE: NORMAL
P AXIS - MUSE: 38 DEGREES
PR INTERVAL - MUSE: 114 MS
QRS DURATION - MUSE: 84 MS
QT - MUSE: 304 MS
QTC - MUSE: 398 MS
R AXIS - MUSE: -10 DEGREES
SYSTOLIC BLOOD PRESSURE - MUSE: NORMAL MMHG
T AXIS - MUSE: 29 DEGREES
VENTRICULAR RATE- MUSE: 103 BPM

## 2023-02-09 ENCOUNTER — LAB (OUTPATIENT)
Dept: INFUSION THERAPY | Facility: CLINIC | Age: 53
End: 2023-02-09
Attending: STUDENT IN AN ORGANIZED HEALTH CARE EDUCATION/TRAINING PROGRAM
Payer: COMMERCIAL

## 2023-02-09 ENCOUNTER — MYC MEDICAL ADVICE (OUTPATIENT)
Dept: ONCOLOGY | Facility: CLINIC | Age: 53
End: 2023-02-09

## 2023-02-09 DIAGNOSIS — G89.3 CANCER RELATED PAIN: ICD-10-CM

## 2023-02-09 DIAGNOSIS — C49.9 SARCOMA OF SOFT TISSUE (H): ICD-10-CM

## 2023-02-09 LAB
ALBUMIN SERPL BCG-MCNC: 2.9 G/DL (ref 3.5–5.2)
ALP SERPL-CCNC: 81 U/L (ref 40–129)
ALT SERPL W P-5'-P-CCNC: 8 U/L (ref 10–50)
ANION GAP SERPL CALCULATED.3IONS-SCNC: 10 MMOL/L (ref 7–15)
AST SERPL W P-5'-P-CCNC: 13 U/L (ref 10–50)
BILIRUB SERPL-MCNC: 0.3 MG/DL
BUN SERPL-MCNC: 11.8 MG/DL (ref 6–20)
CALCIUM SERPL-MCNC: 8.8 MG/DL (ref 8.6–10)
CHLORIDE SERPL-SCNC: 101 MMOL/L (ref 98–107)
CREAT SERPL-MCNC: 0.64 MG/DL (ref 0.67–1.17)
DEPRECATED HCO3 PLAS-SCNC: 23 MMOL/L (ref 22–29)
ERYTHROCYTE [DISTWIDTH] IN BLOOD BY AUTOMATED COUNT: 17.2 % (ref 10–15)
GFR SERPL CREATININE-BSD FRML MDRD: >90 ML/MIN/1.73M2
GLUCOSE SERPL-MCNC: 164 MG/DL (ref 70–99)
HCT VFR BLD AUTO: 28.3 % (ref 40–53)
HGB BLD-MCNC: 8.2 G/DL (ref 13.3–17.7)
MAGNESIUM SERPL-MCNC: 2 MG/DL (ref 1.7–2.3)
MCH RBC QN AUTO: 22.7 PG (ref 26.5–33)
MCHC RBC AUTO-ENTMCNC: 29 G/DL (ref 31.5–36.5)
MCV RBC AUTO: 78 FL (ref 78–100)
PHOSPHATE SERPL-MCNC: 2.1 MG/DL (ref 2.5–4.5)
PLATELET # BLD AUTO: 230 10E3/UL (ref 150–450)
POTASSIUM SERPL-SCNC: 3.9 MMOL/L (ref 3.4–5.3)
PROT SERPL-MCNC: 7.3 G/DL (ref 6.4–8.3)
RBC # BLD AUTO: 3.61 10E6/UL (ref 4.4–5.9)
SODIUM SERPL-SCNC: 134 MMOL/L (ref 136–145)
WBC # BLD AUTO: 0.4 10E3/UL (ref 4–11)

## 2023-02-09 PROCEDURE — 250N000011 HC RX IP 250 OP 636: Performed by: STUDENT IN AN ORGANIZED HEALTH CARE EDUCATION/TRAINING PROGRAM

## 2023-02-09 PROCEDURE — 96523 IRRIG DRUG DELIVERY DEVICE: CPT

## 2023-02-09 PROCEDURE — 80053 COMPREHEN METABOLIC PANEL: CPT | Performed by: STUDENT IN AN ORGANIZED HEALTH CARE EDUCATION/TRAINING PROGRAM

## 2023-02-09 PROCEDURE — 83735 ASSAY OF MAGNESIUM: CPT | Performed by: STUDENT IN AN ORGANIZED HEALTH CARE EDUCATION/TRAINING PROGRAM

## 2023-02-09 PROCEDURE — 36415 COLL VENOUS BLD VENIPUNCTURE: CPT

## 2023-02-09 PROCEDURE — 85027 COMPLETE CBC AUTOMATED: CPT | Performed by: STUDENT IN AN ORGANIZED HEALTH CARE EDUCATION/TRAINING PROGRAM

## 2023-02-09 PROCEDURE — 84100 ASSAY OF PHOSPHORUS: CPT | Performed by: STUDENT IN AN ORGANIZED HEALTH CARE EDUCATION/TRAINING PROGRAM

## 2023-02-09 RX ORDER — HEPARIN SODIUM (PORCINE) LOCK FLUSH IV SOLN 100 UNIT/ML 100 UNIT/ML
5 SOLUTION INTRAVENOUS
Status: CANCELLED | OUTPATIENT
Start: 2023-02-09

## 2023-02-09 RX ORDER — HEPARIN SODIUM,PORCINE 10 UNIT/ML
5 VIAL (ML) INTRAVENOUS ONCE
Status: COMPLETED | OUTPATIENT
Start: 2023-02-09 | End: 2023-02-09

## 2023-02-09 RX ORDER — HEPARIN SODIUM,PORCINE 10 UNIT/ML
5 VIAL (ML) INTRAVENOUS
Status: CANCELLED | OUTPATIENT
Start: 2023-02-09

## 2023-02-09 RX ADMIN — Medication 5 ML: at 13:10

## 2023-02-09 NOTE — PROGRESS NOTES
Nursing Note:  Antonio Canseco presents today for labs.    Patient seen by provider today: No   present during visit today: Not Applicable.    Note: Patient was in the clinic for labs.  Wife had changed CVC dressing yesterday.  Small amount of moisture under the dressing today.  Dressing change done again today.  Site WNL.  Unable to obtain blood return from either lumen of CVC.  Lines flush without difficulty.  Labs drawn via peripheral site.  Message sent to provider re: need for orders for Alteplase.  Message sent to provider re: critical ANC of 0.4 from today.  Patient is scheduled for labs again tomorrow.  Sent message to clarify if labs are needed again tomorrow of they can wait until next wk. Monday.      Per Lissette, OK to administer Alteplase with next visit if unable to obtain a blood return from CVC, orders placed.    No need for lab appointment tomorrow.      To review neutropenic precautions with patient.    [facility] re: Alteplase, lab appointment for tomorrow and reviewed neutropenic precautions.    Intravenous Access:  Lab draw site RAC, Needle type butterfly, Gauge 23.  Labs drawn without difficulty.    Discharge Plan:   Patient was discharged home.    CLIFFORD LINDSEY RN

## 2023-02-10 DIAGNOSIS — C49.9 DEDIFFERENTIATED LIPOSARCOMA (H): Primary | ICD-10-CM

## 2023-02-10 RX ORDER — MORPHINE SULFATE 15 MG/1
15 TABLET, FILM COATED, EXTENDED RELEASE ORAL EVERY 12 HOURS
Qty: 60 TABLET | Refills: 0 | Status: ON HOLD | OUTPATIENT
Start: 2023-02-10 | End: 2023-02-16

## 2023-02-10 RX ORDER — OXYCODONE HYDROCHLORIDE 5 MG/1
5 TABLET ORAL EVERY 6 HOURS PRN
Qty: 45 TABLET | Refills: 0 | Status: ON HOLD | OUTPATIENT
Start: 2023-02-10 | End: 2023-02-16

## 2023-02-10 RX ORDER — OXYCODONE HYDROCHLORIDE 5 MG/1
5-10 TABLET ORAL EVERY 4 HOURS PRN
Qty: 30 TABLET | Refills: 0 | Status: CANCELLED | OUTPATIENT
Start: 2023-02-10

## 2023-02-10 NOTE — TELEPHONE ENCOUNTER
Narcotic Refill Request    Medication(s) requested:  Oxycodone 5 mg tabs  Person Requesting Refill: PatientAntonio  What pain is the medication treating: leg pain, right, exacerbated with chemo, started on 1/30, pain is moderate to severe.  How is the medication being taken?: every 4 hours, 1 tab (5mg)  Does pt have enough for today? Has 10 tablets left.  Is pain being adequately controlled on the current regimen?: Pain intensity is reduced with medication, not completely resolved.  Experiencing any side effects from medication?: gets loopy with 10 mg, only taking 1 tab at a time to avoid this.    Date of most recent appointment:  2/7/23 with Amber Scheierl.  Next appointment:   2/15/23 with Amber Scheierl.  Last fill date and by whom:  2/5/23 #30 tabs by Erika Mar.   Reviewed: No    Routed provider: Amber Scheierl and Dr. Laboy. Paged Dr. Laboy due to end of day/weekend.    Preferred pharmacy is [facility] in [place].

## 2023-02-11 ENCOUNTER — HOSPITAL ENCOUNTER (INPATIENT)
Facility: CLINIC | Age: 53
LOS: 5 days | Discharge: HOME | DRG: 809 | End: 2023-02-16
Attending: EMERGENCY MEDICINE | Admitting: INTERNAL MEDICINE
Payer: COMMERCIAL

## 2023-02-11 ENCOUNTER — NURSE TRIAGE (OUTPATIENT)
Dept: NURSING | Facility: CLINIC | Age: 53
End: 2023-02-11
Payer: COMMERCIAL

## 2023-02-11 ENCOUNTER — APPOINTMENT (OUTPATIENT)
Dept: GENERAL RADIOLOGY | Facility: CLINIC | Age: 53
DRG: 809 | End: 2023-02-11
Attending: EMERGENCY MEDICINE
Payer: COMMERCIAL

## 2023-02-11 DIAGNOSIS — M79.604 PAIN OF RIGHT LOWER EXTREMITY: Primary | ICD-10-CM

## 2023-02-11 DIAGNOSIS — C49.9 SARCOMA (H): ICD-10-CM

## 2023-02-11 DIAGNOSIS — C49.9 SARCOMA OF SOFT TISSUE (H): ICD-10-CM

## 2023-02-11 DIAGNOSIS — D61.810 ANTINEOPLASTIC CHEMOTHERAPY INDUCED PANCYTOPENIA (H): ICD-10-CM

## 2023-02-11 DIAGNOSIS — T45.1X5A ANTINEOPLASTIC CHEMOTHERAPY INDUCED PANCYTOPENIA (H): ICD-10-CM

## 2023-02-11 DIAGNOSIS — R50.81 NEUTROPENIC FEVER (H): ICD-10-CM

## 2023-02-11 DIAGNOSIS — D70.9 NEUTROPENIC FEVER (H): ICD-10-CM

## 2023-02-11 LAB
ALBUMIN SERPL BCG-MCNC: 3 G/DL (ref 3.5–5.2)
ALBUMIN UR-MCNC: 70 MG/DL
ALP SERPL-CCNC: 87 U/L (ref 40–129)
ALT SERPL W P-5'-P-CCNC: 11 U/L (ref 10–50)
ANION GAP SERPL CALCULATED.3IONS-SCNC: 12 MMOL/L (ref 7–15)
APPEARANCE UR: CLEAR
AST SERPL W P-5'-P-CCNC: 14 U/L (ref 10–50)
BASOPHILS # BLD MANUAL: 0 10E3/UL (ref 0–0.2)
BASOPHILS # BLD MANUAL: 0 10E3/UL (ref 0–0.2)
BASOPHILS NFR BLD MANUAL: 0 %
BASOPHILS NFR BLD MANUAL: 1 %
BILIRUB SERPL-MCNC: 0.4 MG/DL
BILIRUB UR QL STRIP: NEGATIVE
BUN SERPL-MCNC: 7 MG/DL (ref 6–20)
CALCIUM SERPL-MCNC: 8.3 MG/DL (ref 8.6–10)
CHLORIDE SERPL-SCNC: 97 MMOL/L (ref 98–107)
COLOR UR AUTO: YELLOW
CREAT SERPL-MCNC: 0.65 MG/DL (ref 0.67–1.17)
DEPRECATED HCO3 PLAS-SCNC: 25 MMOL/L (ref 22–29)
DEPRECATED S PYO AG THROAT QL EIA: NEGATIVE
EOSINOPHIL # BLD MANUAL: 0 10E3/UL (ref 0–0.7)
EOSINOPHIL # BLD MANUAL: 0 10E3/UL (ref 0–0.7)
EOSINOPHIL NFR BLD MANUAL: 0 %
EOSINOPHIL NFR BLD MANUAL: 0 %
ERYTHROCYTE [DISTWIDTH] IN BLOOD BY AUTOMATED COUNT: 17.4 % (ref 10–15)
ERYTHROCYTE [DISTWIDTH] IN BLOOD BY AUTOMATED COUNT: 17.5 % (ref 10–15)
FLUAV RNA SPEC QL NAA+PROBE: NEGATIVE
FLUBV RNA RESP QL NAA+PROBE: NEGATIVE
GFR SERPL CREATININE-BSD FRML MDRD: >90 ML/MIN/1.73M2
GLUCOSE SERPL-MCNC: 171 MG/DL (ref 70–99)
GLUCOSE UR STRIP-MCNC: NEGATIVE MG/DL
GROUP A STREP BY PCR: NOT DETECTED
HCT VFR BLD AUTO: 25.3 % (ref 40–53)
HCT VFR BLD AUTO: 26.4 % (ref 40–53)
HGB BLD-MCNC: 7.5 G/DL (ref 13.3–17.7)
HGB BLD-MCNC: 7.8 G/DL (ref 13.3–17.7)
HGB UR QL STRIP: ABNORMAL
HOLD SPECIMEN: NORMAL
HOLD SPECIMEN: NORMAL
HYALINE CASTS: 3 /LPF
KETONES UR STRIP-MCNC: ABNORMAL MG/DL
LACTATE SERPL-SCNC: 1.2 MMOL/L (ref 0.7–2)
LEUKOCYTE ESTERASE UR QL STRIP: NEGATIVE
LYMPHOCYTES # BLD MANUAL: 0.6 10E3/UL (ref 0.8–5.3)
LYMPHOCYTES # BLD MANUAL: 0.7 10E3/UL (ref 0.8–5.3)
LYMPHOCYTES NFR BLD MANUAL: 54 %
LYMPHOCYTES NFR BLD MANUAL: 58 %
MCH RBC QN AUTO: 22.5 PG (ref 26.5–33)
MCH RBC QN AUTO: 23.1 PG (ref 26.5–33)
MCHC RBC AUTO-ENTMCNC: 29.5 G/DL (ref 31.5–36.5)
MCHC RBC AUTO-ENTMCNC: 29.6 G/DL (ref 31.5–36.5)
MCV RBC AUTO: 76 FL (ref 78–100)
MCV RBC AUTO: 78 FL (ref 78–100)
METAMYELOCYTES # BLD MANUAL: 0 10E3/UL
METAMYELOCYTES NFR BLD MANUAL: 1 %
MONOCYTES # BLD MANUAL: 0.4 10E3/UL (ref 0–1.3)
MONOCYTES # BLD MANUAL: 0.5 10E3/UL (ref 0–1.3)
MONOCYTES NFR BLD MANUAL: 38 %
MONOCYTES NFR BLD MANUAL: 41 %
MUCOUS THREADS #/AREA URNS LPF: PRESENT /LPF
NEUTROPHILS # BLD MANUAL: 0 10E3/UL (ref 1.6–8.3)
NEUTROPHILS # BLD MANUAL: 0 10E3/UL (ref 1.6–8.3)
NEUTROPHILS NFR BLD MANUAL: 3 %
NEUTROPHILS NFR BLD MANUAL: 4 %
NITRATE UR QL: NEGATIVE
NRBC # BLD AUTO: 0 10E3/UL
NRBC BLD MANUAL-RTO: 1 %
PH UR STRIP: 7.5 [PH] (ref 5–7)
PLAT MORPH BLD: ABNORMAL
PLAT MORPH BLD: ABNORMAL
PLATELET # BLD AUTO: 137 10E3/UL (ref 150–450)
PLATELET # BLD AUTO: 149 10E3/UL (ref 150–450)
POTASSIUM SERPL-SCNC: 4.1 MMOL/L (ref 3.4–5.3)
PROCALCITONIN SERPL IA-MCNC: 0.32 NG/ML
PROT SERPL-MCNC: 7.5 G/DL (ref 6.4–8.3)
RBC # BLD AUTO: 3.24 10E6/UL (ref 4.4–5.9)
RBC # BLD AUTO: 3.46 10E6/UL (ref 4.4–5.9)
RBC MORPH BLD: ABNORMAL
RBC MORPH BLD: ABNORMAL
RBC URINE: 120 /HPF
RSV RNA SPEC NAA+PROBE: NEGATIVE
SARS-COV-2 RNA RESP QL NAA+PROBE: NEGATIVE
SODIUM SERPL-SCNC: 134 MMOL/L (ref 136–145)
SP GR UR STRIP: 1.02 (ref 1–1.03)
SQUAMOUS EPITHELIAL: 1 /HPF
TROPONIN T SERPL HS-MCNC: 15 NG/L
UROBILINOGEN UR STRIP-MCNC: 6 MG/DL
WBC # BLD AUTO: 1.1 10E3/UL (ref 4–11)
WBC # BLD AUTO: 1.3 10E3/UL (ref 4–11)
WBC URINE: 3 /HPF

## 2023-02-11 PROCEDURE — 80053 COMPREHEN METABOLIC PANEL: CPT | Performed by: EMERGENCY MEDICINE

## 2023-02-11 PROCEDURE — 85007 BL SMEAR W/DIFF WBC COUNT: CPT | Performed by: EMERGENCY MEDICINE

## 2023-02-11 PROCEDURE — 36415 COLL VENOUS BLD VENIPUNCTURE: CPT | Performed by: EMERGENCY MEDICINE

## 2023-02-11 PROCEDURE — C9803 HOPD COVID-19 SPEC COLLECT: HCPCS

## 2023-02-11 PROCEDURE — 120N000001 HC R&B MED SURG/OB

## 2023-02-11 PROCEDURE — 96361 HYDRATE IV INFUSION ADD-ON: CPT

## 2023-02-11 PROCEDURE — 87040 BLOOD CULTURE FOR BACTERIA: CPT | Performed by: EMERGENCY MEDICINE

## 2023-02-11 PROCEDURE — 84145 PROCALCITONIN (PCT): CPT | Performed by: EMERGENCY MEDICINE

## 2023-02-11 PROCEDURE — 93005 ELECTROCARDIOGRAM TRACING: CPT

## 2023-02-11 PROCEDURE — 99223 1ST HOSP IP/OBS HIGH 75: CPT | Mod: AI | Performed by: INTERNAL MEDICINE

## 2023-02-11 PROCEDURE — 83605 ASSAY OF LACTIC ACID: CPT | Performed by: EMERGENCY MEDICINE

## 2023-02-11 PROCEDURE — 250N000011 HC RX IP 250 OP 636: Performed by: EMERGENCY MEDICINE

## 2023-02-11 PROCEDURE — 71046 X-RAY EXAM CHEST 2 VIEWS: CPT

## 2023-02-11 PROCEDURE — 99285 EMERGENCY DEPT VISIT HI MDM: CPT | Mod: CS,25

## 2023-02-11 PROCEDURE — 96374 THER/PROPH/DIAG INJ IV PUSH: CPT

## 2023-02-11 PROCEDURE — 81001 URINALYSIS AUTO W/SCOPE: CPT | Performed by: EMERGENCY MEDICINE

## 2023-02-11 PROCEDURE — 84484 ASSAY OF TROPONIN QUANT: CPT | Performed by: EMERGENCY MEDICINE

## 2023-02-11 PROCEDURE — 96376 TX/PRO/DX INJ SAME DRUG ADON: CPT

## 2023-02-11 PROCEDURE — 87637 SARSCOV2&INF A&B&RSV AMP PRB: CPT | Performed by: EMERGENCY MEDICINE

## 2023-02-11 PROCEDURE — 87651 STREP A DNA AMP PROBE: CPT | Performed by: EMERGENCY MEDICINE

## 2023-02-11 PROCEDURE — 258N000003 HC RX IP 258 OP 636: Performed by: EMERGENCY MEDICINE

## 2023-02-11 PROCEDURE — 85014 HEMATOCRIT: CPT | Performed by: EMERGENCY MEDICINE

## 2023-02-11 RX ORDER — HYDROMORPHONE HCL IN WATER/PF 6 MG/30 ML
0.4 PATIENT CONTROLLED ANALGESIA SYRINGE INTRAVENOUS
Status: DISCONTINUED | OUTPATIENT
Start: 2023-02-11 | End: 2023-02-16 | Stop reason: HOSPADM

## 2023-02-11 RX ORDER — ACETAMINOPHEN 650 MG/1
650 SUPPOSITORY RECTAL EVERY 6 HOURS PRN
Status: DISCONTINUED | OUTPATIENT
Start: 2023-02-11 | End: 2023-02-16 | Stop reason: HOSPADM

## 2023-02-11 RX ORDER — CEFAZOLIN SODIUM 1 G/50ML
2000 SOLUTION INTRAVENOUS ONCE
Status: COMPLETED | OUTPATIENT
Start: 2023-02-11 | End: 2023-02-12

## 2023-02-11 RX ORDER — AMOXICILLIN 250 MG
2 CAPSULE ORAL 2 TIMES DAILY PRN
Status: DISCONTINUED | OUTPATIENT
Start: 2023-02-11 | End: 2023-02-16 | Stop reason: HOSPADM

## 2023-02-11 RX ORDER — ACETAMINOPHEN 325 MG/1
650 TABLET ORAL EVERY 6 HOURS PRN
Status: DISCONTINUED | OUTPATIENT
Start: 2023-02-11 | End: 2023-02-16 | Stop reason: HOSPADM

## 2023-02-11 RX ORDER — HYDROMORPHONE HYDROCHLORIDE 1 MG/ML
0.5 INJECTION, SOLUTION INTRAMUSCULAR; INTRAVENOUS; SUBCUTANEOUS
Status: COMPLETED | OUTPATIENT
Start: 2023-02-11 | End: 2023-02-11

## 2023-02-11 RX ORDER — ONDANSETRON 2 MG/ML
4 INJECTION INTRAMUSCULAR; INTRAVENOUS EVERY 6 HOURS PRN
Status: DISCONTINUED | OUTPATIENT
Start: 2023-02-11 | End: 2023-02-16 | Stop reason: HOSPADM

## 2023-02-11 RX ORDER — HYDROMORPHONE HYDROCHLORIDE 2 MG/1
2 TABLET ORAL EVERY 4 HOURS PRN
Status: DISCONTINUED | OUTPATIENT
Start: 2023-02-11 | End: 2023-02-16 | Stop reason: HOSPADM

## 2023-02-11 RX ORDER — AMOXICILLIN 250 MG
1 CAPSULE ORAL 2 TIMES DAILY PRN
Status: DISCONTINUED | OUTPATIENT
Start: 2023-02-11 | End: 2023-02-16 | Stop reason: HOSPADM

## 2023-02-11 RX ORDER — BISACODYL 10 MG
10 SUPPOSITORY, RECTAL RECTAL DAILY PRN
Status: DISCONTINUED | OUTPATIENT
Start: 2023-02-11 | End: 2023-02-16 | Stop reason: HOSPADM

## 2023-02-11 RX ORDER — LIDOCAINE 40 MG/G
CREAM TOPICAL
Status: DISCONTINUED | OUTPATIENT
Start: 2023-02-11 | End: 2023-02-16 | Stop reason: HOSPADM

## 2023-02-11 RX ORDER — HEPARIN SODIUM,PORCINE 10 UNIT/ML
5-10 VIAL (ML) INTRAVENOUS EVERY 24 HOURS
Status: DISCONTINUED | OUTPATIENT
Start: 2023-02-11 | End: 2023-02-16 | Stop reason: HOSPADM

## 2023-02-11 RX ORDER — HEPARIN SODIUM (PORCINE) LOCK FLUSH IV SOLN 100 UNIT/ML 100 UNIT/ML
5-10 SOLUTION INTRAVENOUS
Status: DISCONTINUED | OUTPATIENT
Start: 2023-02-11 | End: 2023-02-16 | Stop reason: HOSPADM

## 2023-02-11 RX ORDER — HEPARIN SODIUM,PORCINE 10 UNIT/ML
5-10 VIAL (ML) INTRAVENOUS
Status: DISCONTINUED | OUTPATIENT
Start: 2023-02-11 | End: 2023-02-16 | Stop reason: HOSPADM

## 2023-02-11 RX ORDER — NICOTINE POLACRILEX 4 MG
15-30 LOZENGE BUCCAL
Status: DISCONTINUED | OUTPATIENT
Start: 2023-02-11 | End: 2023-02-16 | Stop reason: HOSPADM

## 2023-02-11 RX ORDER — PROCHLORPERAZINE MALEATE 5 MG
10 TABLET ORAL EVERY 6 HOURS PRN
Status: DISCONTINUED | OUTPATIENT
Start: 2023-02-11 | End: 2023-02-16 | Stop reason: HOSPADM

## 2023-02-11 RX ORDER — SODIUM CHLORIDE 9 MG/ML
INJECTION, SOLUTION INTRAVENOUS CONTINUOUS
Status: DISCONTINUED | OUTPATIENT
Start: 2023-02-11 | End: 2023-02-16 | Stop reason: HOSPADM

## 2023-02-11 RX ORDER — ONDANSETRON 4 MG/1
4 TABLET, ORALLY DISINTEGRATING ORAL EVERY 6 HOURS PRN
Status: DISCONTINUED | OUTPATIENT
Start: 2023-02-11 | End: 2023-02-16 | Stop reason: HOSPADM

## 2023-02-11 RX ORDER — ONDANSETRON 2 MG/ML
4 INJECTION INTRAMUSCULAR; INTRAVENOUS ONCE
Status: COMPLETED | OUTPATIENT
Start: 2023-02-11 | End: 2023-02-11

## 2023-02-11 RX ORDER — PROCHLORPERAZINE 25 MG
25 SUPPOSITORY, RECTAL RECTAL EVERY 12 HOURS PRN
Status: DISCONTINUED | OUTPATIENT
Start: 2023-02-11 | End: 2023-02-16 | Stop reason: HOSPADM

## 2023-02-11 RX ORDER — DEXTROSE MONOHYDRATE 25 G/50ML
25-50 INJECTION, SOLUTION INTRAVENOUS
Status: DISCONTINUED | OUTPATIENT
Start: 2023-02-11 | End: 2023-02-16 | Stop reason: HOSPADM

## 2023-02-11 RX ORDER — HYDROXYZINE HYDROCHLORIDE 25 MG/1
25 TABLET, FILM COATED ORAL EVERY 6 HOURS PRN
Status: DISCONTINUED | OUTPATIENT
Start: 2023-02-11 | End: 2023-02-13

## 2023-02-11 RX ORDER — HYDROMORPHONE HCL IN WATER/PF 6 MG/30 ML
0.2 PATIENT CONTROLLED ANALGESIA SYRINGE INTRAVENOUS
Status: DISCONTINUED | OUTPATIENT
Start: 2023-02-11 | End: 2023-02-16 | Stop reason: HOSPADM

## 2023-02-11 RX ADMIN — HEPARIN, PORCINE (PF) 10 UNIT/ML INTRAVENOUS SYRINGE 5 ML: at 19:39

## 2023-02-11 RX ADMIN — TAZOBACTAM SODIUM AND PIPERACILLIN SODIUM 4.5 G: 500; 4 INJECTION, SOLUTION INTRAVENOUS at 22:03

## 2023-02-11 RX ADMIN — HYDROMORPHONE HYDROCHLORIDE 0.5 MG: 1 INJECTION, SOLUTION INTRAMUSCULAR; INTRAVENOUS; SUBCUTANEOUS at 18:53

## 2023-02-11 RX ADMIN — SODIUM CHLORIDE 1000 ML: 9 INJECTION, SOLUTION INTRAVENOUS at 18:51

## 2023-02-11 RX ADMIN — VANCOMYCIN HYDROCHLORIDE 2000 MG: 1 INJECTION, POWDER, LYOPHILIZED, FOR SOLUTION INTRAVENOUS at 22:40

## 2023-02-11 RX ADMIN — SODIUM CHLORIDE 1000 ML: 9 INJECTION, SOLUTION INTRAVENOUS at 20:30

## 2023-02-11 RX ADMIN — HYDROMORPHONE HYDROCHLORIDE 0.5 MG: 1 INJECTION, SOLUTION INTRAMUSCULAR; INTRAVENOUS; SUBCUTANEOUS at 19:39

## 2023-02-11 RX ADMIN — HYDROMORPHONE HYDROCHLORIDE 0.5 MG: 1 INJECTION, SOLUTION INTRAMUSCULAR; INTRAVENOUS; SUBCUTANEOUS at 21:39

## 2023-02-11 RX ADMIN — HEPARIN, PORCINE (PF) 10 UNIT/ML INTRAVENOUS SYRINGE 5 ML: at 19:40

## 2023-02-11 ASSESSMENT — ENCOUNTER SYMPTOMS
BACK PAIN: 1
COUGH: 0
FEVER: 1
SORE THROAT: 1
DYSURIA: 1
DIARRHEA: 0
CONSTIPATION: 1

## 2023-02-11 ASSESSMENT — ACTIVITIES OF DAILY LIVING (ADL)
ADLS_ACUITY_SCORE: 35

## 2023-02-11 NOTE — TELEPHONE ENCOUNTER
Telephone:    Wife of patient reports not hearing back from on call hem/onc provider.  Writer repaged on call through [facility].    Yareli Martin RN on 2/11/2023 at 5:02 PM    Reason for Disposition    Health Information question, no triage required and triager able to answer question    Protocols used: INFORMATION ONLY CALL - NO TRIAGE-A-

## 2023-02-11 NOTE — TELEPHONE ENCOUNTER
Wife calling and verbal consent to communicate given verbally by patient.    Patient is in severe pain 9/10 right leg from soft tissue sarcoma.  Patient prescribed Morphine tablets yesterday.  The pharmacy is out of the medication.   Oxycodone is not enough to relieve pain.  Caller is asking for a new prescription for Morphine to be sent to different pharmacy.  Writer explained narcotic prescriptions cannot be prescribed after hours.      Patient symptoms have a disposition of being seen within four hours or get secondary triage.  Provider Dr. Laboy will need to be paged to patient directly per protocol.  Warm transferred caller to answering service.    Carol Chicas RN  [facility]                                    Reason for Disposition    [1] SEVERE pain (e.g., excruciating, unable to do any normal activities) AND [2] not improved after 2 hours of pain medicine    Additional Information    Negative: Looks like a broken bone or dislocated joint (e.g., crooked or deformed)    Negative: Sounds like a life-threatening emergency to the triager    Negative: Chest pain    Negative: Difficulty breathing    Negative: Entire foot is cool or blue in comparison to other side    Negative: Unable to walk    Negative: [1] Red area or streak AND [2] fever    Negative: [1] Swollen joint AND [2] fever    Negative: [1] Cast on leg or ankle AND [2] now increased pain    Negative: Patient sounds very sick or weak to the triager    Protocols used: LEG PAIN-A-AH

## 2023-02-12 LAB
ABO/RH(D): NORMAL
ALBUMIN SERPL BCG-MCNC: 2.6 G/DL (ref 3.5–5.2)
ALP SERPL-CCNC: 74 U/L (ref 40–129)
ALT SERPL W P-5'-P-CCNC: 8 U/L (ref 10–50)
ANION GAP SERPL CALCULATED.3IONS-SCNC: 9 MMOL/L (ref 7–15)
ANTIBODY SCREEN: NEGATIVE
AST SERPL W P-5'-P-CCNC: 15 U/L (ref 10–50)
BASOPHILS # BLD MANUAL: 0 10E3/UL (ref 0–0.2)
BASOPHILS NFR BLD MANUAL: 1 %
BILIRUB SERPL-MCNC: 0.5 MG/DL
BLD PROD TYP BPU: NORMAL
BLOOD COMPONENT TYPE: NORMAL
BUN SERPL-MCNC: 5.2 MG/DL (ref 6–20)
CALCIUM SERPL-MCNC: 7.9 MG/DL (ref 8.6–10)
CHLORIDE SERPL-SCNC: 98 MMOL/L (ref 98–107)
CODING SYSTEM: NORMAL
CREAT SERPL-MCNC: 0.68 MG/DL (ref 0.67–1.17)
CROSSMATCH: NORMAL
DEPRECATED HCO3 PLAS-SCNC: 24 MMOL/L (ref 22–29)
EOSINOPHIL # BLD MANUAL: 0 10E3/UL (ref 0–0.7)
EOSINOPHIL NFR BLD MANUAL: 0 %
ERYTHROCYTE [DISTWIDTH] IN BLOOD BY AUTOMATED COUNT: 17.4 % (ref 10–15)
GFR SERPL CREATININE-BSD FRML MDRD: >90 ML/MIN/1.73M2
GLUCOSE BLDC GLUCOMTR-MCNC: 130 MG/DL (ref 70–99)
GLUCOSE BLDC GLUCOMTR-MCNC: 145 MG/DL (ref 70–99)
GLUCOSE BLDC GLUCOMTR-MCNC: 155 MG/DL (ref 70–99)
GLUCOSE BLDC GLUCOMTR-MCNC: 155 MG/DL (ref 70–99)
GLUCOSE SERPL-MCNC: 148 MG/DL (ref 70–99)
GLUCOSE SERPL-MCNC: 160 MG/DL (ref 70–99)
HCT VFR BLD AUTO: 23.6 % (ref 40–53)
HGB BLD-MCNC: 6.9 G/DL (ref 13.3–17.7)
HOLD SPECIMEN: NORMAL
HOLD SPECIMEN: NORMAL
ISSUE DATE AND TIME: NORMAL
LACTATE SERPL-SCNC: 1.1 MMOL/L (ref 0.7–2)
LYMPHOCYTES # BLD MANUAL: 0.9 10E3/UL (ref 0.8–5.3)
LYMPHOCYTES NFR BLD MANUAL: 49 %
MCH RBC QN AUTO: 22.5 PG (ref 26.5–33)
MCHC RBC AUTO-ENTMCNC: 29.2 G/DL (ref 31.5–36.5)
MCV RBC AUTO: 77 FL (ref 78–100)
MONOCYTES # BLD MANUAL: 0.7 10E3/UL (ref 0–1.3)
MONOCYTES NFR BLD MANUAL: 37 %
MYELOCYTES # BLD MANUAL: 0 10E3/UL
MYELOCYTES NFR BLD MANUAL: 1 %
NEUTROPHILS # BLD MANUAL: 0.2 10E3/UL (ref 1.6–8.3)
NEUTROPHILS NFR BLD MANUAL: 12 %
NRBC # BLD AUTO: 0 10E3/UL
NRBC BLD MANUAL-RTO: 2 %
PLAT MORPH BLD: ABNORMAL
PLATELET # BLD AUTO: 138 10E3/UL (ref 150–450)
POTASSIUM SERPL-SCNC: 3.8 MMOL/L (ref 3.4–5.3)
PROT SERPL-MCNC: 6.1 G/DL (ref 6.4–8.3)
RBC # BLD AUTO: 3.07 10E6/UL (ref 4.4–5.9)
RBC MORPH BLD: ABNORMAL
SODIUM SERPL-SCNC: 131 MMOL/L (ref 136–145)
SPECIMEN EXPIRATION DATE: NORMAL
UNIT ABO/RH: NORMAL
UNIT NUMBER: NORMAL
UNIT STATUS: NORMAL
UNIT TYPE ISBT: 6200
WBC # BLD AUTO: 1.8 10E3/UL (ref 4–11)

## 2023-02-12 PROCEDURE — 86923 COMPATIBILITY TEST ELECTRIC: CPT | Performed by: INTERNAL MEDICINE

## 2023-02-12 PROCEDURE — 250N000013 HC RX MED GY IP 250 OP 250 PS 637: Performed by: INTERNAL MEDICINE

## 2023-02-12 PROCEDURE — 83605 ASSAY OF LACTIC ACID: CPT | Performed by: INTERNAL MEDICINE

## 2023-02-12 PROCEDURE — 80053 COMPREHEN METABOLIC PANEL: CPT | Performed by: INTERNAL MEDICINE

## 2023-02-12 PROCEDURE — 86850 RBC ANTIBODY SCREEN: CPT | Performed by: INTERNAL MEDICINE

## 2023-02-12 PROCEDURE — 250N000011 HC RX IP 250 OP 636: Performed by: INTERNAL MEDICINE

## 2023-02-12 PROCEDURE — 258N000003 HC RX IP 258 OP 636: Performed by: INTERNAL MEDICINE

## 2023-02-12 PROCEDURE — 85027 COMPLETE CBC AUTOMATED: CPT | Performed by: PHYSICIAN ASSISTANT

## 2023-02-12 PROCEDURE — 85007 BL SMEAR W/DIFF WBC COUNT: CPT | Performed by: INTERNAL MEDICINE

## 2023-02-12 PROCEDURE — C9113 INJ PANTOPRAZOLE SODIUM, VIA: HCPCS | Performed by: INTERNAL MEDICINE

## 2023-02-12 PROCEDURE — 36415 COLL VENOUS BLD VENIPUNCTURE: CPT | Performed by: INTERNAL MEDICINE

## 2023-02-12 PROCEDURE — P9016 RBC LEUKOCYTES REDUCED: HCPCS | Performed by: INTERNAL MEDICINE

## 2023-02-12 PROCEDURE — 82947 ASSAY GLUCOSE BLOOD QUANT: CPT | Performed by: INTERNAL MEDICINE

## 2023-02-12 PROCEDURE — 99233 SBSQ HOSP IP/OBS HIGH 50: CPT | Performed by: INTERNAL MEDICINE

## 2023-02-12 PROCEDURE — 120N000001 HC R&B MED SURG/OB

## 2023-02-12 PROCEDURE — 99223 1ST HOSP IP/OBS HIGH 75: CPT | Performed by: INTERNAL MEDICINE

## 2023-02-12 PROCEDURE — 82962 GLUCOSE BLOOD TEST: CPT

## 2023-02-12 PROCEDURE — 85007 BL SMEAR W/DIFF WBC COUNT: CPT | Performed by: PHYSICIAN ASSISTANT

## 2023-02-12 PROCEDURE — 85027 COMPLETE CBC AUTOMATED: CPT | Performed by: INTERNAL MEDICINE

## 2023-02-12 PROCEDURE — 250N000013 HC RX MED GY IP 250 OP 250 PS 637: Performed by: EMERGENCY MEDICINE

## 2023-02-12 PROCEDURE — 86901 BLOOD TYPING SEROLOGIC RH(D): CPT | Performed by: INTERNAL MEDICINE

## 2023-02-12 RX ORDER — HEPARIN SODIUM,PORCINE 10 UNIT/ML
5-10 VIAL (ML) INTRAVENOUS EVERY 24 HOURS
Status: DISCONTINUED | OUTPATIENT
Start: 2023-02-12 | End: 2023-02-16 | Stop reason: HOSPADM

## 2023-02-12 RX ORDER — BENZONATATE 100 MG/1
100 CAPSULE ORAL 3 TIMES DAILY PRN
Status: DISCONTINUED | OUTPATIENT
Start: 2023-02-12 | End: 2023-02-16 | Stop reason: HOSPADM

## 2023-02-12 RX ORDER — NALOXONE HYDROCHLORIDE 0.4 MG/ML
0.4 INJECTION, SOLUTION INTRAMUSCULAR; INTRAVENOUS; SUBCUTANEOUS
Status: DISCONTINUED | OUTPATIENT
Start: 2023-02-12 | End: 2023-02-16 | Stop reason: HOSPADM

## 2023-02-12 RX ORDER — NALOXONE HYDROCHLORIDE 0.4 MG/ML
0.2 INJECTION, SOLUTION INTRAMUSCULAR; INTRAVENOUS; SUBCUTANEOUS
Status: DISCONTINUED | OUTPATIENT
Start: 2023-02-12 | End: 2023-02-16 | Stop reason: HOSPADM

## 2023-02-12 RX ORDER — HYDROXYZINE HYDROCHLORIDE 25 MG/1
25 TABLET, FILM COATED ORAL ONCE
Status: COMPLETED | OUTPATIENT
Start: 2023-02-12 | End: 2023-02-12

## 2023-02-12 RX ORDER — MORPHINE SULFATE 15 MG/1
15 TABLET, FILM COATED, EXTENDED RELEASE ORAL EVERY 12 HOURS SCHEDULED
Status: DISCONTINUED | OUTPATIENT
Start: 2023-02-12 | End: 2023-02-15

## 2023-02-12 RX ORDER — SUCRALFATE ORAL 1 G/10ML
1 SUSPENSION ORAL
Status: DISCONTINUED | OUTPATIENT
Start: 2023-02-12 | End: 2023-02-16 | Stop reason: HOSPADM

## 2023-02-12 RX ORDER — HEPARIN SODIUM,PORCINE 10 UNIT/ML
5-10 VIAL (ML) INTRAVENOUS
Status: DISCONTINUED | OUTPATIENT
Start: 2023-02-12 | End: 2023-02-16 | Stop reason: HOSPADM

## 2023-02-12 RX ORDER — HEPARIN SODIUM (PORCINE) LOCK FLUSH IV SOLN 100 UNIT/ML 100 UNIT/ML
5-10 SOLUTION INTRAVENOUS
Status: DISCONTINUED | OUTPATIENT
Start: 2023-02-12 | End: 2023-02-16 | Stop reason: HOSPADM

## 2023-02-12 RX ADMIN — HYDROXYZINE HYDROCHLORIDE 25 MG: 25 TABLET, FILM COATED ORAL at 15:32

## 2023-02-12 RX ADMIN — HYDROMORPHONE HYDROCHLORIDE 2 MG: 2 TABLET ORAL at 19:06

## 2023-02-12 RX ADMIN — TAZOBACTAM SODIUM AND PIPERACILLIN SODIUM 3.38 G: 375; 3 INJECTION, SOLUTION INTRAVENOUS at 09:48

## 2023-02-12 RX ADMIN — HYDROMORPHONE HYDROCHLORIDE 2 MG: 2 TABLET ORAL at 14:33

## 2023-02-12 RX ADMIN — HYDROMORPHONE HYDROCHLORIDE 2 MG: 2 TABLET ORAL at 00:47

## 2023-02-12 RX ADMIN — ACETAMINOPHEN 650 MG: 325 TABLET ORAL at 20:11

## 2023-02-12 RX ADMIN — VANCOMYCIN HYDROCHLORIDE 1500 MG: 5 INJECTION, POWDER, LYOPHILIZED, FOR SOLUTION INTRAVENOUS at 10:25

## 2023-02-12 RX ADMIN — FILGRASTIM-AAFI 480 MCG: 480 INJECTION, SOLUTION INTRAVENOUS; SUBCUTANEOUS at 20:03

## 2023-02-12 RX ADMIN — HYDROXYZINE HYDROCHLORIDE 25 MG: 25 TABLET, FILM COATED ORAL at 00:47

## 2023-02-12 RX ADMIN — MORPHINE SULFATE 15 MG: 15 TABLET, EXTENDED RELEASE ORAL at 20:02

## 2023-02-12 RX ADMIN — VANCOMYCIN HYDROCHLORIDE 1500 MG: 5 INJECTION, POWDER, LYOPHILIZED, FOR SOLUTION INTRAVENOUS at 21:12

## 2023-02-12 RX ADMIN — ACETAMINOPHEN 650 MG: 325 TABLET ORAL at 06:19

## 2023-02-12 RX ADMIN — TAZOBACTAM SODIUM AND PIPERACILLIN SODIUM 3.38 G: 375; 3 INJECTION, SOLUTION INTRAVENOUS at 04:48

## 2023-02-12 RX ADMIN — HEPARIN, PORCINE (PF) 10 UNIT/ML INTRAVENOUS SYRINGE 5 ML: at 17:03

## 2023-02-12 RX ADMIN — ACETAMINOPHEN 650 MG: 325 TABLET ORAL at 13:10

## 2023-02-12 RX ADMIN — BENZONATATE 100 MG: 100 CAPSULE ORAL at 15:48

## 2023-02-12 RX ADMIN — HEPARIN, PORCINE (PF) 10 UNIT/ML INTRAVENOUS SYRINGE 5 ML: at 13:56

## 2023-02-12 RX ADMIN — HYDROMORPHONE HYDROCHLORIDE 0.2 MG: 0.2 INJECTION, SOLUTION INTRAMUSCULAR; INTRAVENOUS; SUBCUTANEOUS at 06:48

## 2023-02-12 RX ADMIN — HYDROMORPHONE HYDROCHLORIDE 2 MG: 2 TABLET ORAL at 04:48

## 2023-02-12 RX ADMIN — SODIUM CHLORIDE: 9 INJECTION, SOLUTION INTRAVENOUS at 00:46

## 2023-02-12 RX ADMIN — SUCRALFATE ORAL 1 G: 1 SUSPENSION ORAL at 21:12

## 2023-02-12 RX ADMIN — SENNOSIDES AND DOCUSATE SODIUM 1 TABLET: 50; 8.6 TABLET ORAL at 06:47

## 2023-02-12 RX ADMIN — TAZOBACTAM SODIUM AND PIPERACILLIN SODIUM 3.38 G: 375; 3 INJECTION, SOLUTION INTRAVENOUS at 22:47

## 2023-02-12 RX ADMIN — HYDROMORPHONE HYDROCHLORIDE 2 MG: 2 TABLET ORAL at 09:47

## 2023-02-12 RX ADMIN — TAZOBACTAM SODIUM AND PIPERACILLIN SODIUM 3.38 G: 375; 3 INJECTION, SOLUTION INTRAVENOUS at 15:35

## 2023-02-12 RX ADMIN — PANTOPRAZOLE SODIUM 40 MG: 40 INJECTION, POWDER, FOR SOLUTION INTRAVENOUS at 15:34

## 2023-02-12 RX ADMIN — SUCRALFATE ORAL 1 G: 1 SUSPENSION ORAL at 15:32

## 2023-02-12 ASSESSMENT — ACTIVITIES OF DAILY LIVING (ADL)
ADLS_ACUITY_SCORE: 35
DIFFICULTY_EATING/SWALLOWING: NO
VISION_MANAGEMENT: NEAR AND FAR
CHANGE_IN_FUNCTIONAL_STATUS_SINCE_ONSET_OF_CURRENT_ILLNESS/INJURY: NO
ADLS_ACUITY_SCORE: 22
ADLS_ACUITY_SCORE: 24
TOILETING_ISSUES: NO
ADLS_ACUITY_SCORE: 22
WEAR_GLASSES_OR_BLIND: YES
ADLS_ACUITY_SCORE: 22
ADLS_ACUITY_SCORE: 35
ADLS_ACUITY_SCORE: 22
HEARING_DIFFICULTY_OR_DEAF: NO
ADLS_ACUITY_SCORE: 35
DOING_ERRANDS_INDEPENDENTLY_DIFFICULTY: NO
DRESSING/BATHING_DIFFICULTY: NO
WALKING_OR_CLIMBING_STAIRS_DIFFICULTY: NO
FALL_HISTORY_WITHIN_LAST_SIX_MONTHS: NO
ADLS_ACUITY_SCORE: 22
DIFFICULTY_COMMUNICATING: NO
CONCENTRATING,_REMEMBERING_OR_MAKING_DECISIONS_DIFFICULTY: NO

## 2023-02-12 NOTE — PROGRESS NOTES
[facility]  Hospitalist Progress Note  Judson Servin M.D., M.B.A.   02/12/2023    Reason for Stay/active problem list       Neutropenic fever     Pancytopenia          Assessment and Plan:        Summary of Stay: Antonio Canseco is a 52 year old male admitted on 2/11/2023. He has a past medical history significant for high-grade sarcoma, hypertension, diabetes mellitus type 2, GERD, and irritable bowel syndrome.  Hospitalized at the beginning of this month at [facility] for chemotherapy.  Discharged from [facility] on 2/6.  Had been on outpatient oral antibiotics with levofloxacin and doxycycline.  On topical clindamycin to chronic right leg wound.  He presented to emergency room neutropenic fever.      Problem List with Assessment and Plan:    Neutropenic fever  -Neutropenia due to recent chemo.  -Unclear infectious source.  -Has been on oral antibiotics with levofloxacin and doxycycline in the outpatient setting.  Also on topical clindamycin for chronic right leg wound.  -Started on IV vancomycin and Zosyn in the ER.  -currently afebrile , cultures NTD , will continue abx , follow cultures      Pancytopenia.  High-grade sarcoma on recent chemo   - CBC on arrival to ED :hgb  7.8 , wbc 1.3 , Neutrophil 0.0 ,    - Currently Hgb is 6.9 , will transfuse a unit of blood , monitor counts   - continue to monitor counts , Oncology consulted      Diabetes mellitus type 2.  - not on medication at home   -Continue sliding scale insulin     Hyponatremia.  -Mild. Monitor for now     Chronic right leg lesion   -Due to high-grade sarcoma.  -Pain medications as needed.    Sleep apnea.  -Use CPAP while sleeping.          Plan for today:    Blood transfusion       VTE Prophylaxis: Ambulate every shift  Code Status: Full Code  Diet: Combination Diet Regular Diet Adult    [place] Catheter: Not present    Family updated today: No     Disposition: may discharge home in 2-3 days            Interval History (Subjective):        Patient is seen and examined by me today and medical record reviewed.Overnight events noted and care discussed with nursing staff.He feels okay today. No fever or chills. Blood transfusion consent obtained                   Physical Exam:        Last Vital Signs:  /49 (BP Location: Left arm)   Pulse 92   Temp 100.4  F (38  C) (Oral)   Resp 20   Wt 135.3 kg (298 lb 4.8 oz)   SpO2 93%   BMI 41.62 kg/m      I/O last 3 completed shifts:  In: -   Out: 200 [Urine:200]    Wt Readings from Last 5 Encounters:   02/12/23 135.3 kg (298 lb 4.8 oz)   02/07/23 134.7 kg (297 lb)   02/06/23 136.6 kg (301 lb 3.2 oz)   01/27/23 137.4 kg (303 lb)   01/26/23 137.9 kg (304 lb)        Constitutional: Awake, alert, cooperative, no apparent distress     Respiratory: Clear to auscultation bilaterally, no crackles or wheezing   Cardiovascular: Regular rate and rhythm, normal S1 and S2, and no murmur noted   Abdomen: Normal bowel sounds, soft, non-distended, non-tender   Skin: No new rashes, no cyanosis, dry to touch   Neuro: Alert with  no new focal weakness   Extremities: No edema   Other(s):        All other systems: Negative          Medications:        All current medications were reviewed with changes reflected in problem list.         Data:      All new lab and imaging data was reviewed.      Data reviewed today: I reviewed all new labs and imaging results over the last 24 hours. I personally reviewed       Recent Labs   Lab 02/12/23  0632 02/11/23 2016 02/11/23  1845   WBC 1.8* 1.3* 1.1*   HGB 6.9* 7.5* 7.8*   HCT 23.6* 25.3* 26.4*   MCV 77* 78 76*   * 137* 149*     No results for input(s): CULT in the last 168 hours.  Recent Labs   Lab 02/12/23  1106 02/12/23  0632 02/12/23  0006 02/11/23  1843 02/09/23  1331 02/07/23  1303 02/06/23  0753 02/06/23  0705   NA  --  131*  --  134* 134* 133*  --  136   POTASSIUM  --  3.8  --  4.1 3.9 4.2  --  4.1   CHLORIDE  --  98  --  97* 101  100  --  103   CO2  --  24  --  25 23 22  --  25   ANIONGAP  --  9  --  12 10 11  --  8   * 148* 130* 171* 164* 170*   < > 131*   BUN  --  5.2*  --  7.0 11.8 14.4  --  11.9   CR  --  0.68  --  0.65* 0.64* 0.83  --  0.74   GFRESTIMATED  --  >90  --  >90 >90 >90  --  >90   DORIAN  --  7.9*  --  8.3* 8.8 8.8  --  8.2*   MAG  --   --   --   --  2.0 2.3  --  2.4*   PHOS  --   --   --   --  2.1* 1.5*  --  2.2*   PROTTOTAL  --  6.1*  --  7.5 7.3 7.3  --  6.4   ALBUMIN  --  2.6*  --  3.0* 2.9* 2.9*  --  2.6*   BILITOTAL  --  0.5  --  0.4 0.3 0.5  --  0.5   ALKPHOS  --  74  --  87 81 82  --  58   AST  --  15  --  14 13 12  --  15   ALT  --  8*  --  11 8* 8*  --  8*    < > = values in this interval not displayed.       Recent Labs   Lab 02/12/23  1106 02/12/23  0632 02/12/23  0006 02/11/23  1843 02/09/23  1331   * 148* 130* 171* 164*       No results for input(s): INR in the last 168 hours.      No results for input(s): TROPONIN, TROPI, TROPR in the last 168 hours.    Invalid input(s): TROP, TROPONINIES    Recent Results (from the past 48 hour(s))   XR Chest 2 Views    Narrative    EXAM: XR CHEST 2 VIEWS  LOCATION: [facility]  DATE/TIME: 2/11/2023 7:32 PM    INDICATION: Cough  COMPARISON: 4/24/2022      Impression    IMPRESSION: Infusion port tip in the mid SVC. Lungs are clear. Normal heart size and pulmonary vascularity. No pleural effusions.       COVID Status:  COVID-19 PCR Results    COVID-19 PCR Results 3/9/21 3/9/21 4/24/22 1/27/23 2/11/23    [id number]      COVID-19 Virus PCR to [facility] - Result Test received-See reflex to IDDL test SARS CoV2 (COVID-19) Virus RT-PCR       COVID-19 Virus PCR to [facility] - Source Nasopharyngeal       SARS-CoV-2 Virus Specimen Source  Nasopharyngeal      SARS-CoV-2 PCR Result  NEGATIVE      SARS CoV2 PCR   Negative Negative Negative      Comments are available for some flowsheets but are not being displayed.         COVID-19 Antibody Results, Testing for  Immunity    COVID-19 Antibody Results, Testing for Immunity   No data to display.              Disclaimer: This note consists of symbols derived from keyboarding, dictation and/or voice recognition software. As a result, there may be errors in the script that have gone undetected. Please consider this when interpreting information found in this chart.

## 2023-02-12 NOTE — PROVIDER NOTIFICATION
Dr. Lazo notified critical result- Hemoglobin 6.9.  Dr. Lazo called back for updates. Dr. Lazo will review pt first this am.

## 2023-02-12 NOTE — PLAN OF CARE
Goal Outcome Evaluation:       End of Shift Summary  For vital signs and complete assessments, please see documentation flowsheets.     Pertinent assessments: Pt A/Ox4. HR tachy. Denies nausea. Report SOBE. Tylenol and Dilaudid IV given for pain right thigh. Lump noted right inner thigh, no active drainage at this time, can squeeze out puss per his report. Up SBA.     Major Shift Events: 1 unit of blood transfused, added home carafate and IV protonix for c/o heartburn, urology consult due to burning and some intermittent blood in the urine, pain and pall consult added. MS contin added. Neupogen every day ordered.     Treatment Plan: Zosyn, Vanco IV, IVF, symptom management and monitor blood count. Hem/Onc, WOC, VAD counsult- now with blood return to both lumens of the CVC- heparin locks ordered    Bedside Nurse: Aniyah Gutiérrez RN

## 2023-02-12 NOTE — PLAN OF CARE
Goal Outcome Evaluation:       To Do:  End of Shift Summary  For vital signs and complete assessments, please see documentation flowsheets.     Pertinent assessments: Pt A/Ox4. HR tachy. Denies nausea. Report SOBE. Tylenol and Dilaudid IV given for pain right thigh. Lump noted right inner thigh. Up SBA.   Major Shift Events: Admit to unit. Hgb 6.9, Dr Lazo notified.  Treatment Plan: Zosyn, Vanc IV, IVF, symptom management and monitor blood count. Onco and WOC counsult  Bedside Nurse: Dahlia Salgado RN

## 2023-02-12 NOTE — CONSULTS
[facility]    Hematology/Oncology Consult Note      Date of Admission:  2/11/2023  Date of Consult:  02/12/23  Reason for Consult: High-grade sarcoma of the right posterior thigh  Primary Oncologist:  Michael Liang     ASSESSMENT/PLAN:  Antonio Canseco is a 52 year old male with history of high-grade sarcoma currently undergoing neoadjuvant chemotherapy cycle 1 and 2/6/2023 admitted with neutropenic fevers and intractable pain    1 Neutropenic fever; continue current antibiotics. Will start neupogen, he did receive Neulasta on 2/7/2023 which may take a while to be effective therefore in this current state of neutropenic fever and high risk I would recommend starting Neupogen.  Stop if ANC greater than 3000 for 2 days or greater than 5000 x 1 day    2.  High-grade sarcoma status post neoadjuvant chemotherapy with ifosfamide Doxil on 2/6/2023    3.  Pain control: Would recommend palliative care consultation for pain management tomorrow.  For now continue Dilaudid as needed.  He was going to start a longer acting morphine I will start him today.     4 anemia check iron studies, likely AOCD due to chemotherapy, s/p 1 unit of blood, transfuse if H/h <7.0    5 hematuria history of hematuria prior to chemo: urology consult . ua has moderate amount of blood    6 GERD maximize protonix bid and carafate qid, if cough persists will get pulmonary involved   7 cough: see above chest x-ray negative     Shin will see the patient tomorrow. Thank you for the consult    Total time spent on day of visit, including review of tests, obtaining/reviewing separately obtained history, ordering medications/tests/procedures, communicating with PCP/consultants, and documenting in electronic medical record: 80 minutes    Jef De La Cruz MD       HISTORY OF PRESENT ILLNESS: Antonio Canseco is a 52 year old with past medical history significant for type 2 diabetes and recently diagnosed with a right posterior thigh  high-grade sarcoma currently undergoing neoadjuvant chemotherapy withDoxil/ifosfamide Cycle 1 given on 2/6/2023 came to the emergency room yesterday with fever, chest pain and back pain.  I received a phone call on call yesterday that his wife was given a long-acting morphine prescription however the pharmacy did not have it available in stock and they were unable to get it on Monday.  Due to intractable pain issues he presented to the emergency room yesterday. Temperature went up to 102 .    He was on oral antibiotics with Levaquin and doxycycline and topical clindamycin due to chronic leg wounds.His laboratory data completed yesterday showed a white count of 1.1 hemoglobin of 7.8 platelet count of 149, ANC today is at 200 hemoglobin at 6.9 he was given 1 unit of blood. CXR negative. UA negative blood cultures pending Exudative pharyngo-tonsillitis is noted. Anterior cervical nodes are present.  Ears are normal, chest is clear.  Rapid strep test is negative, influenza negative. Started on vancomycin and zosyn    Of note he received neulasta on 02/07/2023.    Patient states pain 5/10 with dilaudid, he has chronic cough for a year (off lisinopril) a little better but still cough fits.  GERD is not well controlled.  Has hematuria prior to starting chemotherapy. States has dysuria now but improving. ua negative for bacteria but had blood in it    REVIEW OF SYSTEMS:   14 point ROS was reviewed and is negative other than as noted above in HPI.       MEDICATIONS:  Current Facility-Administered Medications   Medication     acetaminophen (TYLENOL) tablet 650 mg    Or     acetaminophen (TYLENOL) Suppository 650 mg     bisacodyl (DULCOLAX) suppository 10 mg     glucose gel 15-30 g    Or     dextrose 50 % injection 25-50 mL    Or     glucagon injection 1 mg     heparin 100 UNIT/ML injection 5-10 mL     heparin lock flush 10 UNIT/ML injection 5-10 mL     heparin lock flush 10 UNIT/ML injection 5-10 mL     HYDROmorphone  (DILAUDID) half-tab 1 mg     HYDROmorphone (DILAUDID) injection 0.2 mg     HYDROmorphone (DILAUDID) injection 0.4 mg     HYDROmorphone (DILAUDID) tablet 2 mg     hydrOXYzine (ATARAX) tablet 25 mg     insulin aspart (NovoLOG) injection (RAPID ACTING)     insulin aspart (NovoLOG) injection (RAPID ACTING)     lidocaine (LMX4) cream     lidocaine 1 % 0.1-1 mL     magnesium hydroxide (MILK OF MAGNESIA) suspension 30 mL     melatonin tablet 1 mg     ondansetron (ZOFRAN ODT) ODT tab 4 mg    Or     ondansetron (ZOFRAN) injection 4 mg     piperacillin-tazobactam (ZOSYN) infusion 3.375 g     prochlorperazine (COMPAZINE) injection 10 mg    Or     prochlorperazine (COMPAZINE) tablet 10 mg    Or     prochlorperazine (COMPAZINE) suppository 25 mg     senna-docusate (SENOKOT-S/PERICOLACE) 8.6-50 MG per tablet 1 tablet    Or     senna-docusate (SENOKOT-S/PERICOLACE) 8.6-50 MG per tablet 2 tablet     sodium chloride (PF) 0.9% PF flush 10-20 mL     sodium chloride (PF) 0.9% PF flush 10-20 mL     sodium chloride (PF) 0.9% PF flush 10-20 mL     sodium chloride (PF) 0.9% PF flush 3 mL     sodium chloride (PF) 0.9% PF flush 3 mL     sodium chloride 0.9% infusion     vancomycin (VANCOCIN) 1,500 mg in 0.9% NaCl 250 mL intermittent infusion         ALLERGIES:  Allergies   Allergen Reactions     Emend [Aprepitant] Shortness Of Breath     Couldn't breathe and started to pass out during 1st administration      Kiwi Itching         PAST MEDICAL HISTORY:  Past Medical History:   Diagnosis Date     Acrochordon 02/11/2021     CARDIOVASCULAR SCREENING; LDL GOAL LESS THAN 160 03/27/2012     Colon adenoma      Controlled type 2 diabetes mellitus without complication, without long-term current use of insulin (H) 09/16/2019    X 1     Cough 02/04/2014     Degeneration of thoracic intervertebral disc 12/11/2013     Dysfunction of both eustachian tubes 04/12/2019     Elevated blood pressure 12/22/2014     Elevated hemoglobin A1c 09/16/2019    X 1      Gastroesophageal reflux disease      Hamstring strain, right, subsequent encounter 2022     Hepatic cyst 2018     Hepatic steatosis 2013     History of colonic polyps 2013     History of drainage of abscess 2019     Hypertension      Irritable bowel syndrome without diarrhea 2018     Low libido 2019     Lumbar radiculopathy 2022     Microscopic hematuria 2013     Nephrolithiasis      Obesity 2012     BRIAN on CPAP 2012     Other irritable bowel syndrome 2018     Pain in thoracic spine 2013     Pulmonary nodule 2018     Right-sided chest pain 2018     Sarcoma (H) 2023     Sleep apnea      Tinea pedis of both feet 2021     Tinnitus, unspecified laterality 2019     Uncontrolled diabetes mellitus with hyperglycemia, without long-term current use of insulin (H) 2023         PAST SURGICAL HISTORY:  Past Surgical History:   Procedure Laterality Date     COLONOSCOPY       COLONOSCOPY N/A 2021    Procedure: COLONOSCOPY (fv);  Surgeon: Ean Alcantara MD;  Location: [facility] OR     CYSTOSCOPY  2014    Procedure: CYSTOSCOPY;   Video Cystoscopy with Exam Under Anesthesia;  Surgeon: Chente Moeller MD;  Location: [facility] OR     IR CVC TUNNEL PLACEMENT > 5 YRS OF AGE  2023     LEG SURGERY Left 2019    Suregy r/t infection     wisdom teeth           SOCIAL HISTORY:  Social History     Socioeconomic History     Marital status:      Spouse name: Yari     Number of children: 2     Years of education: Not on file     Highest education level: Some college, no degree   Occupational History     Occupation: printing   Tobacco Use     Smoking status: Former     Packs/day: 1.00     Years: 25.00     Pack years: 25.00     Types: Cigarettes     Start date: 1988     Quit date: 2013     Years since quittin.8     Smokeless tobacco: Never     Tobacco comments:     1 sd/day   Vaping Use     Vaping  "Use: Never used   Substance and Sexual Activity     Alcohol use: Not Currently     Comment: one drink every 6 months     Drug use: No     Sexual activity: Yes     Partners: Female   Other Topics Concern     Parent/sibling w/ CABG, MI or angioplasty before 65F 55M? Not Asked   Social History Narrative     Not on file     Social Determinants of Health     Financial Resource Strain: Low Risk      Difficulty of Paying Living Expenses: Not hard at all   Food Insecurity: No Food Insecurity     Worried About Running Out of Food in the Last Year: Never true     Ran Out of Food in the Last Year: Never true   Transportation Needs: No Transportation Needs     Lack of Transportation (Medical): No     Lack of Transportation (Non-Medical): No   Physical Activity: Not on file   Stress: Not on file   Social Connections: Not on file   Intimate Partner Violence: Not on file   Housing Stability: Not on file         FAMILY HISTORY:  Family History   Problem Relation Age of Onset     Family History Negative Mother      Obesity Mother      Cancer Father         lymphoma     Other Cancer Father      Other Cancer Paternal Grandfather          PHYSICAL EXAM:  Vital signs:  Temp: 98.7  F (37.1  C) Temp src: Oral BP: 120/54 Pulse: 92   Resp: 22 SpO2: 92 % O2 Device: None (Room air)     Weight: 135.3 kg (298 lb 4.8 oz)  Estimated body mass index is 41.62 kg/m  as calculated from the following:    Height as of 23: 1.803 m (5' 10.98\").    Weight as of this encounter: 135.3 kg (298 lb 4.8 oz).    ECO  GENERAL/CONSTITUTIONAL: No acute distress.  EYES: Pupils are equal, round, and react to light and accommodation. Extraocular movements intact.  No scleral icterus.  ENT/MOUTH: Neck supple. Oropharynx clear, no mucositis.  LYMPH: No anterior cervical, posterior cervical, supraclavicular, axillary or inguinal adenopathy.   RESPIRATORY: Clear to auscultation bilaterally. No crackles or wheezing.   CARDIOVASCULAR: Regular rate and rhythm without " murmurs, gallops, or rubs.  GASTROINTESTINAL: No hepatosplenomegaly, masses, or tenderness. The patient has normal bowel sounds. No guarding.  No distention.  MUSCULOSKELETAL: + mass about 8 cm posterior right thigh NEUROLOGIC: Cranial nerves II-XII are intact. Alert, oriented, answers questions appropriately.  INTEGUMENTARY: No rashes or jaundice.         LABS:  CBC RESULTS:   Recent Labs   Lab Test 02/12/23  0632   WBC 1.8*   RBC 3.07*   HGB 6.9*   HCT 23.6*   MCV 77*   MCH 22.5*   MCHC 29.2*   RDW 17.4*   *       Recent Labs   Lab Test 02/12/23  0632 02/12/23  0006 02/11/23  1843   *  --  134*   POTASSIUM 3.8  --  4.1   CHLORIDE 98  --  97*   CO2 24  --  25   ANIONGAP 9  --  12   * 130* 171*   BUN 5.2*  --  7.0   CR 0.68  --  0.65*   DORIAN 7.9*  --  8.3*         PATHOLOGY:  noted    IMAGING:  Noted and reviewed in epic          Thank you for the opportunity to participate in this patient's care.  Please call with any questions.    Jef De La Cruz MD  Hematology/Oncology  [facility]

## 2023-02-12 NOTE — ED NOTES
"[facility]  ED Nurse Handoff Report    Antonio Canseco is a 52 year old male   ED Chief complaint: Fever, Chest Pain, and Back Pain  . ED Diagnosis:   Final diagnoses:   Neutropenic fever (H)   Sarcoma (H)   Antineoplastic chemotherapy induced pancytopenia (H)     Allergies:   Allergies   Allergen Reactions    Emend [Aprepitant] Shortness Of Breath     Couldn't breathe and started to pass out during 1st administration     Kiwi Itching       Code Status: Full Code  Activity level - Baseline/Home:  Independent. Activity Level - Current:   Independent. Lift room needed: No. Bariatric: No   Needed: No   Isolation: Yes. Infection: neutropenic .     Vital Signs:   Vitals:    02/11/23 2111 02/11/23 2141 02/12/23 0049 02/12/23 0528   BP:   124/74    Pulse:   103    Resp:       Temp:    (!) 100.9  F (38.3  C)   TempSrc:    Oral   SpO2: 91% 92% 95%        Cardiac Rhythm:  ,      Pain level:    Patient confused: No. Patient Falls Risk: Yes.   Elimination Status: Has voided   Patient Report - Initial Complaint: fever. Focused Assessment: Chief Complaint:  Fever, Chest Pain, and Back Pain     The history is provided by the patient and the spouse.      Antonio Canseco is a 52 year old male with a history of type 2 diabetes, colon adenoma, sarcoma, hepatic steatosis, hypertension, and pulmonary nodule who presents with fever, chest pain, and back pain. Patient states he completed a round of chemotherapy about 5 days ago on 2/6/23. He endorses upper, tight, throbbing chest pain that began today. He states he could \"feel his heart beat.\" He also endorses dysuria that began today. His wife notes he mentioned he had a sore throat. He reports he has constipation and states his appetite is relatively okay. He reports no diarrhea, cough, mucus, or rashes.     Independent Historian:   Patient and his wife     Review of External Notes: I reviewed his most recent clinic visit note     ROS:  Review of Systems "   Constitutional: Positive for fever.   HENT: Positive for sore throat. Negative for congestion.    Respiratory: Negative for cough.    Cardiovascular: Positive for chest pain.   Gastrointestinal: Positive for constipation. Negative for diarrhea.   Genitourinary: Positive for dysuria.   Musculoskeletal: Positive for back pain.   Skin: Negative for rash.   All other systems reviewed and are negative.     Allergies:  Emend    Tests Performed: labs, imaging . Abnormal Results:   XR Chest 2 Views   Final Result   IMPRESSION: Infusion port tip in the mid SVC. Lungs are clear. Normal heart size and pulmonary vascularity. No pleural effusions.        Labs Ordered and Resulted from Time of ED Arrival to Time of ED Departure   COMPREHENSIVE METABOLIC PANEL - Abnormal       Result Value    Sodium 134 (*)     Potassium 4.1      Chloride 97 (*)     Carbon Dioxide (CO2) 25      Anion Gap 12      Urea Nitrogen 7.0      Creatinine 0.65 (*)     Calcium 8.3 (*)     Glucose 171 (*)     Alkaline Phosphatase 87      AST 14      ALT 11      Protein Total 7.5      Albumin 3.0 (*)     Bilirubin Total 0.4      GFR Estimate >90     PROCALCITONIN - Abnormal    Procalcitonin 0.32 (*)    CBC WITH PLATELETS AND DIFFERENTIAL - Abnormal    WBC Count 1.1 (*)     RBC Count 3.46 (*)     Hemoglobin 7.8 (*)     Hematocrit 26.4 (*)     MCV 76 (*)     MCH 22.5 (*)     MCHC 29.5 (*)     RDW 17.4 (*)     Platelet Count 149 (*)    UA MACROSCOPIC WITH REFLEX TO MICRO AND CULTURE - Abnormal    Color Urine Yellow      Appearance Urine Clear      Glucose Urine Negative      Bilirubin Urine Negative      Ketones Urine Trace (*)     Specific Gravity Urine 1.023      Blood Urine Moderate (*)     pH Urine 7.5 (*)     Protein Albumin Urine 70 (*)     Urobilinogen Urine 6.0 (*)     Nitrite Urine Negative      Leukocyte Esterase Urine Negative      Mucus Urine Present (*)     RBC Urine 120 (*)     WBC Urine 3      Squamous Epithelials Urine 1      Hyaline Casts Urine  3 (*)    DIFFERENTIAL - Abnormal    % Neutrophils 4      % Lymphocytes 58      % Monocytes 38      % Eosinophils 0      % Basophils 0      NRBCs per 100 WBC 1 (*)     Absolute Neutrophils 0.0 (*)     Absolute Lymphocytes 0.6 (*)     Absolute Monocytes 0.4      Absolute Eosinophils 0.0      Absolute Basophils 0.0      Absolute NRBCs 0.0      RBC Morphology Confirmed RBC Indices      Platelet Assessment        Value: Automated Count Confirmed. Platelet morphology is normal.   CBC WITH PLATELETS AND DIFFERENTIAL - Abnormal    WBC Count 1.3 (*)     RBC Count 3.24 (*)     Hemoglobin 7.5 (*)     Hematocrit 25.3 (*)     MCV 78      MCH 23.1 (*)     MCHC 29.6 (*)     RDW 17.5 (*)     Platelet Count 137 (*)    DIFFERENTIAL - Abnormal    % Neutrophils 3      % Lymphocytes 54      % Monocytes 41      % Eosinophils 0      % Basophils 1      % Metamyelocytes 1      Absolute Neutrophils 0.0 (*)     Absolute Lymphocytes 0.7 (*)     Absolute Monocytes 0.5      Absolute Eosinophils 0.0      Absolute Basophils 0.0      Absolute Metamyelocytes 0.0      RBC Morphology Confirmed RBC Indices      Platelet Assessment        Value: Automated Count Confirmed. Platelet morphology is normal.   GLUCOSE BY METER - Abnormal    GLUCOSE BY METER POCT 130 (*)    TROPONIN T, HIGH SENSITIVITY - Normal    Troponin T, High Sensitivity 15     LACTIC ACID WHOLE BLOOD - Normal    Lactic Acid 1.2     INFLUENZA A/B & SARS-COV2 PCR MULTIPLEX - Normal    Influenza A PCR Negative      Influenza B PCR Negative      RSV PCR Negative      SARS CoV2 PCR Negative     STREPTOCOCCUS A RAPID SCREEN W REFELX TO PCR - Normal    Group A Strep antigen Negative     GROUP A STREPTOCOCCUS PCR THROAT SWAB - Normal    Group A strep by PCR Not Detected     GLUCOSE MONITOR NURSING POCT   GLUCOSE MONITOR NURSING POCT   GLUCOSE MONITOR NURSING POCT   GLUCOSE MONITOR NURSING POCT   GLUCOSE MONITOR NURSING POCT   COMPREHENSIVE METABOLIC PANEL   BLOOD CULTURE   BLOOD CULTURE     .    Treatments provided: see MAR  Family Comments: wife at bedside  OBS brochure/video discussed/provided to patient:  No  ED Medications:   Medications   sodium chloride (PF) 0.9% PF flush 10-20 mL (has no administration in time range)   sodium chloride (PF) 0.9% PF flush 10-20 mL (has no administration in time range)   sodium chloride (PF) 0.9% PF flush 10-20 mL (10 mLs Intracatheter Not Given 2/11/23 2232)   heparin lock flush 10 UNIT/ML injection 5-10 mL (5 mLs Intracatheter Given 2/11/23 1940)   heparin lock flush 10 UNIT/ML injection 5-10 mL (5 mLs Intracatheter Given 2/11/23 1939)   heparin 100 UNIT/ML injection 5-10 mL ( Intracatheter Canceled Entry 2/11/23 2139)   piperacillin-tazobactam (ZOSYN) infusion 3.375 g (3.375 g Intravenous New Bag 2/12/23 0448)   lidocaine 1 % 0.1-1 mL (has no administration in time range)   lidocaine (LMX4) cream (has no administration in time range)   sodium chloride (PF) 0.9% PF flush 3 mL (3 mLs Intracatheter Not Given 2/11/23 2356)   sodium chloride (PF) 0.9% PF flush 3 mL (has no administration in time range)   melatonin tablet 1 mg (has no administration in time range)   sodium chloride 0.9% infusion ( Intravenous Rate/Dose Verify 2/12/23 0452)   acetaminophen (TYLENOL) tablet 650 mg (has no administration in time range)     Or   acetaminophen (TYLENOL) Suppository 650 mg (has no administration in time range)   HYDROmorphone (DILAUDID) half-tab 1 mg (has no administration in time range)   HYDROmorphone (DILAUDID) tablet 2 mg (2 mg Oral Given 2/12/23 0448)   HYDROmorphone (DILAUDID) injection 0.2 mg (has no administration in time range)   HYDROmorphone (DILAUDID) injection 0.4 mg (has no administration in time range)   senna-docusate (SENOKOT-S/PERICOLACE) 8.6-50 MG per tablet 1 tablet (has no administration in time range)     Or   senna-docusate (SENOKOT-S/PERICOLACE) 8.6-50 MG per tablet 2 tablet (has no administration in time range)   bisacodyl (DULCOLAX) suppository 10 mg  (has no administration in time range)   magnesium hydroxide (MILK OF MAGNESIA) suspension 30 mL (has no administration in time range)   ondansetron (ZOFRAN ODT) ODT tab 4 mg (has no administration in time range)     Or   ondansetron (ZOFRAN) injection 4 mg (has no administration in time range)   prochlorperazine (COMPAZINE) injection 10 mg (has no administration in time range)     Or   prochlorperazine (COMPAZINE) tablet 10 mg (has no administration in time range)     Or   prochlorperazine (COMPAZINE) suppository 25 mg (has no administration in time range)   glucose gel 15-30 g (has no administration in time range)     Or   dextrose 50 % injection 25-50 mL (has no administration in time range)     Or   glucagon injection 1 mg (has no administration in time range)   insulin aspart (NovoLOG) injection (RAPID ACTING) (has no administration in time range)   insulin aspart (NovoLOG) injection (RAPID ACTING) (1 Units Subcutaneous Not Given 2/12/23 0016)   hydrOXYzine (ATARAX) tablet 25 mg ( Oral Canceled Entry 2/12/23 0452)   vancomycin (VANCOCIN) 1,500 mg in 0.9% NaCl 250 mL intermittent infusion (has no administration in time range)   0.9% sodium chloride BOLUS (0 mLs Intravenous Stopped 2/11/23 2209)   ondansetron (ZOFRAN) injection 4 mg (4 mg Intravenous Not Given 2/11/23 2231)   HYDROmorphone (PF) (DILAUDID) injection 0.5 mg (0.5 mg Intravenous Given 2/11/23 2139)   0.9% sodium chloride BOLUS (0 mLs Intravenous Stopped 2/11/23 2138)   vancomycin (VANCOCIN) 2,000 mg in sodium chloride 0.9 % 500 mL intermittent infusion (0 mg Intravenous Stopped 2/12/23 0047)   piperacillin-tazobactam (ZOSYN) intermittent infusion 4.5 g (0 g Intravenous Stopped 2/11/23 2239)   hydrOXYzine (ATARAX) tablet 25 mg (25 mg Oral Given 2/12/23 0047)     Drips infusing:  Yes  For the majority of the shift, the patient's behavior Green. Interventions performed were n/a .    Sepsis treatment initiated: No     Patient tested for COVID 19 prior to  admission: YES    ED Nurse Name/Phone Number: Carol Landrum RN,   10:25 PM  RECEIVING UNIT ED HANDOFF REVIEW    Above ED Nurse Handoff Report was reviewed: Yes  Reviewed by: Dahlia Salgado RN on February 12, 2023 at 5:25 AM

## 2023-02-12 NOTE — ED TRIAGE NOTES
Pt arrives with fever, chest pain, back pain, and right leg pain near tumor. Pt took oxycodone 10 mg at 1630. Wife states fever as high as 101.8 ABCs intact and AOx4.     Triage Assessment     Row Name 02/11/23 6178       Triage Assessment (Adult)    Airway WDL WDL       Respiratory WDL    Respiratory WDL WDL       Cardiac WDL    Cardiac WDL X       Chest Pain Assessment    Associated Signs/Symptoms tachycardia       Peripheral/Neurovascular WDL    Peripheral Neurovascular WDL WDL

## 2023-02-12 NOTE — PHARMACY-VANCOMYCIN DOSING SERVICE
"Pharmacy Vancomycin Initial Note  Date of Service 2023  Patient's  1970  52 year old, male    Indication: Febrile Neutropenia    Current estimated CrCl = Estimated Creatinine Clearance: 186.3 mL/min (A) (based on SCr of 0.65 mg/dL (L)).    Creatinine for last 3 days  2023:  1:31 PM Creatinine 0.64 mg/dL  2023:  6:43 PM Creatinine 0.65 mg/dL    Recent Vancomycin Level(s) for last 3 days  No results found for requested labs within last 72 hours.      Vancomycin IV Administrations (past 72 hours)                   vancomycin (VANCOCIN) 2,000 mg in sodium chloride 0.9 % 500 mL intermittent infusion (mg) 2,000 mg New Bag 23 2240                Nephrotoxins and other renal medications (From now, onward)    Start     Dose/Rate Route Frequency Ordered Stop    23 0400  piperacillin-tazobactam (ZOSYN) infusion 3.375 g        Note to Pharmacy: For [facility], [facility] and [facility]: For Zosyn-naive patients, use the \"Zosyn initial dose + extended infusion\" order panel.    3.375 g  100 mL/hr over 30 Minutes Intravenous EVERY 6 HOURS 23 2313            Contrast Orders - past 72 hours (72h ago, onward)    None          [employer] Prediction of Planned Initial Vancomycin Regimen    Loading dose: 2000 mg  Regimen: 1500 mg IV every 12 hours.  Start time: 10:40 on 2023  after loading dose.   Exposure target: AUC24 (range)400-600 mg/L.hr   AUC24,ss: 538 mg/L.hr  Probability of AUC24 > 400: 71 %  Ctrough,ss: 13.6 mg/L  Probability of Ctrough,ss > 20: 33 %  Probability of nephrotoxicity (Lodise HAZEL ): 9 %        Plan:  1. Start vancomycin  1500 mg IV q12h  after loading dose.    2. Vancomycin monitoring method: AUC  3. Vancomycin therapeutic monitoring goal: 400-600 mg*h/L  4. Pharmacy will check vancomycin levels as appropriate in 1-3 Days.    5. Serum creatinine levels will be ordered daily for the first week of therapy and at least twice weekly for subsequent weeks.      Rajinder Dockery, [facility] "

## 2023-02-12 NOTE — PHARMACY-ADMISSION MEDICATION HISTORY
Admission medication history interview status for this patient is complete. See [facility] admission navigator for allergy information, prior to admission medications and immunization status.     Medication history interview done, indicate source(s): Patient and Family  Medication history resources (including written lists, pill bottles, clinic record): Past Encounters, Care Everywhere, SureScripts  Pharmacy: [facility] in [place]    Changes made to PTA medication list:  Added: none    Changed:   - oxycodone from 5 mg q4h to 15 mg q3h  - heparin lock flush 10 unit/mL SOLN injection from 3 ml per lumen (6 mL in 24 hour period) to 5 ml per lumen (10 mL in 24 hour period)     Reported as Not Taking:   - oxycodone 5 mg tablet (has two prescriptions, confirmed with patient's wife new regimen)   - ondansetron  - prochlorperazine   Removed: none    Actions taken by pharmacist (provider contacted, etc): will leave MD a message that med rec has been completed     Additional medication history information: patient had been taking tylenol and ibuprofen but was instructed by primary doctor not to take them any more. Patient's wife, Yari manages medications. Talked to Yari over the phone to confirm medication list as well as with Antonio. Antonio stated that the lidocaine patches weren't effective for his pain. He also stated that he was supposed to hold his lisinopril as well. Antonio's regimen of taking 15 mg of oxycodone q3h was confirmed with Yari. Antonio also stated that the doctors have been playing around with the oxycodone to see what regimen would best manage his pain.     Medication reconciliation/reorder completed by provider prior to medication history?  N   (Y/N)       Prior to Admission medications    Medication Sig Last Dose Taking? Auth Provider Long Term End Date   clindamycin (CLINDAMAX) 1 % external gel Apply topically 2 times daily , to HS lesion 2/11/2023 at am Yes Erika Mar PA-C     doxycycline  hyclate (VIBRA-TABS) 100 MG tablet Take 1 tablet (100 mg) by mouth daily 2/11/2023 Yes Erika Mar PA-C     DULoxetine (CYMBALTA) 60 MG capsule Take 1 capsule (60 mg) by mouth daily 2/11/2023 Yes Mynor Mason MD Yes    ferrous sulfate (FEROSUL) 325 (65 Fe) MG tablet Take 1 tablet (325 mg) by mouth daily (with breakfast) 2/11/2023 Yes Scheierl, Amber J, APRN CNP     folic acid (FOLVITE) 400 MCG tablet Take 1 tablet (400 mcg) by mouth daily 2/11/2023 Yes Erika Mar PA-C     gabapentin (NEURONTIN) 300 MG capsule Take 300 mg by mouth At Bedtime 2/11/2023 Yes Erika Mar PA-C Yes    heparin lock flush 10 UNIT/ML SOLN injection 3 mLs by Intracatheter route every 24 hours Flush each lumen with 3 mLs (total 6mL in 24 hours period)  Patient taking differently: 5 mLs by Intracatheter route every 24 hours Flush each lumen with 5 mLs (total 10mL in 24 hours period) 2/11/2023 Yes Erika Mar PA-C     levofloxacin (LEVAQUIN) 250 MG tablet Take 1 tablet (250 mg) by mouth daily 2/11/2023 Yes Erika Mar PA-C     lidocaine (LIDODERM) 5 % patch Place 1 patch onto the skin every 24 hours To prevent lidocaine toxicity, patient should be patch free for 12 hrs daily. 2/10/2023 Yes Scheierl, Amber J, APRN CNP     phosphorus tablet 250 mg 250 MG per tablet Take 2 tablets (500 mg) by mouth 3 times daily Take until directed otherwise 2/11/2023 at 2pm Yes Scheierl, Amber J, APRN CNP     sennosides (SENOKOT) 8.6 MG tablet Take 1 tablet by mouth daily as needed for constipation 2/11/2023 Yes Reported, Patient     blood glucose (NO BRAND SPECIFIED) test strip accuchek guide- Use to test blood sugar 1 times daily or as directed.   Mynor Mason MD     blood glucose monitoring (ACCU-CHEK FASTCLIX) lancets 1 each daily Accuchek guide   Mynor Mason, MD     lisinopril (ZESTRIL) 20 MG tablet Take 1 tablet (20 mg) by mouth daily , HOLD UNTIL INSTRUCTED TO RESUME BY OUTPATIENT  TEAM  Patient not taking: Reported on 2/7/2023 1/28/2023  Erika Mar PA-C Yes    metFORMIN (GLUCOPHAGE XR) 500 MG 24 hr tablet Take 2 tablets (1,000 mg) by mouth daily (with dinner) for 30 days 2/10/2023  Michael Laboy MD Yes 2/26/23   morphine (MS CONTIN) 15 MG CR tablet Take 1 tablet (15 mg) by mouth every 12 hours for 30 days maximum 2 tablet(s) per day   Michael Laboy MD  3/12/23   naloxone (NARCAN) 4 MG/0.1ML nasal spray Spray 1 spray (4 mg) into one nostril alternating nostrils as needed for opioid reversal every 2-3 minutes until assistance arrives   Erika Mar PA-C Yes    omeprazole 20 MG tablet Take 20 mg by mouth daily as needed 1/28/2023  Reported, Patient     ondansetron (ZOFRAN ODT) 4 MG ODT tab Take 1-2 tablets (4-8 mg) by mouth every 8 hours as needed for nausea or vomiting  Patient not taking: Reported on 2/12/2023 Not Taking  Erika Mar PA-C     oxyCODONE (ROXICODONE) 5 MG tablet Take 1 tablet (5 mg) by mouth every 6 hours as needed for pain  Patient not taking: Reported on 2/12/2023 Not Taking  Michael Laboy MD  2/21/23   oxyCODONE (ROXICODONE) 5 MG tablet Take 1-2 tablets (5-10 mg) by mouth every 4 hours as needed for severe pain (7-10)  Patient taking differently: Take 15 mg by mouth every 6 hours   Erika Mar PA-C     prochlorperazine (COMPAZINE) 10 MG tablet Take 0.5-1 tablets (5-10 mg) by mouth every 6 hours as needed for nausea or vomiting  Patient not taking: Reported on 2/7/2023   Erika Mar PA-C     sucralfate (CARAFATE) 1 GM/10ML suspension Take 10 mLs (1 g) by mouth 4 times daily as needed for nausea (or indigestion) 2/10/2023  Erika Mar PA-C

## 2023-02-12 NOTE — H&P
[facility]    History and Physical - Hospitalist Service       Date of Admission:  2/11/2023    Assessment & Plan      Antonio Canseco is a 52 year old male admitted on 2/11/2023. He has a past medical history significant for high-grade sarcoma, hypertension, diabetes mellitus type 2, GERD, and irritable bowel syndrome.  Hospitalized at the beginning of this month at [facility] for chemotherapy.  Discharged from [facility] on 2/6.  Had been on outpatient oral antibiotics with levofloxacin and doxycycline.  On topical clindamycin to chronic right leg wound.  He presented to emergency room with fevers.  Found to have neutropenic fevers.    Neutropenic fevers.  -Neutropenia due to recent chemo.  -Unclear infectious source.  -Has been on oral antibiotics with levofloxacin and doxycycline in the outpatient setting.  Also on topical clindamycin for chronic right leg wound.  -Started on IV vancomycin and Zosyn in the ER.  -Continue IV vancomycin and Zosyn.  -Monitor culture results.    Pancytopenia.  High-grade sarcoma.  -Oncology consult.  -Recheck CBC with differential in the morning.    Diabetes mellitus type 2.  -Start aspart insulin sliding scale.    Hyponatremia.  -Mild.  Sodium 134.  -Start gentle IV fluids overnight.  -Recheck metabolic panel tomorrow.    Chronic right leg wound.  -Due to high-grade sarcoma.  -Pain medications as needed.  -Antibiotics as above.  -Wound nurse consult.    Sleep apnea.  -Use CPAP while sleeping.       Diet: Combination Diet Regular Diet Adult    DVT Prophylaxis: Pneumatic Compression Devices  [place] Catheter: Not present  Lines: PRESENT             Cardiac Monitoring: None  Code Status: Full Code      Clinically Significant Risk Factors Present on Admission              # Hypoalbuminemia: Lowest albumin = 3 g/dL at 2/11/2023  6:43 PM, will monitor as appropriate  # Drug Induced Coagulation Defect: home medication list includes an  "anticoagulant medication    # Hypertension: home medication list includes antihypertensive(s)     # DMII: A1C = 11.7 % (Ref range: <5.7 %) within past 3 months    # Severe Obesity: Estimated body mass index is 41.44 kg/m  as calculated from the following:    Height as of 2/7/23: 1.803 m (5' 10.98\").    Weight as of 2/7/23: 134.7 kg (297 lb).           Disposition Plan      Expected Discharge Date: 02/13/2023                  Geovani Donovan DO  Hospitalist Service  [facility]  Securely message with [employer] (more info)  Text page via [facility] Paging/Directory     ______________________________________________________________________    Chief Complaint   Fevers.    History is obtained from the patient    History of Present Illness   Antonio Canseco is a 52 year old male who has a past medical history significant for high-grade sarcoma, hypertension, diabetes mellitus type 2, GERD, and irritable bowel syndrome.  Hospitalized at the beginning of this month at [facility] for chemotherapy.  Discharged from [facility] on 2/6.  Had been on outpatient oral antibiotics with levofloxacin and doxycycline.  On topical clindamycin to chronic right leg wound.  He has been having occasional fevers since discharge from the hospital on 2/6.  Fevers got worse today.  Does have chronic right leg pain.  Right leg pain seems to be getting worse.  He called his oncology office today.  Was directed to emergency room for further evaluation of fevers.  Pain medications at home will help his right leg pain only briefly.  Does feel that pain medications in the ER helped leg pain more.  No cough.  No shortness of breath.  Did have some dysuria.  Some pain when urinating.  No other acute complaints.      Past Medical History    Past Medical History:   Diagnosis Date     Acrochordon 02/11/2021     CARDIOVASCULAR SCREENING; LDL GOAL LESS THAN 160 03/27/2012     Colon adenoma      Controlled type 2 " diabetes mellitus without complication, without long-term current use of insulin (H) 09/16/2019    X 1     Cough 02/04/2014     Degeneration of thoracic intervertebral disc 12/11/2013     Dysfunction of both eustachian tubes 04/12/2019     Elevated blood pressure 12/22/2014     Elevated hemoglobin A1c 09/16/2019    X 1     Gastroesophageal reflux disease      Hamstring strain, right, subsequent encounter 12/19/2022     Hepatic cyst 05/22/2018     Hepatic steatosis 12/11/2013     History of colonic polyps 12/11/2013     History of drainage of abscess 09/16/2019     Hypertension      Irritable bowel syndrome without diarrhea 06/01/2018     Low libido 04/12/2019     Lumbar radiculopathy 12/19/2022     Microscopic hematuria 12/19/2013     Nephrolithiasis      Obesity 03/27/2012     BRIAN on CPAP 03/27/2012     Other irritable bowel syndrome 05/22/2018     Pain in thoracic spine 05/09/2013     Pulmonary nodule 05/22/2018     Right-sided chest pain 04/13/2018     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     Sleep apnea      Tinea pedis of both feet 02/11/2021     Tinnitus, unspecified laterality 04/12/2019     Uncontrolled diabetes mellitus with hyperglycemia, without long-term current use of insulin (H) 01/23/2023       Past Surgical History   Past Surgical History:   Procedure Laterality Date     COLONOSCOPY       COLONOSCOPY N/A 03/12/2021    Procedure: COLONOSCOPY (fv);  Surgeon: Ean Alcantara MD;  Location: [facility] OR     CYSTOSCOPY  02/11/2014    Procedure: CYSTOSCOPY;   Video Cystoscopy with Exam Under Anesthesia;  Surgeon: Chente Moeller MD;  Location: [facility] OR     IR CVC TUNNEL PLACEMENT > 5 YRS OF AGE  1/27/2023     LEG SURGERY Left 2019    Suregy r/t infection     wisdom teeth         Prior to Admission Medications   Prior to Admission Medications   Prescriptions Last Dose Informant Patient Reported? Taking?   DULoxetine (CYMBALTA) 60 MG capsule   No No   Sig: Take 1 capsule (60 mg) by mouth daily   blood glucose (NO BRAND  SPECIFIED) test strip   No No   Sig: accuchek guide- Use to test blood sugar 1 times daily or as directed.   blood glucose monitoring (ACCU-CHEK FASTCLIX) lancets   No No   Si each daily Accuchek guide   clindamycin (CLINDAMAX) 1 % external gel   No No   Sig: Apply topically 2 times daily , to HS lesion   doxycycline hyclate (VIBRA-TABS) 100 MG tablet   No No   Sig: Take 1 tablet (100 mg) by mouth daily   ferrous sulfate (FEROSUL) 325 (65 Fe) MG tablet   No No   Sig: Take 1 tablet (325 mg) by mouth daily (with breakfast)   folic acid (FOLVITE) 400 MCG tablet   No No   Sig: Take 1 tablet (400 mcg) by mouth daily   gabapentin (NEURONTIN) 300 MG capsule   Yes No   Sig: Take 1 capsule (300 mg) by mouth 3 times daily   heparin lock flush 10 UNIT/ML SOLN injection   No No   Sig: 3 mLs by Intracatheter route every 24 hours Flush each lumen with 3 mLs (total 6mL in 24 hours period)   levofloxacin (LEVAQUIN) 250 MG tablet   No No   Sig: Take 1 tablet (250 mg) by mouth daily   lidocaine (LIDODERM) 5 % patch   No No   Sig: Place 1 patch onto the skin every 24 hours To prevent lidocaine toxicity, patient should be patch free for 12 hrs daily.   lisinopril (ZESTRIL) 20 MG tablet   Yes No   Sig: Take 1 tablet (20 mg) by mouth daily , HOLD UNTIL INSTRUCTED TO RESUME BY OUTPATIENT TEAM   Patient not taking: Reported on 2023   metFORMIN (GLUCOPHAGE XR) 500 MG 24 hr tablet   No No   Sig: Take 2 tablets (1,000 mg) by mouth daily (with dinner) for 30 days   morphine (MS CONTIN) 15 MG CR tablet   No No   Sig: Take 1 tablet (15 mg) by mouth every 12 hours for 30 days maximum 2 tablet(s) per day   naloxone (NARCAN) 4 MG/0.1ML nasal spray   No No   Sig: Spray 1 spray (4 mg) into one nostril alternating nostrils as needed for opioid reversal every 2-3 minutes until assistance arrives   Patient not taking: Reported on 2023   omeprazole 20 MG tablet   Yes No   Sig: Take 20 mg by mouth daily as needed   ondansetron (ZOFRAN ODT) 4  MG ODT tab   No No   Sig: Take 1-2 tablets (4-8 mg) by mouth every 8 hours as needed for nausea or vomiting   Patient not taking: Reported on 2023   oxyCODONE (ROXICODONE) 5 MG tablet   No No   Sig: Take 1-2 tablets (5-10 mg) by mouth every 4 hours as needed for severe pain (7-10)   oxyCODONE (ROXICODONE) 5 MG tablet   No No   Sig: Take 1 tablet (5 mg) by mouth every 6 hours as needed for pain   phosphorus tablet 250 mg 250 MG per tablet   No No   Sig: Take 2 tablets (500 mg) by mouth 3 times daily Take until directed otherwise   prochlorperazine (COMPAZINE) 10 MG tablet   No No   Sig: Take 0.5-1 tablets (5-10 mg) by mouth every 6 hours as needed for nausea or vomiting   Patient not taking: Reported on 2023   sennosides (SENOKOT) 8.6 MG tablet   Yes No   Sig: Take 1 tablet by mouth daily as needed for constipation   sucralfate (CARAFATE) 1 GM/10ML suspension   No No   Sig: Take 10 mLs (1 g) by mouth 4 times daily as needed for nausea (or indigestion)      Facility-Administered Medications: None        Review of Systems    The 10 point Review of Systems is negative other than noted in the HPI     Social History   I have reviewed this patient's social history and updated it with pertinent information if needed.  Social History     Tobacco Use     Smoking status: Former     Packs/day: 1.00     Years: 25.00     Pack years: 25.00     Types: Cigarettes     Start date: 1988     Quit date: 2013     Years since quittin.8     Smokeless tobacco: Never     Tobacco comments:     1 sd/day   Vaping Use     Vaping Use: Never used   Substance Use Topics     Alcohol use: Not Currently     Comment: one drink every 6 months     Drug use: No       Family History   I have reviewed this patient's family history and updated it with pertinent information if needed.  Family History   Problem Relation Age of Onset     Family History Negative Mother      Obesity Mother      Cancer Father         lymphoma     Other Cancer  Father      Other Cancer Paternal Grandfather        Allergies   Allergies   Allergen Reactions     Emend [Aprepitant] Shortness Of Breath     Couldn't breathe and started to pass out during 1st administration      Kiwi Itching        Physical Exam   Vital Signs: Temp: 100.2  F (37.9  C) Temp src: Oral BP: 128/71 Pulse: 107   Resp: 22 SpO2: 92 % O2 Device: None (Room air)    Weight: 0 lbs 0 oz    Gen:  NAD, A&Ox3.  Eyes:  PERRL, sclera anicteric.  OP:  MMM, no lesions.  Neck:  Supple.  CV:  Regular, no murmurs.  Lung:  CTA b/l, normal effort.  Ab:  +BS, soft.  Skin:  Warm, dry to touch.  Right leg dressing not removed.  Ext:  No pitting edema LE b/l.      Medical Decision Making       75 MINUTES SPENT BY ME on the date of service doing chart review, history, exam, documentation & further activities per the note.      Data     I have personally reviewed the following data over the past 24 hrs:    1.3 (L)  \   7.5 (L)   / 137 (L)     134 (L) 97 (L) 7.0 /  171 (H)   4.1 25 0.65 (L) \       ALT: 11 AST: 14 AP: 87 TBILI: 0.4   ALB: 3.0 (L) TOT PROTEIN: 7.5 LIPASE: N/A       Trop: 15 BNP: N/A       Procal: 0.32 (H) CRP: N/A Lactic Acid: 1.2         Imaging results reviewed over the past 24 hrs:   Recent Results (from the past 24 hour(s))   XR Chest 2 Views    Narrative    EXAM: XR CHEST 2 VIEWS  LOCATION: [facility]  DATE/TIME: 2/11/2023 7:32 PM    INDICATION: Cough  COMPARISON: 4/24/2022      Impression    IMPRESSION: Infusion port tip in the mid SVC. Lungs are clear. Normal heart size and pulmonary vascularity. No pleural effusions.

## 2023-02-12 NOTE — ED PROVIDER NOTES
"  History     Chief Complaint:  Fever, Chest Pain, and Back Pain     The history is provided by the patient and the spouse.      Antonio Canseco is a 52 year old male with a history of type 2 diabetes, colon adenoma, sarcoma, hepatic steatosis, hypertension, and pulmonary nodule who presents with fever, chest pain, and back pain. Patient states he completed a round of chemotherapy about 5 days ago on 2/6/23. He endorses upper, tight, throbbing chest pain that began today. He states he could \"feel his heart beat.\" He also endorses dysuria that began today. His wife notes he mentioned he had a sore throat. He reports he has constipation and states his appetite is relatively okay. He reports no diarrhea, cough, mucus, or rashes.    Independent Historian:   Patient and his wife    Review of External Notes: I reviewed his most recent clinic visit note    ROS:  Review of Systems   Constitutional: Positive for fever.   HENT: Positive for sore throat. Negative for congestion.    Respiratory: Negative for cough.    Cardiovascular: Positive for chest pain.   Gastrointestinal: Positive for constipation. Negative for diarrhea.   Genitourinary: Positive for dysuria.   Musculoskeletal: Positive for back pain.   Skin: Negative for rash.   All other systems reviewed and are negative.    Allergies:  Emend     Medications:    Protonix  Ellipta inhaler  Flonase  Cymbalta  Meloxicam  Heparin  Levaquin (current)  Doxycycline hyclate (current)  Metformin  Morphine  Phosphorous   Carafate  Neurontin  Omeprazole    Past Medical History:     Colon adenoma   Type 2 diabetes mellitus without complication, without long-term current use of insulin   Degeneration of thoracic intervertebral disc   Dysfunction of both eustachian tubes   GERD   Hepatic cyst   Hepatic steatosis   Colonic polyps  Hypertension   Irritable bowel syndrome without diarrhea   Lumbar radiculopathy  Nephrolithiasis   Obesity   BRIAN on CPAP   Pulmonary nodule   Sarcoma    Skin " tags    Past Surgical History:    Drainage of abscess   Colonoscopy   Cystoscopy   IR CVC tunnel placement  Left leg surgery  [place] teeth extraction     Family History:    Father: cancer  Mother: obesity    Social History:  Patient presents to the ED via private vehicle with his wife   PCP: Mynor Mason     Physical Exam     Patient Vitals for the past 24 hrs:   BP Temp Temp src Pulse Resp SpO2   02/11/23 2141 -- -- -- -- -- 92 %   02/11/23 2111 -- -- -- -- -- 91 %   02/11/23 2041 -- -- -- -- -- 94 %   02/11/23 1958 128/71 -- -- -- -- 94 %   02/11/23 1951 128/71 -- -- 107 -- 92 %   02/11/23 1913 123/72 -- -- -- -- 91 %   02/11/23 1900 110/79 -- -- 108 -- 91 %   02/11/23 1845 123/65 -- -- 105 -- 94 %   02/11/23 1823 138/71 100.2  F (37.9  C) Oral 115 22 93 %      Physical Exam  Constitutional: Vital signs reviewed as above  General: Alert, pleasant, and tired appearing.  HEENT: Dry mucous membranes.  Eyes: Pupils are equal, round, and reactive to light.   Neck: Normal range of motion  Cardiovascular: Tachycardic rate, Regular rhythm and normal heart sounds.  No MRG  Pulmonary/Chest: Tachypneic. Patient has no wheezes. Patient has no rales.   Gastrointestinal: Soft. Positive bowel sounds. No MRG.  Musculoskeletal/Extremities: Full ROM. Central line in right chest wall in place.  Endo: No pitting edema  Neurological: Alert, no focal deficits.  Skin: no erythema, warmth, abscess, or tenderness to the right groin and inner thigh.  Psychiatric: Pleasant    Emergency Department Course   ECG  ECG results from 02/11/23   EKG 12-lead, tracing only     Value    Systolic Blood Pressure     Diastolic Blood Pressure     Ventricular Rate 108    Atrial Rate 108    DC Interval 126    QRS Duration 98        QTc 479    P Axis 37    R AXIS -4    T Axis 47    Interpretation ECG      Sinus tachycardia  Otherwise normal ECG  When compared with ECG of 07-FEB-2023 15:21,  Nonspecific T wave abnormality, improved in Lateral  leads  QT has lengthened        Imaging:  XR Chest 2 Views   Final Result   IMPRESSION: Infusion port tip in the mid SVC. Lungs are clear. Normal heart size and pulmonary vascularity. No pleural effusions.         Report per radiology    Laboratory:  Labs Ordered and Resulted from Time of ED Arrival to Time of ED Departure   COMPREHENSIVE METABOLIC PANEL - Abnormal       Result Value    Sodium 134 (*)     Potassium 4.1      Chloride 97 (*)     Carbon Dioxide (CO2) 25      Anion Gap 12      Urea Nitrogen 7.0      Creatinine 0.65 (*)     Calcium 8.3 (*)     Glucose 171 (*)     Alkaline Phosphatase 87      AST 14      ALT 11      Protein Total 7.5      Albumin 3.0 (*)     Bilirubin Total 0.4      GFR Estimate >90     PROCALCITONIN - Abnormal    Procalcitonin 0.32 (*)    CBC WITH PLATELETS AND DIFFERENTIAL - Abnormal    WBC Count 1.1 (*)     RBC Count 3.46 (*)     Hemoglobin 7.8 (*)     Hematocrit 26.4 (*)     MCV 76 (*)     MCH 22.5 (*)     MCHC 29.5 (*)     RDW 17.4 (*)     Platelet Count 149 (*)    UA MACROSCOPIC WITH REFLEX TO MICRO AND CULTURE - Abnormal    Color Urine Yellow      Appearance Urine Clear      Glucose Urine Negative      Bilirubin Urine Negative      Ketones Urine Trace (*)     Specific Gravity Urine 1.023      Blood Urine Moderate (*)     pH Urine 7.5 (*)     Protein Albumin Urine 70 (*)     Urobilinogen Urine 6.0 (*)     Nitrite Urine Negative      Leukocyte Esterase Urine Negative      Mucus Urine Present (*)     RBC Urine 120 (*)     WBC Urine 3      Squamous Epithelials Urine 1      Hyaline Casts Urine 3 (*)    DIFFERENTIAL - Abnormal    % Neutrophils 4      % Lymphocytes 58      % Monocytes 38      % Eosinophils 0      % Basophils 0      NRBCs per 100 WBC 1 (*)     Absolute Neutrophils 0.0 (*)     Absolute Lymphocytes 0.6 (*)     Absolute Monocytes 0.4      Absolute Eosinophils 0.0      Absolute Basophils 0.0      Absolute NRBCs 0.0      RBC Morphology Confirmed RBC Indices      Platelet  Assessment        Value: Automated Count Confirmed. Platelet morphology is normal.   CBC WITH PLATELETS AND DIFFERENTIAL - Abnormal    WBC Count 1.3 (*)     RBC Count 3.24 (*)     Hemoglobin 7.5 (*)     Hematocrit 25.3 (*)     MCV 78      MCH 23.1 (*)     MCHC 29.6 (*)     RDW 17.5 (*)     Platelet Count 137 (*)    TROPONIN T, HIGH SENSITIVITY - Normal    Troponin T, High Sensitivity 15     LACTIC ACID WHOLE BLOOD - Normal    Lactic Acid 1.2     STREPTOCOCCUS A RAPID SCREEN W REFELX TO PCR - Normal    Group A Strep antigen Negative     INFLUENZA A/B & SARS-COV2 PCR MULTIPLEX   BLOOD CULTURE   BLOOD CULTURE   GROUP A STREPTOCOCCUS PCR THROAT SWAB      Emergency Department Course & Assessments:     Interventions:  Medications   ondansetron (ZOFRAN) injection 4 mg (has no administration in time range)   sodium chloride (PF) 0.9% PF flush 10-20 mL (has no administration in time range)   sodium chloride (PF) 0.9% PF flush 10-20 mL (has no administration in time range)   sodium chloride (PF) 0.9% PF flush 10-20 mL (has no administration in time range)   heparin lock flush 10 UNIT/ML injection 5-10 mL (5 mLs Intracatheter Given 2/11/23 1940)   heparin lock flush 10 UNIT/ML injection 5-10 mL (5 mLs Intracatheter Given 2/11/23 1939)   heparin 100 UNIT/ML injection 5-10 mL ( Intracatheter Canceled Entry 2/11/23 2139)   vancomycin (VANCOCIN) 2,000 mg in sodium chloride 0.9 % 500 mL intermittent infusion (has no administration in time range)   piperacillin-tazobactam (ZOSYN) intermittent infusion 4.5 g (has no administration in time range)   0.9% sodium chloride BOLUS (1,000 mLs Intravenous New Bag 2/11/23 1851)   HYDROmorphone (PF) (DILAUDID) injection 0.5 mg (0.5 mg Intravenous Given 2/11/23 2139)   0.9% sodium chloride BOLUS (0 mLs Intravenous Stopped 2/11/23 2138)      Independent Interpretation (X-rays, CTs, rhythm strip):  Chest x-ray negative    Social Determinants of Health affecting care:   None  Patient is currently  on chemo therapy and is pancytopenic    Assessments/Consultations/Discussion of Management or Tests:  ED Course as of 02/11/23 2157   Sat Feb 11, 2023 1839 I obtained history and examined the patient as noted above.    2137 I spoke with Dr. Donovan, hospitalist, who agreed to admit the patient.       Disposition:  The patient was admitted to the hospital under the care of Dr. Donovan.     Impression & Plan      Medical Decision Making:  Patient is a very pleasant 52-year-old gentleman with sarcoma currently undergoing chemotherapy and found to be pancytopenic with an ANC of 0 here today.  Temperature up to 102 at home.  I am unable to find a bacterial source of his fever here as his chest x-ray was unremarkable.  His UA was also unremarkable.  His abdominal exam here is benign.  Strep test was negative.  He is currently being treated for a wound on his leg near a tumor site with Levaquin, doxycycline, and clindamycin cream.  I did inspect the area and there is no erythema, warmth, tenderness, or abscess.  In fact the site looks much better than I anticipated based upon what they were telling.  He has required some pain medication here in the form of Dilaudid.  He is also 2 L of fluid.  Plan at this point will be to give empiric broad-spectrum antibiotics with vancomycin and Zosyn until blood cultures return.  COVID influenza and RSV are still currently in process however he would still be wise to cover with [place] until we have culture results.  And his wife with this plan.  Dr. Donovan is also in agreement and has graciously accepted the patient.    Diagnosis:    ICD-10-CM    1. Neutropenic fever (H)  D70.9     R50.81       2. Sarcoma (H)  C49.9       3. Antineoplastic chemotherapy induced pancytopenia (H)  D61.810     T45.1X5A            Scribe Disclosure:  I, Kyra Bustillos, am serving as a scribe at 7:08 PM on 2/11/2023 to document services personally performed by Vitaliy Garcia MD based on my  observations and the provider's statements to me.     2/11/2023   Vitaliy Garcia MD Walters, Brent Aaron, MD  02/11/23 9700

## 2023-02-13 LAB
ALBUMIN SERPL BCG-MCNC: 2.4 G/DL (ref 3.5–5.2)
ALP SERPL-CCNC: 83 U/L (ref 40–129)
ALT SERPL W P-5'-P-CCNC: 9 U/L (ref 10–50)
ANION GAP SERPL CALCULATED.3IONS-SCNC: 13 MMOL/L (ref 7–15)
AST SERPL W P-5'-P-CCNC: 19 U/L (ref 10–50)
ATRIAL RATE - MUSE: 108 BPM
BASOPHILS # BLD MANUAL: 0 10E3/UL (ref 0–0.2)
BASOPHILS # BLD MANUAL: 0 10E3/UL (ref 0–0.2)
BASOPHILS NFR BLD MANUAL: 0 %
BASOPHILS NFR BLD MANUAL: 0 %
BILIRUB SERPL-MCNC: 0.5 MG/DL
BLD PROD TYP BPU: NORMAL
BLOOD COMPONENT TYPE: NORMAL
BUN SERPL-MCNC: 7.7 MG/DL (ref 6–20)
BURR CELLS BLD QL SMEAR: SLIGHT
CALCIUM SERPL-MCNC: 8.1 MG/DL (ref 8.6–10)
CHLORIDE SERPL-SCNC: 104 MMOL/L (ref 98–107)
CODING SYSTEM: NORMAL
CREAT SERPL-MCNC: 1.07 MG/DL (ref 0.67–1.17)
CROSSMATCH: NORMAL
DEPRECATED HCO3 PLAS-SCNC: 26 MMOL/L (ref 22–29)
DIASTOLIC BLOOD PRESSURE - MUSE: NORMAL MMHG
EOSINOPHIL # BLD MANUAL: 0 10E3/UL (ref 0–0.7)
EOSINOPHIL # BLD MANUAL: 0 10E3/UL (ref 0–0.7)
EOSINOPHIL NFR BLD MANUAL: 0 %
EOSINOPHIL NFR BLD MANUAL: 0 %
ERYTHROCYTE [DISTWIDTH] IN BLOOD BY AUTOMATED COUNT: 17.7 % (ref 10–15)
ERYTHROCYTE [DISTWIDTH] IN BLOOD BY AUTOMATED COUNT: 17.8 % (ref 10–15)
FERRITIN SERPL-MCNC: 1347 NG/ML (ref 31–409)
GFR SERPL CREATININE-BSD FRML MDRD: 83 ML/MIN/1.73M2
GLUCOSE BLDC GLUCOMTR-MCNC: 112 MG/DL (ref 70–99)
GLUCOSE BLDC GLUCOMTR-MCNC: 116 MG/DL (ref 70–99)
GLUCOSE BLDC GLUCOMTR-MCNC: 128 MG/DL (ref 70–99)
GLUCOSE BLDC GLUCOMTR-MCNC: 141 MG/DL (ref 70–99)
GLUCOSE BLDC GLUCOMTR-MCNC: 159 MG/DL (ref 70–99)
GLUCOSE SERPL-MCNC: 120 MG/DL (ref 70–99)
HCT VFR BLD AUTO: 23.6 % (ref 40–53)
HCT VFR BLD AUTO: 26.3 % (ref 40–53)
HGB BLD-MCNC: 6.8 G/DL (ref 13.3–17.7)
HGB BLD-MCNC: 8.1 G/DL (ref 13.3–17.7)
HGB BLD-MCNC: 8.5 G/DL (ref 13.3–17.7)
INTERPRETATION ECG - MUSE: NORMAL
IRON BINDING CAPACITY (ROCHE): 106 UG/DL (ref 240–430)
IRON SATN MFR SERPL: 19 % (ref 15–46)
IRON SERPL-MCNC: 20 UG/DL (ref 61–157)
ISSUE DATE AND TIME: NORMAL
LACTATE SERPL-SCNC: 1.8 MMOL/L (ref 0.7–2)
LYMPHOCYTES # BLD MANUAL: 1.1 10E3/UL (ref 0.8–5.3)
LYMPHOCYTES # BLD MANUAL: 1.6 10E3/UL (ref 0.8–5.3)
LYMPHOCYTES NFR BLD MANUAL: 14 %
LYMPHOCYTES NFR BLD MANUAL: 45 %
MCH RBC QN AUTO: 22.6 PG (ref 26.5–33)
MCH RBC QN AUTO: 23.2 PG (ref 26.5–33)
MCHC RBC AUTO-ENTMCNC: 28.8 G/DL (ref 31.5–36.5)
MCHC RBC AUTO-ENTMCNC: 30.8 G/DL (ref 31.5–36.5)
MCV RBC AUTO: 75 FL (ref 78–100)
MCV RBC AUTO: 78 FL (ref 78–100)
MONOCYTES # BLD MANUAL: 0.8 10E3/UL (ref 0–1.3)
MONOCYTES # BLD MANUAL: 2.4 10E3/UL (ref 0–1.3)
MONOCYTES NFR BLD MANUAL: 21 %
MONOCYTES NFR BLD MANUAL: 33 %
NEUTROPHILS # BLD MANUAL: 0.5 10E3/UL (ref 1.6–8.3)
NEUTROPHILS # BLD MANUAL: 7.5 10E3/UL (ref 1.6–8.3)
NEUTROPHILS NFR BLD MANUAL: 22 %
NEUTROPHILS NFR BLD MANUAL: 65 %
NRBC # BLD AUTO: 0.1 10E3/UL
NRBC # BLD AUTO: 0.3 10E3/UL
NRBC BLD MANUAL-RTO: 3 %
NRBC BLD MANUAL-RTO: 3 %
P AXIS - MUSE: 37 DEGREES
PLAT MORPH BLD: ABNORMAL
PLAT MORPH BLD: ABNORMAL
PLATELET # BLD AUTO: 152 10E3/UL (ref 150–450)
PLATELET # BLD AUTO: 293 10E3/UL (ref 150–450)
POTASSIUM SERPL-SCNC: 3.9 MMOL/L (ref 3.4–5.3)
PR INTERVAL - MUSE: 126 MS
PROT SERPL-MCNC: 6.3 G/DL (ref 6.4–8.3)
QRS DURATION - MUSE: 98 MS
QT - MUSE: 358 MS
QTC - MUSE: 479 MS
R AXIS - MUSE: -4 DEGREES
RBC # BLD AUTO: 3.01 10E6/UL (ref 4.4–5.9)
RBC # BLD AUTO: 3.49 10E6/UL (ref 4.4–5.9)
RBC MORPH BLD: ABNORMAL
RBC MORPH BLD: ABNORMAL
SODIUM SERPL-SCNC: 143 MMOL/L (ref 136–145)
SYSTOLIC BLOOD PRESSURE - MUSE: NORMAL MMHG
T AXIS - MUSE: 47 DEGREES
UNIT ABO/RH: NORMAL
UNIT NUMBER: NORMAL
UNIT STATUS: NORMAL
UNIT TYPE ISBT: 6200
VANCOMYCIN SERPL-MCNC: <4 UG/ML
VENTRICULAR RATE- MUSE: 108 BPM
WBC # BLD AUTO: 11.5 10E3/UL (ref 4–11)
WBC # BLD AUTO: 2.4 10E3/UL (ref 4–11)

## 2023-02-13 PROCEDURE — 99233 SBSQ HOSP IP/OBS HIGH 50: CPT | Performed by: INTERNAL MEDICINE

## 2023-02-13 PROCEDURE — 250N000011 HC RX IP 250 OP 636: Performed by: INTERNAL MEDICINE

## 2023-02-13 PROCEDURE — 36415 COLL VENOUS BLD VENIPUNCTURE: CPT | Performed by: INTERNAL MEDICINE

## 2023-02-13 PROCEDURE — 99232 SBSQ HOSP IP/OBS MODERATE 35: CPT | Performed by: PHYSICIAN ASSISTANT

## 2023-02-13 PROCEDURE — 999N000127 HC STATISTIC PERIPHERAL IV START W US GUIDANCE

## 2023-02-13 PROCEDURE — 85007 BL SMEAR W/DIFF WBC COUNT: CPT | Performed by: INTERNAL MEDICINE

## 2023-02-13 PROCEDURE — 250N000013 HC RX MED GY IP 250 OP 250 PS 637: Performed by: INTERNAL MEDICINE

## 2023-02-13 PROCEDURE — 250N000013 HC RX MED GY IP 250 OP 250 PS 637: Performed by: NURSE PRACTITIONER

## 2023-02-13 PROCEDURE — G0463 HOSPITAL OUTPT CLINIC VISIT: HCPCS

## 2023-02-13 PROCEDURE — 82728 ASSAY OF FERRITIN: CPT | Performed by: INTERNAL MEDICINE

## 2023-02-13 PROCEDURE — 85027 COMPLETE CBC AUTOMATED: CPT | Performed by: INTERNAL MEDICINE

## 2023-02-13 PROCEDURE — 999N000040 HC STATISTIC CONSULT NO CHARGE VASC ACCESS

## 2023-02-13 PROCEDURE — P9016 RBC LEUKOCYTES REDUCED: HCPCS | Performed by: INTERNAL MEDICINE

## 2023-02-13 PROCEDURE — 80053 COMPREHEN METABOLIC PANEL: CPT | Performed by: INTERNAL MEDICINE

## 2023-02-13 PROCEDURE — 99497 ADVNCD CARE PLAN 30 MIN: CPT | Performed by: NURSE PRACTITIONER

## 2023-02-13 PROCEDURE — 85018 HEMOGLOBIN: CPT | Performed by: INTERNAL MEDICINE

## 2023-02-13 PROCEDURE — 99254 IP/OBS CNSLTJ NEW/EST MOD 60: CPT | Mod: 25 | Performed by: NURSE PRACTITIONER

## 2023-02-13 PROCEDURE — 120N000001 HC R&B MED SURG/OB

## 2023-02-13 PROCEDURE — 258N000003 HC RX IP 258 OP 636: Performed by: INTERNAL MEDICINE

## 2023-02-13 PROCEDURE — 80202 ASSAY OF VANCOMYCIN: CPT | Performed by: INTERNAL MEDICINE

## 2023-02-13 PROCEDURE — 83605 ASSAY OF LACTIC ACID: CPT | Performed by: INTERNAL MEDICINE

## 2023-02-13 PROCEDURE — 83550 IRON BINDING TEST: CPT | Performed by: INTERNAL MEDICINE

## 2023-02-13 RX ORDER — PREGABALIN 25 MG/1
25 CAPSULE ORAL 3 TIMES DAILY
Status: DISCONTINUED | OUTPATIENT
Start: 2023-02-13 | End: 2023-02-16 | Stop reason: HOSPADM

## 2023-02-13 RX ORDER — HYDROXYZINE HYDROCHLORIDE 25 MG/1
25 TABLET, FILM COATED ORAL EVERY 4 HOURS PRN
Status: DISCONTINUED | OUTPATIENT
Start: 2023-02-13 | End: 2023-02-16 | Stop reason: HOSPADM

## 2023-02-13 RX ORDER — PANTOPRAZOLE SODIUM 40 MG/1
40 TABLET, DELAYED RELEASE ORAL
Status: DISCONTINUED | OUTPATIENT
Start: 2023-02-13 | End: 2023-02-16 | Stop reason: HOSPADM

## 2023-02-13 RX ORDER — AMOXICILLIN 250 MG
1 CAPSULE ORAL AT BEDTIME
Status: DISCONTINUED | OUTPATIENT
Start: 2023-02-13 | End: 2023-02-16 | Stop reason: HOSPADM

## 2023-02-13 RX ORDER — POLYETHYLENE GLYCOL 3350 17 G/17G
17 POWDER, FOR SOLUTION ORAL DAILY
Status: DISCONTINUED | OUTPATIENT
Start: 2023-02-13 | End: 2023-02-16 | Stop reason: HOSPADM

## 2023-02-13 RX ADMIN — HEPARIN, PORCINE (PF) 10 UNIT/ML INTRAVENOUS SYRINGE 5 ML: at 04:00

## 2023-02-13 RX ADMIN — SUCRALFATE ORAL 1 G: 1 SUSPENSION ORAL at 12:33

## 2023-02-13 RX ADMIN — HYDROMORPHONE HYDROCHLORIDE 1 MG: 2 TABLET ORAL at 21:33

## 2023-02-13 RX ADMIN — HEPARIN, PORCINE (PF) 10 UNIT/ML INTRAVENOUS SYRINGE 10 ML: at 15:29

## 2023-02-13 RX ADMIN — VANCOMYCIN HYDROCHLORIDE 1500 MG: 5 INJECTION, POWDER, LYOPHILIZED, FOR SOLUTION INTRAVENOUS at 13:33

## 2023-02-13 RX ADMIN — HYDROMORPHONE HYDROCHLORIDE 2 MG: 2 TABLET ORAL at 03:53

## 2023-02-13 RX ADMIN — SUCRALFATE ORAL 1 G: 1 SUSPENSION ORAL at 17:14

## 2023-02-13 RX ADMIN — MORPHINE SULFATE 15 MG: 15 TABLET, EXTENDED RELEASE ORAL at 20:08

## 2023-02-13 RX ADMIN — PREGABALIN 25 MG: 25 CAPSULE ORAL at 21:27

## 2023-02-13 RX ADMIN — HYDROXYZINE HYDROCHLORIDE 25 MG: 25 TABLET, FILM COATED ORAL at 10:26

## 2023-02-13 RX ADMIN — HYDROMORPHONE HYDROCHLORIDE 1 MG: 2 TABLET ORAL at 10:26

## 2023-02-13 RX ADMIN — SUCRALFATE ORAL 1 G: 1 SUSPENSION ORAL at 21:27

## 2023-02-13 RX ADMIN — HEPARIN, PORCINE (PF) 10 UNIT/ML INTRAVENOUS SYRINGE 10 ML: at 10:33

## 2023-02-13 RX ADMIN — PREGABALIN 25 MG: 25 CAPSULE ORAL at 15:29

## 2023-02-13 RX ADMIN — SODIUM CHLORIDE: 9 INJECTION, SOLUTION INTRAVENOUS at 03:43

## 2023-02-13 RX ADMIN — TAZOBACTAM SODIUM AND PIPERACILLIN SODIUM 3.38 G: 375; 3 INJECTION, SOLUTION INTRAVENOUS at 17:14

## 2023-02-13 RX ADMIN — HEPARIN, PORCINE (PF) 10 UNIT/ML INTRAVENOUS SYRINGE 5 ML: at 05:56

## 2023-02-13 RX ADMIN — TAZOBACTAM SODIUM AND PIPERACILLIN SODIUM 3.38 G: 375; 3 INJECTION, SOLUTION INTRAVENOUS at 04:01

## 2023-02-13 RX ADMIN — PREGABALIN 25 MG: 25 CAPSULE ORAL at 12:32

## 2023-02-13 RX ADMIN — SUCRALFATE ORAL 1 G: 1 SUSPENSION ORAL at 08:37

## 2023-02-13 RX ADMIN — SENNOSIDES AND DOCUSATE SODIUM 1 TABLET: 50; 8.6 TABLET ORAL at 21:27

## 2023-02-13 RX ADMIN — SODIUM CHLORIDE: 9 INJECTION, SOLUTION INTRAVENOUS at 17:17

## 2023-02-13 RX ADMIN — ALTEPLASE 2 MG: 2.2 INJECTION, POWDER, LYOPHILIZED, FOR SOLUTION INTRAVENOUS at 13:33

## 2023-02-13 RX ADMIN — MORPHINE SULFATE 15 MG: 15 TABLET, EXTENDED RELEASE ORAL at 08:37

## 2023-02-13 RX ADMIN — HYDROMORPHONE HYDROCHLORIDE 2 MG: 2 TABLET ORAL at 14:44

## 2023-02-13 RX ADMIN — HEPARIN, PORCINE (PF) 10 UNIT/ML INTRAVENOUS SYRINGE 5 ML: at 21:34

## 2023-02-13 RX ADMIN — PANTOPRAZOLE SODIUM 40 MG: 40 TABLET, DELAYED RELEASE ORAL at 12:32

## 2023-02-13 RX ADMIN — HEPARIN, PORCINE (PF) 10 UNIT/ML INTRAVENOUS SYRINGE 5 ML: at 03:52

## 2023-02-13 RX ADMIN — TAZOBACTAM SODIUM AND PIPERACILLIN SODIUM 3.38 G: 375; 3 INJECTION, SOLUTION INTRAVENOUS at 12:34

## 2023-02-13 ASSESSMENT — ACTIVITIES OF DAILY LIVING (ADL)
ADLS_ACUITY_SCORE: 24

## 2023-02-13 NOTE — CONSULTS
[facility]  Palliative Care Consultation   Text Page    Assessment & Plan   Antonio Canseco is a 52 year old male who was admitted on 2/11/2023.   Consulted by Dr. De La Cruz to assist with symptom management and goals of care.    Recommendations:  1. Goals of Care- Full Code  Hospitalization goals discussed with patient.  Goal for adequate symptom management and discharge home with further chemotherapy.    Decisional Capacity- Intact. Patient does not have an advance directive. Per  informed consent policy next of kin should be involved if patient becomes unable.  POLST none on file.    2. Pain   Patient's opioid use in past 24 hours: 15 mg long acting morphine, 8 mg oral hydromorphone  = 47 mg Daily Morphine Equivalent  [place] Board  Pharmacy Data Base Reviewed:    YES; As expected, no concern for misuse/abuse of controlled medications based on this report.  - agree with MS contin 15 mg every 12 hours, consider titration if he is utilizing prn dilaudid regularly.  - agree with PRN hydromorphone 1-2 mg every 4 hours prn breakthrough pain.  - avoid NSAIDs, acetaminophen currently   - consider acetaminophen 1000mg every 8 hours scheduled once fever is resolved.  - hydroxyzine 25 mg every 4 hours prn  - pregabalin 25 mg every 8 hours - patient has failed gabapentin and would benefit from neuroleptic agent for multimodal pain management plan.     3. Constipation  Patient had constipation at presentation to the hospital.  Now resolved.  Likely opiate induced constipation.  - senna-docusate 1 tablet daily at bedtime, additional tablet daily prn  - Miralax 1 packet daily - titrate to normal stooling pattern  - maintain adequate hydration    4. Reflux  Patient reports ongoing reflux, unchanged since time of admission.    - agree with PPI, IV protonix while in patient.  - concern for ongoing PPI use with pancytopenia.   - cautiously recommend omeprazole at discharge.    4. Spiritual  Care  Oriented to Spiritual Health as part of Palliative Care team.  Spiritual Background: not designated    5. Care Planning  Appreciate Care of interdisciplinary team.  Medications for discharge - MS Contin 15 mg BID, Hydromorphone 2 mg every 4 hours prn, hydroxyzine 25 mg every 4 hours prn, pregabalin 25 mg three times daily.    - Palliative Care Clinic referral for ongoing symptom management and goals of care support    Medical Decision Making and Goals of Care:  Discussed on February 13, 2023 with JEFFREY Van CNP:   Met with patient at the bedside.  Introduced palliative care service and purpose of consult.  Reviewed his medical history and diagnosis of sarcoma over the summer.  Reviewed his current symptoms of pain and constipation.  Discussed plan of care as outlined above.      Discussed cancer diagnosis and current chemotherapy.  Inquired about patient's processing of this diagnosis and his feelings.  He reviewed his goal to have treatment and remain functional.  Discussed the topic of advance care planning including health care directive and surrogate designation.  He verbalized understanding of this tool for his plan of care and requested more information.  Planned for consultation to provide information during the hospitalization.      Discussed the likely need to have outpatient symptom management to follow up from inpatient management and recommended palliative care clinic to him.  He verbalized understanding and agreement to this plan and welcomed ongoing support of his symptoms to maintain functional status.  He denied further questions or concerns.  Planned for follow up to address ongoing symptom management needs tomorrow.    Thank you for involving us in the patient's care.     JEFFREY Van CNP  Pain Management and Palliative Care  [facility]  Pgr: [phone]    Medical Decision Making     MANAGEMENT DISCUSSED with the following over the past 24  "hours: PO/IV opiate medication, symptom management, outpatient follow up with palliative care   NOTE(S)/MEDICAL RECORDS REVIEWED over the past 24 hours: inpatient H&P, consult notes, Clinic notes, outpatient imaging results, inpatient labwork  Tests REVIEWED in the past 24 hours:  - BMP  - CBC  Medical complexity over the past 24 hours:  - Parenteral (IV) CONTROLLED SUBSTANCES ordered       Advance Care Planning     Advance Care Planning Discussion 2/13/2023. Hallie BESS APRN CNP met with Patient today at the hospital to discuss Advance Care Planning. Antonio Canseco does have decisional capacity and was present for this discussion.  Those present were informed of the voluntary nature of this discussion and wished to proceed.  The discussion included: education about health care directive, surrogate decision maker and completion during hospitalization if the patient wishes. This discussion began at 0845 and ended at 0902 for a total of 17 minutes.            Understanding of disease process:   This has been discussed with patient and primary team.    History of Present Illness   History is obtained from the patient and electronic chart.    Antonio Canseco is a 52 year old male with a past medical history of high-grade sarcoma, hypertension, diabetes mellitus type 2, GERD, who presents with neutropenic fever and pancytopenia.  He was admitted for management and transfusion.  Also noted to have significant right sided leg pain in the hamstring area, adjacent to sarcoma lesion.    This is in the setting of recent diagnosis of high grade sarcoma, initiated chemotherapy last week.  Initial evaluation of right sided 'lump' in his hamstring area was XR, after which he underwent PT evaluation, gabapentin and meloxicam with minimal relief. He discussed use of meloxicam and NSAIDs in general to be most effective at \"keeping me functional\".  States pain is 'sharp' and is worse with mobilization.  States it radiates " "to his buttock \"like sciatica\".  Minimal relief with ice or heat, opiates offer some relief thus far.  PT did not help.  Ace bandage wrapping at the suggestion of a family member has seemed to help. Etiology of disc herniation of the lumbar spine was ruled out.  He was later diagnosed with the sarcoma after further imaging studies were completed.  He has been hospitalized once in the past 1 month for fever and leg pain.  He was discharged on oxycodone prn and gabapentin. He discontinued NSAID use due to pancytopenia.  There has not been a decline in daily functioning except for pain limited mobility intermittently.  There is no reported or documented weight loss.     Past Medical History   I have reviewed this patient's medical history and updated it with pertinent information if needed.   Past Medical History:   Diagnosis Date     Acrochordon 02/11/2021     CARDIOVASCULAR SCREENING; LDL GOAL LESS THAN 160 03/27/2012     Colon adenoma      Controlled type 2 diabetes mellitus without complication, without long-term current use of insulin (H) 09/16/2019    X 1     Cough 02/04/2014     Degeneration of thoracic intervertebral disc 12/11/2013     Dysfunction of both eustachian tubes 04/12/2019     Elevated blood pressure 12/22/2014     Elevated hemoglobin A1c 09/16/2019    X 1     Gastroesophageal reflux disease      Hamstring strain, right, subsequent encounter 12/19/2022     Hepatic cyst 05/22/2018     Hepatic steatosis 12/11/2013     History of colonic polyps 12/11/2013     History of drainage of abscess 09/16/2019     Hypertension      Irritable bowel syndrome without diarrhea 06/01/2018     Low libido 04/12/2019     Lumbar radiculopathy 12/19/2022     Microscopic hematuria 12/19/2013     Nephrolithiasis      Obesity 03/27/2012     BRIAN on CPAP 03/27/2012     Other irritable bowel syndrome 05/22/2018     Pain in thoracic spine 05/09/2013     Pulmonary nodule 05/22/2018     Right-sided chest pain 04/13/2018     Sarcoma " (H) 2023     Sleep apnea      Tinea pedis of both feet 2021     Tinnitus, unspecified laterality 2019     Uncontrolled diabetes mellitus with hyperglycemia, without long-term current use of insulin (H) 2023       Past Surgical History   I have reviewed this patient's surgical history and updated it with pertinent information if needed.  Past Surgical History:   Procedure Laterality Date     COLONOSCOPY       COLONOSCOPY N/A 2021    Procedure: COLONOSCOPY (fv);  Surgeon: Ean Alcantara MD;  Location: [facility] OR     CYSTOSCOPY  2014    Procedure: CYSTOSCOPY;   Video Cystoscopy with Exam Under Anesthesia;  Surgeon: Chente Moeller MD;  Location: [facility] OR     IR CVC TUNNEL PLACEMENT > 5 YRS OF AGE  2023     LEG SURGERY Left 2019    Suregy r/t infection     wisdom teeth         Prior to Admission Medications   Prior to Admission Medications   Prescriptions Last Dose Informant Patient Reported? Taking?   DULoxetine (CYMBALTA) 60 MG capsule 2023  No Yes   Sig: Take 1 capsule (60 mg) by mouth daily   blood glucose (NO BRAND SPECIFIED) test strip   No No   Sig: accuchek guide- Use to test blood sugar 1 times daily or as directed.   blood glucose monitoring (ACCU-CHEK FASTCLIX) lancets   No No   Si each daily Accuchek guide   clindamycin (CLINDAMAX) 1 % external gel 2023 at am  No Yes   Sig: Apply topically 2 times daily , to HS lesion   doxycycline hyclate (VIBRA-TABS) 100 MG tablet 2023  No Yes   Sig: Take 1 tablet (100 mg) by mouth daily   ferrous sulfate (FEROSUL) 325 (65 Fe) MG tablet 2023  No Yes   Sig: Take 1 tablet (325 mg) by mouth daily (with breakfast)   folic acid (FOLVITE) 400 MCG tablet 2023  No Yes   Sig: Take 1 tablet (400 mcg) by mouth daily   gabapentin (NEURONTIN) 300 MG capsule 2023  Yes Yes   Sig: Take 300 mg by mouth At Bedtime   heparin lock flush 10 UNIT/ML SOLN injection 2023  No Yes   Sig: 3 mLs by Intracatheter route  every 24 hours Flush each lumen with 3 mLs (total 6mL in 24 hours period)   Patient taking differently: 5 mLs by Intracatheter route every 24 hours Flush each lumen with 5 mLs (total 10mL in 24 hours period)   levofloxacin (LEVAQUIN) 250 MG tablet 2/11/2023  No Yes   Sig: Take 1 tablet (250 mg) by mouth daily   lidocaine (LIDODERM) 5 % patch 2/10/2023  No Yes   Sig: Place 1 patch onto the skin every 24 hours To prevent lidocaine toxicity, patient should be patch free for 12 hrs daily.   lisinopril (ZESTRIL) 20 MG tablet 1/28/2023  Yes No   Sig: Take 1 tablet (20 mg) by mouth daily , HOLD UNTIL INSTRUCTED TO RESUME BY OUTPATIENT TEAM   Patient not taking: Reported on 2/7/2023   metFORMIN (GLUCOPHAGE XR) 500 MG 24 hr tablet Past Week  No Yes   Sig: Take 2 tablets (1,000 mg) by mouth daily (with dinner) for 30 days   morphine (MS CONTIN) 15 MG CR tablet  at NEW prescription  No No   Sig: Take 1 tablet (15 mg) by mouth every 12 hours for 30 days maximum 2 tablet(s) per day   naloxone (NARCAN) 4 MG/0.1ML nasal spray   No No   Sig: Spray 1 spray (4 mg) into one nostril alternating nostrils as needed for opioid reversal every 2-3 minutes until assistance arrives   omeprazole 20 MG tablet   Yes Yes   Sig: Take 20 mg by mouth daily as needed   ondansetron (ZOFRAN ODT) 4 MG ODT tab Not Taking  No No   Sig: Take 1-2 tablets (4-8 mg) by mouth every 8 hours as needed for nausea or vomiting   Patient not taking: Reported on 2/12/2023   oxyCODONE (ROXICODONE) 5 MG tablet   No Yes   Sig: Take 1-2 tablets (5-10 mg) by mouth every 4 hours as needed for severe pain (7-10)   Patient taking differently: Take 15 mg by mouth every 6 hours   oxyCODONE (ROXICODONE) 5 MG tablet Not Taking  No No   Sig: Take 1 tablet (5 mg) by mouth every 6 hours as needed for pain   Patient not taking: Reported on 2/12/2023   phosphorus tablet 250 mg 250 MG per tablet 2/11/2023 at 2pm  No Yes   Sig: Take 2 tablets (500 mg) by mouth 3 times daily Take until  directed otherwise   prochlorperazine (COMPAZINE) 10 MG tablet   No No   Sig: Take 0.5-1 tablets (5-10 mg) by mouth every 6 hours as needed for nausea or vomiting   Patient not taking: Reported on 2/7/2023   sennosides (SENOKOT) 8.6 MG tablet 2/11/2023  Yes Yes   Sig: Take 1 tablet by mouth daily as needed for constipation   sucralfate (CARAFATE) 1 GM/10ML suspension 2/10/2023  No No   Sig: Take 10 mLs (1 g) by mouth 4 times daily as needed for nausea (or indigestion)      Facility-Administered Medications: None     Allergies   Allergies   Allergen Reactions     Emend [Aprepitant] Shortness Of Breath     Couldn't breathe and started to pass out during 1st administration      Kiwi Itching       Social History   I have updated and reviewed the following Social History Narrative:   Social History     Social History Narrative     Not on file           Living situation: home with spouse and 2 teenaged children       Support system: spouse, mother, children       Actual/Potential Caregiver: spouse       Functional status: independent  History of substance use/abuse: no    Family History   I have reviewed this patient's family history and updated it with pertinent information if needed.   Family History   Problem Relation Age of Onset     Family History Negative Mother      Obesity Mother      Cancer Father         lymphoma     Other Cancer Father      Other Cancer Paternal Grandfather        Review of Systems   The 10 point Review of Systems is negative other than noted in the HPI or here.     Palliative Symptom Review (0=no symptom/no concern, 1=mild, 2=moderate, 3=severe):      Pain: 3-severe      Fatigue: 1-mild      Nausea: 0-none      Constipation: 2-moderate      Diarrhea: 0-none      Depressive Symptoms: 0-none      Anxiety: 0-none      Drowsiness: 0-none      Poor Appetite: 1-mild      Shortness of Breath: 0-none      Insomnia: 0-none      Other:Reflux 2-moderate      Overall (0 good/no concerns, 3 very poor):   1    Physical Exam   Temp:  [98.8  F (37.1  C)-100.9  F (38.3  C)] 100  F (37.8  C)  Pulse:  [] (P) 101  Resp:  [18-20] (P) 18  BP: (102-125)/(46-72) (P) 114/49  SpO2:  [92 %-98 %] (P) 98 %  298 lbs 4.8 oz     Exam:  GEN:  Alert, oriented x 3, appears comfortable, NAD.  HEENT:  Normocephalic/atraumatic, no scleral icterus, no nasal discharge, mouth moist.  CV:  RRR, S1, S2; no murmurs or other irregularities noted.  +3 DP/PT pulses bilatererally; no edema BLE.  RESP:  Clear to auscultation bilaterally without rales/rhonchi/wheezing/retractions.  Symmetric chest rise on inhalation noted.  Normal respiratory effort.  ABD:  Rounded, soft, non-tender/non-distended.  +BS  EXT:  Edema & pulses as noted above.  CMS intact x 4.     M/S:   Tender to palpation right distal hamstring area.    SKIN:  Dry to touch, no exanthems noted in the visualized areas.    NEURO: Symmetric strength +5/5.  Sensation to touch intact all extremities.   There is no area of allodynia or hyperesthesia.  PAIN BEHAVIOR: Cooperative, facial grimacing  Psych:  Normal affect.  Calm, cooperative, conversant appropriately.    Data   Results for orders placed or performed during the hospital encounter of 02/11/23 (from the past 24 hour(s))   Glucose   Result Value Ref Range    Glucose 160 (H) 70 - 99 mg/dL   Lactic Acid STAT   Result Value Ref Range    Lactic Acid 1.1 0.7 - 2.0 mmol/L   Extra Tube    Narrative    The following orders were created for panel order Extra Tube.  Procedure                               Abnormality         Status                     ---------                               -----------         ------                     Extra Green Top (Lithium...[[id number]]                      Final result               Extra Purple Top Tube[[id number]]                            Final result                 Please view results for these tests on the individual orders.   Extra Green Top (Lithium Heparin) Tube   Result Value Ref Range     Hold Specimen JIC    Extra Purple Top Tube   Result Value Ref Range    Hold Specimen JIC    CBC with Platelets & Differential    Narrative    The following orders were created for panel order CBC with Platelets & Differential.  Procedure                               Abnormality         Status                     ---------                               -----------         ------                     CBC with platelets and d...[[id number]]  Abnormal            Preliminary result         Manual Differential[[id number]]          Abnormal            Final result                 Please view results for these tests on the individual orders.   CBC with platelets and differential   Result Value Ref Range    WBC Count 2.4 (L) 4.0 - 11.0 10e3/uL    RBC Count 3.01 (L) 4.40 - 5.90 10e6/uL    Hemoglobin 6.8 (LL) 13.3 - 17.7 g/dL    Hematocrit 23.6 (L) 40.0 - 53.0 %    MCV 78 78 - 100 fL    MCH 22.6 (L) 26.5 - 33.0 pg    MCHC 28.8 (L) 31.5 - 36.5 g/dL    RDW 17.7 (H) 10.0 - 15.0 %    Platelet Count 152 150 - 450 10e3/uL    NRBCs per 100 WBC 1 (H) <1 /100    Absolute NRBCs 0.0 10e3/uL   Manual Differential   Result Value Ref Range    % Neutrophils 22 %    % Lymphocytes 45 %    % Monocytes 33 %    % Eosinophils 0 %    % Basophils 0 %    NRBCs per 100 WBC 3 (H) <=0 %    Absolute Neutrophils 0.5 (L) 1.6 - 8.3 10e3/uL    Absolute Lymphocytes 1.1 0.8 - 5.3 10e3/uL    Absolute Monocytes 0.8 0.0 - 1.3 10e3/uL    Absolute Eosinophils 0.0 0.0 - 0.7 10e3/uL    Absolute Basophils 0.0 0.0 - 0.2 10e3/uL    Absolute NRBCs 0.1 (H) <=0.0 10e3/uL    RBC Morphology Confirmed RBC Indices     Platelet Assessment  Automated Count Confirmed. Platelet morphology is normal.     Automated Count Confirmed. Platelet morphology is normal.    Jah Cells Slight (A) None Seen   Glucose by meter   Result Value Ref Range    GLUCOSE BY METER POCT 155 (H) 70 - 99 mg/dL   Glucose by meter   Result Value Ref Range    GLUCOSE BY METER POCT 155 (H) 70 - 99 mg/dL   Glucose  by meter   Result Value Ref Range    GLUCOSE BY METER POCT 145 (H) 70 - 99 mg/dL   Glucose by meter   Result Value Ref Range    GLUCOSE BY METER POCT 116 (H) 70 - 99 mg/dL   Lactic Acid STAT   Result Value Ref Range    Lactic Acid 1.8 0.7 - 2.0 mmol/L   Glucose by meter   Result Value Ref Range    GLUCOSE BY METER POCT 112 (H) 70 - 99 mg/dL   CBC with Platelets & Differential    Narrative    The following orders were created for panel order CBC with Platelets & Differential.  Procedure                               Abnormality         Status                     ---------                               -----------         ------                     CBC with platelets and d...[[id number]]                      In process                   Please view results for these tests on the individual orders.   Iron and iron binding capacity   Result Value Ref Range    Iron 20 (L) 61 - 157 ug/dL    Iron Binding Capacity 106 (L) 240 - 430 ug/dL    Iron Sat Index 19 15 - 46 %

## 2023-02-13 NOTE — CONSULTS
[facility]  WOC Nurse Inpatient Assessment     Consulted for: Right inner thigh    Patient History (according to provider note(s):      52 year old male admitted on 2/11/2023. He has a past medical history significant for high-grade sarcoma, hypertension, diabetes mellitus type 2, GERD, and irritable bowel syndrome.  Hospitalized at the beginning of this month at [facility] for chemotherapy.  Discharged from [facility] on 2/6.  Had been on outpatient oral antibiotics with levofloxacin and doxycycline.  On topical clindamycin to chronic right leg wound.  He presented to emergency room neutropenic fever.    Areas Assessed:      Areas visualized during today's visit: right thigh    Wound location: Right inner thigh    Last photo: 2/13/23    Wound due to: Hidradenitis Suppurativa  Wound history/plan of care: Patient has a history of hidradentitis and uses a clindamycin ointment at home.   Wound base: 100 % cyst     Palpation of the wound bed: firm      Drainage: none     Description of drainage: none     Measurements (length x width x depth, in cm): 1  x 1  x  +0.1 cm      Tunneling: N/A     Undermining: N/A  Periwound skin: Intact      Color: normal and consistent with surrounding tissue      Temperature: normal   Odor: none  Pain: absent and denies , none  Pain interventions prior to dressing change: N/A  Treatment goal: Infection control/prevention  STATUS: initial assessment  Supplies ordered: supplies stored on unit, discussed with RN and discussed with patient       Treatment Plan:     Right inner thigh wound(s): BID   1. Cleanse gently with wound cleanser. Pat dry with gauze. Ok to leave MELISSA     Orders: Written    RECOMMEND PRIMARY TEAM ORDER: None, at this time  Education provided: plan of care  Discussed plan of care with: Patient and Nurse  WOC nurse follow-up plan: signing off  Notify WOC if wound(s) deteriorate.  Nursing to notify the Provider(s) and re-consult  the [facility] Nurse if new skin concern.    DATA:     Current support surface: Standard  Standard gel/foam mattress (IsoFlex, Atmos air, etc)  Containment of urine/stool: Continent of bladder and Continent of bowel  BMI: Body mass index is 41.62 kg/m .   Active diet order: Orders Placed This Encounter      Combination Diet Regular Diet Adult     Output: I/O last 3 completed shifts:  In: 3048 [I.V.:2733]  Out: 750 [Urine:750]     Labs: Recent Labs   Lab 02/13/23  0830   ALBUMIN 2.4*   HGB 8.1*   WBC 11.5*     Pressure injury risk assessment:   Sensory Perception: 4-->no impairment  Moisture: 4-->rarely moist  Activity: 3-->walks occasionally  Mobility: 3-->slightly limited  Nutrition: 3-->adequate  Friction and Shear: 3-->no apparent problem  Ger Score: 20    Edilma Delatorre, RN CWOCN  Contact Via [facility]- [facility] Nurse ([place])  Dept. Office Number: [phone]

## 2023-02-13 NOTE — PROGRESS NOTES
DATE:  2/13/2023   TIME OF RECEIPT FROM LAB:  937 AM  LAB TEST:  Hgb  LAB VALUE:  6.8   RESULTS GIVEN WITH READ-BACK TO (PROVIDER):  Dr. Servin  TIME LAB VALUE REPORTED TO PROVIDER:   940 AM

## 2023-02-13 NOTE — PROGRESS NOTES
SPIRITUAL HEALTH SERVICES Progress Note  MS 5    Met with patient regarding staff consult for advance directive discussion.    Explained materials and answered patient's questions.    Patient would like to speak with his wife about the matter.  She is visiting this evening.    No additional needs at this time.  [facility] remains available for assistance with notary, etc.    Rev. Cynthia Grubbs M.Div.  Staff   Phone  [phone]

## 2023-02-13 NOTE — PROGRESS NOTES
[facility]  Hospitalist Progress Note  Judson Servin M.D., M.B.A.   02/13/2023    Reason for Stay/active problem list       Neutropenic fever     Pancytopenia          Assessment and Plan:        Summary of Stay: Antonio Canseco is a 52 year old male admitted on 2/11/2023. He has a past medical history significant for high-grade sarcoma, hypertension, diabetes mellitus type 2, GERD, and irritable bowel syndrome.  Hospitalized at the beginning of this month at [facility] for chemotherapy.  Discharged from [facility] on 2/6.  Had been on outpatient oral antibiotics with levofloxacin and doxycycline.  On topical clindamycin to chronic right leg wound.  He presented to emergency room neutropenic fever.      Problem List with Assessment and Plan:    Neutropenic fever  -Neutropenia due to recent chemo.  -Unclear infectious source.  -Has been on oral antibiotics with levofloxacin and doxycycline in the outpatient setting.  Also on topical clindamycin for chronic right leg wound.  -Started on IV vancomycin and Zosyn in the ER.  -currently afebrile , cultures NTD , will continue abx , follow cultures      Pancytopenia.  High-grade sarcoma on recent chemo   - CBC on arrival to ED :hgb  7.8 , wbc 1.3 , Neutrophil 0.0 ,    -Patient was transfused 1 unit of blood on February 12 for hemoglobin of 6.9.  --currently Hgb is 6.8 , will transfuse additional unit of blood , monitor counts.  Otherwise we will cell count and platelets improving.  Oncology team is following and input is appreciated.    Diabetes mellitus type 2.  - not on medication at home   -Continue sliding scale insulin     Hyponatremia.  -Mild. Monitor for now     Chronic right leg lesion   -Due to high-grade sarcoma.  -Pain medications as needed.    Sleep apnea.  -Use CPAP while sleeping.          Plan for today:    Blood transfusion.  Monitor hemoglobin every 12 hours      VTE Prophylaxis: Ambulate every shift  Code  Status: Full Code  Diet: Combination Diet Regular Diet Adult  Snacks/Supplements Adult: Ensure Enlive; With Meals    [place] Catheter: Not present    Family updated today: No     Disposition: Discharge home in the next 2 days pending stability of his blood count and treatment of infection        Interval History (Subjective):        Patient is seen and examined by me today and medical record reviewed.Overnight events noted and care discussed with nursing staff.patient is feeling better.  Denies any fever or chills.  Patient's hemoglobin remained low despite blood transfusion yesterday.  No evidence of external bleeding noted.  Discussed with bedside nurse.  Has been complaining of dysuria.  He is currently on antibiotic                  Physical Exam:        Last Vital Signs:  /63 (BP Location: Left arm)   Pulse 96   Temp 98.6  F (37  C) (Oral)   Resp 18   Wt 135.3 kg (298 lb 4.8 oz)   SpO2 91%   BMI 41.62 kg/m      I/O last 3 completed shifts:  In: 3048 [I.V.:2733]  Out: 750 [Urine:750]    Wt Readings from Last 5 Encounters:   02/12/23 135.3 kg (298 lb 4.8 oz)   02/07/23 134.7 kg (297 lb)   02/06/23 136.6 kg (301 lb 3.2 oz)   01/27/23 137.4 kg (303 lb)   01/26/23 137.9 kg (304 lb)        Constitutional: Awake, alert, cooperative, no apparent distress     Respiratory: Clear to auscultation bilaterally, no crackles or wheezing   Cardiovascular: Regular rate and rhythm, normal S1 and S2, and no murmur noted   Abdomen: Normal bowel sounds, soft, non-distended, non-tender   Skin: No new rashes, no cyanosis, dry to touch   Neuro: Alert with  no new focal weakness   Extremities: No edema   Other(s):        All other systems: Negative          Medications:        All current medications were reviewed with changes reflected in problem list.         Data:      All new lab and imaging data was reviewed.      Data reviewed today: I reviewed all new labs and imaging results over the last 24 hours. I personally  reviewed       Recent Labs   Lab 02/13/23  0830 02/12/23  1116 02/12/23  0632   WBC 11.5* 2.4* 1.8*   HGB 8.1* 6.8* 6.9*   HCT 26.3* 23.6* 23.6*   MCV 75* 78 77*    152 138*     No results for input(s): CULT in the last 168 hours.  Recent Labs   Lab 02/13/23  1222 02/13/23  0830 02/13/23  0828 02/12/23  1106 02/12/23  0632 02/12/23  0006 02/11/23  1843 02/09/23  1331 02/07/23  1303   NA  --  143  --   --  131*  --  134* 134* 133*   POTASSIUM  --  3.9  --   --  3.8  --  4.1 3.9 4.2   CHLORIDE  --  104  --   --  98  --  97* 101 100   CO2  --  26  --   --  24  --  25 23 22   ANIONGAP  --  13  --   --  9  --  12 10 11   * 120* 112*   < > 148*   < > 171* 164* 170*   BUN  --  7.7  --   --  5.2*  --  7.0 11.8 14.4   CR  --  1.07  --   --  0.68  --  0.65* 0.64* 0.83   GFRESTIMATED  --  83  --   --  >90  --  >90 >90 >90   DORIAN  --  8.1*  --   --  7.9*  --  8.3* 8.8 8.8   MAG  --   --   --   --   --   --   --  2.0 2.3   PHOS  --   --   --   --   --   --   --  2.1* 1.5*   PROTTOTAL  --  6.3*  --   --  6.1*  --  7.5 7.3 7.3   ALBUMIN  --  2.4*  --   --  2.6*  --  3.0* 2.9* 2.9*   BILITOTAL  --  0.5  --   --  0.5  --  0.4 0.3 0.5   ALKPHOS  --  83  --   --  74  --  87 81 82   AST  --  19  --   --  15  --  14 13 12   ALT  --  9*  --   --  8*  --  11 8* 8*    < > = values in this interval not displayed.       Recent Labs   Lab 02/13/23  1222 02/13/23  0830 02/13/23  0828 02/13/23  0200 02/12/23  2134   * 120* 112* 116* 145*       No results for input(s): INR in the last 168 hours.      No results for input(s): TROPONIN, TROPI, TROPR in the last 168 hours.    Invalid input(s): TROP, TROPONINIES    Recent Results (from the past 48 hour(s))   XR Chest 2 Views    Narrative    EXAM: XR CHEST 2 VIEWS  LOCATION: [facility]  DATE/TIME: 2/11/2023 7:32 PM    INDICATION: Cough  COMPARISON: 4/24/2022      Impression    IMPRESSION: Infusion port tip in the mid SVC. Lungs are clear. Normal heart size  and pulmonary vascularity. No pleural effusions.       COVID Status:  COVID-19 PCR Results    COVID-19 PCR Results 3/9/21 3/9/21 4/24/22 1/27/23 2/11/23    1146 1452      COVID-19 Virus PCR to [facility] - Result Test received-See reflex to IDDL test SARS CoV2 (COVID-19) Virus RT-PCR       COVID-19 Virus PCR to [facility] - Source Nasopharyngeal       SARS-CoV-2 Virus Specimen Source  Nasopharyngeal      SARS-CoV-2 PCR Result  NEGATIVE      SARS CoV2 PCR   Negative Negative Negative      Comments are available for some flowsheets but are not being displayed.         COVID-19 Antibody Results, Testing for Immunity    COVID-19 Antibody Results, Testing for Immunity   No data to display.              Disclaimer: This note consists of symbols derived from keyboarding, dictation and/or voice recognition software. As a result, there may be errors in the script that have gone undetected. Please consider this when interpreting information found in this chart.

## 2023-02-13 NOTE — PROGRESS NOTES
[facility]    Patient is currently on service with [facility] for tunneled catheter line care. Pt goes to the clinic for weekly central line care and cap change.     Liaison will follow along. If applicable at discharge, please include Home Infusion Referral for central line care in discharge orders.     Thank you,    Meme Segura RN  [facility] Liaison  [phone] (M-F 8a-5p) [phone] Office

## 2023-02-13 NOTE — CONSULTS
"CLINICAL NUTRITION SERVICES  -  ASSESSMENT NOTE      Recommendations:   - Continue diet as ordered.  Food from home as needed.  - Ok for prn Ensure.     MALNUTRITION:  % Weight Loss:  Weight loss does not meet criteria for malnutrition   % Intake:  Decreased intake does not meet criteria for malnutrition   Subcutaneous Fat Loss:  None observed  Muscle Loss:  None observed  Fluid Retention: None documented    Malnutrition Diagnosis: Patient does not meet two of the above criteria necessary for diagnosing malnutrition          REASON FOR ASSESSMENT  Antonio Canseco is a 52 year old male seen by Registered Dietitian for Admission Nutrition Risk Screen for positive.    PMH of: High grade sarcoma, DMII, GERD, IBS, discharge from [facility] 2/06 following chemotherapy.    Admit 2/2: Neutropenic fever, pancytopenia, pain.    NUTRITION HISTORY  - Information obtained from patient and chart.  - Diet at home: Regular.  - Barriers to PO intakes: Notes he has been having nausea here in the hospital and was having low appetite at times in the home setting, but really no consistent decreased PO intakes recently and per report.  - Use of oral supplements: None.  - Allergies: Kiwi.      CURRENT NUTRITION ORDERS  Diet Order:     Regular    Current Intake/Tolerance:  Antonio describes he is having nausea at times and really isn't fond of the food here at the hospital.  We discussed the option of prn Ensure or food from home as needed.      Obtained from Chart/Interdisciplinary Team:  - Chronic R leg wound (inner thigh) d/t sarcoma, WOCN consult pending  - Pain team consulted  - Stooling patterns reviewed    ANTHROPOMETRICS  Height: 5' 10\"  Weight: 298 lbs 4.8 oz  Body mass index is 41.62 kg/m .  Weight Status:  Obesity Grade III BMI >40  Weight History:  Wt Readings from Last 10 Encounters:   02/12/23 135.3 kg (298 lb 4.8 oz)   02/07/23 134.7 kg (297 lb)   02/06/23 136.6 kg (301 lb 3.2 oz)   01/27/23 137.4 kg (303 lb)   01/26/23 137.9 kg (304 " lb)   01/24/23 137.9 kg (304 lb)   01/23/23 137.9 kg (304 lb)   01/18/23 139.3 kg (307 lb)   04/24/22 (!) 158.8 kg (350 lb)   12/28/21 (!) 156.5 kg (345 lb)     - 3% wt loss in the past ~month, 15% wt loss in the past <1 year.  - Confirmed with Antonio.  He feels his weight has been relatively stable recently but knows he lost weight over the past year.  Cannot really tell the timeframe as he only gets weighed at office visits/outpatient appointments.    LABS  Labs reviewed.      MEDICATIONS  Medications reviewed.      ASSESSED NUTRITION NEEDS PER APPROVED PRACTICE GUIDELINES:    Dosing Weight 135 kg   Estimated Energy Needs: 15-20 Kcal/Kg  Justification: maintenance  Estimated Protein Needs: 0.8-1 g pro/Kg  Justification: preservation of lean body mass  Estimated Fluid Needs: per MD      NUTRITION DIAGNOSIS:  Predicted inadequate nutrient intake (energy/protein) related to potential for decline in PO intakes during admit pending appetite, nausea, and food preferences.    NUTRITION INTERVENTIONS  Recommendations / Nutrition Prescription  See above.      Implementation  Nutrition education: Provided education on above.    Medical Food Supplement: As above.     Collaboration and Referral of Nutrition care: Discussed POC with team during rounds.    Nutrition Goals  Patient to consume at least 50-75% of meals or supplements TID while acutely admitted.      MONITORING AND EVALUATION:  Progress towards goals will be monitored and evaluated per protocol and Practice Guidelines          Dolores Russell RDN, LD  Clinical Dietitian  3rd floor/ICU: [phone]  All other floors: [phone]  Weekend/holiday: [phone]  Office: [phone]

## 2023-02-13 NOTE — PLAN OF CARE
Goal Outcome Evaluation:       To Do:  End of Shift Summary  For vital signs and complete assessments, please see documentation flowsheets.     Pertinent assessments: T max 100.4, Tylenol given. Rechecked 98.8. CPAP at night. Denies nausea. SOBE. Infrequent cough, declined Tessalon. Scheduled MS contin and Dilaudid prn given for right leg pain. Lump right inner thigh.  and 116. Up SBA  Major Shift Events: Triggered sepsis, lactic 1.8.   Treatment Plan: Vanco, Zosyn, IVF, filgrastim, symptom management and monitor blood ct and temp.   Bedside Nurse: Dahlia Salgado RN

## 2023-02-13 NOTE — PROGRESS NOTES
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      # Neutropenic fever  - Continue current antibiotics. Fever trend down trending.  - Started on filgrastim 480mcg 2/12/23. ANC today 7.5. This improvement is likely combination of response to prior Neulasta given plus the filgrastim given yesterday.   - No additional filgrastim needed.   - Would not be surprised if his WBC continues to increase over the next few days      # High-grade sarcoma   - status post neoadjuvant chemotherapy with ifosfamide Doxil on 1/30/2023  - will follow-up with [facility] team 2/21/23 as scheduled   - had neurologic changes with cycle 1, may need to dose reduce Ifos with cycle 2, defer to outpatient team     # Pain control  - Appreciate palliative care consultation for pain management   - Continue Dilaudid PRN and MS Contin BID  - Agree with outpatient palliative referral      # Anemia   - Iron studies consistent with anemia of chronic disease/chemotherapy.   - s/p 1 unit of blood, transfuse if H/h <7.0  - spoke with RN, hgb is in fact 8.1 today. The critical result of 6.8 was from yesterday 2/12/23     # Hematuria   - history of hematuria prior to chemo  - recheck outpatient and consider urology referral      # GERD  -maximize protonix bid and carafate qid    Discussed with Dr. Coker. We will continue to follow.     INTERIM HISTORY:  Antonio is doing OK. He still has pain but is working with palliative team to adjust this. He had  "chest tightness when he came into ED and this is improved. He notes dry mouth from treatment along with heartburn, otherwise no side effects. Did have neurologic changes while on ifosfamide (talking in sleep, saying unusual things, neuropathy). This is now resolved. Denies breathing concerns, GI concerns, swelling. Did have pain with urination, now better.        MEDICATIONS:  Reviewed       PHYSICAL EXAM:  Vital signs:  Temp: 100  F (37.8  C) Temp src: Oral BP: (P) 114/49 Pulse: (P) 101   Resp: (P) 18 SpO2: (P) 98 % O2 Device: None (Room air)     Weight: 135.3 kg (298 lb 4.8 oz)  Estimated body mass index is 41.62 kg/m  as calculated from the following:    Height as of 2/7/23: 1.803 m (5' 10.98\").    Weight as of this encounter: 135.3 kg (298 lb 4.8 oz).      GENERAL/CONSTITUTIONAL: No acute distress.  RESPIRATORY: Clear to auscultation bilaterally. No crackles or wheezing.   CARDIOVASCULAR: Regular rate and rhythm without murmurs, gallops, or rubs.  GASTROINTESTINAL: No hepatosplenomegaly, masses, or tenderness. The patient has normal bowel sounds. No guarding.  No distention.  MUSCULOSKELETAL: Warm and well-perfused, no cyanosis, clubbing, or edema.    LABS:  Recent Labs   Lab Test 02/13/23  0200 02/12/23  1106 02/12/23  0632   NA  --   --  131*   POTASSIUM  --   --  3.8   CHLORIDE  --   --  98   CO2  --   --  24   ANIONGAP  --   --  9   BUN  --   --  5.2*   CR  --   --  0.68   *   < > 148*   DORIAN  --   --  7.9*    < > = values in this interval not displayed.     Recent Labs   Lab Test 02/12/23  0632 02/11/23 2016   WBC 1.8* 1.3*   HGB 6.9* 7.5*   * 137*   MCV 77* 78   NEUTROPHIL 12 3     Recent Labs   Lab Test 02/12/23  0632 02/11/23  1843 01/31/23  0605 01/30/23  1025   BILITOTAL 0.5 0.4   < > 0.4   ALKPHOS 74 87   < > 64   ALT 8* 11   < > 9*   AST 15 14   < > 15   ALBUMIN 2.6* 3.0*   < > 2.9*   LDH  --   --   --  193    < > = values in this interval not displayed.       Aaron Sargent" JUN  Department of Hematology and Oncology  [facility]   Pager [phone]

## 2023-02-13 NOTE — PROGRESS NOTES
Rt consult order follow up due to pt complaining about home CPAP mask. Pt checked and was sleeping with home CPAP on. Per RN pt was concerned about wearing the CPAP mask will make him get infection. RN reassured and pt was able to use the home CPAP. Rt will continue to monitor.

## 2023-02-13 NOTE — PROGRESS NOTES
Care Management Discharge Note    Discharge Date: 02/13/2023       Discharge Disposition:  Home    Handoff Referral Completed: Yes    Additional Information:  Pt identified as a Service Bundle #3 and an unplanned readmission risk. No needs or assessment needed at this time.   Case Management will continue to follow therapy recommendations and other discipline recommendations if additional needs arise.  Please consult CM/SW  if discharge needs should arise.    Nyasia Vázquez, RN      Nyasia Vázquez RN Case Manager  Inpatient Care Coordination  [facility]   [phone]

## 2023-02-14 ENCOUNTER — APPOINTMENT (OUTPATIENT)
Dept: CT IMAGING | Facility: CLINIC | Age: 53
DRG: 809 | End: 2023-02-14
Attending: PHYSICIAN ASSISTANT
Payer: COMMERCIAL

## 2023-02-14 LAB
ALBUMIN SERPL BCG-MCNC: 2.5 G/DL (ref 3.5–5.2)
ALP SERPL-CCNC: 95 U/L (ref 40–129)
ALT SERPL W P-5'-P-CCNC: 7 U/L (ref 10–50)
ANION GAP SERPL CALCULATED.3IONS-SCNC: 9 MMOL/L (ref 7–15)
AST SERPL W P-5'-P-CCNC: 18 U/L (ref 10–50)
BASOPHILS # BLD MANUAL: 0 10E3/UL (ref 0–0.2)
BASOPHILS NFR BLD MANUAL: 0 %
BILIRUB SERPL-MCNC: 0.3 MG/DL
BUN SERPL-MCNC: 8.6 MG/DL (ref 6–20)
CALCIUM SERPL-MCNC: 7.9 MG/DL (ref 8.6–10)
CHLORIDE SERPL-SCNC: 106 MMOL/L (ref 98–107)
CREAT SERPL-MCNC: 1.42 MG/DL (ref 0.67–1.17)
DEPRECATED HCO3 PLAS-SCNC: 27 MMOL/L (ref 22–29)
EOSINOPHIL # BLD MANUAL: 0 10E3/UL (ref 0–0.7)
EOSINOPHIL NFR BLD MANUAL: 0 %
ERYTHROCYTE [DISTWIDTH] IN BLOOD BY AUTOMATED COUNT: 18.4 % (ref 10–15)
GFR SERPL CREATININE-BSD FRML MDRD: 59 ML/MIN/1.73M2
GLUCOSE BLDC GLUCOMTR-MCNC: 138 MG/DL (ref 70–99)
GLUCOSE BLDC GLUCOMTR-MCNC: 158 MG/DL (ref 70–99)
GLUCOSE BLDC GLUCOMTR-MCNC: 168 MG/DL (ref 70–99)
GLUCOSE BLDC GLUCOMTR-MCNC: 189 MG/DL (ref 70–99)
GLUCOSE BLDC GLUCOMTR-MCNC: 197 MG/DL (ref 70–99)
GLUCOSE SERPL-MCNC: 154 MG/DL (ref 70–99)
HCT VFR BLD AUTO: 27.1 % (ref 40–53)
HGB BLD-MCNC: 8.1 G/DL (ref 13.3–17.7)
LACTATE SERPL-SCNC: 0.8 MMOL/L (ref 0.7–2)
LYMPHOCYTES # BLD MANUAL: 1.6 10E3/UL (ref 0.8–5.3)
LYMPHOCYTES NFR BLD MANUAL: 8 %
MAGNESIUM SERPL-MCNC: 2 MG/DL (ref 1.7–2.3)
MCH RBC QN AUTO: 23.7 PG (ref 26.5–33)
MCHC RBC AUTO-ENTMCNC: 29.9 G/DL (ref 31.5–36.5)
MCV RBC AUTO: 79 FL (ref 78–100)
METAMYELOCYTES # BLD MANUAL: 0.2 10E3/UL
METAMYELOCYTES NFR BLD MANUAL: 1 %
MONOCYTES # BLD MANUAL: 1.6 10E3/UL (ref 0–1.3)
MONOCYTES NFR BLD MANUAL: 8 %
NEUTROPHILS # BLD MANUAL: 16.1 10E3/UL (ref 1.6–8.3)
NEUTROPHILS NFR BLD MANUAL: 83 %
NRBC # BLD AUTO: 0.4 10E3/UL
NRBC BLD MANUAL-RTO: 2 %
PLAT MORPH BLD: ABNORMAL
PLATELET # BLD AUTO: 308 10E3/UL (ref 150–450)
POTASSIUM SERPL-SCNC: 3.4 MMOL/L (ref 3.4–5.3)
POTASSIUM SERPL-SCNC: 3.7 MMOL/L (ref 3.4–5.3)
PROT SERPL-MCNC: 6 G/DL (ref 6.4–8.3)
RBC # BLD AUTO: 3.42 10E6/UL (ref 4.4–5.9)
RBC MORPH BLD: ABNORMAL
SODIUM SERPL-SCNC: 142 MMOL/L (ref 136–145)
TOXIC GRANULES BLD QL SMEAR: PRESENT
WBC # BLD AUTO: 19.4 10E3/UL (ref 4–11)

## 2023-02-14 PROCEDURE — 250N000013 HC RX MED GY IP 250 OP 250 PS 637: Performed by: INTERNAL MEDICINE

## 2023-02-14 PROCEDURE — 99233 SBSQ HOSP IP/OBS HIGH 50: CPT | Performed by: INTERNAL MEDICINE

## 2023-02-14 PROCEDURE — 87086 URINE CULTURE/COLONY COUNT: CPT | Performed by: INTERNAL MEDICINE

## 2023-02-14 PROCEDURE — 250N000011 HC RX IP 250 OP 636: Performed by: PHYSICIAN ASSISTANT

## 2023-02-14 PROCEDURE — 99232 SBSQ HOSP IP/OBS MODERATE 35: CPT | Performed by: PHYSICIAN ASSISTANT

## 2023-02-14 PROCEDURE — 250N000011 HC RX IP 250 OP 636: Performed by: INTERNAL MEDICINE

## 2023-02-14 PROCEDURE — 258N000003 HC RX IP 258 OP 636: Performed by: INTERNAL MEDICINE

## 2023-02-14 PROCEDURE — 80053 COMPREHEN METABOLIC PANEL: CPT | Performed by: INTERNAL MEDICINE

## 2023-02-14 PROCEDURE — 99221 1ST HOSP IP/OBS SF/LOW 40: CPT | Performed by: STUDENT IN AN ORGANIZED HEALTH CARE EDUCATION/TRAINING PROGRAM

## 2023-02-14 PROCEDURE — 71275 CT ANGIOGRAPHY CHEST: CPT

## 2023-02-14 PROCEDURE — 84132 ASSAY OF SERUM POTASSIUM: CPT | Performed by: INTERNAL MEDICINE

## 2023-02-14 PROCEDURE — 83605 ASSAY OF LACTIC ACID: CPT | Performed by: INTERNAL MEDICINE

## 2023-02-14 PROCEDURE — 120N000001 HC R&B MED SURG/OB

## 2023-02-14 PROCEDURE — 999N000111 HC STATISTIC OT IP EVAL DEFER: Performed by: REHABILITATION PRACTITIONER

## 2023-02-14 PROCEDURE — 85027 COMPLETE CBC AUTOMATED: CPT | Performed by: INTERNAL MEDICINE

## 2023-02-14 PROCEDURE — 250N000013 HC RX MED GY IP 250 OP 250 PS 637: Performed by: NURSE PRACTITIONER

## 2023-02-14 PROCEDURE — 83735 ASSAY OF MAGNESIUM: CPT | Performed by: INTERNAL MEDICINE

## 2023-02-14 PROCEDURE — 85007 BL SMEAR W/DIFF WBC COUNT: CPT | Performed by: INTERNAL MEDICINE

## 2023-02-14 RX ORDER — DULOXETIN HYDROCHLORIDE 60 MG/1
60 CAPSULE, DELAYED RELEASE ORAL DAILY
Status: DISCONTINUED | OUTPATIENT
Start: 2023-02-14 | End: 2023-02-16 | Stop reason: HOSPADM

## 2023-02-14 RX ORDER — IOPAMIDOL 755 MG/ML
77 INJECTION, SOLUTION INTRAVASCULAR ONCE
Status: COMPLETED | OUTPATIENT
Start: 2023-02-14 | End: 2023-02-14

## 2023-02-14 RX ORDER — PROCHLORPERAZINE 25 MG
25 SUPPOSITORY, RECTAL RECTAL EVERY 12 HOURS PRN
Status: DISCONTINUED | OUTPATIENT
Start: 2023-02-14 | End: 2023-02-14

## 2023-02-14 RX ORDER — ONDANSETRON 2 MG/ML
4 INJECTION INTRAMUSCULAR; INTRAVENOUS EVERY 6 HOURS PRN
Status: DISCONTINUED | OUTPATIENT
Start: 2023-02-14 | End: 2023-02-14

## 2023-02-14 RX ORDER — MORPHINE SULFATE 15 MG/1
15 TABLET, FILM COATED, EXTENDED RELEASE ORAL EVERY 12 HOURS
Status: DISCONTINUED | OUTPATIENT
Start: 2023-02-14 | End: 2023-02-14

## 2023-02-14 RX ORDER — ONDANSETRON 4 MG/1
4 TABLET, ORALLY DISINTEGRATING ORAL EVERY 6 HOURS PRN
Status: DISCONTINUED | OUTPATIENT
Start: 2023-02-14 | End: 2023-02-14

## 2023-02-14 RX ORDER — PROCHLORPERAZINE MALEATE 5 MG
10 TABLET ORAL EVERY 6 HOURS PRN
Status: DISCONTINUED | OUTPATIENT
Start: 2023-02-14 | End: 2023-02-14

## 2023-02-14 RX ORDER — PHENAZOPYRIDINE HYDROCHLORIDE 100 MG/1
100 TABLET, FILM COATED ORAL 3 TIMES DAILY PRN
Status: DISCONTINUED | OUTPATIENT
Start: 2023-02-14 | End: 2023-02-16 | Stop reason: HOSPADM

## 2023-02-14 RX ORDER — POTASSIUM CHLORIDE 1500 MG/1
40 TABLET, EXTENDED RELEASE ORAL ONCE
Status: COMPLETED | OUTPATIENT
Start: 2023-02-14 | End: 2023-02-14

## 2023-02-14 RX ORDER — GABAPENTIN 300 MG/1
300 CAPSULE ORAL AT BEDTIME
Status: DISCONTINUED | OUTPATIENT
Start: 2023-02-14 | End: 2023-02-14

## 2023-02-14 RX ORDER — FERROUS SULFATE 325(65) MG
325 TABLET ORAL
Status: DISCONTINUED | OUTPATIENT
Start: 2023-02-14 | End: 2023-02-16 | Stop reason: HOSPADM

## 2023-02-14 RX ORDER — LANOLIN ALCOHOL/MO/W.PET/CERES
400 CREAM (GRAM) TOPICAL DAILY
Status: DISCONTINUED | OUTPATIENT
Start: 2023-02-14 | End: 2023-02-16 | Stop reason: HOSPADM

## 2023-02-14 RX ORDER — SUCRALFATE ORAL 1 G/10ML
1 SUSPENSION ORAL 4 TIMES DAILY PRN
Status: DISCONTINUED | OUTPATIENT
Start: 2023-02-14 | End: 2023-02-14

## 2023-02-14 RX ADMIN — SENNOSIDES AND DOCUSATE SODIUM 1 TABLET: 50; 8.6 TABLET ORAL at 22:01

## 2023-02-14 RX ADMIN — SODIUM CHLORIDE: 9 INJECTION, SOLUTION INTRAVENOUS at 06:47

## 2023-02-14 RX ADMIN — DULOXETINE HYDROCHLORIDE 60 MG: 60 CAPSULE, DELAYED RELEASE ORAL at 09:18

## 2023-02-14 RX ADMIN — SODIUM CHLORIDE: 9 INJECTION, SOLUTION INTRAVENOUS at 19:19

## 2023-02-14 RX ADMIN — SUCRALFATE ORAL 1 G: 1 SUSPENSION ORAL at 16:58

## 2023-02-14 RX ADMIN — SUCRALFATE ORAL 1 G: 1 SUSPENSION ORAL at 22:02

## 2023-02-14 RX ADMIN — MORPHINE SULFATE 15 MG: 15 TABLET, EXTENDED RELEASE ORAL at 20:25

## 2023-02-14 RX ADMIN — TAZOBACTAM SODIUM AND PIPERACILLIN SODIUM 3.38 G: 375; 3 INJECTION, SOLUTION INTRAVENOUS at 06:37

## 2023-02-14 RX ADMIN — PREGABALIN 25 MG: 25 CAPSULE ORAL at 15:07

## 2023-02-14 RX ADMIN — TAZOBACTAM SODIUM AND PIPERACILLIN SODIUM 3.38 G: 375; 3 INJECTION, SOLUTION INTRAVENOUS at 11:58

## 2023-02-14 RX ADMIN — TAZOBACTAM SODIUM AND PIPERACILLIN SODIUM 3.38 G: 375; 3 INJECTION, SOLUTION INTRAVENOUS at 17:44

## 2023-02-14 RX ADMIN — IOPAMIDOL 77 ML: 755 INJECTION, SOLUTION INTRAVENOUS at 13:13

## 2023-02-14 RX ADMIN — HYDROXYZINE HYDROCHLORIDE 25 MG: 25 TABLET, FILM COATED ORAL at 07:43

## 2023-02-14 RX ADMIN — TAZOBACTAM SODIUM AND PIPERACILLIN SODIUM 3.38 G: 375; 3 INJECTION, SOLUTION INTRAVENOUS at 23:37

## 2023-02-14 RX ADMIN — POTASSIUM CHLORIDE 40 MEQ: 1500 TABLET, EXTENDED RELEASE ORAL at 15:07

## 2023-02-14 RX ADMIN — FOLIC ACID TAB 400 MCG 400 MCG: 400 TAB at 09:19

## 2023-02-14 RX ADMIN — VANCOMYCIN HYDROCHLORIDE 1500 MG: 5 INJECTION, POWDER, LYOPHILIZED, FOR SOLUTION INTRAVENOUS at 01:02

## 2023-02-14 RX ADMIN — SUCRALFATE ORAL 1 G: 1 SUSPENSION ORAL at 06:37

## 2023-02-14 RX ADMIN — FERROUS SULFATE TAB 325 MG (65 MG ELEMENTAL FE) 325 MG: 325 (65 FE) TAB at 09:17

## 2023-02-14 RX ADMIN — TAZOBACTAM SODIUM AND PIPERACILLIN SODIUM 3.38 G: 375; 3 INJECTION, SOLUTION INTRAVENOUS at 00:11

## 2023-02-14 RX ADMIN — PANTOPRAZOLE SODIUM 40 MG: 40 TABLET, DELAYED RELEASE ORAL at 06:37

## 2023-02-14 RX ADMIN — HYDROMORPHONE HYDROCHLORIDE 1 MG: 2 TABLET ORAL at 23:39

## 2023-02-14 RX ADMIN — PREGABALIN 25 MG: 25 CAPSULE ORAL at 22:01

## 2023-02-14 RX ADMIN — HEPARIN, PORCINE (PF) 10 UNIT/ML INTRAVENOUS SYRINGE 5 ML: at 06:41

## 2023-02-14 RX ADMIN — SUCRALFATE ORAL 1 G: 1 SUSPENSION ORAL at 11:54

## 2023-02-14 RX ADMIN — PREGABALIN 25 MG: 25 CAPSULE ORAL at 09:19

## 2023-02-14 RX ADMIN — POLYETHYLENE GLYCOL 3350 17 G: 17 POWDER, FOR SOLUTION ORAL at 09:21

## 2023-02-14 RX ADMIN — MORPHINE SULFATE 15 MG: 15 TABLET, EXTENDED RELEASE ORAL at 07:43

## 2023-02-14 RX ADMIN — HEPARIN, PORCINE (PF) 10 UNIT/ML INTRAVENOUS SYRINGE 5 ML: at 09:40

## 2023-02-14 RX ADMIN — HYDROMORPHONE HYDROCHLORIDE 1 MG: 2 TABLET ORAL at 07:43

## 2023-02-14 ASSESSMENT — ACTIVITIES OF DAILY LIVING (ADL)
ADLS_ACUITY_SCORE: 24
ADLS_ACUITY_SCORE: 22
ADLS_ACUITY_SCORE: 22
ADLS_ACUITY_SCORE: 24
ADLS_ACUITY_SCORE: 22
ADLS_ACUITY_SCORE: 24
ADLS_ACUITY_SCORE: 22
ADLS_ACUITY_SCORE: 22

## 2023-02-14 NOTE — PLAN OF CARE
Goal Outcome Evaluation:      Plan of Care Reviewed With: patient     Pertinent assessments: Pt up ad casey, independent. A&O x4. T max 100.0. Scheduled MS Contin and dilaudid PRN for R leg pain. Lump on R posterior inner thigh. Unit of blood given. IV ABX (zosyn, vanco). VAD started a second IV in R arm. No blood return on CVC; alteplase given, effective.     Major Shift Events Blood transfusion (1 unit), alteplase to CVC    Treatment Plan: Pain management, IV ABX, monitor blood count and temp, symptom management    Bedside Nurse: Jesika Hdz RN

## 2023-02-14 NOTE — PROGRESS NOTES
End of Shift Summary  For vital signs and complete assessments, please see documentation flowsheets.     Pertinent assessments: Pt A&O, low grade temp 99.8, HR tachy, 88% on RA, cpap @ HS. Pt c/o right hip pain 5/10 scheduled MS Contin and oral Dilaudid x 2 given. Denies any nausea. BG checks 159, 189    Major Shift Events: uneventful     Treatment Plan: IVF, IV Zosyn, Vanco, pain management, monitor labs, BG checks

## 2023-02-14 NOTE — PLAN OF CARE
OT: Orders received. Chart reviewed and discussed with care team. Per RN, patient is I with cares and mobility, in the past, patient has used an ace wrap to provided increased comfort to L LE hamstring to reduce pain. Spoke with RN, who will obtain ace wrap from [facility] and provide to patient to allow him to wrap hamstring to his comfort. Skilled OT not indicated as patient has completed this in the past and is able to manage to increase his comfort. Defer discharge recommendations to care team and will complete orders.

## 2023-02-14 NOTE — PROGRESS NOTES
Hematology-Oncology Hospital Follow-up Note  [facility]     Today's Date: 02/14/23  Date of Admission:  2/11/2023  High-grade sarcoma of the right posterior thigh  Primary Oncologist:  Michael Liang      Plan:  - CT PE (ordered for you)  - Urology consult (ordered for you)  - Continue IV abx. If remains afebrile transition to PO abx tomorrow 2/15/23 (Levaquin 750mg daily) through 2/18 to complete 7 day course  - No additional G-CSF needed  - Transfuse for hemoglobin <7     ASSESSMENT:  Antonio Canseco is a 52 year old male with history of high-grade sarcoma currently undergoing neoadjuvant chemotherapy (Doxil + Ifos) cycle 1 1/30/23 with Neulasta on 2/7/23, now admitted with neutropenic fevers and intractable pain.      # Neutropenic fever  - Continue current IV antibiotics. Fever trend down trending.  - If remains afebrile can transition to PO tomorrow: Levaquin 750mg daily through 2/18 to complete 7 days of abx  - S/p filgrastim 480mcg 2/12/23. No additional G-CSF needed.   - Leukocytosis is 2/2 prior Neulasta and Filgrastim.      # High-grade sarcoma   - status post neoadjuvant chemotherapy with ifosfamide Doxil on 1/30/2023  - will follow-up with [facility] team 2/21/23 as scheduled   - had neurologic changes with cycle 1, may need to dose reduce Ifos with cycle 2, defer to outpatient team  - also now with concerns for ifosfamide related hemorrhagic cystitis, further indication to dose reduce with cycle 2. Will update primary MD     # Pain control  - Appreciate palliative care consultation for pain management   - Continue Dilaudid PRN and MS Contin BID  - Agree with outpatient palliative referral      # Anemia   - Iron studies consistent with anemia of chronic disease/chemotherapy.   - transfuse for hemoglobin <7    # Hematuria   - history of hematuria prior to chemo  - now with recurrent urszula hematuria this AM. This is possibly 2/2 ifosfamide (has risk of hemorrhagic cystitis). Did  "confirm with patient he received mesna with cycle 1  - management includes good hydration and consideration of bladder irrigation if symptoms persist   - urology referral given degree of hematuria.  - agree with urine culture, pending     # GERD  -maximize protonix bid and carafate qid    # Hypoxia  - Noted lower O2 sats on RA along with persistent tachycardia  - He is at high risk of PE. CT PE ordered  - Continue incentive spirometry      Discussed with Dr. Garcia. We will continue to follow.     INTERIM HISTORY:  Antonio is doing OK. He notes improvement in GERD and pain. No fevers. He denies SOB or chest tightness, though nursing notes lower O2 sats on RA. He did have recurrent dysuria this morning with urszula hematuria. He tells me he did receive Mesna with his chemo. No bowel concerns.      MEDICATIONS:  Reviewed       PHYSICAL EXAM:  Vital signs:  Temp: 98.2  F (36.8  C) Temp src: Oral BP: 116/69 Pulse: 100   Resp: 18 SpO2: 92 % O2 Device: None (Room air)     Weight: 135.3 kg (298 lb 4.8 oz)  Estimated body mass index is 41.62 kg/m  as calculated from the following:    Height as of 2/7/23: 1.803 m (5' 10.98\").    Weight as of this encounter: 135.3 kg (298 lb 4.8 oz).      GENERAL/CONSTITUTIONAL: No acute distress.  RESPIRATORY: Clear to auscultation bilaterally. No crackles or wheezing.   CARDIOVASCULAR: Tachycardic rate and rhythm without murmurs, gallops, or rubs.  GASTROINTESTINAL: No hepatosplenomegaly, masses, or tenderness. The patient has normal bowel sounds. No guarding.  No distention.  MUSCULOSKELETAL: Warm and well-perfused, no cyanosis, clubbing, or edema.    LABS:  Recent Labs   Lab Test 02/14/23  0734 02/14/23  0637   NA  --  142   POTASSIUM  --  3.4   CHLORIDE  --  106   CO2  --  27   ANIONGAP  --  9   BUN  --  8.6   CR  --  1.42*   * 154*   DORIAN  --  7.9*     Recent Labs   Lab Test 02/14/23  0637 02/13/23  1812 02/13/23  0830 02/12/23  1116   WBC 19.4*  --  11.5* 2.4*   HGB 8.1* 8.5* 8.1* " 6.8*     --  293 152   MCV 79  --  75* 78   NEUTROPHIL  --   --  65 22     Recent Labs   Lab Test 02/14/23  0637 02/13/23  0830 01/31/23  0605 01/30/23  1025   BILITOTAL 0.3 0.5   < > 0.4   ALKPHOS 95 83   < > 64   ALT 7* 9*   < > 9*   AST 18 19   < > 15   ALBUMIN 2.5* 2.4*   < > 2.9*   LDH  --   --   --  193    < > = values in this interval not displayed.       Aaron Sargent PA-C  Department of Hematology and Oncology  [facility]   Pager [phone]

## 2023-02-14 NOTE — PROGRESS NOTES
[facility]  Hospitalist Progress Note  Judson Servin M.D., M.B.A.   02/14/2023    Reason for Stay/active problem list       Neutropenic fever     Pancytopenia          Assessment and Plan:        Summary of Stay: Antonio Canseco is a 52 year old male admitted on 2/11/2023. He has a past medical history significant for high-grade sarcoma, hypertension, diabetes mellitus type 2, GERD, and irritable bowel syndrome.  Hospitalized at the beginning of this month at [facility] for chemotherapy.  Discharged from [facility] on 2/6.  Had been on outpatient oral antibiotics with levofloxacin and doxycycline.  On topical clindamycin to chronic right leg wound.  He presented to emergency room neutropenic fever.      Problem List with Assessment and Plan:    Neutropenic fever  -Neutropenia due to recent chemo.  -Unclear infectious source.  -Has been on oral antibiotics with levofloxacin and doxycycline in the outpatient setting.  Also on topical clindamycin for chronic right leg wound.  -Started on IV vancomycin and Zosyn in the ER.  -currently afebrile , cultures NTD , will discontinue vancomycin, continue Zosyn, follow cultures      Pancytopenia.  High-grade sarcoma on recent chemo   - CBC on arrival to ED :hgb  7.8 , wbc 1.3 , Neutrophil 0.0 ,    -Patient was transfused 1 unit of blood on February 12 for hemoglobin of 6.9.  --currently Hgb is 8.1.    -- WBC elevated as well as normal platelets count.  Continue to monitor hemoglobin and transfuse as needed.  Oncology team is following and input is appreciated.      Diabetes mellitus type 2.  - not on medication at home   -Continue sliding scale insulin     Hyponatremia.  -Mild. Monitor for now     Chronic right leg lesion   -Due to high-grade sarcoma.  -Pain medications as needed.    Sleep apnea.  -Use CPAP while sleeping.    Acute kidney injury:  -- Stop vancomycin, continue to monitor kidney function.    Plan for  today:    Stop vancomycin, continue Zosyn.  Monitor closely.  Pyridium for dysuria    VTE Prophylaxis: Ambulate every shift  Code Status: Full Code  Diet: Combination Diet Regular Diet Adult  Snacks/Supplements Adult: Ensure Enlive; With Meals    De La Rosa Catheter: Not present    Family updated today: No     Disposition: May discharge in the next 1 day pending continued improvement and stability        Interval History (Subjective):        Patient is seen and examined by me today and medical record reviewed.Overnight events noted and care discussed with nursing staff.patient is feeling better.  She continues to do well except for some hematuria and dysuria.  He has no fever or chills.  Denies any chest pain or shortness of breath.  Care plan was discussed with oncology team and concern for PE discussed.                  Physical Exam:        Last Vital Signs:  /69 (BP Location: Left arm)   Pulse 100   Temp 98.2  F (36.8  C) (Oral)   Resp 18   Wt 135.3 kg (298 lb 4.8 oz)   SpO2 92%   BMI 41.62 kg/m      I/O last 3 completed shifts:  In: 4596 [I.V.:4596]  Out: 1725 [Urine:1725]    Wt Readings from Last 5 Encounters:   02/12/23 135.3 kg (298 lb 4.8 oz)   02/07/23 134.7 kg (297 lb)   02/06/23 136.6 kg (301 lb 3.2 oz)   01/27/23 137.4 kg (303 lb)   01/26/23 137.9 kg (304 lb)        Constitutional: Awake, alert, cooperative, no apparent distress     Respiratory: Clear to auscultation bilaterally, no crackles or wheezing   Cardiovascular: Regular rate and rhythm, normal S1 and S2, and no murmur noted   Abdomen: Normal bowel sounds, soft, non-distended, non-tender   Skin: No new rashes, no cyanosis, dry to touch   Neuro: Alert with  no new focal weakness   Extremities: No edema   Other(s):        All other systems: Negative          Medications:        All current medications were reviewed with changes reflected in problem list.         Data:      All new lab and imaging data was reviewed.      Data reviewed today:  I reviewed all new labs and imaging results over the last 24 hours. I personally reviewed       Recent Labs   Lab 02/14/23  0637 02/13/23  1812 02/13/23  0830 02/12/23  1116   WBC 19.4*  --  11.5* 2.4*   HGB 8.1* 8.5* 8.1* 6.8*   HCT 27.1*  --  26.3* 23.6*   MCV 79  --  75* 78     --  293 152     No results for input(s): CULT in the last 168 hours.  Recent Labs   Lab 02/14/23  1226 02/14/23  0734 02/14/23  0637 02/13/23  1222 02/13/23  0830 02/12/23  1106 02/12/23  0632 02/11/23  1843 02/09/23  1331   NA  --   --  142  --  143  --  131*   < > 134*   POTASSIUM  --   --  3.4  --  3.9  --  3.8   < > 3.9   CHLORIDE  --   --  106  --  104  --  98   < > 101   CO2  --   --  27  --  26  --  24   < > 23   ANIONGAP  --   --  9  --  13  --  9   < > 10   * 138* 154*   < > 120*   < > 148*   < > 164*   BUN  --   --  8.6  --  7.7  --  5.2*   < > 11.8   CR  --   --  1.42*  --  1.07  --  0.68   < > 0.64*   GFRESTIMATED  --   --  59*  --  83  --  >90   < > >90   DORIAN  --   --  7.9*  --  8.1*  --  7.9*   < > 8.8   MAG  --   --  2.0  --   --   --   --   --  2.0   PHOS  --   --   --   --   --   --   --   --  2.1*   PROTTOTAL  --   --  6.0*  --  6.3*  --  6.1*   < > 7.3   ALBUMIN  --   --  2.5*  --  2.4*  --  2.6*   < > 2.9*   BILITOTAL  --   --  0.3  --  0.5  --  0.5   < > 0.3   ALKPHOS  --   --  95  --  83  --  74   < > 81   AST  --   --  18  --  19  --  15   < > 13   ALT  --   --  7*  --  9*  --  8*   < > 8*    < > = values in this interval not displayed.       Recent Labs   Lab 02/14/23  1226 02/14/23  0734 02/14/23  0637 02/14/23  0202 02/13/23  2136   * 138* 154* 189* 159*       No results for input(s): INR in the last 168 hours.      No results for input(s): TROPONIN, TROPI, TROPR in the last 168 hours.    Invalid input(s): TROP, TROPONINIES    Recent Results (from the past 48 hour(s))   XR Chest 2 Views    Narrative    EXAM: XR CHEST 2 VIEWS  LOCATION: [facility]  DATE/TIME: 2/11/2023  7:32 PM    INDICATION: Cough  COMPARISON: 4/24/2022      Impression    IMPRESSION: Infusion port tip in the mid SVC. Lungs are clear. Normal heart size and pulmonary vascularity. No pleural effusions.       COVID Status:  COVID-19 PCR Results    COVID-19 PCR Results 3/9/21 3/9/21 4/24/22 1/27/23 2/11/23    1459 1459      COVID-19 Virus PCR to [facility] - Result Test received-See reflex to IDDL test SARS CoV2 (COVID-19) Virus RT-PCR       COVID-19 Virus PCR to [facility] - Source Nasopharyngeal       SARS-CoV-2 Virus Specimen Source  Nasopharyngeal      SARS-CoV-2 PCR Result  NEGATIVE      SARS CoV2 PCR   Negative Negative Negative      Comments are available for some flowsheets but are not being displayed.         COVID-19 Antibody Results, Testing for Immunity    COVID-19 Antibody Results, Testing for Immunity   No data to display.              Disclaimer: This note consists of symbols derived from keyboarding, dictation and/or voice recognition software. As a result, there may be errors in the script that have gone undetected. Please consider this when interpreting information found in this chart.

## 2023-02-15 LAB
ALBUMIN SERPL BCG-MCNC: 2.5 G/DL (ref 3.5–5.2)
ALP SERPL-CCNC: 116 U/L (ref 40–129)
ALT SERPL W P-5'-P-CCNC: 6 U/L (ref 10–50)
ANION GAP SERPL CALCULATED.3IONS-SCNC: 9 MMOL/L (ref 7–15)
AST SERPL W P-5'-P-CCNC: 16 U/L (ref 10–50)
BILIRUB SERPL-MCNC: 0.3 MG/DL
BUN SERPL-MCNC: 8.2 MG/DL (ref 6–20)
CALCIUM SERPL-MCNC: 7.9 MG/DL (ref 8.6–10)
CHLORIDE SERPL-SCNC: 107 MMOL/L (ref 98–107)
CREAT SERPL-MCNC: 1.3 MG/DL (ref 0.67–1.17)
DEPRECATED HCO3 PLAS-SCNC: 26 MMOL/L (ref 22–29)
ERYTHROCYTE [DISTWIDTH] IN BLOOD BY AUTOMATED COUNT: 18.6 % (ref 10–15)
GFR SERPL CREATININE-BSD FRML MDRD: 66 ML/MIN/1.73M2
GLUCOSE BLDC GLUCOMTR-MCNC: 126 MG/DL (ref 70–99)
GLUCOSE BLDC GLUCOMTR-MCNC: 131 MG/DL (ref 70–99)
GLUCOSE BLDC GLUCOMTR-MCNC: 138 MG/DL (ref 70–99)
GLUCOSE BLDC GLUCOMTR-MCNC: 142 MG/DL (ref 70–99)
GLUCOSE BLDC GLUCOMTR-MCNC: 211 MG/DL (ref 70–99)
GLUCOSE SERPL-MCNC: 148 MG/DL (ref 70–99)
HCT VFR BLD AUTO: 27.1 % (ref 40–53)
HGB BLD-MCNC: 7.8 G/DL (ref 13.3–17.7)
MAGNESIUM SERPL-MCNC: 2 MG/DL (ref 1.7–2.3)
MCH RBC QN AUTO: 23.3 PG (ref 26.5–33)
MCHC RBC AUTO-ENTMCNC: 28.8 G/DL (ref 31.5–36.5)
MCV RBC AUTO: 81 FL (ref 78–100)
PHOSPHATE SERPL-MCNC: 3.2 MG/DL (ref 2.5–4.5)
PLATELET # BLD AUTO: 348 10E3/UL (ref 150–450)
POTASSIUM SERPL-SCNC: 3.7 MMOL/L (ref 3.4–5.3)
PROT SERPL-MCNC: 6.1 G/DL (ref 6.4–8.3)
RBC # BLD AUTO: 3.35 10E6/UL (ref 4.4–5.9)
SODIUM SERPL-SCNC: 142 MMOL/L (ref 136–145)
WBC # BLD AUTO: 19.5 10E3/UL (ref 4–11)

## 2023-02-15 PROCEDURE — 84100 ASSAY OF PHOSPHORUS: CPT | Performed by: PHYSICIAN ASSISTANT

## 2023-02-15 PROCEDURE — 250N000011 HC RX IP 250 OP 636: Performed by: INTERNAL MEDICINE

## 2023-02-15 PROCEDURE — 88112 CYTOPATH CELL ENHANCE TECH: CPT | Mod: 26 | Performed by: PATHOLOGY

## 2023-02-15 PROCEDURE — 250N000012 HC RX MED GY IP 250 OP 636 PS 637: Performed by: INTERNAL MEDICINE

## 2023-02-15 PROCEDURE — 83735 ASSAY OF MAGNESIUM: CPT | Performed by: INTERNAL MEDICINE

## 2023-02-15 PROCEDURE — 99231 SBSQ HOSP IP/OBS SF/LOW 25: CPT | Performed by: PHYSICIAN ASSISTANT

## 2023-02-15 PROCEDURE — 250N000013 HC RX MED GY IP 250 OP 250 PS 637: Performed by: NURSE PRACTITIONER

## 2023-02-15 PROCEDURE — 88112 CYTOPATH CELL ENHANCE TECH: CPT | Mod: TC | Performed by: STUDENT IN AN ORGANIZED HEALTH CARE EDUCATION/TRAINING PROGRAM

## 2023-02-15 PROCEDURE — 250N000013 HC RX MED GY IP 250 OP 250 PS 637: Performed by: INTERNAL MEDICINE

## 2023-02-15 PROCEDURE — 99233 SBSQ HOSP IP/OBS HIGH 50: CPT | Performed by: NURSE PRACTITIONER

## 2023-02-15 PROCEDURE — 88120 CYTP URNE 3-5 PROBES EA SPEC: CPT | Mod: 26 | Performed by: PATHOLOGY

## 2023-02-15 PROCEDURE — 80053 COMPREHEN METABOLIC PANEL: CPT | Performed by: INTERNAL MEDICINE

## 2023-02-15 PROCEDURE — 85027 COMPLETE CBC AUTOMATED: CPT | Performed by: INTERNAL MEDICINE

## 2023-02-15 PROCEDURE — 258N000003 HC RX IP 258 OP 636: Performed by: INTERNAL MEDICINE

## 2023-02-15 PROCEDURE — 88120 CYTP URNE 3-5 PROBES EA SPEC: CPT | Mod: TC | Performed by: STUDENT IN AN ORGANIZED HEALTH CARE EDUCATION/TRAINING PROGRAM

## 2023-02-15 PROCEDURE — 99232 SBSQ HOSP IP/OBS MODERATE 35: CPT | Performed by: PHYSICIAN ASSISTANT

## 2023-02-15 PROCEDURE — 99232 SBSQ HOSP IP/OBS MODERATE 35: CPT | Mod: 25 | Performed by: INTERNAL MEDICINE

## 2023-02-15 PROCEDURE — 120N000001 HC R&B MED SURG/OB

## 2023-02-15 RX ORDER — MORPHINE SULFATE 30 MG/1
30 TABLET, FILM COATED, EXTENDED RELEASE ORAL EVERY 12 HOURS SCHEDULED
Status: DISCONTINUED | OUTPATIENT
Start: 2023-02-15 | End: 2023-02-16 | Stop reason: HOSPADM

## 2023-02-15 RX ADMIN — SODIUM CHLORIDE: 9 INJECTION, SOLUTION INTRAVENOUS at 16:44

## 2023-02-15 RX ADMIN — MORPHINE SULFATE 30 MG: 30 TABLET, FILM COATED, EXTENDED RELEASE ORAL at 20:28

## 2023-02-15 RX ADMIN — INSULIN ASPART 1 UNITS: 100 INJECTION, SOLUTION INTRAVENOUS; SUBCUTANEOUS at 22:34

## 2023-02-15 RX ADMIN — HYDROMORPHONE HYDROCHLORIDE 1 MG: 2 TABLET ORAL at 05:57

## 2023-02-15 RX ADMIN — FERROUS SULFATE TAB 325 MG (65 MG ELEMENTAL FE) 325 MG: 325 (65 FE) TAB at 09:03

## 2023-02-15 RX ADMIN — PREGABALIN 25 MG: 25 CAPSULE ORAL at 16:42

## 2023-02-15 RX ADMIN — PREGABALIN 25 MG: 25 CAPSULE ORAL at 22:32

## 2023-02-15 RX ADMIN — SUCRALFATE ORAL 1 G: 1 SUSPENSION ORAL at 12:58

## 2023-02-15 RX ADMIN — PREGABALIN 25 MG: 25 CAPSULE ORAL at 09:04

## 2023-02-15 RX ADMIN — FOLIC ACID TAB 400 MCG 400 MCG: 400 TAB at 09:05

## 2023-02-15 RX ADMIN — SUCRALFATE ORAL 1 G: 1 SUSPENSION ORAL at 22:32

## 2023-02-15 RX ADMIN — TAZOBACTAM SODIUM AND PIPERACILLIN SODIUM 3.38 G: 375; 3 INJECTION, SOLUTION INTRAVENOUS at 05:57

## 2023-02-15 RX ADMIN — TAZOBACTAM SODIUM AND PIPERACILLIN SODIUM 3.38 G: 375; 3 INJECTION, SOLUTION INTRAVENOUS at 18:54

## 2023-02-15 RX ADMIN — HEPARIN, PORCINE (PF) 10 UNIT/ML INTRAVENOUS SYRINGE 5 ML: at 05:50

## 2023-02-15 RX ADMIN — TAZOBACTAM SODIUM AND PIPERACILLIN SODIUM 3.38 G: 375; 3 INJECTION, SOLUTION INTRAVENOUS at 12:59

## 2023-02-15 RX ADMIN — SODIUM CHLORIDE: 9 INJECTION, SOLUTION INTRAVENOUS at 05:50

## 2023-02-15 RX ADMIN — SUCRALFATE ORAL 1 G: 1 SUSPENSION ORAL at 16:42

## 2023-02-15 RX ADMIN — HYDROMORPHONE HYDROCHLORIDE 1 MG: 2 TABLET ORAL at 16:51

## 2023-02-15 RX ADMIN — DULOXETINE HYDROCHLORIDE 60 MG: 60 CAPSULE, DELAYED RELEASE ORAL at 09:03

## 2023-02-15 RX ADMIN — PANTOPRAZOLE SODIUM 40 MG: 40 TABLET, DELAYED RELEASE ORAL at 09:03

## 2023-02-15 RX ADMIN — MORPHINE SULFATE 15 MG: 15 TABLET, EXTENDED RELEASE ORAL at 09:05

## 2023-02-15 RX ADMIN — SENNOSIDES AND DOCUSATE SODIUM 1 TABLET: 50; 8.6 TABLET ORAL at 22:32

## 2023-02-15 RX ADMIN — SUCRALFATE ORAL 1 G: 1 SUSPENSION ORAL at 09:01

## 2023-02-15 RX ADMIN — HYDROMORPHONE HYDROCHLORIDE 2 MG: 2 TABLET ORAL at 09:56

## 2023-02-15 ASSESSMENT — ACTIVITIES OF DAILY LIVING (ADL)
ADLS_ACUITY_SCORE: 22

## 2023-02-15 NOTE — PROGRESS NOTES
[facility] Progress Note          Assessment and Plan:     Assessment:    Gross hematuria    Antineoplastic chemotherapy induced pancytopenia (H)    Sarcoma (H)    Neutropenic fever (H)    FREDERICK      Plan:   -Continue to bladder scan to ensure emptying.    -If patient continues to have gross hematuria with clots and is unable to urinate/empty, would consider three-way De La Rosa catheter placement with CBI.  Discussed that we would like to avoid this.  -Urine culture is currently pending.  Patient is on broad-spectrum IV antibiotics.  Follow cultures.  Transition to oral as clinically appropriate.  -Patient needs outpatient cystoscopy.  Should be timed between chemotherapy sessions and ideally done on antibiotics.  Patient previously required cystoscopy under anesthesia.  In discussion with patient, he would be willing to trial this in office.  Our office will coordinate.  -Once FREDERICK has resolved, would recommend CT urogram in the next several weeks.  Did discuss that cystoscopy is more important.  -Continue to monitor kidney function.  -Continue with hydration and mesna when receiving ifosfamide per protocol.    Katty Rankin PA-C   [facility]  [phone]               Interval History:     Doing okay.  Has been urinating in the urinal and not seeing as much blood after urination.  Patient notes he had hematuria prior to chemotherapy and now has been having bleeding after urination at the end of his stream.  Patient denies fevers or chills.  Tmax of 100.  Denies nausea and vomiting.  On IV Zosyn.  Postvoid residuals have been low.  FREDERICK slowly improving, creatinine 1.30 EGFR 66 (1.42 EGFR 59 (1.07 EGFR 83)).  WBC 19.5 (19.4).  Hemoglobin 7.8 (8.1).  Occasional tachycardia.  Urine culture is in process.              Review of Systems:     The 5 point Review of Systems is negative other than noted in the HPI             Medications:     Current Facility-Administered Medications Ordered in  Epic   Medication Dose Route Frequency Last Rate Last Admin     acetaminophen (TYLENOL) tablet 650 mg  650 mg Oral Q6H PRN   650 mg at 02/12/23 2011    Or     acetaminophen (TYLENOL) Suppository 650 mg  650 mg Rectal Q6H PRN         benzonatate (TESSALON) capsule 100 mg  100 mg Oral TID PRN   100 mg at 02/12/23 1548     bisacodyl (DULCOLAX) suppository 10 mg  10 mg Rectal Daily PRN         glucose gel 15-30 g  15-30 g Oral Q15 Min PRN        Or     dextrose 50 % injection 25-50 mL  25-50 mL Intravenous Q15 Min PRN        Or     glucagon injection 1 mg  1 mg Subcutaneous Q15 Min PRN         DULoxetine (CYMBALTA) DR capsule 60 mg  60 mg Oral Daily   60 mg at 02/15/23 0903     ferrous sulfate (FEROSUL) tablet 325 mg  325 mg Oral Daily with breakfast   325 mg at 02/15/23 0903     folic acid (FOLVITE) tablet 400 mcg  400 mcg Oral Daily   400 mcg at 02/15/23 0905     heparin 100 UNIT/ML injection 5-10 mL  5-10 mL Intracatheter Q28 Days         heparin 100 UNIT/ML injection 5-10 mL  5-10 mL Intracatheter Q28 Days         heparin lock flush 10 UNIT/ML injection 5-10 mL  5-10 mL Intracatheter Q1H PRN   5 mL at 02/15/23 0550     heparin lock flush 10 UNIT/ML injection 5-10 mL  5-10 mL Intracatheter Q24H   5 mL at 02/12/23 1356     heparin lock flush 10 UNIT/ML injection 5-10 mL  5-10 mL Intracatheter Q1H PRN   5 mL at 02/14/23 0641     heparin lock flush 10 UNIT/ML injection 5-10 mL  5-10 mL Intracatheter Q24H   5 mL at 02/11/23 1939     HYDROmorphone (DILAUDID) half-tab 1 mg  1 mg Oral Q4H PRN   1 mg at 02/15/23 0557     HYDROmorphone (DILAUDID) injection 0.2 mg  0.2 mg Intravenous Q2H PRN   0.2 mg at 02/12/23 0648     HYDROmorphone (DILAUDID) injection 0.4 mg  0.4 mg Intravenous Q2H PRN         HYDROmorphone (DILAUDID) tablet 2 mg  2 mg Oral Q4H PRN   2 mg at 02/13/23 1444     hydrOXYzine (ATARAX) tablet 25 mg  25 mg Oral Q4H PRN   25 mg at 02/14/23 0743     insulin aspart (NovoLOG) injection (RAPID ACTING)  1-3 Units  Subcutaneous TID AC   1 Units at 02/14/23 1920     insulin aspart (NovoLOG) injection (RAPID ACTING)  1-3 Units Subcutaneous At Bedtime         lidocaine (LMX4) cream   Topical Q1H PRN         lidocaine 1 % 0.1-1 mL  0.1-1 mL Other Q1H PRN         magnesium hydroxide (MILK OF MAGNESIA) suspension 30 mL  30 mL Oral Daily PRN         melatonin tablet 1 mg  1 mg Oral At Bedtime PRN         morphine (MS CONTIN) 12 hr tablet 15 mg  15 mg Oral Q12H TRACEY (08/20)   15 mg at 02/15/23 0905     naloxone (NARCAN) injection 0.2 mg  0.2 mg Intravenous Q2 Min PRN        Or     naloxone (NARCAN) injection 0.4 mg  0.4 mg Intravenous Q2 Min PRN        Or     naloxone (NARCAN) injection 0.2 mg  0.2 mg Intramuscular Q2 Min PRN        Or     naloxone (NARCAN) injection 0.4 mg  0.4 mg Intramuscular Q2 Min PRN         ondansetron (ZOFRAN ODT) ODT tab 4 mg  4 mg Oral Q6H PRN        Or     ondansetron (ZOFRAN) injection 4 mg  4 mg Intravenous Q6H PRN         pantoprazole (PROTONIX) EC tablet 40 mg  40 mg Oral QAM AC   40 mg at 02/15/23 0903     phenazopyridine (PYRIDIUM) tablet 100 mg  100 mg Oral TID PRN         piperacillin-tazobactam (ZOSYN) infusion 3.375 g  3.375 g Intravenous Q6H 100 mL/hr at 02/15/23 0557 3.375 g at 02/15/23 0557     polyethylene glycol (MIRALAX) Packet 17 g  17 g Oral Daily   17 g at 02/14/23 0921     pregabalin (LYRICA) capsule 25 mg  25 mg Oral TID   25 mg at 02/15/23 0904     prochlorperazine (COMPAZINE) injection 10 mg  10 mg Intravenous Q6H PRN        Or     prochlorperazine (COMPAZINE) tablet 10 mg  10 mg Oral Q6H PRN        Or     prochlorperazine (COMPAZINE) suppository 25 mg  25 mg Rectal Q12H PRN         senna-docusate (SENOKOT-S/PERICOLACE) 8.6-50 MG per tablet 1 tablet  1 tablet Oral At Bedtime   1 tablet at 02/14/23 2201     senna-docusate (SENOKOT-S/PERICOLACE) 8.6-50 MG per tablet 1 tablet  1 tablet Oral BID PRN   1 tablet at 02/12/23 0647    Or     senna-docusate (SENOKOT-S/PERICOLACE) 8.6-50 MG per  tablet 2 tablet  2 tablet Oral BID PRN         sodium chloride (PF) 0.9% PF flush 10-20 mL  10-20 mL Intracatheter q1 min prn   10 mL at 02/12/23 1703     sodium chloride (PF) 0.9% PF flush 10-20 mL  10-20 mL Intracatheter Q1H PRN         sodium chloride (PF) 0.9% PF flush 10-20 mL  10-20 mL Intracatheter Q28 Days   20 mL at 02/12/23 1357     sodium chloride (PF) 0.9% PF flush 10-20 mL  10-20 mL Intracatheter q1 min prn         sodium chloride (PF) 0.9% PF flush 10-20 mL  10-20 mL Intracatheter Q1H PRN   20 mL at 02/12/23 1312     sodium chloride (PF) 0.9% PF flush 10-20 mL  10-20 mL Intracatheter Q28 Days         sodium chloride (PF) 0.9% PF flush 3 mL  3 mL Intracatheter Q8H   3 mL at 02/15/23 0907     sodium chloride (PF) 0.9% PF flush 3 mL  3 mL Intracatheter q1 min prn         sodium chloride 0.9% infusion   Intravenous Continuous 100 mL/hr at 02/15/23 0550 New Bag at 02/15/23 0550     sucralfate (CARAFATE) suspension 1 g  1 g Oral 4x Daily AC & HS   1 g at 02/15/23 0901     No current [facility]-ordered outpatient medications on file.                  Physical Exam:   Vitals were reviewed  Patient Vitals for the past 8 hrs:   BP Temp Temp src Pulse Resp SpO2   02/15/23 0721 134/76 100  F (37.8  C) Oral 98 12 91 %     GEN: NAD, sitting up in bed  EYES: EOMI  MOUTH: MMM  NECK: Supple  RESP: Unlabored breathing  SKIN: Warm  ABD: soft  NEURO: AAO  URO: No meatal bleeding.  Nontender.  Urinating on own.           Data:     Lab Results   Component Value Date    NTBNPI 9 04/24/2022     Lab Results   Component Value Date    WBC 19.5 (H) 02/15/2023    WBC 19.4 (H) 02/14/2023    WBC 11.5 (H) 02/13/2023    HGB 7.8 (L) 02/15/2023    HGB 8.1 (L) 02/14/2023    HGB 8.5 (L) 02/13/2023    HCT 27.1 (L) 02/15/2023    HCT 27.1 (L) 02/14/2023    HCT 26.3 (L) 02/13/2023    MCV 81 02/15/2023    MCV 79 02/14/2023    MCV 75 (L) 02/13/2023     02/15/2023     02/14/2023     02/13/2023     Lab Results   Component Value  Date    INR 1.44 (H) 01/31/2023    INR 1.37 (H) 01/30/2023    INR 1.35 (H) 01/27/2023

## 2023-02-15 NOTE — CONSULTS
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 25 pack year smoker, quit 10 years ago. Unclear if gross hematuria predated the chemo. He does have a history of microscopic hematuria for which he underwent evaluation by Dr. Moeller in 2014 with CT imaging; he did not tolerate an office cystoscopy while awake so cysto was done under anesthesia at that time. On exam has some blood clot at tip of meatus. Urinating well but has some dysuria.     ddx includes hemorrhagic cystitis from ifosfamide (a very common and known side effect of that particular chemo regimen) vs infection (bacterial vs. Other viral infection e.g. BK virus in setting of immunosuppression) vs bladder tumor (25 pack year smoker quit 10 years ago). Had ct c/a/p 1/26/2023 without any obvious upper tract lesions or large bladder mass. He does not have delayed films but he just got another contrast load for ct chest pe today and he has significant FREDERICK. CT urogram once renal function recovers, non urgent. Cysto is higher priority to rule out a urothelial tumor of the bladder. I discussed with him office cystoscopy and he seems amenable to this      Recommendations:   - no acute urologic intervention  - bladder scan to ensure bladder is empty - PVR 40 ml, good  - urine culture is pending  - send urine cytology/FISH  - needs outpatient cystoscopy, need to time between chemo cycles to ensure not immunosupressed. Ideally  would be done on antibiotics  - CTU once renal function recovers in the next few weeks  - continue hydration and mesna when receiving ifosfamide per protocol    Renaldo Ochoa MD   [facility]  [phone] clinic phone               Chief Complaint:   Gross hematuria     History is obtained from the patient         History of Present Illness:   This patient is a 52 year old male who presents with the following condition requiring a hospital admission:    Hematuria  Patient complains of gross hematuria. There is not a history of nephrolithiasis. There is not a history of urologic trauma. Other urologic symptoms include dysuria. Patient admits to history of tobacco use. Patient denies history of chronic De La Rosa catheter,  surgeries, sexually transmitted diseases, trauma and urolithiasis. 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 Received once cycle of doxil/ifosfamide inpatient 1/30/23-2/6/23. Now admitted for neutropenic fever. Urology consulted for gross hematuria with clots. He had been on outpatient oral abx levaquin and doxycycline but was started on vanc/zosyn upon admission          Past Medical History:     I have reviewed this patient's past medical history  Past Medical History:   Diagnosis Date     Acrochordon 02/11/2021     CARDIOVASCULAR SCREENING; LDL GOAL LESS THAN 160 03/27/2012     Colon adenoma      Controlled type 2 diabetes mellitus without complication, without long-term current use of insulin (H) 09/16/2019    X 1     Cough 02/04/2014     Degeneration of thoracic intervertebral disc 12/11/2013     Dysfunction of both eustachian tubes 04/12/2019     Elevated blood pressure 12/22/2014     Elevated hemoglobin A1c 09/16/2019    X 1     Gastroesophageal reflux disease      Hamstring strain, right, subsequent encounter 12/19/2022      Hepatic cyst 2018     Hepatic steatosis 2013     History of colonic polyps 2013     History of drainage of abscess 2019     Hypertension      Irritable bowel syndrome without diarrhea 2018     Low libido 2019     Lumbar radiculopathy 2022     Microscopic hematuria 2013     Nephrolithiasis      Obesity 2012     BRIAN on CPAP 2012     Other irritable bowel syndrome 2018     Pain in thoracic spine 2013     Pulmonary nodule 2018     Right-sided chest pain 2018     Sarcoma (H) 2023     Sleep apnea      Tinea pedis of both feet 2021     Tinnitus, unspecified laterality 2019     Uncontrolled diabetes mellitus with hyperglycemia, without long-term current use of insulin (H) 2023             Past Surgical History:     I have reviewed this patient's past surgical history  Past Surgical History:   Procedure Laterality Date     COLONOSCOPY       COLONOSCOPY N/A 2021    Procedure: COLONOSCOPY (fv);  Surgeon: Ean Alcantara MD;  Location: [facility] OR     CYSTOSCOPY  2014    Procedure: CYSTOSCOPY;   Video Cystoscopy with Exam Under Anesthesia;  Surgeon: Chente Moeller MD;  Location: [facility] OR     IR CVC TUNNEL PLACEMENT > 5 YRS OF AGE  2023     LEG SURGERY Left 2019    Suregy r/t infection     wisdom teeth               Social History:     I have reviewed this patient's social history  Social History     Tobacco Use     Smoking status: Former     Packs/day: 1.00     Years: 25.00     Pack years: 25.00     Types: Cigarettes     Start date: 1988     Quit date: 2013     Years since quittin.8     Smokeless tobacco: Never     Tobacco comments:     1 sd/day   Substance Use Topics     Alcohol use: Not Currently     Comment: one drink every 6 months             Family History:     Family History   Problem Relation Age of Onset     Family History Negative Mother      Obesity Mother      Cancer Father          lymphoma     Other Cancer Father      Other Cancer Paternal Grandfather      Family history reviewed. No FH of  malignancy          Immunizations:     Immunization History   Administered Date(s) Administered     COVID-19 Vaccine 18+ (Moderna) 04/16/2021, 05/14/2021     COVID-19 Vaccine Bivalent Booster 18+ (Moderna) 01/23/2023     COVID-19,PF,Moderna Booster 12/28/2021, 05/17/2022     Influenza Vaccine 50-64 or 18-64 w/egg allergy (Flublok) 01/23/2023     Influenza Vaccine >6 months (Alfuria,Fluzone) 12/11/2013, 10/25/2014, 09/28/2018, 10/07/2019, 02/11/2021     Pneumococcal 23 valent 05/03/2013     TD (ADULT, 7+) 04/19/2007     TDAP Vaccine (Adacel) 01/05/2020     TDAP Vaccine (Boostrix) 03/27/2012     Zoster vaccine recombinant adjuvanted (SHINGRIX) 01/23/2023             Allergies:     Allergies   Allergen Reactions     Emend [Aprepitant] Shortness Of Breath     Couldn't breathe and started to pass out during 1st administration      Kiwi Itching             Medications:     Current Facility-Administered Medications   Medication     acetaminophen (TYLENOL) tablet 650 mg    Or     acetaminophen (TYLENOL) Suppository 650 mg     benzonatate (TESSALON) capsule 100 mg     bisacodyl (DULCOLAX) suppository 10 mg     glucose gel 15-30 g    Or     dextrose 50 % injection 25-50 mL    Or     glucagon injection 1 mg     DULoxetine (CYMBALTA) DR capsule 60 mg     ferrous sulfate (FEROSUL) tablet 325 mg     folic acid (FOLVITE) tablet 400 mcg     heparin 100 UNIT/ML injection 5-10 mL     heparin 100 UNIT/ML injection 5-10 mL     heparin lock flush 10 UNIT/ML injection 5-10 mL     heparin lock flush 10 UNIT/ML injection 5-10 mL     heparin lock flush 10 UNIT/ML injection 5-10 mL     heparin lock flush 10 UNIT/ML injection 5-10 mL     HYDROmorphone (DILAUDID) half-tab 1 mg     HYDROmorphone (DILAUDID) injection 0.2 mg     HYDROmorphone (DILAUDID) injection 0.4 mg     HYDROmorphone (DILAUDID) tablet 2 mg     hydrOXYzine  (ATARAX) tablet 25 mg     insulin aspart (NovoLOG) injection (RAPID ACTING)     insulin aspart (NovoLOG) injection (RAPID ACTING)     lidocaine (LMX4) cream     lidocaine 1 % 0.1-1 mL     magnesium hydroxide (MILK OF MAGNESIA) suspension 30 mL     melatonin tablet 1 mg     morphine (MS CONTIN) 12 hr tablet 15 mg     naloxone (NARCAN) injection 0.2 mg    Or     naloxone (NARCAN) injection 0.4 mg    Or     naloxone (NARCAN) injection 0.2 mg    Or     naloxone (NARCAN) injection 0.4 mg     ondansetron (ZOFRAN ODT) ODT tab 4 mg    Or     ondansetron (ZOFRAN) injection 4 mg     pantoprazole (PROTONIX) EC tablet 40 mg     phenazopyridine (PYRIDIUM) tablet 100 mg     piperacillin-tazobactam (ZOSYN) infusion 3.375 g     polyethylene glycol (MIRALAX) Packet 17 g     pregabalin (LYRICA) capsule 25 mg     prochlorperazine (COMPAZINE) injection 10 mg    Or     prochlorperazine (COMPAZINE) tablet 10 mg    Or     prochlorperazine (COMPAZINE) suppository 25 mg     senna-docusate (SENOKOT-S/PERICOLACE) 8.6-50 MG per tablet 1 tablet     senna-docusate (SENOKOT-S/PERICOLACE) 8.6-50 MG per tablet 1 tablet    Or     senna-docusate (SENOKOT-S/PERICOLACE) 8.6-50 MG per tablet 2 tablet     sodium chloride (PF) 0.9% PF flush 10-20 mL     sodium chloride (PF) 0.9% PF flush 10-20 mL     sodium chloride (PF) 0.9% PF flush 10-20 mL     sodium chloride (PF) 0.9% PF flush 10-20 mL     sodium chloride (PF) 0.9% PF flush 10-20 mL     sodium chloride (PF) 0.9% PF flush 10-20 mL     sodium chloride (PF) 0.9% PF flush 3 mL     sodium chloride (PF) 0.9% PF flush 3 mL     sodium chloride 0.9% infusion     sucralfate (CARAFATE) suspension 1 g             Review of Systems:   The Review of Systems is negative other than noted in the HPI          Physical Exam:   Vitals were reviewed  Temp: 100  F (37.8  C) Temp src: Oral BP: 134/76 Pulse: 98   Resp: 12 SpO2: 91 % O2 Device: None (Room air)    Constitutional:   Awake, alert   Eyes:   Eyes open no scleral  icterus   ENT:   Nc/at   Neck:   No neck mass   Hematologic / Lymphatic:   No bleed/bruise   Back:   Not examined   Lungs:   nonlabored   Cardiovascular:   extrem wwp   Abdomen:   nondistended   Chest / Breast:   Not examined   Genitounirinary:   circ phallus with small clot at meatus   Musculoskeletal:   Right posterior thigh firm mass, biopsy proven sarcoma   Neurologic:   SORTO   Neuropsychiatric:   Normal mood and affect   Skin:   No rash             Data:   All laboratory and imaging data in the past 24 hours reviewed  Results for orders placed or performed during the hospital encounter of 02/11/23 (from the past 24 hour(s))   Lactic Acid STAT   Result Value Ref Range    Lactic Acid 0.8 0.7 - 2.0 mmol/L   Glucose by meter   Result Value Ref Range    GLUCOSE BY METER POCT 168 (H) 70 - 99 mg/dL   CT Chest Pulmonary Embolism w Contrast    Narrative    CT CHEST PULMONARY EMBOLISM W CONTRAST 2/14/2023 1:23 PM    CLINICAL HISTORY: hypoxia and tachycardia, sarcoma, r/o PE  TECHNIQUE: CT angiogram chest during arterial phase injection IV  contrast. 2D and 3D MIP reconstructions were performed by the CT  technologist. Dose reduction techniques were used.     CONTRAST: 77mL Isovue-370    COMPARISON: 1/26/2023    FINDINGS:  ANGIOGRAM CHEST: Pulmonary arteries are normal caliber and negative  for pulmonary emboli. Thoracic aorta is negative for dissection. No CT  evidence of right heart strain.    LUNGS AND PLEURA: No infiltrate or pleural effusion. Small  pleural-based patchy opacity in the posterior left lower lobe at the  lung base, likely atelectasis (series 7, image 237), new since  1/26/2023. Few stable small pulmonary nodules. For example, a right  middle lobe 5 mm nodule (series 7, image 167), right lower lobe 3 mm  nodule (image 163) and 5 mm nodule along the lateral left major  fissure are stable.    MEDIASTINUM/AXILLAE: Stable mild hilar lymphadenopathy. For example, a  1.8 cm right hilar lymph node previously  measured 1.8 cm (series 6,  image 113). No thoracic aortic aneurysm. No coronary artery  calcifications. No pericardial effusion.    UPPER ABDOMEN: Stable probable cyst in the right hepatic lobe.    MUSCULOSKELETAL: No suspicious lesions in the bones.      Impression    IMPRESSION:  1.  No pulmonary emboli. No acute findings in the chest.  2.  Small pleural-based nodular focus in the left lower lobe at the  lung base is new and favored to be atelectasis. Few other small  pulmonary nodules are stable. Continued attention on follow-up.  3.  Stable mild right hilar lymphadenopathy.    WICHO CORONA MD         SYSTEM ID:  [id number]   Glucose by meter   Result Value Ref Range    GLUCOSE BY METER POCT 197 (H) 70 - 99 mg/dL   Potassium   Result Value Ref Range    Potassium 3.7 3.4 - 5.3 mmol/L   Glucose by meter   Result Value Ref Range    GLUCOSE BY METER POCT 158 (H) 70 - 99 mg/dL   Glucose by meter   Result Value Ref Range    GLUCOSE BY METER POCT 131 (H) 70 - 99 mg/dL   CBC with platelets   Result Value Ref Range    WBC Count 19.5 (H) 4.0 - 11.0 10e3/uL    RBC Count 3.35 (L) 4.40 - 5.90 10e6/uL    Hemoglobin 7.8 (L) 13.3 - 17.7 g/dL    Hematocrit 27.1 (L) 40.0 - 53.0 %    MCV 81 78 - 100 fL    MCH 23.3 (L) 26.5 - 33.0 pg    MCHC 28.8 (L) 31.5 - 36.5 g/dL    RDW 18.6 (H) 10.0 - 15.0 %    Platelet Count 348 150 - 450 10e3/uL   Comprehensive metabolic panel   Result Value Ref Range    Sodium 142 136 - 145 mmol/L    Potassium 3.7 3.4 - 5.3 mmol/L    Chloride 107 98 - 107 mmol/L    Carbon Dioxide (CO2) 26 22 - 29 mmol/L    Anion Gap 9 7 - 15 mmol/L    Urea Nitrogen 8.2 6.0 - 20.0 mg/dL    Creatinine 1.30 (H) 0.67 - 1.17 mg/dL    Calcium 7.9 (L) 8.6 - 10.0 mg/dL    Glucose 148 (H) 70 - 99 mg/dL    Alkaline Phosphatase 116 40 - 129 U/L    AST 16 10 - 50 U/L    ALT 6 (L) 10 - 50 U/L    Protein Total 6.1 (L) 6.4 - 8.3 g/dL    Albumin 2.5 (L) 3.5 - 5.2 g/dL    Bilirubin Total 0.3 <=1.2 mg/dL    GFR Estimate 66 >60 mL/min/1.73m2    Magnesium   Result Value Ref Range    Magnesium 2.0 1.7 - 2.3 mg/dL   Glucose by meter   Result Value Ref Range    GLUCOSE BY METER POCT 138 (H) 70 - 99 mg/dL     All imaging studies reviewed by me.    CT c/a/p 1/26/2023    No obvious upper tract masses. No stones. No bladder mass     Attestation:  I have reviewed today's vital signs, notes, medications, labs and imaging.  Amount of time performed on this consult: 45 minutes.    Renaldo Ochoa MD

## 2023-02-15 NOTE — PROGRESS NOTES
[facility]  Hospitalist Progress Note  Judson Servin M.D., M.B.A.   02/15/2023    Reason for Stay/active problem list       Neutropenic fever     Pancytopenia          Assessment and Plan:        Summary of Stay: Antonio Canseco is a 52 year old male admitted on 2/11/2023. He has a past medical history significant for high-grade sarcoma, hypertension, diabetes mellitus type 2, GERD, and irritable bowel syndrome.  Hospitalized at the beginning of this month at [facility] for chemotherapy.  Discharged from [facility] on 2/6.  Had been on outpatient oral antibiotics with levofloxacin and doxycycline.  On topical clindamycin to chronic right leg wound.  He presented to emergency room neutropenic fever.      Problem List with Assessment and Plan:    Neutropenic fever  -Neutropenia due to recent chemo.  -Unclear infectious source.  -Has been on oral antibiotics with levofloxacin and doxycycline in the outpatient setting.  Also on topical clindamycin for chronic right leg wound.  -Started on IV vancomycin and Zosyn in the ER.  -currently afebrile , cultures NTD , continue Zosyn, follow cultures -May change to oral Levaquin on discharge     Pancytopenia.  High-grade sarcoma on recent chemo   - CBC on arrival to ED :hgb  7.8 , wbc 1.3 , Neutrophil 0.0 ,    -Patient was transfused 1 unit of blood on February 12 for hemoglobin of 6.9.  --currently Hgb is 7.8    -- WBC elevated as well as normal platelets count.  Continue to monitor hemoglobin and transfuse as needed.  Oncology team is following and input is appreciated.      Diabetes mellitus type 2.  - not on medication at home   -Continue sliding scale insulin     Hyponatremia.  -Mild. Monitor for now     Chronic right leg lesion   -Due to high-grade sarcoma.  -Pain medications as needed.    Sleep apnea.  -Use CPAP while sleeping.    Acute kidney injury:  --Stopped vancomycin.  Kidney function improving.  Monitor for now      Hematuria: Differential diagnosis include hemorrhagic cystitis from chemotherapeutic agent-Efosfamide   -- Etiology unclear.  Urology was consulted and recommendation obtained.  -- No acute urology intervention was recommended.  -- We will continue to monitor hemoglobin and transfuse as needed, bladder scan to ensure bladder is empty and outpatient cystoscopy for further evaluation.    Abnormal CT of the chest  --CT of the chest on February 14 showed no pulm embolism but evidence of small pleural-based nodular focus in the left lower lobe of the lung with which is new and favored to be atelectasis.  Few other small pulmonary nodules were also noted and this has been stable and follow-up recommended.  Stable mild right hilar lymphadenopathy     Plan for today:    Care plan discussed with oncology team.  Continue to monitor hemoglobin, continue antibiotic.    VTE Prophylaxis: Ambulate every shift  Code Status: Full Code  Diet: Combination Diet Regular Diet Adult  Snacks/Supplements Adult: Ensure Enlive; With Meals    De La Rosa Catheter: Not present    Family updated today: No     Disposition: May discharge home tomorrow if hemoglobin is stable        Interval History (Subjective):        Patient is seen and examined by me today and medical record reviewed.Overnight events noted and care discussed with nursing staff.patient is feeling better.  Patient continues to do well.  Denies any urinary symptoms today.  Has no fever or chills.  There is some documentation of low-grade temperature this morning.  No chest pain or shortness of breath.  Care plan discussed with oncology team                Physical Exam:        Last Vital Signs:  /73 (BP Location: Left arm)   Pulse 94   Temp 98.9  F (37.2  C) (Oral)   Resp 15   Wt 135.3 kg (298 lb 4.8 oz)   SpO2 97%   BMI 41.62 kg/m      I/O last 3 completed shifts:  In: 2905 [I.V.:2905]  Out: 950 [Urine:950]    Wt Readings from Last 5 Encounters:   02/12/23 135.3 kg (298  lb 4.8 oz)   02/07/23 134.7 kg (297 lb)   02/06/23 136.6 kg (301 lb 3.2 oz)   01/27/23 137.4 kg (303 lb)   01/26/23 137.9 kg (304 lb)        Constitutional: Awake, alert, cooperative, no apparent distress     Respiratory: Clear to auscultation bilaterally, no crackles or wheezing   Cardiovascular: Regular rate and rhythm, normal S1 and S2, and no murmur noted   Abdomen: Normal bowel sounds, soft, non-distended, non-tender   Skin: No new rashes, no cyanosis, dry to touch   Neuro: Alert with  no new focal weakness   Extremities: No edema   Other(s):        All other systems: Negative          Medications:        All current medications were reviewed with changes reflected in problem list.         Data:      All new lab and imaging data was reviewed.      Data reviewed today: I reviewed all new labs and imaging results over the last 24 hours. I personally reviewed       Recent Labs   Lab 02/15/23  0600 02/14/23  0637 02/13/23  1812 02/13/23  0830   WBC 19.5* 19.4*  --  11.5*   HGB 7.8* 8.1* 8.5* 8.1*   HCT 27.1* 27.1*  --  26.3*   MCV 81 79  --  75*    308  --  293     No results for input(s): CULT in the last 168 hours.  Recent Labs   Lab 02/15/23  1202 02/15/23  0728 02/15/23  0600 02/14/23  2114 02/14/23  2026 02/14/23  0734 02/14/23  0637 02/13/23  1222 02/13/23  0830 02/11/23  1843 02/09/23  1331   NA  --   --  142  --   --   --  142  --  143   < > 134*   POTASSIUM  --   --  3.7  --  3.7  --  3.4  --  3.9   < > 3.9   CHLORIDE  --   --  107  --   --   --  106  --  104   < > 101   CO2  --   --  26  --   --   --  27  --  26   < > 23   ANIONGAP  --   --  9  --   --   --  9  --  13   < > 10   * 138* 148*   < >  --    < > 154*   < > 120*   < > 164*   BUN  --   --  8.2  --   --   --  8.6  --  7.7   < > 11.8   CR  --   --  1.30*  --   --   --  1.42*  --  1.07   < > 0.64*   GFRESTIMATED  --   --  66  --   --   --  59*  --  83   < > >90   DORIAN  --   --  7.9*  --   --   --  7.9*  --  8.1*   < > 8.8   MAG  --    --  2.0  --   --   --  2.0  --   --   --  2.0   PHOS  --   --  3.2  --   --   --   --   --   --   --  2.1*   PROTTOTAL  --   --  6.1*  --   --   --  6.0*  --  6.3*   < > 7.3   ALBUMIN  --   --  2.5*  --   --   --  2.5*  --  2.4*   < > 2.9*   BILITOTAL  --   --  0.3  --   --   --  0.3  --  0.5   < > 0.3   ALKPHOS  --   --  116  --   --   --  95  --  83   < > 81   AST  --   --  16  --   --   --  18  --  19   < > 13   ALT  --   --  6*  --   --   --  7*  --  9*   < > 8*    < > = values in this interval not displayed.       Recent Labs   Lab 02/15/23  1202 02/15/23  0728 02/15/23  0600 02/15/23  0239 02/14/23  2114   * 138* 148* 131* 158*       No results for input(s): INR in the last 168 hours.      No results for input(s): TROPONIN, TROPI, TROPR in the last 168 hours.    Invalid input(s): TROP, TROPONINIES    Recent Results (from the past 48 hour(s))   XR Chest 2 Views    Narrative    EXAM: XR CHEST 2 VIEWS  LOCATION: [facility]  DATE/TIME: 2/11/2023 7:32 PM    INDICATION: Cough  COMPARISON: 4/24/2022      Impression    IMPRESSION: Infusion port tip in the mid SVC. Lungs are clear. Normal heart size and pulmonary vascularity. No pleural effusions.       COVID Status:  COVID-19 PCR Results    COVID-19 PCR Results 3/9/21 3/9/21 4/24/22 1/27/23 2/11/23    [id number]      COVID-19 Virus PCR to [facility] - Result Test received-See reflex to IDDL test SARS CoV2 (COVID-19) Virus RT-PCR       COVID-19 Virus PCR to [facility] - Source Nasopharyngeal       SARS-CoV-2 Virus Specimen Source  Nasopharyngeal      SARS-CoV-2 PCR Result  NEGATIVE      SARS CoV2 PCR   Negative Negative Negative      Comments are available for some flowsheets but are not being displayed.         COVID-19 Antibody Results, Testing for Immunity    COVID-19 Antibody Results, Testing for Immunity   No data to display.              Disclaimer: This note consists of symbols derived from keyboarding, dictation and/or voice recognition  software. As a result, there may be errors in the script that have gone undetected. Please consider this when interpreting information found in this chart.

## 2023-02-15 NOTE — PROGRESS NOTES
[facility]  Palliative Care Progress Note  Text Page     Assessment & Plan   Recommendations:  1. Goals of Care- Full Code  Hospitalization goals discussed with patient.  Goal for adequate symptom management and discharge home with further chemotherapy.    Decisional Capacity- Intact. Patient does not have an advance directive. Per  informed consent policy next of kin should be involved if patient becomes unable.  POLST none on file.  - child life consult  - ACP planning/notarization if patient and spouse prepare document while inpatient     2. Pain   Patient's opioid use in past 24 hours: 30 mg long acting morphine, 12 mg oral hydromorphone  = 42 mg Daily Morphine Equivalent  Patient reports hesitancy to take PRN medication, reports pain as ongoing and moderate.  Will increase scheduled long acting medication in attempt to cover additional medical needs.  - agree with MS contin 30 mg every 12 hours.  - agree with PRN hydromorphone 1-2 mg every 4 hours prn breakthrough pain.  - avoid NSAIDs, acetaminophen   - consider acetaminophen 1000mg every 8 hours scheduled once fever is resolved.  - hydroxyzine 25 mg every 4 hours prn  - pregabalin 25 mg every 8 hours - patient has failed gabapentin and would benefit from neuroleptic agent for multimodal pain management plan.      3. Constipation  Patient had constipation at presentation to the hospital.  Now resolved.  Likely opiate induced constipation.  - senna-docusate 1 tablet daily at bedtime, additional tablet daily prn  - Miralax 1 packet daily - titrate to normal stooling pattern  - maintain adequate hydration     4. Reflux  Patient reports ongoing reflux, unchanged since time of admission.    - agree with PPI, IV protonix while in patient.  - concern for ongoing PPI use with pancytopenia.   - cautiously recommend omeprazole at discharge.     4. Spiritual Care  Oriented to Spiritual Health as part of Palliative Care team.  Spiritual  "Background: not designated     5. Care Planning  Appreciate Care of interdisciplinary team.  Medications for discharge - MS Contin 30 mg BID, Hydromorphone 1-2 mg every 4 hours prn, hydroxyzine 25 mg every 4 hours prn, pregabalin 25 mg three times daily.    - Palliative Care Clinic referral for ongoing symptom management and goals of care support     Interval history:  Discussed on February 15, 2023 with JEFFREY Van CNP:   Met with patient and spouse at the bedside.  Reviewed patient's symptoms, discussed pain plan as noted above.  Reviewed multimodal pain management plan, mindfulness, distraction and movement as a means to provide relief.    Discussed coping while in the hospital and emotional processing.  Yari discussed their processing as a family and their daughters' coping.  She stated, \"Now that he is in the hospital it is hitting them as real.\" Offered empathetic listening. Offered consult for the inpatient child life service to provide materials that may prompt conversation as a family and processing appropriate for teenagers.  They verbalized agreement to this plan.      Touched on advance care planning and the resource of notarization while inpatient, they were receptive to this and would inform RN if they were ready for that service.     Discussed plan for follow up by palliative care.      JEFFREY Van CNP  Pain Management and Palliative Care  [facility]  Pgr: [phone]      Time Spent on this Encounter   Total unit/floor time 55 minutes, time consisted of the following, examination of the patient, reviewing the record and completing documentation. >50% of time spent in counseling and coordination of care, Bedside Nurse Jesika/Jacqueline and Hospitalist Malathi.  Time spend counseling with patient and family consisted of the following topics, advance care planning, care planning for discharge, symptom management and family support.    Review of Systems    " CONSTITUTIONAL: NEGATIVE for fever, chills, change in weight  ENT/MOUTH: NEGATIVE for ear, mouth and throat problems  RESP: NEGATIVE for significant cough or SOB  CV: NEGATIVE for chest pain, palpitations or peripheral edema    Palliative Symptom Review (0=no symptom/no concern, 1=mild, 2=moderate, 3=severe):      Pain: 2-moderate      Fatigue: 1-mild      Nausea: 0-none      Constipation: 0-none      Diarrhea: 0-none      Depressive Symptoms: 1-mild      Anxiety: 1-mild      Drowsiness: 0-none      Poor Appetite: 0-none      Shortness of Breath: 0-none      Insomnia: 0-none      Overall (0 good/no concerns, 3 very poor):  2    Physical Exam   Temp:  [98.3  F (36.8  C)-100  F (37.8  C)] 100  F (37.8  C)  Pulse:  [] 98  Resp:  [12-16] 12  BP: (121-143)/(69-78) 134/76  SpO2:  [90 %-93 %] 91 %  298 lbs 4.8 oz  Exam:  GEN:  Alert, oriented x 3, restless in bed.  HEENT:  Normocephalic/atraumatic, no scleral icterus, no nasal discharge, mouth moist.  CV:  RRR, S1, S2; no murmurs or other irregularities noted.  +3 DP/PT pulses bilatererally; no edema BLE.  RESP:  Symmetric chest rise on inhalation noted.  Normal respiratory effort.  ABD:  Rounded, soft, non-tender/non-distended.  +BS  EXT:  Edema & pulses as noted above.  CMS intact x 4.     SKIN:  Dry to touch, no exanthems noted in the visualized areas.    NEURO: Symmetric strength +5/5.  Sensation to touch intact all extremities.     PAIN BEHAVIOR: Cooperative  Psych:  Normal affect.  Calm, cooperative, conversant appropriately.    Medications     sodium chloride 100 mL/hr at 02/15/23 0550       DULoxetine  60 mg Oral Daily     ferrous sulfate  325 mg Oral Daily with breakfast     folic acid  400 mcg Oral Daily     heparin  5-10 mL Intracatheter Q28 Days     heparin  5-10 mL Intracatheter Q28 Days     heparin lock flush  5-10 mL Intracatheter Q24H     heparin lock flush  5-10 mL Intracatheter Q24H     insulin aspart  1-3 Units Subcutaneous TID AC     insulin  aspart  1-3 Units Subcutaneous At Bedtime     morphine  30 mg Oral Q12H TRACEY (08/20)     pantoprazole  40 mg Oral QAM AC     piperacillin-tazobactam  3.375 g Intravenous Q6H     polyethylene glycol  17 g Oral Daily     pregabalin  25 mg Oral TID     senna-docusate  1 tablet Oral At Bedtime     sodium chloride (PF)  10-20 mL Intracatheter Q28 Days     sodium chloride (PF)  10-20 mL Intracatheter Q28 Days     sodium chloride (PF)  3 mL Intracatheter Q8H     sucralfate  1 g Oral 4x Daily AC & HS       Data   Results for orders placed or performed during the hospital encounter of 02/11/23 (from the past 24 hour(s))   Glucose by meter   Result Value Ref Range    GLUCOSE BY METER POCT 168 (H) 70 - 99 mg/dL   CT Chest Pulmonary Embolism w Contrast    Narrative    CT CHEST PULMONARY EMBOLISM W CONTRAST 2/14/2023 1:23 PM    CLINICAL HISTORY: hypoxia and tachycardia, sarcoma, r/o PE  TECHNIQUE: CT angiogram chest during arterial phase injection IV  contrast. 2D and 3D MIP reconstructions were performed by the CT  technologist. Dose reduction techniques were used.     CONTRAST: 77mL Isovue-370    COMPARISON: 1/26/2023    FINDINGS:  ANGIOGRAM CHEST: Pulmonary arteries are normal caliber and negative  for pulmonary emboli. Thoracic aorta is negative for dissection. No CT  evidence of right heart strain.    LUNGS AND PLEURA: No infiltrate or pleural effusion. Small  pleural-based patchy opacity in the posterior left lower lobe at the  lung base, likely atelectasis (series 7, image 237), new since  1/26/2023. Few stable small pulmonary nodules. For example, a right  middle lobe 5 mm nodule (series 7, image 167), right lower lobe 3 mm  nodule (image 163) and 5 mm nodule along the lateral left major  fissure are stable.    MEDIASTINUM/AXILLAE: Stable mild hilar lymphadenopathy. For example, a  1.8 cm right hilar lymph node previously measured 1.8 cm (series 6,  image 113). No thoracic aortic aneurysm. No coronary  artery  calcifications. No pericardial effusion.    UPPER ABDOMEN: Stable probable cyst in the right hepatic lobe.    MUSCULOSKELETAL: No suspicious lesions in the bones.      Impression    IMPRESSION:  1.  No pulmonary emboli. No acute findings in the chest.  2.  Small pleural-based nodular focus in the left lower lobe at the  lung base is new and favored to be atelectasis. Few other small  pulmonary nodules are stable. Continued attention on follow-up.  3.  Stable mild right hilar lymphadenopathy.    WICHO CORONA MD         SYSTEM ID:  [id number]   Glucose by meter   Result Value Ref Range    GLUCOSE BY METER POCT 197 (H) 70 - 99 mg/dL   Potassium   Result Value Ref Range    Potassium 3.7 3.4 - 5.3 mmol/L   Glucose by meter   Result Value Ref Range    GLUCOSE BY METER POCT 158 (H) 70 - 99 mg/dL   Glucose by meter   Result Value Ref Range    GLUCOSE BY METER POCT 131 (H) 70 - 99 mg/dL   CBC with platelets   Result Value Ref Range    WBC Count 19.5 (H) 4.0 - 11.0 10e3/uL    RBC Count 3.35 (L) 4.40 - 5.90 10e6/uL    Hemoglobin 7.8 (L) 13.3 - 17.7 g/dL    Hematocrit 27.1 (L) 40.0 - 53.0 %    MCV 81 78 - 100 fL    MCH 23.3 (L) 26.5 - 33.0 pg    MCHC 28.8 (L) 31.5 - 36.5 g/dL    RDW 18.6 (H) 10.0 - 15.0 %    Platelet Count 348 150 - 450 10e3/uL   Comprehensive metabolic panel   Result Value Ref Range    Sodium 142 136 - 145 mmol/L    Potassium 3.7 3.4 - 5.3 mmol/L    Chloride 107 98 - 107 mmol/L    Carbon Dioxide (CO2) 26 22 - 29 mmol/L    Anion Gap 9 7 - 15 mmol/L    Urea Nitrogen 8.2 6.0 - 20.0 mg/dL    Creatinine 1.30 (H) 0.67 - 1.17 mg/dL    Calcium 7.9 (L) 8.6 - 10.0 mg/dL    Glucose 148 (H) 70 - 99 mg/dL    Alkaline Phosphatase 116 40 - 129 U/L    AST 16 10 - 50 U/L    ALT 6 (L) 10 - 50 U/L    Protein Total 6.1 (L) 6.4 - 8.3 g/dL    Albumin 2.5 (L) 3.5 - 5.2 g/dL    Bilirubin Total 0.3 <=1.2 mg/dL    GFR Estimate 66 >60 mL/min/1.73m2   Magnesium   Result Value Ref Range    Magnesium 2.0 1.7 - 2.3 mg/dL   Glucose by  meter   Result Value Ref Range    GLUCOSE BY METER POCT 138 (H) 70 - 99 mg/dL

## 2023-02-15 NOTE — PROGRESS NOTES
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  - If plan to discharge tomorrow, please transfuse for hgb <8 as he won't be checked again until his follow-up next week  - Has onc follow-up scheduled 2/21     ASSESSMENT:  Antonio Canseco is a 52 year old male with history of high-grade sarcoma currently undergoing neoadjuvant chemotherapy (Doxil + Ifos) cycle 1 1/30/23 with Neulasta on 2/7/23, now admitted with neutropenic fevers and intractable pain.      # Neutropenic fever  - Currently on  IV antibiotics. Fever trend down trending. One elevated temp this morning though per patient may have been false due to under multiple blankets. Will need to recheck temp.  - If afebrile can transition to Levaquin 750mg daily through 2/18/23 (complete 7 day course)  - If remains afebrile can discharge tomorrow 2/16/23  - S/p filgrastim 480mcg 2/12/23. No additional G-CSF needed.   - Leukocytosis is 2/2 prior Neulasta and Filgrastim.      # High-grade sarcoma   - status post neoadjuvant chemotherapy with ifosfamide Doxil on 1/30/2023  - will follow-up with [facility] team 2/21/23 as scheduled   - had neurologic changes with cycle 1, may need to dose reduce Ifos with cycle 2, defer to outpatient team  - also now with concerns for ifosfamide related hemorrhagic cystitis, further indication to dose reduce with cycle 2. Previously updated MD  - Checked phos level per request of outpatient team and WNL      # Pain control  - Appreciate palliative care consultation for pain management   - Continue Dilaudid PRN  "and MS Contin BID  - Agree with outpatient palliative referral      # Anemia   - Iron studies consistent with anemia of chronic disease/chemotherapy.   - transfuse for hemoglobin <7  - If plan to discharge tomorrow please transfuse to get hgb >8 to ensure he doesn't drop too low over the weekend      # Hematuria   - history of hematuria prior to chemo  - now with recurrent urszula hematuria this AM. This is possibly 2/2 ifosfamide (has risk of hemorrhagic cystitis). Did confirm with patient he received mesna with cycle 1  - management includes good hydration and consideration of bladder irrigation if symptoms persist   - appreciate urology input   - agree with urine culture, pending     # GERD  -maximize protonix bid and carafate qid     # Hypoxia  - Noted lower O2 sats on RA along with persistent tachycardia  - CT PE negative. Possible atelectasis  - Continue incentive spirometry     INTERIM HISTORY:  Antonio is doing well overall. Had questions about labs and plan which we reviewed. Hematuria seems improved. Discussed 100 temp this morning and he notes he was under multiple blankets when this was checked.      MEDICATIONS:  Reviewed       PHYSICAL EXAM:  Vital signs:  Temp: 100  F (37.8  C) Temp src: Oral BP: 134/76 Pulse: 98   Resp: 12 SpO2: 91 % O2 Device: None (Room air)     Weight: 135.3 kg (298 lb 4.8 oz)  Estimated body mass index is 41.62 kg/m  as calculated from the following:    Height as of 2/7/23: 1.803 m (5' 10.98\").    Weight as of this encounter: 135.3 kg (298 lb 4.8 oz).      GENERAL/CONSTITUTIONAL: No acute distress.  RESPIRATORY: Non-labored breathing  MUSCULOSKELETAL: Warm and well-perfused, no cyanosis, clubbing, or edema.    LABS:  Recent Labs   Lab Test 02/15/23  1202 02/15/23  0728 02/15/23  0600   NA  --   --  142   POTASSIUM  --   --  3.7   CHLORIDE  --   --  107   CO2  --   --  26   ANIONGAP  --   --  9   BUN  --   --  8.2   CR  --   --  1.30*   *   < > 148*   DORIAN  --   --  7.9*    < > " = values in this interval not displayed.     Recent Labs   Lab Test 02/15/23  0600 02/14/23  0637 02/13/23  1812 02/13/23  0830   WBC 19.5* 19.4*  --  11.5*   HGB 7.8* 8.1*   < > 8.1*    308  --  293   MCV 81 79  --  75*   NEUTROPHIL  --  83  --  65    < > = values in this interval not displayed.     Recent Labs   Lab Test 02/15/23  0600 02/14/23  0637 01/31/23  0605 01/30/23  1025   BILITOTAL 0.3 0.3   < > 0.4   ALKPHOS 116 95   < > 64   ALT 6* 7*   < > 9*   AST 16 18   < > 15   ALBUMIN 2.5* 2.5*   < > 2.9*   LDH  --   --   --  193    < > = values in this interval not displayed.       Aaron Sargent PA-C  Department of Hematology and Oncology  [facility]   Pager [phone]

## 2023-02-15 NOTE — PROGRESS NOTES
Urology    Formal consult note to follow    51 yo M with right thigh sarcoma s/p first round of neoadjuvant chemo with doxil and ifosfamide admitted with neutropenic fever and pain upon whom urology consulted for gross hematuria. 25 pack year smoker, quit 10 years ago. Unclear if gross hematuria predated the chemo. On exam has some blood clot at tip of meatus. Urinating well but has some dysuria.    ddx includes hemorrhagic cystitis from ifosfamide vs infection vs bladder tumor. Had ct c/a/p 1/26/2023 without any obvious upper tract lesions. He does not have delayed films but he just got another contrast load for ct chest pe today and he has significant FREDERICK. CT urogram once renal function recovers, non urgent. Cysto is higher priority to rule out a urothelial tumor of the bladder    - no acute urologic intervention  - bladder scan to ensure bladder is empty  - needs outpatient cystoscopy, need to time between chemo cycles to ensure not immunosupressed. Ideally would be done on antibiotics  - CTU once renal function recovers in the next few weeks    Renaldo Ochoa MD   [facility]  [phone] clinic phone

## 2023-02-15 NOTE — PLAN OF CARE
Goal Outcome Evaluation:       To Do:  End of Shift Summary  For vital signs and complete assessments, please see documentation flowsheets.     Pertinent assessments: Pt A/Ox4. VSS. Afebrile. CPAP overnight. SOBE at times. Infrequent cough. Denies nausea. Scheduled MS Contin and prn Dilaudid x2 po given for right hip pain. Lump on right inner thigh, no open wound and MELISSA.  Voiding adequately, bladder scan 40 ml. Up ind.  and 131.  Major Shift Events: K+ rechecked, K+ 3.7  Treatment Plan: IVF, Zosyn, symptom management, bladder scan, monitor blood ct and transition to Levaquin po if afebrile.  Bedside Nurse: Dahlia Salgado RN

## 2023-02-15 NOTE — PLAN OF CARE
Goal Outcome Evaluation:     Pertinent assessments: Pt up ad casey, independent. A&O x4. Afebrile. Scheduled MS Contin and po dilaudid PRN for right leg pain. IV zosyn. Urszula red blood from urethra after urination. Urine sent for culture. Urology consult for tomorrow.  Coughing and dizziness after using inc spir. CT chest neg for PE.    Major Shift Events CT chest neg for PE, urszula blood discharged from urethra after urination, urine sent for culture    Treatment Plan: Pain management, IV ABX, monitor blood count, electrolytes, and lactic acid, symptom management    Bedside Nurse: Jesika Hdz RN

## 2023-02-16 VITALS
RESPIRATION RATE: 16 BRPM | DIASTOLIC BLOOD PRESSURE: 65 MMHG | OXYGEN SATURATION: 95 % | HEART RATE: 92 BPM | TEMPERATURE: 99.3 F | WEIGHT: 298.3 LBS | SYSTOLIC BLOOD PRESSURE: 131 MMHG | BODY MASS INDEX: 41.62 KG/M2

## 2023-02-16 LAB
ABO/RH(D): NORMAL
ANION GAP SERPL CALCULATED.3IONS-SCNC: 10 MMOL/L (ref 7–15)
ANTIBODY SCREEN: NEGATIVE
BACTERIA BLD CULT: NO GROWTH
BACTERIA BLD CULT: NO GROWTH
BACTERIA UR CULT: NO GROWTH
BLD PROD TYP BPU: NORMAL
BLOOD COMPONENT TYPE: NORMAL
BUN SERPL-MCNC: 6.7 MG/DL (ref 6–20)
CALCIUM SERPL-MCNC: 7.9 MG/DL (ref 8.6–10)
CHLORIDE SERPL-SCNC: 106 MMOL/L (ref 98–107)
CODING SYSTEM: NORMAL
CREAT SERPL-MCNC: 1.24 MG/DL (ref 0.67–1.17)
CROSSMATCH: NORMAL
DEPRECATED HCO3 PLAS-SCNC: 25 MMOL/L (ref 22–29)
GFR SERPL CREATININE-BSD FRML MDRD: 70 ML/MIN/1.73M2
GLUCOSE BLDC GLUCOMTR-MCNC: 117 MG/DL (ref 70–99)
GLUCOSE BLDC GLUCOMTR-MCNC: 142 MG/DL (ref 70–99)
GLUCOSE BLDC GLUCOMTR-MCNC: 144 MG/DL (ref 70–99)
GLUCOSE BLDC GLUCOMTR-MCNC: 146 MG/DL (ref 70–99)
GLUCOSE SERPL-MCNC: 135 MG/DL (ref 70–99)
HGB BLD-MCNC: 7.9 G/DL (ref 13.3–17.7)
HOLD SPECIMEN: NORMAL
ISSUE DATE AND TIME: NORMAL
MAGNESIUM SERPL-MCNC: 2.1 MG/DL (ref 1.7–2.3)
PATH REPORT.COMMENTS IMP SPEC: NORMAL
PATH REPORT.FINAL DX SPEC: NORMAL
PATH REPORT.GROSS SPEC: NORMAL
PATH REPORT.MICROSCOPIC SPEC OTHER STN: NORMAL
PATH REPORT.RELEVANT HX SPEC: NORMAL
POTASSIUM SERPL-SCNC: 3.7 MMOL/L (ref 3.4–5.3)
POTASSIUM SERPL-SCNC: 3.9 MMOL/L (ref 3.4–5.3)
SODIUM SERPL-SCNC: 141 MMOL/L (ref 136–145)
SPECIMEN EXPIRATION DATE: NORMAL
UNIT ABO/RH: NORMAL
UNIT NUMBER: NORMAL
UNIT STATUS: NORMAL
UNIT TYPE ISBT: 6200

## 2023-02-16 PROCEDURE — 250N000011 HC RX IP 250 OP 636: Performed by: INTERNAL MEDICINE

## 2023-02-16 PROCEDURE — 250N000013 HC RX MED GY IP 250 OP 250 PS 637: Performed by: INTERNAL MEDICINE

## 2023-02-16 PROCEDURE — 83735 ASSAY OF MAGNESIUM: CPT | Performed by: INTERNAL MEDICINE

## 2023-02-16 PROCEDURE — 99233 SBSQ HOSP IP/OBS HIGH 50: CPT | Performed by: NURSE PRACTITIONER

## 2023-02-16 PROCEDURE — 86923 COMPATIBILITY TEST ELECTRIC: CPT | Performed by: PHYSICIAN ASSISTANT

## 2023-02-16 PROCEDURE — 250N000013 HC RX MED GY IP 250 OP 250 PS 637: Performed by: NURSE PRACTITIONER

## 2023-02-16 PROCEDURE — 80048 BASIC METABOLIC PNL TOTAL CA: CPT | Performed by: INTERNAL MEDICINE

## 2023-02-16 PROCEDURE — 99239 HOSP IP/OBS DSCHRG MGMT >30: CPT | Performed by: INTERNAL MEDICINE

## 2023-02-16 PROCEDURE — 258N000003 HC RX IP 258 OP 636: Performed by: INTERNAL MEDICINE

## 2023-02-16 PROCEDURE — 85018 HEMOGLOBIN: CPT | Performed by: INTERNAL MEDICINE

## 2023-02-16 PROCEDURE — P9016 RBC LEUKOCYTES REDUCED: HCPCS | Performed by: PHYSICIAN ASSISTANT

## 2023-02-16 PROCEDURE — 84132 ASSAY OF SERUM POTASSIUM: CPT | Performed by: INTERNAL MEDICINE

## 2023-02-16 PROCEDURE — 86901 BLOOD TYPING SEROLOGIC RH(D): CPT | Performed by: INTERNAL MEDICINE

## 2023-02-16 PROCEDURE — 99231 SBSQ HOSP IP/OBS SF/LOW 25: CPT | Performed by: PHYSICIAN ASSISTANT

## 2023-02-16 RX ORDER — PREGABALIN 25 MG/1
25 CAPSULE ORAL 3 TIMES DAILY
Qty: 36 CAPSULE | Refills: 0 | Status: SHIPPED | OUTPATIENT
Start: 2023-02-16 | End: 2023-04-20

## 2023-02-16 RX ORDER — MORPHINE SULFATE 30 MG/1
30 TABLET, FILM COATED, EXTENDED RELEASE ORAL EVERY 12 HOURS
Qty: 12 TABLET | Refills: 0 | Status: SHIPPED | OUTPATIENT
Start: 2023-02-16 | End: 2023-02-22

## 2023-02-16 RX ORDER — AMOXICILLIN 250 MG
1 CAPSULE ORAL 2 TIMES DAILY
Qty: 30 TABLET | Refills: 0 | Status: SHIPPED | OUTPATIENT
Start: 2023-02-16 | End: 2023-04-28

## 2023-02-16 RX ORDER — HYDROMORPHONE HYDROCHLORIDE 2 MG/1
1-2 TABLET ORAL EVERY 6 HOURS PRN
Qty: 24 TABLET | Refills: 0 | Status: SHIPPED | OUTPATIENT
Start: 2023-02-16 | End: 2023-04-28

## 2023-02-16 RX ORDER — LEVOFLOXACIN 500 MG/1
500 TABLET, FILM COATED ORAL DAILY
Qty: 7 TABLET | Refills: 0 | Status: SHIPPED | OUTPATIENT
Start: 2023-02-16 | End: 2023-02-23

## 2023-02-16 RX ADMIN — TAZOBACTAM SODIUM AND PIPERACILLIN SODIUM 3.38 G: 375; 3 INJECTION, SOLUTION INTRAVENOUS at 12:03

## 2023-02-16 RX ADMIN — TAZOBACTAM SODIUM AND PIPERACILLIN SODIUM 3.38 G: 375; 3 INJECTION, SOLUTION INTRAVENOUS at 00:24

## 2023-02-16 RX ADMIN — Medication 10 ML: at 17:41

## 2023-02-16 RX ADMIN — DULOXETINE HYDROCHLORIDE 60 MG: 60 CAPSULE, DELAYED RELEASE ORAL at 08:30

## 2023-02-16 RX ADMIN — PREGABALIN 25 MG: 25 CAPSULE ORAL at 16:30

## 2023-02-16 RX ADMIN — TAZOBACTAM SODIUM AND PIPERACILLIN SODIUM 3.38 G: 375; 3 INJECTION, SOLUTION INTRAVENOUS at 17:42

## 2023-02-16 RX ADMIN — PANTOPRAZOLE SODIUM 40 MG: 40 TABLET, DELAYED RELEASE ORAL at 08:30

## 2023-02-16 RX ADMIN — PREGABALIN 25 MG: 25 CAPSULE ORAL at 09:23

## 2023-02-16 RX ADMIN — MORPHINE SULFATE 30 MG: 30 TABLET, FILM COATED, EXTENDED RELEASE ORAL at 08:31

## 2023-02-16 RX ADMIN — SODIUM CHLORIDE: 9 INJECTION, SOLUTION INTRAVENOUS at 00:24

## 2023-02-16 RX ADMIN — HYDROMORPHONE HYDROCHLORIDE 1 MG: 2 TABLET ORAL at 10:49

## 2023-02-16 RX ADMIN — FERROUS SULFATE TAB 325 MG (65 MG ELEMENTAL FE) 325 MG: 325 (65 FE) TAB at 08:31

## 2023-02-16 RX ADMIN — HEPARIN, PORCINE (PF) 10 UNIT/ML INTRAVENOUS SYRINGE 5 ML: at 13:03

## 2023-02-16 RX ADMIN — SUCRALFATE ORAL 1 G: 1 SUSPENSION ORAL at 08:30

## 2023-02-16 RX ADMIN — TAZOBACTAM SODIUM AND PIPERACILLIN SODIUM 3.38 G: 375; 3 INJECTION, SOLUTION INTRAVENOUS at 06:05

## 2023-02-16 RX ADMIN — HYDROMORPHONE HYDROCHLORIDE 1 MG: 2 TABLET ORAL at 06:08

## 2023-02-16 RX ADMIN — SUCRALFATE ORAL 1 G: 1 SUSPENSION ORAL at 12:03

## 2023-02-16 RX ADMIN — FOLIC ACID TAB 400 MCG 400 MCG: 400 TAB at 08:30

## 2023-02-16 RX ADMIN — HEPARIN, PORCINE (PF) 10 UNIT/ML INTRAVENOUS SYRINGE 5 ML: at 06:07

## 2023-02-16 RX ADMIN — SODIUM CHLORIDE: 9 INJECTION, SOLUTION INTRAVENOUS at 11:23

## 2023-02-16 ASSESSMENT — ACTIVITIES OF DAILY LIVING (ADL)
ADLS_ACUITY_SCORE: 22

## 2023-02-16 NOTE — PLAN OF CARE
Pertinent assessments: VSS on room air. Afebrile. Tolerating a regular diet. Up independently. x2 lumen CVC. B, 211.     Major Shift Events: none.    Treatment Plan: Pain management, BG monitoring, and IV Zosyn.

## 2023-02-16 NOTE — PROGRESS NOTES
[facility]  Palliative Care Progress Note  Text Page     Assessment & Plan   Recommendations:  1. Goals of Care- Full Code  Hospitalization goals discussed with patient.  Goal for adequate symptom management and discharge home with further chemotherapy.    Decisional Capacity- Intact. Patient does not have an advance directive. Per  informed consent policy next of kin should be involved if patient becomes unable.  POLST none on file.  - child life consult  - ACP planning/notarization if patient and spouse prepare document while inpatient - patient declines this during at this time.     2. Pain   Patient's opioid use in past 24 hours: 45 mg long acting morphine, 4 mg oral hydromorphone  = 61 mg Daily Morphine Equivalent  Patient reports hesitancy to take PRN medication, reports pain as ongoing and moderate.  Will increase scheduled long acting medication in attempt to cover additional medical needs.  - agree with MS contin 30 mg every 12 hours.  - agree with PRN hydromorphone 1-2 mg every 4 hours prn breakthrough pain.  - avoid NSAIDs, acetaminophen   - consider acetaminophen 1000mg every 8 hours scheduled once fever is resolved.  - hydroxyzine 25 mg every 4 hours prn  - pregabalin 25 mg every 8 hours - patient has failed gabapentin and would benefit from neuroleptic agent for multimodal pain management plan.   - topicals:  Patient reports no relief with lidocaine patches.  Recommended Voltaren gel to right hamstring area and Capsaisin cream/patch.  Stop use if not helpful with pain.    - non-pharmacological: recommend ace bandage for support when up and mobilizing.  Use ace bandage and try bike shorts over ace to hold it in place.      3. Constipation  Patient had constipation at presentation to the hospital.  Now resolved.  Likely opiate induced constipation.  - senna-docusate 1 tablet two times daily at   - Miralax 1 packet daily - titrate to normal stooling pattern  - maintain  adequate hydration     4. Reflux  Patient reports ongoing reflux, unchanged since time of admission.    - agree with PPI, IV protonix while in patient.  - concern for ongoing PPI use with pancytopenia.   - cautiously recommend omeprazole at discharge.     4. Spiritual Care  Oriented to Spiritual Health as part of Palliative Care team.  Spiritual Background: not designated     5. Care Planning  Appreciate Care of interdisciplinary team.  Medications for discharge, sent to discharge pharmacy:  MS Contin 30 mg BID, Hydromorphone 1-2 mg every 6 hours prn,  pregabalin 25 mg three times daily, senna-docusate 1 tablet two times daily.    - Palliative Care Clinic referral for ongoing symptom management and goals of care support     Interval history:  Discussed on February 15, 2023 with JEFFREY Van CNP:   Met with patient at the bedside.  Reviewed overnight pain management, he reports tolerable pain levels.  Discussed goal to utilize multimodal therapy, offered recommendations for topical OTC agents as listed above.  He believes he will discharge today if his blood counts are stable.  Reviewed plan to provide prescriptions for 6 days of pain medication coverage, this will allow him to have supply until next appointment with oncology on 2/21. He denied questions regarding this plan.    JEFFREY Van CNP  Pain Management and Palliative Care  [facility]  Pgr: [phone]      Time Spent on this Encounter   Total unit/floor time 55 minutes, time consisted of the following, examination of the patient, reviewing the record and completing documentation. >50% of time spent in counseling and coordination of care, Bedside Nurse Jesika/Jacqueline and Hospitalist Malathi.  Time spend counseling with patient and family consisted of the following topics, advance care planning, care planning for discharge, symptom management and family support.    Review of Systems    CONSTITUTIONAL: NEGATIVE for  fever, chills, change in weight  ENT/MOUTH: NEGATIVE for ear, mouth and throat problems  RESP: NEGATIVE for significant cough or SOB  CV: NEGATIVE for chest pain, palpitations or peripheral edema    Physical Exam   Temp:  [98.2  F (36.8  C)-98.9  F (37.2  C)] 98.4  F (36.9  C)  Pulse:  [92-98] 98  Resp:  [15-22] 22  BP: (127-143)/(73-86) 143/75  SpO2:  [96 %-98 %] 98 %  298 lbs 4.8 oz  Exam:  GEN:  Alert, oriented x 3, NAD  RESP:  Symmetric chest rise on inhalation noted.  Normal respiratory effort.  ABD:  Rounded, soft, non-tender/non-distended.  +BS  EXT:  Edema & pulses as noted above.  CMS intact x 4.     NEURO: Symmetric strength +5/5.      PAIN BEHAVIOR: Cooperative  Psych:  Normal affect.  Calm, cooperative, conversant appropriately.    Medications     sodium chloride 100 mL/hr at 02/16/23 0024       DULoxetine  60 mg Oral Daily     ferrous sulfate  325 mg Oral Daily with breakfast     folic acid  400 mcg Oral Daily     heparin  5-10 mL Intracatheter Q28 Days     heparin  5-10 mL Intracatheter Q28 Days     heparin lock flush  5-10 mL Intracatheter Q24H     heparin lock flush  5-10 mL Intracatheter Q24H     insulin aspart  1-3 Units Subcutaneous TID AC     insulin aspart  1-3 Units Subcutaneous At Bedtime     morphine  30 mg Oral Q12H TRACEY (08/20)     pantoprazole  40 mg Oral QAM AC     piperacillin-tazobactam  3.375 g Intravenous Q6H     polyethylene glycol  17 g Oral Daily     pregabalin  25 mg Oral TID     senna-docusate  1 tablet Oral At Bedtime     sodium chloride (PF)  10-20 mL Intracatheter Q28 Days     sodium chloride (PF)  10-20 mL Intracatheter Q28 Days     sodium chloride (PF)  3 mL Intracatheter Q8H     sucralfate  1 g Oral 4x Daily AC & HS       Data   Results for orders placed or performed during the hospital encounter of 02/11/23 (from the past 24 hour(s))   Glucose by meter   Result Value Ref Range    GLUCOSE BY METER POCT 142 (H) 70 - 99 mg/dL   Glucose by meter   Result Value Ref Range     GLUCOSE BY METER POCT 126 (H) 70 - 99 mg/dL   Glucose by meter   Result Value Ref Range    GLUCOSE BY METER POCT 211 (H) 70 - 99 mg/dL   Glucose by meter   Result Value Ref Range    GLUCOSE BY METER POCT 144 (H) 70 - 99 mg/dL   Extra Tube ([place] Draw) *Canceled*    Narrative    The following orders were created for panel order Extra Tube ([place] Draw).  Procedure                               Abnormality         Status                     ---------                               -----------         ------                       Please view results for these tests on the individual orders.   Extra Tube ([place] Draw)    Narrative    The following orders were created for panel order Extra Tube ([place] Draw).  Procedure                               Abnormality         Status                     ---------                               -----------         ------                     Extra Purple Top Tube[[id number]]                            Final result                 Please view results for these tests on the individual orders.   Magnesium   Result Value Ref Range    Magnesium 2.1 1.7 - 2.3 mg/dL   Potassium   Result Value Ref Range    Potassium 3.7 3.4 - 5.3 mmol/L   Extra Purple Top Tube   Result Value Ref Range    Hold Specimen [facility]    Glucose by meter   Result Value Ref Range    GLUCOSE BY METER POCT 117 (H) 70 - 99 mg/dL

## 2023-02-16 NOTE — PLAN OF CARE
Goal Outcome Evaluation:    Pertinent assessments: Pt up ad casey, independently. A&O x4. VSS. Afebrile. Scheduled MS contin and PRN oral dilaudid for R hip pain. Palliative saw him today and increased his dose of MS contin. First dose will be tonight. Urine sample sent for cytology/FISH. Continues on IVF and IV zosyn. Creat 1.3, Hgb 7.8, WBC 19.5. , 142, 126.    Major Shift Events Urine sample sent to lab for cytology/FISH, MS contin dose increased to 30mg q 12 hrs scheduled.    Treatment Plan: Manage pain, treat symptoms, monitor blood count, IV zosyn    Bedside Nurse: Jesika Hdz RN

## 2023-02-16 NOTE — PROGRESS NOTES
[facility] Progress Note          Assessment and Plan:     Assessment:    Gross hematuria    Antineoplastic chemotherapy induced pancytopenia (H)    Sarcoma (H)    Neutropenic fever (H)    FREDERICK      Plan:   -Continue to bladder scan to ensure emptying.    -Patient needs outpatient cystoscopy.  Should be timed between chemotherapy sessions and ideally done on antibiotics.  Patient previously required cystoscopy under anesthesia.  In discussion with patient, he would be willing to trial this in office.  Our office will coordinate.  -Once FREDERICK has resolved, would recommend CT urogram in the next several weeks.  Did discuss that cystoscopy is more important.  -Continue with hydration and mesna when receiving ifosfamide per protocol.  -Okay to discharge from urology perspective with outpatient follow up.  Will sign off.    Katty Rankin PA-C   [facility]  [phone]               Interval History:     Postvoid residuals have been low.  Patient denies any hematuria or blood dripping from the urethra.  Overall feels relatively well.  He does note some continued slight dysuria.  Urine culture showed no growth to date.  On IV Zosyn with plan to transition to Levaquin.  Urine cytology and FISH sent yesterday.  Denies N/V/F/C/SOB/CP. Afebrile.              Review of Systems:     The 5 point Review of Systems is negative other than noted in the HPI             Medications:     Current Facility-Administered Medications Ordered in Epic   Medication Dose Route Frequency Last Rate Last Admin     acetaminophen (TYLENOL) tablet 650 mg  650 mg Oral Q6H PRN   650 mg at 02/12/23 2011    Or     acetaminophen (TYLENOL) Suppository 650 mg  650 mg Rectal Q6H PRN         benzonatate (TESSALON) capsule 100 mg  100 mg Oral TID PRN   100 mg at 02/12/23 1548     bisacodyl (DULCOLAX) suppository 10 mg  10 mg Rectal Daily PRN         glucose gel 15-30 g  15-30 g Oral Q15 Min PRN        Or     dextrose 50 %  injection 25-50 mL  25-50 mL Intravenous Q15 Min PRN        Or     glucagon injection 1 mg  1 mg Subcutaneous Q15 Min PRN         DULoxetine (CYMBALTA) DR capsule 60 mg  60 mg Oral Daily   60 mg at 02/16/23 0830     ferrous sulfate (FEROSUL) tablet 325 mg  325 mg Oral Daily with breakfast   325 mg at 02/16/23 0831     folic acid (FOLVITE) tablet 400 mcg  400 mcg Oral Daily   400 mcg at 02/16/23 0830     heparin 100 UNIT/ML injection 5-10 mL  5-10 mL Intracatheter Q28 Days         heparin 100 UNIT/ML injection 5-10 mL  5-10 mL Intracatheter Q28 Days         heparin lock flush 10 UNIT/ML injection 5-10 mL  5-10 mL Intracatheter Q1H PRN   5 mL at 02/15/23 0550     heparin lock flush 10 UNIT/ML injection 5-10 mL  5-10 mL Intracatheter Q24H   5 mL at 02/12/23 1356     heparin lock flush 10 UNIT/ML injection 5-10 mL  5-10 mL Intracatheter Q1H PRN   5 mL at 02/16/23 0607     heparin lock flush 10 UNIT/ML injection 5-10 mL  5-10 mL Intracatheter Q24H   5 mL at 02/11/23 1939     HYDROmorphone (DILAUDID) half-tab 1 mg  1 mg Oral Q4H PRN   1 mg at 02/16/23 0608     HYDROmorphone (DILAUDID) injection 0.2 mg  0.2 mg Intravenous Q2H PRN   0.2 mg at 02/12/23 0648     HYDROmorphone (DILAUDID) injection 0.4 mg  0.4 mg Intravenous Q2H PRN         HYDROmorphone (DILAUDID) tablet 2 mg  2 mg Oral Q4H PRN   2 mg at 02/15/23 0956     hydrOXYzine (ATARAX) tablet 25 mg  25 mg Oral Q4H PRN   25 mg at 02/14/23 0743     insulin aspart (NovoLOG) injection (RAPID ACTING)  1-3 Units Subcutaneous TID AC   1 Units at 02/14/23 1920     insulin aspart (NovoLOG) injection (RAPID ACTING)  1-3 Units Subcutaneous At Bedtime   1 Units at 02/15/23 2234     lidocaine (LMX4) cream   Topical Q1H PRN         lidocaine 1 % 0.1-1 mL  0.1-1 mL Other Q1H PRN         magnesium hydroxide (MILK OF MAGNESIA) suspension 30 mL  30 mL Oral Daily PRN         melatonin tablet 1 mg  1 mg Oral At Bedtime PRN         morphine (MS CONTIN) 12 hr tablet 30 mg  30 mg Oral Q12H  TRACEY (08/20)   30 mg at 02/16/23 0831     naloxone (NARCAN) injection 0.2 mg  0.2 mg Intravenous Q2 Min PRN        Or     naloxone (NARCAN) injection 0.4 mg  0.4 mg Intravenous Q2 Min PRN        Or     naloxone (NARCAN) injection 0.2 mg  0.2 mg Intramuscular Q2 Min PRN        Or     naloxone (NARCAN) injection 0.4 mg  0.4 mg Intramuscular Q2 Min PRN         ondansetron (ZOFRAN ODT) ODT tab 4 mg  4 mg Oral Q6H PRN        Or     ondansetron (ZOFRAN) injection 4 mg  4 mg Intravenous Q6H PRN         pantoprazole (PROTONIX) EC tablet 40 mg  40 mg Oral QAM AC   40 mg at 02/16/23 0830     phenazopyridine (PYRIDIUM) tablet 100 mg  100 mg Oral TID PRN         piperacillin-tazobactam (ZOSYN) infusion 3.375 g  3.375 g Intravenous Q6H 100 mL/hr at 02/16/23 0605 3.375 g at 02/16/23 0605     polyethylene glycol (MIRALAX) Packet 17 g  17 g Oral Daily   17 g at 02/14/23 0921     pregabalin (LYRICA) capsule 25 mg  25 mg Oral TID   25 mg at 02/16/23 0923     prochlorperazine (COMPAZINE) injection 10 mg  10 mg Intravenous Q6H PRN        Or     prochlorperazine (COMPAZINE) tablet 10 mg  10 mg Oral Q6H PRN        Or     prochlorperazine (COMPAZINE) suppository 25 mg  25 mg Rectal Q12H PRN         senna-docusate (SENOKOT-S/PERICOLACE) 8.6-50 MG per tablet 1 tablet  1 tablet Oral At Bedtime   1 tablet at 02/15/23 2232     senna-docusate (SENOKOT-S/PERICOLACE) 8.6-50 MG per tablet 1 tablet  1 tablet Oral BID PRN   1 tablet at 02/12/23 0647    Or     senna-docusate (SENOKOT-S/PERICOLACE) 8.6-50 MG per tablet 2 tablet  2 tablet Oral BID PRN         sodium chloride (PF) 0.9% PF flush 10-20 mL  10-20 mL Intracatheter q1 min prn   10 mL at 02/12/23 1703     sodium chloride (PF) 0.9% PF flush 10-20 mL  10-20 mL Intracatheter Q1H PRN         sodium chloride (PF) 0.9% PF flush 10-20 mL  10-20 mL Intracatheter Q28 Days   20 mL at 02/12/23 1357     sodium chloride (PF) 0.9% PF flush 10-20 mL  10-20 mL Intracatheter q1 min prn         sodium chloride  (PF) 0.9% PF flush 10-20 mL  10-20 mL Intracatheter Q1H PRN   20 mL at 02/12/23 1312     sodium chloride (PF) 0.9% PF flush 10-20 mL  10-20 mL Intracatheter Q28 Days         sodium chloride (PF) 0.9% PF flush 3 mL  3 mL Intracatheter Q8H   3 mL at 02/16/23 0833     sodium chloride (PF) 0.9% PF flush 3 mL  3 mL Intracatheter q1 min prn         sodium chloride 0.9% infusion   Intravenous Continuous 100 mL/hr at 02/16/23 0024 New Bag at 02/16/23 0024     sucralfate (CARAFATE) suspension 1 g  1 g Oral 4x Daily AC & HS   1 g at 02/16/23 0830     No current [facility]-ordered outpatient medications on file.                  Physical Exam:   Vitals were reviewed  Patient Vitals for the past 8 hrs:   BP Temp Temp src Pulse Resp SpO2   02/16/23 0821 (!) 143/75 98.4  F (36.9  C) Oral 98 22 98 %     GEN: NAD, sitting up in bed  EYES: EOMI  MOUTH: MMM  NECK: Supple  RESP: Unlabored breathing           Data:     Lab Results   Component Value Date    NTBNPI 9 04/24/2022     Lab Results   Component Value Date    WBC 19.5 (H) 02/15/2023    WBC 19.4 (H) 02/14/2023    WBC 11.5 (H) 02/13/2023    HGB 7.8 (L) 02/15/2023    HGB 8.1 (L) 02/14/2023    HGB 8.5 (L) 02/13/2023    HCT 27.1 (L) 02/15/2023    HCT 27.1 (L) 02/14/2023    HCT 26.3 (L) 02/13/2023    MCV 81 02/15/2023    MCV 79 02/14/2023    MCV 75 (L) 02/13/2023     02/15/2023     02/14/2023     02/13/2023     Lab Results   Component Value Date    INR 1.44 (H) 01/31/2023    INR 1.37 (H) 01/30/2023    INR 1.35 (H) 01/27/2023     Urine culture: no growth, less than 1 day

## 2023-02-16 NOTE — PLAN OF CARE
Goal Outcome Evaluation:       To Do:  End of Shift Summary  For vital signs and complete assessments, please see documentation flowsheets.     Pertinent assessments: Pt A/Ox4. VSS. Denies nausea and SOB. C/o mild discomfort, Dilaudid po given. Up ind.   Major Shift Events: none  Treatment Plan: symptom management, Zosyn, ? transition to Levaquin po  Bedside Nurse: Dahlia Salgado RN

## 2023-02-16 NOTE — PROGRESS NOTES
Oncology:     Chart check: History of high grade sarcoma and s/p neoadjuvant ifos/doxil on 1/30 oh right with Neulasta on 2/7, admitted for neutropenic fever.  Received filgrastim on 2/12 and WBC responded, no additional G-CSF needed.  Afebrile in the last 24 hours.  Okay to discharge with Levaquin 750 mg daily through 2/18 to complete 7-day course.    Ongoing anemia likely secondary to chemotherapy and AOCD.  Hemoglobin 7.9 today and okay to discharge, but would recommend 1 unit of PRBCs today prior to discharge to ensure his hemoglobin does not drop too much over the weekend.  Discussed with RN and blood orders complete.    Follow-up scheduled at the [facility] on 2/21/2023.  Keep follow-up as scheduled.  No new recommendations.  Please page if any questions.    Camille Rodriguez PA-C  Hematology/Oncology  [facility]  [phone] (p)

## 2023-02-16 NOTE — PROGRESS NOTES
End of Shift Summary  For vital signs and complete assessments, please see documentation flowsheets.     Pertinent assessments: Pt A/Ox4. VSS. Mild SOB w/ ambulation. C/o mild discomfort, Dilaudid po given. Up ind. Blood infusing, per hemoc.     Major Shift Events: none    Treatment Plan: discharge home after blood    Bedside Nurse: Ely Hicks RN

## 2023-02-16 NOTE — CONSULTS
CCLS contacted patient via phone, offering Child Life Services for teenage daughters. Patient noted that daughters are coping in their own ways, along with having bigger emotions of being young teenagers. CCLS offered resources. Declined at this time. Child Life will be available in the future for support as needed.

## 2023-02-16 NOTE — PROGRESS NOTES
[facility]    Received message from pt's care coordinator, Nyasia reporting pt will discharge to home today. Phone call placed to pt's wife to obtain supply needs for central line care. Yari states heparin flushes are needed - [facility] will deliver to pt's home today.     Thank you    Meme Segura RN  [facility] Liaison  [phone] (Mon thru Fri 8am - 5pm)  [phone] Office

## 2023-02-16 NOTE — PROGRESS NOTES
"Care Management Discharge Note    Discharge Date: 02/16/2023       Discharge Disposition:  Home    Discharge Services:  Resumption of Home Infusion    Handoff Referral Completed: Yes    Additional Information:  Anticipate patient to discharge to home.  Paged provider Abdissa requesting orders for resumption of Home Infusion Referral for central line care in discharge orders.    Update 1055: Provider has added orders for discharge. AVS updated with contact information for I.     Update 1400: Received request from home infusion: \"Could you please verify if pt requires 3ml of heparin daily to each lumen or if we can continue to provide our standard 5ml of heparin daily to each lumen.\"  Paged provider Abdissa to address question and to clarify dosing.         Nyasia Vázquez, RN      Nyasia Vázquez RN Case Manager  Inpatient Care Coordination  [facility]   [phone]  "

## 2023-02-17 ENCOUNTER — PATIENT OUTREACH (OUTPATIENT)
Dept: CARE COORDINATION | Facility: CLINIC | Age: 53
End: 2023-02-17

## 2023-02-17 ENCOUNTER — HOSPITAL ENCOUNTER (OUTPATIENT)
Facility: CLINIC | Age: 53
End: 2023-02-17
Attending: STUDENT IN AN ORGANIZED HEALTH CARE EDUCATION/TRAINING PROGRAM | Admitting: STUDENT IN AN ORGANIZED HEALTH CARE EDUCATION/TRAINING PROGRAM
Payer: COMMERCIAL

## 2023-02-17 ENCOUNTER — PATIENT OUTREACH (OUTPATIENT)
Dept: ONCOLOGY | Facility: CLINIC | Age: 53
End: 2023-02-17

## 2023-02-17 ENCOUNTER — LAB (OUTPATIENT)
Dept: INFUSION THERAPY | Facility: CLINIC | Age: 53
End: 2023-02-17
Attending: STUDENT IN AN ORGANIZED HEALTH CARE EDUCATION/TRAINING PROGRAM
Payer: COMMERCIAL

## 2023-02-17 DIAGNOSIS — C49.9 SARCOMA OF SOFT TISSUE (H): ICD-10-CM

## 2023-02-17 LAB
ALBUMIN SERPL BCG-MCNC: 2.9 G/DL (ref 3.5–5.2)
ALP SERPL-CCNC: 110 U/L (ref 40–129)
ALT SERPL W P-5'-P-CCNC: 12 U/L (ref 10–50)
ANION GAP SERPL CALCULATED.3IONS-SCNC: 11 MMOL/L (ref 7–15)
AST SERPL W P-5'-P-CCNC: 14 U/L (ref 10–50)
BILIRUB SERPL-MCNC: 0.5 MG/DL
BUN SERPL-MCNC: 7.2 MG/DL (ref 6–20)
CALCIUM SERPL-MCNC: 8.6 MG/DL (ref 8.6–10)
CHLORIDE SERPL-SCNC: 103 MMOL/L (ref 98–107)
CREAT SERPL-MCNC: 1.16 MG/DL (ref 0.67–1.17)
DEPRECATED HCO3 PLAS-SCNC: 27 MMOL/L (ref 22–29)
ERYTHROCYTE [DISTWIDTH] IN BLOOD BY AUTOMATED COUNT: 19.4 % (ref 10–15)
GFR SERPL CREATININE-BSD FRML MDRD: 76 ML/MIN/1.73M2
GLUCOSE SERPL-MCNC: 173 MG/DL (ref 70–99)
HCT VFR BLD AUTO: 33.2 % (ref 40–53)
HGB BLD-MCNC: 9.9 G/DL (ref 13.3–17.7)
MAGNESIUM SERPL-MCNC: 1.9 MG/DL (ref 1.7–2.3)
MCH RBC QN AUTO: 24.1 PG (ref 26.5–33)
MCHC RBC AUTO-ENTMCNC: 29.8 G/DL (ref 31.5–36.5)
MCV RBC AUTO: 81 FL (ref 78–100)
PHOSPHATE SERPL-MCNC: 2 MG/DL (ref 2.5–4.5)
PLATELET # BLD AUTO: 426 10E3/UL (ref 150–450)
POTASSIUM SERPL-SCNC: 4 MMOL/L (ref 3.4–5.3)
PROT SERPL-MCNC: 7 G/DL (ref 6.4–8.3)
RBC # BLD AUTO: 4.11 10E6/UL (ref 4.4–5.9)
SODIUM SERPL-SCNC: 141 MMOL/L (ref 136–145)
WBC # BLD AUTO: 19.3 10E3/UL (ref 4–11)

## 2023-02-17 PROCEDURE — 85027 COMPLETE CBC AUTOMATED: CPT | Performed by: STUDENT IN AN ORGANIZED HEALTH CARE EDUCATION/TRAINING PROGRAM

## 2023-02-17 PROCEDURE — 36591 DRAW BLOOD OFF VENOUS DEVICE: CPT

## 2023-02-17 PROCEDURE — 80053 COMPREHEN METABOLIC PANEL: CPT | Performed by: STUDENT IN AN ORGANIZED HEALTH CARE EDUCATION/TRAINING PROGRAM

## 2023-02-17 PROCEDURE — 84100 ASSAY OF PHOSPHORUS: CPT | Performed by: STUDENT IN AN ORGANIZED HEALTH CARE EDUCATION/TRAINING PROGRAM

## 2023-02-17 PROCEDURE — 250N000011 HC RX IP 250 OP 636: Performed by: STUDENT IN AN ORGANIZED HEALTH CARE EDUCATION/TRAINING PROGRAM

## 2023-02-17 PROCEDURE — 83735 ASSAY OF MAGNESIUM: CPT | Performed by: STUDENT IN AN ORGANIZED HEALTH CARE EDUCATION/TRAINING PROGRAM

## 2023-02-17 RX ORDER — HEPARIN SODIUM,PORCINE 10 UNIT/ML
5 VIAL (ML) INTRAVENOUS ONCE
Status: COMPLETED | OUTPATIENT
Start: 2023-02-17 | End: 2023-02-17

## 2023-02-17 RX ADMIN — Medication 5 ML: at 13:12

## 2023-02-17 SDOH — ECONOMIC STABILITY: TRANSPORTATION INSECURITY
IN THE PAST 12 MONTHS, HAS THE LACK OF TRANSPORTATION KEPT YOU FROM MEDICAL APPOINTMENTS OR FROM GETTING MEDICATIONS?: NO

## 2023-02-17 SDOH — ECONOMIC STABILITY: FOOD INSECURITY: WITHIN THE PAST 12 MONTHS, THE FOOD YOU BOUGHT JUST DIDN'T LAST AND YOU DIDN'T HAVE MONEY TO GET MORE.: NEVER TRUE

## 2023-02-17 SDOH — ECONOMIC STABILITY: INCOME INSECURITY: IN THE LAST 12 MONTHS, WAS THERE A TIME WHEN YOU WERE NOT ABLE TO PAY THE MORTGAGE OR RENT ON TIME?: NO

## 2023-02-17 SDOH — ECONOMIC STABILITY: FOOD INSECURITY: WITHIN THE PAST 12 MONTHS, YOU WORRIED THAT YOUR FOOD WOULD RUN OUT BEFORE YOU GOT MONEY TO BUY MORE.: NEVER TRUE

## 2023-02-17 SDOH — ECONOMIC STABILITY: TRANSPORTATION INSECURITY
IN THE PAST 12 MONTHS, HAS LACK OF TRANSPORTATION KEPT YOU FROM MEETINGS, WORK, OR FROM GETTING THINGS NEEDED FOR DAILY LIVING?: NO

## 2023-02-17 ASSESSMENT — SOCIAL DETERMINANTS OF HEALTH (SDOH): HOW HARD IS IT FOR YOU TO PAY FOR THE VERY BASICS LIKE FOOD, HOUSING, MEDICAL CARE, AND HEATING?: NOT VERY HARD

## 2023-02-17 ASSESSMENT — ACTIVITIES OF DAILY LIVING (ADL): DEPENDENT_IADLS:: INDEPENDENT

## 2023-02-17 NOTE — PROGRESS NOTES
Nursing Note:  Antonio Canseco presents today for Labs.    Patient seen by provider today: No   present during visit today: Not Applicable.    Note: N/A.    Intravenous Access:  Labs drawn without difficulty.  CVC.    Discharge Plan:   Patient was discharged home.     Giuliana Garcia RN

## 2023-02-17 NOTE — LETTER
[facility] CARE COORDINATION    February 17, 2023    Antonio Canseco  [address] [address]      Dear Antonio,        I am a clinic care coordinator who works with Mynor Mason MD with the [facility]. I wanted to thank you for spending the time to talk with me.  Below is a description of clinic care coordination and how I can further assist you.       The clinic care coordination team is made up of a registered nurse, , financial resource worker and community health worker who understand the health care system. The goal of clinic care coordination is to help you manage your health and improve access to the health care system. Our team works alongside your provider to assist you in determining your health and social needs. We can help you obtain health care and community resources, providing you with necessary information and education. We can work with you through any barriers and develop a care plan that helps coordinate and strengthen the communication between you and your care team.    Please feel free to contact me with any questions or concerns regarding care coordination and what we can offer.      We are focused on providing you with the highest-quality healthcare experience possible.    Sincerely,     HUGH Stein   Care Coordination Team  [phone]

## 2023-02-17 NOTE — PROGRESS NOTES
Patient discharged on 2/16/23, please follow up per TCM workflow.    Julius Unger on 2/17/2023 at 8:25 AM

## 2023-02-17 NOTE — DISCHARGE SUMMARY
Pt A&OX4. Vss and low grade temp of 99.3 and  on RA. LS clear and no sob noted. Tolerated PRBC and no reaction noted so far. Had a dose of IV zosyn prior to discharge. Discharge paper work explained to Pt with understanding. Family to .

## 2023-02-17 NOTE — PROGRESS NOTES
Clinic Care Coordination Contact    Clinic Care Coordination Contact  OUTREACH    Referral Information:  Referral Source: IP Handoff    Primary Diagnosis: Oncology    Chief Complaint   Patient presents with     Clinic Care Coordination - Post Hospital     Neutropenia        Universal Utilization: 22% Admission or ED Risk  Clinic Utilization  Difficulty keeping appointments:: No  Compliance Concerns: Yes  No-Show Concerns: No  No PCP office visit in Past Year: No  Utilization    Hospital Admissions  3             ED Visits  2             No Show Count (past year)  2                Current as of: 2/17/2023  7:31 AM              Clinical Concerns:  Current Medical Concerns:    Patient Active Problem List   Diagnosis     CARDIOVASCULAR SCREENING; LDL GOAL LESS THAN 160     BRIAN on CPAP     Degeneration of thoracic intervertebral disc     History of colonic polyps     Hepatic steatosis     Microscopic hematuria     Cough     Plantar fasciitis     Morbid obesity (H)     Right-sided chest pain     Pulmonary nodule     Hepatic cyst     Irritable bowel syndrome without diarrhea     Tinnitus, unspecified laterality     Dysfunction of both eustachian tubes     Low libido     Essential hypertension     Acrochordon     Tinea pedis of both feet     Personal history of tobacco use     Encounter for immunization     Sarcoma of soft tissue (H)     Uncontrolled diabetes mellitus with hyperglycemia, without long-term current use of insulin (H)     Sarcoma (H)     Antineoplastic drugs causing adverse effect, subsequent encounter     Encounter for antineoplastic chemotherapy     Neutropenic fever (H)     Antineoplastic chemotherapy induced pancytopenia (H)    Antonio reports today that he is feeling ok since being hoe form the hospital.  He hs resumed home infusions. He has all of his medications. AUSTEN PRICE assisted him with scheduling a hospital PCP visit  He is interested in scheduling a Diabetes educator appt.  AUSTEN PRICE asked scheduling to  call him to leslee.    Current Behavioral Concerns: NA    Education Provided to patient: CC role, reviewed AVS   Pain  Pain (GOAL):: Yes  Type: Acute (<3mo)  Health Maintenance Reviewed: Due/Overdue   Health Maintenance Due   Topic Date Due     ADVANCE CARE PLANNING  Never done     COPD ACTION PLAN  Never done     YEARLY PREVENTIVE VISIT  05/03/2014     MICROALBUMIN  02/11/2022     DIABETIC FOOT EXAM  02/11/2022     EYE EXAM  03/24/2022     COVID-19 Vaccine (3 - Moderna risk series) 01/23/2023     Clinical Pathway: None    Medication Management:  Medication review status: Medications reviewed and no changes reported per patient.             Functional Status:  Dependent ADLs:: Independent  Dependent IADLs:: Independent  Bed or wheelchair confined:: No  Mobility Status: Independent    Living Situation:  Current living arrangement:: I live in a private home with spouse  Type [facility]    Lifestyle & Psychosocial Needs:    Social Determinants of Health     Tobacco Use: Medium Risk     Smoking Tobacco Use: Former     Smokeless Tobacco Use: Never     Passive Exposure: Not on file   Alcohol Use: Not At Risk     Frequency of Alcohol Consumption: Monthly or less     Average Number of Drinks: 1 or 2     Frequency of Binge Drinking: Never   Financial Resource Strain: Low Risk      Difficulty of Paying Living Expenses: Not very hard   Food Insecurity: No Food Insecurity     Worried About Running Out of Food in the Last Year: Never true     Ran Out of Food in the Last Year: Never true   Transportation Needs: No Transportation Needs     Lack of Transportation (Medical): No     Lack of Transportation (Non-Medical): No   Physical Activity: Not on file   Stress: Not on file   Social Connections: Not on file   Intimate Partner Violence: Not on file   Depression: Not at risk     PHQ-2 Score: 2   Housing Stability: Low Risk      Unable to Pay for Housing in the Last Year: No     Number of Places Lived in  the Last Year: 1     Unstable Housing in the Last Year: No     Inadequate nutrition (GOAL):: No  Tube Feeding: No  Inadequate activity/exercise (GOAL):: No  Significant changes in sleep pattern (GOAL): No  Transportation means:: Regular car     Lutheran or spiritual beliefs that impact treatment:: No  Mental health DX:: No  Mental health management concern (GOAL):: No             Resources and Interventions:  Current Resources:      Community Resources: None  Supplies Currently Used at Home: None  Equipment Currently Used at Home: none  Employment Status: employed full-time              Referrals Placed: Home Infusion         Care Plan:NA      Patient/Caregiver understanding: Yes       Future Appointments              In 3 days [facility] DRAW 1 [facility]    In 3 days [facility]1 [facility]    In 4 days Michael Laboy MD [facility]    In 5 days [facility] LAB DRAW 1 [facility]    In 5 days Ely Almazan PA-C [facility], [facility]    In 6 days Shanel Carroll RD [place], [place]    In 1 week RH LAB DRAW 1 [facility]    In 1 week CR COVID LAB [facility], [facility]    In 1 week  [facility] LAB DRAW [facility]    In 1 week Scheierl, Amber J, APRN CNP [facility]    In 3 weeks Alex Cartwrigth MD; UB CYF [facility] [place],  PHY BURNS          Plan: Antonio plans to attend his upcoming medical appointments.  He will be getting a call to schedule with a Diabetes Educator.  He reports no CC needs..  No further outreaches will be made at this time.     HUGH Stein   Care Coordination Team  [phone]

## 2023-02-20 ENCOUNTER — HOSPITAL ENCOUNTER (OUTPATIENT)
Dept: MRI IMAGING | Facility: CLINIC | Age: 53
Discharge: HOME | End: 2023-02-20
Attending: STUDENT IN AN ORGANIZED HEALTH CARE EDUCATION/TRAINING PROGRAM | Admitting: STUDENT IN AN ORGANIZED HEALTH CARE EDUCATION/TRAINING PROGRAM
Payer: COMMERCIAL

## 2023-02-20 ENCOUNTER — LAB (OUTPATIENT)
Dept: INFUSION THERAPY | Facility: CLINIC | Age: 53
End: 2023-02-20
Attending: STUDENT IN AN ORGANIZED HEALTH CARE EDUCATION/TRAINING PROGRAM
Payer: COMMERCIAL

## 2023-02-20 DIAGNOSIS — E11.9 DIABETES MELLITUS WITHOUT COMPLICATION (H): ICD-10-CM

## 2023-02-20 DIAGNOSIS — D64.9 ANEMIA, UNSPECIFIED TYPE: ICD-10-CM

## 2023-02-20 DIAGNOSIS — C49.9 SARCOMA OF SOFT TISSUE (H): ICD-10-CM

## 2023-02-20 LAB
ALBUMIN SERPL BCG-MCNC: 3.1 G/DL (ref 3.5–5.2)
ALP SERPL-CCNC: 72 U/L (ref 40–129)
ALT SERPL W P-5'-P-CCNC: 9 U/L (ref 10–50)
ANION GAP SERPL CALCULATED.3IONS-SCNC: 11 MMOL/L (ref 7–15)
AST SERPL W P-5'-P-CCNC: 15 U/L (ref 10–50)
BILIRUB SERPL-MCNC: 0.4 MG/DL
BUN SERPL-MCNC: 11.1 MG/DL (ref 6–20)
CALCIUM SERPL-MCNC: 8.9 MG/DL (ref 8.6–10)
CHLORIDE SERPL-SCNC: 100 MMOL/L (ref 98–107)
CREAT SERPL-MCNC: 1.08 MG/DL (ref 0.67–1.17)
DEPRECATED HCO3 PLAS-SCNC: 29 MMOL/L (ref 22–29)
ERYTHROCYTE [DISTWIDTH] IN BLOOD BY AUTOMATED COUNT: 20.4 % (ref 10–15)
GFR SERPL CREATININE-BSD FRML MDRD: 83 ML/MIN/1.73M2
GLUCOSE SERPL-MCNC: 141 MG/DL (ref 70–99)
HCT VFR BLD AUTO: 33.6 % (ref 40–53)
HGB BLD-MCNC: 9.9 G/DL (ref 13.3–17.7)
MAGNESIUM SERPL-MCNC: 1.8 MG/DL (ref 1.7–2.3)
MCH RBC QN AUTO: 24 PG (ref 26.5–33)
MCHC RBC AUTO-ENTMCNC: 29.5 G/DL (ref 31.5–36.5)
MCV RBC AUTO: 81 FL (ref 78–100)
PHOSPHATE SERPL-MCNC: 3.9 MG/DL (ref 2.5–4.5)
PLATELET # BLD AUTO: 515 10E3/UL (ref 150–450)
POTASSIUM SERPL-SCNC: 4.3 MMOL/L (ref 3.4–5.3)
PROT SERPL-MCNC: 6.9 G/DL (ref 6.4–8.3)
RBC # BLD AUTO: 4.13 10E6/UL (ref 4.4–5.9)
SODIUM SERPL-SCNC: 140 MMOL/L (ref 136–145)
WBC # BLD AUTO: 17.5 10E3/UL (ref 4–11)

## 2023-02-20 PROCEDURE — 80053 COMPREHEN METABOLIC PANEL: CPT | Performed by: STUDENT IN AN ORGANIZED HEALTH CARE EDUCATION/TRAINING PROGRAM

## 2023-02-20 PROCEDURE — 85027 COMPLETE CBC AUTOMATED: CPT | Performed by: STUDENT IN AN ORGANIZED HEALTH CARE EDUCATION/TRAINING PROGRAM

## 2023-02-20 PROCEDURE — A9585 GADOBUTROL INJECTION: HCPCS | Performed by: STUDENT IN AN ORGANIZED HEALTH CARE EDUCATION/TRAINING PROGRAM

## 2023-02-20 PROCEDURE — 36592 COLLECT BLOOD FROM PICC: CPT

## 2023-02-20 PROCEDURE — 83735 ASSAY OF MAGNESIUM: CPT | Performed by: STUDENT IN AN ORGANIZED HEALTH CARE EDUCATION/TRAINING PROGRAM

## 2023-02-20 PROCEDURE — 84100 ASSAY OF PHOSPHORUS: CPT | Performed by: STUDENT IN AN ORGANIZED HEALTH CARE EDUCATION/TRAINING PROGRAM

## 2023-02-20 PROCEDURE — 255N000002 HC RX 255 OP 636: Performed by: STUDENT IN AN ORGANIZED HEALTH CARE EDUCATION/TRAINING PROGRAM

## 2023-02-20 PROCEDURE — 73720 MRI LWR EXTREMITY W/O&W/DYE: CPT | Mod: 26 | Performed by: RADIOLOGY

## 2023-02-20 PROCEDURE — 73720 MRI LWR EXTREMITY W/O&W/DYE: CPT | Mod: RT

## 2023-02-20 RX ORDER — GADOBUTROL 604.72 MG/ML
14 INJECTION INTRAVENOUS ONCE
Status: COMPLETED | OUTPATIENT
Start: 2023-02-20 | End: 2023-02-20

## 2023-02-20 RX ORDER — FERROUS SULFATE 325(65) MG
325 TABLET ORAL
Qty: 30 TABLET | Refills: 1 | Status: ON HOLD | OUTPATIENT
Start: 2023-02-20 | End: 2023-05-22

## 2023-02-20 RX ADMIN — GADOBUTROL 14 ML: 604.72 INJECTION INTRAVENOUS at 16:36

## 2023-02-20 NOTE — TELEPHONE ENCOUNTER
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 Plan to continue ferrous sulfate daily and trend CBC.\"    Any other pertinent information (if none, N/A):     Refilled: Y/N, if NO, why?    Routed to Amber Scheierl.  "

## 2023-02-20 NOTE — PROGRESS NOTES
Nursing Note:  Antonio Canseco presents today for CVC labs.    Patient seen by provider today: No   present during visit today: Not Applicable.    Note: N/A.    Intravenous Access:  Labs drawn without difficulty.  CVC.    Discharge Plan:   Patient was discharged home.     Giuliana Garcia RN

## 2023-02-20 NOTE — PROGRESS NOTES
[facility]    NEW PATIENT VISIT NOTE    PATIENT NAME: Antonio Canseco MRN # [id number]  DATE OF VISIT: January 17, 2023 YOB: 1970    REFERRING PROVIDER: Ari Nascimento PA-C  [address] [place] [address]    CANCER TYPE: High grade sarcoma       CHIEF COMPLAIN   Thigh pain     HISTORY OF PRESENT ILLNESS   52 year old male with PMH of DM and recent Dx of large 27 cm mass in the posterior R thigh. He reports that he first noticed a node in the posterior thigh on 5/2022, we was evaluated at OSH where he got an Xray and he was told that this was sciatica and was started on physical therapy. Tumor continued to grow rapidly during this time. MRI done on 1/6/2023 showing a large mass in the posterior thigh. 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   He reports having pain and limit mobility. He has been taking daily meloxican with some relief, however he needs to be resting most of the time to avoid pain.     C1 of doxil/ifosfamide on 1/30    Interval History  Patient experienced increase in thigh pain after staring chemotherapy. After chemotherapy, he did have an episode of neutropenic fever requiring admission to the hospital. He has recovered from chemo and feels better now. Pain is better controlled now on MS contin 30 mg BID and PRN dilaudid 2 mg, only taking 1-2/day.     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       CURRENT OUTPATIENT MEDICATIONS     Current Outpatient Medications   Medication Sig Dispense Refill     blood glucose (NO BRAND SPECIFIED) test strip accuchek guide- Use to test blood sugar 1 times daily or as directed. 100 strip 0     blood glucose monitoring (ACCU-CHEK FASTCLIX) lancets 1 each daily Accuchek guide 100 each 0     clindamycin (CLINDAMAX) 1 % external gel Apply topically 2 times daily , to HS lesion 30 g 0     DULoxetine (CYMBALTA) 60 MG capsule Take 1 capsule (60 mg) by mouth daily 90 capsule 1     ferrous sulfate (FEROSUL) 325 (65 Fe) MG tablet Take 1 tablet (325 mg) by mouth daily (with breakfast) 30 tablet 1     folic acid (FOLVITE) 400 MCG tablet Take 1 tablet (400 mcg) by mouth daily 30 tablet 0     heparin lock flush 10 UNIT/ML SOLN injection 3 mLs by Intracatheter route every 24 hours Flush each lumen with 3 mLs (total 6mL in 24 hours period) (Patient taking differently: 5 mLs by Intracatheter route every 24 hours Flush each lumen with 5 mLs (total 10mL in 24 hours period)) 180 mL 0     HYDROmorphone (DILAUDID) 2 MG tablet Take 0.5-1 tablets (1-2 mg) by mouth every 6 hours as needed for moderate to severe pain 24 tablet 0     levofloxacin (LEVAQUIN) 500 MG tablet Take 1 tablet (500 mg) by mouth daily for 7 days 7 tablet 0     lidocaine (LIDODERM) 5 % patch Place 1 patch onto the skin every 24 hours To prevent lidocaine toxicity, patient should be patch free for 12 hrs daily. 14 patch 1     metFORMIN (GLUCOPHAGE XR) 500 MG 24 hr tablet Take 2 tablets (1,000 mg) by mouth daily (with dinner) for 30 days 60 tablet 3     morphine (MS CONTIN) 30 MG CR tablet Take 1 tablet (30 mg) by mouth every 12 hours for 6 days 12 tablet 0     naloxone (NARCAN) 4 MG/0.1ML nasal spray Spray 1 spray (4 mg) into one nostril alternating nostrils as needed for opioid reversal every 2-3 minutes until assistance arrives 2 each 1     omeprazole 20 MG tablet Take 20  mg by mouth daily as needed       phosphorus tablet 250 mg 250 MG per tablet Take 2 tablets (500 mg) by mouth 3 times daily Take until directed otherwise 84 tablet 1     pregabalin (LYRICA) 25 MG capsule Take 1 capsule (25 mg) by mouth 3 times daily 36 capsule 0     senna-docusate (SENOKOT-S/PERICOLACE) 8.6-50 MG tablet Take 1 tablet by mouth 2 times daily 30 tablet 0     sennosides (SENOKOT) 8.6 MG tablet Take 1 tablet by mouth daily as needed for constipation       sucralfate (CARAFATE) 1 GM/10ML suspension Take 10 mLs (1 g) by mouth 4 times daily as needed for nausea (or indigestion) 414 mL 0          REVIEW OF SYSTEMS   As above in the HPI, o/w complete 12-point ROS was negative.     PHYSICAL EXAM   There were no vitals taken for this visit.    EGO  GEN: NAD  HEENT: PERRL, EOMI, no icterus, injection or pallor. Oropharynx is clear.  NECK: no cervical or supraclavicular lymphadenopathy  LUNGS: clear bilaterally  CV: regular, no murmurs, rubs, or gallops  ABDOMEN: soft, non-tender, non-distended, normal bowel sounds, no hepatosplenomegaly by percussion or palpation  EXT: warm, well perfused, no edema, R leg circumference of 29.5 in.   NEURO: alert  SKIN: no rashes     LABORATORY AND IMAGING STUDIES     Recent Labs   Lab Test 22  1245 21  1202   * 139   POTASSIUM 4.1 4.8   CHLORIDE 101 106   CO2 29 28   ANIONGAP 2* 5   BUN 11 13   CR 0.78 0.85   * 177*   DORIAN 8.8 8.9     Recent Labs   Lab Test 22  1245 19  1745 18  0823   WBC 6.4 8.6 7.3   HGB 14.8 14.2 15.0    237 249   MCV 91 90 91   NEUTROPHIL 66  --   --      Recent Labs   Lab Test 22  1245 21  1202 21  1622   BILITOTAL 1.7* 0.8 0.7   ALKPHOS 44 47 43   ALT 43 50 43   AST 21 22 23   ALBUMIN 3.4 3.5 3.7     TSH   Date Value Ref Range Status   2021 2.11 0.40 - 4.00 mU/L Final   10/07/2019 3.07 0.40 - 4.00 mU/L Final       Results for orders placed or performed during the hospital  encounter of 01/06/23   MR Femur Thigh Right wo & w Contrast     Value    Radiologist flags Large thigh mass    Narrative    EXAMINATION: MR FEMUR THIGH RIGHT W/O & W CONTRAST  1/6/2023  9:07 AM     CLINICAL HISTORY:  Hamstring strain, right, subsequent encounter     COMPARISON:    TECHNIQUE: Multiplanar multi-sequence MR imaging was obtained using  standard sequences in three orthogonal planes the administration of  intravenous or intra-articular gadolinium contrast agent.    FINDINGS:   Right femur:    There is a very large soft tissue mass measuring 27 x 7.4 x 12 cm in  maximal caudocranial, AP and transverse dimensions (series 20, image  21 and series 38, image 32), respectively.    Mass is originating just distal to the conjoined hamstring tendon and  the mass mostly occupies the space of the hamstring musculature.  Proximally the mass is located medial to the gluteus maximums muscle  belly and distally occupies partially the space of the biceps femoris  and the semimembranosus muscle bellies and entirely the  semitendinosus. The semitendinosus muscle belly is also involved the  furthest distally. Proximally the lesion protrudes anteriorly and  appears to invade the obturator externus muscle (series 38, image 27)    The large lesion reveals highly heterogeneous signal with mostly T2  hyperintense areas, mildly T1 hyperintense areas and septations  throughout. Following the administration of intravenous gadolinium  contrast, there is a central necrotic area occupying an area of about  7 x 5 x 14 cm in transverse, AP and CC dimensions.    At the level of the midshaft of the femur, there is very close  proximity and adherence to multiple prominent vessels (series 38,  image 49).    The sciatic nerve travels alongside the anterior margins of the lesion  over a distance of about 20 cm. However, there is a preserved fat  plane between the sciatic nerve and the tibial border of the mass.    Osseous structures:    The  bilateral femora appear normal, this red marrow conversion. No  distinct osseous lesions are identified..    Hip joint: The hip joints are not well assessed at the edge of the  field-of-view. However, no significant abnormalities are identified.    Knee joint: The knee joint is not well assessed.    The left femur appears unremarkable. Including musculatures.      Impression    IMPRESSION:   1. Large soft tissue mass in the posterior compartment of the right  thigh measuring 27 x 7.4 x 12 cm in maximal caudocranial, AP and  transverse dimensions, respectively. The mass originates just distal  to the conjoined hamstring tendon, infiltrates the hamstring muscle  bellies and extends distally within the semitendinosus muscle belly  proximal to the knee joint. However, the semitendinosus tendon sheaths  appears to have increased signal to the level of the knee joint. A  dedicated knee MRI could be useful to identify at the most distal  extent of the lesion.  2. The lesion is heterogeneous, septated, with myxoid and lipoid  components, vividly enhancing with a large central area of necrosis  which measures 7 x 5 x 14 cm. Imaging findings are highly suggestive  of a malignant mass from the spectrum of myxoid liposarcomatous type.  Further evaluation at the [facility]  is recommended.  3. The lesion occupies the posterior muscle compartment of the thigh  just posterior to the sciatic nerve with a preserved fat plane in  between.  4. At the level of the mid shaft of the femur there is very close  proximity and adherence to multiple vessels, that entered the mass,  best seen on series 38 image 49.    Mass is originating just distal to the conjoined hamstring tendon and  the mass mostly occupies the space of the hamstring musculature.  Proximally the mass is located medial to the gluteus maximums muscle  belly and distally occupies partially the space of the biceps femoris  and the  semimembranosus muscle bellies and entirely the  semitendinosus. The semitendinosus muscle belly is also involved the  furthest distally. Proximally the lesion protrudes anteriorly and  appears to invade the obturator externus muscle (series 38, image 27).    [Consider Follow Up: Large thigh mass     This report will be copied to the [facility] to ensure a  provider acknowledges the finding. Access Center is available Monday  through Friday 8am-3:30 pm.    JUTTA ELLERMANN, MD         SYSTEM ID:  [id number]     1/6/2023 MRI right femur thigh: Large soft tissue mass in the posterior compartment of the right thigh measuring 27 x 7.4 x 12 cm.  The mass lies primarily in the hamstring muscle belly.  Heterogeneous signal with areas of necrosis noted.    2/20/23 MRI R femur                                                                   IMPRESSION: In this patient with high grade sarcoma status  post-neoadjuvant chemotherapy;     Increased in size of the 11 x 15.7 x 21.7 cm heterogeneously enhancing  soft tissue mass with myxoid and lipoid components in the posterior  compartment of the right thigh since MRI from 1/6/2023, previously  measured 7.4 x 12 x 20 cm.  *  Distal portion of the mass anteriorly abuts the sciatic nerve and  deep femoral artery/vein without definite invasion.  *  Increased edema within the adductor rosy when compared to prior  study, possibly neurogenic.     PATHOLOGY         Final Diagnosis   A.  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   Electronically signed by Vineet Coello MD on 1/17/2023 at 10:17 AM   Comment   [facility]   The neoplasm is comprised of highly pleomorphic spindle cells with stromal hyalinization and extensive areas of necrosis.  Focal areas with scattered lipoblasts are seen.  Although presence of few cells with lipoblast morphology suggests dedifferentiated liposarcoma, MDM2  immunohistochemistry is negative.  MDM-2 gene amplification study by FISH is in progress and result will be provided separately.       I reviewed the medical records including medical records, laboratory studies, and have personally reviewed imaging including MRI of the R thigh which demonstrates a large 27 cm mass in the posterior compartment of the R thigh.      ASSESSMENT AND PLAN     Mr. 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     After discussion in tumor board and with the patient we proceeded with the recommendation of neoadjuvant chemotherapy with regimen doxil/ifosfamide, he received C1 on 1/30. Patient did experienced worsening pain shortly after starting chemotherapy. MRI done on 2/20/23 showing increase in size of large posterior thigh sarcoma. The comparison of this MRI is to the baseline done on 1/6/23, and in a patient with high grade sarcoma the mass is presumed to have grown in size in the month before starting chemotherapy. However, given that the patient had increase in pain and there is no areas of necrosis observed in the recent MRI, I will consider this to be progression of disease and recommend to plan for surgery with consideration for preoperative radiation therapy.     Patient and wife agreed with this plan:    -I have communicated about these findings with Dr. Salgado and Dr. Valle and we plan to discuss this case in tumor board on 2/23/23.  -We'll get PET scan now to evaluate for metastatic disease.   -Pain controlled, continue regimen recommended by palliative team, prescription for MS contin 30 mg BID, dilaudid 2 mg PRN Q6H and pregabalin 25 mg TID sent today. Patient has narcan at home, but reports no somnolence related to this regimen.   - I'll see the patient in 1 month to address pain management  -Follow up NGS results.     40 minutes spent on the date of the encounter doing  chart review, review of test results, interpretation of tests, patient visit, documentation and discussion with other provider(s)     Michael Laboy MD   of Medicine  Hematology, Oncology and Transplantation

## 2023-02-21 ENCOUNTER — ONCOLOGY VISIT (OUTPATIENT)
Dept: ONCOLOGY | Facility: CLINIC | Age: 53
End: 2023-02-21
Attending: STUDENT IN AN ORGANIZED HEALTH CARE EDUCATION/TRAINING PROGRAM
Payer: COMMERCIAL

## 2023-02-21 VITALS
SYSTOLIC BLOOD PRESSURE: 128 MMHG | WEIGHT: 295.5 LBS | DIASTOLIC BLOOD PRESSURE: 85 MMHG | BODY MASS INDEX: 41.23 KG/M2 | OXYGEN SATURATION: 97 % | RESPIRATION RATE: 18 BRPM | HEART RATE: 120 BPM | TEMPERATURE: 98.6 F

## 2023-02-21 DIAGNOSIS — C49.9 UNDIFFERENTIATED PLEOMORPHIC SARCOMA (H): ICD-10-CM

## 2023-02-21 DIAGNOSIS — L30.4 INTERTRIGO: Primary | ICD-10-CM

## 2023-02-21 DIAGNOSIS — C49.9 MYXOID LIPOSARCOMA (H): ICD-10-CM

## 2023-02-21 PROCEDURE — 99215 OFFICE O/P EST HI 40 MIN: CPT | Performed by: STUDENT IN AN ORGANIZED HEALTH CARE EDUCATION/TRAINING PROGRAM

## 2023-02-21 PROCEDURE — G0463 HOSPITAL OUTPT CLINIC VISIT: HCPCS | Performed by: STUDENT IN AN ORGANIZED HEALTH CARE EDUCATION/TRAINING PROGRAM

## 2023-02-21 PROCEDURE — G0463 HOSPITAL OUTPT CLINIC VISIT: HCPCS | Mod: 25 | Performed by: STUDENT IN AN ORGANIZED HEALTH CARE EDUCATION/TRAINING PROGRAM

## 2023-02-21 RX ORDER — PREGABALIN 25 MG/1
25 CAPSULE ORAL 3 TIMES DAILY
Qty: 90 CAPSULE | Refills: 1 | Status: ON HOLD | OUTPATIENT
Start: 2023-02-21 | End: 2023-05-22

## 2023-02-21 RX ORDER — HYDROMORPHONE HYDROCHLORIDE 2 MG/1
2 TABLET ORAL EVERY 6 HOURS PRN
Qty: 90 TABLET | Refills: 0 | Status: ON HOLD | OUTPATIENT
Start: 2023-02-21 | End: 2023-03-10

## 2023-02-21 RX ORDER — NYSTATIN 100000 [USP'U]/G
POWDER TOPICAL 2 TIMES DAILY
Qty: 30 G | Refills: 1 | Status: SHIPPED | OUTPATIENT
Start: 2023-02-21 | End: 2023-03-03

## 2023-02-21 RX ORDER — MORPHINE SULFATE 30 MG/1
30 TABLET, FILM COATED, EXTENDED RELEASE ORAL EVERY 12 HOURS
Qty: 60 TABLET | Refills: 0 | Status: SHIPPED | OUTPATIENT
Start: 2023-02-21 | End: 2023-03-23

## 2023-02-21 ASSESSMENT — PAIN SCALES - GENERAL: PAINLEVEL: SEVERE PAIN (6)

## 2023-02-21 NOTE — LETTER
2/21/2023         RE: Antonio Canseco  [address] [facility] [address]        Dear Colleague,    Thank you for referring your patient, Antonio Canseco, to the [facility]. Please see a copy of my visit note below.    [facility]    NEW PATIENT VISIT NOTE    PATIENT NAME: Antonio Canseco MRN # [id number]  DATE OF VISIT: January 17, 2023 YOB: 1970    REFERRING PROVIDER: Ari Nascimento PA-C  [facility] [place] [address]    CANCER TYPE: High grade sarcoma       CHIEF COMPLAIN   Thigh pain     HISTORY OF PRESENT ILLNESS   52 year old male with PMH of DM and recent Dx of large 27 cm mass in the posterior R thigh. He reports that he first noticed a node in the posterior thigh on 5/2022, we was evaluated at OSH where he got an Xray and he was told that this was sciatica and was started on physical therapy. Tumor continued to grow rapidly during this time. MRI done on 1/6/2023 showing a large mass in the posterior thigh. 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   He reports having pain and limit mobility. He has been taking daily meloxican with some relief, however he needs to be resting most of the time to avoid pain.     C1 of doxil/ifosfamide on 1/30    Interval History  Patient experienced increase in thigh pain after staring chemotherapy. After chemotherapy, he did have an episode of neutropenic fever requiring admission to the hospital. He has recovered from chemo and feels better now. Pain is better controlled now on MS contin 30 mg BID and PRN dilaudid 2 mg, only taking 1-2/day.     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       CURRENT OUTPATIENT MEDICATIONS     Current Outpatient Medications   Medication Sig Dispense Refill     blood glucose (NO BRAND SPECIFIED) test strip accuchek guide- Use to test blood sugar 1 times daily or as directed. 100 strip 0     blood glucose monitoring (ACCU-CHEK FASTCLIX) lancets 1 each daily Accuchek guide 100 each 0     clindamycin (CLINDAMAX) 1 % external gel Apply topically 2 times daily , to HS lesion 30 g 0     DULoxetine (CYMBALTA) 60 MG capsule Take 1 capsule (60 mg) by mouth daily 90 capsule 1     ferrous sulfate (FEROSUL) 325 (65 Fe) MG tablet Take 1 tablet (325 mg) by mouth daily (with breakfast) 30 tablet 1     folic acid (FOLVITE) 400 MCG tablet Take 1 tablet (400 mcg) by mouth daily 30 tablet 0     heparin lock flush 10 UNIT/ML SOLN injection 3 mLs by Intracatheter route every 24 hours Flush each lumen with 3 mLs (total 6mL in 24 hours period) (Patient taking differently: 5 mLs by Intracatheter route every 24 hours Flush each lumen with 5 mLs (total 10mL in 24 hours period)) 180 mL 0     HYDROmorphone (DILAUDID) 2 MG tablet Take 0.5-1 tablets (1-2 mg) by mouth every 6 hours as needed for moderate to severe pain 24 tablet 0     levofloxacin (LEVAQUIN) 500 MG tablet Take 1 tablet (500 mg) by mouth daily for 7 days 7 tablet 0     lidocaine (LIDODERM) 5 % patch Place 1 patch onto the skin every 24 hours To prevent lidocaine toxicity, patient should be patch free for 12 hrs daily. 14 patch 1     metFORMIN (GLUCOPHAGE XR) 500 MG 24 hr tablet Take 2 tablets (1,000 mg) by mouth daily (with dinner) for 30 days 60 tablet 3     morphine (MS CONTIN) 30 MG CR tablet Take 1 tablet (30 mg) by mouth every  12 hours for 6 days 12 tablet 0     naloxone (NARCAN) 4 MG/0.1ML nasal spray Spray 1 spray (4 mg) into one nostril alternating nostrils as needed for opioid reversal every 2-3 minutes until assistance arrives 2 each 1     omeprazole 20 MG tablet Take 20 mg by mouth daily as needed       phosphorus tablet 250 mg 250 MG per tablet Take 2 tablets (500 mg) by mouth 3 times daily Take until directed otherwise 84 tablet 1     pregabalin (LYRICA) 25 MG capsule Take 1 capsule (25 mg) by mouth 3 times daily 36 capsule 0     senna-docusate (SENOKOT-S/PERICOLACE) 8.6-50 MG tablet Take 1 tablet by mouth 2 times daily 30 tablet 0     sennosides (SENOKOT) 8.6 MG tablet Take 1 tablet by mouth daily as needed for constipation       sucralfate (CARAFATE) 1 GM/10ML suspension Take 10 mLs (1 g) by mouth 4 times daily as needed for nausea (or indigestion) 414 mL 0          REVIEW OF SYSTEMS   As above in the HPI, o/w complete 12-point ROS was negative.     PHYSICAL EXAM   There were no vitals taken for this visit.    EGO  GEN: NAD  HEENT: PERRL, EOMI, no icterus, injection or pallor. Oropharynx is clear.  NECK: no cervical or supraclavicular lymphadenopathy  LUNGS: clear bilaterally  CV: regular, no murmurs, rubs, or gallops  ABDOMEN: soft, non-tender, non-distended, normal bowel sounds, no hepatosplenomegaly by percussion or palpation  EXT: warm, well perfused, no edema, R leg circumference of 29.5 in.   NEURO: alert  SKIN: no rashes     LABORATORY AND IMAGING STUDIES     Recent Labs   Lab Test 22  1245 21  1202   * 139   POTASSIUM 4.1 4.8   CHLORIDE 101 106   CO2 29 28   ANIONGAP 2* 5   BUN 11 13   CR 0.78 0.85   * 177*   DORIAN 8.8 8.9     Recent Labs   Lab Test 22  1245 19  1745 18  0823   WBC 6.4 8.6 7.3   HGB 14.8 14.2 15.0    237 249   MCV 91 90 91   NEUTROPHIL 66  --   --      Recent Labs   Lab Test 22  1245 21  1202 21  1622   BILITOTAL 1.7* 0.8 0.7   ALKPHOS  44 47 43   ALT 43 50 43   AST 21 22 23   ALBUMIN 3.4 3.5 3.7     TSH   Date Value Ref Range Status   12/28/2021 2.11 0.40 - 4.00 mU/L Final   10/07/2019 3.07 0.40 - 4.00 mU/L Final       Results for orders placed or performed during the hospital encounter of 01/06/23   MR Femur Thigh Right wo & w Contrast     Value    Radiologist flags Large thigh mass    Narrative    EXAMINATION: MR FEMUR THIGH RIGHT W/O & W CONTRAST  1/6/2023  9:07 AM     CLINICAL HISTORY:  Hamstring strain, right, subsequent encounter     COMPARISON:    TECHNIQUE: Multiplanar multi-sequence MR imaging was obtained using  standard sequences in three orthogonal planes the administration of  intravenous or intra-articular gadolinium contrast agent.    FINDINGS:   Right femur:    There is a very large soft tissue mass measuring 27 x 7.4 x 12 cm in  maximal caudocranial, AP and transverse dimensions (series 20, image  21 and series 38, image 32), respectively.    Mass is originating just distal to the conjoined hamstring tendon and  the mass mostly occupies the space of the hamstring musculature.  Proximally the mass is located medial to the gluteus maximums muscle  belly and distally occupies partially the space of the biceps femoris  and the semimembranosus muscle bellies and entirely the  semitendinosus. The semitendinosus muscle belly is also involved the  furthest distally. Proximally the lesion protrudes anteriorly and  appears to invade the obturator externus muscle (series 38, image 27)    The large lesion reveals highly heterogeneous signal with mostly T2  hyperintense areas, mildly T1 hyperintense areas and septations  throughout. Following the administration of intravenous gadolinium  contrast, there is a central necrotic area occupying an area of about  7 x 5 x 14 cm in transverse, AP and CC dimensions.    At the level of the midshaft of the femur, there is very close  proximity and adherence to multiple prominent vessels (series  38,  image 49).    The sciatic nerve travels alongside the anterior margins of the lesion  over a distance of about 20 cm. However, there is a preserved fat  plane between the sciatic nerve and the tibial border of the mass.    Osseous structures:    The bilateral femora appear normal, this red marrow conversion. No  distinct osseous lesions are identified..    Hip joint: The hip joints are not well assessed at the edge of the  field-of-view. However, no significant abnormalities are identified.    Knee joint: The knee joint is not well assessed.    The left femur appears unremarkable. Including musculatures.      Impression    IMPRESSION:   1. Large soft tissue mass in the posterior compartment of the right  thigh measuring 27 x 7.4 x 12 cm in maximal caudocranial, AP and  transverse dimensions, respectively. The mass originates just distal  to the conjoined hamstring tendon, infiltrates the hamstring muscle  bellies and extends distally within the semitendinosus muscle belly  proximal to the knee joint. However, the semitendinosus tendon sheaths  appears to have increased signal to the level of the knee joint. A  dedicated knee MRI could be useful to identify at the most distal  extent of the lesion.  2. The lesion is heterogeneous, septated, with myxoid and lipoid  components, vividly enhancing with a large central area of necrosis  which measures 7 x 5 x 14 cm. Imaging findings are highly suggestive  of a malignant mass from the spectrum of myxoid liposarcomatous type.  Further evaluation at the [facility] Orthopedic oncology  is recommended.  3. The lesion occupies the posterior muscle compartment of the thigh  just posterior to the sciatic nerve with a preserved fat plane in  between.  4. At the level of the mid shaft of the femur there is very close  proximity and adherence to multiple vessels, that entered the mass,  best seen on series 38 image 49.    Mass is originating just distal to the  conjoined hamstring tendon and  the mass mostly occupies the space of the hamstring musculature.  Proximally the mass is located medial to the gluteus maximums muscle  belly and distally occupies partially the space of the biceps femoris  and the semimembranosus muscle bellies and entirely the  semitendinosus. The semitendinosus muscle belly is also involved the  furthest distally. Proximally the lesion protrudes anteriorly and  appears to invade the obturator externus muscle (series 38, image 27).    [Consider Follow Up: Large thigh mass     This report will be copied to the [facility] to ensure a  provider acknowledges the finding. Access Center is available Monday  through Friday 8am-3:30 pm.    JUTTA ELLERMANN, MD         SYSTEM ID:  [id number]     1/6/2023 MRI right femur thigh: Large soft tissue mass in the posterior compartment of the right thigh measuring 27 x 7.4 x 12 cm.  The mass lies primarily in the hamstring muscle belly.  Heterogeneous signal with areas of necrosis noted.    2/20/23 MRI R femur                                                                   IMPRESSION: In this patient with high grade sarcoma status  post-neoadjuvant chemotherapy;     Increased in size of the 11 x 15.7 x 21.7 cm heterogeneously enhancing  soft tissue mass with myxoid and lipoid components in the posterior  compartment of the right thigh since MRI from 1/6/2023, previously  measured 7.4 x 12 x 20 cm.  *  Distal portion of the mass anteriorly abuts the sciatic nerve and  deep femoral artery/vein without definite invasion.  *  Increased edema within the adductor rosy when compared to prior  study, possibly neurogenic.     PATHOLOGY         Final Diagnosis   A.  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   Electronically signed by Vineet Coello MD on 1/17/2023 at 10:17 AM   Comment   [facility]MAY[facility]   The neoplasm is comprised of highly  pleomorphic spindle cells with stromal hyalinization and extensive areas of necrosis.  Focal areas with scattered lipoblasts are seen.  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  MDM-2 gene amplification study by FISH is in progress and result will be provided separately.       I reviewed the medical records including medical records, laboratory studies, and have personally reviewed imaging including MRI of the R thigh which demonstrates a large 27 cm mass in the posterior compartment of the R thigh.      ASSESSMENT AND PLAN     Mr. 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     After discussion in tumor board and with the patient we proceeded with the recommendation of neoadjuvant chemotherapy with regimen doxil/ifosfamide, he received C1 on 1/30. Patient did experienced worsening pain shortly after starting chemotherapy. MRI done on 2/20/23 showing increase in size of large posterior thigh sarcoma. The comparison of this MRI is to the baseline done on 1/6/23, and in a patient with high grade sarcoma the mass is presumed to have grown in size in the month before starting chemotherapy. However, given that the patient had increase in pain and there is no areas of necrosis observed in the recent MRI, I will consider this to be progression of disease and recommend to plan for surgery with consideration for preoperative radiation therapy.     Patient and wife agreed with this plan:    -I have communicated about these findings with Dr. Salgado and Dr. Valle and we plan to discuss this case in tumor board on 2/23/23.  -We'll get PET scan now to evaluate for metastatic disease.   -Pain controlled, continue regimen recommended by palliative team, prescription for MS contin 30 mg BID, dilaudid 2 mg PRN Q6H and pregabalin 25 mg TID  sent today. Patient has narcan at home, but reports no somnolence related to this regimen.   - I'll see the patient in 1 month to address pain management  -Follow up NGS results.     40 minutes spent on the date of the encounter doing chart review, review of test results, interpretation of tests, patient visit, documentation and discussion with other provider(s)     Michael Laboy MD   of Medicine  Hematology, Oncology and Transplantation

## 2023-02-21 NOTE — NURSING NOTE
"Oncology Rooming Note    February 21, 2023 2:56 PM   Antonio Canseco is a 52 year old male who presents for:    Chief Complaint   Patient presents with     Oncology Clinic Visit     Sarcoma of soft tissue     Initial Vitals: /85 (BP Location: Left arm, Patient Position: Sitting, Cuff Size: Adult Large)   Pulse 120   Temp 98.6  F (37  C) (Oral)   Resp 18   Wt 134 kg (295 lb 8 oz)   SpO2 97%   BMI 41.23 kg/m   Estimated body mass index is 41.23 kg/m  as calculated from the following:    Height as of 2/7/23: 1.803 m (5' 10.98\").    Weight as of this encounter: 134 kg (295 lb 8 oz). Body surface area is 2.59 meters squared.  Severe Pain (6) Comment: Data Unavailable   No LMP for male patient.  Allergies reviewed: Yes  Medications reviewed: Yes    Medications: Needs medication refills  Pharmacy name entered into [facility]:    [place], [place] - [address] [id number] [place], [place] - [address]    Clinical concerns: Patient requesting a medication refill for morphine, preferred pharmacy was verified.        Meme Florence), PARMJITN February 21, 2023 2:57 PM              "

## 2023-02-23 ENCOUNTER — VIRTUAL VISIT (OUTPATIENT)
Dept: EDUCATION SERVICES | Facility: CLINIC | Age: 53
End: 2023-02-23
Payer: COMMERCIAL

## 2023-02-23 ENCOUNTER — VIRTUAL VISIT (OUTPATIENT)
Dept: FAMILY MEDICINE | Facility: CLINIC | Age: 53
End: 2023-02-23
Payer: COMMERCIAL

## 2023-02-23 DIAGNOSIS — T45.1X5A ANTINEOPLASTIC CHEMOTHERAPY INDUCED PANCYTOPENIA (H): ICD-10-CM

## 2023-02-23 DIAGNOSIS — C49.9 SARCOMA OF SOFT TISSUE (H): ICD-10-CM

## 2023-02-23 DIAGNOSIS — D70.9 NEUTROPENIC FEVER (H): ICD-10-CM

## 2023-02-23 DIAGNOSIS — E11.65 UNCONTROLLED TYPE 2 DIABETES MELLITUS WITH HYPERGLYCEMIA, WITHOUT LONG-TERM CURRENT USE OF INSULIN (H): ICD-10-CM

## 2023-02-23 DIAGNOSIS — R50.81 NEUTROPENIC FEVER (H): ICD-10-CM

## 2023-02-23 DIAGNOSIS — E11.65 UNCONTROLLED DIABETES MELLITUS WITH HYPERGLYCEMIA, WITHOUT LONG-TERM CURRENT USE OF INSULIN (H): Primary | ICD-10-CM

## 2023-02-23 DIAGNOSIS — E11.65 UNCONTROLLED TYPE 2 DIABETES MELLITUS WITH HYPERGLYCEMIA, WITHOUT LONG-TERM CURRENT USE OF INSULIN (H): Primary | ICD-10-CM

## 2023-02-23 DIAGNOSIS — D61.810 ANTINEOPLASTIC CHEMOTHERAPY INDUCED PANCYTOPENIA (H): ICD-10-CM

## 2023-02-23 PROCEDURE — 99214 OFFICE O/P EST MOD 30 MIN: CPT | Mod: VID | Performed by: PHYSICIAN ASSISTANT

## 2023-02-23 PROCEDURE — G0108 DIAB MANAGE TRN  PER INDIV: HCPCS | Mod: 93

## 2023-02-23 RX ORDER — BLOOD SUGAR DIAGNOSTIC
STRIP MISCELLANEOUS
Qty: 100 STRIP | Refills: 4 | Status: ON HOLD | OUTPATIENT
Start: 2023-02-23 | End: 2023-05-22

## 2023-02-23 RX ORDER — BLOOD-GLUCOSE SENSOR
1 EACH MISCELLANEOUS
Qty: 2 EACH | Refills: 5 | Status: ON HOLD | OUTPATIENT
Start: 2023-02-23 | End: 2023-05-22

## 2023-02-23 RX ORDER — METFORMIN HCL 500 MG
1500 TABLET, EXTENDED RELEASE 24 HR ORAL
Qty: 270 TABLET | Refills: 1 | Status: SHIPPED | OUTPATIENT
Start: 2023-02-23 | End: 2023-02-23

## 2023-02-23 RX ORDER — METFORMIN HCL 500 MG
1500 TABLET, EXTENDED RELEASE 24 HR ORAL
Qty: 270 TABLET | Refills: 1 | Status: SHIPPED | OUTPATIENT
Start: 2023-02-23 | End: 2023-06-04

## 2023-02-23 NOTE — TELEPHONE ENCOUNTER
Neftali Mason,     Met with Antonio today and after discussion, agreed to increase Metformin XR to 1500 mg with evening meal.     There is currently a pended prescription from Oncology for 1000 mg so I am unable to send increased dose to pharmacy.    If you agree with increased dose, please sign this new pended prescription and discontinue previous Metformin prescription.    Thank you,  Shanel Carroll RDN, LD, [facility]

## 2023-02-23 NOTE — PROGRESS NOTES
Diabetes Self-Management Education & Support    Presents for: Individual review    Type of Service: Telephone Visit    Originating Location (Patient Location): Home  Distant Location (Provider Location): Home  Mode of Communication:  Telephone    Telephone Visit Start Time: Noon  Telephone Visit End Time (telephone visit stop time): 1255 PM    How would patient like to obtain AVS? MyChart    ASSESSMENT:  Antonio reports his blood sugars were elevated in the mid 200 range when he went in for a PET scan in January.   He is working towards better blood sugar control with recent results mostly between 110-150 mg/dL.   Discussed BG monitoring and use of CGM and he is interested in using the Marty 3.  He uses a smart phone and downloaded Marty 3 sean during visit. Discussed target range, instructions for applying the sensor, alarms (need to review at next visit) and the differences between BG meter and CGM results. He has a good understanding of use.     Patient's most recent   Lab Results   Component Value Date    A1C 11.7 2023    A1C 6.6 2021     is not meeting goal of <7.0    Diabetes knowledge and skills assessment:   Patient is knowledgeable in diabetes management concepts related to: Taking Medication and Reducing Risks    Continue education with the following diabetes management concepts: Healthy Eating, Monitoring, Taking Medication, Problem Solving, Reducing Risks and Healthy Coping    Based on learning assessment above, most appropriate setting for further diabetes education would be: Individual setting.      PLAN  Prescriptions sent to pharmacy for updated Accu-chek guide strips (current strips may be ) and the Marty 3 CGM sensor.    Recommend he increase Metformin XR to 1500 mg after evening meal.     Topics to cover at upcoming visits: Healthy Eating, Monitoring, Taking Medication, Problem Solving and Reducing Risks    Follow-up: 3/13/23    See Care Plan for co-developed, patient-state  "behavior change goals.  AVS provided for patient today.    Education Materials Provided:  No new materials provided today      SUBJECTIVE/OBJECTIVE:  Presents for: Individual review  Accompanied by: Self  Focus of Visit: Monitoring, Reducing Risks, Healthy Eating  Diabetes type: Type 2  Date of diagnosis: a couple years ago  How confident are you filling out medical forms by yourself: Quite a bit  Diabetes management related comments/concerns: Blood sugars have been higher since PET scan  Transportation concerns: No  Difficulty affording diabetes medication?: No  Difficulty affording diabetes testing supplies?: No (using  strips)  Other concerns: None  Cultural Influences/Ethnic Background:  Not  or       Diabetes Symptoms & Complications:  Fatigue: Yes (\"On a lot of pain meds\")  Polydipsia: Yes  Polyuria: Yes (At night)  Visual change: No  Slow healing wounds: No  Weight trend: Decreasing (Has lost 60 pounds since 2022)  Complications assessed today?: No    Patient Problem List and Family Medical History reviewed for relevant medical history, current medical status, and diabetes risk factors.    Vitals:  There were no vitals taken for this visit.  Estimated body mass index is 41.23 kg/m  as calculated from the following:    Height as of 23: 1.803 m (5' 10.98\").    Weight as of 23: 134 kg (295 lb 8 oz).   Last 3 BP:   BP Readings from Last 3 Encounters:   23 128/85   23 131/65   23 (!) 145/89       History   Smoking Status     Former     Packs/day: 1.00     Years: 25.00     Types: Cigarettes     Start date: 1988     Quit date: 2013   Smokeless Tobacco     Never       Labs:  Lab Results   Component Value Date    A1C 11.7 2023    A1C 6.6 2021     Lab Results   Component Value Date     2023     2023     2022     2021     Lab Results   Component Value Date    LDL 74 2023    LDL 49 " "02/11/2021     HDL Cholesterol   Date Value Ref Range Status   02/11/2021 46 >39 mg/dL Final     Direct Measure HDL   Date Value Ref Range Status   01/18/2023 38 (L) >=40 mg/dL Final   ]  GFR Estimate   Date Value Ref Range Status   02/20/2023 83 >60 mL/min/1.73m2 Final     Comment:     eGFR calculated using 2021 CKD-EPI equation.   02/11/2021 >90 >60 mL/min/[1.73_m2] Final     Comment:     Non  GFR Calc  Starting 12/18/2018, serum creatinine based estimated GFR (eGFR) will be   calculated using the Chronic Kidney Disease Epidemiology Collaboration   (CKD-EPI) equation.       GFR Estimate If Black   Date Value Ref Range Status   02/11/2021 >90 >60 mL/min/[1.73_m2] Final     Comment:      GFR Calc  Starting 12/18/2018, serum creatinine based estimated GFR (eGFR) will be   calculated using the Chronic Kidney Disease Epidemiology Collaboration   (CKD-EPI) equation.       Lab Results   Component Value Date    CR 1.08 02/20/2023    CR 0.94 02/11/2021     No results found for: MICROALBUMIN    Healthy Eating:  Healthy Eating Assessed Today: Yes  Meal planning/habits: Smaller portions, Low carb  How many times a week on average do you eat food made away from home (restaurant/take-out)?: 1  Meals include: Breakfast, Lunch, Dinner  Breakfast: 9 AM: Glucerna shake or banana, coffee- black or water  Lunch: 1 PM: \"hit or miss, not too hungry lately.\" Possibly turkey sandwich, water or unsweetened ice tea  Dinner: 6 PM: Reva and mixed vegetables OR pork roast with vegetales, 10-12 oz skim milk  Snacks: pistachios- mid afternoon  Beverages: Water, Coffee, Tea  Has patient met with a dietitian in the past?: Yes    Being Active:  Being Active Assessed Today: No  Barrier to exercise: None    Monitoring:  Monitoring Assessed Today: Yes  Did patient bring glucose meter to appointment? : Yes  Blood Glucose Meter: Accu-chek  Times checking blood sugar at home (number): 3  Times checking blood sugar at " home (per): Day  Blood glucose trend: Decreasing (100-150 mg/dL per pt)    Date Breakfast  Lunch  Dinner  Bedtime    Before After Before After Before After    2/23 149         2/22 120  149  136     2/21 108  141  133     2/20 117    138     2/19 130    175     2/18 140  136  158     2/17 138    161         Taking Medications:  Diabetes Medication(s)     Biguanides       metFORMIN (GLUCOPHAGE XR) 500 MG 24 hr tablet    Take 2 tablets (1,000 mg) by mouth daily (with dinner) for 30 days        Taking Medication Assessed Today: Yes  Current Treatments: Diet, Oral Medication (taken by mouth)  Problems taking diabetes medications regularly?: No  Diabetes medication side effects?: No    Problem Solving:  Problem Solving Assessed Today: Yes  Is the patient at risk for hypoglycemia?: No  Is the patient at risk for DKA?: No    Reducing Risks:  Reducing Risks Assessed Today: Yes  Diabetes Risks: Age over 45 years, Sedentary Lifestyle    Healthy Coping:  Healthy Coping Assessed Today: Yes  Emotional response to diabetes: Ready to learn  Informal Support system:: Spouse  Stage of change: ACTION (Actively working towards change)  Patient Activation Measure Survey Score:  SARAH Score (Last Two) 3/23/2012 2/4/2021   SARAH Raw Score 45 36   Activation Score 73.1 75.5   SARAH Level 4 4       Care Plan and Education Provided:  Care Plan: Diabetes   Updates made by Shanel Carroll since 2/23/2023 12:00 AM      Problem: HbA1C Not In Goal       Goal: Establish Regular Follow-Ups with PCP       Task: Discuss with PCP the recommended timing for patient's next follow up visit(s)    Responsible User: Shanel Carroll      Task: Discuss schedule for PCP visits with patient    Responsible User: Shanel Carroll      Goal: Get HbA1C Level in Goal       Task: Educate patient on diabetes education self-management topics Completed 2/23/2023   Responsible User: Shanel Carroll      Task: Educate patient on benefits of regular glucose monitoring  Completed 2/23/2023   Responsible User: Shanel Carroll      Task: Refer patient to appropriate extended care team member, as needed (Medication Therapy Management, Behavioral Health, Physical Therapy, etc.)    Responsible User: Shanel Carroll      Task: Discuss diabetes treatment plan with patient Completed 2/23/2023   Responsible User: Shanel Carroll      Problem: Diabetes Self-Management Education Needed to Optimize Self-Care Behaviors       Goal: Understand diabetes pathophysiology and disease progression       Task: Provide education on diabetes pathophysiology and disease progression specfic to patient's diabetes type    Responsible User: Shanel Carroll      Goal: Healthy Eating - follow a healthy eating pattern for diabetes       Task: Provide education on portion control and consistency in amount, composition and timing of food intake    Responsible User: Shanel Carroll      Task: Provide education on managing carbohydrate intake (carbohydrate counting, plate planning method, etc.)    Responsible User: Shanel Carroll      Task: Provide education on weight management    Responsible User: Shanel Carroll      Task: Provide education on heart healthy eating    Responsible User: Shanel Carroll      Task: Provide education on eating out    Responsible User: Shanel Carroll      Task: Develop individualized healthy eating plan with patient    Responsible User: Shanel Carroll      Goal: Being Active - get regular physical activity, working up to at least 150 minutes per week       Task: Provide education on relationship of activity to glucose and precautions to take if at risk for low glucose    Responsible User: Shanel Carroll      Task: Discuss barriers to physical activity with patient    Responsible User: Shanel Carroll      Task: Develop physical activity plan with patient    Responsible User: Shanel Carroll      Task: Explore community resources including walking groups, assistance  programs, and home videos    Responsible User: Shanel Carroll      Goal: Monitoring - monitor glucose and ketones as directed       Task: Provide education on blood glucose monitoring (purpose, proper technique, frequency, glucose targets, interpreting results, when to use glucose control solution, sharps disposal) Completed 2/23/2023   Responsible User: Shanel Carroll      Task: Provide education on continuous glucose monitoring (sensor placement, use of sean or /reader, understanding glucose trends, alerts and alarms, differences between sensor glucose and blood glucose)    Responsible User: Shanel Carroll      Task: Provide education on ketone monitoring (when to monitor, frequency, etc.)    Responsible User: Shanel Carroll      Goal: Taking Medication - patient is consistently taking medications as directed    Start Date: 2/23/2023   This Visit's Progress: 0%   Note:    Increase Metformin XR to 1500 mg after evening meal     Task: Provide education on action of prescribed medication, including when to take and possible side effects Completed 2/23/2023   Responsible User: Shanel Carroll      Task: Provide education on insulin and injectable diabetes medications, including administration, storage, site selection and rotation for injection sites    Responsible User: Shanel Carroll      Task: Discuss barriers to medication adherence with patient and provide management technique ideas as appropriate    Responsible User: Shanel Carroll      Task: Provide education on frequency and refill details of medications    Responsible User: Shanel Carroll      Goal: Problem Solving - know how to prevent and manage short-term diabetes complications       Task: Provide education on high blood glucose - causes, signs/symptoms, prevention and treatment Completed 2/23/2023   Responsible User: Shanel Carroll      Task: Provide education on low blood glucose - causes, signs/symptoms, prevention, treatment,  carrying a carbohydrate source at all times, and medical identification    Responsible User: Shanel Carroll      Task: Provide education on safe travel with diabetes    Responsible User: Shanel Carroll      Task: Provide education on how to care for diabetes on sick days    Responsible User: Shanel Carroll      Task: Provide education on when to call a health care provider Completed 2/23/2023   Responsible User: Shanel Carroll      Goal: Reducing Risks - know how to prevent and treat long-term diabetes complications       Task: Provide education on major complications of diabetes, prevention, early diagnostic measures and treatment of complications    Responsible User: Shanel Carroll      Task: Provide education on recommended care for dental, eye and foot health    Responsible User: Shanel Carroll      Task: Provide education on Hemoglobin A1c - goals and relationship to blood glucose levels    Responsible User: Shanel Carroll      Task: Provide education on recommendations for heart health - lipid levels and goals, blood pressure and goals, and aspirin therapy, if indicated    Responsible User: Shanel Carroll      Task: Provide education on tobacco cessation    Responsible User: Shanel Carroll      Goal: Healthy Coping - use available resources to cope with the challenges of managing diabetes       Task: Discuss recognizing feelings about having diabetes    Responsible User: Shanel Carroll      Task: Provide education on the benefits of making appropriate lifestyle changes Completed 2/23/2023   Responsible User: Shanel Carroll      Task: Provide education on benefits of utilizing support systems    Responsible User: Shanel Carroll      Task: Discuss methods for coping with stress    Responsible User: Shanel Carroll      Task: Provide education on when to seek professional counseling    Responsible User: Shanel Carroll RDN, RODOLFO, CDCES    Time Spent: 60  minutes  Encounter Type: Individual    Any diabetes medication dose changes were made via the CDE Protocol per the patient's referring provider. A copy of this encounter was shared with the provider.

## 2023-02-23 NOTE — PROGRESS NOTES
Antonio is a 52 year old who is being evaluated via a billable video visit.      How would you like to obtain your AVS? MyChart  If the video visit is dropped, the invitation should be resent by: Text to cell phone: [phone]  Will anyone else be joining your video visit? No        Assessment & Plan     Uncontrolled diabetes mellitus with hyperglycemia, without long-term current use of insulin (H)  Will continue to monitor glucose. Due for a1c next month    Antineoplastic chemotherapy induced pancytopenia (H)  Stable.     Neutropenic fever (H)  improved    Sarcoma of soft tissue (H)  Continues cares with oncology  Pain tolerable with medications       MED REC REQUIRED  Post Medication Reconciliation Status:       No follow-ups on file.    Ely Almazan PA-C  [facility]   Antonio is a 52 year old, presenting for the following health issues:  Hospital F/U and Video Visit      HPI     Post Discharge Outreach 2/17/2023   Admission Date 2/11/2023   Reason for Admission Neutropenic fevers.   Discharge Date 2/16/2023   Discharge Diagnosis Neutropenia due to recent chemo   How are you doing now that you are home? Bettter   How are your symptoms? (Red Flag symptoms escalate to triage hotline per guidelines) Improved   Do you feel your condition is stable enough to be safe at home until your provider visit? Yes   Does the patient have their discharge instructions?  Yes   Does the patient have questions regarding their discharge instructions?  No   Does the patient have all of their medications? Yes   Do you have questions regarding any of your medications?  No   Do you have all of your needed medical supplies or equipment (DME)?  (i.e. oxygen tank, CPAP, cane, etc.) Yes   Discharge follow-up appointment scheduled within 14 calendar days?  Yes   Discharge Follow Up Appointment Date 2/22/2023   Discharge Follow Up Appointment Scheduled with? Primary Care Provider     [facility]  Follow-up Visit:    Hospital/Nursing Home/IP Rehab Facility: [facility]  Date of Admission: 2/11/23  Date of Discharge: 2/16/23  Reason(s) for Admission: Sarcoma, neutropenic fever, Chemo induced pancytopenia  Status now: doing fine  Was your hospitalization related to COVID-19? No   Problems taking medications regularly:  None  Medication changes since discharge: He started Morphine 30 mg and Dilaudid 2 mg and Lyrica 25 mg  Problems adhering to non-medication therapy:  None    Summary of hospitalization:  [facility] discharge summary reviewed  Diagnostic Tests/Treatments reviewed.  Follow up needed: continued care with oncology  Other Healthcare Providers Involved in Patient s Care:         upcoming f/u with oncology  Update since discharge: stable.    Plan of care communicated with patient     Glucose at home has been ranging 100-160's past few days. CDE increased metformin to 1500 mg with supper     Review of Systems   Constitutional, HEENT, cardiovascular, pulmonary, gi and gu systems are negative, except as otherwise noted.      Objective           Vitals:  No vitals were obtained today due to virtual visit.    Physical Exam   GENERAL: Healthy, alert and no distress  EYES: Eyes grossly normal to inspection.  No discharge or erythema, or obvious scleral/conjunctival abnormalities.  RESP: No audible wheeze, cough, or visible cyanosis.  No visible retractions or increased work of breathing.    NEURO: Cranial nerves grossly intact.  Mentation and speech appropriate for age.  PSYCH: Mentation appears normal, affect normal/bright, judgement and insight intact, normal speech and appearance well-groomed.                Video-Visit Details    Type of service:  Video Visit     Originating Location (pt. Location): Home  Distant Location (provider location):  Off-site  Platform used for Video Visit: Vollee

## 2023-02-24 ENCOUNTER — LAB (OUTPATIENT)
Dept: INFUSION THERAPY | Facility: CLINIC | Age: 53
End: 2023-02-24
Attending: STUDENT IN AN ORGANIZED HEALTH CARE EDUCATION/TRAINING PROGRAM
Payer: COMMERCIAL

## 2023-02-24 DIAGNOSIS — C49.9 SARCOMA OF SOFT TISSUE (H): Primary | ICD-10-CM

## 2023-02-24 LAB
ALBUMIN SERPL BCG-MCNC: 3.5 G/DL (ref 3.5–5.2)
ALP SERPL-CCNC: 71 U/L (ref 40–129)
ALT SERPL W P-5'-P-CCNC: 10 U/L (ref 10–50)
ANION GAP SERPL CALCULATED.3IONS-SCNC: 11 MMOL/L (ref 7–15)
AST SERPL W P-5'-P-CCNC: 18 U/L (ref 10–50)
BILIRUB SERPL-MCNC: 0.4 MG/DL
BUN SERPL-MCNC: 17.6 MG/DL (ref 6–20)
CALCIUM SERPL-MCNC: 9.4 MG/DL (ref 8.6–10)
CHLORIDE SERPL-SCNC: 99 MMOL/L (ref 98–107)
CREAT SERPL-MCNC: 1.13 MG/DL (ref 0.67–1.17)
DEPRECATED HCO3 PLAS-SCNC: 29 MMOL/L (ref 22–29)
ERYTHROCYTE [DISTWIDTH] IN BLOOD BY AUTOMATED COUNT: 20.3 % (ref 10–15)
GFR SERPL CREATININE-BSD FRML MDRD: 78 ML/MIN/1.73M2
GLUCOSE SERPL-MCNC: 120 MG/DL (ref 70–99)
HCT VFR BLD AUTO: 34.8 % (ref 40–53)
HGB BLD-MCNC: 10.3 G/DL (ref 13.3–17.7)
MAGNESIUM SERPL-MCNC: 2.1 MG/DL (ref 1.7–2.3)
MCH RBC QN AUTO: 24.2 PG (ref 26.5–33)
MCHC RBC AUTO-ENTMCNC: 29.6 G/DL (ref 31.5–36.5)
MCV RBC AUTO: 82 FL (ref 78–100)
PHOSPHATE SERPL-MCNC: 4.7 MG/DL (ref 2.5–4.5)
PLATELET # BLD AUTO: 564 10E3/UL (ref 150–450)
POTASSIUM SERPL-SCNC: 4.5 MMOL/L (ref 3.4–5.3)
PROT SERPL-MCNC: 7.7 G/DL (ref 6.4–8.3)
RBC # BLD AUTO: 4.25 10E6/UL (ref 4.4–5.9)
SODIUM SERPL-SCNC: 139 MMOL/L (ref 136–145)
WBC # BLD AUTO: 12.5 10E3/UL (ref 4–11)

## 2023-02-24 PROCEDURE — 85049 AUTOMATED PLATELET COUNT: CPT | Performed by: STUDENT IN AN ORGANIZED HEALTH CARE EDUCATION/TRAINING PROGRAM

## 2023-02-24 PROCEDURE — 85014 HEMATOCRIT: CPT | Performed by: STUDENT IN AN ORGANIZED HEALTH CARE EDUCATION/TRAINING PROGRAM

## 2023-02-24 PROCEDURE — 250N000011 HC RX IP 250 OP 636: Performed by: STUDENT IN AN ORGANIZED HEALTH CARE EDUCATION/TRAINING PROGRAM

## 2023-02-24 PROCEDURE — 80053 COMPREHEN METABOLIC PANEL: CPT | Performed by: STUDENT IN AN ORGANIZED HEALTH CARE EDUCATION/TRAINING PROGRAM

## 2023-02-24 PROCEDURE — 83735 ASSAY OF MAGNESIUM: CPT | Performed by: STUDENT IN AN ORGANIZED HEALTH CARE EDUCATION/TRAINING PROGRAM

## 2023-02-24 PROCEDURE — 84100 ASSAY OF PHOSPHORUS: CPT | Performed by: STUDENT IN AN ORGANIZED HEALTH CARE EDUCATION/TRAINING PROGRAM

## 2023-02-24 PROCEDURE — 36415 COLL VENOUS BLD VENIPUNCTURE: CPT

## 2023-02-24 RX ORDER — HEPARIN SODIUM,PORCINE 10 UNIT/ML
5 VIAL (ML) INTRAVENOUS
Status: DISCONTINUED | OUTPATIENT
Start: 2023-02-24 | End: 2023-02-24 | Stop reason: HOSPADM

## 2023-02-24 RX ORDER — HEPARIN SODIUM,PORCINE 10 UNIT/ML
5 VIAL (ML) INTRAVENOUS
Status: CANCELLED | OUTPATIENT
Start: 2023-02-24

## 2023-02-24 RX ORDER — HEPARIN SODIUM (PORCINE) LOCK FLUSH IV SOLN 100 UNIT/ML 100 UNIT/ML
5 SOLUTION INTRAVENOUS
Status: CANCELLED | OUTPATIENT
Start: 2023-02-24

## 2023-02-24 RX ADMIN — Medication 5 ML: at 13:12

## 2023-02-24 RX ADMIN — Medication 5 ML: at 13:13

## 2023-02-24 NOTE — PROGRESS NOTES
Nursing Note:  Antonio Canseco presents today for [facility].    Patient seen by provider today: No   present during visit today: Not Applicable.    Note: N/A.    Intravenous Access:  Labs drawn without difficulty.  CVC.    Discharge Plan:   Patient was sent to home.     Akanksha Garcia RN

## 2023-02-26 ENCOUNTER — TELEPHONE (OUTPATIENT)
Dept: OTHER | Facility: CLINIC | Age: 53
End: 2023-02-26
Payer: COMMERCIAL

## 2023-02-27 NOTE — PROGRESS NOTES
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  Patient here for consent signing and CT simulation.    SUBJECTIVE:  Antonio Canseco is a 52-year-old male with a past medical history of uncontrolled diabetes, and hydratinits supporotiva, who presents for adjuvant radiotherapy for a biopsy-confirmed 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    The patient was last seen on 2023 for his initial consultation.  During that initial consultation, informed consent was obtained.      He received C1D1 of Doxil/ ifosfamide on 2023.  He reports worsening pain after chemotherapy was initiated.    On 2023, MRI of the right femur showed increase in size of the mass, now measuring 11 x 15.7 x 29.7 cm, previously 7.4 x 12 x 20 cm.  As documented in our consultation note, there is enhancement along the semitendinosus muscle sheath down to the level of the knee.  There is more edema along the abductor rosy as compared to prior, redness possibly neurogenic.    The patient now returns to proceed with CT simulation.        The patient returns for signing consents and CT simulation.    On Interview:    Pain has worsened from prior visit    On 30 mg morphine and dilaudid for breakthrough    Has lost hair from chemotherapy    He feels that the mass has increased slightly in  size.      OBJECTIVE:   Wt 130.5 kg (287 lb 9.6 oz)   BMI 40.13 kg/m       KPS 70  Pain Score Severe Pain (7)/10  General: Alert and in no acute distress.  HEENT: Normocephalic, atraumatic. No visible scleral icterus.  Neck: Apparent full range of motion.  CV: Appears well-perfused, with no visible cyanosis.  Lungs: Breathing easily on room air, with no difficulty completing full sentences  Extremities:   Full range of motion of the joints in the upper and lower extremities.  Along the right posterior thigh, there is a large area of diffuse enlargement from upper to distal thigh with no overlying skin changes.  Neuro: Alert and oriented; grossly nonfocal. Normal speech. Moving upper extremities equally.  Skin: No visible jaundice. No suspicious lesions of the visualized integuments.  Psych: Mood and affect are appropriate to given situation. Answers questions appropriately.      Radiology:  MRI of the right thigh, 2/20/2023:  IMPRESSION: In this patient with high grade sarcoma status  post-neoadjuvant chemotherapy;     Increased in size of the 11 x 15.7 x 21.7 cm heterogeneously enhancing  soft tissue mass with myxoid and lipoid components in the posterior  compartment of the right thigh since MRI from 1/6/2023, previously  measured 7.4 x 12 x 20 cm.  *  Distal portion of the mass anteriorly abuts the sciatic nerve and  deep femoral artery/vein without definite invasion.  *  Increased edema within the adductor rosy when compared to prior  study, possibly neurogenic.    Labs:  Most Recent 3 CBC's:Recent Labs   Lab Test 02/24/23  1307 02/20/23  1236 02/17/23  1259   WBC 12.5* 17.5* 19.3*   HGB 10.3* 9.9* 9.9*   MCV 82 81 81   * 515* 426     Most Recent 3 BMP's:Recent Labs   Lab Test 02/24/23  1307 02/20/23  1236 02/17/23  1259    140 141   POTASSIUM 4.5 4.3 4.0   CHLORIDE 99 100 103   CO2 29 29 27   BUN 17.6 11.1 7.2   CR 1.13 1.08 1.16   ANIONGAP 11 11 11   DORIAN 9.4 8.9 8.6   * 141* 173*          ASSESSMENT AND PLAN:  Antonio Canseco is a 52-year-old male with a past medical history of uncontrolled diabetes, and hydratinits supporotiva, who presents for adjuvant radiotherapy for a biopsy-confirmed 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    In the pre-operative setting, we would prescribe radiation to 50 Gy in 25 fractions, daily, Monday-Friday, approximately 15-30 minutes per day. We would use daily image guidance and IMRT in an effort to reduce incidence of late toxicities as per RTOG 0630 (Cerda et al., JCO 2015).    All questions answered.  The patient proceeded with CT simulation.     Patient was seen and discussed with my attending physician, Dr. Valle.    José Luis Moser MD, MS PGY-2  Radiation Oncology       Physician Attestation   I, Sydney Valle, saw this patient and agree with the findings and plan of care as documented in the note.   I was present for key portions of the history and physical exam.  Briefly, this is a 52-year-old man with biopsy-proven high-grade sarcoma of the right posterior thigh.  He has now status post 1 cycle of Doxil ifosfamide with evidence of disease progression.  As such, we are proceeding with preoperative radiotherapy.  He has a PET/CT scheduled on 3/15/2023.  He would not initiate therapy prior to this PET/CT showed therapy progression outside of the target area.  We did call to ascertain if this PET/CT could be moved up, but the timing is restricted by his insurance.  Informed consent was reviewed and CT simulation for radiation planning was performed.  He will be treated in 25 fractions to 50 [place], without concurrent chemotherapy.    We appreciate the opportunity to participate in Mr. Canseco's care. He was encouraged to contact me with questions or concerns should they arise.    I personally reviewed the available MRI images.  I personally discussed his case with his medical and surgical oncologist.  Laboratory data and pathology as noted above was reviewed.  I reviewed previous medical records, which are summarized in the HPI. A total of 40 minutes were spent (including face to face time) during this visit, and more than 50% of the time was spent in counseling and coordination of care.     Sydney Valle MD MPH PhD    Department of Radiation Oncology

## 2023-02-27 NOTE — DISCHARGE SUMMARY
Physician Discharge Summary           [facility]  Hospitalist Discharge Summary-[facility]    Name: Antonio Canseco    MRN: [id number]     YOB: 1970    Age: 52 year old                                                 Primary care provider: Mynor Mason    Admit date:  2/11/2023    Discharge date and time: 2/16/2023  7:27 PM    Discharge Physician: Judson Servin M.D., M.B.A.       Primary Discharge Diagnosis       Neutropenic fever   Pancytopenia       Secondary Diagnosis /chronic medical conditions     Past Medical History:   Diagnosis Date     Acrochordon 02/11/2021     CARDIOVASCULAR SCREENING; LDL GOAL LESS THAN 160 03/27/2012     Colon adenoma      Controlled type 2 diabetes mellitus without complication, without long-term current use of insulin (H) 09/16/2019    X 1     Cough 02/04/2014     Degeneration of thoracic intervertebral disc 12/11/2013     Dysfunction of both eustachian tubes 04/12/2019     Elevated blood pressure 12/22/2014     Elevated hemoglobin A1c 09/16/2019    X 1     Gastroesophageal reflux disease      Hamstring strain, right, subsequent encounter 12/19/2022     Hepatic cyst 05/22/2018     Hepatic steatosis 12/11/2013     History of colonic polyps 12/11/2013     History of drainage of abscess 09/16/2019     Hypertension      Irritable bowel syndrome without diarrhea 06/01/2018     Low libido 04/12/2019     Lumbar radiculopathy 12/19/2022     Microscopic hematuria 12/19/2013     Nephrolithiasis      Obesity 03/27/2012     BRIAN on CPAP 03/27/2012     Other irritable bowel syndrome 05/22/2018     Pain in thoracic spine 05/09/2013     Pulmonary nodule 05/22/2018     Right-sided chest pain 04/13/2018     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     Sleep apnea      Tinea pedis of both feet 02/11/2021     Tinnitus, unspecified laterality 04/12/2019     Uncontrolled diabetes mellitus with hyperglycemia, without long-term current use of insulin (H) 01/23/2023     Past Surgical  History:  Past Surgical History:   Procedure Laterality Date     COLONOSCOPY       COLONOSCOPY N/A 03/12/2021    Procedure: COLONOSCOPY (fv);  Surgeon: Ean Alcantara MD;  Location: [facility] OR     CYSTOSCOPY  02/11/2014    Procedure: CYSTOSCOPY;   Video Cystoscopy with Exam Under Anesthesia;  Surgeon: Chente Moeller MD;  Location: [facility] OR     IR CVC TUNNEL PLACEMENT > 5 YRS OF AGE  1/27/2023     LEG SURGERY Left 2019    Suregy r/t infection     wisdom teeth             Brief Summary of Hospital stay :       Please refer to  Admission H&P note  and subsequent progress notes in EMR for full details of patient care.    Reason for Hospitalization(C/C,HPI and brief patient summary):neutropenic fever      Significant findings(Primary diagnosis )Procedures and treatments provided(Hospital course ,consults, procedures):Please see below for details    Antonio Canseco is a 52 year old male admitted on 2/11/2023. He has a past medical history significant for high-grade sarcoma, hypertension, diabetes mellitus type 2, GERD, and irritable bowel syndrome.  Hospitalized at the beginning of this month at [facility] for chemotherapy.  Discharged from [facility] on 2/6.  Had been on outpatient oral antibiotics with levofloxacin and doxycycline.  On topical clindamycin to chronic right leg wound.  He presented to emergency room neutropenic fever.    Problem list (medical problems addressed during hospital stay):    Neutropenic fever  -Neutropenia due to recent chemo.  -Unclear infectious source.  -Has been on oral antibiotics with levofloxacin and doxycycline in the outpatient setting.  Also on topical clindamycin for chronic right leg wound.  -Started on IV vancomycin and Zosyn in the ER. Continued with zosyn and he remained  afebrile , cultures NTD   Pancytopenia.  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  --currently Hgb is 7.8    -- WBC elevated as well as normal platelets count.          Diabetes mellitus type 2.  - not on medication at home   - was on  sliding scale insulin      Hyponatremia.  -Mild. Monitor for now      Chronic right leg lesion   -Due to high-grade sarcoma.  -Pain medications as needed.     Sleep apnea.  -Use CPAP while sleeping.     Acute kidney injury:  --Stopped vancomycin.  Kidney function improving.       Hematuria: Differential diagnosis include hemorrhagic cystitis from chemotherapeutic agent-Efosfamide   -- Etiology unclear.  Urology was consulted and recommendation obtained.  -- No acute urology intervention was recommended.     Abnormal CT of the chest  --CT of the chest on February 14 showed no pulm embolism but evidence of small pleural-based nodular focus in the left lower lobe of the lung with which is new and favored to be atelectasis.  Few other small pulmonary nodules were also noted and this has been stable and follow-up recommended.  Stable mild right hilar lymphadenopathy           Consultations during hospital stay:       HEMATOLOGY & ONCOLOGY IP CONSULT  PHARMACY TO DOSE VANCO  WOUND OSTOMY CONTINENCE NURSE  IP CONSULT  VASCULAR ACCESS ADULT IP CONSULT  RESPIRATORY CARE IP CONSULT  PALLIATIVE CARE ADULT IP CONSULT  UROLOGY IP CONSULT  ADVANCE DIRECTIVE IP CONSULT  OCCUPATIONAL THERAPY ADULT IP CONSULT  UROLOGY IP CONSULT  CHILD FAMILY LIFE IP CONSULT      Patient discharge Condition:     stable    /65 (BP Location: Left arm)   Pulse 92   Temp 99.3  F (37.4  C)   Resp 16   Wt 135.3 kg (298 lb 4.8 oz)   SpO2 95%   BMI 41.62 kg/m               Discharge Instructions:       Patient/family instructions: Written discharge instruction given to patient/family    Discharge Medications:       Review of your medicines      UNREVIEWED medicines. Ask your doctor about these medicines      Dose / Directions   heparin lock flush 10  UNIT/ML Soln injection  Used for: Encounter for central line care      Dose: 3 mL  3 mLs by Intracatheter route every 24 hours Flush each lumen with 3 mLs (total 6mL in 24 hours period)  Quantity: 180 mL  Refills: 0     morphine 30 MG CR tablet  Commonly known as: MS CONTIN  Used for: Pain of right lower extremity  Ask about: Should I take this medication?      Dose: 30 mg  Take 1 tablet (30 mg) by mouth every 12 hours for 6 days  Quantity: 12 tablet  Refills: 0        START taking      Dose / Directions   HYDROmorphone 2 MG tablet  Commonly known as: DILAUDID  Used for: Pain of right lower extremity      Dose: 1-2 mg  Take 0.5-1 tablets (1-2 mg) by mouth every 6 hours as needed for moderate to severe pain  Quantity: 24 tablet  Refills: 0     pregabalin 25 MG capsule  Commonly known as: LYRICA  Used for: Pain of right lower extremity      Dose: 25 mg  Take 1 capsule (25 mg) by mouth 3 times daily  Quantity: 36 capsule  Refills: 0     senna-docusate 8.6-50 MG tablet  Commonly known as: SENOKOT-S/PERICOLACE  Used for: Pain of right lower extremity      Dose: 1 tablet  Take 1 tablet by mouth 2 times daily  Quantity: 30 tablet  Refills: 0        CONTINUE these medicines which have NOT CHANGED      Dose / Directions   clindamycin 1 % external gel  Commonly known as: CLINDAMAX  Used for: Hidradenitis suppurativa      Apply topically 2 times daily , to HS lesion  Quantity: 30 g  Refills: 0     DULoxetine 60 MG capsule  Commonly known as: CYMBALTA  Used for: Irritable bowel syndrome without diarrhea      Dose: 60 mg  Take 1 capsule (60 mg) by mouth daily  Quantity: 90 capsule  Refills: 1     folic acid 400 MCG tablet  Commonly known as: FOLVITE  Used for: Anemia, unspecified type      Dose: 400 mcg  Take 1 tablet (400 mcg) by mouth daily  Quantity: 30 tablet  Refills: 0     lidocaine 5 % patch  Commonly known as: LIDODERM  Used for: Pain of right lower extremity      Dose: 1 patch  Place 1 patch onto the skin every 24 hours  To prevent lidocaine toxicity, patient should be patch free for 12 hrs daily.  Quantity: 14 patch  Refills: 1     naloxone 4 MG/0.1ML nasal spray  Commonly known as: NARCAN  Used for: Sarcoma of soft tissue (H), Cancer related pain      Dose: 4 mg  Spray 1 spray (4 mg) into one nostril alternating nostrils as needed for opioid reversal every 2-3 minutes until assistance arrives  Quantity: 2 each  Refills: 1     omeprazole 20 MG tablet  Indication: Heartburn      Dose: 20 mg  Take 20 mg by mouth daily as needed  Refills: 0     phosphorus tablet 250 mg 250 MG per tablet  Commonly known as: phosphorus tablet 250 mg  Used for: Hypophosphatemia      Dose: 500 mg  Take 2 tablets (500 mg) by mouth 3 times daily Take until directed otherwise  Quantity: 84 tablet  Refills: 1     sennosides 8.6 MG tablet  Commonly known as: SENOKOT      Dose: 1 tablet  Take 1 tablet by mouth daily as needed for constipation  Refills: 0     sucralfate 1 GM/10ML suspension  Commonly known as: CARAFATE  Used for: Chemotherapy-induced nausea      Dose: 1 g  Take 10 mLs (1 g) by mouth 4 times daily as needed for nausea (or indigestion)  Quantity: 414 mL  Refills: 0        STOP taking    blood glucose monitoring lancets        blood glucose test strip  Commonly known as: NO BRAND SPECIFIED        doxycycline hyclate 100 MG tablet  Commonly known as: VIBRA-TABS        gabapentin 300 MG capsule  Commonly known as: NEURONTIN        levofloxacin 250 MG tablet  Commonly known as: LEVAQUIN        lisinopril 20 MG tablet  Commonly known as: ZESTRIL        morphine 15 MG CR tablet  Commonly known as: MS CONTIN        ondansetron 4 MG ODT tab  Commonly known as: ZOFRAN ODT        oxyCODONE 5 MG tablet  Commonly known as: ROXICODONE        prochlorperazine 10 MG tablet  Commonly known as: COMPAZINE              Where to get your medicines      These medications were sent to [facility]/pharmacy #[id number] - [place], [place] - [address]  [address] [place]  [facility] [address]    Phone: [phone]     HYDROmorphone 2 MG tablet    morphine 30 MG CR tablet    pregabalin 25 MG capsule    senna-docusate 8.6-50 MG tablet          Discharge diet: regular       Discharge activity:Activity as tolerated      Discharge follow-up:    Follow up with primary care provider in 7 days or earlier if symptoms return or gets worse.    Follow up with consultant as instructed  with Hem/Onc      Other instructions:    We discussed with patient/family about detail discharge instructions as well as discharge medications above including potential risks,side effects and benefits.Patient/family understood benefits and potential serious side effects of taking these medications and need to follow up with PCP if the patient develops complications.  Patient is also advised to see a doctor immediately for severe symptoms.        Major procedure performed/  Significant Diagnostic Studies:       Results for orders placed or performed during the hospital encounter of 02/11/23   XR Chest 2 Views    Narrative    EXAM: XR CHEST 2 VIEWS  LOCATION: [facility]  DATE/TIME: 2/11/2023 7:32 PM    INDICATION: Cough  COMPARISON: 4/24/2022      Impression    IMPRESSION: Infusion port tip in the mid SVC. Lungs are clear. Normal heart size and pulmonary vascularity. No pleural effusions.   CT Chest Pulmonary Embolism w Contrast    Narrative    CT CHEST PULMONARY EMBOLISM W CONTRAST 2/14/2023 1:23 PM    CLINICAL HISTORY: hypoxia and tachycardia, sarcoma, r/o PE  TECHNIQUE: CT angiogram chest during arterial phase injection IV  contrast. 2D and 3D MIP reconstructions were performed by the CT  technologist. Dose reduction techniques were used.     CONTRAST: 77mL Isovue-370    COMPARISON: 1/26/2023    FINDINGS:  ANGIOGRAM CHEST: Pulmonary arteries are normal caliber and negative  for pulmonary emboli. Thoracic aorta is negative for dissection. No CT  evidence of right heart strain.    LUNGS AND  PLEURA: No infiltrate or pleural effusion. Small  pleural-based patchy opacity in the posterior left lower lobe at the  lung base, likely atelectasis (series 7, image 237), new since  1/26/2023. Few stable small pulmonary nodules. For example, a right  middle lobe 5 mm nodule (series 7, image 167), right lower lobe 3 mm  nodule (image 163) and 5 mm nodule along the lateral left major  fissure are stable.    MEDIASTINUM/AXILLAE: Stable mild hilar lymphadenopathy. For example, a  1.8 cm right hilar lymph node previously measured 1.8 cm (series 6,  image 113). No thoracic aortic aneurysm. No coronary artery  calcifications. No pericardial effusion.    UPPER ABDOMEN: Stable probable cyst in the right hepatic lobe.    MUSCULOSKELETAL: No suspicious lesions in the bones.      Impression    IMPRESSION:  1.  No pulmonary emboli. No acute findings in the chest.  2.  Small pleural-based nodular focus in the left lower lobe at the  lung base is new and favored to be atelectasis. Few other small  pulmonary nodules are stable. Continued attention on follow-up.  3.  Stable mild right hilar lymphadenopathy.    WICHO CORONA MD         SYSTEM ID:  [id number]       Recent Labs   Lab 02/24/23  1307 02/20/23  1236   WBC 12.5* 17.5*   HGB 10.3* 9.9*   HCT 34.8* 33.6*   MCV 82 81   * 515*     No results for input(s): CULT in the last 168 hours.  Recent Labs   Lab 02/24/23  1307 02/20/23  1236    140   POTASSIUM 4.5 4.3   CHLORIDE 99 100   CO2 29 29   ANIONGAP 11 11   * 141*   BUN 17.6 11.1   CR 1.13 1.08   GFRESTIMATED 78 83   DORIAN 9.4 8.9   MAG 2.1 1.8   PHOS 4.7* 3.9   PROTTOTAL 7.7 6.9   ALBUMIN 3.5 3.1*   BILITOTAL 0.4 0.4   ALKPHOS 71 72   AST 18 15   ALT 10 9*       Recent Labs   Lab 02/24/23  1307 02/20/23  1236   * 141*         Pending Results:       Unresulted Labs Ordered in the Past 30 Days of this Admission     Date and Time Order Name Status Description    2/16/2023  7:48 AM FISH bladder cancer In  process              Patient Allergies:       Allergies   Allergen Reactions     Emend [Aprepitant] Shortness Of Breath     Couldn't breathe and started to pass out during 1st administration      Kiwi Itching         Disposition:     Disposition: home    I saw and evaluated the patient on day of discharge and  discharge instructions reviewed  and  all the patient's questions and concerns addressed. Over 30 minutes spent on discharge and coordination of discharge process for this patient.      Disclaimer: This note consists of symbols derived from keyboarding, dictation and/or voice recognition software. As a result, there may be errors in the script that have gone undetected. Please consider this when interpreting information found in this chart

## 2023-02-28 ENCOUNTER — OFFICE VISIT (OUTPATIENT)
Dept: RADIATION ONCOLOGY | Facility: CLINIC | Age: 53
End: 2023-02-28
Attending: PHYSICIAN ASSISTANT
Payer: COMMERCIAL

## 2023-02-28 ENCOUNTER — VIRTUAL VISIT (OUTPATIENT)
Dept: ORTHOPEDICS | Facility: CLINIC | Age: 53
End: 2023-02-28
Payer: COMMERCIAL

## 2023-02-28 ENCOUNTER — TELEPHONE (OUTPATIENT)
Dept: ONCOLOGY | Facility: CLINIC | Age: 53
End: 2023-02-28

## 2023-02-28 VITALS — WEIGHT: 287.6 LBS | BODY MASS INDEX: 40.13 KG/M2

## 2023-02-28 DIAGNOSIS — C49.9 SARCOMA OF SOFT TISSUE (H): Primary | ICD-10-CM

## 2023-02-28 LAB
PATH REPORT.COMMENTS IMP SPEC: NORMAL
PATH REPORT.FINAL DX SPEC: NORMAL
PATH REPORT.GROSS SPEC: NORMAL
PATH REPORT.RELEVANT HX SPEC: NORMAL

## 2023-02-28 PROCEDURE — 77334 RADIATION TREATMENT AID(S): CPT | Mod: 26 | Performed by: RADIOLOGY

## 2023-02-28 PROCEDURE — 77334 RADIATION TREATMENT AID(S): CPT | Performed by: RADIOLOGY

## 2023-02-28 PROCEDURE — 99212 OFFICE O/P EST SF 10 MIN: CPT | Mod: VID | Performed by: ORTHOPAEDIC SURGERY

## 2023-02-28 PROCEDURE — G0463 HOSPITAL OUTPT CLINIC VISIT: HCPCS | Mod: 25 | Performed by: RADIOLOGY

## 2023-02-28 ASSESSMENT — PAIN SCALES - GENERAL: PAINLEVEL: SEVERE PAIN (7)

## 2023-02-28 ASSESSMENT — PATIENT HEALTH QUESTIONNAIRE - PHQ9: SUM OF ALL RESPONSES TO PHQ QUESTIONS 1-9: 12

## 2023-02-28 NOTE — NURSING NOTE
Is the patient currently in the state of [place]? YES    Visit mode:VIDEO    If the visit is dropped, the patient can be reconnected by: VIDEO VISIT: Send to e-mail at: [email or web address]    Will anyone else be joining the visit? NO      How would you like to obtain your AVS? MyChart    Are changes needed to the allergy or medication list? NO    Reason for visit:   Chief Complaint   Patient presents with     Video Visit     Follow up

## 2023-02-28 NOTE — LETTER
2023         RE: Antonio Canseco  [address] [facility] [facility] [address]        Dear Colleague,    Thank you for referring your patient, Antonio Canseco, to the [facility] RADIATION ONCOLOGY. Please see a copy of my visit note below.    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  Patient here for consent signing and CT simulation.    SUBJECTIVE:  Antonio Canseco is a 52-year-old male with a past medical history of uncontrolled diabetes, and hydratinits supporotiva, who presents for adjuvant radiotherapy for a biopsy-confirmed 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    The patient was last seen on 2023 for his initial consultation.  During that initial consultation, informed consent was obtained.      He received C1D1 of Doxil/ ifosfamide on 2023.  He reports worsening pain after chemotherapy was initiated.    On 2023, MRI of the right femur showed increase in size of the mass, now measuring 11 x 15.7 x 29.7 cm, previously 7.4 x 12 x 20 cm.  As documented in our consultation note, there is enhancement along the semitendinosus muscle sheath down to the level of the knee.  There is more edema along the abductor rosy as compared to prior, redness possibly neurogenic.    The patient now returns to proceed with CT simulation.        The patient  returns for signing consents and CT simulation.    On Interview:    Pain has worsened from prior visit    On 30 mg morphine and dilaudid for breakthrough    Has lost hair from chemotherapy    He feels that the mass has increased slightly in size.      OBJECTIVE:   Wt 130.5 kg (287 lb 9.6 oz)   BMI 40.13 kg/m       KPS 70  Pain Score Severe Pain (7)/10  General: Alert and in no acute distress.  HEENT: Normocephalic, atraumatic. No visible scleral icterus.  Neck: Apparent full range of motion.  CV: Appears well-perfused, with no visible cyanosis.  Lungs: Breathing easily on room air, with no difficulty completing full sentences  Extremities:   Full range of motion of the joints in the upper and lower extremities.  Along the right posterior thigh, there is a large area of diffuse enlargement from upper to distal thigh with no overlying skin changes.  Neuro: Alert and oriented; grossly nonfocal. Normal speech. Moving upper extremities equally.  Skin: No visible jaundice. No suspicious lesions of the visualized integuments.  Psych: Mood and affect are appropriate to given situation. Answers questions appropriately.      Radiology:  MRI of the right thigh, 2/20/2023:  IMPRESSION: In this patient with high grade sarcoma status  post-neoadjuvant chemotherapy;     Increased in size of the 11 x 15.7 x 21.7 cm heterogeneously enhancing  soft tissue mass with myxoid and lipoid components in the posterior  compartment of the right thigh since MRI from 1/6/2023, previously  measured 7.4 x 12 x 20 cm.  *  Distal portion of the mass anteriorly abuts the sciatic nerve and  deep femoral artery/vein without definite invasion.  *  Increased edema within the adductor rosy when compared to prior  study, possibly neurogenic.    Labs:  Most Recent 3 CBC's:Recent Labs   Lab Test 02/24/23  1307 02/20/23  1236 02/17/23  1259   WBC 12.5* 17.5* 19.3*   HGB 10.3* 9.9* 9.9*   MCV 82 81 81   * 515* 426     Most Recent 3 BMP's:Recent Labs    Lab Test 02/24/23  1307 02/20/23  1236 02/17/23  1259    140 141   POTASSIUM 4.5 4.3 4.0   CHLORIDE 99 100 103   CO2 29 29 27   BUN 17.6 11.1 7.2   CR 1.13 1.08 1.16   ANIONGAP 11 11 11   DORIAN 9.4 8.9 8.6   * 141* 173*         ASSESSMENT AND PLAN:  Antonio Canseco is a 52-year-old male with a past medical history of uncontrolled diabetes, and hydratinits supporotiva, who presents for adjuvant radiotherapy for a biopsy-confirmed 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    In the pre-operative setting, we would prescribe radiation to 50 Gy in 25 fractions, daily, Monday-Friday, approximately 15-30 minutes per day. We would use daily image guidance and IMRT in an effort to reduce incidence of late toxicities as per RTOG 0630 (Cerda et al., JCO 2015).    All questions answered.  The patient proceeded with CT simulation.     Patient was seen and discussed with my attending physician, Dr. Valle.    José Luis Moser MD, MS PGY-2  Radiation Oncology       Physician Attestation   I, Sydney Valle, saw this patient and agree with the findings and plan of care as documented in the note.   I was present for key portions of the history and physical exam.  Briefly, this is a 52-year-old man with biopsy-proven high-grade sarcoma of the right posterior thigh.  He has now status post 1 cycle of Doxil ifosfamide with evidence of disease progression.  As such, we are proceeding with preoperative radiotherapy.  He has a PET/CT scheduled on 3/15/2023.  He would not initiate therapy prior to this PET/CT showed therapy progression outside of the target area.  We did call to ascertain if this PET/CT could be moved up, but the timing is restricted by his insurance.  Informed consent was reviewed and CT simulation for radiation planning was performed.  He will be treated in 25 fractions to 50 [place], without concurrent chemotherapy.    We appreciate the opportunity to participate in Mr. Canseco's care. He was encouraged to contact me  with questions or concerns should they arise.    I personally reviewed the available MRI images.  I personally discussed his case with his medical and surgical oncologist.  Laboratory data and pathology as noted above was reviewed. I reviewed previous medical records, which are summarized in the HPI. A total of 40 minutes were spent (including face to face time) during this visit, and more than 50% of the time was spent in counseling and coordination of care.     Sydney Valle MD MPH PhD    Department of Radiation Oncology

## 2023-02-28 NOTE — PROGRESS NOTES
Radiation Therapy Patient Education    Person involved with teaching: Patient    Patient educational needs for self management of treatment-related side effects assessment completed.  [facility] Patient Ed tab contains Patient Learning Assessment    Education Materials Given  Sim handout    Educational Topics Discussed  Side effects expected, Pain management, Activity and When to call MD/RN    Response To Teaching  Verbalizes understanding    GYN Only  Vaginal Dilator-given and educated: N/A    Referrals sent: None    Chemotherapy?  Yes, Doxil with Benoit

## 2023-02-28 NOTE — TELEPHONE ENCOUNTER
Spouse stating that line is sluggish at times.  Can you please enter orders in Epic for TPA to be administered as needed.     Thank you, Bernardino BAI

## 2023-02-28 NOTE — LETTER
2/28/2023     RE: Antonio Canseco  [address] [facility] [address]    Dear Colleague,    Thank you for referring your patient, Antonio Canseco, to the [facility] RADIATION ONCOLOGY. Please see a copy of my visit note below.    Radiation Therapy Patient Education    Person involved with teaching: Patient    Patient educational needs for self management of treatment-related side effects assessment completed.  EPIC Patient Ed tab contains Patient Learning Assessment    Education Materials Given  Sim handout    Educational Topics Discussed  Side effects expected, Pain management, Activity and When to call MD/RN    Response To Teaching  Verbalizes understanding    GYN Only  Vaginal Dilator-given and educated: N/A    Referrals sent: None    Chemotherapy?  Yes, Doxil with [facility]oit    Again, thank you for allowing me to participate in the care of your patient.      Sincerely,    Sydney Valle

## 2023-02-28 NOTE — LETTER
2/28/2023         RE: Antonio Canseco  [address] [facility] [address]        Dear Colleague,    Thank you for referring your patient, Antonio Canseco, to the [facility] [place]. Please see a copy of my visit note below.    Antonio was seen today to confirm the plans to move forward with radiation therapy in the setting of enlargement of the tumor despite chemotherapy.  His case was discussed at our conference today.  All his questions were answered.  He will proceed I be treated by our radiation team.    Impression: Chemotherapy will be discontinued he will commence radiation treatments.  Following radiation we will plan surgical excision.    Plan: 1.  I need to see him back 1 week after his radiation is completed.  2.  He will need an MRI scan at the time of that visit.  This would be of the entire thigh.      Zach Salgado MD

## 2023-03-01 DIAGNOSIS — C49.9 UNDIFFERENTIATED PLEOMORPHIC SARCOMA (H): Primary | ICD-10-CM

## 2023-03-07 DIAGNOSIS — E83.39 HYPOPHOSPHATEMIA: ICD-10-CM

## 2023-03-07 NOTE — PATIENT INSTRUCTIONS
Impression: Chemotherapy will be discontinued he will commence radiation treatments.  Following radiation we will plan surgical excision.    Plan: 1.  I need to see him back 1 week after his radiation is completed.  2.  He will need an MRI scan at the time of that visit.  This would be of the entire thigh.

## 2023-03-07 NOTE — PROGRESS NOTES
Antonio was seen today to confirm the plans to move forward with radiation therapy in the setting of enlargement of the tumor despite chemotherapy.  His case was discussed at our conference today.  All his questions were answered.  He will proceed I be treated by our radiation team.    Impression: Chemotherapy will be discontinued he will commence radiation treatments.  Following radiation we will plan surgical excision.    Plan: 1.  I need to see him back 1 week after his radiation is completed.  2.  He will need an MRI scan at the time of that visit.  This would be of the entire thigh.

## 2023-03-07 NOTE — TELEPHONE ENCOUNTER
Phosphorus Tablets Refill   Last prescribing provider: Amber Scheierl    Last clinic visit date: 2/21/23 Dr Price     Recommendations for requested medication (if none, N/A): Copied from chart note 2/21/23 Dr Price     phosphorus tablet 250 mg 250 MG per tablet Take 2 tablets (500 mg) by mouth 3 times daily Take until directed otherwise 84 tablet 1       Any other pertinent information (if none, N/A): N/A    Refilled: Y/N, if NO, why?

## 2023-03-09 DIAGNOSIS — C49.9 SARCOMA OF SOFT TISSUE (H): Primary | ICD-10-CM

## 2023-03-10 ENCOUNTER — MYC REFILL (OUTPATIENT)
Dept: ONCOLOGY | Facility: CLINIC | Age: 53
End: 2023-03-10
Payer: COMMERCIAL

## 2023-03-10 ENCOUNTER — TELEPHONE (OUTPATIENT)
Dept: ORTHOPEDICS | Facility: CLINIC | Age: 53
End: 2023-03-10
Payer: COMMERCIAL

## 2023-03-10 DIAGNOSIS — C49.9 UNDIFFERENTIATED PLEOMORPHIC SARCOMA (H): ICD-10-CM

## 2023-03-10 NOTE — TELEPHONE ENCOUNTER
I extended the expiration date on their MRI order and called the patient to let them know that they should be able to schedule it now. I let them know to schedule the MRI for after radiation is complete.    Mathieu Eason, EMT

## 2023-03-10 NOTE — TELEPHONE ENCOUNTER
Narcotic Refill Request    Medication(s) requested:  Hydromorphone 2mg tab  Person Requesting Refill: Antonio  What pain is the medication treating: cancer pain  How is the medication being taken?: 1-2 tabs/day   Does pt have enough for today? Pt reports he has 6 tabs left  Is pain being adequately controlled on the current regimen?: yes  Experiencing any side effects from medication?: constipation- taking Senna 1 tab/day    Date of most recent appointment:  2/21/23 w/ Dr. Laboy  Next appointment:   3/28/23  Last fill date and by whom:  90 tabs on 2/21/23 by Dr. Laboy; also lists 24 tabs filled on 2/16/23 by Hallie Whiting   Reviewed:  no    Routed provider: Dr. Laboy

## 2023-03-10 NOTE — TELEPHONE ENCOUNTER
[facility] Call Center    Phone Message    May a detailed message be left on voicemail: yes     Reason for Call: Other: Pt recvd a call from imaging to schedule and it was an order put in by Meliza Sanches . But pt thought he didn't need this until after radiation which is tentatively scheduled through  the orders will  by then. Pt is very confused please call to clarify      Action Taken: Other: ortho csc    Travel Screening: Not Applicable

## 2023-03-12 DIAGNOSIS — I10 ESSENTIAL HYPERTENSION: ICD-10-CM

## 2023-03-12 DIAGNOSIS — K58.9 IRRITABLE BOWEL SYNDROME WITHOUT DIARRHEA: ICD-10-CM

## 2023-03-13 ENCOUNTER — APPOINTMENT (OUTPATIENT)
Dept: INTERVENTIONAL RADIOLOGY/VASCULAR | Facility: CLINIC | Age: 53
End: 2023-03-13
Attending: STUDENT IN AN ORGANIZED HEALTH CARE EDUCATION/TRAINING PROGRAM
Payer: COMMERCIAL

## 2023-03-13 ENCOUNTER — OFFICE VISIT (OUTPATIENT)
Dept: UROLOGY | Facility: CLINIC | Age: 53
End: 2023-03-13
Payer: COMMERCIAL

## 2023-03-13 ENCOUNTER — VIRTUAL VISIT (OUTPATIENT)
Dept: EDUCATION SERVICES | Facility: CLINIC | Age: 53
End: 2023-03-13
Payer: COMMERCIAL

## 2023-03-13 ENCOUNTER — HOSPITAL ENCOUNTER (OUTPATIENT)
Facility: CLINIC | Age: 53
Discharge: HOME | End: 2023-03-13
Attending: STUDENT IN AN ORGANIZED HEALTH CARE EDUCATION/TRAINING PROGRAM | Admitting: RADIOLOGY
Payer: COMMERCIAL

## 2023-03-13 VITALS
BODY MASS INDEX: 39.2 KG/M2 | HEIGHT: 71 IN | SYSTOLIC BLOOD PRESSURE: 132 MMHG | WEIGHT: 280 LBS | DIASTOLIC BLOOD PRESSURE: 90 MMHG

## 2023-03-13 DIAGNOSIS — R31.0 GROSS HEMATURIA: Primary | ICD-10-CM

## 2023-03-13 DIAGNOSIS — C49.9 UNDIFFERENTIATED PLEOMORPHIC SARCOMA (H): ICD-10-CM

## 2023-03-13 DIAGNOSIS — E11.65 UNCONTROLLED TYPE 2 DIABETES MELLITUS WITH HYPERGLYCEMIA, WITHOUT LONG-TERM CURRENT USE OF INSULIN (H): Primary | ICD-10-CM

## 2023-03-13 LAB
ALBUMIN UR-MCNC: 30 MG/DL
APPEARANCE UR: CLEAR
BILIRUB UR QL STRIP: NEGATIVE
COLOR UR AUTO: YELLOW
GLUCOSE UR STRIP-MCNC: NEGATIVE MG/DL
HGB UR QL STRIP: ABNORMAL
KETONES UR STRIP-MCNC: NEGATIVE MG/DL
LEUKOCYTE ESTERASE UR QL STRIP: NEGATIVE
NITRATE UR QL: NEGATIVE
PH UR STRIP: 5.5 [PH] (ref 5–7)
SP GR UR STRIP: 1.02 (ref 1–1.03)
UROBILINOGEN UR STRIP-ACNC: 1 E.U./DL

## 2023-03-13 PROCEDURE — 81003 URINALYSIS AUTO W/O SCOPE: CPT | Mod: QW | Performed by: STUDENT IN AN ORGANIZED HEALTH CARE EDUCATION/TRAINING PROGRAM

## 2023-03-13 PROCEDURE — 98968 PH1 ASSMT&MGMT NQHP 21-30: CPT | Mod: 93

## 2023-03-13 PROCEDURE — 52000 CYSTOURETHROSCOPY: CPT | Performed by: STUDENT IN AN ORGANIZED HEALTH CARE EDUCATION/TRAINING PROGRAM

## 2023-03-13 PROCEDURE — 250N000009 HC RX 250

## 2023-03-13 PROCEDURE — 36589 REMOVAL TUNNELED CV CATH: CPT

## 2023-03-13 RX ORDER — DULOXETIN HYDROCHLORIDE 60 MG/1
60 CAPSULE, DELAYED RELEASE ORAL DAILY
Qty: 90 CAPSULE | Refills: 1 | Status: SHIPPED | OUTPATIENT
Start: 2023-03-13 | End: 2023-06-23

## 2023-03-13 RX ORDER — HYDROMORPHONE HYDROCHLORIDE 2 MG/1
2 TABLET ORAL EVERY 6 HOURS PRN
Qty: 90 TABLET | Refills: 0 | Status: SHIPPED | OUTPATIENT
Start: 2023-03-13 | End: 2023-03-29

## 2023-03-13 RX ORDER — LIDOCAINE HYDROCHLORIDE 20 MG/ML
JELLY TOPICAL ONCE
Status: COMPLETED | OUTPATIENT
Start: 2023-03-13 | End: 2023-03-13

## 2023-03-13 RX ORDER — LIDOCAINE HYDROCHLORIDE 10 MG/ML
INJECTION, SOLUTION EPIDURAL; INFILTRATION; INTRACAUDAL; PERINEURAL
Status: COMPLETED
Start: 2023-03-13 | End: 2023-03-13

## 2023-03-13 RX ORDER — LISINOPRIL 20 MG/1
20 TABLET ORAL DAILY
Qty: 90 TABLET | Refills: 1 | Status: SHIPPED | OUTPATIENT
Start: 2023-03-13 | End: 2023-04-28

## 2023-03-13 RX ADMIN — LIDOCAINE HYDROCHLORIDE 100 MG: 10 INJECTION, SOLUTION EPIDURAL; INFILTRATION; INTRACAUDAL; PERINEURAL at 08:33

## 2023-03-13 RX ADMIN — LIDOCAINE HYDROCHLORIDE 5 ML: 20 JELLY TOPICAL at 14:41

## 2023-03-13 ASSESSMENT — ACTIVITIES OF DAILY LIVING (ADL)
ADLS_ACUITY_SCORE: 35
ADLS_ACUITY_SCORE: 35

## 2023-03-13 ASSESSMENT — PAIN SCALES - GENERAL: PAINLEVEL: SEVERE PAIN (6)

## 2023-03-13 NOTE — LETTER
3/13/2023         RE: Antonio Canseco  [address] [facility] [address]        Dear Colleague,    Thank you for referring your patient, Antonio Canseco, to the [facility]. Please see a copy of my visit note below.    Diabetes Self-Management Education & Support    Presents for: Follow-up    Type of Service: Telephone Visit    Originating Location (Patient Location): Home  Distant Location (Provider Location): Home  Mode of Communication:  Telephone    Telephone Visit Start Time: 1230  Telephone Visit End Time (telephone visit stop time): 100 PM    How would patient like to obtain AVS? MyChart    ASSESSMENT:  Antonio states he has been using the Marty 3 CGM. No questions or concerns with using it today. Marty reports below reviewed with him today. He is 97% in target with a GMI of 6.3%, positive reinforcement given!    Antonio states he noticed a spike after he ate cereal. Also reports his appetite has been changing with radiation and certain foods aren't tasting the same or tasting good to him anymore. Discussed adding protein to breakfast and/or snacks and reviewed examples.  Antonio also states he will have Glucerna 1-2 times per week if he doesn't feel like eating a meal. Discussed beverage alternatives to water and reviewed examples.     Antonio reports he is tolerating taking 3 pills of Metformin.    Patient's most recent   Lab Results   Component Value Date    A1C 11.7 01/18/2023    A1C 6.6 02/11/2021     is not meeting goal of <7.0   However, he is for the last 2 weeks, based on his GMI, below.    Diabetes knowledge and skills assessment:   Patient is knowledgeable in diabetes management concepts related to: Monitoring, Taking Medication, Problem Solving and Reducing Risks    Continue education with the following diabetes management concepts: Healthy Eating, Monitoring, Problem Solving, Reducing Risks and Healthy Coping    Based on learning assessment above, most appropriate  "setting for further diabetes education would be: Individual setting.      PLAN    Enc to continue taking the Metformin as prescribed.  Continue using Marty 3 for monitoring glucose    Continue to follow healthy eating concepts as discussed.  Topics to cover at upcoming visits: Healthy Eating, Monitoring, Taking Medication, Problem Solving, Reducing Risks and Healthy Coping    Follow-up: 23    See Care Plan for co-developed, patient-state behavior change goals.    Education Materials Provided:  No new materials provided today      SUBJECTIVE/OBJECTIVE:  Presents for: Follow-up  Accompanied by: Self  Focus of Visit: Monitoring, Reducing Risks, Healthy Eating  Diabetes type: Type 2  Date of diagnosis: a couple years ago  Disease course: Worsening  How confident are you filling out medical forms by yourself:: Quite a bit  Diabetes management related comments/concerns: Getting used to everything now  Transportation concerns: No  Difficulty affording diabetes medication?: No  Difficulty affording diabetes testing supplies?: No (using  strips)  Other concerns:: None  Cultural Influences/Ethnic Background:  Not  or       Diabetes Symptoms & Complications:  Fatigue: Yes (\"On a lot of pain meds\")  Polydipsia: Yes  Polyuria: Yes (At night)  Visual change: No  Slow healing wounds: No  Symptom course: Improving  Weight trend: Decreasing (Has lost 60 pounds since 2022)  Complications assessed today?: No    Patient Problem List and Family Medical History reviewed for relevant medical history, current medical status, and diabetes risk factors.    Vitals:  There were no vitals taken for this visit.  Estimated body mass index is 40.13 kg/m  as calculated from the following:    Height as of 23: 1.803 m (5' 10.98\").    Weight as of 23: 130.5 kg (287 lb 9.6 oz).   Last 3 BP:   BP Readings from Last 3 Encounters:   23 128/85   23 131/65   23 (!) 145/89       History   Smoking " "Status     Former     Packs/day: 1.00     Years: 25.00     Types: Cigarettes     Start date: 4/23/1988     Quit date: 4/23/2013   Smokeless Tobacco     Never       Labs:  Lab Results   Component Value Date    A1C 11.7 01/18/2023    A1C 6.6 02/11/2021     Lab Results   Component Value Date     02/24/2023     02/16/2023     04/24/2022     02/11/2021     Lab Results   Component Value Date    LDL 74 01/18/2023    LDL 49 02/11/2021     HDL Cholesterol   Date Value Ref Range Status   02/11/2021 46 >39 mg/dL Final     Direct Measure HDL   Date Value Ref Range Status   01/18/2023 38 (L) >=40 mg/dL Final   ]  GFR Estimate   Date Value Ref Range Status   02/24/2023 78 >60 mL/min/1.73m2 Final     Comment:     eGFR calculated using 2021 CKD-EPI equation.   02/11/2021 >90 >60 mL/min/[1.73_m2] Final     Comment:     Non  GFR Calc  Starting 12/18/2018, serum creatinine based estimated GFR (eGFR) will be   calculated using the Chronic Kidney Disease Epidemiology Collaboration   (CKD-EPI) equation.       GFR Estimate If Black   Date Value Ref Range Status   02/11/2021 >90 >60 mL/min/[1.73_m2] Final     Comment:      GFR Calc  Starting 12/18/2018, serum creatinine based estimated GFR (eGFR) will be   calculated using the Chronic Kidney Disease Epidemiology Collaboration   (CKD-EPI) equation.       Lab Results   Component Value Date    CR 1.13 02/24/2023    CR 0.94 02/11/2021     No results found for: MICROALBUMIN    Healthy Eating:  Healthy Eating Assessed Today: Yes  Cultural/Pentecostalism diet restrictions?: No  Meal planning/habits: Smaller portions, Low carb  How many times a week on average do you eat food made away from home (restaurant/take-out)?: 1  Meals include: Breakfast, Lunch, Dinner  Breakfast: 9 AM: Glucerna shake or banana, coffee- black or water  Lunch: 1 PM: \"hit or miss, not too hungry lately.\" Possibly turkey sandwich, water or unsweetened ice tea  Dinner: 6 " PM: [place] and mixed vegetables OR pork roast with vegetables, 10-12 oz skim milk  Snacks: pistachios- mid afternoon  Beverages: Water, Coffee, Tea  Has patient met with a dietitian in the past?: Yes    Being Active:  Being Active Assessed Today: No  Exercise:: Currently not exercising  Barrier to exercise: None, Physical limitation (pain secondary to tumor)    Monitoring:  Monitoring Assessed Today: Yes  Did patient bring glucose meter to appointment? : Yes  Blood Glucose Meter: Accu-chek  Times checking blood sugar at home (number): 3  Times checking blood sugar at home (per): Day  Blood glucose trend: Decreasing (100-150 mg/dL per pt)           Taking Medications:  Diabetes Medication(s)     Biguanides       metFORMIN (GLUCOPHAGE XR) 500 MG 24 hr tablet    Take 3 tablets (1,500 mg) by mouth daily (with dinner)          Taking Medication Assessed Today: Yes  Current Treatments: Diet, Oral Medication (taken by mouth)  Problems taking diabetes medications regularly?: No  Diabetes medication side effects?: No    Problem Solving:  Problem Solving Assessed Today: Yes  Is the patient at risk for hypoglycemia?: No  Is the patient at risk for DKA?: No    Reducing Risks:  Reducing Risks Assessed Today: Yes  Diabetes Risks: Age over 45 years, Sedentary Lifestyle    Healthy Coping:  Healthy Coping Assessed Today: Yes  Emotional response to diabetes: Ready to learn  Informal Support system:: Spouse  Stage of change: ACTION (Actively working towards change)  Patient Activation Measure Survey Score:  SARAH Score (Last Two) 3/23/2012 2/4/2021   SARAH Raw Score 45 36   Activation Score 73.1 75.5   SARAH Level 4 4         Care Plan and Education Provided:  Care Plan: Diabetes   Updates made by Shanel Carroll since 3/13/2023 12:00 AM      Problem: Diabetes Self-Management Education Needed to Optimize Self-Care Behaviors       Goal: Healthy Eating - follow a healthy eating pattern for diabetes       Task: Provide education on portion  control and consistency in amount, composition and timing of food intake Completed 3/13/2023   Responsible User: Shanel Carroll      Goal: Monitoring - monitor glucose and ketones as directed       Task: Provide education on continuous glucose monitoring (sensor placement, use of sean or /reader, understanding glucose trends, alerts and alarms, differences between sensor glucose and blood glucose) Completed 3/13/2023   Responsible User: Shanel Carroll      Goal: Taking Medication - patient is consistently taking medications as directed    Start Date: 2/23/2023   This Visit's Progress: 100%   Recent Progress: 0%   Note:    Increase Metformin XR to 1500 mg after evening meal         Shanel Carroll RDN, LD, CDCES    Time Spent: 30 minutes  Encounter Type: Individual    Any diabetes medication dose changes were made via the CDE Protocol per the patient's referring provider. A copy of this encounter was shared with the provider.

## 2023-03-13 NOTE — NURSING NOTE
Chief Complaint   Patient presents with     Gross Hematuria     Pt here for in office cystoscopy     Prior to the start of the procedure and with procedural staff participation, I verbally confirmed the patient s identity using two indicators, relevant allergies, that the procedure was appropriate and matched the consent or emergent situation, and that the correct equipment/implants were available. Immediately prior to starting the procedure I conducted the Time Out with the procedural staff and re-confirmed the patient s name, procedure, and site/side. I have wiped the patient off with the povidone-Iodine solution, draped them,  used Lidocaine hydrochloride jelly, and instilled sterile water into the bladder. (The Joint Commission universal protocol was followed.)  Yes    Sedation (Moderate or Deep): None    5mL 2% lidocaine hydrochloride Urojet instilled into urethra.    NDC# [address]-5  Lot #: [id number]  Expiration Date:  09/24    Ana Maria Rodriguez CMA

## 2023-03-13 NOTE — TELEPHONE ENCOUNTER
Routing refill request to provider for review/approval because:  Lisinopril- Drug not active on patient's medication list    Shayna Olivo RN

## 2023-03-13 NOTE — PROGRESS NOTES
Diabetes Self-Management Education & Support    Presents for: Follow-up    Type of Service: Telephone Visit    Originating Location (Patient Location): Home  Distant Location (Provider Location): Home  Mode of Communication:  Telephone    Telephone Visit Start Time: 1230  Telephone Visit End Time (telephone visit stop time): 100 PM    How would patient like to obtain AVS? MyChart    ASSESSMENT:  Antonio states he has been using the Marty 3 CGM. No questions or concerns with using it today. Marty reports below reviewed with him today. He is 97% in target with a GMI of 6.3%, positive reinforcement given!    Antonio states he noticed a spike after he ate cereal. Also reports his appetite has been changing with radiation and certain foods aren't tasting the same or tasting good to him anymore. Discussed adding protein to breakfast and/or snacks and reviewed examples.  Antonio also states he will have Glucerna 1-2 times per week if he doesn't feel like eating a meal. Discussed beverage alternatives to water and reviewed examples.     Antonio reports he is tolerating taking 3 pills of Metformin.    Patient's most recent   Lab Results   Component Value Date    A1C 11.7 01/18/2023    A1C 6.6 02/11/2021     is not meeting goal of <7.0   However, he is for the last 2 weeks, based on his GMI, below.    Diabetes knowledge and skills assessment:   Patient is knowledgeable in diabetes management concepts related to: Monitoring, Taking Medication, Problem Solving and Reducing Risks    Continue education with the following diabetes management concepts: Healthy Eating, Monitoring, Problem Solving, Reducing Risks and Healthy Coping    Based on learning assessment above, most appropriate setting for further diabetes education would be: Individual setting.      PLAN    Enc to continue taking the Metformin as prescribed.  Continue using Marty 3 for monitoring glucose    Continue to follow healthy eating concepts as discussed.  Topics to cover  "at upcoming visits: Healthy Eating, Monitoring, Taking Medication, Problem Solving, Reducing Risks and Healthy Coping    Follow-up: 23    See Care Plan for co-developed, patient-state behavior change goals.    Education Materials Provided:  No new materials provided today      SUBJECTIVE/OBJECTIVE:  Presents for: Follow-up  Accompanied by: Self  Focus of Visit: Monitoring, Reducing Risks, Healthy Eating  Diabetes type: Type 2  Date of diagnosis: a couple years ago  Disease course: Worsening  How confident are you filling out medical forms by yourself:: Quite a bit  Diabetes management related comments/concerns: Getting used to everything now  Transportation concerns: No  Difficulty affording diabetes medication?: No  Difficulty affording diabetes testing supplies?: No (using  strips)  Other concerns:: None  Cultural Influences/Ethnic Background:  Not  or       Diabetes Symptoms & Complications:  Fatigue: Yes (\"On a lot of pain meds\")  Polydipsia: Yes  Polyuria: Yes (At night)  Visual change: No  Slow healing wounds: No  Symptom course: Improving  Weight trend: Decreasing (Has lost 60 pounds since 2022)  Complications assessed today?: No    Patient Problem List and Family Medical History reviewed for relevant medical history, current medical status, and diabetes risk factors.    Vitals:  There were no vitals taken for this visit.  Estimated body mass index is 40.13 kg/m  as calculated from the following:    Height as of 23: 1.803 m (5' 10.98\").    Weight as of 23: 130.5 kg (287 lb 9.6 oz).   Last 3 BP:   BP Readings from Last 3 Encounters:   23 128/85   23 131/65   23 (!) 145/89       History   Smoking Status     Former     Packs/day: 1.00     Years: 25.00     Types: Cigarettes     Start date: 1988     Quit date: 2013   Smokeless Tobacco     Never       Labs:  Lab Results   Component Value Date    A1C 11.7 2023    A1C 6.6 2021     Lab " "Results   Component Value Date     02/24/2023     02/16/2023     04/24/2022     02/11/2021     Lab Results   Component Value Date    LDL 74 01/18/2023    LDL 49 02/11/2021     HDL Cholesterol   Date Value Ref Range Status   02/11/2021 46 >39 mg/dL Final     Direct Measure HDL   Date Value Ref Range Status   01/18/2023 38 (L) >=40 mg/dL Final   ]  GFR Estimate   Date Value Ref Range Status   02/24/2023 78 >60 mL/min/1.73m2 Final     Comment:     eGFR calculated using 2021 CKD-EPI equation.   02/11/2021 >90 >60 mL/min/[1.73_m2] Final     Comment:     Non  GFR Calc  Starting 12/18/2018, serum creatinine based estimated GFR (eGFR) will be   calculated using the Chronic Kidney Disease Epidemiology Collaboration   (CKD-EPI) equation.       GFR Estimate If Black   Date Value Ref Range Status   02/11/2021 >90 >60 mL/min/[1.73_m2] Final     Comment:      GFR Calc  Starting 12/18/2018, serum creatinine based estimated GFR (eGFR) will be   calculated using the Chronic Kidney Disease Epidemiology Collaboration   (CKD-EPI) equation.       Lab Results   Component Value Date    CR 1.13 02/24/2023    CR 0.94 02/11/2021     No results found for: MICROALBUMIN    Healthy Eating:  Healthy Eating Assessed Today: Yes  Cultural/Denominational diet restrictions?: No  Meal planning/habits: Smaller portions, Low carb  How many times a week on average do you eat food made away from home (restaurant/take-out)?: 1  Meals include: Breakfast, Lunch, Dinner  Breakfast: 9 AM: Glucerna shake or banana, coffee- black or water  Lunch: 1 PM: \"hit or miss, not too hungry lately.\" Possibly turkey sandwich, water or unsweetened ice tea  Dinner: 6 PM: [place] and mixed vegetables OR pork roast with vegetables, 10-12 oz skim milk  Snacks: pistachios- mid afternoon  Beverages: Water, Coffee, Tea  Has patient met with a dietitian in the past?: Yes    Being Active:  Being Active Assessed Today: " No  Exercise:: Currently not exercising  Barrier to exercise: None, Physical limitation (pain secondary to tumor)    Monitoring:  Monitoring Assessed Today: Yes  Did patient bring glucose meter to appointment? : Yes  Blood Glucose Meter: Accu-chek  Times checking blood sugar at home (number): 3  Times checking blood sugar at home (per): Day  Blood glucose trend: Decreasing (100-150 mg/dL per pt)           Taking Medications:  Diabetes Medication(s)     Biguanides       metFORMIN (GLUCOPHAGE XR) 500 MG 24 hr tablet    Take 3 tablets (1,500 mg) by mouth daily (with dinner)          Taking Medication Assessed Today: Yes  Current Treatments: Diet, Oral Medication (taken by mouth)  Problems taking diabetes medications regularly?: No  Diabetes medication side effects?: No    Problem Solving:  Problem Solving Assessed Today: Yes  Is the patient at risk for hypoglycemia?: No  Is the patient at risk for DKA?: No    Reducing Risks:  Reducing Risks Assessed Today: Yes  Diabetes Risks: Age over 45 years, Sedentary Lifestyle    Healthy Coping:  Healthy Coping Assessed Today: Yes  Emotional response to diabetes: Ready to learn  Informal Support system:: Spouse  Stage of change: ACTION (Actively working towards change)  Patient Activation Measure Survey Score:  SARAH Score (Last Two) 3/23/2012 2/4/2021   SARAH Raw Score 45 36   Activation Score 73.1 75.5   SARAH Level 4 4         Care Plan and Education Provided:  Care Plan: Diabetes   Updates made by Shanel Carroll since 3/13/2023 12:00 AM      Problem: Diabetes Self-Management Education Needed to Optimize Self-Care Behaviors       Goal: Healthy Eating - follow a healthy eating pattern for diabetes       Task: Provide education on portion control and consistency in amount, composition and timing of food intake Completed 3/13/2023   Responsible User: Shanel Carroll      Goal: Monitoring - monitor glucose and ketones as directed       Task: Provide education on continuous glucose  monitoring (sensor placement, use of sean or /reader, understanding glucose trends, alerts and alarms, differences between sensor glucose and blood glucose) Completed 3/13/2023   Responsible User: Shanel Carroll      Goal: Taking Medication - patient is consistently taking medications as directed    Start Date: 2/23/2023   This Visit's Progress: 100%   Recent Progress: 0%   Note:    Increase Metformin XR to 1500 mg after evening meal         Shanel Carroll RDN, LD, [facility]    Time Spent: 30 minutes  Encounter Type: Individual    Any diabetes medication dose changes were made via the CDE Protocol per the patient's referring provider. A copy of this encounter was shared with the provider.

## 2023-03-13 NOTE — PATIENT INSTRUCTIONS
"AFTER YOUR CYSTOSCOPY  ?  ?  You have just completed a cystoscopy, or \"cysto\", which allowed your physician to learn more about your bladder (or to remove a stent placed after surgery). We suggest that you continue to avoid caffeine, fruit juice, and alcohol for the next 24 hours, however, you are encouraged to return to your normal activities.  ?  ?  A few things that are considered normal after your cystoscopy:  ?  * small amount of bleeding (or spotting) that clears within the next 24 hours  ?  * slight burning sensation with urination  ?  * sensation of needing to void (urinate) more frequently  ?  * the feeling of \"air\" in your urine  ?  * mild discomfort that is relieved with Tylenol    * bladder spasms  ?  ?  ?  Please contact our office promptly if you:  ?  * develop a fever above 101 degrees  ?  * are unable to urinate  ?  * develop bright red blood that does not stop  ?  * experience severe pain or swelling  ?  ?  ?  And of course, please contact our office with any concerns or questions [phone].  ?   AFTER YOUR CYSTOSCOPY        You have just completed a cystoscopy, or \"cysto\", which allowed your physician to learn more about your bladder (or to remove a stent placed after surgery). We suggest that you continue to avoid caffeine, fruit juice, and alcohol for the next 24 hours, however, you are encouraged to return to your normal activities.         A few things that are considered normal after your cystoscopy:     * Small amount of bleeding (or spotting) that clears within the next 24 hours     * Slight burning sensation with urination     * Sensation to of needing to avoid more frequently     * The feeling of \"air\" in your urine     * Mild discomfort that is relieved with Tylenol        Please contact our office promptly if you:     * Develop a fever above 101 degrees     * Are unable to urinate     * Develop bright red blood that does not stop     * Severe pain or swelling         Please contact " our office with any concerns or questions @[facility].

## 2023-03-13 NOTE — IP AVS SNAPSHOT
[facility] Interventional Radiology  [address]  [facility] [address]  Phone: [phone]  Fax: [phone]                                    After Visit Summary   3/13/2023    Antonio Canseco   MRN: [id number]           After Visit Summary Signature Page    I have received my discharge instructions, and my questions have been answered. I have discussed any challenges I see with this plan with the nurse or doctor.    ..........................................................................................................................................  Patient/Patient Representative Signature      ..........................................................................................................................................  Patient Representative Print Name and Relationship to Patient    ..................................................               ................................................  Date                                   Time    ..........................................................................................................................................  Reviewed by Signature/Title    ...................................................              ..............................................  Date                                               Time          22EPIC Rev 08/18

## 2023-03-13 NOTE — PROGRESS NOTES
"CHIEF COMPLAINT   It was my pleasure to see Antonio Canseco who is a 52 year old male for follow-up of gross hematuria.      HPI:  Antonio Canseco is a 52 year old male being seen for follow-up and cystoscopy.  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 Ochoa  Currently does not have any hematuria  Previous history of gross hematuria  We will have no significant concerns at this point scheduled to start radiation therapy next week    Exam:  BP (!) 132/90   Ht 1.803 m (5' 11\")   Wt 127 kg (280 lb)   BMI 39.05 kg/m    General: age-appropriate appearing male in NAD sitting in an exam chair  Resp: no respiratory distress  CV: heart rate regular  Abdomen: Degree of obesity is moderate. Abdomen is soft and nontender. No organomegaly.   : Deferred to cystoscopy  Neuro: grossly non focal. Normal reflexes  Motor: excellent strength throughout                CYSTOSCOPY        Pre-procedure diagnosis: Gross hematuria  Post procedure diagnosis: normal cystoscopy  Procedure performed: cystoscopy  Surgeon: Alex Cartwirght MD  Anesthesia: local    Indications for procedure: Patient is a 52 year old year old male with a history of gross hematuria.  Here today for cysto    Description of procedure: After fully informed voluntary consent was obtained patient was brought into the procedure room, identified and placed in a supine position on the cysto table.  The groin/scrotum were prepped and draped in a sterile fashion with betadine.  A 15F flexible cystoscope was inserted into the urethra and the bladder and urethra examined in a systematic manner.  There were no tumor stones or diverticula.  Ureteric orifices were normal in position and number and effluxing clear urine.  The prostate was 3 cm long and did not show bilobar hypertrophy.  There was no " median lobe.  Distal urethra was normal.  The patient tolerated the procedure well and there were no complications.      Assessment/Plan: Patient with a history of hematuria with negative cystoscopy.   Please review the Detailed notes.  Review of Imaging:  The following imaging exams were independently viewed and interpreted by me and discussed with patient:  CT Scan Abd/Pelvis: Normal, no urological etiology for hematuria    Review of Labs:  The following labs were reviewed by me and discussed with the patient:  UA: Abnormal: Trace blood    Assessment & Plan     Gross hematuria  Possibly related to chemotherapy  Currently no gross hematuria on microscopic blood seen in the urine analysis next cystoscopy and CT are unremarkable  Recommend no further urological evaluation less hematuria recurs becomes worse    - UA without Microscopic [[id number]]; Future  - lidocaine (XYLOCAINE) 2 % external gel  - UA without Microscopic [[id number]]  - CYSTOURETHROSCOPY      Alex Cartwright MD  [facility] [place]      ==========================    Additional Billing and Coding Information:  Review of external notes as documented above   Review of the result(s) of each unique test - UA, CT abdomen pelvis    Independent interpretation of a test performed by another physician/other qualified health care professional (not separately reported) -       Discussion of management or test interpretation with external physician/other qualified healthcare professional/appropriate source -           10 minutes spent on the date of the encounter doing chart review, review of test results, interpretation of tests, patient visit and documentation apart from time spent at cystoscopy    ==========================

## 2023-03-13 NOTE — LETTER
"    3/13/2023         RE: Antonio Canseco  [address] [facility] [address]        Dear Colleague,    Thank you for referring your patient, Antonoi Canseco, to the [facility]. Please see a copy of my visit note below.            No data 9th and 10th... spike after breakfast yesterday.    Diabetes Self-Management Education & Support    Presents for: Follow-up    Type of Service: Telephone Visit    Originating Location (Patient Location): Home  Distant Location (Provider Location): Home  Mode of Communication:  Telephone    Telephone Visit Start Time: 1230  Telephone Visit End Time (telephone visit stop time): ***    How would patient like to obtain AVS? MyChart    ASSESSMENT:  ***    Patient's most recent   Lab Results   Component Value Date    A1C 11.7 01/18/2023    A1C 6.6 02/11/2021     {is/is not:566376::\"is\"} meeting goal of {A1C GOALS:919105}    Diabetes knowledge and skills assessment:   Patient is knowledgeable in diabetes management concepts related to: {AADE 7:996301}    Continue education with the following diabetes management concepts: {AADE 7:828046}    Based on learning assessment above, most appropriate setting for further diabetes education would be: {Visit Type:855818} setting.      PLAN    ***  Topics to cover at upcoming visits: {AADE 7:515016}    Follow-up: ***    See Care Plan for co-developed, patient-state behavior change goals.  AVS provided for patient today.    Education Materials Provided:  {CDE EDUCATION MATERIALS:735146}      SUBJECTIVE/OBJECTIVE:  Presents for: Follow-up  Accompanied by: Self  Focus of Visit: Monitoring, Reducing Risks, Healthy Eating  Diabetes type: Type 2  Date of diagnosis: a couple years ago  Disease course: Worsening  How confident are you filling out medical forms by yourself:: Quite a bit  Diabetes management related comments/concerns: Getting used to everything now  Transportation concerns: No  Difficulty affording diabetes " "medication?: No  Difficulty affording diabetes testing supplies?: No (using  strips)  Other concerns:: None  Cultural Influences/Ethnic Background:  Not  or   ***    Diabetes Symptoms & Complications:  Fatigue: Yes (\"On a lot of pain meds\")  Polydipsia: Yes  Polyuria: Yes (At night)  Visual change: No  Slow healing wounds: No  Symptom course: Improving  Weight trend: Decreasing (Has lost 60 pounds since 2022)  Complications assessed today?: No    Patient Problem List and Family Medical History reviewed for relevant medical history, current medical status, and diabetes risk factors.    Vitals:  There were no vitals taken for this visit.  Estimated body mass index is 40.13 kg/m  as calculated from the following:    Height as of 23: 1.803 m (5' 10.98\").    Weight as of 23: 130.5 kg (287 lb 9.6 oz).   Last 3 BP:   BP Readings from Last 3 Encounters:   23 128/85   23 131/65   23 (!) 145/89       History   Smoking Status     Former     Packs/day: 1.00     Years: 25.00     Types: Cigarettes     Start date: 1988     Quit date: 2013   Smokeless Tobacco     Never       Labs:  Lab Results   Component Value Date    A1C 11.7 2023    A1C 6.6 2021     Lab Results   Component Value Date     2023     2023     2022     2021     Lab Results   Component Value Date    LDL 74 2023    LDL 49 2021     HDL Cholesterol   Date Value Ref Range Status   2021 46 >39 mg/dL Final     Direct Measure HDL   Date Value Ref Range Status   2023 38 (L) >=40 mg/dL Final   ]  GFR Estimate   Date Value Ref Range Status   2023 78 >60 mL/min/1.73m2 Final     Comment:     eGFR calculated using  CKD-EPI equation.   2021 >90 >60 mL/min/[1.73_m2] Final     Comment:     Non  GFR Calc  Starting 2018, serum creatinine based estimated GFR (eGFR) will be   calculated using the " "Chronic Kidney Disease Epidemiology Collaboration   (CKD-EPI) equation.       GFR Estimate If Black   Date Value Ref Range Status   02/11/2021 >90 >60 mL/min/[1.73_m2] Final     Comment:      GFR Calc  Starting 12/18/2018, serum creatinine based estimated GFR (eGFR) will be   calculated using the Chronic Kidney Disease Epidemiology Collaboration   (CKD-EPI) equation.       Lab Results   Component Value Date    CR 1.13 02/24/2023    CR 0.94 02/11/2021     No results found for: MICROALBUMIN    Healthy Eating:  Healthy Eating Assessed Today: Yes  Cultural/Holiness diet restrictions?: No  Meal planning/habits: Smaller portions, Low carb  How many times a week on average do you eat food made away from home (restaurant/take-out)?: 1  Meals include: Breakfast, Lunch, Dinner  Breakfast: 9 AM: Glucerna shake or banana, coffee- black or water  Lunch: 1 PM: \"hit or miss, not too hungry lately.\" Possibly turkey sandwich, water or unsweetened ice tea  Dinner: 6 PM: [place] and mixed vegetables OR pork roast with vegetales, 10-12 oz skim milk  Snacks: pistachios- mid afternoon  Beverages: Water, Coffee, Tea  Has patient met with a dietitian in the past?: Yes    Being Active:  Being Active Assessed Today: No  Exercise:: Currently not exercising  Barrier to exercise: None, Physical limitation (pain secondary to tumor)    Monitoring:  Monitoring Assessed Today: Yes  Did patient bring glucose meter to appointment? : Yes  Blood Glucose Meter: Accu-chek  Times checking blood sugar at home (number): 3  Times checking blood sugar at home (per): Day  Blood glucose trend: Decreasing (100-150 mg/dL per pt)    ***    Taking Medications:  Diabetes Medication(s)     Biguanides       metFORMIN (GLUCOPHAGE XR) 500 MG 24 hr tablet    Take 3 tablets (1,500 mg) by mouth daily (with dinner)          Taking Medication Assessed Today: Yes  Current Treatments: Diet, Oral Medication (taken by mouth)  Problems taking diabetes medications " regularly?: No  Diabetes medication side effects?: No    Problem Solving:  Problem Solving Assessed Today: Yes  Is the patient at risk for hypoglycemia?: No  Is the patient at risk for DKA?: No              Reducing Risks:  Reducing Risks Assessed Today: Yes  Diabetes Risks: Age over 45 years, Sedentary Lifestyle    Healthy Coping:  Healthy Coping Assessed Today: Yes  Emotional response to diabetes: Ready to learn  Informal Support system:: Spouse  Stage of change: ACTION (Actively working towards change)  Patient Activation Measure Survey Score:  SARAH Score (Last Two) 3/23/2012 2/4/2021   SARAH Raw Score 45 36   Activation Score 73.1 75.5   SARAH Level 4 4         Care Plan and Education Provided:  {Care Plan and Eduction Provided:093731}    ***    Time Spent: {cde time spent:091028} minutes  Encounter Type: Individual    Any diabetes medication dose changes were made via the CDE Protocol per the patient's {diabetes education provider list:003951}. A copy of this encounter was shared with the provider.    Diabetes Self-Management Education & Support    Presents for: Follow-up    Type of Service: Telephone Visit    Originating Location (Patient Location): Home  Distant Location (Provider Location): Home  Mode of Communication:  Telephone    Telephone Visit Start Time: 1230  Telephone Visit End Time (telephone visit stop time): 100 PM    How would patient like to obtain AVS? MyChart    ASSESSMENT:  Antonio states he has been using the Marty 3 CGM. No questions or concerns with using it today. Marty reports below reviewed with him today. He is 97% in target with a GMI of 6.3%, positive reinforcement given!    Antonio states he noticed a spike after he ate cereal. Also reports his appetite has been changing with radiation and certain foods aren't tasting the same or tasting good to him anymore. Discussed adding protein to breakfast and/or snacks and reviewed examples.  Antonio also states he will have Glucerna 1-2 times per week  if he doesn't feel like eating a meal. Discussed beverage alternatives to water and reviewed examples.     Antonio reports he is tolerating taking 3 pills of Metformin.    Patient's most recent   Lab Results   Component Value Date    A1C 11.7 2023    A1C 6.6 2021     is not meeting goal of <7.0   However, he is for the last 2 weeks, based on his GMI, below.    Diabetes knowledge and skills assessment:   Patient is knowledgeable in diabetes management concepts related to: Monitoring, Taking Medication, Problem Solving and Reducing Risks    Continue education with the following diabetes management concepts: Healthy Eating, Monitoring, Problem Solving, Reducing Risks and Healthy Coping    Based on learning assessment above, most appropriate setting for further diabetes education would be: Individual setting.      PLAN    Enc to continue taking the Metformin as prescribed.  Continue using Marty 3 for monitoring glucose    Continue to follow healthy eating concepts as discussed.  Topics to cover at upcoming visits: Healthy Eating, Monitoring, Taking Medication, Problem Solving, Reducing Risks and Healthy Coping    Follow-up: 23    See Care Plan for co-developed, patient-state behavior change goals.    Education Materials Provided:  No new materials provided today      SUBJECTIVE/OBJECTIVE:  Presents for: Follow-up  Accompanied by: Self  Focus of Visit: Monitoring, Reducing Risks, Healthy Eating  Diabetes type: Type 2  Date of diagnosis: a couple years ago  Disease course: Worsening  How confident are you filling out medical forms by yourself:: Quite a bit  Diabetes management related comments/concerns: Getting used to everything now  Transportation concerns: No  Difficulty affording diabetes medication?: No  Difficulty affording diabetes testing supplies?: No (using  strips)  Other concerns:: None  Cultural Influences/Ethnic Background:  Not  or       Diabetes Symptoms &  "Complications:  Fatigue: Yes (\"On a lot of pain meds\")  Polydipsia: Yes  Polyuria: Yes (At night)  Visual change: No  Slow healing wounds: No  Symptom course: Improving  Weight trend: Decreasing (Has lost 60 pounds since March 2022)  Complications assessed today?: No    Patient Problem List and Family Medical History reviewed for relevant medical history, current medical status, and diabetes risk factors.    Vitals:  There were no vitals taken for this visit.  Estimated body mass index is 40.13 kg/m  as calculated from the following:    Height as of 2/7/23: 1.803 m (5' 10.98\").    Weight as of 2/28/23: 130.5 kg (287 lb 9.6 oz).   Last 3 BP:   BP Readings from Last 3 Encounters:   02/21/23 128/85   02/16/23 131/65   02/07/23 (!) 145/89       History   Smoking Status     Former     Packs/day: 1.00     Years: 25.00     Types: Cigarettes     Start date: 4/23/1988     Quit date: 4/23/2013   Smokeless Tobacco     Never       Labs:  Lab Results   Component Value Date    A1C 11.7 01/18/2023    A1C 6.6 02/11/2021     Lab Results   Component Value Date     02/24/2023     02/16/2023     04/24/2022     02/11/2021     Lab Results   Component Value Date    LDL 74 01/18/2023    LDL 49 02/11/2021     HDL Cholesterol   Date Value Ref Range Status   02/11/2021 46 >39 mg/dL Final     Direct Measure HDL   Date Value Ref Range Status   01/18/2023 38 (L) >=40 mg/dL Final   ]  GFR Estimate   Date Value Ref Range Status   02/24/2023 78 >60 mL/min/1.73m2 Final     Comment:     eGFR calculated using 2021 CKD-EPI equation.   02/11/2021 >90 >60 mL/min/[1.73_m2] Final     Comment:     Non  GFR Calc  Starting 12/18/2018, serum creatinine based estimated GFR (eGFR) will be   calculated using the Chronic Kidney Disease Epidemiology Collaboration   (CKD-EPI) equation.       GFR Estimate If Black   Date Value Ref Range Status   02/11/2021 >90 >60 mL/min/[1.73_m2] Final     Comment:      GFR " "Calc  Starting 12/18/2018, serum creatinine based estimated GFR (eGFR) will be   calculated using the Chronic Kidney Disease Epidemiology Collaboration   (CKD-EPI) equation.       Lab Results   Component Value Date    CR 1.13 02/24/2023    CR 0.94 02/11/2021     No results found for: MICROALBUMIN    Healthy Eating:  Healthy Eating Assessed Today: Yes  Cultural/Quaker diet restrictions?: No  Meal planning/habits: Smaller portions, Low carb  How many times a week on average do you eat food made away from home (restaurant/take-out)?: 1  Meals include: Breakfast, Lunch, Dinner  Breakfast: 9 AM: Glucerna shake or banana, coffee- black or water  Lunch: 1 PM: \"hit or miss, not too hungry lately.\" Possibly turkey sandwich, water or unsweetened ice tea  Dinner: 6 PM: [place] and mixed vegetables OR pork roast with vegetables, 10-12 oz skim milk  Snacks: pistachios- mid afternoon  Beverages: Water, Coffee, Tea  Has patient met with a dietitian in the past?: Yes    Being Active:  Being Active Assessed Today: No  Exercise:: Currently not exercising  Barrier to exercise: None, Physical limitation (pain secondary to tumor)    Monitoring:  Monitoring Assessed Today: Yes  Did patient bring glucose meter to appointment? : Yes  Blood Glucose Meter: Accu-chek  Times checking blood sugar at home (number): 3  Times checking blood sugar at home (per): Day  Blood glucose trend: Decreasing (100-150 mg/dL per pt)           Taking Medications:  Diabetes Medication(s)     Biguanides       metFORMIN (GLUCOPHAGE XR) 500 MG 24 hr tablet    Take 3 tablets (1,500 mg) by mouth daily (with dinner)          Taking Medication Assessed Today: Yes  Current Treatments: Diet, Oral Medication (taken by mouth)  Problems taking diabetes medications regularly?: No  Diabetes medication side effects?: No    Problem Solving:  Problem Solving Assessed Today: Yes  Is the patient at risk for hypoglycemia?: No  Is the patient at risk for DKA?: No    Reducing " Risks:  Reducing Risks Assessed Today: Yes  Diabetes Risks: Age over 45 years, Sedentary Lifestyle    Healthy Coping:  Healthy Coping Assessed Today: Yes  Emotional response to diabetes: Ready to learn  Informal Support system:: Spouse  Stage of change: ACTION (Actively working towards change)  Patient Activation Measure Survey Score:  SARAH Score (Last Two) 3/23/2012 2/4/2021   SARAH Raw Score 45 36   Activation Score 73.1 75.5   SARAH Level 4 4         Care Plan and Education Provided:  Care Plan: Diabetes   Updates made by Shanel Carroll since 3/13/2023 12:00 AM      Problem: Diabetes Self-Management Education Needed to Optimize Self-Care Behaviors       Goal: Healthy Eating - follow a healthy eating pattern for diabetes       Task: Provide education on portion control and consistency in amount, composition and timing of food intake Completed 3/13/2023   Responsible User: Shanel Carroll      Goal: Monitoring - monitor glucose and ketones as directed       Task: Provide education on continuous glucose monitoring (sensor placement, use of sean or /reader, understanding glucose trends, alerts and alarms, differences between sensor glucose and blood glucose) Completed 3/13/2023   Responsible User: Shanel Carroll      Goal: Taking Medication - patient is consistently taking medications as directed    Start Date: 2/23/2023   This Visit's Progress: 100%   Recent Progress: 0%   Note:    Increase Metformin XR to 1500 mg after evening meal         Shanel Carroll RDN, [facility], [facility]    Time Spent: 30 minutes  Encounter Type: Individual    Any diabetes medication dose changes were made via the CDE Protocol per the patient's referring provider. A copy of this encounter was shared with the provider.

## 2023-03-13 NOTE — DISCHARGE INSTRUCTIONS
Going Home after the   Removal of Tunneled Catheter  ______________________________________________________________________    Patient Name:  Antonio Canseco  Today's Date:  March 13, 2023    The doctor who removed your port or catheter was: Dr. Cm  at [facility]) in:    Interventional Radiology Department  When you get home:  You may go back to your regular diet today.   If you take aspirin or Plavix, you may begin taking it again tomorrow. You may restart all other medicines today. Use pain medicine as directed.  Avoid heavy lifting or the overuse of your shoulder for three days.    Port site and bandage care  Keep the wound clean and dry for three days. Cover it with plastic before taking a shower.   Change the bandage if it gets wet or dirty. Use bacitracin (antibiotic cream) and clean gauze.   After three days, you may use Band-Aids until the wound has healed.  If you have oozing or bleeding from the catheter (tube) site:   Put direct pressure on the wound for 5 to 10 minutes with a gauze pad.  If you still have bleeding after 10 minutes, call your doctor.  If you are bleeding a lot and can t control it with direct pressure, call 911.    Call your PCP if you have:  Swelling in your neck.  Signs of infection:   fever over 100  F (37.8 C) under the tongue  the wound is red, tender or draining.

## 2023-03-13 NOTE — LETTER
"3/13/2023       RE: Antonio Canseco  [address] [facility] [address]     Dear Colleague,    Thank you for referring your patient, Antonio Canseco, to the [facility] UROLOGY CLINIC [place] at [facility]. Please see a copy of my visit note below.    CHIEF COMPLAINT   It was my pleasure to see Antonio Canseco who is a 52 year old male for follow-up of gross hematuria.      HPI:  Antonio Canseco is a 52 year old male being seen for follow-up and cystoscopy.  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 Ochoa  Currently does not have any hematuria  Previous history of gross hematuria  We will have no significant concerns at this point scheduled to start radiation therapy next week    Exam:  BP (!) 132/90   Ht 1.803 m (5' 11\")   Wt 127 kg (280 lb)   BMI 39.05 kg/m    General: age-appropriate appearing male in NAD sitting in an exam chair  Resp: no respiratory distress  CV: heart rate regular  Abdomen: Degree of obesity is moderate. Abdomen is soft and nontender. No organomegaly.   : Deferred to cystoscopy  Neuro: grossly non focal. Normal reflexes  Motor: excellent strength throughout                CYSTOSCOPY        Pre-procedure diagnosis: Gross hematuria  Post procedure diagnosis: normal cystoscopy  Procedure performed: cystoscopy  Surgeon: Alex Cartwright MD  Anesthesia: local    Indications for procedure: Patient is a 52 year old year old male with a history of gross hematuria.  Here today for cysto    Description of procedure: After fully informed voluntary consent was obtained patient was brought into the procedure room, identified and placed in a supine position on the cysto table.  The groin/scrotum were prepped and draped in a sterile fashion with betadine.  A 15F " flexible cystoscope was inserted into the urethra and the bladder and urethra examined in a systematic manner.  There were no tumor stones or diverticula.  Ureteric orifices were normal in position and number and effluxing clear urine.  The prostate was 3 cm long and did not show bilobar hypertrophy.  There was no median lobe.  Distal urethra was normal.  The patient tolerated the procedure well and there were no complications.      Assessment/Plan: Patient with a history of hematuria with negative cystoscopy.   Please review the Detailed notes.  Review of Imaging:  The following imaging exams were independently viewed and interpreted by me and discussed with patient:  CT Scan Abd/Pelvis: Normal, no urological etiology for hematuria    Review of Labs:  The following labs were reviewed by me and discussed with the patient:  UA: Abnormal: Trace blood    Assessment & Plan     Gross hematuria  Possibly related to chemotherapy  Currently no gross hematuria on microscopic blood seen in the urine analysis next cystoscopy and CT are unremarkable  Recommend no further urological evaluation less hematuria recurs becomes worse    - UA without Microscopic [[id number]]; Future  - lidocaine (XYLOCAINE) 2 % external gel  - UA without Microscopic [[id number]]  - CYSTOURETHROSCOPY      Alex Cartwright MD  [facility] [place]      ==========================    Additional Billing and Coding Information:  Review of external notes as documented above   Review of the result(s) of each unique test - UA, CT abdomen pelvis    Independent interpretation of a test performed by another physician/other qualified health care professional (not separately reported) -       Discussion of management or test interpretation with external physician/other qualified healthcare professional/appropriate source -           10 minutes spent on the date of the encounter doing chart review, review of test results, interpretation of tests,  patient visit and documentation apart from time spent at cystoscopy

## 2023-03-14 ENCOUNTER — MYC MEDICAL ADVICE (OUTPATIENT)
Dept: FAMILY MEDICINE | Facility: CLINIC | Age: 53
End: 2023-03-14
Payer: COMMERCIAL

## 2023-03-14 ENCOUNTER — HOSPITAL ENCOUNTER (OUTPATIENT)
Dept: PET IMAGING | Facility: CLINIC | Age: 53
Discharge: HOME | End: 2023-03-14
Attending: STUDENT IN AN ORGANIZED HEALTH CARE EDUCATION/TRAINING PROGRAM | Admitting: STUDENT IN AN ORGANIZED HEALTH CARE EDUCATION/TRAINING PROGRAM
Payer: COMMERCIAL

## 2023-03-14 DIAGNOSIS — C49.9 UNDIFFERENTIATED PLEOMORPHIC SARCOMA (H): ICD-10-CM

## 2023-03-14 PROCEDURE — 343N000001 HC RX 343: Performed by: STUDENT IN AN ORGANIZED HEALTH CARE EDUCATION/TRAINING PROGRAM

## 2023-03-14 PROCEDURE — 78816 PET IMAGE W/CT FULL BODY: CPT | Mod: PI

## 2023-03-14 PROCEDURE — A9552 F18 FDG: HCPCS | Performed by: STUDENT IN AN ORGANIZED HEALTH CARE EDUCATION/TRAINING PROGRAM

## 2023-03-14 RX ADMIN — FLUDEOXYGLUCOSE F-18 14.38 MILLICURIE: 500 INJECTION, SOLUTION INTRAVENOUS at 14:21

## 2023-03-15 ENCOUNTER — APPOINTMENT (OUTPATIENT)
Dept: RADIATION ONCOLOGY | Facility: CLINIC | Age: 53
End: 2023-03-15
Attending: RADIOLOGY
Payer: COMMERCIAL

## 2023-03-15 VITALS
SYSTOLIC BLOOD PRESSURE: 145 MMHG | OXYGEN SATURATION: 98 % | HEART RATE: 60 BPM | BODY MASS INDEX: 39.25 KG/M2 | RESPIRATION RATE: 16 BRPM | DIASTOLIC BLOOD PRESSURE: 92 MMHG | WEIGHT: 281.4 LBS

## 2023-03-15 DIAGNOSIS — L29.9 ITCHING: Primary | ICD-10-CM

## 2023-03-15 DIAGNOSIS — D64.9 ANEMIA, UNSPECIFIED TYPE: ICD-10-CM

## 2023-03-15 DIAGNOSIS — C49.9 SARCOMA OF SOFT TISSUE (H): Primary | ICD-10-CM

## 2023-03-15 PROCEDURE — 77386 HC IMRT TREATMENT DELIVERY, COMPLEX: CPT | Performed by: RADIOLOGY

## 2023-03-15 RX ORDER — HYDROXYZINE HYDROCHLORIDE 10 MG/1
10 TABLET, FILM COATED ORAL 3 TIMES DAILY PRN
Qty: 45 TABLET | Refills: 0 | Status: SHIPPED | OUTPATIENT
Start: 2023-03-15 | End: 2023-03-30

## 2023-03-15 RX ORDER — FERROUS SULFATE 325(65) MG
TABLET ORAL
Qty: 30 TABLET | Refills: 1 | OUTPATIENT
Start: 2023-03-15

## 2023-03-15 ASSESSMENT — PAIN SCALES - GENERAL: PAINLEVEL: SEVERE PAIN (6)

## 2023-03-15 NOTE — LETTER
Date:March 23, 2023      Provider requested that no letter be sent. Do not send.       [facility]

## 2023-03-15 NOTE — LETTER
3/15/2023         RE: Antonio Canseco  [address] [facility] [id number]        Dear Colleague,    Thank you for referring your patient, Antonio Canseco, to the [facility] RADIATION ONCOLOGY. Please see a copy of my visit note below.    [facility] PHYSICIANS  SPECIALIZING IN BREAKTHROUGHS  Radiation Oncology    On Treatment Visit Note      Antonio Canseco      Date: 3/15/2023   MRN: [id number]   : 1970  Diagnosis: liposarcoma      Reason for Visit:  On Radiation Treatment Visit     Treatment Summary to Date  Treatment Site: R hamstring Current Dose: 200/5000 cGy Fractions:       Chemotherapy  Chemo concurrent with radx?: No    ED Visit/Hospital Admission: none    Treatment Breaks: none    Subjective:   Mr. Canseco presents for initiation of radiotherapy. He endorses severe pain in the right thigh, 7/10 at its worst, alleviated by morphine SR 30mg PO BID and dilaudid 2mg PO q6h prn pain which he is only taking once a day. Pain is exacerbated by sitting and standing and made better by laying down.     He also endorses and pruritic rash over all body surfaces which started after he left the hospital. No one else in the home has a similar rash. He hasn't tried any OTC agents for the itching.     Nursing ROS:   Nutrition Alteration  Diet Type: Patient's Preference  Skin  Skin Reaction: 0 - No changes  Skin Note: Educated Patient on the use of aqupahor or eucerin cream BID.  CNS Alteration  CNS note: WNL     Cardiovascular  Respiratory effort: 1 - Normal - without distress  Gastrointestinal  Nausea: 0 - None     Psychosocial  Mood - Anxiety: 0 - Normal  Pain Assessment  0-10 Pain Scale: 7  Pain Note: Patient is rating his pain 6-7/10 with little relief noted from dilaudid 2 mg once per day PRN.  Morphine 30 mg BID scheduled brings pain down to 4/10.  Pain is not awakening Patient at night.    Objective:   BP (!) 145/92 (BP Location: Right arm)   Pulse 60   Resp 16   Wt  127.6 kg (281 lb 6.4 oz)   SpO2 98%   BMI 39.25 kg/m    Gen: Appears well, in no acute distress  Skin: No erythema  MSK: massive right thigh from knee to buttocks    Labs:  CBC RESULTS: Recent Labs   Lab Test 02/24/23  1307   WBC 12.5*   RBC 4.25*   HGB 10.3*   HCT 34.8*   MCV 82   MCH 24.2*   MCHC 29.6*   RDW 20.3*   *     ELECTROLYTES:  Recent Labs   Lab Test 02/24/23  1307      POTASSIUM 4.5   CHLORIDE 99   DORIAN 9.4   CO2 29   BUN 17.6   CR 1.13   *       Assessment:    Tolerating radiation therapy well.  All questions and concerns addressed.    Toxicities:  Pain: Grade 2: Moderate pain; limiting instrumental ADL  Dermatitis: Grade 0: No toxicity    Plan:   1. Continue current therapy.    2. Rash is unrelated to radiation. Recommended hydroxyzine 50mg PO QID prn itching. Also, asked him to message medical oncology to see if possibly related to chemotherapy  3. Recommended Tylenol 1000mg PO BID to take along with morphine.      Mosaiq chart and setup information reviewed  MVCT/IGRT images checked    Medication Review  Med list reviewed with patient?: Yes  Med Note: Plan to add in the use of tylenol BID.    Educational Topic Discussed  Education Instructions: Reviewed      Angela Gómez MD PGY-3  [phone] clinic  Pager [phone]    Please do not send letter to referring physician.      Physician Attestation   I, Sydney Valle, saw this patient and agree with the findings and plan of care as documented in the note.   I was present for key portions of the history and physical exam.    I personally reviewed the available treatment setup images and vital signs listed.     Sydney Valle MD MPH PhD    Department of Radiation Oncology          Again, thank you for allowing me to participate in the care of your patient.        Sincerely,        Sydney Valle

## 2023-03-15 NOTE — PROGRESS NOTES
[facility] PHYSICIANS  SPECIALIZING IN BREAKTHROUGHS  Radiation Oncology    On Treatment Visit Note      Antonio Canseco      Date: 3/15/2023   MRN: [id number]   : 1970  Diagnosis: liposarcoma      Reason for Visit:  On Radiation Treatment Visit     Treatment Summary to Date  Treatment Site: R hamstring Current Dose: 200/5000 cGy Fractions:       Chemotherapy  Chemo concurrent with radx?: No    ED Visit/Hospital Admission: none    Treatment Breaks: none    Subjective:   Mr. Canseco presents for initiation of radiotherapy. He endorses severe pain in the right thigh, 7/10 at its worst, alleviated by morphine SR 30mg PO BID and dilaudid 2mg PO q6h prn pain which he is only taking once a day. Pain is exacerbated by sitting and standing and made better by laying down.     He also endorses and pruritic rash over all body surfaces which started after he left the hospital. No one else in the home has a similar rash. He hasn't tried any OTC agents for the itching.     Nursing ROS:   Nutrition Alteration  Diet Type: Patient's Preference  Skin  Skin Reaction: 0 - No changes  Skin Note: Educated Patient on the use of aqupahor or eucerin cream BID.  CNS Alteration  CNS note: WNL     Cardiovascular  Respiratory effort: 1 - Normal - without distress  Gastrointestinal  Nausea: 0 - None     Psychosocial  Mood - Anxiety: 0 - Normal  Pain Assessment  0-10 Pain Scale: 7  Pain Note: Patient is rating his pain 6-7/10 with little relief noted from dilaudid 2 mg once per day PRN.  Morphine 30 mg BID scheduled brings pain down to 4/10.  Pain is not awakening Patient at night.    Objective:   BP (!) 145/92 (BP Location: Right arm)   Pulse 60   Resp 16   Wt 127.6 kg (281 lb 6.4 oz)   SpO2 98%   BMI 39.25 kg/m    Gen: Appears well, in no acute distress  Skin: No erythema  MSK: massive right thigh from knee to buttocks    Labs:  CBC RESULTS: Recent Labs   Lab Test 23  1307   WBC 12.5*   RBC 4.25*   HGB 10.3*    HCT 34.8*   MCV 82   MCH 24.2*   MCHC 29.6*   RDW 20.3*   *     ELECTROLYTES:  Recent Labs   Lab Test 02/24/23  1307      POTASSIUM 4.5   CHLORIDE 99   DORIAN 9.4   CO2 29   BUN 17.6   CR 1.13   *       Assessment:    Tolerating radiation therapy well.  All questions and concerns addressed.    Toxicities:  Pain: Grade 2: Moderate pain; limiting instrumental ADL  Dermatitis: Grade 0: No toxicity    Plan:   1. Continue current therapy.    2. Rash is unrelated to radiation. Recommended hydroxyzine 50mg PO QID prn itching. Also, asked him to message medical oncology to see if possibly related to chemotherapy  3. Recommended Tylenol 1000mg PO BID to take along with morphine.      Mosaiq chart and setup information reviewed  MVCT/IGRT images checked    Medication Review  Med list reviewed with patient?: Yes  Med Note: Plan to add in the use of tylenol BID.    Educational Topic Discussed  Education Instructions: Reviewed      Angela Gómez MD PGY-3  [phone] clinic  Pager [phone]    Please do not send letter to referring physician.      Physician Attestation   I, Sydney Valle, saw this patient and agree with the findings and plan of care as documented in the note.   I was present for key portions of the history and physical exam.    I personally reviewed the available treatment setup images and vital signs listed.     Sydney Valle MD MPH PhD    Department of Radiation Oncology

## 2023-03-16 ENCOUNTER — APPOINTMENT (OUTPATIENT)
Dept: RADIATION ONCOLOGY | Facility: CLINIC | Age: 53
End: 2023-03-16
Attending: RADIOLOGY
Payer: COMMERCIAL

## 2023-03-16 PROCEDURE — 77014 PR CT GUIDE FOR PLACEMENT RADIATION THERAPY FIELDS: CPT | Mod: 26 | Performed by: RADIOLOGY

## 2023-03-16 PROCEDURE — 77386 HC IMRT TREATMENT DELIVERY, COMPLEX: CPT | Performed by: RADIOLOGY

## 2023-03-17 ENCOUNTER — APPOINTMENT (OUTPATIENT)
Dept: RADIATION ONCOLOGY | Facility: CLINIC | Age: 53
End: 2023-03-17
Attending: RADIOLOGY
Payer: COMMERCIAL

## 2023-03-17 PROCEDURE — 77386 HC IMRT TREATMENT DELIVERY, COMPLEX: CPT | Performed by: RADIOLOGY

## 2023-03-17 PROCEDURE — 77014 PR CT GUIDE FOR PLACEMENT RADIATION THERAPY FIELDS: CPT | Mod: 26 | Performed by: RADIOLOGY

## 2023-03-20 ENCOUNTER — APPOINTMENT (OUTPATIENT)
Dept: RADIATION ONCOLOGY | Facility: CLINIC | Age: 53
End: 2023-03-20
Attending: RADIOLOGY
Payer: COMMERCIAL

## 2023-03-20 PROCEDURE — 77386 HC IMRT TREATMENT DELIVERY, COMPLEX: CPT | Performed by: RADIOLOGY

## 2023-03-21 ENCOUNTER — APPOINTMENT (OUTPATIENT)
Dept: RADIATION ONCOLOGY | Facility: CLINIC | Age: 53
End: 2023-03-21
Attending: RADIOLOGY
Payer: COMMERCIAL

## 2023-03-21 PROCEDURE — 77014 PR CT GUIDE FOR PLACEMENT RADIATION THERAPY FIELDS: CPT | Mod: 26 | Performed by: RADIOLOGY

## 2023-03-21 PROCEDURE — 77427 RADIATION TX MANAGEMENT X5: CPT | Mod: GC | Performed by: RADIOLOGY

## 2023-03-21 PROCEDURE — 77336 RADIATION PHYSICS CONSULT: CPT | Performed by: RADIOLOGY

## 2023-03-21 PROCEDURE — 77386 HC IMRT TREATMENT DELIVERY, COMPLEX: CPT | Performed by: RADIOLOGY

## 2023-03-22 ENCOUNTER — OFFICE VISIT (OUTPATIENT)
Dept: RADIATION ONCOLOGY | Facility: CLINIC | Age: 53
End: 2023-03-22
Attending: RADIOLOGY
Payer: COMMERCIAL

## 2023-03-22 VITALS
WEIGHT: 274 LBS | RESPIRATION RATE: 16 BRPM | BODY MASS INDEX: 38.22 KG/M2 | OXYGEN SATURATION: 99 % | HEART RATE: 95 BPM | DIASTOLIC BLOOD PRESSURE: 88 MMHG | SYSTOLIC BLOOD PRESSURE: 138 MMHG

## 2023-03-22 DIAGNOSIS — C49.9 SARCOMA OF SOFT TISSUE (H): Primary | ICD-10-CM

## 2023-03-22 PROCEDURE — 77386 HC IMRT TREATMENT DELIVERY, COMPLEX: CPT | Performed by: RADIOLOGY

## 2023-03-22 ASSESSMENT — PAIN SCALES - GENERAL: PAINLEVEL: MODERATE PAIN (4)

## 2023-03-22 NOTE — LETTER
3/22/2023         RE: Antonio Canseco  [address]  [place] [address]        Dear Colleague,    Thank you for referring your patient, Antonio Canseco, to the [facility] RADIATION ONCOLOGY. Please see a copy of my visit note below.    [facility] PHYSICIANS  SPECIALIZING IN BREAKTHROUGHS  Radiation Oncology    On Treatment Visit Note      Antonio Canseco      Date: 3/22/2023   MRN: [id number]   : 1970  Diagnosis: liposarcoma      Reason for Visit:  On Radiation Treatment Visit     Treatment Summary to Date  Treatment Site: R hamstring Current Dose: 1200/5000 cGy Fractions:       Chemotherapy  Chemo concurrent with radx?: No    ED Visit/Hospital Admission: none    Treatment Breaks: none    Subjective:   Mr. Canseco presents for initiation of radiotherapy. He continues to endorse severe pain in the right thigh, presently 4 out of 10, 7/10 at its worst, alleviated by morphine SR 30mg PO BID and dilaudid 2mg PO q6h prn pain.  We discussed taking Dilaudid first more frequently, up to every every 3 hours as needed, and/or with increasing milligrams per dose, up to 4 mg to start. Pain is exacerbated by sitting and standing and made better by laying down.     He is concerned given ongoing dark urine and persistent rash despite Atarax.  He has not had recent lab work.      Nursing ROS:   Nutrition Alteration  Diet Type: Patient's Preference  Skin  Skin Reaction: 0 - No changes at the treatment site; evidence of pruritus elsewhere particularly on the trunk.  Skin Note: Educated Patient on the use of aqupahor or eucerin cream BID.  CNS Alteration  CNS note: WNL     Cardiovascular  Respiratory effort: 1 - Normal - without distress  Gastrointestinal  Nausea: 0 - None     Psychosocial  Mood - Anxiety: 0 - Normal  Pain Assessment  0-10 Pain Scale: Presently 4, up to 7      Objective:   /88 (BP Location: Right arm)   Pulse 95   Resp 16   Wt 124.3 kg (274 lb)   SpO2 99%   BMI  38.22 kg/m    Moderate Pain (4)  Gen: Appears well, in no acute distress  Skin: No erythema, evidence of excoriation in the trunk area.  MSK: massive right thigh from knee to buttocks    Labs:  CBC RESULTS: Recent Labs   Lab Test 02/24/23  1307   WBC 12.5*   RBC 4.25*   HGB 10.3*   HCT 34.8*   MCV 82   MCH 24.2*   MCHC 29.6*   RDW 20.3*   *     ELECTROLYTES:  Recent Labs   Lab Test 02/24/23  1307      POTASSIUM 4.5   CHLORIDE 99   DORIAN 9.4   CO2 29   BUN 17.6   CR 1.13   *       Assessment:    Tolerating radiation therapy reasonably well in the setting of ongoing pain.  All questions and concerns addressed.    Toxicities:  Pain: Grade 2: Moderate pain; limiting instrumental ADL  Dermatitis: Grade 0: No toxicity    Plan:   1. Continue current therapy.    2. Continue Atarax for itchiness.  Discussed need for lab work with medical oncology, which they have ordered.  3. Discussed increasing as needed Dilaudid as per above we will make adjustments to MS Contin as needed if he is using it consistently notable amount of Dilaudid.      Mosaiq chart and setup information reviewed  MVCT/IGRT images checked    Medication Review  Med list reviewed with patient?: Yes  Med Note: See above read pain plan    Educational Topic Discussed  Education Instructions: Reviewed      Sydney Valle MD MPH PhD    Department of Radiation Oncology

## 2023-03-23 ENCOUNTER — APPOINTMENT (OUTPATIENT)
Dept: RADIATION ONCOLOGY | Facility: CLINIC | Age: 53
End: 2023-03-23
Attending: RADIOLOGY
Payer: COMMERCIAL

## 2023-03-23 ENCOUNTER — MYC REFILL (OUTPATIENT)
Dept: ONCOLOGY | Facility: CLINIC | Age: 53
End: 2023-03-23
Payer: COMMERCIAL

## 2023-03-23 DIAGNOSIS — C49.9 UNDIFFERENTIATED PLEOMORPHIC SARCOMA (H): ICD-10-CM

## 2023-03-23 PROCEDURE — 77386 HC IMRT TREATMENT DELIVERY, COMPLEX: CPT | Performed by: RADIOLOGY

## 2023-03-23 RX ORDER — MORPHINE SULFATE 30 MG/1
30 TABLET, FILM COATED, EXTENDED RELEASE ORAL EVERY 12 HOURS
Qty: 60 TABLET | Refills: 0 | Status: ON HOLD | OUTPATIENT
Start: 2023-03-23 | End: 2023-05-22

## 2023-03-23 NOTE — TELEPHONE ENCOUNTER
Narcotic Refill Request    Medication(s) requested:  Morphine   Person Requesting Refill:Antonio   What pain is the medication treating: right leg femur   How is the medication being taken?:Morphine takes twice daily   Does pt have enough for today? Yes   Is pain being adequately controlled on the current regimen?: yes   Experiencing any side effects from medication?: some constipation, Will takes senna s needed.     Date of most recent appointment:  2/7/23 Amber Scheierl   Any No Show Visits:No   Next appointment:   3/28/23 Dr Price   Last fill date and by whom:  2/21/23 Dr Price    Reviewed:  No access     Routed/Paged provider:  Amber Scheierl

## 2023-03-24 ENCOUNTER — APPOINTMENT (OUTPATIENT)
Dept: RADIATION ONCOLOGY | Facility: CLINIC | Age: 53
End: 2023-03-24
Attending: RADIOLOGY
Payer: COMMERCIAL

## 2023-03-24 ENCOUNTER — LAB (OUTPATIENT)
Dept: LAB | Facility: CLINIC | Age: 53
End: 2023-03-24
Attending: FAMILY MEDICINE
Payer: COMMERCIAL

## 2023-03-24 DIAGNOSIS — C49.9 SARCOMA OF SOFT TISSUE (H): ICD-10-CM

## 2023-03-24 LAB
ALBUMIN SERPL BCG-MCNC: 3.4 G/DL (ref 3.5–5.2)
ALP SERPL-CCNC: 48 U/L (ref 40–129)
ALT SERPL W P-5'-P-CCNC: 5 U/L (ref 10–50)
ANION GAP SERPL CALCULATED.3IONS-SCNC: 12 MMOL/L (ref 7–15)
AST SERPL W P-5'-P-CCNC: 17 U/L (ref 10–50)
BILIRUB SERPL-MCNC: 0.6 MG/DL
BUN SERPL-MCNC: 13.2 MG/DL (ref 6–20)
CALCIUM SERPL-MCNC: 9.3 MG/DL (ref 8.6–10)
CHLORIDE SERPL-SCNC: 102 MMOL/L (ref 98–107)
CREAT SERPL-MCNC: 0.82 MG/DL (ref 0.67–1.17)
DEPRECATED HCO3 PLAS-SCNC: 26 MMOL/L (ref 22–29)
ERYTHROCYTE [DISTWIDTH] IN BLOOD BY AUTOMATED COUNT: 20.3 % (ref 10–15)
GFR SERPL CREATININE-BSD FRML MDRD: >90 ML/MIN/1.73M2
GLUCOSE SERPL-MCNC: 150 MG/DL (ref 70–99)
HCT VFR BLD AUTO: 30.8 % (ref 40–53)
HGB BLD-MCNC: 9.1 G/DL (ref 13.3–17.7)
MAGNESIUM SERPL-MCNC: 2.2 MG/DL (ref 1.7–2.3)
MCH RBC QN AUTO: 24.3 PG (ref 26.5–33)
MCHC RBC AUTO-ENTMCNC: 29.5 G/DL (ref 31.5–36.5)
MCV RBC AUTO: 82 FL (ref 78–100)
PHOSPHATE SERPL-MCNC: 4.3 MG/DL (ref 2.5–4.5)
PLATELET # BLD AUTO: 380 10E3/UL (ref 150–450)
POTASSIUM SERPL-SCNC: 4.2 MMOL/L (ref 3.4–5.3)
PROT SERPL-MCNC: 8 G/DL (ref 6.4–8.3)
RBC # BLD AUTO: 3.74 10E6/UL (ref 4.4–5.9)
SODIUM SERPL-SCNC: 140 MMOL/L (ref 136–145)
WBC # BLD AUTO: 8 10E3/UL (ref 4–11)

## 2023-03-24 PROCEDURE — 77014 PR CT GUIDE FOR PLACEMENT RADIATION THERAPY FIELDS: CPT | Mod: 26 | Performed by: RADIOLOGY

## 2023-03-24 PROCEDURE — 84100 ASSAY OF PHOSPHORUS: CPT

## 2023-03-24 PROCEDURE — 36415 COLL VENOUS BLD VENIPUNCTURE: CPT

## 2023-03-24 PROCEDURE — 83735 ASSAY OF MAGNESIUM: CPT

## 2023-03-24 PROCEDURE — 80053 COMPREHEN METABOLIC PANEL: CPT

## 2023-03-24 PROCEDURE — 85027 COMPLETE CBC AUTOMATED: CPT

## 2023-03-24 PROCEDURE — 77386 HC IMRT TREATMENT DELIVERY, COMPLEX: CPT | Performed by: RADIOLOGY

## 2023-03-27 ENCOUNTER — APPOINTMENT (OUTPATIENT)
Dept: RADIATION ONCOLOGY | Facility: CLINIC | Age: 53
End: 2023-03-27
Attending: RADIOLOGY
Payer: COMMERCIAL

## 2023-03-27 PROCEDURE — 77014 PR CT GUIDE FOR PLACEMENT RADIATION THERAPY FIELDS: CPT | Mod: 26 | Performed by: RADIOLOGY

## 2023-03-27 PROCEDURE — 77386 HC IMRT TREATMENT DELIVERY, COMPLEX: CPT | Performed by: RADIOLOGY

## 2023-03-27 NOTE — PROGRESS NOTES
[facility]    NEW PATIENT VISIT NOTE    PATIENT NAME: Antonio Canseco MRN # [id number]  DATE OF VISIT: January 17, 2023 YOB: 1970    REFERRING PROVIDER: Ari Nascimento PA-C  [facility] [place] [address]    CANCER TYPE: High grade sarcoma       CHIEF COMPLAIN   Thigh pain     HISTORY OF PRESENT ILLNESS   52 year old male with PMH of DM and recent Dx of large 27 cm mass in the posterior R thigh. He reports that he first noticed a node in the posterior thigh on 5/2022, we was evaluated at OSH where he got an Xray and he was told that this was sciatica and was started on physical therapy. Tumor continued to grow rapidly during this time. MRI done on 1/6/2023 showing a large mass in the posterior thigh. 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   He reports having pain and limit mobility. He has been taking daily meloxican with some relief, however he needs to be resting most of the time to avoid pain.     C1 of doxil/ifosfamide on 1/30 follow up MRI showing increase in size for mass without significant areas of necrosis.   Started Preoperative RT on 3/15.     Interval History  Antonio is currently undergoing radiation therapy.  Dr. Renteria reports that he has developed a rash in his abdomen and chest, and dark urine.  He comes to follow-up about this new symptoms.   For over a week he has developed a rash papular with lesions crusting scattered in his arms and legs. No close contact with someone with similar rash, very pruritic. Wife reports that sometime he scratches at night and wakes up covered in blood. Rash getting better now that he is taking hydroxyzine, however lesions still present.      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       CURRENT OUTPATIENT MEDICATIONS     Current Outpatient Medications   Medication Sig Dispense Refill     ACCU-CHEK GUIDE test strip Use to test blood sugar 3 times daily. 100 strip 4     clindamycin (CLINDAMAX) 1 % external gel Apply topically 2 times daily , to HS lesion 30 g 0     Continuous Blood Gluc Sensor (KarmaHireSTYLE MORENITA 3 SENSOR) MISC 1 each every 14 days Use 1 sensor every 14 days. Use to read blood sugars per 's instructions. 2 each 5     DULoxetine (CYMBALTA) 60 MG capsule TAKE 1 CAPSULE (60 MG) BY MOUTH DAILY 90 capsule 1     ferrous sulfate (FEROSUL) 325 (65 Fe) MG tablet Take 1 tablet (325 mg) by mouth daily (with breakfast) 30 tablet 1     folic acid (FOLVITE) 400 MCG tablet Take 1 tablet (400 mcg) by mouth daily 30 tablet 0     heparin lock flush 10 UNIT/ML SOLN injection 3 mLs by Intracatheter route every 24 hours Flush each lumen with 3 mLs (total 6mL in 24 hours period) (Patient not taking: Reported on 3/13/2023) 180 mL 0     HYDROmorphone (DILAUDID) 2 MG tablet Take 1 tablet (2 mg) by mouth every 6 hours as needed for pain 90 tablet 0     HYDROmorphone (DILAUDID) 2 MG tablet Take 0.5-1 tablets (1-2 mg) by mouth every 6 hours as needed for moderate to severe pain 24 tablet 0     hydrOXYzine (ATARAX) 10 MG tablet Take 1 tablet (10 mg) by mouth 3 times daily as needed for itching 45 tablet 0     lidocaine (LIDODERM) 5 % patch Place 1 patch onto the skin every 24 hours To prevent lidocaine toxicity, patient should be patch free for 12 hrs daily. (Patient not taking: Reported on 2/28/2023) 14 patch 1     lisinopril (ZESTRIL)  20 MG tablet TAKE 1 TABLET (20 MG) BY MOUTH DAILY 90 tablet 1     metFORMIN (GLUCOPHAGE XR) 500 MG 24 hr tablet Take 3 tablets (1,500 mg) by mouth daily (with dinner) 270 tablet 1     morphine (MS CONTIN) 30 MG CR tablet Take 1 tablet (30 mg) by mouth every 12 hours 60 tablet 0     naloxone (NARCAN) 4 MG/0.1ML nasal spray Spray 1 spray (4 mg) into one nostril alternating nostrils as needed for opioid reversal every 2-3 minutes until assistance arrives 2 each 1     omeprazole 20 MG tablet Take 20 mg by mouth daily as needed       phosphorus tablet 250 mg 250 MG per tablet Take 2 tablets (500 mg) by mouth 3 times daily Take until directed otherwise 84 tablet 1     pregabalin (LYRICA) 25 MG capsule Take 1 capsule (25 mg) by mouth 3 times daily 90 capsule 1     pregabalin (LYRICA) 25 MG capsule Take 1 capsule (25 mg) by mouth 3 times daily 36 capsule 0     senna-docusate (SENOKOT-S/PERICOLACE) 8.6-50 MG tablet Take 1 tablet by mouth 2 times daily 30 tablet 0     sennosides (SENOKOT) 8.6 MG tablet Take 1 tablet by mouth daily as needed for constipation       sucralfate (CARAFATE) 1 GM/10ML suspension Take 10 mLs (1 g) by mouth 4 times daily as needed for nausea (or indigestion) (Patient not taking: Reported on 3/13/2023) 414 mL 0          REVIEW OF SYSTEMS   As above in the HPI, o/w complete 12-point ROS was negative.     PHYSICAL EXAM   There were no vitals taken for this visit.    Virtual visit     LABORATORY AND IMAGING STUDIES     Recent Labs   Lab Test 04/24/22  1245 12/28/21  1202   * 139   POTASSIUM 4.1 4.8   CHLORIDE 101 106   CO2 29 28   ANIONGAP 2* 5   BUN 11 13   CR 0.78 0.85   * 177*   DORIAN 8.8 8.9     Recent Labs   Lab Test 04/24/22  1245 12/26/19  1745 12/20/18  0823   WBC 6.4 8.6 7.3   HGB 14.8 14.2 15.0    237 249   MCV 91 90 91   NEUTROPHIL 66  --   --      Recent Labs   Lab Test 04/24/22  1245 12/28/21  1202 02/11/21  1622   BILITOTAL 1.7* 0.8 0.7   ALKPHOS 44 47 43   ALT 43 50 43    AST 21 22 23   ALBUMIN 3.4 3.5 3.7     TSH   Date Value Ref Range Status   12/28/2021 2.11 0.40 - 4.00 mU/L Final   10/07/2019 3.07 0.40 - 4.00 mU/L Final       Results for orders placed or performed during the hospital encounter of 01/06/23   MR Femur Thigh Right wo & w Contrast     Value    Radiologist flags Large thigh mass    Narrative    EXAMINATION: MR FEMUR THIGH RIGHT W/O & W CONTRAST  1/6/2023  9:07 AM     CLINICAL HISTORY:  Hamstring strain, right, subsequent encounter     COMPARISON:    TECHNIQUE: Multiplanar multi-sequence MR imaging was obtained using  standard sequences in three orthogonal planes the administration of  intravenous or intra-articular gadolinium contrast agent.    FINDINGS:   Right femur:    There is a very large soft tissue mass measuring 27 x 7.4 x 12 cm in  maximal caudocranial, AP and transverse dimensions (series 20, image  21 and series 38, image 32), respectively.    Mass is originating just distal to the conjoined hamstring tendon and  the mass mostly occupies the space of the hamstring musculature.  Proximally the mass is located medial to the gluteus maximums muscle  belly and distally occupies partially the space of the biceps femoris  and the semimembranosus muscle bellies and entirely the  semitendinosus. The semitendinosus muscle belly is also involved the  furthest distally. Proximally the lesion protrudes anteriorly and  appears to invade the obturator externus muscle (series 38, image 27)    The large lesion reveals highly heterogeneous signal with mostly T2  hyperintense areas, mildly T1 hyperintense areas and septations  throughout. Following the administration of intravenous gadolinium  contrast, there is a central necrotic area occupying an area of about  7 x 5 x 14 cm in transverse, AP and CC dimensions.    At the level of the midshaft of the femur, there is very close  proximity and adherence to multiple prominent vessels (series 38,  image 49).    The sciatic  nerve travels alongside the anterior margins of the lesion  over a distance of about 20 cm. However, there is a preserved fat  plane between the sciatic nerve and the tibial border of the mass.    Osseous structures:    The bilateral femora appear normal, this red marrow conversion. No  distinct osseous lesions are identified..    Hip joint: The hip joints are not well assessed at the edge of the  field-of-view. However, no significant abnormalities are identified.    Knee joint: The knee joint is not well assessed.    The left femur appears unremarkable. Including musculatures.      Impression    IMPRESSION:   1. Large soft tissue mass in the posterior compartment of the right  thigh measuring 27 x 7.4 x 12 cm in maximal caudocranial, AP and  transverse dimensions, respectively. The mass originates just distal  to the conjoined hamstring tendon, infiltrates the hamstring muscle  bellies and extends distally within the semitendinosus muscle belly  proximal to the knee joint. However, the semitendinosus tendon sheaths  appears to have increased signal to the level of the knee joint. A  dedicated knee MRI could be useful to identify at the most distal  extent of the lesion.  2. The lesion is heterogeneous, septated, with myxoid and lipoid  components, vividly enhancing with a large central area of necrosis  which measures 7 x 5 x 14 cm. Imaging findings are highly suggestive  of a malignant mass from the spectrum of myxoid liposarcomatous type.  Further evaluation at the [facility]ology  is recommended.  3. The lesion occupies the posterior muscle compartment of the thigh  just posterior to the sciatic nerve with a preserved fat plane in  between.  4. At the level of the mid shaft of the femur there is very close  proximity and adherence to multiple vessels, that entered the mass,  best seen on series 38 image 49.    Mass is originating just distal to the conjoined hamstring tendon and  the  mass mostly occupies the space of the hamstring musculature.  Proximally the mass is located medial to the gluteus maximums muscle  belly and distally occupies partially the space of the biceps femoris  and the semimembranosus muscle bellies and entirely the  semitendinosus. The semitendinosus muscle belly is also involved the  furthest distally. Proximally the lesion protrudes anteriorly and  appears to invade the obturator externus muscle (series 38, image 27).    [Consider Follow Up: Large thigh mass     This report will be copied to the [facility] to ensure a  provider acknowledges the finding. Access Center is available Monday  through Friday 8am-3:30 pm.    JUTTA ELLERMANN, MD         SYSTEM ID:  [id number]     1/6/2023 MRI right femur thigh: Large soft tissue mass in the posterior compartment of the right thigh measuring 27 x 7.4 x 12 cm.  The mass lies primarily in the hamstring muscle belly.  Heterogeneous signal with areas of necrosis noted.    2/20/23 MRI R femur                                                                   IMPRESSION: In this patient with high grade sarcoma status  post-neoadjuvant chemotherapy;     Increased in size of the 11 x 15.7 x 21.7 cm heterogeneously enhancing  soft tissue mass with myxoid and lipoid components in the posterior  compartment of the right thigh since MRI from 1/6/2023, previously  measured 7.4 x 12 x 20 cm.  *  Distal portion of the mass anteriorly abuts the sciatic nerve and  deep femoral artery/vein without definite invasion.  *  Increased edema within the adductor rosy when compared to prior  study, possibly neurogenic.     PATHOLOGY         Final Diagnosis   A.  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   Electronically signed by Vineet Coello MD on 1/17/2023 at 10:17 AM   Comment   UUMAYO   The neoplasm is comprised of highly pleomorphic spindle cells with  stromal hyalinization and extensive areas of necrosis.  Focal areas with scattered lipoblasts are seen.  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  MDM-2 gene amplification study by FISH is in progress and result will be provided separately.       I reviewed the medical records including medical records, laboratory studies, and have personally reviewed imaging including MRI of the R thigh which demonstrates a large 27 cm mass in the posterior compartment of the R thigh.      ASSESSMENT AND PLAN     Mr. 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     He received C1 on 1/30. Patient did experienced worsening pain shortly after starting chemotherapy. MRI done on 2/20/23 showing increase in size of large posterior thigh sarcoma. The comparison of this MRI is to the baseline done on 1/6/23, and in a patient with high grade sarcoma the mass is presumed to have grown in size in the month before starting chemotherapy. However, given that the patient had increase in pain and there is no areas of necrosis observed in the recent MRI, I will consider this to be progression of disease and recommend to plan for surgery with consideration for preoperative radiation therapy.      He started radiation therapy on 3/15.  I reviewed the PET scan done on 3/14 showing some tumor shrinkage at 14 x 14 x 18 from prior 11 x 15.7 x 21. We discussed that given some response was seen, if needed in the future this will still be an option. Right now, given prior complications from chemo (admission for neutropenic fever, bleeding in the urine presumed to be 2/2 to ifosfamide), we will hold on further chemo prior to surgery.     He has developed a rash, papular, pruritic, getting better on hydroxyzine, but lesions are still present.     Plan:    -Urgent  appointment with Derm to evaluate rash, he will likely need a biopsy.   -Labs only showing anemia at 9, RT is probably contributing. He will keep taking iron and folate.   -I will follow up after completion of RT to help coordinate and address any problems before surgery.       40 minutes spent on the date of the encounter doing chart review, review of test results, interpretation of tests, patient visit, documentation and discussion with other provider(s)     Michael Laboy MD   of Medicine  Hematology, Oncology and Transplantation

## 2023-03-28 ENCOUNTER — VIRTUAL VISIT (OUTPATIENT)
Dept: ONCOLOGY | Facility: CLINIC | Age: 53
End: 2023-03-28
Attending: STUDENT IN AN ORGANIZED HEALTH CARE EDUCATION/TRAINING PROGRAM
Payer: COMMERCIAL

## 2023-03-28 ENCOUNTER — APPOINTMENT (OUTPATIENT)
Dept: RADIATION ONCOLOGY | Facility: CLINIC | Age: 53
End: 2023-03-28
Attending: RADIOLOGY
Payer: COMMERCIAL

## 2023-03-28 DIAGNOSIS — D64.9 ANEMIA, UNSPECIFIED TYPE: ICD-10-CM

## 2023-03-28 DIAGNOSIS — C49.9 UNDIFFERENTIATED PLEOMORPHIC SARCOMA (H): ICD-10-CM

## 2023-03-28 DIAGNOSIS — R21 RASH: Primary | ICD-10-CM

## 2023-03-28 PROCEDURE — 99215 OFFICE O/P EST HI 40 MIN: CPT | Mod: VID | Performed by: STUDENT IN AN ORGANIZED HEALTH CARE EDUCATION/TRAINING PROGRAM

## 2023-03-28 PROCEDURE — 77014 PR CT GUIDE FOR PLACEMENT RADIATION THERAPY FIELDS: CPT | Mod: 26 | Performed by: RADIOLOGY

## 2023-03-28 PROCEDURE — 77386 HC IMRT TREATMENT DELIVERY, COMPLEX: CPT | Performed by: RADIOLOGY

## 2023-03-28 PROCEDURE — G0463 HOSPITAL OUTPT CLINIC VISIT: HCPCS | Mod: PN,GT | Performed by: STUDENT IN AN ORGANIZED HEALTH CARE EDUCATION/TRAINING PROGRAM

## 2023-03-28 PROCEDURE — 77427 RADIATION TX MANAGEMENT X5: CPT | Performed by: RADIOLOGY

## 2023-03-28 PROCEDURE — 77336 RADIATION PHYSICS CONSULT: CPT | Performed by: RADIOLOGY

## 2023-03-28 RX ORDER — LANOLIN ALCOHOL/MO/W.PET/CERES
400 CREAM (GRAM) TOPICAL DAILY
Qty: 30 TABLET | Refills: 1 | Status: ON HOLD | OUTPATIENT
Start: 2023-03-28 | End: 2023-05-22

## 2023-03-28 NOTE — PROGRESS NOTES
Virtual Visit Details    Type of service:  Video Visit     Originating Location (pt. Location): Home    Distant Location (provider location):  On-site  Platform used for Video Visit: Vishal

## 2023-03-28 NOTE — LETTER
3/28/2023         RE: Antonio Canseco  [address] [facility] [address]        Dear Colleague,    Thank you for referring your patient, Antonio Canesco, to the [facility] CANCER CLINIC. Please see a copy of my visit note below.    [facility] CANCER [facility]    NEW PATIENT VISIT NOTE    PATIENT NAME: Antonio Canseco MRN # [id number]  DATE OF VISIT: January 17, 2023 YOB: 1970    REFERRING PROVIDER: Ari Nascimento PA-C  [facility] [place] [address]    CANCER TYPE: High grade sarcoma       CHIEF COMPLAIN   Thigh pain     HISTORY OF PRESENT ILLNESS   52 year old male with PMH of DM and recent Dx of large 27 cm mass in the posterior R thigh. He reports that he first noticed a node in the posterior thigh on 5/2022, we was evaluated at OSH where he got an Xray and he was told that this was sciatica and was started on physical therapy. Tumor continued to grow rapidly during this time. MRI done on 1/6/2023 showing a large mass in the posterior thigh. 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   He reports having pain and limit mobility. He has been taking daily meloxican with some relief, however he needs to be resting most of the time to avoid pain.     C1 of doxil/ifosfamide on 1/30 follow up MRI showing increase in size for mass without significant areas of necrosis.   Started Preoperative RT on 3/15.     Interval History  Antonio is currently undergoing radiation therapy.  Dr. Renteria reports that he has developed a rash in his abdomen and chest, and dark urine.  He comes to follow-up about this new symptoms.   For over a week he has developed a rash papular with lesions crusting scattered in his arms and legs. No close contact with someone with similar rash, very pruritic. Wife reports that sometime he scratches at night and wakes up covered in blood. Rash getting better now that he is taking hydroxyzine, however lesions still  present.      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       CURRENT OUTPATIENT MEDICATIONS     Current Outpatient Medications   Medication Sig Dispense Refill     ACCU-CHEK GUIDE test strip Use to test blood sugar 3 times daily. 100 strip 4     clindamycin (CLINDAMAX) 1 % external gel Apply topically 2 times daily , to HS lesion 30 g 0     Continuous Blood Gluc Sensor (InEdge MORENITA 3 SENSOR) MISC 1 each every 14 days Use 1 sensor every 14 days. Use to read blood sugars per 's instructions. 2 each 5     DULoxetine (CYMBALTA) 60 MG capsule TAKE 1 CAPSULE (60 MG) BY MOUTH DAILY 90 capsule 1     ferrous sulfate (FEROSUL) 325 (65 Fe) MG tablet Take 1 tablet (325 mg) by mouth daily (with breakfast) 30 tablet 1     folic acid (FOLVITE) 400 MCG tablet Take 1 tablet (400 mcg) by mouth daily 30 tablet 0     heparin lock flush 10 UNIT/ML SOLN injection 3 mLs by Intracatheter route every 24 hours Flush each lumen with 3 mLs (total 6mL in 24 hours period) (Patient not taking: Reported on 3/13/2023) 180 mL 0     HYDROmorphone (DILAUDID) 2 MG tablet Take 1 tablet (2 mg) by mouth every 6 hours as needed for pain 90 tablet 0     HYDROmorphone (DILAUDID) 2 MG tablet Take 0.5-1 tablets (1-2 mg) by mouth every 6 hours as needed for moderate to severe pain 24 tablet 0     hydrOXYzine (ATARAX) 10 MG tablet Take 1 tablet (10 mg) by mouth 3 times daily as  needed for itching 45 tablet 0     lidocaine (LIDODERM) 5 % patch Place 1 patch onto the skin every 24 hours To prevent lidocaine toxicity, patient should be patch free for 12 hrs daily. (Patient not taking: Reported on 2/28/2023) 14 patch 1     lisinopril (ZESTRIL) 20 MG tablet TAKE 1 TABLET (20 MG) BY MOUTH DAILY 90 tablet 1     metFORMIN (GLUCOPHAGE XR) 500 MG 24 hr tablet Take 3 tablets (1,500 mg) by mouth daily (with dinner) 270 tablet 1     morphine (MS CONTIN) 30 MG CR tablet Take 1 tablet (30 mg) by mouth every 12 hours 60 tablet 0     naloxone (NARCAN) 4 MG/0.1ML nasal spray Spray 1 spray (4 mg) into one nostril alternating nostrils as needed for opioid reversal every 2-3 minutes until assistance arrives 2 each 1     omeprazole 20 MG tablet Take 20 mg by mouth daily as needed       phosphorus tablet 250 mg 250 MG per tablet Take 2 tablets (500 mg) by mouth 3 times daily Take until directed otherwise 84 tablet 1     pregabalin (LYRICA) 25 MG capsule Take 1 capsule (25 mg) by mouth 3 times daily 90 capsule 1     pregabalin (LYRICA) 25 MG capsule Take 1 capsule (25 mg) by mouth 3 times daily 36 capsule 0     senna-docusate (SENOKOT-S/PERICOLACE) 8.6-50 MG tablet Take 1 tablet by mouth 2 times daily 30 tablet 0     sennosides (SENOKOT) 8.6 MG tablet Take 1 tablet by mouth daily as needed for constipation       sucralfate (CARAFATE) 1 GM/10ML suspension Take 10 mLs (1 g) by mouth 4 times daily as needed for nausea (or indigestion) (Patient not taking: Reported on 3/13/2023) 414 mL 0          REVIEW OF SYSTEMS   As above in the HPI, o/w complete 12-point ROS was negative.     PHYSICAL EXAM   There were no vitals taken for this visit.    Virtual visit     LABORATORY AND IMAGING STUDIES     Recent Labs   Lab Test 04/24/22  1245 12/28/21  1202   * 139   POTASSIUM 4.1 4.8   CHLORIDE 101 106   CO2 29 28   ANIONGAP 2* 5   BUN 11 13   CR 0.78 0.85   * 177*   DORIAN 8.8 8.9     Recent Labs   Lab Test  04/24/22  1245 12/26/19  1745 12/20/18  0823   WBC 6.4 8.6 7.3   HGB 14.8 14.2 15.0    237 249   MCV 91 90 91   NEUTROPHIL 66  --   --      Recent Labs   Lab Test 04/24/22  1245 12/28/21  1202 02/11/21  1622   BILITOTAL 1.7* 0.8 0.7   ALKPHOS 44 47 43   ALT 43 50 43   AST 21 22 23   ALBUMIN 3.4 3.5 3.7     TSH   Date Value Ref Range Status   12/28/2021 2.11 0.40 - 4.00 mU/L Final   10/07/2019 3.07 0.40 - 4.00 mU/L Final       Results for orders placed or performed during the hospital encounter of 01/06/23   MR Femur Thigh Right wo & w Contrast     Value    Radiologist flags Large thigh mass    Narrative    EXAMINATION: MR FEMUR THIGH RIGHT W/O & W CONTRAST  1/6/2023  9:07 AM     CLINICAL HISTORY:  Hamstring strain, right, subsequent encounter     COMPARISON:    TECHNIQUE: Multiplanar multi-sequence MR imaging was obtained using  standard sequences in three orthogonal planes the administration of  intravenous or intra-articular gadolinium contrast agent.    FINDINGS:   Right femur:    There is a very large soft tissue mass measuring 27 x 7.4 x 12 cm in  maximal caudocranial, AP and transverse dimensions (series 20, image  21 and series 38, image 32), respectively.    Mass is originating just distal to the conjoined hamstring tendon and  the mass mostly occupies the space of the hamstring musculature.  Proximally the mass is located medial to the gluteus maximums muscle  belly and distally occupies partially the space of the biceps femoris  and the semimembranosus muscle bellies and entirely the  semitendinosus. The semitendinosus muscle belly is also involved the  furthest distally. Proximally the lesion protrudes anteriorly and  appears to invade the obturator externus muscle (series 38, image 27)    The large lesion reveals highly heterogeneous signal with mostly T2  hyperintense areas, mildly T1 hyperintense areas and septations  throughout. Following the administration of intravenous gadolinium  contrast,  there is a central necrotic area occupying an area of about  7 x 5 x 14 cm in transverse, AP and CC dimensions.    At the level of the midshaft of the femur, there is very close  proximity and adherence to multiple prominent vessels (series 38,  image 49).    The sciatic nerve travels alongside the anterior margins of the lesion  over a distance of about 20 cm. However, there is a preserved fat  plane between the sciatic nerve and the tibial border of the mass.    Osseous structures:    The bilateral femora appear normal, this red marrow conversion. No  distinct osseous lesions are identified..    Hip joint: The hip joints are not well assessed at the edge of the  field-of-view. However, no significant abnormalities are identified.    Knee joint: The knee joint is not well assessed.    The left femur appears unremarkable. Including musculatures.      Impression    IMPRESSION:   1. Large soft tissue mass in the posterior compartment of the right  thigh measuring 27 x 7.4 x 12 cm in maximal caudocranial, AP and  transverse dimensions, respectively. The mass originates just distal  to the conjoined hamstring tendon, infiltrates the hamstring muscle  bellies and extends distally within the semitendinosus muscle belly  proximal to the knee joint. However, the semitendinosus tendon sheaths  appears to have increased signal to the level of the knee joint. A  dedicated knee MRI could be useful to identify at the most distal  extent of the lesion.  2. The lesion is heterogeneous, septated, with myxoid and lipoid  components, vividly enhancing with a large central area of necrosis  which measures 7 x 5 x 14 cm. Imaging findings are highly suggestive  of a malignant mass from the spectrum of myxoid liposarcomatous type.  Further evaluation at the [facility] Orthopedic oncology  is recommended.  3. The lesion occupies the posterior muscle compartment of the thigh  just posterior to the sciatic nerve with a  preserved fat plane in  between.  4. At the level of the mid shaft of the femur there is very close  proximity and adherence to multiple vessels, that entered the mass,  best seen on series 38 image 49.    Mass is originating just distal to the conjoined hamstring tendon and  the mass mostly occupies the space of the hamstring musculature.  Proximally the mass is located medial to the gluteus maximums muscle  belly and distally occupies partially the space of the biceps femoris  and the semimembranosus muscle bellies and entirely the  semitendinosus. The semitendinosus muscle belly is also involved the  furthest distally. Proximally the lesion protrudes anteriorly and  appears to invade the obturator externus muscle (series 38, image 27).    [Consider Follow Up: Large thigh mass     This report will be copied to the [facility] to ensure a  provider acknowledges the finding. Access Center is available Monday  through Friday 8am-3:30 pm.    JUTTA ELLERMANN, MD         SYSTEM ID:  [id number]     1/6/2023 MRI right femur thigh: Large soft tissue mass in the posterior compartment of the right thigh measuring 27 x 7.4 x 12 cm.  The mass lies primarily in the hamstring muscle belly.  Heterogeneous signal with areas of necrosis noted.    2/20/23 MRI R femur                                                                   IMPRESSION: In this patient with high grade sarcoma status  post-neoadjuvant chemotherapy;     Increased in size of the 11 x 15.7 x 21.7 cm heterogeneously enhancing  soft tissue mass with myxoid and lipoid components in the posterior  compartment of the right thigh since MRI from 1/6/2023, previously  measured 7.4 x 12 x 20 cm.  *  Distal portion of the mass anteriorly abuts the sciatic nerve and  deep femoral artery/vein without definite invasion.  *  Increased edema within the adductor rosy when compared to prior  study, possibly neurogenic.     PATHOLOGY         Final Diagnosis   A.  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   Electronically signed by Vineet Coello MD on 1/17/2023 at 10:17 AM   Comment   UUMAYO   The neoplasm is comprised of highly pleomorphic spindle cells with stromal hyalinization and extensive areas of necrosis.  Focal areas with scattered lipoblasts are seen.  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  MDM-2 gene amplification study by FISH is in progress and result will be provided separately.       I reviewed the medical records including medical records, laboratory studies, and have personally reviewed imaging including MRI of the R thigh which demonstrates a large 27 cm mass in the posterior compartment of the R thigh.      ASSESSMENT AND PLAN     Mr. 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     He received C1 on 1/30. Patient did experienced worsening pain shortly after starting chemotherapy. MRI done on 2/20/23 showing increase in size of large posterior thigh sarcoma. The comparison of this MRI is to the baseline done on 1/6/23, and in a patient with high grade sarcoma the mass is presumed to have grown in size in the month before starting chemotherapy. However, given that the patient had increase in pain and there is no areas of necrosis observed in the recent MRI, I will consider this to be progression of disease and recommend to plan for surgery with consideration for preoperative radiation therapy.      He started radiation therapy on 3/15.  I reviewed the PET scan done on 3/14 showing some tumor shrinkage at 14 x 14 x 18 from prior 11 x 15.7 x 21. We discussed that given some response was seen, if needed in the future this will still be an option. Right now, given prior complications from  chemo (admission for neutropenic fever, bleeding in the urine presumed to be 2/2 to ifosfamide), we will hold on further chemo prior to surgery.     He has developed a rash, papular, pruritic, getting better on hydroxyzine, but lesions are still present.     Plan:    -Urgent appointment with Derm to evaluate rash, he will likely need a biopsy.   -Labs only showing anemia at 9, RT is probably contributing. He will keep taking iron and folate.   -I will follow up after completion of RT to help coordinate and address any problems before surgery.       40 minutes spent on the date of the encounter doing chart review, review of test results, interpretation of tests, patient visit, documentation and discussion with other provider(s)     Michael Laboy MD   of Medicine  Hematology, Oncology and Transplantation

## 2023-03-28 NOTE — NURSING NOTE
Is the patient currently in the state of [place]? YES    Visit mode:VIDEO    If the visit is dropped, the patient can be reconnected by: VIDEO VISIT: Text to cell phone: [phone]    Will anyone else be joining the visit? NO      How would you like to obtain your AVS? MyChart    Are changes needed to the allergy or medication list? NO     Senokot is used prn.     Reason for visit: Return CCSL

## 2023-03-29 ENCOUNTER — OFFICE VISIT (OUTPATIENT)
Dept: RADIATION ONCOLOGY | Facility: CLINIC | Age: 53
End: 2023-03-29
Attending: RADIOLOGY
Payer: COMMERCIAL

## 2023-03-29 ENCOUNTER — TELEPHONE (OUTPATIENT)
Dept: DERMATOLOGY | Facility: CLINIC | Age: 53
End: 2023-03-29
Payer: COMMERCIAL

## 2023-03-29 VITALS
BODY MASS INDEX: 38.35 KG/M2 | OXYGEN SATURATION: 94 % | DIASTOLIC BLOOD PRESSURE: 67 MMHG | SYSTOLIC BLOOD PRESSURE: 140 MMHG | HEART RATE: 101 BPM | RESPIRATION RATE: 16 BRPM | WEIGHT: 275 LBS

## 2023-03-29 DIAGNOSIS — C49.9 UNDIFFERENTIATED PLEOMORPHIC SARCOMA (H): ICD-10-CM

## 2023-03-29 DIAGNOSIS — C49.9 SARCOMA OF SOFT TISSUE (H): Primary | ICD-10-CM

## 2023-03-29 PROCEDURE — 77386 HC IMRT TREATMENT DELIVERY, COMPLEX: CPT | Performed by: RADIOLOGY

## 2023-03-29 RX ORDER — HYDROMORPHONE HYDROCHLORIDE 2 MG/1
4 TABLET ORAL EVERY 6 HOURS PRN
Qty: 90 TABLET | Refills: 0 | Status: SHIPPED | OUTPATIENT
Start: 2023-03-29 | End: 2023-04-12

## 2023-03-29 RX ORDER — MORPHINE SULFATE 15 MG/1
15 TABLET, FILM COATED, EXTENDED RELEASE ORAL EVERY 12 HOURS
Refills: 0 | Status: CANCELLED | OUTPATIENT
Start: 2023-03-29

## 2023-03-29 ASSESSMENT — PAIN SCALES - GENERAL: PAINLEVEL: SEVERE PAIN (6)

## 2023-03-29 NOTE — LETTER
3/29/2023         RE: Antonio Canseco  [address] [place]  [facility] [address]        Dear Colleague,    Thank you for referring your patient, Antonio Canseco, to the [facility] RADIATION ONCOLOGY. Please see a copy of my visit note below.    [facility] PHYSICIANS  SPECIALIZING IN BREAKTHROUGHS  Radiation Oncology    On Treatment Visit Note      Antonio Canseco      Date: 3/29/2023   MRN: [id number]   : 1970  Diagnosis: liposarcoma      Reason for Visit:  On Radiation Treatment Visit     Treatment Summary to Date  Treatment Site: R hamstring Current Dose: 2200/5000 cGy Fractions:       Chemotherapy  Chemo concurrent with radx?: No    ED Visit/Hospital Admission: none    Treatment Breaks: none    Subjective:   Mr. Canseco presents for initiation of radiotherapy. He continues to endorse severe pain in the right thigh, presently 6 out of 10.  He has been taking Dilaudid 2 mg every 3 hours while awake.  He has not tried increasing the dose to 4 mg. Pain is exacerbated by sitting and standing and made better by laying down.     Recent laboratory work-up did not reveal a source of his dark urine or pruritus.  He has been referred to dermatology.      Nursing ROS:   Nutrition Alteration  Diet Type: Patient's Preference  Skin  Skin Reaction: 0 - No changes at the treatment site; evidence of pruritus elsewhere particularly on the trunk.  Skin Note: Educated Patient on the use of aqupahor or eucerin cream BID.  CNS Alteration  CNS note: WNL     Cardiovascular  Respiratory effort: 1 - Normal - without distress  Gastrointestinal  Nausea: 0 - None     Psychosocial  Mood - Anxiety: 0 - Normal  Pain Assessment  0-10 Pain Scale: Presently 4, up to 7      Objective:   BP (!) 140/67 (BP Location: Right arm)   Pulse 101   Resp 16   Wt 124.7 kg (275 lb)   SpO2 94%   BMI 38.35 kg/m    Severe Pain (6)  Gen: Appears well, in no acute distress  Skin: No erythema, evidence of excoriation in the  trunk area.    MSK: massive right thigh from knee to buttocks    Labs:  Most Recent 3 CBC's:Recent Labs   Lab Test 03/24/23  0946 02/24/23  1307 02/20/23  1236   WBC 8.0 12.5* 17.5*   HGB 9.1* 10.3* 9.9*   MCV 82 82 81    564* 515*     Most Recent 3 BMP's:Recent Labs   Lab Test 03/24/23  0946 02/24/23  1307 02/20/23  1236    139 140   POTASSIUM 4.2 4.5 4.3   CHLORIDE 102 99 100   CO2 26 29 29   BUN 13.2 17.6 11.1   CR 0.82 1.13 1.08   ANIONGAP 12 11 11   DORIAN 9.3 9.4 8.9   * 120* 141*     Most Recent 2 LFT's:Recent Labs   Lab Test 03/24/23  0946 02/24/23  1307   AST 17 18   ALT 5* 10   ALKPHOS 48 71   BILITOTAL 0.6 0.4       Assessment:    Tolerating radiation therapy reasonably well in the setting of ongoing pain.  We discussed that he should increase Dilaudid to 4 mg every time he takes it and keep track of this for the next 2 days.  We will meet with him again on Friday and discussed increasing his MS Contin should there be adequate need to do so based on as needed Dilaudid.  All questions and concerns addressed.    Toxicities:  Pain: Grade 2: Moderate pain; limiting instrumental ADL  Dermatitis: Grade 0: No toxicity    Plan:   Continue current therapy.    Continue Atarax for itchiness.  Dermatology referral for rash has been placed by medical oncology.  Discussed increasing as needed Dilaudid as per above we will make adjustments to MS Contin as needed if he is using it consistently notable amount of Dilaudid.      Mosaiq chart and setup information reviewed  MVCT/IGRT images checked    Medication Review  Med list reviewed with patient?: Yes  Med Note: See above read pain plan    Educational Topic Discussed  Education Instructions: Reviewed      Sydney Valle MD MPH PhD    Department of Radiation Oncology      Again, thank you for allowing me to participate in the care of your patient.        Sincerely,        Sydney Valle

## 2023-03-29 NOTE — PROGRESS NOTES
[facility] PHYSICIANS  SPECIALIZING IN BREAKTHROUGHS  Radiation Oncology    On Treatment Visit Note      Antonio Canseco      Date: 3/22/2023   MRN: [id number]   : 1970  Diagnosis: liposarcoma      Reason for Visit:  On Radiation Treatment Visit     Treatment Summary to Date  Treatment Site: R hamstring Current Dose: 1200/5000 cGy Fractions:       Chemotherapy  Chemo concurrent with radx?: No    ED Visit/Hospital Admission: none    Treatment Breaks: none    Subjective:   Mr. Canseco presents for initiation of radiotherapy. He continues to endorse severe pain in the right thigh, presently 4 out of 10, 7/10 at its worst, alleviated by morphine SR 30mg PO BID and dilaudid 2mg PO q6h prn pain.  We discussed taking Dilaudid first more frequently, up to every every 3 hours as needed, and/or with increasing milligrams per dose, up to 4 mg to start. Pain is exacerbated by sitting and standing and made better by laying down.     He is concerned given ongoing dark urine and persistent rash despite Atarax.  He has not had recent lab work.      Nursing ROS:   Nutrition Alteration  Diet Type: Patient's Preference  Skin  Skin Reaction: 0 - No changes at the treatment site; evidence of pruritus elsewhere particularly on the trunk.  Skin Note: Educated Patient on the use of aqupahor or eucerin cream BID.  CNS Alteration  CNS note: WNL     Cardiovascular  Respiratory effort: 1 - Normal - without distress  Gastrointestinal  Nausea: 0 - None     Psychosocial  Mood - Anxiety: 0 - Normal  Pain Assessment  0-10 Pain Scale: Presently 4, up to 7      Objective:   /88 (BP Location: Right arm)   Pulse 95   Resp 16   Wt 124.3 kg (274 lb)   SpO2 99%   BMI 38.22 kg/m    Moderate Pain (4)  Gen: Appears well, in no acute distress  Skin: No erythema, evidence of excoriation in the trunk area.  MSK: massive right thigh from knee to buttocks    Labs:  CBC RESULTS: Recent Labs   Lab Test 23  1307   WBC 12.5*    RBC 4.25*   HGB 10.3*   HCT 34.8*   MCV 82   MCH 24.2*   MCHC 29.6*   RDW 20.3*   *     ELECTROLYTES:  Recent Labs   Lab Test 02/24/23  1307      POTASSIUM 4.5   CHLORIDE 99   DORIAN 9.4   CO2 29   BUN 17.6   CR 1.13   *       Assessment:    Tolerating radiation therapy reasonably well in the setting of ongoing pain.  All questions and concerns addressed.    Toxicities:  Pain: Grade 2: Moderate pain; limiting instrumental ADL  Dermatitis: Grade 0: No toxicity    Plan:   1. Continue current therapy.    2. Continue Atarax for itchiness.  Discussed need for lab work with medical oncology, which they have ordered.  3. Discussed increasing as needed Dilaudid as per above we will make adjustments to MS Contin as needed if he is using it consistently notable amount of Dilaudid.      Mosaiq chart and setup information reviewed  MVCT/IGRT images checked    Medication Review  Med list reviewed with patient?: Yes  Med Note: See above read pain plan    Educational Topic Discussed  Education Instructions: Reviewed      Sydney Valle MD MPH PhD    Department of Radiation Oncology

## 2023-03-29 NOTE — TELEPHONE ENCOUNTER
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  Based on the availability of our provider(s), we are unable to accommodate this request.      Were all sites offered this patient?  Yes      Does scheduling algorithm request to schedule next available?  Patient appointment has not been scheduled.  Please review the referral request for accommodation and contact the patient.  If unable to accommodate, please resubmit a referral and indicate a preferred partner or affiliate location using Provider Finder or Scheduling Instructions field.

## 2023-03-30 ENCOUNTER — TELEPHONE (OUTPATIENT)
Dept: DERMATOLOGY | Facility: CLINIC | Age: 53
End: 2023-03-30
Payer: COMMERCIAL

## 2023-03-30 ENCOUNTER — APPOINTMENT (OUTPATIENT)
Dept: RADIATION ONCOLOGY | Facility: CLINIC | Age: 53
End: 2023-03-30
Attending: RADIOLOGY
Payer: COMMERCIAL

## 2023-03-30 PROCEDURE — 77386 HC IMRT TREATMENT DELIVERY, COMPLEX: CPT | Performed by: RADIOLOGY

## 2023-03-30 PROCEDURE — 77014 PR CT GUIDE FOR PLACEMENT RADIATION THERAPY FIELDS: CPT | Mod: 26 | Performed by: RADIOLOGY

## 2023-03-30 NOTE — TELEPHONE ENCOUNTER
[facility] Call [facility]    Phone Message    May a detailed message be left on voicemail: yes     Reason for Call: Appointment Intake    Referring Provider Name: Michael Laboy MD in  ONCOLOGY ADULT    Diagnosis and/or Symptoms: Patient with high grade sarcoma undergoing RT, has developed a rash papular/nodular generalized, pruritic.    URGENT: 3-5 days    Action Taken: Message routed to:  Other: Derm    Travel Screening: Not Applicable

## 2023-03-31 ENCOUNTER — APPOINTMENT (OUTPATIENT)
Dept: RADIATION ONCOLOGY | Facility: CLINIC | Age: 53
End: 2023-03-31
Attending: RADIOLOGY
Payer: COMMERCIAL

## 2023-03-31 VITALS — HEART RATE: 107 BPM | OXYGEN SATURATION: 99 % | SYSTOLIC BLOOD PRESSURE: 150 MMHG | DIASTOLIC BLOOD PRESSURE: 83 MMHG

## 2023-03-31 DIAGNOSIS — C49.9 UNDIFFERENTIATED PLEOMORPHIC SARCOMA (H): Primary | ICD-10-CM

## 2023-03-31 PROCEDURE — 77427 RADIATION TX MANAGEMENT X5: CPT | Performed by: RADIOLOGY

## 2023-03-31 PROCEDURE — 77386 HC IMRT TREATMENT DELIVERY, COMPLEX: CPT | Performed by: RADIOLOGY

## 2023-03-31 RX ORDER — MORPHINE SULFATE 15 MG/1
15 TABLET, FILM COATED, EXTENDED RELEASE ORAL DAILY
Qty: 60 TABLET | Refills: 0 | Status: ON HOLD | OUTPATIENT
Start: 2023-03-31 | End: 2023-05-23

## 2023-03-31 NOTE — LETTER
3/31/2023         RE: Antonio Canseco  [address] [place]  [facility] [address]        Dear Colleague,    Thank you for referring your patient, Antonio Canseco, to the [facility] RADIATION ONCOLOGY. Please see a copy of my visit note below.    [facility] PHYSICIANS  SPECIALIZING IN BREAKTHROUGHS  Radiation Oncology    On Treatment Visit Note      Antonio Canseco      Date: 3/31/2023   MRN: [id number]   : 1970  Diagnosis: liposarcoma      Reason for Visit:  On Radiation Treatment Visit     Treatment Summary to Date  Treatment Site: R hamstring Current Dose: 2600/5000 cGy Fractions:       Chemotherapy  Chemo concurrent with radx?: No    ED Visit/Hospital Admission: none    Treatment Breaks: none    Subjective:   Mr. Canseco is seen to discuss pain management during his ongoing care.  He reports taking approximately 6 tablets of Dilaudid 4 mg in addition to MS Contin 30 mg twice daily.  His pain remains at about a 4 out of 10 on this management.    Nursing ROS:   Nutrition Alteration  Diet Type: Patient's Preference  Skin  Skin Reaction: 0 - No changes at the treatment site; evidence of pruritus elsewhere particularly on the trunk.  Skin Note: Educated Patient on the use of aqupahor or eucerin cream BID.  CNS Alteration  CNS note: WNL     Cardiovascular  Respiratory effort: 1 - Normal - without distress  Gastrointestinal  Nausea: 0 - None     Psychosocial  Mood - Anxiety: 0 - Normal  Pain Assessment  0-10 Pain Scale: Presently 4, up to 7      Objective:   BP (!) 150/83   Pulse 107   SpO2 99%   Moderate Pain (4)  Gen: Appears well, in no acute distress  Skin: No erythema, evidence of excoriation in the trunk area.    MSK: massive right thigh from knee to buttocks    Labs:  Most Recent 3 CBC's:  Recent Labs   Lab Test 23  0946 23  1307 23  1236   WBC 8.0 12.5* 17.5*   HGB 9.1* 10.3* 9.9*   MCV 82 82 81    564* 515*     Most Recent 3 BMP's:  Recent Labs   Lab  Test 03/24/23  0946 02/24/23  1307 02/20/23  1236    139 140   POTASSIUM 4.2 4.5 4.3   CHLORIDE 102 99 100   CO2 26 29 29   BUN 13.2 17.6 11.1   CR 0.82 1.13 1.08   ANIONGAP 12 11 11   DORIAN 9.3 9.4 8.9   * 120* 141*     Most Recent 2 LFT's:  Recent Labs   Lab Test 03/24/23  0946 02/24/23  1307   AST 17 18   ALT 5* 10   ALKPHOS 48 71   BILITOTAL 0.6 0.4       Assessment:    Tolerating radiation therapy reasonably well in the setting of ongoing pain.  We discussed that he should increase Dilaudid to 4 mg every time he takes it and keep track of this for the next 2 days.  We will increase MS Contin to 30 mg every morning and p.m. and 50 mg q. midday.    Toxicities:  Pain: Grade 2: Moderate pain; limiting instrumental ADL  Dermatitis: Grade 0: No toxicity    Plan:   Continue current therapy.    MS Contin to 30 mg every morning and p.m. and 50 mg q. midday.      Mosaiq chart and setup information reviewed  MVCT/IGRT images checked    Medication Review  Med list reviewed with patient?: Yes  Med Note: See above read pain plan    Educational Topic Discussed  Education Instructions: Reviewed      Sydney Valle MD MPH PhD    Department of Radiation Oncology

## 2023-04-03 ENCOUNTER — APPOINTMENT (OUTPATIENT)
Dept: RADIATION ONCOLOGY | Facility: CLINIC | Age: 53
End: 2023-04-03
Attending: RADIOLOGY
Payer: COMMERCIAL

## 2023-04-03 PROCEDURE — 77014 PR CT GUIDE FOR PLACEMENT RADIATION THERAPY FIELDS: CPT | Mod: 26 | Performed by: RADIOLOGY

## 2023-04-03 PROCEDURE — 77386 HC IMRT TREATMENT DELIVERY, COMPLEX: CPT | Performed by: RADIOLOGY

## 2023-04-04 ENCOUNTER — APPOINTMENT (OUTPATIENT)
Dept: RADIATION ONCOLOGY | Facility: CLINIC | Age: 53
End: 2023-04-04
Attending: RADIOLOGY
Payer: COMMERCIAL

## 2023-04-04 PROCEDURE — 77014 PR CT GUIDE FOR PLACEMENT RADIATION THERAPY FIELDS: CPT | Mod: 26 | Performed by: RADIOLOGY

## 2023-04-04 PROCEDURE — 77386 HC IMRT TREATMENT DELIVERY, COMPLEX: CPT | Performed by: RADIOLOGY

## 2023-04-04 PROCEDURE — 77336 RADIATION PHYSICS CONSULT: CPT | Performed by: RADIOLOGY

## 2023-04-05 ENCOUNTER — OFFICE VISIT (OUTPATIENT)
Dept: DERMATOLOGY | Facility: CLINIC | Age: 53
End: 2023-04-05
Payer: COMMERCIAL

## 2023-04-05 ENCOUNTER — OFFICE VISIT (OUTPATIENT)
Dept: RADIATION ONCOLOGY | Facility: CLINIC | Age: 53
End: 2023-04-05
Attending: RADIOLOGY
Payer: COMMERCIAL

## 2023-04-05 VITALS
RESPIRATION RATE: 16 BRPM | DIASTOLIC BLOOD PRESSURE: 76 MMHG | HEART RATE: 95 BPM | BODY MASS INDEX: 38.12 KG/M2 | OXYGEN SATURATION: 98 % | SYSTOLIC BLOOD PRESSURE: 124 MMHG | WEIGHT: 273.3 LBS

## 2023-04-05 DIAGNOSIS — R30.0 DYSURIA: Primary | ICD-10-CM

## 2023-04-05 DIAGNOSIS — R21 RASH: Primary | ICD-10-CM

## 2023-04-05 DIAGNOSIS — L29.9 PRURITUS: ICD-10-CM

## 2023-04-05 LAB
ALBUMIN UR-MCNC: 50 MG/DL
APPEARANCE UR: CLEAR
BILIRUB UR QL STRIP: NEGATIVE
COLOR UR AUTO: YELLOW
GLUCOSE UR STRIP-MCNC: NEGATIVE MG/DL
HGB UR QL STRIP: NEGATIVE
KETONES UR STRIP-MCNC: NEGATIVE MG/DL
LEUKOCYTE ESTERASE UR QL STRIP: ABNORMAL
MUCOUS THREADS #/AREA URNS LPF: PRESENT /LPF
NITRATE UR QL: NEGATIVE
PH UR STRIP: 5.5 [PH] (ref 5–7)
RBC URINE: 5 /HPF
SP GR UR STRIP: 1.03 (ref 1–1.03)
SQUAMOUS EPITHELIAL: 2 /HPF
UROBILINOGEN UR STRIP-MCNC: 2 MG/DL
WBC URINE: 23 /HPF

## 2023-04-05 PROCEDURE — 87086 URINE CULTURE/COLONY COUNT: CPT | Performed by: RADIOLOGY

## 2023-04-05 PROCEDURE — 81001 URINALYSIS AUTO W/SCOPE: CPT | Performed by: RADIOLOGY

## 2023-04-05 PROCEDURE — 99204 OFFICE O/P NEW MOD 45 MIN: CPT | Performed by: STUDENT IN AN ORGANIZED HEALTH CARE EDUCATION/TRAINING PROGRAM

## 2023-04-05 PROCEDURE — 77386 HC IMRT TREATMENT DELIVERY, COMPLEX: CPT | Performed by: RADIOLOGY

## 2023-04-05 RX ORDER — NITROFURANTOIN 25; 75 MG/1; MG/1
100 CAPSULE ORAL 2 TIMES DAILY
Qty: 14 CAPSULE | Refills: 0 | Status: SHIPPED | OUTPATIENT
Start: 2023-04-05 | End: 2023-04-12

## 2023-04-05 RX ORDER — TRIAMCINOLONE ACETONIDE 1 MG/G
OINTMENT TOPICAL 2 TIMES DAILY
Qty: 454 G | Refills: 1 | Status: ON HOLD | OUTPATIENT
Start: 2023-04-05 | End: 2023-05-22

## 2023-04-05 ASSESSMENT — PAIN SCALES - GENERAL: PAINLEVEL: MODERATE PAIN (4)

## 2023-04-05 NOTE — PROGRESS NOTES
[facility] PHYSICIANS  SPECIALIZING IN BREAKTHROUGHS  Radiation Oncology    On Treatment Visit Note      Antonio Canseco      Date: 3/29/2023   MRN: [id number]   : 1970  Diagnosis: liposarcoma      Reason for Visit:  On Radiation Treatment Visit     Treatment Summary to Date  Treatment Site: R hamstring Current Dose: 2200/5000 cGy Fractions:       Chemotherapy  Chemo concurrent with radx?: No    ED Visit/Hospital Admission: none    Treatment Breaks: none    Subjective:   Mr. Canseco presents for initiation of radiotherapy. He continues to endorse severe pain in the right thigh, presently 6 out of 10.  He has been taking Dilaudid 2 mg every 3 hours while awake.  He has not tried increasing the dose to 4 mg. Pain is exacerbated by sitting and standing and made better by laying down.     Recent laboratory work-up did not reveal a source of his dark urine or pruritus.  He has been referred to dermatology.      Nursing ROS:   Nutrition Alteration  Diet Type: Patient's Preference  Skin  Skin Reaction: 0 - No changes at the treatment site; evidence of pruritus elsewhere particularly on the trunk.  Skin Note: Educated Patient on the use of aqupahor or eucerin cream BID.  CNS Alteration  CNS note: WNL     Cardiovascular  Respiratory effort: 1 - Normal - without distress  Gastrointestinal  Nausea: 0 - None     Psychosocial  Mood - Anxiety: 0 - Normal  Pain Assessment  0-10 Pain Scale: Presently 4, up to 7      Objective:   BP (!) 140/67 (BP Location: Right arm)   Pulse 101   Resp 16   Wt 124.7 kg (275 lb)   SpO2 94%   BMI 38.35 kg/m    Severe Pain (6)  Gen: Appears well, in no acute distress  Skin: No erythema, evidence of excoriation in the trunk area.    MSK: massive right thigh from knee to buttocks    Labs:  Most Recent 3 CBC's:Recent Labs   Lab Test 23  0946 23  1307 23  1236   WBC 8.0 12.5* 17.5*   HGB 9.1* 10.3* 9.9*   MCV 82 82 81    564* 515*     Most Recent 3  BMP's:Recent Labs   Lab Test 03/24/23  0946 02/24/23  1307 02/20/23  1236    139 140   POTASSIUM 4.2 4.5 4.3   CHLORIDE 102 99 100   CO2 26 29 29   BUN 13.2 17.6 11.1   CR 0.82 1.13 1.08   ANIONGAP 12 11 11   DORIAN 9.3 9.4 8.9   * 120* 141*     Most Recent 2 LFT's:Recent Labs   Lab Test 03/24/23  0946 02/24/23  1307   AST 17 18   ALT 5* 10   ALKPHOS 48 71   BILITOTAL 0.6 0.4       Assessment:    Tolerating radiation therapy reasonably well in the setting of ongoing pain.  We discussed that he should increase Dilaudid to 4 mg every time he takes it and keep track of this for the next 2 days.  We will meet with him again on Friday and discussed increasing his MS Contin should there be adequate need to do so based on as needed Dilaudid.  All questions and concerns addressed.    Toxicities:  Pain: Grade 2: Moderate pain; limiting instrumental ADL  Dermatitis: Grade 0: No toxicity    Plan:   1. Continue current therapy.    2. Continue Atarax for itchiness.  Dermatology referral for rash has been placed by medical oncology.  3. Discussed increasing as needed Dilaudid as per above we will make adjustments to MS Contin as needed if he is using it consistently notable amount of Dilaudid.      Mosaiq chart and setup information reviewed  MVCT/IGRT images checked    Medication Review  Med list reviewed with patient?: Yes  Med Note: See above read pain plan    Educational Topic Discussed  Education Instructions: Reviewed      Sydney Valle MD MPH PhD    Department of Radiation Oncology

## 2023-04-05 NOTE — LETTER
4/5/2023         RE: Antonio Canseco  [address] [place]  [facility] [address]        Dear Colleague,    Thank you for referring your patient, Antonio Canseco, to the [facility]. Please see a copy of my visit note below.    [facility] Dermatology Note    Encounter Date: Apr 5, 2023    Dermatology Problem List:      Major PMHx  #High grade sarcoma   - chemo cycle1 Doxil/Ifosfamide 1/30  ______________________________________    Impression/Plan:  Antonio was seen today for derm problem.    Diagnoses and all orders for this visit:    Pruritus  Rash  -     triamcinolone (KENALOG) 0.1 % external ointment; Apply topically 2 times daily  -Unilateral rash affecting the right side of the chest and the right upper arm with a papular morphology that initially worsened and then resolved on its own with only symptomatic treatment of pruritus with hydroxyzine  - Appeared around the time of patient's chemotherapy when he was functionally immunosuppressed  - Based on these factors zoster is high on the differential, most inflammatory cutaneous conditions would not be localized to just 1 side of the chest and the arm and would not resolve on their own with just hydroxyzine.  Rash obviously spans thoracic and cervical dermatomes, however this may be explained by patient's more severe functionally immunosuppressed state at the time of outbreak at the end of the visit wife also points out unilateral postinflammatory hyperpigmentation of the right lower leg, its possible that this was also zoster although it it could also have been a separate rash difficult to say at this point and again when things have resolved  - Given triamcinolone for symptomatic pruritus  - Instructed to call right away if rash were to return or spread elsewhere  - - no contraindication to have grafting procedure done         Follow-up PRN.       Staff Involved:  Staff Only    Rodrigo Tavarez MD  Assistant  Professor of Dermatology  Department of Dermatology  [facility] [employer]      CC:   Chief Complaint   Patient presents with     Derm Problem     Rash, itchy, all over the body, happened after chemo        History of Present Illness:  Mr. Antonio Canseco is a 52 year old male who presents as a new patient.    Rash started after discharge on 02/28. Has been very pruritic .Scratches until he bleeds. Has been using hydroxyzine which essentially resolved the pruritus. His skin has continued to heal.  The pruritus has resolved.  Only thing that he has left today are some areas of excoriation.  His wife who is a nurse is present in the room.  They do not have pictures of what the rash looks like when it first appeared    Labs:      Physical exam:  Vitals: There were no vitals taken for this visit.  GEN: well developed, well-nourished, in no acute distress, in a pleasant mood.     SKIN: Lopez phototype 1  - Waist-up skin, which includes the head/face, neck, both arms, chest, back, abdomen, digits and/or nails was examined.  -Unilateral excoriations and resolving pink papules on the right chest and right upper arm in a dermatomal distribution  - No other lesions of concern on areas examined.     Past Medical History:   Past Medical History:   Diagnosis Date     Acrochordon 02/11/2021     CARDIOVASCULAR SCREENING; LDL GOAL LESS THAN 160 03/27/2012     Colon adenoma      Controlled type 2 diabetes mellitus without complication, without long-term current use of insulin (H) 09/16/2019    X 1     Cough 02/04/2014     Degeneration of thoracic intervertebral disc 12/11/2013     Dysfunction of both eustachian tubes 04/12/2019     Elevated blood pressure 12/22/2014     Elevated hemoglobin A1c 09/16/2019    X 1     Gastroesophageal reflux disease      Hamstring strain, right, subsequent encounter 12/19/2022     Hepatic cyst 05/22/2018     Hepatic steatosis 12/11/2013     History of colonic polyps  12/11/2013     History of drainage of abscess 09/16/2019     Hypertension      Irritable bowel syndrome without diarrhea 06/01/2018     Low libido 04/12/2019     Lumbar radiculopathy 12/19/2022     Microscopic hematuria 12/19/2013     Nephrolithiasis      Obesity 03/27/2012     BRIAN on CPAP 03/27/2012     Other irritable bowel syndrome 05/22/2018     Pain in thoracic spine 05/09/2013     Pulmonary nodule 05/22/2018     Right-sided chest pain 04/13/2018     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     Sleep apnea      Tinea pedis of both feet 02/11/2021     Tinnitus, unspecified laterality 04/12/2019     Uncontrolled diabetes mellitus with hyperglycemia, without long-term current use of insulin (H) 01/23/2023     Past Surgical History:   Procedure Laterality Date     COLONOSCOPY       COLONOSCOPY N/A 03/12/2021    Procedure: COLONOSCOPY (fv);  Surgeon: Ean Alcantara MD;  Location: [facility] OR     CYSTOSCOPY  02/11/2014    Procedure: CYSTOSCOPY;   Video Cystoscopy with Exam Under Anesthesia;  Surgeon: Chente Moeller MD;  Location: [facility] OR     IR CVC TUNNEL PLACEMENT > 5 YRS OF AGE  1/27/2023     IR CVC TUNNEL REMOVAL RIGHT  3/13/2023     LEG SURGERY Left 2019    Suregy r/t infection     wisdom teeth         Social History:   reports that he quit smoking about 9 years ago. His smoking use included cigarettes. He started smoking about 34 years ago. He has a 25.00 pack-year smoking history. He has never used smokeless tobacco. He reports that he does not currently use alcohol. He reports that he does not use drugs.    Family History:  Family History   Problem Relation Age of Onset     Family History Negative Mother      Obesity Mother      Cancer Father         lymphoma     Other Cancer Father      Other Cancer Paternal Grandfather        Medications:  Current Outpatient Medications   Medication Sig Dispense Refill     ACCU-CHEK GUIDE test strip Use to test blood sugar 3 times daily. 100 strip 4     clindamycin (CLINDAMAX) 1 % external  gel Apply topically 2 times daily , to HS lesion 30 g 0     Continuous Blood Gluc Sensor (FREESTYLE MORENITA 3 SENSOR) MISC 1 each every 14 days Use 1 sensor every 14 days. Use to read blood sugars per 's instructions. 2 each 5     DULoxetine (CYMBALTA) 60 MG capsule TAKE 1 CAPSULE (60 MG) BY MOUTH DAILY 90 capsule 1     ferrous sulfate (FEROSUL) 325 (65 Fe) MG tablet Take 1 tablet (325 mg) by mouth daily (with breakfast) 30 tablet 1     folic acid (FOLVITE) 400 MCG tablet Take 1 tablet (400 mcg) by mouth daily 30 tablet 1     heparin lock flush 10 UNIT/ML SOLN injection 3 mLs by Intracatheter route every 24 hours Flush each lumen with 3 mLs (total 6mL in 24 hours period) 180 mL 0     HYDROmorphone (DILAUDID) 2 MG tablet Take 2 tablets (4 mg) by mouth every 6 hours as needed for pain 90 tablet 0     HYDROmorphone (DILAUDID) 2 MG tablet Take 0.5-1 tablets (1-2 mg) by mouth every 6 hours as needed for moderate to severe pain 24 tablet 0     lidocaine (LIDODERM) 5 % patch Place 1 patch onto the skin every 24 hours To prevent lidocaine toxicity, patient should be patch free for 12 hrs daily. (Patient taking differently: Place onto the skin every 24 hours To prevent lidocaine toxicity, patient should be patch free for 12 hrs daily.) 14 patch 1     lisinopril (ZESTRIL) 20 MG tablet TAKE 1 TABLET (20 MG) BY MOUTH DAILY 90 tablet 1     metFORMIN (GLUCOPHAGE XR) 500 MG 24 hr tablet Take 3 tablets (1,500 mg) by mouth daily (with dinner) 270 tablet 1     morphine (MS CONTIN) 15 MG CR tablet Take 1 tablet (15 mg) by mouth daily for 60 days Take at midday; in addition to 30mg in AM and PM 60 tablet 0     morphine (MS CONTIN) 30 MG CR tablet Take 1 tablet (30 mg) by mouth every 12 hours 60 tablet 0     naloxone (NARCAN) 4 MG/0.1ML nasal spray Spray 1 spray (4 mg) into one nostril alternating nostrils as needed for opioid reversal every 2-3 minutes until assistance arrives 2 each 1     omeprazole 20 MG tablet Take 20 mg  by mouth daily as needed       phosphorus tablet 250 mg 250 MG per tablet Take 2 tablets (500 mg) by mouth 3 times daily Take until directed otherwise 84 tablet 1     pregabalin (LYRICA) 25 MG capsule Take 1 capsule (25 mg) by mouth 3 times daily 90 capsule 1     pregabalin (LYRICA) 25 MG capsule Take 1 capsule (25 mg) by mouth 3 times daily 36 capsule 0     senna-docusate (SENOKOT-S/PERICOLACE) 8.6-50 MG tablet Take 1 tablet by mouth 2 times daily 30 tablet 0     sennosides (SENOKOT) 8.6 MG tablet Take 1 tablet by mouth daily as needed for constipation       sucralfate (CARAFATE) 1 GM/10ML suspension Take 10 mLs (1 g) by mouth 4 times daily as needed for nausea (or indigestion) 414 mL 0     triamcinolone (KENALOG) 0.1 % external ointment Apply topically 2 times daily 454 g 1     Allergies   Allergen Reactions     Emend [Aprepitant] Shortness Of Breath     Couldn't breathe and started to pass out during 1st administration      Kiwi Itching                   Again, thank you for allowing me to participate in the care of your patient.        Sincerely,        Rodrigo Tavarez MD

## 2023-04-05 NOTE — PROGRESS NOTES
[facility] Dermatology Note    Encounter Date: Apr 5, 2023    Dermatology Problem List:      Major PMHx  #High grade sarcoma   - chemo cycle1 Doxil/Ifosfamide 1/30  ______________________________________    Impression/Plan:  Antonio was seen today for derm problem.    Diagnoses and all orders for this visit:    Pruritus  Rash  -     triamcinolone (KENALOG) 0.1 % external ointment; Apply topically 2 times daily  -Unilateral rash affecting the right side of the chest and the right upper arm with a papular morphology that initially worsened and then resolved on its own with only symptomatic treatment of pruritus with hydroxyzine  - Appeared around the time of patient's chemotherapy when he was functionally immunosuppressed  - Based on these factors zoster is high on the differential, most inflammatory cutaneous conditions would not be localized to just 1 side of the chest and the arm and would not resolve on their own with just hydroxyzine.  Rash obviously spans thoracic and cervical dermatomes, however this may be explained by patient's more severe functionally immunosuppressed state at the time of outbreak at the end of the visit wife also points out unilateral postinflammatory hyperpigmentation of the right lower leg, its possible that this was also zoster although it it could also have been a separate rash difficult to say at this point and again when things have resolved  - Given triamcinolone for symptomatic pruritus  - Instructed to call right away if rash were to return or spread elsewhere  - - no contraindication to have grafting procedure done         Follow-up PRN.       Staff Involved:  Staff Only    Rodrigo Tavarez MD   of Dermatology  Department of Dermatology  [facility]      CC:   Chief Complaint   Patient presents with     Derm Problem     Rash, itchy, all over the body, happened after chemo        History of Present Illness:    Antonio Canseco is a 52 year old male who presents as a new patient.    Rash started after discharge on 02/28. Has been very pruritic .Scratches until he bleeds. Has been using hydroxyzine which essentially resolved the pruritus. His skin has continued to heal.  The pruritus has resolved.  Only thing that he has left today are some areas of excoriation.  His wife who is a nurse is present in the room.  They do not have pictures of what the rash looks like when it first appeared    Labs:      Physical exam:  Vitals: There were no vitals taken for this visit.  GEN: well developed, well-nourished, in no acute distress, in a pleasant mood.     SKIN: Lopez phototype 1  - Waist-up skin, which includes the head/face, neck, both arms, chest, back, abdomen, digits and/or nails was examined.  -Unilateral excoriations and resolving pink papules on the right chest and right upper arm in a dermatomal distribution  - No other lesions of concern on areas examined.     Past Medical History:   Past Medical History:   Diagnosis Date     Acrochordon 02/11/2021     CARDIOVASCULAR SCREENING; LDL GOAL LESS THAN 160 03/27/2012     Colon adenoma      Controlled type 2 diabetes mellitus without complication, without long-term current use of insulin (H) 09/16/2019    X 1     Cough 02/04/2014     Degeneration of thoracic intervertebral disc 12/11/2013     Dysfunction of both eustachian tubes 04/12/2019     Elevated blood pressure 12/22/2014     Elevated hemoglobin A1c 09/16/2019    X 1     Gastroesophageal reflux disease      Hamstring strain, right, subsequent encounter 12/19/2022     Hepatic cyst 05/22/2018     Hepatic steatosis 12/11/2013     History of colonic polyps 12/11/2013     History of drainage of abscess 09/16/2019     Hypertension      Irritable bowel syndrome without diarrhea 06/01/2018     Low libido 04/12/2019     Lumbar radiculopathy 12/19/2022     Microscopic hematuria 12/19/2013     Nephrolithiasis      Obesity  03/27/2012     BRIAN on CPAP 03/27/2012     Other irritable bowel syndrome 05/22/2018     Pain in thoracic spine 05/09/2013     Pulmonary nodule 05/22/2018     Right-sided chest pain 04/13/2018     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     Sleep apnea      Tinea pedis of both feet 02/11/2021     Tinnitus, unspecified laterality 04/12/2019     Uncontrolled diabetes mellitus with hyperglycemia, without long-term current use of insulin (H) 01/23/2023     Past Surgical History:   Procedure Laterality Date     COLONOSCOPY       COLONOSCOPY N/A 03/12/2021    Procedure: COLONOSCOPY (fv);  Surgeon: Ean Alcantara MD;  Location: [facility] OR     CYSTOSCOPY  02/11/2014    Procedure: CYSTOSCOPY;   Video Cystoscopy with Exam Under Anesthesia;  Surgeon: Chente Moeller MD;  Location: [facility] OR     IR CVC TUNNEL PLACEMENT > 5 YRS OF AGE  1/27/2023     IR CVC TUNNEL REMOVAL RIGHT  3/13/2023     LEG SURGERY Left 2019    Suregy r/t infection     wisdom teeth         Social History:   reports that he quit smoking about 9 years ago. His smoking use included cigarettes. He started smoking about 34 years ago. He has a 25.00 pack-year smoking history. He has never used smokeless tobacco. He reports that he does not currently use alcohol. He reports that he does not use drugs.    Family History:  Family History   Problem Relation Age of Onset     Family History Negative Mother      Obesity Mother      Cancer Father         lymphoma     Other Cancer Father      Other Cancer Paternal Grandfather        Medications:  Current Outpatient Medications   Medication Sig Dispense Refill     ACCU-CHEK GUIDE test strip Use to test blood sugar 3 times daily. 100 strip 4     clindamycin (CLINDAMAX) 1 % external gel Apply topically 2 times daily , to HS lesion 30 g 0     Continuous Blood Gluc Sensor (FREESTYLE MORENITA 3 SENSOR) [facility] 1 each every 14 days Use 1 sensor every 14 days. Use to read blood sugars per 's instructions. 2 each 5     DULoxetine (CYMBALTA) 60  MG capsule TAKE 1 CAPSULE (60 MG) BY MOUTH DAILY 90 capsule 1     ferrous sulfate (FEROSUL) 325 (65 Fe) MG tablet Take 1 tablet (325 mg) by mouth daily (with breakfast) 30 tablet 1     folic acid (FOLVITE) 400 MCG tablet Take 1 tablet (400 mcg) by mouth daily 30 tablet 1     heparin lock flush 10 UNIT/ML SOLN injection 3 mLs by Intracatheter route every 24 hours Flush each lumen with 3 mLs (total 6mL in 24 hours period) 180 mL 0     HYDROmorphone (DILAUDID) 2 MG tablet Take 2 tablets (4 mg) by mouth every 6 hours as needed for pain 90 tablet 0     HYDROmorphone (DILAUDID) 2 MG tablet Take 0.5-1 tablets (1-2 mg) by mouth every 6 hours as needed for moderate to severe pain 24 tablet 0     lidocaine (LIDODERM) 5 % patch Place 1 patch onto the skin every 24 hours To prevent lidocaine toxicity, patient should be patch free for 12 hrs daily. (Patient taking differently: Place onto the skin every 24 hours To prevent lidocaine toxicity, patient should be patch free for 12 hrs daily.) 14 patch 1     lisinopril (ZESTRIL) 20 MG tablet TAKE 1 TABLET (20 MG) BY MOUTH DAILY 90 tablet 1     metFORMIN (GLUCOPHAGE XR) 500 MG 24 hr tablet Take 3 tablets (1,500 mg) by mouth daily (with dinner) 270 tablet 1     morphine (MS CONTIN) 15 MG CR tablet Take 1 tablet (15 mg) by mouth daily for 60 days Take at midday; in addition to 30mg in AM and PM 60 tablet 0     morphine (MS CONTIN) 30 MG CR tablet Take 1 tablet (30 mg) by mouth every 12 hours 60 tablet 0     naloxone (NARCAN) 4 MG/0.1ML nasal spray Spray 1 spray (4 mg) into one nostril alternating nostrils as needed for opioid reversal every 2-3 minutes until assistance arrives 2 each 1     omeprazole 20 MG tablet Take 20 mg by mouth daily as needed       phosphorus tablet 250 mg 250 MG per tablet Take 2 tablets (500 mg) by mouth 3 times daily Take until directed otherwise 84 tablet 1     pregabalin (LYRICA) 25 MG capsule Take 1 capsule (25 mg) by mouth 3 times daily 90 capsule 1      pregabalin (LYRICA) 25 MG capsule Take 1 capsule (25 mg) by mouth 3 times daily 36 capsule 0     senna-docusate (SENOKOT-S/PERICOLACE) 8.6-50 MG tablet Take 1 tablet by mouth 2 times daily 30 tablet 0     sennosides (SENOKOT) 8.6 MG tablet Take 1 tablet by mouth daily as needed for constipation       sucralfate (CARAFATE) 1 GM/10ML suspension Take 10 mLs (1 g) by mouth 4 times daily as needed for nausea (or indigestion) 414 mL 0     triamcinolone (KENALOG) 0.1 % external ointment Apply topically 2 times daily 454 g 1     Allergies   Allergen Reactions     Emend [Aprepitant] Shortness Of Breath     Couldn't breathe and started to pass out during 1st administration      Kiwi Itching

## 2023-04-05 NOTE — PROGRESS NOTES
[facility] PHYSICIANS  SPECIALIZING IN BREAKTHROUGHS  Radiation Oncology    On Treatment Visit Note      Antonio Canseco      Date: 2023   MRN: [id number]   : 1970  Diagnosis: liposarcoma      Reason for Visit:  On Radiation Treatment Visit     Treatment Summary to Date  Treatment Site: R hamstring Current Dose: 3200/5000 cGy Fractions:       Chemotherapy  Chemo concurrent with radx?: No    ED Visit/Hospital Admission: none    Treatment Breaks: none    Subjective:   Mr. Canseco is doing well. He reports dysuria new over the past week with associated urinary urgency. He denies fevers, hematuria and frequency. He had a cystoscopy 3-13-23 for evaluation of gross hematuria and has mild irritation since then.    He continues to have right thigh pain 4/10 in severity, alleviated by morphine SR 30mg qAM, 15mg at noon and 30mg qPM, dilaudid 2mg q4h as needed which he is only taking once a day, and tylenol 500mg PO BID.    His itchy body rash has improved and he no longer takes Hydroxyzine.      Nursing ROS:   Nutrition Alteration  Diet Type: Patient's Preference  Skin  Skin Reaction: 0 - No changes  CNS Alteration  CNS note: WNL     Cardiovascular  Respiratory effort: 1 - Normal - without distress  Gastrointestinal  Nausea: 0 - None  Genitourinary   Note: Reported urinary symptoms of burning and frequency since before last Weds possible.  UA sample collected.  Psychosocial  Mood - Anxiety: 0 - Normal  Pain Assessment  0-10 Pain Scale: 4  Pain Note: States pain as feeling better managed with current pain regimen.      Objective:   /76 (BP Location: Right arm)   Pulse 95   Resp 16   Wt 124 kg (273 lb 4.8 oz)   SpO2 98%   BMI 38.12 kg/m    Moderate Pain (4)  Gen: Appears well, in no acute distress  Skin: No erythema in radiation treatment area, mild hyperpigmentation in groin skin fold  MSK: massive right thigh from knee to buttocks    Labs:  CBC RESULTS: Recent Labs   Lab Test  02/24/23  1307   WBC 12.5*   RBC 4.25*   HGB 10.3*   HCT 34.8*   MCV 82   MCH 24.2*   MCHC 29.6*   RDW 20.3*   *     ELECTROLYTES:  Recent Labs   Lab Test 02/24/23  1307      POTASSIUM 4.5   CHLORIDE 99   DORIAN 9.4   CO2 29   BUN 17.6   CR 1.13   *       Assessment:    Tolerating radiation therapy reasonably well in the setting of ongoing pain.  All questions and concerns addressed.    Toxicities:  Pain: Grade 2: Moderate pain; limiting instrumental ADL  Dermatitis: Grade 1: Faint erythema or dry desquamation    Plan:   1. Continue current therapy.    2. Mr. Canseco will see dermatology today for follow up of rash  3. Discussed increasing Dilaudid frequency and we will make adjustments to MS Contin as needed  4. Will follow up with orthopedics regarding scheduling of MRI leg    Angela Gómez MD PGY3    Mosaiq chart and setup information reviewed  MVCT/IGRT images checked    Medication Review  Med list reviewed with patient?: Yes  Med Note: See above read pain plan    Educational Topic Discussed  Education Instructions: Reviewed      Sydney Valle MD MPH PhD    Department of Radiation Oncology

## 2023-04-06 ENCOUNTER — PREP FOR PROCEDURE (OUTPATIENT)
Dept: OTHER | Facility: CLINIC | Age: 53
End: 2023-04-06
Payer: COMMERCIAL

## 2023-04-06 ENCOUNTER — APPOINTMENT (OUTPATIENT)
Dept: RADIATION ONCOLOGY | Facility: CLINIC | Age: 53
End: 2023-04-06
Attending: RADIOLOGY
Payer: COMMERCIAL

## 2023-04-06 DIAGNOSIS — C49.9 SARCOMA OF SOFT TISSUE (H): Primary | ICD-10-CM

## 2023-04-06 LAB — BACTERIA UR CULT: NO GROWTH

## 2023-04-06 PROCEDURE — 77014 PR CT GUIDE FOR PLACEMENT RADIATION THERAPY FIELDS: CPT | Mod: 26 | Performed by: RADIOLOGY

## 2023-04-06 PROCEDURE — 77386 HC IMRT TREATMENT DELIVERY, COMPLEX: CPT | Performed by: RADIOLOGY

## 2023-04-07 ENCOUNTER — TELEPHONE (OUTPATIENT)
Dept: ORTHOPEDICS | Facility: CLINIC | Age: 53
End: 2023-04-07

## 2023-04-07 ENCOUNTER — APPOINTMENT (OUTPATIENT)
Dept: RADIATION ONCOLOGY | Facility: CLINIC | Age: 53
End: 2023-04-07
Attending: RADIOLOGY
Payer: COMMERCIAL

## 2023-04-07 PROCEDURE — 77386 HC IMRT TREATMENT DELIVERY, COMPLEX: CPT | Performed by: RADIOLOGY

## 2023-04-07 PROCEDURE — 77014 PR CT GUIDE FOR PLACEMENT RADIATION THERAPY FIELDS: CPT | Mod: 26 | Performed by: RADIOLOGY

## 2023-04-07 NOTE — TELEPHONE ENCOUNTER
Phoned patient to get him scheduled for surgery with Dr. Salgado     Patient was unavailable,   Provided call back number in voicemail:   [phone] & [phone] for care team.

## 2023-04-10 ENCOUNTER — APPOINTMENT (OUTPATIENT)
Dept: RADIATION ONCOLOGY | Facility: CLINIC | Age: 53
End: 2023-04-10
Attending: RADIOLOGY
Payer: COMMERCIAL

## 2023-04-10 PROCEDURE — 77386 HC IMRT TREATMENT DELIVERY, COMPLEX: CPT | Performed by: RADIOLOGY

## 2023-04-10 PROCEDURE — 77014 PR CT GUIDE FOR PLACEMENT RADIATION THERAPY FIELDS: CPT | Mod: 26 | Performed by: RADIOLOGY

## 2023-04-11 ENCOUNTER — APPOINTMENT (OUTPATIENT)
Dept: RADIATION ONCOLOGY | Facility: CLINIC | Age: 53
End: 2023-04-11
Attending: RADIOLOGY
Payer: COMMERCIAL

## 2023-04-11 PROCEDURE — 77386 HC IMRT TREATMENT DELIVERY, COMPLEX: CPT | Performed by: RADIOLOGY

## 2023-04-11 PROCEDURE — 77427 RADIATION TX MANAGEMENT X5: CPT | Mod: GC | Performed by: RADIOLOGY

## 2023-04-11 PROCEDURE — 77336 RADIATION PHYSICS CONSULT: CPT | Performed by: RADIOLOGY

## 2023-04-11 PROCEDURE — 77014 PR CT GUIDE FOR PLACEMENT RADIATION THERAPY FIELDS: CPT | Mod: 26 | Performed by: RADIOLOGY

## 2023-04-12 ENCOUNTER — APPOINTMENT (OUTPATIENT)
Dept: RADIATION ONCOLOGY | Facility: CLINIC | Age: 53
End: 2023-04-12
Attending: RADIOLOGY
Payer: COMMERCIAL

## 2023-04-12 VITALS
BODY MASS INDEX: 37.35 KG/M2 | HEART RATE: 104 BPM | OXYGEN SATURATION: 96 % | WEIGHT: 267.8 LBS | SYSTOLIC BLOOD PRESSURE: 112 MMHG | DIASTOLIC BLOOD PRESSURE: 82 MMHG | RESPIRATION RATE: 16 BRPM

## 2023-04-12 DIAGNOSIS — C49.9 UNDIFFERENTIATED PLEOMORPHIC SARCOMA (H): ICD-10-CM

## 2023-04-12 DIAGNOSIS — C49.9 UNDIFFERENTIATED PLEOMORPHIC SARCOMA (H): Primary | ICD-10-CM

## 2023-04-12 PROCEDURE — 77386 HC IMRT TREATMENT DELIVERY, COMPLEX: CPT | Performed by: RADIOLOGY

## 2023-04-12 RX ORDER — HYDROMORPHONE HYDROCHLORIDE 2 MG/1
4 TABLET ORAL EVERY 6 HOURS PRN
Qty: 30 TABLET | Refills: 0 | Status: ON HOLD | OUTPATIENT
Start: 2023-04-18 | End: 2023-05-22

## 2023-04-12 RX ORDER — MORPHINE SULFATE 30 MG/1
30 TABLET, FILM COATED, EXTENDED RELEASE ORAL 3 TIMES DAILY
Qty: 90 TABLET | Refills: 0 | Status: SHIPPED | OUTPATIENT
Start: 2023-04-18 | End: 2023-04-19

## 2023-04-12 ASSESSMENT — PAIN SCALES - GENERAL: PAINLEVEL: MODERATE PAIN (4)

## 2023-04-12 NOTE — PROGRESS NOTES
[facility] PHYSICIANS  SPECIALIZING IN BREAKTHROUGHS  Radiation Oncology    On Treatment Visit Note      Antonio Canseco      Date: 3/31/2023   MRN: [id number]   : 1970  Diagnosis: liposarcoma      Reason for Visit:  On Radiation Treatment Visit     Treatment Summary to Date  Treatment Site: R hamstring Current Dose: 2600/5000 cGy Fractions:       Chemotherapy  Chemo concurrent with radx?: No    ED Visit/Hospital Admission: none    Treatment Breaks: none    Subjective:   Mr. Canseco is seen to discuss pain management during his ongoing care.  He reports taking approximately 6 tablets of Dilaudid 4 mg in addition to MS Contin 30 mg twice daily.  His pain remains at about a 4 out of 10 on this management.    Nursing ROS:   Nutrition Alteration  Diet Type: Patient's Preference  Skin  Skin Reaction: 0 - No changes at the treatment site; evidence of pruritus elsewhere particularly on the trunk.  Skin Note: Educated Patient on the use of aqupahor or eucerin cream BID.  CNS Alteration  CNS note: WNL     Cardiovascular  Respiratory effort: 1 - Normal - without distress  Gastrointestinal  Nausea: 0 - None     Psychosocial  Mood - Anxiety: 0 - Normal  Pain Assessment  0-10 Pain Scale: Presently 4, up to 7      Objective:   BP (!) 150/83   Pulse 107   SpO2 99%   Moderate Pain (4)  Gen: Appears well, in no acute distress  Skin: No erythema, evidence of excoriation in the trunk area.    MSK: massive right thigh from knee to buttocks    Labs:  Most Recent 3 CBC's:  Recent Labs   Lab Test 23  0946 23  1307 23  1236   WBC 8.0 12.5* 17.5*   HGB 9.1* 10.3* 9.9*   MCV 82 82 81    564* 515*     Most Recent 3 BMP's:  Recent Labs   Lab Test 23  0946 23  1307 23  1236    139 140   POTASSIUM 4.2 4.5 4.3   CHLORIDE 102 99 100   CO2 26 29 29   BUN 13.2 17.6 11.1   CR 0.82 1.13 1.08   ANIONGAP 12 11 11   DORIAN 9.3 9.4 8.9   * 120* 141*     Most Recent 2  LFT's:  Recent Labs   Lab Test 03/24/23  0946 02/24/23  1307   AST 17 18   ALT 5* 10   ALKPHOS 48 71   BILITOTAL 0.6 0.4       Assessment:    Tolerating radiation therapy reasonably well in the setting of ongoing pain.  We discussed that he should increase Dilaudid to 4 mg every time he takes it and keep track of this for the next 2 days.  We will increase MS Contin to 30 mg every morning and p.m. and 50 mg q. midday.    Toxicities:  Pain: Grade 2: Moderate pain; limiting instrumental ADL  Dermatitis: Grade 0: No toxicity    Plan:   1. Continue current therapy.    2. MS Contin to 30 mg every morning and p.m. and 50 mg q. midday.      Mosaiq chart and setup information reviewed  MVCT/IGRT images checked    Medication Review  Med list reviewed with patient?: Yes  Med Note: See above read pain plan    Educational Topic Discussed  Education Instructions: Reviewed      Sydney Valle MD MPH PhD    Department of Radiation Oncology

## 2023-04-12 NOTE — LETTER
2023         RE: Antonio Canseco  [address] [place]  [facility] [facility] [address]        Dear Colleague,    Thank you for referring your patient, Antonio Canseco, to the [facility] RADIATION ONCOLOGY. Please see a copy of my visit note below.    [facility] PHYSICIANS  SPECIALIZING IN BREAKTHROUGHS  Radiation Oncology    On Treatment Visit Note      Antonio Canseco      Date: 2023   MRN: [id number]   : 1970  Diagnosis: liposarcoma      Reason for Visit:  On Radiation Treatment Visit     Treatment Summary to Date  Treatment Site: R hamstring Current Dose: 4200/5000 cGy Fractions:       Chemotherapy  Chemo concurrent with radx?: No    ED Visit/Hospital Admission: none    Treatment Breaks: none    Subjective:   Mr. Canseco is doing well. He continues to have mild dysuria, not improved on antibiotic course.    He continues to have right thigh pain 4/10 in severity, alleviated by morphine SR 30mg qAM, 15mg at noon and 30mg qPM, dilaudid 2mg q4h as needed which he is only taking once a day, and tylenol 500mg PO BID. Pain is 7/10 at its worst.     Pain has progressed in the buttocks and scrotal area where dermatitis is occurring. He has not been using lotion or ointment.      Nursing ROS:   Nutrition Alteration  Diet Type: Patient's Preference  Skin  Skin Reaction: 0 - No changes  CNS Alteration  CNS note: WNL     Cardiovascular  Respiratory effort: 1 - Normal - without distress  Gastrointestinal  Nausea: 0 - None  Genitourinary   Note: Reported urinary symptoms of burning and frequency since before last Weds possible.  UA sample collected.  Psychosocial  Mood - Anxiety: 0 - Normal  Pain Assessment  0-10 Pain Scale: 4  Pain Note: States pain as feeling better managed with current pain regimen.      Objective:   /82 (BP Location: Right arm)   Pulse 104   Resp 16   Wt 121.5 kg (267 lb 12.8 oz)   SpO2 96%   BMI 37.35 kg/m    Moderate Pain (4)  Gen: Appears well, in no acute  distress  Skin: No erythema in radiation treatment area, mild hyperpigmentation in groin skin folds  MSK: massive right thigh from knee to buttocks    Labs:  CBC RESULTS: Recent Labs   Lab Test 02/24/23  1307   WBC 12.5*   RBC 4.25*   HGB 10.3*   HCT 34.8*   MCV 82   MCH 24.2*   MCHC 29.6*   RDW 20.3*   *     ELECTROLYTES:  Recent Labs   Lab Test 02/24/23  1307      POTASSIUM 4.5   CHLORIDE 99   DORIAN 9.4   CO2 29   BUN 17.6   CR 1.13   *       Assessment:    Tolerating radiation therapy reasonably well in the setting of ongoing pain.  All questions and concerns addressed.    Toxicities:  Pain: Grade 2: Moderate pain; limiting instrumental ADL  Dermatitis: Grade 1: Faint erythema or dry desquamation    Plan:   Continue current therapy.    Increase MS Contin to 30mg PO TID for better pain control. Continue Dilaudid as needed  MRI scheduled for 4-19  Again strongly recommended skin care with ointment or lotion    Angela Gómez MD PGY3    Mosaiq chart and setup information reviewed  MVCT/IGRT images checked    Medication Review  Med list reviewed with patient?: Yes  Med Note: See above read pain plan    Educational Topic Discussed  Education Instructions: Reviewed      Sydney Valle MD MPH PhD    Department of Radiation Oncology    Physician Attestation   I, Sydney Valle, saw this patient and agree with the findings and plan of care as documented in the note.   I was present for key portions of the history and physical exam.    I personally reviewed the available treatment setup images and vital signs listed.     Sydney Valle MD MPH PhD    Department of Radiation Oncology

## 2023-04-13 ENCOUNTER — APPOINTMENT (OUTPATIENT)
Dept: RADIATION ONCOLOGY | Facility: CLINIC | Age: 53
End: 2023-04-13
Attending: RADIOLOGY
Payer: COMMERCIAL

## 2023-04-13 PROCEDURE — 77386 HC IMRT TREATMENT DELIVERY, COMPLEX: CPT | Performed by: RADIOLOGY

## 2023-04-13 PROCEDURE — 77014 PR CT GUIDE FOR PLACEMENT RADIATION THERAPY FIELDS: CPT | Mod: 26 | Performed by: RADIOLOGY

## 2023-04-13 NOTE — PROGRESS NOTES
[facility] PHYSICIANS  SPECIALIZING IN BREAKTHROUGHS  Radiation Oncology    On Treatment Visit Note      Antonio Canseco      Date: 2023   MRN: [id number]   : 1970  Diagnosis: liposarcoma      Reason for Visit:  On Radiation Treatment Visit     Treatment Summary to Date  Treatment Site: R hamstring Current Dose: 4200/5000 cGy Fractions:       Chemotherapy  Chemo concurrent with radx?: No    ED Visit/Hospital Admission: none    Treatment Breaks: none    Subjective:   Mr. Canseco is doing well. He continues to have mild dysuria, not improved on antibiotic course.    He continues to have right thigh pain 4/10 in severity, alleviated by morphine SR 30mg qAM, 15mg at noon and 30mg qPM, dilaudid 2mg q4h as needed which he is only taking once a day, and tylenol 500mg PO BID. Pain is 7/10 at its worst.     Pain has progressed in the buttocks and scrotal area where dermatitis is occurring. He has not been using lotion or ointment.      Nursing ROS:   Nutrition Alteration  Diet Type: Patient's Preference  Skin  Skin Reaction: 0 - No changes  CNS Alteration  CNS note: WNL     Cardiovascular  Respiratory effort: 1 - Normal - without distress  Gastrointestinal  Nausea: 0 - None  Genitourinary   Note: Reported urinary symptoms of burning and frequency since before last Weds possible.  UA sample collected.  Psychosocial  Mood - Anxiety: 0 - Normal  Pain Assessment  0-10 Pain Scale: 4  Pain Note: States pain as feeling better managed with current pain regimen.      Objective:   /82 (BP Location: Right arm)   Pulse 104   Resp 16   Wt 121.5 kg (267 lb 12.8 oz)   SpO2 96%   BMI 37.35 kg/m    Moderate Pain (4)  Gen: Appears well, in no acute distress  Skin: No erythema in radiation treatment area, mild hyperpigmentation in groin skin folds  MSK: massive right thigh from knee to buttocks    Labs:  CBC RESULTS: Recent Labs   Lab Test 23  1307   WBC 12.5*   RBC 4.25*   HGB 10.3*   HCT 34.8*    MCV 82   MCH 24.2*   MCHC 29.6*   RDW 20.3*   *     ELECTROLYTES:  Recent Labs   Lab Test 02/24/23  1307      POTASSIUM 4.5   CHLORIDE 99   DORIAN 9.4   CO2 29   BUN 17.6   CR 1.13   *       Assessment:    Tolerating radiation therapy reasonably well in the setting of ongoing pain.  All questions and concerns addressed.    Toxicities:  Pain: Grade 2: Moderate pain; limiting instrumental ADL  Dermatitis: Grade 1: Faint erythema or dry desquamation    Plan:   1. Continue current therapy.    2. Increase MS Contin to 30mg PO TID for better pain control. Continue Dilaudid as needed  3. MRI scheduled for 4-19  4. Again strongly recommended skin care with ointment or lotion    Angela Gómez MD PGY3    Mosaiq chart and setup information reviewed  MVCT/IGRT images checked    Medication Review  Med list reviewed with patient?: Yes  Med Note: See above read pain plan    Educational Topic Discussed  Education Instructions: Reviewed      Sydney Valle MD MPH PhD    Department of Radiation Oncology    Physician Attestation   I, Sydney Valle, saw this patient and agree with the findings and plan of care as documented in the note.   I was present for key portions of the history and physical exam.    I personally reviewed the available treatment setup images and vital signs listed.     Sydney Valle MD MPH PhD    Department of Radiation Oncology

## 2023-04-14 ENCOUNTER — PREP FOR PROCEDURE (OUTPATIENT)
Dept: OTHER | Facility: CLINIC | Age: 53
End: 2023-04-14
Payer: COMMERCIAL

## 2023-04-14 ENCOUNTER — APPOINTMENT (OUTPATIENT)
Dept: RADIATION ONCOLOGY | Facility: CLINIC | Age: 53
End: 2023-04-14
Attending: RADIOLOGY
Payer: COMMERCIAL

## 2023-04-14 PROCEDURE — 77386 HC IMRT TREATMENT DELIVERY, COMPLEX: CPT | Performed by: RADIOLOGY

## 2023-04-14 PROCEDURE — 77014 PR CT GUIDE FOR PLACEMENT RADIATION THERAPY FIELDS: CPT | Mod: 26 | Performed by: RADIOLOGY

## 2023-04-16 ENCOUNTER — HEALTH MAINTENANCE LETTER (OUTPATIENT)
Age: 53
End: 2023-04-16

## 2023-04-17 ENCOUNTER — APPOINTMENT (OUTPATIENT)
Dept: RADIATION ONCOLOGY | Facility: CLINIC | Age: 53
End: 2023-04-17
Attending: RADIOLOGY
Payer: COMMERCIAL

## 2023-04-17 ENCOUNTER — TELEPHONE (OUTPATIENT)
Dept: ORTHOPEDICS | Facility: CLINIC | Age: 53
End: 2023-04-17

## 2023-04-17 PROCEDURE — 77014 PR CT GUIDE FOR PLACEMENT RADIATION THERAPY FIELDS: CPT | Mod: 26 | Performed by: RADIOLOGY

## 2023-04-17 PROCEDURE — 77386 HC IMRT TREATMENT DELIVERY, COMPLEX: CPT | Performed by: RADIOLOGY

## 2023-04-17 NOTE — TELEPHONE ENCOUNTER
Received call from patient asking if he'd be able to schedule surgery after surgery with Dr. Salgado.     Surgery coordinator explained that is perfectly fine and asked if he'd like to proceed with scheduling now that he was on the phone or wait after visit.    Patient preferred to wait.     Patient was provided call back number of [phone].

## 2023-04-17 NOTE — TELEPHONE ENCOUNTER
Left voice message for patient telling him I have an appointment on hold for him for April 20th at 2:15 to discuss MRI on 4/19. I asked him to call me back at my  Line [phone].    Geneva Cobb LPN

## 2023-04-18 ENCOUNTER — APPOINTMENT (OUTPATIENT)
Dept: RADIATION ONCOLOGY | Facility: CLINIC | Age: 53
End: 2023-04-18
Attending: RADIOLOGY
Payer: COMMERCIAL

## 2023-04-18 PROCEDURE — 77014 PR CT GUIDE FOR PLACEMENT RADIATION THERAPY FIELDS: CPT | Mod: 26 | Performed by: RADIOLOGY

## 2023-04-19 ENCOUNTER — HOSPITAL ENCOUNTER (OUTPATIENT)
Dept: MRI IMAGING | Facility: CLINIC | Age: 53
Discharge: HOME | End: 2023-04-19
Attending: PHYSICIAN ASSISTANT | Admitting: PHYSICIAN ASSISTANT
Payer: COMMERCIAL

## 2023-04-19 DIAGNOSIS — C49.9 SARCOMA OF SOFT TISSUE (H): ICD-10-CM

## 2023-04-19 DIAGNOSIS — D64.9 ANEMIA, UNSPECIFIED TYPE: ICD-10-CM

## 2023-04-19 DIAGNOSIS — C49.9 UNDIFFERENTIATED PLEOMORPHIC SARCOMA (H): ICD-10-CM

## 2023-04-19 PROCEDURE — 255N000002 HC RX 255 OP 636: Performed by: PHYSICIAN ASSISTANT

## 2023-04-19 PROCEDURE — 73720 MRI LWR EXTREMITY W/O&W/DYE: CPT | Mod: RT

## 2023-04-19 PROCEDURE — A9585 GADOBUTROL INJECTION: HCPCS | Performed by: PHYSICIAN ASSISTANT

## 2023-04-19 PROCEDURE — 73720 MRI LWR EXTREMITY W/O&W/DYE: CPT | Mod: 26 | Performed by: RADIOLOGY

## 2023-04-19 RX ORDER — GADOBUTROL 604.72 MG/ML
14 INJECTION INTRAVENOUS ONCE
Status: COMPLETED | OUTPATIENT
Start: 2023-04-19 | End: 2023-04-19

## 2023-04-19 RX ORDER — MORPHINE SULFATE 30 MG/1
30 TABLET, FILM COATED, EXTENDED RELEASE ORAL 3 TIMES DAILY
Qty: 90 TABLET | Refills: 0 | Status: ON HOLD | OUTPATIENT
Start: 2023-04-25 | End: 2023-05-23

## 2023-04-19 RX ADMIN — GADOBUTROL 14 ML: 604.72 INJECTION INTRAVENOUS at 10:04

## 2023-04-20 ENCOUNTER — OFFICE VISIT (OUTPATIENT)
Dept: ORTHOPEDICS | Facility: CLINIC | Age: 53
End: 2023-04-20
Payer: COMMERCIAL

## 2023-04-20 DIAGNOSIS — C49.9 SARCOMA OF SOFT TISSUE (H): Primary | ICD-10-CM

## 2023-04-20 PROCEDURE — 99215 OFFICE O/P EST HI 40 MIN: CPT | Performed by: ORTHOPAEDIC SURGERY

## 2023-04-20 NOTE — LETTER
4/20/2023         RE: Antonio Canseco  [address] [place]  [facility] [address]        Dear Colleague,    Thank you for referring your patient, Antonio Canseco, to the [facility] [place]. Please see a copy of my visit note below.    Pre-Op Teaching was done in person at the clinic.    Teaching Flowsheet   Relevant Diagnosis: Excision mass right lower extremity  Teaching Topic: Pre-Operative Teaching     Person(s) involved in teaching:   Patient and Wife     Motivation Level:  Asks Questions: Yes  Eager to Learn: Yes  Cooperative: Yes  Receptive (willing/able to accept information): Yes  Any cultural factors/Mu-ism beliefs that may influence understanding or compliance? No     Patient demonstrates understanding of the following:  Reason for the appointment, diagnosis and treatment plan: Yes  Knowledge of proper use of medications and conditions for which they are ordered (with special attention to potential side effects or drug interactions): Yes  Which situations necessitate calling provider and whom to contact: Yes- discussed the stoplight tool to help assist with this.      Teaching Concerns Addressed:      Proper use of surgical scrub explain and provided to patient.    Nutritional needs and diet plan: Yes  Pain management techniques: Yes  Wound Care: Yes  How and/when to access community resources: Yes     Instructional Materials Used/Given: a surgical folder and 2 bottles of surgical soap given in clinic      - Important contact info/ phone numbers  - Map/ location of surgery  - Medications to avoid  - Showering instructions  - Stop light tool    Additionally the following was discussed with patient:  -The patient will be admitted to the hospital following surgery.         -Next step: Surgery date May 22nd. Schedule PAC appointment    Time spent with patient: 15 minutes.     Tara Holter, RNCC    Diagnosis: High-grade sarcoma right posterior thigh.    Treatment: 1.  Limited  preoperative chemotherapy followed by preoperative radiation.  2.  Surgical resection planned on May 22.    I met with Antonio and his wife.  We reviewed his recent MRI scan which shows perhaps small enlargement of the tumor but nothing dramatic.    He certainly having significant symptoms and that he is on 90 mg of morphine per day for comfort.    On exam he has a large mass in the posterior thigh he has diffuse swelling of the right leg.  He has no past history of DVTs.  He is on oral therapy for diabetes.    I had a lengthy discussion with them highlighting the events around the proposed surgery.  I reported that I anticipate his entire hamstring will be removed and that we he will have some difficulty with walking but will be able to normally perform activities of daily living.  We talked about the reality that the margin will be very close.  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    Impression: Patient is a good candidate for surgery moving forward.    Plan: 1.  Awilda to perform preop teaching today.  2.  Case request form submitted.  Theodora to schedule preferably from May 22.  3.  Patient should be seen in the preanesthesia clinic.  4.  I anticipate he will require hospitalization as he is currently on 90 mg of morphine per day and pain management postoperatively may be challenging.    Patient was seen today for greater than 40 minutes.  All questions were answered.      Again, thank you for allowing me to participate in the care of your patient.        Sincerely,        Zach Salgado MD

## 2023-04-20 NOTE — TELEPHONE ENCOUNTER
Patient is scheduled for surgery with Dr. Salgado    Spoke with: Antonio    Date of Surgery: 5/22/23    Location: [facility]    Informed patient they will need an adult  Yes    Pre op with Provider PAC 5/1/23 @ 1:45pm    Additional imaging/appointments: POP Made    Surgery packet: Received     Additional comments: N/A        Zenobia Walker on 4/20/2023 at 4:09 PM

## 2023-04-20 NOTE — PROGRESS NOTES
Pre-Op Teaching was done in person at the clinic.    Teaching Flowsheet   Relevant Diagnosis: Excision mass right lower extremity  Teaching Topic: Pre-Operative Teaching     Person(s) involved in teaching:   Patient and Wife     Motivation Level:  Asks Questions: Yes  Eager to Learn: Yes  Cooperative: Yes  Receptive (willing/able to accept information): Yes  Any cultural factors/Jehovah's witness beliefs that may influence understanding or compliance? No     Patient demonstrates understanding of the following:  Reason for the appointment, diagnosis and treatment plan: Yes  Knowledge of proper use of medications and conditions for which they are ordered (with special attention to potential side effects or drug interactions): Yes  Which situations necessitate calling provider and whom to contact: Yes- discussed the stoplight tool to help assist with this.      Teaching Concerns Addressed:      Proper use of surgical scrub explain and provided to patient.    Nutritional needs and diet plan: Yes  Pain management techniques: Yes  Wound Care: Yes  How and/when to access community resources: Yes     Instructional Materials Used/Given: a surgical folder and 2 bottles of surgical soap given in clinic      - Important contact info/ phone numbers  - Map/ location of surgery  - Medications to avoid  - Showering instructions  - Stop light tool    Additionally the following was discussed with patient:  -The patient will be admitted to the hospital following surgery.         -Next step: Surgery date May 22nd. Schedule PAC appointment    Time spent with patient: 15 minutes.     Tara Holter, RNCC

## 2023-04-20 NOTE — NURSING NOTE
Chief Complaint   Patient presents with     RECHECK     Review right thigh MRI and discuss surgery        52 year old  1970            Pain Assessment  Patient Currently in Pain: Yes  0-10 Pain Scale: 3  Primary Pain Location:  (right posterior thigh)                [facility], [place] - [address] [facility] [address] [place], [place] - [address] [address]        Allergies   Allergen Reactions     Emend [Aprepitant] Shortness Of Breath     Couldn't breathe and started to pass out during 1st administration      Kiwi Itching           Current Outpatient Medications   Medication     ACCU-CHEK GUIDE test strip     clindamycin (CLINDAMAX) 1 % external gel     Continuous Blood Gluc Sensor (FREESTYLE MORENITA 3 SENSOR) MISC     DULoxetine (CYMBALTA) 60 MG capsule     ferrous sulfate (FEROSUL) 325 (65 Fe) MG tablet     folic acid (FOLVITE) 400 MCG tablet     heparin lock flush 10 UNIT/ML SOLN injection     HYDROmorphone (DILAUDID) 2 MG tablet     HYDROmorphone (DILAUDID) 2 MG tablet     lidocaine (LIDODERM) 5 % patch     lisinopril (ZESTRIL) 20 MG tablet     metFORMIN (GLUCOPHAGE XR) 500 MG 24 hr tablet     morphine (MS CONTIN) 15 MG CR tablet     [START ON 4/25/2023] morphine (MS CONTIN) 30 MG CR tablet     morphine (MS CONTIN) 30 MG CR tablet     naloxone (NARCAN) 4 MG/0.1ML nasal spray     omeprazole 20 MG tablet     phosphorus tablet 250 mg 250 MG per tablet     pregabalin (LYRICA) 25 MG capsule     pregabalin (LYRICA) 25 MG capsule     senna-docusate (SENOKOT-S/PERICOLACE) 8.6-50 MG tablet     sennosides (SENOKOT) 8.6 MG tablet     sucralfate (CARAFATE) 1 GM/10ML suspension     triamcinolone (KENALOG) 0.1 % external ointment     No current facility-administered medications for this visit.

## 2023-04-20 NOTE — PROGRESS NOTES
Diagnosis: High-grade sarcoma right posterior thigh.    Treatment: 1.  Limited preoperative chemotherapy followed by preoperative radiation.  2.  Surgical resection planned on May 22.    I met with Antonio and his wife.  We reviewed his recent MRI scan which shows perhaps small enlargement of the tumor but nothing dramatic.    He certainly having significant symptoms and that he is on 90 mg of morphine per day for comfort.    On exam he has a large mass in the posterior thigh he has diffuse swelling of the right leg.  He has no past history of DVTs.  He is on oral therapy for diabetes.    I had a lengthy discussion with them highlighting the events around the proposed surgery.  I reported that I anticipate his entire hamstring will be removed and that we he will have some difficulty with walking but will be able to normally perform activities of daily living.  We talked about the reality that the margin will be very close.  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    Impression: Patient is a good candidate for surgery moving forward.    Plan: 1.  Awilda to perform preop teaching today.  2.  Case request form submitted.  Theodora to schedule preferably from May 22.  3.  Patient should be seen in the preanesthesia clinic.  4.  I anticipate he will require hospitalization as he is currently on 90 mg of morphine per day and pain management postoperatively may be challenging.    Patient was seen today for greater than 40 minutes.  All questions were answered.

## 2023-04-21 DIAGNOSIS — E83.39 HYPOPHOSPHATEMIA: ICD-10-CM

## 2023-04-21 NOTE — TELEPHONE ENCOUNTER
FUTURE VISIT INFORMATION      SURGERY INFORMATION:    Date: 23    [facility]: [facility]    Surgeon:  Zach Salgado MD    Anesthesia Type:  General    Procedure: EXCISION, NEOPLASM, RIGHT LOWER EXTREMITY    RECORDS REQUESTED FROM:       Primary Care Provider: [facility]    Pertinent Medical History: jolene, pulmonary nodule    Most recent EKG+ Tracin23    Most recent ECHO: 23    Most recent Cardiac Stress Test: 14

## 2023-04-24 ENCOUNTER — ONCOLOGY VISIT (OUTPATIENT)
Dept: RADIATION ONCOLOGY | Facility: CLINIC | Age: 53
End: 2023-04-24
Payer: COMMERCIAL

## 2023-04-24 NOTE — Clinical Note
4/24/2023         RE: Antonio Canseco  [address]  [place] [address]        Dear Colleague,    Thank you for referring your patient, Antonio Canseco, to the [facility]. Please see a copy of my visit note below.    No notes on file    Again, thank you for allowing me to participate in the care of your patient.        Sincerely,        Sydney Valle

## 2023-04-26 ENCOUNTER — VIRTUAL VISIT (OUTPATIENT)
Dept: ONCOLOGY | Facility: CLINIC | Age: 53
End: 2023-04-26
Attending: STUDENT IN AN ORGANIZED HEALTH CARE EDUCATION/TRAINING PROGRAM
Payer: COMMERCIAL

## 2023-04-26 DIAGNOSIS — C49.9 UNDIFFERENTIATED PLEOMORPHIC SARCOMA (H): Primary | ICD-10-CM

## 2023-04-26 PROCEDURE — G0463 HOSPITAL OUTPT CLINIC VISIT: HCPCS | Mod: PN,GT | Performed by: STUDENT IN AN ORGANIZED HEALTH CARE EDUCATION/TRAINING PROGRAM

## 2023-04-26 PROCEDURE — 99215 OFFICE O/P EST HI 40 MIN: CPT | Mod: VID | Performed by: STUDENT IN AN ORGANIZED HEALTH CARE EDUCATION/TRAINING PROGRAM

## 2023-04-26 RX ORDER — VALACYCLOVIR HYDROCHLORIDE 1 G/1
1000 TABLET, FILM COATED ORAL 3 TIMES DAILY
Qty: 30 TABLET | Refills: 0 | Status: ON HOLD | OUTPATIENT
Start: 2023-04-26 | End: 2023-05-22

## 2023-04-26 NOTE — LETTER
4/26/2023         RE: Antonio Canseco  [address]  [facility] [address]        Dear Colleague,    Thank you for referring your patient, Antonio Canseco, to the [facility]. Please see a copy of my visit note below.    Virtual Visit Details    Type of service:  Video Visit     Originating Location (pt. Location): Home    Distant Location (provider location):  On-site  Platform used for Video Visit: [facility] CANCER [facility]    NEW PATIENT VISIT NOTE    PATIENT NAME: Antonio Canseco MRN # [id number]  DATE OF VISIT: January 17, 2023 YOB: 1970    REFERRING PROVIDER: Ari Nascimento PA-C  [facility] [place] [address]    CANCER TYPE: High grade sarcoma       CHIEF COMPLAIN   Thigh pain     HISTORY OF PRESENT ILLNESS   52 year old male with PMH of DM and recent Dx of large 27 cm mass in the posterior R thigh. He reports that he first noticed a node in the posterior thigh on 5/2022, we was evaluated at OSH where he got an Xray and he was told that this was sciatica and was started on physical therapy. Tumor continued to grow rapidly during this time. MRI done on 1/6/2023 showing a large mass in the posterior thigh. 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   He reports having pain and limit mobility. He has been taking daily meloxican with some relief, however he needs to be resting most of the time to avoid pain.     C1 of doxil/ifosfamide on 1/30 follow up MRI showing increase in size for mass without significant areas of necrosis.   Started Preoperative RT on 3/15 and completed on 4/18     Interval History  I saw Antonio today on virtual visit in the company of his spouse Yari.   Antonio has completed preoperative RT on 4/18. He reports feeling well, with pain and discomfort from radiation well controlled. He still has rash in the R side of his body. He reports that some lesions are better, but he has  developed a couple of new lesions in the legs.      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       CURRENT OUTPATIENT MEDICATIONS     Current Outpatient Medications   Medication Sig Dispense Refill    ACCU-CHEK GUIDE test strip Use to test blood sugar 3 times daily. 100 strip 4    clindamycin (CLINDAMAX) 1 % external gel Apply topically 2 times daily , to HS lesion 30 g 0    Continuous Blood Gluc Sensor (SpongeYLE MORENITA 3 SENSOR) MISC 1 each every 14 days Use 1 sensor every 14 days. Use to read blood sugars per 's instructions. 2 each 5    DULoxetine (CYMBALTA) 60 MG capsule TAKE 1 CAPSULE (60 MG) BY MOUTH DAILY 90 capsule 1    ferrous sulfate (FEROSUL) 325 (65 Fe) MG tablet Take 1 tablet (325 mg) by mouth daily (with breakfast) 30 tablet 1    folic acid (FOLVITE) 400 MCG tablet Take 1 tablet (400 mcg) by mouth daily 30 tablet 1    heparin lock flush 10 UNIT/ML SOLN injection 3 mLs by Intracatheter route every 24 hours Flush each lumen with 3 mLs (total 6mL in 24 hours period) 180 mL 0    HYDROmorphone (DILAUDID) 2 MG tablet Take 2 tablets (4 mg) by mouth every 6 hours as needed for pain 30 tablet 0    HYDROmorphone (DILAUDID) 2 MG tablet Take 0.5-1 tablets (1-2 mg) by mouth every 6 hours as needed for moderate to severe pain 24 tablet 0    lisinopril (ZESTRIL) 20 MG tablet TAKE 1 TABLET (20 MG) BY MOUTH DAILY 90 tablet 1    metFORMIN (GLUCOPHAGE  XR) 500 MG 24 hr tablet Take 3 tablets (1,500 mg) by mouth daily (with dinner) 270 tablet 1    morphine (MS CONTIN) 15 MG CR tablet Take 1 tablet (15 mg) by mouth daily for 60 days Take at midday; in addition to 30mg in AM and PM 60 tablet 0    morphine (MS CONTIN) 30 MG CR tablet Take 1 tablet (30 mg) by mouth 3 times daily 90 tablet 0    morphine (MS CONTIN) 30 MG CR tablet Take 1 tablet (30 mg) by mouth every 12 hours 60 tablet 0    naloxone (NARCAN) 4 MG/0.1ML nasal spray Spray 1 spray (4 mg) into one nostril alternating nostrils as needed for opioid reversal every 2-3 minutes until assistance arrives 2 each 1    omeprazole 20 MG tablet Take 20 mg by mouth daily as needed      phosphorus tablet 250 mg 250 MG per tablet Take 2 tablets (500 mg) by mouth 3 times daily Take until directed otherwise 84 tablet 1    pregabalin (LYRICA) 25 MG capsule Take 1 capsule (25 mg) by mouth 3 times daily 90 capsule 1    senna-docusate (SENOKOT-S/PERICOLACE) 8.6-50 MG tablet Take 1 tablet by mouth 2 times daily 30 tablet 0    sennosides (SENOKOT) 8.6 MG tablet Take 1 tablet by mouth daily as needed for constipation      sucralfate (CARAFATE) 1 GM/10ML suspension Take 10 mLs (1 g) by mouth 4 times daily as needed for nausea (or indigestion) 414 mL 0    triamcinolone (KENALOG) 0.1 % external ointment Apply topically 2 times daily 454 g 1          REVIEW OF SYSTEMS   As above in the HPI, o/w complete 12-point ROS was negative.     PHYSICAL EXAM   There were no vitals taken for this visit.    Virtual visit     LABORATORY AND IMAGING STUDIES     Lab Results   Component Value Date    WBC 8.0 03/24/2023    WBC 8.6 12/26/2019     Lab Results   Component Value Date    RBC 3.74 03/24/2023    RBC 4.96 12/26/2019     Lab Results   Component Value Date    HGB 9.1 03/24/2023    HGB 14.2 12/26/2019     Lab Results   Component Value Date    HCT 30.8 03/24/2023    HCT 44.6 12/26/2019     Lab Results   Component Value Date    MCV 82 03/24/2023     MCV 90 12/26/2019     Lab Results   Component Value Date    MCH 24.3 03/24/2023    MCH 28.6 12/26/2019     Lab Results   Component Value Date    MCHC 29.5 03/24/2023    MCHC 31.8 12/26/2019     Lab Results   Component Value Date    RDW 20.3 03/24/2023    RDW 14.3 12/26/2019     Lab Results   Component Value Date     03/24/2023     12/26/2019     Last Comprehensive Metabolic Panel:  Sodium   Date Value Ref Range Status   03/24/2023 140 136 - 145 mmol/L Final   02/11/2021 140 133 - 144 mmol/L Final     Potassium   Date Value Ref Range Status   03/24/2023 4.2 3.4 - 5.3 mmol/L Final   04/24/2022 4.1 3.4 - 5.3 mmol/L Final   02/11/2021 4.4 3.4 - 5.3 mmol/L Final     Chloride   Date Value Ref Range Status   03/24/2023 102 98 - 107 mmol/L Final   04/24/2022 101 94 - 109 mmol/L Final   02/11/2021 107 94 - 109 mmol/L Final     Carbon Dioxide   Date Value Ref Range Status   02/11/2021 27 20 - 32 mmol/L Final     Carbon Dioxide (CO2)   Date Value Ref Range Status   03/24/2023 26 22 - 29 mmol/L Final   04/24/2022 29 20 - 32 mmol/L Final     Anion Gap   Date Value Ref Range Status   03/24/2023 12 7 - 15 mmol/L Final   04/24/2022 2 (L) 3 - 14 mmol/L Final   02/11/2021 6 3 - 14 mmol/L Final     Glucose   Date Value Ref Range Status   03/24/2023 150 (H) 70 - 99 mg/dL Final   04/24/2022 143 (H) 70 - 99 mg/dL Final   02/11/2021 135 (H) 70 - 99 mg/dL Final     Comment:     Non Fasting     GLUCOSE BY METER POCT   Date Value Ref Range Status   02/16/2023 142 (H) 70 - 99 mg/dL Final     Urea Nitrogen   Date Value Ref Range Status   03/24/2023 13.2 6.0 - 20.0 mg/dL Final   04/24/2022 11 7 - 30 mg/dL Final   02/11/2021 13 7 - 30 mg/dL Final     Creatinine   Date Value Ref Range Status   03/24/2023 0.82 0.67 - 1.17 mg/dL Final   02/11/2021 0.94 0.66 - 1.25 mg/dL Final     GFR Estimate   Date Value Ref Range Status   03/24/2023 >90 >60 mL/min/1.73m2 Final     Comment:     eGFR calculated using 2021 CKD-EPI equation.    02/11/2021 >90 >60 mL/min/[1.73_m2] Final     Comment:     Non  GFR Calc  Starting 12/18/2018, serum creatinine based estimated GFR (eGFR) will be   calculated using the Chronic Kidney Disease Epidemiology Collaboration   (CKD-EPI) equation.       Calcium   Date Value Ref Range Status   03/24/2023 9.3 8.6 - 10.0 mg/dL Final   02/11/2021 9.1 8.5 - 10.1 mg/dL Final     Bilirubin Total   Date Value Ref Range Status   03/24/2023 0.6 <=1.2 mg/dL Final   02/11/2021 0.7 0.2 - 1.3 mg/dL Final     Alkaline Phosphatase   Date Value Ref Range Status   03/24/2023 48 40 - 129 U/L Final   02/11/2021 43 40 - 150 U/L Final     ALT   Date Value Ref Range Status   03/24/2023 5 (L) 10 - 50 U/L Final   02/11/2021 43 0 - 70 U/L Final     AST   Date Value Ref Range Status   03/24/2023 17 10 - 50 U/L Final   02/11/2021 23 0 - 45 U/L Final       Results for orders placed or performed during the hospital encounter of 01/06/23   MR Femur Thigh Right wo & w Contrast     Value    Radiologist flags Large thigh mass    Narrative    EXAMINATION: MR FEMUR THIGH RIGHT W/O & W CONTRAST  1/6/2023  9:07 AM     CLINICAL HISTORY:  Hamstring strain, right, subsequent encounter     COMPARISON:    TECHNIQUE: Multiplanar multi-sequence MR imaging was obtained using  standard sequences in three orthogonal planes the administration of  intravenous or intra-articular gadolinium contrast agent.    FINDINGS:   Right femur:    There is a very large soft tissue mass measuring 27 x 7.4 x 12 cm in  maximal caudocranial, AP and transverse dimensions (series 20, image  21 and series 38, image 32), respectively.    Mass is originating just distal to the conjoined hamstring tendon and  the mass mostly occupies the space of the hamstring musculature.  Proximally the mass is located medial to the gluteus maximums muscle  belly and distally occupies partially the space of the biceps femoris  and the semimembranosus muscle bellies and entirely  the  semitendinosus. The semitendinosus muscle belly is also involved the  furthest distally. Proximally the lesion protrudes anteriorly and  appears to invade the obturator externus muscle (series 38, image 27)    The large lesion reveals highly heterogeneous signal with mostly T2  hyperintense areas, mildly T1 hyperintense areas and septations  throughout. Following the administration of intravenous gadolinium  contrast, there is a central necrotic area occupying an area of about  7 x 5 x 14 cm in transverse, AP and CC dimensions.    At the level of the midshaft of the femur, there is very close  proximity and adherence to multiple prominent vessels (series 38,  image 49).    The sciatic nerve travels alongside the anterior margins of the lesion  over a distance of about 20 cm. However, there is a preserved fat  plane between the sciatic nerve and the tibial border of the mass.    Osseous structures:    The bilateral femora appear normal, this red marrow conversion. No  distinct osseous lesions are identified..    Hip joint: The hip joints are not well assessed at the edge of the  field-of-view. However, no significant abnormalities are identified.    Knee joint: The knee joint is not well assessed.    The left femur appears unremarkable. Including musculatures.      Impression    IMPRESSION:   1. Large soft tissue mass in the posterior compartment of the right  thigh measuring 27 x 7.4 x 12 cm in maximal caudocranial, AP and  transverse dimensions, respectively. The mass originates just distal  to the conjoined hamstring tendon, infiltrates the hamstring muscle  bellies and extends distally within the semitendinosus muscle belly  proximal to the knee joint. However, the semitendinosus tendon sheaths  appears to have increased signal to the level of the knee joint. A  dedicated knee MRI could be useful to identify at the most distal  extent of the lesion.  2. The lesion is heterogeneous, septated, with myxoid and  lipoid  components, vividly enhancing with a large central area of necrosis  which measures 7 x 5 x 14 cm. Imaging findings are highly suggestive  of a malignant mass from the spectrum of myxoid liposarcomatous type.  Further evaluation at the [facility] Orthopedic oncology  is recommended.  3. The lesion occupies the posterior muscle compartment of the thigh  just posterior to the sciatic nerve with a preserved fat plane in  between.  4. At the level of the mid shaft of the femur there is very close  proximity and adherence to multiple vessels, that entered the mass,  best seen on series 38 image 49.    Mass is originating just distal to the conjoined hamstring tendon and  the mass mostly occupies the space of the hamstring musculature.  Proximally the mass is located medial to the gluteus maximums muscle  belly and distally occupies partially the space of the biceps femoris  and the semimembranosus muscle bellies and entirely the  semitendinosus. The semitendinosus muscle belly is also involved the  furthest distally. Proximally the lesion protrudes anteriorly and  appears to invade the obturator externus muscle (series 38, image 27).    [Consider Follow Up: Large thigh mass     This report will be copied to the [facility] to ensure a  provider acknowledges the finding. Access Center is available Monday  through Friday 8am-3:30 pm.    JUTTA ELLERMANN, MD         SYSTEM ID:  [id number]     1/6/2023 MRI right femur thigh: Large soft tissue mass in the posterior compartment of the right thigh measuring 27 x 7.4 x 12 cm.  The mass lies primarily in the hamstring muscle belly.  Heterogeneous signal with areas of necrosis noted.    2/20/23 MRI R femur                                                                   IMPRESSION: In this patient with high grade sarcoma status  post-neoadjuvant chemotherapy;     Increased in size of the 11 x 15.7 x 21.7 cm heterogeneously enhancing  soft tissue mass  with myxoid and lipoid components in the posterior  compartment of the right thigh since MRI from 1/6/2023, previously  measured 7.4 x 12 x 20 cm.  *  Distal portion of the mass anteriorly abuts the sciatic nerve and  deep femoral artery/vein without definite invasion.  *  Increased edema within the adductor rosy when compared to prior  study, possibly neurogenic.      PET scan 3/14/23  IMPRESSION:     1. 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      2. Subcentimeter pulmonary nodule in the left lower lobe, which is too small to accurately characterize by PET/CT and warrants continued imaging surveillance.    4/19/23 MRI femur R                                                                 IMPRESSION: In this patient with high grade sarcoma status  post-neoadjuvant chemotherapy;     Slightly increase in the size of the heterogeneously enhancing soft  tissue mass with myxoid and lipoid components in the posterior  compartment of the right thigh since MRI from 2/20/2023, measuring 11  x 16.4 x 21 cm, previously measured 11 x 15.7 x 21.7 cm.   *  Distal portion of the mass anteriorly abuts the sciatic nerve and  deep femoral artery/vein without definite invasion.  *  Slightly increased edema within the adductor rosy when compared  to prior study.       PATHOLOGY         Final Diagnosis   A.  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   Electronically signed by Vineet Coello MD on 1/17/2023 at 10:17 AM   Comment   UUMAYO   The neoplasm is comprised of highly pleomorphic spindle cells with stromal hyalinization and extensive areas of necrosis.  Focal areas with scattered lipoblasts are seen.  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  MDM-2 gene  amplification study by FISH is in progress and result will be provided separately.       I reviewed the medical records including medical records, laboratory studies, and have personally reviewed imaging including MRI of the R thigh which demonstrates a large 27 cm mass in the posterior compartment of the R thigh.      ASSESSMENT AND PLAN     Mr. 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     He received C1 on 1/30. Patient did experienced worsening pain shortly after starting chemotherapy. MRI done on 2/20/23 showing increase in size of large posterior thigh sarcoma. The comparison of this MRI is to the baseline done on 1/6/23, and in a patient with high grade sarcoma the mass is presumed to have grown in size in the month before starting chemotherapy. However, given that the patient had increase in pain and there is no areas of necrosis observed in the recent MRI, I will consider this to be progression of disease and recommend to plan for surgery with consideration for preoperative radiation therapy.      He started radiation therapy on 3/15.  I reviewed the PET scan done on 3/14 showing some tumor shrinkage at 14 x 14 x 18 from prior 11 x 15.7 x 21. We discussed that given some response was seen, if needed in the future this will still be an option. Right now, given prior complications from chemo (admission for neutropenic fever, bleeding in the urine presumed to be 2/2 to ifosfamide), we will hold on further chemo prior to surgery.     Antonio has completed preoperative RT and has surgical resection planned for 5/22.   He still has rash, per dermatology evaluation most likely resolving disseminated herpes zoster. Today he tells me he has 2 new lesions in the legs.     Plan:    -Plan for PET scan in 3 weeks, prior to surgery  -He has rash presumed to be disseminated herpes zoster by dermatology, even though multiple  lesions are healing, he reports 2 new lesions in the legs   -start valtrex 1000 mg TID for 10 days  -Labs cbc, cmp, phos, and folate on 4/26  -I will follow up with him after surgery, he was told about the possibility of positive surgical margin at the time of resection, I will discuss with Dr. Salgado about this concern.     40 minutes spent on the date of the encounter doing chart review, review of test results, interpretation of tests, patient visit, documentation and discussion with other provider(s)     Michael Laboy MD   of Medicine  Hematology, Oncology and Transplantation

## 2023-04-26 NOTE — NURSING NOTE
Is the patient currently in the state of [place]? YES    Visit mode:VIDEO    If the visit is dropped, the patient can be reconnected by: VIDEO VISIT: Text to cell phone: [phone]    Will anyone else be joining the visit? Yes, Yari [phone]      How would you like to obtain your AVS? MyChart    Are changes needed to the allergy or medication list? NO    Reason for visit: No chief complaint on file.  Anali Lees, Virtual Facilitator

## 2023-04-26 NOTE — PROGRESS NOTES
Virtual Visit Details    Type of service:  Video Visit     Originating Location (pt. Location): Home    Distant Location (provider location):  On-site  Platform used for Video Visit: [facility]    NEW PATIENT VISIT NOTE    PATIENT NAME: Antonio Canseco MRN # [id number]  DATE OF VISIT: January 17, 2023 YOB: 1970    REFERRING PROVIDER: Ari Nascimento PA-C  [facility] [place] [address]    CANCER TYPE: High grade sarcoma       CHIEF COMPLAIN   Thigh pain     HISTORY OF PRESENT ILLNESS   52 year old male with PMH of DM and recent Dx of large 27 cm mass in the posterior R thigh. He reports that he first noticed a node in the posterior thigh on 5/2022, we was evaluated at OSH where he got an Xray and he was told that this was sciatica and was started on physical therapy. Tumor continued to grow rapidly during this time. MRI done on 1/6/2023 showing a large mass in the posterior thigh. 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   He reports having pain and limit mobility. He has been taking daily meloxican with some relief, however he needs to be resting most of the time to avoid pain.     C1 of doxil/ifosfamide on 1/30 follow up MRI showing increase in size for mass without significant areas of necrosis.   Started Preoperative RT on 3/15 and completed on 4/18     Interval History  I saw Antonio today on virtual visit in the company of his spouse Yari.   Antonio has completed preoperative RT on 4/18. He reports feeling well, with pain and discomfort from radiation well controlled. He still has rash in the R side of his body. He reports that some lesions are better, but he has developed a couple of new lesions in the legs.      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       CURRENT OUTPATIENT MEDICATIONS     Current Outpatient Medications   Medication Sig Dispense Refill     ACCU-CHEK GUIDE test strip Use to test blood sugar 3 times daily. 100 strip 4     clindamycin (CLINDAMAX) 1 % external gel Apply topically 2 times daily , to HS lesion 30 g 0     Continuous Blood Gluc Sensor (VontuYLE MORENITA 3 SENSOR) MISC 1 each every 14 days Use 1 sensor every 14 days. Use to read blood sugars per 's instructions. 2 each 5     DULoxetine (CYMBALTA) 60 MG capsule TAKE 1 CAPSULE (60 MG) BY MOUTH DAILY 90 capsule 1     ferrous sulfate (FEROSUL) 325 (65 Fe) MG tablet Take 1 tablet (325 mg) by mouth daily (with breakfast) 30 tablet 1     folic acid (FOLVITE) 400 MCG tablet Take 1 tablet (400 mcg) by mouth daily 30 tablet 1     heparin lock flush 10 UNIT/ML SOLN injection 3 mLs by Intracatheter route every 24 hours Flush each lumen with 3 mLs (total 6mL in 24 hours period) 180 mL 0     HYDROmorphone (DILAUDID) 2 MG tablet Take 2 tablets (4 mg) by mouth every 6 hours as needed for pain 30 tablet 0     HYDROmorphone (DILAUDID) 2 MG tablet Take 0.5-1 tablets (1-2 mg) by mouth every 6 hours as needed for moderate to severe pain 24 tablet 0     lisinopril (ZESTRIL) 20 MG tablet TAKE 1 TABLET (20 MG) BY MOUTH DAILY 90 tablet 1     metFORMIN (GLUCOPHAGE XR) 500 MG 24 hr tablet Take 3 tablets (1,500 mg) by mouth daily (with dinner) 270 tablet 1     morphine (MS CONTIN) 15 MG CR tablet Take 1 tablet (15 mg) by mouth daily for 60 days Take at midday; in addition to 30mg in AM and PM 60 tablet 0      morphine (MS CONTIN) 30 MG CR tablet Take 1 tablet (30 mg) by mouth 3 times daily 90 tablet 0     morphine (MS CONTIN) 30 MG CR tablet Take 1 tablet (30 mg) by mouth every 12 hours 60 tablet 0     naloxone (NARCAN) 4 MG/0.1ML nasal spray Spray 1 spray (4 mg) into one nostril alternating nostrils as needed for opioid reversal every 2-3 minutes until assistance arrives 2 each 1     omeprazole 20 MG tablet Take 20 mg by mouth daily as needed       phosphorus tablet 250 mg 250 MG per tablet Take 2 tablets (500 mg) by mouth 3 times daily Take until directed otherwise 84 tablet 1     pregabalin (LYRICA) 25 MG capsule Take 1 capsule (25 mg) by mouth 3 times daily 90 capsule 1     senna-docusate (SENOKOT-S/PERICOLACE) 8.6-50 MG tablet Take 1 tablet by mouth 2 times daily 30 tablet 0     sennosides (SENOKOT) 8.6 MG tablet Take 1 tablet by mouth daily as needed for constipation       sucralfate (CARAFATE) 1 GM/10ML suspension Take 10 mLs (1 g) by mouth 4 times daily as needed for nausea (or indigestion) 414 mL 0     triamcinolone (KENALOG) 0.1 % external ointment Apply topically 2 times daily 454 g 1          REVIEW OF SYSTEMS   As above in the HPI, o/w complete 12-point ROS was negative.     PHYSICAL EXAM   There were no vitals taken for this visit.    Virtual visit     LABORATORY AND IMAGING STUDIES     Lab Results   Component Value Date    WBC 8.0 03/24/2023    WBC 8.6 12/26/2019     Lab Results   Component Value Date    RBC 3.74 03/24/2023    RBC 4.96 12/26/2019     Lab Results   Component Value Date    HGB 9.1 03/24/2023    HGB 14.2 12/26/2019     Lab Results   Component Value Date    HCT 30.8 03/24/2023    HCT 44.6 12/26/2019     Lab Results   Component Value Date    MCV 82 03/24/2023    MCV 90 12/26/2019     Lab Results   Component Value Date    MCH 24.3 03/24/2023    MCH 28.6 12/26/2019     Lab Results   Component Value Date    MCHC 29.5 03/24/2023    MCHC 31.8 12/26/2019     Lab Results   Component Value Date    RDW  20.3 03/24/2023    RDW 14.3 12/26/2019     Lab Results   Component Value Date     03/24/2023     12/26/2019     Last Comprehensive Metabolic Panel:  Sodium   Date Value Ref Range Status   03/24/2023 140 136 - 145 mmol/L Final   02/11/2021 140 133 - 144 mmol/L Final     Potassium   Date Value Ref Range Status   03/24/2023 4.2 3.4 - 5.3 mmol/L Final   04/24/2022 4.1 3.4 - 5.3 mmol/L Final   02/11/2021 4.4 3.4 - 5.3 mmol/L Final     Chloride   Date Value Ref Range Status   03/24/2023 102 98 - 107 mmol/L Final   04/24/2022 101 94 - 109 mmol/L Final   02/11/2021 107 94 - 109 mmol/L Final     Carbon Dioxide   Date Value Ref Range Status   02/11/2021 27 20 - 32 mmol/L Final     Carbon Dioxide (CO2)   Date Value Ref Range Status   03/24/2023 26 22 - 29 mmol/L Final   04/24/2022 29 20 - 32 mmol/L Final     Anion Gap   Date Value Ref Range Status   03/24/2023 12 7 - 15 mmol/L Final   04/24/2022 2 (L) 3 - 14 mmol/L Final   02/11/2021 6 3 - 14 mmol/L Final     Glucose   Date Value Ref Range Status   03/24/2023 150 (H) 70 - 99 mg/dL Final   04/24/2022 143 (H) 70 - 99 mg/dL Final   02/11/2021 135 (H) 70 - 99 mg/dL Final     Comment:     Non Fasting     GLUCOSE BY METER POCT   Date Value Ref Range Status   02/16/2023 142 (H) 70 - 99 mg/dL Final     Urea Nitrogen   Date Value Ref Range Status   03/24/2023 13.2 6.0 - 20.0 mg/dL Final   04/24/2022 11 7 - 30 mg/dL Final   02/11/2021 13 7 - 30 mg/dL Final     Creatinine   Date Value Ref Range Status   03/24/2023 0.82 0.67 - 1.17 mg/dL Final   02/11/2021 0.94 0.66 - 1.25 mg/dL Final     GFR Estimate   Date Value Ref Range Status   03/24/2023 >90 >60 mL/min/1.73m2 Final     Comment:     eGFR calculated using 2021 CKD-EPI equation.   02/11/2021 >90 >60 mL/min/[1.73_m2] Final     Comment:     Non  GFR Calc  Starting 12/18/2018, serum creatinine based estimated GFR (eGFR) will be   calculated using the Chronic Kidney Disease Epidemiology Collaboration    (CKD-EPI) equation.       Calcium   Date Value Ref Range Status   03/24/2023 9.3 8.6 - 10.0 mg/dL Final   02/11/2021 9.1 8.5 - 10.1 mg/dL Final     Bilirubin Total   Date Value Ref Range Status   03/24/2023 0.6 <=1.2 mg/dL Final   02/11/2021 0.7 0.2 - 1.3 mg/dL Final     Alkaline Phosphatase   Date Value Ref Range Status   03/24/2023 48 40 - 129 U/L Final   02/11/2021 43 40 - 150 U/L Final     ALT   Date Value Ref Range Status   03/24/2023 5 (L) 10 - 50 U/L Final   02/11/2021 43 0 - 70 U/L Final     AST   Date Value Ref Range Status   03/24/2023 17 10 - 50 U/L Final   02/11/2021 23 0 - 45 U/L Final       Results for orders placed or performed during the hospital encounter of 01/06/23   MR Femur Thigh Right wo & w Contrast     Value    Radiologist flags Large thigh mass    Narrative    EXAMINATION: MR FEMUR THIGH RIGHT W/O & W CONTRAST  1/6/2023  9:07 AM     CLINICAL HISTORY:  Hamstring strain, right, subsequent encounter     COMPARISON:    TECHNIQUE: Multiplanar multi-sequence MR imaging was obtained using  standard sequences in three orthogonal planes the administration of  intravenous or intra-articular gadolinium contrast agent.    FINDINGS:   Right femur:    There is a very large soft tissue mass measuring 27 x 7.4 x 12 cm in  maximal caudocranial, AP and transverse dimensions (series 20, image  21 and series 38, image 32), respectively.    Mass is originating just distal to the conjoined hamstring tendon and  the mass mostly occupies the space of the hamstring musculature.  Proximally the mass is located medial to the gluteus maximums muscle  belly and distally occupies partially the space of the biceps femoris  and the semimembranosus muscle bellies and entirely the  semitendinosus. The semitendinosus muscle belly is also involved the  furthest distally. Proximally the lesion protrudes anteriorly and  appears to invade the obturator externus muscle (series 38, image 27)    The large lesion reveals highly  heterogeneous signal with mostly T2  hyperintense areas, mildly T1 hyperintense areas and septations  throughout. Following the administration of intravenous gadolinium  contrast, there is a central necrotic area occupying an area of about  7 x 5 x 14 cm in transverse, AP and CC dimensions.    At the level of the midshaft of the femur, there is very close  proximity and adherence to multiple prominent vessels (series 38,  image 49).    The sciatic nerve travels alongside the anterior margins of the lesion  over a distance of about 20 cm. However, there is a preserved fat  plane between the sciatic nerve and the tibial border of the mass.    Osseous structures:    The bilateral femora appear normal, this red marrow conversion. No  distinct osseous lesions are identified..    Hip joint: The hip joints are not well assessed at the edge of the  field-of-view. However, no significant abnormalities are identified.    Knee joint: The knee joint is not well assessed.    The left femur appears unremarkable. Including musculatures.      Impression    IMPRESSION:   1. Large soft tissue mass in the posterior compartment of the right  thigh measuring 27 x 7.4 x 12 cm in maximal caudocranial, AP and  transverse dimensions, respectively. The mass originates just distal  to the conjoined hamstring tendon, infiltrates the hamstring muscle  bellies and extends distally within the semitendinosus muscle belly  proximal to the knee joint. However, the semitendinosus tendon sheaths  appears to have increased signal to the level of the knee joint. A  dedicated knee MRI could be useful to identify at the most distal  extent of the lesion.  2. The lesion is heterogeneous, septated, with myxoid and lipoid  components, vividly enhancing with a large central area of necrosis  which measures 7 x 5 x 14 cm. Imaging findings are highly suggestive  of a malignant mass from the spectrum of myxoid liposarcomatous type.  Further evaluation at the  [facility] oncology  is recommended.  3. The lesion occupies the posterior muscle compartment of the thigh  just posterior to the sciatic nerve with a preserved fat plane in  between.  4. At the level of the mid shaft of the femur there is very close  proximity and adherence to multiple vessels, that entered the mass,  best seen on series 38 image 49.    Mass is originating just distal to the conjoined hamstring tendon and  the mass mostly occupies the space of the hamstring musculature.  Proximally the mass is located medial to the gluteus maximums muscle  belly and distally occupies partially the space of the biceps femoris  and the semimembranosus muscle bellies and entirely the  semitendinosus. The semitendinosus muscle belly is also involved the  furthest distally. Proximally the lesion protrudes anteriorly and  appears to invade the obturator externus muscle (series 38, image 27).    [Consider Follow Up: Large thigh mass     This report will be copied to the [facility] to ensure a  provider acknowledges the finding. Access Center is available Monday  through Friday 8am-3:30 pm.    JUTTA ELLERMANN, MD         SYSTEM ID:  [id number]     1/6/2023 MRI right femur thigh: Large soft tissue mass in the posterior compartment of the right thigh measuring 27 x 7.4 x 12 cm.  The mass lies primarily in the hamstring muscle belly.  Heterogeneous signal with areas of necrosis noted.    2/20/23 MRI R femur                                                                   IMPRESSION: In this patient with high grade sarcoma status  post-neoadjuvant chemotherapy;     Increased in size of the 11 x 15.7 x 21.7 cm heterogeneously enhancing  soft tissue mass with myxoid and lipoid components in the posterior  compartment of the right thigh since MRI from 1/6/2023, previously  measured 7.4 x 12 x 20 cm.  *  Distal portion of the mass anteriorly abuts the sciatic nerve and  deep femoral artery/vein  without definite invasion.  *  Increased edema within the adductor rosy when compared to prior  study, possibly neurogenic.      PET scan 3/14/23  IMPRESSION:     1. 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      2. Subcentimeter pulmonary nodule in the left lower lobe, which is too small to accurately characterize by PET/CT and warrants continued imaging surveillance.    4/19/23 MRI femur R                                                                 IMPRESSION: In this patient with high grade sarcoma status  post-neoadjuvant chemotherapy;     Slightly increase in the size of the heterogeneously enhancing soft  tissue mass with myxoid and lipoid components in the posterior  compartment of the right thigh since MRI from 2/20/2023, measuring 11  x 16.4 x 21 cm, previously measured 11 x 15.7 x 21.7 cm.   *  Distal portion of the mass anteriorly abuts the sciatic nerve and  deep femoral artery/vein without definite invasion.  *  Slightly increased edema within the adductor rosy when compared  to prior study.       PATHOLOGY         Final Diagnosis   A.  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   Electronically signed by Vineet Coello MD on 1/17/2023 at 10:17 AM   Comment   UUMAYO   The neoplasm is comprised of highly pleomorphic spindle cells with stromal hyalinization and extensive areas of necrosis.  Focal areas with scattered lipoblasts are seen.  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  MDM-2 gene amplification study by FISH is in progress and result will be provided separately.       I reviewed the medical records including medical records, laboratory studies, and have personally reviewed imaging including MRI of the R thigh which demonstrates  a large 27 cm mass in the posterior compartment of the R thigh.      ASSESSMENT AND PLAN     Mr. 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     He received C1 on 1/30. Patient did experienced worsening pain shortly after starting chemotherapy. MRI done on 2/20/23 showing increase in size of large posterior thigh sarcoma. The comparison of this MRI is to the baseline done on 1/6/23, and in a patient with high grade sarcoma the mass is presumed to have grown in size in the month before starting chemotherapy. However, given that the patient had increase in pain and there is no areas of necrosis observed in the recent MRI, I will consider this to be progression of disease and recommend to plan for surgery with consideration for preoperative radiation therapy.      He started radiation therapy on 3/15.  I reviewed the PET scan done on 3/14 showing some tumor shrinkage at 14 x 14 x 18 from prior 11 x 15.7 x 21. We discussed that given some response was seen, if needed in the future this will still be an option. Right now, given prior complications from chemo (admission for neutropenic fever, bleeding in the urine presumed to be 2/2 to ifosfamide), we will hold on further chemo prior to surgery.     Antonio has completed preoperative RT and has surgical resection planned for 5/22.   He still has rash, per dermatology evaluation most likely resolving disseminated herpes zoster. Today he tells me he has 2 new lesions in the legs.     Plan:    -Plan for PET scan in 3 weeks, prior to surgery  -He has rash presumed to be disseminated herpes zoster by dermatology, even though multiple lesions are healing, he reports 2 new lesions in the legs   -start valtrex 1000 mg TID for 10 days  -Labs cbc, cmp, phos, and folate on 4/26  -I will follow up with him after surgery, he was told about the possibility of positive surgical margin at  the time of resection, I will discuss with Dr. Salgado about this concern.     40 minutes spent on the date of the encounter doing chart review, review of test results, interpretation of tests, patient visit, documentation and discussion with other provider(s)     Michael Laboy MD   of Medicine  Hematology, Oncology and Transplantation

## 2023-04-26 NOTE — PROCEDURES
Radiotherapy Treatment Summary          Date of Report: 2023     PATIENT: ANGELES LOYA  MEDICAL RECORD NO: [id number]  : 1970     DIAGNOSIS: C49.9 Malignant neoplasm of connective and soft tissue, unspecified  INTENT OF RADIOTHERAPY: curative  PATHOLOGY: high grade sarcoma of right hamstring  STAGE: [id number]  CONCURRENT SYSTEMIC THERAPY: none     Details of the treatments summarized below are found in records kept in the Department of Radiation Oncology at [facility].     Treatment Summary:  Treatment Site   Dose Modality From To Elapsed Days Fx.  1 R Hamstring   5,000 cGy 06 X  3/15/2023  2023  34  25          Dose per Fraction:  200 cGy     COMMENTS:  Mr. Loya is a 51yo man with sarcoma, high grade, involving the right hamstring initially presenting with right   thigh pain which did not improve over several months with associated unintentional weight loss. Staging   evaluation showed 27 x 7.4 x 12cm mass of right hamstring and multiple small pulmonary nodules of   indeterminate nature. He completed neoadjuvant chemotherapy Doxil/Ifosfamide one cycle. Repeat imaging   showed evidence of disease progression prompting initiation of neoadjuvant radiotherapy for improved local   control.     The right thigh received 50Gy in 25 once daily fractions with photons via intensity modulated arc therapy   technique. One coplanar arc was used. During treatment, he developed radiation dermatitis for which Aquaphor   was recommended. He continued to have moderate to severe pain in the right thigh managed on Morphine SR,   Dilaudid and Tylenol. He developed dysuria thought unrelated to radiation, with a negative UA, which did not   improve with a course of antibiotics. He had no missed treatments. He had no ER visits or hospitalizations   during treatment.      Acute Toxicity Profile (CTCAE v5.0): dermatitis, grade 1; pain, grade 2     PAIN MANAGEMENT:  morphine SR 30mg PO TID, Dilaudid 2mg PO q4h prn,  Tylenol 500mg PO BID     FOLLOW UP PLAN:   See Dr. Laboy on 4-26-23  See Dr. Salgado on 6-1-23  Surgery on 5-22-23 with Dr. Salgado     Resident Physician: Angela Gómez MD PGY3  Staff Physician: Sydney Valle MD  CC: MD Michael Leon MD              Radiation Oncology:  [facility] [address] [place] [address]

## 2023-04-28 NOTE — PHARMACY - PREOPERATIVE ASSESSMENT CENTER
"Preoperative Assessment Center Medication History Note  Medication history completed on April 28, 2023 by this writer prior to patient's PAC appointment. See Epic admission navigator for prior to admission medications. Operating room staff will still need to confirm medications and last dose information on day of surgery.     Medication history interview sources  Patient via phone    Pertinent Information:     Patient is on a 10 day course of valtrex that was started on 4/27/23    Folic acid and phosphorus supplement currently on hold    Currently taking ms contin [id number] as of 4/28 but will increase to 30mg three times daily starting 4/29    Changes made to PTA medication list    Added: None    Deleted:   o Lisinopril     Changed: NOne    Allergies reviewed with patient and updates made in EHR: no    -- No recent (within 30 days) course of antibiotics  -- No recent (within 30 days) course of systemic steroids  -- Reports not being on blood thinning medications    -- Declines being on any other prescription or over-the-counter medications    Pain Medication Quantification - Patient is currently scheduled for a same day surgery. If this plan changes and patient is admitted, the inpatient pain management service could be consulted for specific pain management recommendations [contact the Inpatient [facility] Service via [facility] [place]: \"PAIN MANAGEMENT ACUTE INPATIENT/ [facility] or [facility] (depending on location of patient)]    - OUTPATIENT MEDICATIONS (related to pain management):  -- Long-acting opioid: ms contin 30mg by mouth three times daily by the time of the procedure  -- Short-acting opioid: HYDROmorphone 4mg by mouth every 6 hour   -- Intrathecal pump: None    Average daily oral morphine equivalent (OME): 90 mg of OME/day      Prior to Admission medications    Medication Sig Last Dose Taking? Auth Provider Long Term End Date   DULoxetine (CYMBALTA) 60 MG capsule TAKE 1 CAPSULE (60 MG) BY MOUTH DAILY Taking Yes " Mynor Mason MD Yes    ferrous sulfate (FEROSUL) 325 (65 Fe) MG tablet Take 1 tablet (325 mg) by mouth daily (with breakfast) Taking Yes Nyasia Keller PA-C     metFORMIN (GLUCOPHAGE XR) 500 MG 24 hr tablet Take 3 tablets (1,500 mg) by mouth daily (with dinner) Taking Yes Mynor Mason MD Yes    morphine (MS CONTIN) 15 MG CR tablet Take 1 tablet (15 mg) by mouth daily for 60 days Take at midday; in addition to 30mg in AM and PM Taking Yes Sydney Valle No 5/30/23   morphine (MS CONTIN) 30 MG CR tablet Take 1 tablet (30 mg) by mouth every 12 hours Taking Yes Scheierl, Amber J, APRN CNP No    omeprazole 20 MG tablet Take 20 mg by mouth daily as needed Taking Yes Reported, Patient     pregabalin (LYRICA) 25 MG capsule Take 1 capsule (25 mg) by mouth 3 times daily Taking Yes Michael Laboy MD Yes    sennosides (SENOKOT) 8.6 MG tablet Take 1 tablet by mouth daily as needed for constipation Taking Yes Reported, Patient     sucralfate (CARAFATE) 1 GM/10ML suspension Take 10 mLs (1 g) by mouth 4 times daily as needed for nausea (or indigestion) Taking Yes Erika Mar PA-C     triamcinolone (KENALOG) 0.1 % external ointment Apply topically 2 times daily Taking Yes Rodrigo Tavarez MD     valACYclovir (VALTREX) 1000 mg tablet Take 1 tablet (1,000 mg) by mouth 3 times daily for 10 days Taking Yes Michael Laboy MD Yes 5/6/23   ACCU-CHEK GUIDE test strip Use to test blood sugar 3 times daily.   Mynor Mason MD     clindamycin (CLINDAMAX) 1 % external gel Apply topically 2 times daily , to HS lesion   Erika Mar PA-C     Continuous Blood Gluc Sensor (FREESTYLE MORENITA 3 SENSOR) MISC 1 each every 14 days Use 1 sensor every 14 days. Use to read blood sugars per 's instructions.   Mynor Mason MD     folic acid (FOLVITE) 400 MCG tablet Take 1 tablet (400 mcg) by mouth daily  Patient not taking: Reported on 4/28/2023 Not Taking  Michael Laboy MD     heparin  lock flush 10 UNIT/ML SOLN injection 3 mLs by Intracatheter route every 24 hours Flush each lumen with 3 mLs (total 6mL in 24 hours period)  Patient not taking: Reported on 4/26/2023   Erika Mar PA-C     HYDROmorphone (DILAUDID) 2 MG tablet Take 2 tablets (4 mg) by mouth every 6 hours as needed for pain  Patient not taking: Reported on 4/28/2023 Not Taking  [place], [place]     morphine (MS CONTIN) 30 MG CR tablet Take 1 tablet (30 mg) by mouth 3 times daily  Patient not taking: Reported on 4/28/2023 Not Taking  [place], Sydney No    naloxone (NARCAN) 4 MG/0.1ML nasal spray Spray 1 spray (4 mg) into one nostril alternating nostrils as needed for opioid reversal every 2-3 minutes until assistance arrives   Erika Mar PA-C Yes    phosphorus tablet 250 mg 250 MG per tablet Take 2 tablets (500 mg) by mouth 3 times daily Take until directed otherwise  Patient not taking: Reported on 4/28/2023 Not Taking  Michael Laboy MD         Medication History Completed By: Mahad Shah RPH 4/28/2023 8:52 AM

## 2023-04-30 LAB
ABO/RH(D): NORMAL
ANTIBODY SCREEN: NEGATIVE
SPECIMEN EXPIRATION DATE: NORMAL

## 2023-05-01 ENCOUNTER — LAB (OUTPATIENT)
Dept: LAB | Facility: CLINIC | Age: 53
End: 2023-05-01
Payer: COMMERCIAL

## 2023-05-01 ENCOUNTER — PRE VISIT (OUTPATIENT)
Dept: SURGERY | Facility: CLINIC | Age: 53
End: 2023-05-01

## 2023-05-01 ENCOUNTER — ANESTHESIA EVENT (OUTPATIENT)
Dept: SURGERY | Facility: CLINIC | Age: 53
End: 2023-05-01

## 2023-05-01 ENCOUNTER — OFFICE VISIT (OUTPATIENT)
Dept: SURGERY | Facility: CLINIC | Age: 53
End: 2023-05-01
Payer: COMMERCIAL

## 2023-05-01 VITALS
SYSTOLIC BLOOD PRESSURE: 119 MMHG | HEART RATE: 93 BPM | OXYGEN SATURATION: 99 % | WEIGHT: 265.5 LBS | TEMPERATURE: 98 F | HEIGHT: 71 IN | BODY MASS INDEX: 37.17 KG/M2 | RESPIRATION RATE: 16 BRPM | DIASTOLIC BLOOD PRESSURE: 77 MMHG

## 2023-05-01 DIAGNOSIS — D50.8 IRON DEFICIENCY ANEMIA SECONDARY TO INADEQUATE DIETARY IRON INTAKE: Primary | ICD-10-CM

## 2023-05-01 DIAGNOSIS — R06.09 DYSPNEA ON EXERTION: ICD-10-CM

## 2023-05-01 DIAGNOSIS — Z01.818 PRE-OP EVALUATION: ICD-10-CM

## 2023-05-01 DIAGNOSIS — D64.9 ANEMIA, UNSPECIFIED TYPE: ICD-10-CM

## 2023-05-01 DIAGNOSIS — R94.39 ABNORMAL CARDIOVASCULAR STRESS TEST: ICD-10-CM

## 2023-05-01 DIAGNOSIS — E11.9 TYPE 2 DIABETES MELLITUS WITHOUT COMPLICATION, WITHOUT LONG-TERM CURRENT USE OF INSULIN (H): ICD-10-CM

## 2023-05-01 DIAGNOSIS — E11.65 UNCONTROLLED DIABETES MELLITUS WITH HYPERGLYCEMIA, WITHOUT LONG-TERM CURRENT USE OF INSULIN (H): ICD-10-CM

## 2023-05-01 DIAGNOSIS — Z01.818 PRE-OP EVALUATION: Primary | ICD-10-CM

## 2023-05-01 DIAGNOSIS — E66.01 MORBID OBESITY (H): ICD-10-CM

## 2023-05-01 DIAGNOSIS — C49.9 UNDIFFERENTIATED PLEOMORPHIC SARCOMA (H): ICD-10-CM

## 2023-05-01 DIAGNOSIS — C49.9 SARCOMA OF SOFT TISSUE (H): ICD-10-CM

## 2023-05-01 LAB
ALBUMIN SERPL BCG-MCNC: 3.6 G/DL (ref 3.5–5.2)
ALP SERPL-CCNC: 60 U/L (ref 40–129)
ALT SERPL W P-5'-P-CCNC: 9 U/L (ref 10–50)
ANION GAP SERPL CALCULATED.3IONS-SCNC: 10 MMOL/L (ref 7–15)
AST SERPL W P-5'-P-CCNC: 13 U/L (ref 10–50)
BILIRUB SERPL-MCNC: 0.5 MG/DL
BUN SERPL-MCNC: 16.2 MG/DL (ref 6–20)
CALCIUM SERPL-MCNC: 9.7 MG/DL (ref 8.6–10)
CHLORIDE SERPL-SCNC: 99 MMOL/L (ref 98–107)
CREAT SERPL-MCNC: 0.8 MG/DL (ref 0.67–1.17)
DEPRECATED HCO3 PLAS-SCNC: 29 MMOL/L (ref 22–29)
ERYTHROCYTE [DISTWIDTH] IN BLOOD BY AUTOMATED COUNT: 18.8 % (ref 10–15)
FERRITIN SERPL-MCNC: 1606 NG/ML (ref 31–409)
FOLATE SERPL-MCNC: 9.1 NG/ML (ref 4.6–34.8)
GFR SERPL CREATININE-BSD FRML MDRD: >90 ML/MIN/1.73M2
GLUCOSE SERPL-MCNC: 134 MG/DL (ref 70–99)
HBA1C MFR BLD: 5.7 %
HCT VFR BLD AUTO: 29.7 % (ref 40–53)
HGB BLD-MCNC: 8.6 G/DL (ref 13.3–17.7)
IRON BINDING CAPACITY (ROCHE): 142 UG/DL (ref 240–430)
IRON SATN MFR SERPL: 15 % (ref 15–46)
IRON SERPL-MCNC: 22 UG/DL (ref 61–157)
MCH RBC QN AUTO: 24.4 PG (ref 26.5–33)
MCHC RBC AUTO-ENTMCNC: 29 G/DL (ref 31.5–36.5)
MCV RBC AUTO: 84 FL (ref 78–100)
PHOSPHATE SERPL-MCNC: 3.8 MG/DL (ref 2.5–4.5)
PLATELET # BLD AUTO: 388 10E3/UL (ref 150–450)
POTASSIUM SERPL-SCNC: 4.4 MMOL/L (ref 3.4–5.3)
PROT SERPL-MCNC: 8.6 G/DL (ref 6.4–8.3)
RBC # BLD AUTO: 3.53 10E6/UL (ref 4.4–5.9)
SODIUM SERPL-SCNC: 138 MMOL/L (ref 136–145)
WBC # BLD AUTO: 6.1 10E3/UL (ref 4–11)

## 2023-05-01 PROCEDURE — 99215 OFFICE O/P EST HI 40 MIN: CPT | Performed by: PHYSICIAN ASSISTANT

## 2023-05-01 PROCEDURE — 82728 ASSAY OF FERRITIN: CPT | Performed by: PATHOLOGY

## 2023-05-01 PROCEDURE — 84100 ASSAY OF PHOSPHORUS: CPT | Performed by: PATHOLOGY

## 2023-05-01 PROCEDURE — 86850 RBC ANTIBODY SCREEN: CPT | Mod: 90 | Performed by: PATHOLOGY

## 2023-05-01 PROCEDURE — 83550 IRON BINDING TEST: CPT | Performed by: PATHOLOGY

## 2023-05-01 PROCEDURE — 83036 HEMOGLOBIN GLYCOSYLATED A1C: CPT | Mod: 90 | Performed by: PATHOLOGY

## 2023-05-01 PROCEDURE — 82746 ASSAY OF FOLIC ACID SERUM: CPT | Mod: 90 | Performed by: PATHOLOGY

## 2023-05-01 PROCEDURE — 99000 SPECIMEN HANDLING OFFICE-LAB: CPT | Performed by: PATHOLOGY

## 2023-05-01 PROCEDURE — 36415 COLL VENOUS BLD VENIPUNCTURE: CPT | Performed by: PATHOLOGY

## 2023-05-01 PROCEDURE — 86900 BLOOD TYPING SEROLOGIC ABO: CPT | Mod: 90 | Performed by: PATHOLOGY

## 2023-05-01 PROCEDURE — 85027 COMPLETE CBC AUTOMATED: CPT | Performed by: PATHOLOGY

## 2023-05-01 PROCEDURE — 83540 ASSAY OF IRON: CPT | Performed by: PATHOLOGY

## 2023-05-01 PROCEDURE — 80053 COMPREHEN METABOLIC PANEL: CPT | Performed by: PATHOLOGY

## 2023-05-01 PROCEDURE — 86901 BLOOD TYPING SEROLOGIC RH(D): CPT | Mod: 90 | Performed by: PATHOLOGY

## 2023-05-01 RX ORDER — ALBUTEROL SULFATE 90 UG/1
1-2 AEROSOL, METERED RESPIRATORY (INHALATION)
Status: CANCELLED
Start: 2023-05-01

## 2023-05-01 RX ORDER — EPINEPHRINE 1 MG/ML
0.3 INJECTION, SOLUTION, CONCENTRATE INTRAVENOUS EVERY 5 MIN PRN
Status: CANCELLED | OUTPATIENT
Start: 2023-05-01

## 2023-05-01 RX ORDER — HEPARIN SODIUM (PORCINE) LOCK FLUSH IV SOLN 100 UNIT/ML 100 UNIT/ML
5 SOLUTION INTRAVENOUS
Status: CANCELLED | OUTPATIENT
Start: 2023-05-01

## 2023-05-01 RX ORDER — HEPARIN SODIUM,PORCINE 10 UNIT/ML
5 VIAL (ML) INTRAVENOUS
Status: CANCELLED | OUTPATIENT
Start: 2023-05-01

## 2023-05-01 RX ORDER — ALBUTEROL SULFATE 0.83 MG/ML
2.5 SOLUTION RESPIRATORY (INHALATION)
Status: CANCELLED | OUTPATIENT
Start: 2023-05-01

## 2023-05-01 RX ORDER — DIPHENHYDRAMINE HYDROCHLORIDE 50 MG/ML
50 INJECTION INTRAMUSCULAR; INTRAVENOUS
Status: CANCELLED
Start: 2023-05-01

## 2023-05-01 RX ORDER — METHYLPREDNISOLONE SODIUM SUCCINATE 125 MG/2ML
125 INJECTION, POWDER, LYOPHILIZED, FOR SOLUTION INTRAMUSCULAR; INTRAVENOUS
Status: CANCELLED
Start: 2023-05-01

## 2023-05-01 RX ORDER — MEPERIDINE HYDROCHLORIDE 25 MG/ML
25 INJECTION INTRAMUSCULAR; INTRAVENOUS; SUBCUTANEOUS EVERY 30 MIN PRN
Status: CANCELLED | OUTPATIENT
Start: 2023-05-01

## 2023-05-01 ASSESSMENT — PAIN SCALES - GENERAL: PAINLEVEL: MILD PAIN (2)

## 2023-05-01 ASSESSMENT — LIFESTYLE VARIABLES: TOBACCO_USE: 1

## 2023-05-01 ASSESSMENT — ENCOUNTER SYMPTOMS: SEIZURES: 0

## 2023-05-01 ASSESSMENT — COPD QUESTIONNAIRES: COPD: 1

## 2023-05-01 NOTE — H&P
Pre-Operative H & P     CC:  Preoperative exam to assess for increased cardiopulmonary risk while undergoing surgery and anesthesia.    Date of Encounter: 5/1/2023  Primary Care Physician:  Mynor Mason     Reason for visit:   Encounter Diagnoses   Name Primary?     Pre-op evaluation Yes     Sarcoma of soft tissue (H)      Anemia, unspecified type      Type 2 diabetes mellitus without complication, without long-term current use of insulin (H)      Dyspnea on exertion      Morbid obesity (H)      Uncontrolled diabetes mellitus with hyperglycemia, without long-term current use of insulin (H)        HPI  Antonio Canseco is a 52 year old male who presents for pre-operative H & P in preparation for  Procedure Information     Date/Time: 5/22/2022, 7:30 AM     Procedure: Excision, neoplasm, right lower extremity    Anesthesia type: General    Pre-op diagnosis: Sarcoma of soft tissue    Location: [facility]    Providers: Zach Salgado MD        Patient is being evaluated for comorbid conditions of  hyperlipdemia, HTN, BRIAN with CPAP, pulmonary nodule, anemia, tinnitus, chronic back pain, poorly controlled type 2 diabetes, obesity, GERD, IBS, hepatic steatosis, nephrolithiasis, and hypophosphatemia.      Patient reports that he first noticed a node in his posterior right thigh in May, 2022. He was evaluated at OSH ED where an xray was obtained and patient was diagosed with sciatica and started on physical therapy. Over time, the lump continued to grow rapidly and he was found to have a large mass on MRI completed in January, 2023. 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 He has been following with oncology and orthopedic surgery and is on chemo and radiation therapy. He is now scheduled for surgery as listed above.      History is obtained from the patient, his family member, and chart review    Hx of abnormal bleeding or anti-platelet  use: None    Past Medical History  Past Medical History:   Diagnosis Date     Acrochordon 02/11/2021     CARDIOVASCULAR SCREENING; LDL GOAL LESS THAN 160 03/27/2012     Colon adenoma      Controlled type 2 diabetes mellitus without complication, without long-term current use of insulin (H) 09/16/2019    X 1     Cough 02/04/2014     Degeneration of thoracic intervertebral disc 12/11/2013     Dysfunction of both eustachian tubes 04/12/2019     Elevated blood pressure 12/22/2014     Elevated hemoglobin A1c 09/16/2019    X 1     Gastroesophageal reflux disease      Hamstring strain, right, subsequent encounter 12/19/2022     Hepatic cyst 05/22/2018     Hepatic steatosis 12/11/2013     History of colonic polyps 12/11/2013     History of drainage of abscess 09/16/2019     Hypertension      Irritable bowel syndrome without diarrhea 06/01/2018     Low libido 04/12/2019     Lumbar radiculopathy 12/19/2022     Microscopic hematuria 12/19/2013     Nephrolithiasis      Obesity 03/27/2012     BRIAN on CPAP 03/27/2012     Other irritable bowel syndrome 05/22/2018     Pain in thoracic spine 05/09/2013     Pulmonary nodule 05/22/2018     Right-sided chest pain 04/13/2018     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     Sleep apnea      Tinea pedis of both feet 02/11/2021     Tinnitus, unspecified laterality 04/12/2019     Uncontrolled diabetes mellitus with hyperglycemia, without long-term current use of insulin (H) 01/23/2023       Past Surgical History  Past Surgical History:   Procedure Laterality Date     COLONOSCOPY       COLONOSCOPY N/A 03/12/2021    Procedure: COLONOSCOPY (fv);  Surgeon: Ean Alcantara MD;  Location: [facility] OR     CYSTOSCOPY  02/11/2014    Procedure: CYSTOSCOPY;   Video Cystoscopy with Exam Under Anesthesia;  Surgeon: Chente Moeller MD;  Location: [facility] OR     IR CVC TUNNEL PLACEMENT > 5 YRS OF AGE  1/27/2023     IR CVC TUNNEL REMOVAL RIGHT  3/13/2023     LEG SURGERY Left 2019    Suregy r/t infection     wisdom teeth          Prior to Admission Medications  Current Outpatient Medications   Medication Sig Dispense Refill     DULoxetine (CYMBALTA) 60 MG capsule TAKE 1 CAPSULE (60 MG) BY MOUTH DAILY 90 capsule 1     ferrous sulfate (FEROSUL) 325 (65 Fe) MG tablet Take 1 tablet (325 mg) by mouth daily (with breakfast) 30 tablet 1     metFORMIN (GLUCOPHAGE XR) 500 MG 24 hr tablet Take 3 tablets (1,500 mg) by mouth daily (with dinner) 270 tablet 1     morphine (MS CONTIN) 15 MG CR tablet Take 1 tablet (15 mg) by mouth daily for 60 days Take at midday; in addition to 30mg in AM and PM 60 tablet 0     morphine (MS CONTIN) 30 MG CR tablet Take 1 tablet (30 mg) by mouth every 12 hours 60 tablet 0     omeprazole 20 MG tablet Take 20 mg by mouth daily as needed       pregabalin (LYRICA) 25 MG capsule Take 1 capsule (25 mg) by mouth 3 times daily 90 capsule 1     sennosides (SENOKOT) 8.6 MG tablet Take 1 tablet by mouth daily as needed for constipation       sucralfate (CARAFATE) 1 GM/10ML suspension Take 10 mLs (1 g) by mouth 4 times daily as needed for nausea (or indigestion) 414 mL 0     triamcinolone (KENALOG) 0.1 % external ointment Apply topically 2 times daily 454 g 1     valACYclovir (VALTREX) 1000 mg tablet Take 1 tablet (1,000 mg) by mouth 3 times daily for 10 days 30 tablet 0     ACCU-CHEK GUIDE test strip Use to test blood sugar 3 times daily. 100 strip 4     clindamycin (CLINDAMAX) 1 % external gel Apply topically 2 times daily , to HS lesion 30 g 0     Continuous Blood Gluc Sensor (FREESTYLE MORENITA 3 SENSOR) [facility] [place] 1 each every 14 days Use 1 sensor every 14 days. Use to read blood sugars per 's instructions. 2 each 5     folic acid (FOLVITE) 400 MCG tablet Take 1 tablet (400 mcg) by mouth daily (Patient not taking: Reported on 4/28/2023) 30 tablet 1     heparin lock flush 10 UNIT/ML SOLN injection 3 mLs by Intracatheter route every 24 hours Flush each lumen with 3 mLs (total 6mL in 24 hours period) (Patient not taking:  Reported on 4/26/2023) 180 mL 0     HYDROmorphone (DILAUDID) 2 MG tablet Take 2 tablets (4 mg) by mouth every 6 hours as needed for pain (Patient not taking: Reported on 4/28/2023) 30 tablet 0     morphine (MS CONTIN) 30 MG CR tablet Take 1 tablet (30 mg) by mouth 3 times daily (Patient not taking: Reported on 4/28/2023) 90 tablet 0     naloxone (NARCAN) 4 MG/0.1ML nasal spray Spray 1 spray (4 mg) into one nostril alternating nostrils as needed for opioid reversal every 2-3 minutes until assistance arrives 2 each 1     phosphorus tablet 250 mg 250 MG per tablet Take 2 tablets (500 mg) by mouth 3 times daily Take until directed otherwise (Patient not taking: Reported on 4/28/2023) 84 tablet 1       Allergies  Allergies   Allergen Reactions     Emend [Aprepitant] Shortness Of Breath     Couldn't breathe and started to pass out during 1st administration      Kiwi Itching       Social History  Social History     Socioeconomic History     Marital status:      Spouse name: Yari     Number of children: 2     Years of education: Not on file     Highest education level: Some college, no degree   Occupational History     Occupation: [employer]   Tobacco Use     Smoking status: Former     Packs/day: 1.00     Years: 25.00     Pack years: 25.00     Types: Cigarettes     Start date: 4/23/1988     Quit date: 4/23/2013     Years since quitting: 10.0     Smokeless tobacco: Never   Vaping Use     Vaping status: Never Used   Substance and Sexual Activity     Alcohol use: Not Currently     Comment: one drink every 6 months     Drug use: No     Sexual activity: Yes     Partners: Female   Other Topics Concern     Parent/sibling w/ CABG, MI or angioplasty before 65F 55M? Not Asked   Social History Narrative     Not on file     Social Determinants of Health     Financial Resource Strain: Low Risk  (2/17/2023)    Overall Financial Resource Strain (CARDIA)      Difficulty of Paying Living Expenses: Not very hard   Food Insecurity: No  Food Insecurity (2/17/2023)    Hunger Vital Sign      Worried About Running Out of Food in the Last Year: Never true      Ran Out of Food in the Last Year: Never true   Transportation Needs: No Transportation Needs (2/17/2023)    PRAPARE - Transportation      Lack of Transportation (Medical): No      Lack of Transportation (Non-Medical): No   Physical Activity: Not on file   Stress: No Stress Concern Present (3/7/2021)    [place] [employer] - Occupational Stress Questionnaire      Feeling of Stress : Not at all   Social Connections: Socially Isolated (3/7/2021)    Social Connection and Isolation Panel [NHANES]      Frequency of Communication with Friends and Family: Never      Frequency of Social Gatherings with Friends and Family: Never      Attends Cheondoism Services: Never      Active Member of Clubs or Organizations: No      Attends Club or Organization Meetings: Never      Marital Status:    Intimate Partner Violence: Not on file   Housing Stability: Low Risk  (2/17/2023)    Housing Stability Vital Sign      Unable to Pay for Housing in the Last Year: No      Number of Places Lived in the Last Year: 1      Unstable Housing in the Last Year: No       Family History  Family History   Problem Relation Age of Onset     Family History Negative Mother      Obesity Mother      Deep Vein Thrombosis (DVT) Mother      Cancer Father         lymphoma     Other Cancer Father      Other Cancer Paternal Grandfather      Deep Vein Thrombosis (DVT) Paternal Uncle      Anesthesia Reaction No family hx of        Review of Systems  The complete review of systems is negative other than noted in the HPI or here.   Anesthesia Evaluation   Pt has had prior anesthetic. Type: General.    No history of anesthetic complications       ROS/MED HX  ENT/Pulmonary: Comment: Pulmonary nodule    (+) sleep apnea, uses CPAP, tobacco use, Past use, COPD (family reported, patient denies),  (-) recent URI   Neurologic:  Comment: Tinnitus bilateral    (+) no peripheral neuropathy  (-) no seizures and no CVA   Cardiovascular:     (+) Dyslipidemia hypertension-----REGAN. Previous cardiac testing   Echo: Date: 1/26/2023 Results:  Interpretation Summary   Left ventricular systolic function is normal.   The visual ejection fraction is estimated at 55%.   (The upper limit of normal is 5.6cm for left ventricular size in end-diastole.)   No hemodynamically significant valvular abnormalities on 2D or color flow imaging. Technically difficult, suboptimal study.  Stress Test: Date: 7/2/2014 Results:  Interpretation Summary   1.  Mildly reduced functional capacity.    2.  Negative stress EKG for ischemia.    3.  Negative stress echocardiogram for ischemia.      4.  Sensitivity of study is reduced as patient imaged below 85% MPHR.  ECG Reviewed:  Date: 2/11/2023 Results:  Sinus tachycardia    Otherwise normal ECG    When compared with ECG of 07-FEB-2023 15:21,    Nonspecific T wave abnormality, improved in Lateral leads    QT has lengthened ()  Cath:  Date: Results:      METS/Exercise Tolerance: 3 - Able to walk 1-2 blocks without stopping    Hematologic:     (+) anemia, history of blood transfusion, no previous transfusion reaction, Known PRBC Anitbodies:No     Musculoskeletal: Comment: Back pain      GI/Hepatic: Comment: IBS    (+) GERD (intermittent), liver disease (Hepatic steatosis),     Renal/Genitourinary:     (+) Nephrolithiasis ,     Endo: Comment: Hypophosphatemia    (+) type II DM, Last HgA1c: 11.7, date: 1/18/2023, Not using insulin, - not using insulin pump. not previously admitted for DM/DKA. Obesity,     Psychiatric/Substance Use:  - neg psychiatric ROS     Infectious Disease:  - neg infectious disease ROS  (-) Recent Fever   Malignancy:   (+) Malignancy, History of Other.Other CA Sarcoma Active status post Chemo.    Other:  - neg other ROS          /77 (BP Location: Right arm, Patient Position: Sitting, Cuff Size: Adult  "Large)   Pulse 93   Temp 98  F (36.7  C) (Oral)   Resp 16   Ht 1.803 m (5' 11\")   Wt 120.4 kg (265 lb 8 oz)   SpO2 99%   BMI 37.03 kg/m      Physical Exam   Constitutional: Pleasant male, no apparent distress, and appears older than stated age.  Eyes: Pupils equal, round and reactive to light, extra ocular muscles intact, sclera clear, conjunctiva normal.  HENT: Normocephalic and a traumatic, oral pharynx with moist mucus membranes, good dentition. No goiter appreciated.   Respiratory: Clear to auscultation bilaterally, no crackles or wheezing.  Cardiovascular: Regular rate and rhythm, normal S1 and S2, and no murmur noted.  Carotids +2, no bruits. No edema. Palpable pulses to radial  DP and PT arteries.   GI: Morbidly obese. Normal bowel sounds, soft, non-distended, non-tender, no masses palpated, no hepatosplenomegaly.    Lymph/Hematologic: No cervical lymphadenopathy and no supraclavicular lymphadenopathy.  Genitourinary:  Deferred  Skin: Warm and dry.  No rashes on exposed skin.  Musculoskeletal: Full ROM of neck. There is no redness, warmth, or swelling of visible joints. Gross motor strength is normal.    Neurologic: Awake, alert, oriented to name, place and time. Cranial nerves II-XII are grossly intact. Gait is normal.   Neuropsychiatric: Calm, cooperative. Normal affect.     Prior Labs/Diagnostic Studies   All labs and imaging personally reviewed      Latest Reference Range & Units 03/24/23 09:46   Sodium 136 - 145 mmol/L 140   Potassium 3.4 - 5.3 mmol/L 4.2   Chloride 98 - 107 mmol/L 102   Carbon Dioxide (CO2) 22 - 29 mmol/L 26   Urea Nitrogen 6.0 - 20.0 mg/dL 13.2   Creatinine 0.67 - 1.17 mg/dL 0.82   GFR Estimate >60 mL/min/1.73m2 >90   Calcium 8.6 - 10.0 mg/dL 9.3   Anion Gap 7 - 15 mmol/L 12   Magnesium 1.7 - 2.3 mg/dL 2.2   Phosphorus 2.5 - 4.5 mg/dL 4.3   Albumin 3.5 - 5.2 g/dL 3.4 (L)   Protein Total 6.4 - 8.3 g/dL 8.0   Alkaline Phosphatase 40 - 129 U/L 48   ALT 10 - 50 U/L 5 (L)   AST 10 - " 50 U/L 17   Bilirubin Total <=1.2 mg/dL 0.6   Glucose 70 - 99 mg/dL 150 (H)   WBC 4.0 - 11.0 10e3/uL 8.0   Hemoglobin 13.3 - 17.7 g/dL 9.1 (L)   Hematocrit 40.0 - 53.0 % 30.8 (L)   Platelet Count 150 - 450 10e3/uL 380   RBC Count 4.40 - 5.90 10e6/uL 3.74 (L)   MCV 78 - 100 fL 82   MCH 26.5 - 33.0 pg 24.3 (L)   MCHC 31.5 - 36.5 g/dL 29.5 (L)   RDW 10.0 - 15.0 % 20.3 (H)   (L): Data is abnormally low  (H): Data is abnormally high    EKG/ stress test - if available please see in ROS above   Echo result w/o MOPS: Interpretation Summary Left ventricular systolic function is normal.The visual ejection fraction is estimated at 55%.(The upper limit of normal is 5.6cm for left ventricular size in end-diastole.)No hemodynamically significant valvular abnormalities on 2D or color flowimaging. Technically difficult, suboptimal study.    MRI Femur 4/19/2023   IMPRESSION: In this patient with high grade sarcoma status post-neoadjuvant chemotherapy;   Slightly increase in the size of the heterogeneously enhancing soft tissue mass with myxoid and lipoid components in the posterior compartment of the right thigh since MRI from 2/20/2023, measuring 11 x 16.4 x 21 cm, previously measured 11 x 15.7 x 21.7 cm.    *  Distal portion of the mass anteriorly abuts the sciatic nerve and deep femoral artery/vein without definite invasion.   *  Slightly increased edema within the adductor rosy when compared to prior study.     The patient's records and results personally reviewed by this provider.     Outside records reviewed from: Care Everywhere    LAB/DIAGNOSTIC STUDIES TODAY:    CBC w/ iron studies, A1c, T&S    Assessment  Antonio Canseco is a 52 year old male seen as a PAC referral for risk assessment and optimization for anesthesia.    Plan/Recommendations  Pt will be optimized for the proposed procedure.  See below for details on the assessment, risk, and preoperative recommendations    NEUROLOGY  - No history of TIA, CVA or seizure  -  Bilateral tinnitus     - Chronic Pain  - OUTPATIENT MEDICATIONS (related to pain management):  -- Long-acting opioid: ms contin 30mg by mouth three times daily by the time of the procedure  -- Short-acting opioid: HYDROmorphone 4mg by mouth every 6 hour   -- Intrathecal pump: None     Average daily oral morphine equivalent (OME): 90 mg of OME/day    Please access the PAC pharmacist's note for full details.    -Post Op delirium risk factors:  No risk identified    ENT  - No current airway concerns.  Will need to be reassessed day of surgery.  Mallampati: I  TM: > 3    CARDIAC  - Hypertension  Well controlled without medication. Patient had been on lisinopril, but this was discontinued due to cough.   - Hyperlipidemia  Well controlled on home regimen  - REGAN: patient reports that his activity has been limited due to pain and dyspnea on exertion. He is unable to walk more than a couple blocks without stopping and is unable to walk up a couple flights of stairs. Last stress test completed in 2014. Due to new symptoms and upcoming intermediate risk surgery, recommend patient complete Lexiscan prior to surgery. Will help patient arrange.   - METS (Metabolic Equivalents)  Patient CANNOT perform 4 METS exercise without symptoms            Total Score: 1    Functional Capacity: Unable to complete 4 METS      RCRI-Very low risk: Class 1 0.4% complication rate            Total Score: 0        PULMONARY  - Obstructive Sleep Apnea  BRIAN with home CPAP.  Patient will be instructed to bring their home CPAP device to the hospital with them.    - Denies asthma or inhaler use   - Patient's family member reports that he has emphysema. Chest CT completed 2/11/23 without evidence of this. Lungs CTA on exam and SpO2 99% on room air today.   - Tobacco History    History   Smoking Status     Former     Packs/day: 1.00     Years: 25.00     Types: Cigarettes     Start date: 4/23/1988     Quit date: 4/23/2013   Smokeless Tobacco     Never  "      GI  - GERD  Intermittent. Controlled with PRN omeprazole.  PONV Medium Risk  Total Score: 2           1 AN PONV: Patient is not a current smoker    1 AN PONV: Intended Post Op Opioids      IBS on cymbalta, patient denies concerns today.  Hepatic steatosis    /RENAL  - Baseline Creatinine  0.82  - History of nephrolithiasis    ENDOCRINE    - BMI: Estimated body mass index is 37.03 kg/m  as calculated from the following:    Height as of this encounter: 1.803 m (5' 11\").    Weight as of this encounter: 120.4 kg (265 lb 8 oz).  Class 3 Obesity (BMI > 40)  - Diabetes  Hemoglobin A1C (%)   Date Value   05/01/2023 5.7 (H)   02/11/2021 6.6 (H)     Diabetes Mellitus, Type 2, non-insulin dependent. Last A1c 11.7. Patient reports improved BG control with recent average between 120-150. Will recheck A1c today.  Hold morning oral hypoglycemic medications. Recommend close monitoring of the patient's blood glucose levels throughout the perioperative period.  - Hypophosphatemia, repeat phosphorus today    HEME  VTE Medium Risk 1.8%            Total Score: 7    VTE: Male    VTE: Current cancer      - No history of abnormal bleeding or antiplatelet use.  - Chronic anemia  Recommend perioperative use of blood conservation techniques intraoperatively and close monitoring for postoperative bleeding.  A type and screen has been ordered for this patient  Iron studies have been ordered and iron infusions were discussed with the patient. Infusions may be ordered preoperatively based on ferritin value  *ADDENDUM: Patient did not qualify for iron infusions. Message sent to surgical team, they anticipate that patient will need blood. T&S completed day of PAC visit as above.  Sadaf Villeda PA-C on 5/9/2023 at 7:20 AM       MSK  - Low back pain    OTHER  - Sarcoma RLE: surgery as scheduled above    Different anesthesia methods/types have been discussed with the patient, but they are aware that the final plan will be decided by the " "assigned anesthesia provider on the date of service.  Patient was discussed with Dr Cam DURAND with information on surgery time/arrival time, meds and NPO status given by nursing staff. No further diagnostic testing indicated.      On the day of service:     Prep time: 20 minutes  Visit time: 20 minutes  Documentation time: 20 minutes  ------------------------------------------  Total time: 60 minutes      Sadaf Villeda PA-C  Preoperative Assessment Center  [facility]  Phone: [phone]  Fax: [phone]      ADDENDUM: A1c yesterday improved at 5.7 and phosphorus within normal limits.  Lexiscan has been scheduled for 5/12, will follow results.  Sadaf Villeda PA-C on 5/1/2023 at 7:15 AM    ADDENDUM: Lexiscan completed 5/12 and notable for reduced EF 36-39%, discussed with Dr. Varma - this new finding is concerning for possible cardiomyopathy. Patient is not currently on ACE or ARB. Will place urgent cardiology referral and update surgical team.    Sadaf Villeda PA-C on 5/15/2023 at 8:34 AM    ADDENDUM: Patient completed recommended cardiology follow-up on 5/17 with Dr. Cee,  recommendations as below.     \"Cardiac MRI. Further recommendation will depend upon that.  If LV systolic function is mild to moderately reduced overall he will be intermediate risk for upcoming excision surgery.  He will need to be started on cardiomyopathy medication if cardiomyopathy is confirmed on cardiac MRI but this is best accomplished after the surgery as he will need to be started on beta-blocker ACE inhibitor with some gradual up titration.  I recommend follow-up with an [facility] in a month.\"     Sadaf Sierra PA-C on 5/18/2023 at 3:08 PM        "

## 2023-05-01 NOTE — PATIENT INSTRUCTIONS
Preparing for Your Surgery      Name:  Antonio Canseco   MRN:  [id number]   :  1970   Today's Date:  2023       Arriving for surgery:  Surgery date: 23  Arrival time:  5.30AM    Please come to:       [facility] [address]  [address] [place]  [address]    - parking is available in front of [facility] from 5:15AM to 8:00PM. If you prefer, park your car in the Green Lot.    -Proceed to the 3rd floor, check in at the Adult Surgery Waiting Lounge. [phone]    If an escort is needed stop at the Information Desk in the lobby. Inform the information person that you are here for surgery. An escort to the Adult Surgery Waiting Lounge will be provided.    What can I eat or drink?  -  You may eat and drink normally up to 8 hours prior to arrival time. (Until 9.30PM)  -  You may have clear liquids until 2 hours prior to arrival time. (Until 3.30AM)    Examples of clear liquids:  Water  Clear broth  Juices (apple, white grape, white cranberry  and cider) without pulp  Noncarbonated, powder based beverages  (lemonade and Malachi-Aid)  Sodas (Sprite, 7-Up, ginger ale and seltzer)  Coffee or tea (without milk or cream)  Gatorade    -  No Alcohol for at least 24 hours before surgery.     Which medicines can I take?    Hold Aspirin for 7 days before surgery.   Hold Multivitamins for 7 days before surgery.  Hold Supplements for 7 days before surgery. (Ferrous sulfate (iron),   Hold Ibuprofen (Advil, Motrin) for 3 day(s) before surgery--unless otherwise directed by surgeon.  Hold Naproxen (Aleve) for 4 days before surgery.    -  DO NOT take these medications the day of surgery:  Clindamycin 1% gel, Metformin, Senokot, Sucralfate (Carafate), Triamcinolone (Kenalog 1% cream)    -  PLEASE TAKE these medications the day of surgery:  Cymbalta, Morphine (as needed), Narcan (as needed), Omeprazole, Pregabalin (Lyrica), Valacyclovir  (Valtrex)     How do I prepare myself?  - Please take 2 showers (one the night prior to surgery and one the morning of surgery) using Scrubcare or Hibiclens soap.    Use this soap only from the neck to your toes.     Leave the soap on your skin for one minute--then rinse thoroughly.      You may use your own shampoo and conditioner. No other hair products.   - Please remove all jewelry and body piercings.  - No lotions, deodorants or fragrance.  - No makeup or fingernail polish.   - Bring your ID and insurance card.    -If you have a Deep Brain Stimulator, Spinal Cord Stimulator, or any Neuro Stimulator device---you must bring the remote control to the hospital.      ALL PATIENTS GOING HOME THE SAME DAY OF SURGERY ARE REQUIRED TO HAVE A RESPONSIBLE ADULT TO DRIVE AND BE IN ATTENDANCE WITH THEM FOR 24 HOURS FOLLOWING SURGERY.    Covid testing policy as of 12/06/2022  Your surgeon will notify and schedule you for a COVID test if one is needed before surgery--please direct any questions or COVID symptoms to your surgeon      Questions or Concerns:    - For any questions regarding the day of surgery or your hospital stay, please contact the Pre Admission Nursing Office at [phone].       - If you have health changes between today and your surgery, please call your surgeon.       - For questions after surgery, please call your surgeons office.           Current Visitor Guidelines       Surgeries and procedures: Adult patients can have 2 visitors all through the surgery process.     Visiting hours: 8 a.m. to 8:30 p.m.     Hospital: Adult patients and children under age 18 can have 4 visitor at a time       No visitors under the age of 5 are allowed for hospital patients.       Double occupancy rooms: Patients can have only two visitors at a time.       Patients confirmed or suspected to have symptoms of COVID 19 or flu:     No visitors allowed for adult patients.   Children (under age 18) can have 1 named visitor.      People who are sick or showing symptoms of COVID 19 or flu:    Are not allowed to visit patients--we can only make exceptions in special situations.       Please follow these guidelines for your visit:          Please maintain social distance          Masking is optional--however at times you may be asked to wear a mask for the safety of yourself and others     Clean your hands with alcohol hand . Do this when you arrive at and leave the building and patient room,    And again after you touch your mask or anything in the room.     Go directly to and from the room you are visiting.     Stay in the patient s room during your visit. Limit going to other places in the hospital as much as possible     Leave bags and jackets at home or in the car.     For everyone s health, please don t come and go during your visit. That includes for smoking   during your visit.

## 2023-05-08 DIAGNOSIS — D64.9 ANEMIA, UNSPECIFIED TYPE: ICD-10-CM

## 2023-05-08 NOTE — TELEPHONE ENCOUNTER
Ferrous sulfate  325mg tab  Last prescribing provider: Nyasia Keller on 2/20/23    Last clinic visit date: 4/26/23     Recommendations for requested medication (if none, N/A): NA    Any other pertinent information (if none, N/A): NA    Refilled: Y/N, if NO, why?    Routed to Dr. Laboy

## 2023-05-10 ENCOUNTER — TELEPHONE (OUTPATIENT)
Dept: ORTHOPEDICS | Facility: CLINIC | Age: 53
End: 2023-05-10
Payer: COMMERCIAL

## 2023-05-10 DIAGNOSIS — Z01.818 PRE-OP TESTING: Primary | ICD-10-CM

## 2023-05-10 NOTE — TELEPHONE ENCOUNTER
----- Message from Zach Salgado MD sent at 5/9/2023  6:25 AM CDT -----  Regarding: Pre op T and C  Please be sure this patient has a type and cross for 2 units of blood for procedure in about 2 weeks.  Zach  ----- Message -----  From: Sadaf Villeda PA-C  Sent: 5/1/2023   4:30 PM CDT  To: Michael Laboy MD; #  Subject: Anemia                                           Hi Dr. Laboy and Dr. Salgado,    I had the pleasure of seeing Antonio in PAC today in preparation for his upcoming surgery on 5/22/23 and noted that his hgb has decreased from where it was in March, now 8.6. We updated his iron studies to see if this could be playing a role but he does not qualify for iron infusions. I just wanted to put this on your radar and see if there were any additional thoughts before surgery in a couple weeks.    Thanks for your time.    Sincerely,  Sadaf Villeda PA-C

## 2023-05-12 ENCOUNTER — HOSPITAL ENCOUNTER (OUTPATIENT)
Dept: NUCLEAR MEDICINE | Facility: CLINIC | Age: 53
Setting detail: NUCLEAR MEDICINE
Discharge: HOME | End: 2023-05-12
Attending: PHYSICIAN ASSISTANT
Payer: COMMERCIAL

## 2023-05-12 ENCOUNTER — HOSPITAL ENCOUNTER (OUTPATIENT)
Dept: CARDIOLOGY | Facility: CLINIC | Age: 53
Discharge: HOME | End: 2023-05-12
Attending: PHYSICIAN ASSISTANT
Payer: COMMERCIAL

## 2023-05-12 DIAGNOSIS — Z01.818 PRE-OP EVALUATION: ICD-10-CM

## 2023-05-12 DIAGNOSIS — R06.09 DYSPNEA ON EXERTION: ICD-10-CM

## 2023-05-12 DIAGNOSIS — E11.65 UNCONTROLLED DIABETES MELLITUS WITH HYPERGLYCEMIA, WITHOUT LONG-TERM CURRENT USE OF INSULIN (H): ICD-10-CM

## 2023-05-12 DIAGNOSIS — E66.01 MORBID OBESITY (H): ICD-10-CM

## 2023-05-12 LAB
CV BLOOD PRESSURE: 36 MMHG
CV STRESS MAX HR HE: 98
NUC STRESS EJECTION FRACTION: 39 %
RATE PRESSURE PRODUCT: NORMAL
STRESS ECHO BASELINE DIASTOLIC HE: 76
STRESS ECHO BASELINE HR: 79 BPM
STRESS ECHO BASELINE SYSTOLIC BP: 124
STRESS ECHO CALCULATED PERCENT HR: 58 %
STRESS ECHO LAST STRESS DIASTOLIC BP: 77
STRESS ECHO LAST STRESS SYSTOLIC BP: 128
STRESS ECHO TARGET HR: 168

## 2023-05-12 PROCEDURE — 250N000011 HC RX IP 250 OP 636

## 2023-05-12 PROCEDURE — A9502 TC99M TETROFOSMIN: HCPCS | Performed by: PHYSICIAN ASSISTANT

## 2023-05-12 PROCEDURE — 78452 HT MUSCLE IMAGE SPECT MULT: CPT

## 2023-05-12 PROCEDURE — 343N000001 HC RX 343: Performed by: PHYSICIAN ASSISTANT

## 2023-05-12 PROCEDURE — 78452 HT MUSCLE IMAGE SPECT MULT: CPT | Mod: 26 | Performed by: INTERNAL MEDICINE

## 2023-05-12 PROCEDURE — 93017 CV STRESS TEST TRACING ONLY: CPT

## 2023-05-12 PROCEDURE — 93016 CV STRESS TEST SUPVJ ONLY: CPT | Performed by: INTERNAL MEDICINE

## 2023-05-12 PROCEDURE — 93018 CV STRESS TEST I&R ONLY: CPT | Performed by: INTERNAL MEDICINE

## 2023-05-12 RX ORDER — REGADENOSON 0.08 MG/ML
INJECTION, SOLUTION INTRAVENOUS
Status: COMPLETED
Start: 2023-05-12 | End: 2023-05-12

## 2023-05-12 RX ADMIN — TETROFOSMIN 10.8 MILLICURIE: 1.38 INJECTION, POWDER, LYOPHILIZED, FOR SOLUTION INTRAVENOUS at 07:53

## 2023-05-12 RX ADMIN — REGADENOSON 0.4 MG: 0.08 INJECTION, SOLUTION INTRAVENOUS at 09:08

## 2023-05-12 RX ADMIN — TETROFOSMIN 32 MILLICURIE: 1.38 INJECTION, POWDER, LYOPHILIZED, FOR SOLUTION INTRAVENOUS at 09:11

## 2023-05-15 NOTE — ADDENDUM NOTE
Addendum  created 05/15/23 0837 by Sadaf Villeda PA-C    Clinical Note Signed, Diagnosis association updated, Order list changed, Visit diagnoses modified

## 2023-05-17 ENCOUNTER — OFFICE VISIT (OUTPATIENT)
Dept: CARDIOLOGY | Facility: CLINIC | Age: 53
End: 2023-05-17
Attending: PHYSICIAN ASSISTANT
Payer: COMMERCIAL

## 2023-05-17 VITALS
SYSTOLIC BLOOD PRESSURE: 132 MMHG | HEART RATE: 80 BPM | BODY MASS INDEX: 36.58 KG/M2 | DIASTOLIC BLOOD PRESSURE: 78 MMHG | WEIGHT: 261.3 LBS | HEIGHT: 71 IN

## 2023-05-17 DIAGNOSIS — Z01.818 PRE-OP EVALUATION: ICD-10-CM

## 2023-05-17 DIAGNOSIS — R06.09 DYSPNEA ON EXERTION: ICD-10-CM

## 2023-05-17 DIAGNOSIS — R94.39 ABNORMAL CARDIOVASCULAR STRESS TEST: ICD-10-CM

## 2023-05-17 PROCEDURE — 93000 ELECTROCARDIOGRAM COMPLETE: CPT | Performed by: INTERNAL MEDICINE

## 2023-05-17 PROCEDURE — 99205 OFFICE O/P NEW HI 60 MIN: CPT | Performed by: INTERNAL MEDICINE

## 2023-05-17 NOTE — PROGRESS NOTES
HPI and Plan:   Mr Canseco is a very pleasant 52 gentleman with the sarcoma involving the right lower extremity.  He is scheduled for excision surgery on Monday next week.  He was seen by anesthesiology in the preop and because of his segmental status and some dyspnea on exertion and CAD risk factors of hypertension, diabetes mellitus with well-controlled A1c of 5.7, prior history of tobacco abuse he underwent Lexiscan stress test.  There was no evidence of ischemia on the stress test there was a small area of possible apical infarct versus artifact.  LV function was noted to be moderately reduced with LVEF of 36% at rest and 39% with stress and LV enlargement was noted.  Echocardiogram done earlier this year showed LVEF of 55% with the borderline dilated LV with no significant valvular abnormalities.  Patient today is coming to cardiology clinic appointment by his wife.  No exertional chest discomfort.  He has been diagnosed with emphysema 2 tobacco 10 years ago and does get shortness of breath with exertion which is longstanding.  He is quite sedentary has not done any dedicated exercise.  No dizziness presyncope syncope.  EKG shows sinus rhythm.  No orthopnea.  In fact patient has lost several pounds of weight since the diagnosis of sarcoma he did receive chemotherapy and radiation.    Assessment and plan  1.  Possible cardiomyopathy.  LV function was noted to be moderately reduced on Lexiscan stress test.  I did discuss with patient and his wife the sensitivity and specificity stress test especially for evaluation of LV function.  Sometimes he does not completely accurate.  Clinically does not appear to be in decompensated congestive heart failure and appears euvolemic.  I recommend doing cardiac MRI to better look at LV function and also to rule out apical scar versus artifact.  No ischemia was noted on stress test and clinically no typical anginal symptoms.  2.  Dyspnea on exertion could be multifactorial from  deconditioning, emphysema and may be cardiomyopathy.  3.  Lower extremity sarcoma scheduled for excision surgery early next week.  4.  Sleep apnea on CPAP.    Recommendations  Cardiac MRI. Further recommendation will depend upon that.  If LV systolic function is mild to moderately reduced overall he will be intermediate risk for upcoming excision surgery.  He will need to be started on cardiomyopathy medication if cardiomyopathy is confirmed on cardiac MRI but this is best accomplished after the surgery as he will need to be started on beta-blocker ACE inhibitor with some gradual up titration.  I recommend follow-up with an [facility] in a month.    60 minutes of total time spent today.    Addendum, 05/19/2023    Cardiac MRI today shows moderate reduced LV systolic function with moderate global hypokinesia with mildly dilated LV.  No delayed enhancement noted on late gadolinium imaging.  No evidence of myocardial iron overload.  Overall cardiomyopathy appears nonischemic.  At recent clinic evaluation he appears euvolemic and compensated, overall intermediate risk for upcoming sarcoma surgery.  It will take several weeks possibly months for LV function to improve with cardiomyopathy medications and given stable cardiac status risk-benefit do not favor delaying tumor excision.  Recommend adding 2.5 mg daily of lisinopril for afterload reduction.  To avoid first dose effect of dizziness hypotension recommend that patient take lisinopril at nighttime.  Can check BMP in about a week time.  At this time would avoid adding beta-blocker so close to surgery.  Recommend close observation to volume status perioperatively and patient may need some diuretics if there is volume overload.  Would eventually need gradual introduction of cardiomyopathy medication and up titration.      Orders Placed This Encounter   Procedures     Follow-Up with Cardiology [facility]     EKG 12-lead complete w/read - Clinics (performed today)       No orders of  the defined types were placed in this encounter.      There are no discontinued medications.      Encounter Diagnoses   Name Primary?     Pre-op evaluation      Dyspnea on exertion      Abnormal cardiovascular stress test        CURRENT MEDICATIONS:  Current Outpatient Medications   Medication Sig Dispense Refill     ACCU-CHEK GUIDE test strip Use to test blood sugar 3 times daily. 100 strip 4     Continuous Blood Gluc Sensor (FREESTYLE MORENITA 3 SENSOR) MISC 1 each every 14 days Use 1 sensor every 14 days. Use to read blood sugars per 's instructions. 2 each 5     DULoxetine (CYMBALTA) 60 MG capsule TAKE 1 CAPSULE (60 MG) BY MOUTH DAILY 90 capsule 1     metFORMIN (GLUCOPHAGE XR) 500 MG 24 hr tablet Take 3 tablets (1,500 mg) by mouth daily (with dinner) 270 tablet 1     morphine (MS CONTIN) 30 MG CR tablet Take 1 tablet (30 mg) by mouth every 12 hours 60 tablet 0     naloxone (NARCAN) 4 MG/0.1ML nasal spray Spray 1 spray (4 mg) into one nostril alternating nostrils as needed for opioid reversal every 2-3 minutes until assistance arrives 2 each 1     sennosides (SENOKOT) 8.6 MG tablet Take 1 tablet by mouth daily as needed for constipation       clindamycin (CLINDAMAX) 1 % external gel Apply topically 2 times daily , to HS lesion (Patient not taking: Reported on 5/17/2023) 30 g 0     ferrous sulfate (FEROSUL) 325 (65 Fe) MG tablet Take 1 tablet (325 mg) by mouth daily (with breakfast) (Patient not taking: Reported on 5/17/2023) 30 tablet 1     folic acid (FOLVITE) 400 MCG tablet Take 1 tablet (400 mcg) by mouth daily (Patient not taking: Reported on 4/28/2023) 30 tablet 1     heparin lock flush 10 UNIT/ML SOLN injection 3 mLs by Intracatheter route every 24 hours Flush each lumen with 3 mLs (total 6mL in 24 hours period) (Patient not taking: Reported on 4/26/2023) 180 mL 0     HYDROmorphone (DILAUDID) 2 MG tablet Take 2 tablets (4 mg) by mouth every 6 hours as needed for pain (Patient not taking: Reported  on 4/28/2023) 30 tablet 0     morphine (MS CONTIN) 15 MG CR tablet Take 1 tablet (15 mg) by mouth daily for 60 days Take at midday; in addition to 30mg in AM and PM (Patient not taking: Reported on 5/17/2023) 60 tablet 0     morphine (MS CONTIN) 30 MG CR tablet Take 1 tablet (30 mg) by mouth 3 times daily (Patient not taking: Reported on 4/28/2023) 90 tablet 0     omeprazole 20 MG tablet Take 20 mg by mouth daily as needed (Patient not taking: Reported on 5/17/2023)       phosphorus tablet 250 mg 250 MG per tablet Take 2 tablets (500 mg) by mouth 3 times daily Take until directed otherwise (Patient not taking: Reported on 4/28/2023) 84 tablet 1     pregabalin (LYRICA) 25 MG capsule Take 1 capsule (25 mg) by mouth 3 times daily (Patient not taking: Reported on 5/17/2023) 90 capsule 1     sucralfate (CARAFATE) 1 GM/10ML suspension Take 10 mLs (1 g) by mouth 4 times daily as needed for nausea (or indigestion) (Patient not taking: Reported on 5/17/2023) 414 mL 0     triamcinolone (KENALOG) 0.1 % external ointment Apply topically 2 times daily (Patient not taking: Reported on 5/17/2023) 454 g 1     valACYclovir (VALTREX) 1000 mg tablet Take 1 tablet (1,000 mg) by mouth 3 times daily for 10 days 30 tablet 0       ALLERGIES     Allergies   Allergen Reactions     Emend [Aprepitant] Shortness Of Breath     Couldn't breathe and started to pass out during 1st administration      Kiwi Itching       PAST MEDICAL HISTORY:  Past Medical History:   Diagnosis Date     Acrochordon 02/11/2021     CARDIOVASCULAR SCREENING; LDL GOAL LESS THAN 160 03/27/2012     Colon adenoma      Controlled type 2 diabetes mellitus without complication, without long-term current use of insulin (H) 09/16/2019    X 1     Cough 02/04/2014     Degeneration of thoracic intervertebral disc 12/11/2013     Dysfunction of both eustachian tubes 04/12/2019     Elevated blood pressure 12/22/2014     Elevated hemoglobin A1c 09/16/2019    X 1     Gastroesophageal  reflux disease      Hamstring strain, right, subsequent encounter 12/19/2022     Hepatic cyst 05/22/2018     Hepatic steatosis 12/11/2013     History of colonic polyps 12/11/2013     History of drainage of abscess 09/16/2019     Hypertension      Irritable bowel syndrome without diarrhea 06/01/2018     Low libido 04/12/2019     Lumbar radiculopathy 12/19/2022     Microscopic hematuria 12/19/2013     Nephrolithiasis      Obesity 03/27/2012     BRIAN on CPAP 03/27/2012     Other irritable bowel syndrome 05/22/2018     Pain in thoracic spine 05/09/2013     Pulmonary nodule 05/22/2018     Right-sided chest pain 04/13/2018     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     Sleep apnea      Tinea pedis of both feet 02/11/2021     Tinnitus, unspecified laterality 04/12/2019     Uncontrolled diabetes mellitus with hyperglycemia, without long-term current use of insulin (H) 01/23/2023       PAST SURGICAL HISTORY:  Past Surgical History:   Procedure Laterality Date     COLONOSCOPY       COLONOSCOPY N/A 03/12/2021    Procedure: COLONOSCOPY (fv);  Surgeon: Ean Alcantara MD;  Location: [facility] OR     CYSTOSCOPY  02/11/2014    Procedure: CYSTOSCOPY;   Video Cystoscopy with Exam Under Anesthesia;  Surgeon: Chente Moeller MD;  Location: [facility] OR     IR CVC TUNNEL PLACEMENT > 5 YRS OF AGE  1/27/2023     IR CVC TUNNEL REMOVAL RIGHT  3/13/2023     LEG SURGERY Left 2019    Suregy r/t infection     wisdom teeth         FAMILY HISTORY:  Family History   Problem Relation Age of Onset     Family History Negative Mother      Obesity Mother      Deep Vein Thrombosis (DVT) Mother      Cancer Father         lymphoma     Other Cancer Father      Other Cancer Paternal Grandfather      Deep Vein Thrombosis (DVT) Paternal Uncle      Anesthesia Reaction No family hx of        SOCIAL HISTORY:  Social History     Socioeconomic History     Marital status:      Spouse name: Yari     Number of children: 2     Years of education: None     Highest education level:  Some college, no degree   Occupational History     Occupation: [employer]   Tobacco Use     Smoking status: Former     Packs/day: 1.00     Years: 25.00     Pack years: 25.00     Types: Cigarettes     Start date: 4/23/1988     Quit date: 4/23/2013     Years since quitting: 10.0     Smokeless tobacco: Never   Vaping Use     Vaping status: Never Used   Substance and Sexual Activity     Alcohol use: Not Currently     Comment: one drink every 6 months or less     Drug use: No     Sexual activity: Yes     Partners: Female     Social Determinants of Health     Financial Resource Strain: Low Risk  (2/17/2023)    Overall Financial Resource Strain (CARDIA)      Difficulty of Paying Living Expenses: Not very hard   Food Insecurity: No Food Insecurity (2/17/2023)    Hunger Vital Sign      Worried About Running Out of Food in the Last Year: Never true      Ran Out of Food in the Last Year: Never true   Transportation Needs: No Transportation Needs (2/17/2023)    PRAPARE - Transportation      Lack of Transportation (Medical): No      Lack of Transportation (Non-Medical): No   Stress: No Stress Concern Present (3/7/2021)    [employer] - Occupational Stress Questionnaire      Feeling of Stress : Not at all   Social Connections: Socially Isolated (3/7/2021)    Social Connection and Isolation Panel [NHANES]      Frequency of Communication with Friends and Family: Never      Frequency of Social Gatherings with Friends and Family: Never      Attends [employer] Services: Never      Active Member of Clubs or Organizations: No      Attends Club or Organization Meetings: Never      Marital Status:    Housing Stability: Low Risk  (2/17/2023)    Housing Stability Vital Sign      Unable to Pay for Housing in the Last Year: No      Number of Places Lived in the Last Year: 1      Unstable Housing in the Last Year: No       Review of Systems:  Skin:  not assessed       Eyes:  not assessed      ENT:  not assessed  "     Respiratory:  Positive for dyspnea on exertion;sleep apnea;CPAP     Cardiovascular:    Positive for;fatigue    Gastroenterology: not assessed      Genitourinary:  not assessed      Musculoskeletal:  not assessed      Neurologic:  not assessed      Psychiatric:  not assessed      Heme/Lymph/Imm:  not assessed      Endocrine:           Physical Exam:  Vitals: /78   Pulse 80   Ht 1.803 m (5' 11\")   Wt 118.5 kg (261 lb 4.8 oz)   BMI 36.44 kg/m      General patient appears comfortable  Neck normal JVP  Cardiovascular system S1-S2 normal no murmur rub or gallop  Respiratory system clear to auscultation  Extremities no edema  Neurological alert, oriented    CC  Sadaf Villeda PA-C  [address] [place] [address]              "

## 2023-05-17 NOTE — LETTER
5/17/2023    Mynor Mason MD  [address] [facility] [address]    RE: Antonio Canseco       Dear Colleague,     I had the pleasure of seeing Antonio Canseco in the [facility].  HPI and Plan:   Mr Canseco is a very pleasant 52 gentleman with the sarcoma involving the right lower extremity.  He is scheduled for excision surgery on Monday next week.  He was seen by anesthesiology in the preop and because of his segmental status and some dyspnea on exertion and CAD risk factors of hypertension, diabetes mellitus with well-controlled A1c of 5.7, prior history of tobacco abuse he underwent Lexiscan stress test.  There was no evidence of ischemia on the stress test there was a small area of possible apical infarct versus artifact.  LV function was noted to be moderately reduced with LVEF of 36% at rest and 39% with stress and LV enlargement was noted.  Echocardiogram done earlier this year showed LVEF of 55% with the borderline dilated LV with no significant valvular abnormalities.  Patient today is coming to cardiology clinic appointment by his wife.  No exertional chest discomfort.  He has been diagnosed with emphysema 2 tobacco 10 years ago and does get shortness of breath with exertion which is longstanding.  He is quite sedentary has not done any dedicated exercise.  No dizziness presyncope syncope.  EKG shows sinus rhythm.  No orthopnea.  In fact patient has lost several pounds of weight since the diagnosis of sarcoma he did receive chemotherapy and radiation.    Assessment and plan  1.  Possible cardiomyopathy.  LV function was noted to be moderately reduced on Lexiscan stress test.  I did discuss with patient and his wife the sensitivity and specificity stress test especially for evaluation of LV function.  Sometimes he does not completely accurate.  Clinically does not appear to be in decompensated congestive heart failure and appears euvolemic.  I recommend doing cardiac MRI to better  look at LV function and also to rule out apical scar versus artifact.  No ischemia was noted on stress test and clinically no typical anginal symptoms.  2.  Dyspnea on exertion could be multifactorial from deconditioning, emphysema and may be cardiomyopathy.  3.  Lower extremity sarcoma scheduled for excision surgery early next week.  4.  Sleep apnea on CPAP.    Recommendations  Cardiac MRI. Further recommendation will depend upon that.  If LV systolic function is mild to moderately reduced overall he will be intermediate risk for upcoming excision surgery.  He will need to be started on cardiomyopathy medication if cardiomyopathy is confirmed on cardiac MRI but this is best accomplished after the surgery as he will need to be started on beta-blocker ACE inhibitor with some gradual up titration.  I recommend follow-up with an [facility] in a month.    60 minutes of total time spent today.      Orders Placed This Encounter   Procedures    Follow-Up with Cardiology [facility]    EKG 12-lead complete w/read - Clinics (performed today)       No orders of the defined types were placed in this encounter.      There are no discontinued medications.      Encounter Diagnoses   Name Primary?    Pre-op evaluation     Dyspnea on exertion     Abnormal cardiovascular stress test        CURRENT MEDICATIONS:  Current Outpatient Medications   Medication Sig Dispense Refill    ACCU-CHEK GUIDE test strip Use to test blood sugar 3 times daily. 100 strip 4    Continuous Blood Gluc Sensor (FREESTYLE MORENITA 3 SENSOR) MISC 1 each every 14 days Use 1 sensor every 14 days. Use to read blood sugars per 's instructions. 2 each 5    DULoxetine (CYMBALTA) 60 MG capsule TAKE 1 CAPSULE (60 MG) BY MOUTH DAILY 90 capsule 1    metFORMIN (GLUCOPHAGE XR) 500 MG 24 hr tablet Take 3 tablets (1,500 mg) by mouth daily (with dinner) 270 tablet 1    morphine (MS CONTIN) 30 MG CR tablet Take 1 tablet (30 mg) by mouth every 12 hours 60 tablet 0    naloxone  (NARCAN) 4 MG/0.1ML nasal spray Spray 1 spray (4 mg) into one nostril alternating nostrils as needed for opioid reversal every 2-3 minutes until assistance arrives 2 each 1    sennosides (SENOKOT) 8.6 MG tablet Take 1 tablet by mouth daily as needed for constipation      clindamycin (CLINDAMAX) 1 % external gel Apply topically 2 times daily , to HS lesion (Patient not taking: Reported on 5/17/2023) 30 g 0    ferrous sulfate (FEROSUL) 325 (65 Fe) MG tablet Take 1 tablet (325 mg) by mouth daily (with breakfast) (Patient not taking: Reported on 5/17/2023) 30 tablet 1    folic acid (FOLVITE) 400 MCG tablet Take 1 tablet (400 mcg) by mouth daily (Patient not taking: Reported on 4/28/2023) 30 tablet 1    heparin lock flush 10 UNIT/ML SOLN injection 3 mLs by Intracatheter route every 24 hours Flush each lumen with 3 mLs (total 6mL in 24 hours period) (Patient not taking: Reported on 4/26/2023) 180 mL 0    HYDROmorphone (DILAUDID) 2 MG tablet Take 2 tablets (4 mg) by mouth every 6 hours as needed for pain (Patient not taking: Reported on 4/28/2023) 30 tablet 0    morphine (MS CONTIN) 15 MG CR tablet Take 1 tablet (15 mg) by mouth daily for 60 days Take at midday; in addition to 30mg in AM and PM (Patient not taking: Reported on 5/17/2023) 60 tablet 0    morphine (MS CONTIN) 30 MG CR tablet Take 1 tablet (30 mg) by mouth 3 times daily (Patient not taking: Reported on 4/28/2023) 90 tablet 0    omeprazole 20 MG tablet Take 20 mg by mouth daily as needed (Patient not taking: Reported on 5/17/2023)      phosphorus tablet 250 mg 250 MG per tablet Take 2 tablets (500 mg) by mouth 3 times daily Take until directed otherwise (Patient not taking: Reported on 4/28/2023) 84 tablet 1    pregabalin (LYRICA) 25 MG capsule Take 1 capsule (25 mg) by mouth 3 times daily (Patient not taking: Reported on 5/17/2023) 90 capsule 1    sucralfate (CARAFATE) 1 GM/10ML suspension Take 10 mLs (1 g) by mouth 4 times daily as needed for nausea (or  indigestion) (Patient not taking: Reported on 5/17/2023) 414 mL 0    triamcinolone (KENALOG) 0.1 % external ointment Apply topically 2 times daily (Patient not taking: Reported on 5/17/2023) 454 g 1    valACYclovir (VALTREX) 1000 mg tablet Take 1 tablet (1,000 mg) by mouth 3 times daily for 10 days 30 tablet 0       ALLERGIES     Allergies   Allergen Reactions    Emend [Aprepitant] Shortness Of Breath     Couldn't breathe and started to pass out during 1st administration     Kiwi Itching       PAST MEDICAL HISTORY:  Past Medical History:   Diagnosis Date    Acrochordon 02/11/2021    CARDIOVASCULAR SCREENING; LDL GOAL LESS THAN 160 03/27/2012    Colon adenoma     Controlled type 2 diabetes mellitus without complication, without long-term current use of insulin (H) 09/16/2019    X 1    Cough 02/04/2014    Degeneration of thoracic intervertebral disc 12/11/2013    Dysfunction of both eustachian tubes 04/12/2019    Elevated blood pressure 12/22/2014    Elevated hemoglobin A1c 09/16/2019    X 1    Gastroesophageal reflux disease     Hamstring strain, right, subsequent encounter 12/19/2022    Hepatic cyst 05/22/2018    Hepatic steatosis 12/11/2013    History of colonic polyps 12/11/2013    History of drainage of abscess 09/16/2019    Hypertension     Irritable bowel syndrome without diarrhea 06/01/2018    Low libido 04/12/2019    Lumbar radiculopathy 12/19/2022    Microscopic hematuria 12/19/2013    Nephrolithiasis     Obesity 03/27/2012    BRIAN on CPAP 03/27/2012    Other irritable bowel syndrome 05/22/2018    Pain in thoracic spine 05/09/2013    Pulmonary nodule 05/22/2018    Right-sided chest pain 04/13/2018    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    Sleep apnea     Tinea pedis of both feet 02/11/2021    Tinnitus, unspecified laterality 04/12/2019    Uncontrolled diabetes mellitus with hyperglycemia, without long-term current use of insulin (H) 01/23/2023       PAST SURGICAL HISTORY:  Past Surgical History:   Procedure Laterality Date     COLONOSCOPY      COLONOSCOPY N/A 03/12/2021    Procedure: COLONOSCOPY (fv);  Surgeon: Ean Alcantara MD;  Location: [facility] OR    CYSTOSCOPY  02/11/2014    Procedure: CYSTOSCOPY;   Video Cystoscopy with Exam Under Anesthesia;  Surgeon: Chente Moeller MD;  Location: [facility] OR    IR CVC TUNNEL PLACEMENT > 5 YRS OF AGE  1/27/2023    IR CVC TUNNEL REMOVAL RIGHT  3/13/2023    LEG SURGERY Left 2019    Suregy r/t infection    wisdom teeth         FAMILY HISTORY:  Family History   Problem Relation Age of Onset    Family History Negative Mother     Obesity Mother     Deep Vein Thrombosis (DVT) Mother     Cancer Father         lymphoma    Other Cancer Father     Other Cancer Paternal Grandfather     Deep Vein Thrombosis (DVT) Paternal Uncle     Anesthesia Reaction No family hx of        SOCIAL HISTORY:  Social History     Socioeconomic History    Marital status:      Spouse name: Yari    Number of children: 2    Years of education: None    Highest education level: Some college, no degree   Occupational History    Occupation: [employer]   Tobacco Use    Smoking status: Former     Packs/day: 1.00     Years: 25.00     Pack years: 25.00     Types: Cigarettes     Start date: 4/23/1988     Quit date: 4/23/2013     Years since quitting: 10.0    Smokeless tobacco: Never   Vaping Use    Vaping status: Never Used   Substance and Sexual Activity    Alcohol use: Not Currently     Comment: one drink every 6 months or less    Drug use: No    Sexual activity: Yes     Partners: Female     Social Determinants of Health     Financial Resource Strain: Low Risk  (2/17/2023)    Overall Financial Resource Strain (CARDIA)     Difficulty of Paying Living Expenses: Not very hard   Food Insecurity: No Food Insecurity (2/17/2023)    Hunger Vital Sign     Worried About Running Out of Food in the Last Year: Never true     Ran Out of Food in the Last Year: Never true   Transportation Needs: No Transportation Needs (2/17/2023)    PRAPARE -  "Transportation     Lack of Transportation (Medical): No     Lack of Transportation (Non-Medical): No   Stress: No Stress Concern Present (3/7/2021)    [place] [place] [employer] - Occupational Stress Questionnaire     Feeling of Stress : Not at all   Social Connections: Socially Isolated (3/7/2021)    Social Connection and Isolation Panel [NHANES]     Frequency of Communication with Friends and Family: Never     Frequency of Social Gatherings with Friends and Family: Never     Attends Synagogue Services: Never     Active Member of Clubs or Organizations: No     Attends Club or Organization Meetings: Never     Marital Status:    Housing Stability: Low Risk  (2/17/2023)    Housing Stability Vital Sign     Unable to Pay for Housing in the Last Year: No     Number of Places Lived in the Last Year: 1     Unstable Housing in the Last Year: No       Review of Systems:  Skin:  not assessed       Eyes:  not assessed      ENT:  not assessed      Respiratory:  Positive for dyspnea on exertion;sleep apnea;CPAP     Cardiovascular:    Positive for;fatigue    Gastroenterology: not assessed      Genitourinary:  not assessed      Musculoskeletal:  not assessed      Neurologic:  not assessed      Psychiatric:  not assessed      Heme/Lymph/Imm:  not assessed      Endocrine:           Physical Exam:  Vitals: /78   Pulse 80   Ht 1.803 m (5' 11\")   Wt 118.5 kg (261 lb 4.8 oz)   BMI 36.44 kg/m      General patient appears comfortable  Neck normal JVP  Cardiovascular system S1-S2 normal no murmur rub or gallop  Respiratory system clear to auscultation  Extremities no edema  Neurological alert, oriented    CC  Sadaf Villeda PA-C  [address] [place] [address]    Thank you for allowing me to participate in the care of your patient.      Sincerely,     Eric Cee MD     [facility]  "

## 2023-05-19 ENCOUNTER — DOCUMENTATION ONLY (OUTPATIENT)
Dept: ORTHOPEDICS | Facility: CLINIC | Age: 53
End: 2023-05-19

## 2023-05-19 ENCOUNTER — HOSPITAL ENCOUNTER (OUTPATIENT)
Dept: CARDIOLOGY | Facility: CLINIC | Age: 53
Discharge: HOME | End: 2023-05-19
Attending: INTERNAL MEDICINE | Admitting: INTERNAL MEDICINE
Payer: COMMERCIAL

## 2023-05-19 ENCOUNTER — TELEPHONE (OUTPATIENT)
Dept: CARDIOLOGY | Facility: CLINIC | Age: 53
End: 2023-05-19

## 2023-05-19 DIAGNOSIS — R94.39 ABNORMAL CARDIOVASCULAR STRESS TEST: ICD-10-CM

## 2023-05-19 DIAGNOSIS — R94.39 ABNORMAL CARDIOVASCULAR STRESS TEST: Primary | ICD-10-CM

## 2023-05-19 DIAGNOSIS — Z13.6 CARDIOVASCULAR SCREENING; LDL GOAL LESS THAN 160: Primary | ICD-10-CM

## 2023-05-19 PROCEDURE — 255N000002 HC RX 255 OP 636: Performed by: INTERNAL MEDICINE

## 2023-05-19 PROCEDURE — 75561 CARDIAC MRI FOR MORPH W/DYE: CPT | Mod: 26 | Performed by: INTERNAL MEDICINE

## 2023-05-19 PROCEDURE — A9585 GADOBUTROL INJECTION: HCPCS | Performed by: INTERNAL MEDICINE

## 2023-05-19 PROCEDURE — 75561 CARDIAC MRI FOR MORPH W/DYE: CPT

## 2023-05-19 RX ORDER — GADOBUTROL 604.72 MG/ML
15 INJECTION INTRAVENOUS ONCE
Status: COMPLETED | OUTPATIENT
Start: 2023-05-19 | End: 2023-05-19

## 2023-05-19 RX ORDER — REGADENOSON 0.08 MG/ML
0.4 INJECTION, SOLUTION INTRAVENOUS ONCE
Status: DISCONTINUED | OUTPATIENT
Start: 2023-05-19 | End: 2023-05-20 | Stop reason: HOSPADM

## 2023-05-19 RX ORDER — ONDANSETRON 2 MG/ML
4 INJECTION INTRAMUSCULAR; INTRAVENOUS
Status: DISCONTINUED | OUTPATIENT
Start: 2023-05-19 | End: 2023-05-20 | Stop reason: HOSPADM

## 2023-05-19 RX ORDER — LOSARTAN POTASSIUM 25 MG/1
TABLET ORAL
Qty: 45 TABLET | Refills: 3 | Status: SHIPPED | OUTPATIENT
Start: 2023-05-19 | End: 2023-06-23

## 2023-05-19 RX ORDER — DIPHENHYDRAMINE HCL 25 MG
25 CAPSULE ORAL
Status: DISCONTINUED | OUTPATIENT
Start: 2023-05-19 | End: 2023-05-20 | Stop reason: HOSPADM

## 2023-05-19 RX ORDER — ACYCLOVIR 200 MG/1
0-1 CAPSULE ORAL
Status: DISCONTINUED | OUTPATIENT
Start: 2023-05-19 | End: 2023-05-20 | Stop reason: HOSPADM

## 2023-05-19 RX ORDER — CAFFEINE CITRATE 20 MG/ML
60 SOLUTION INTRAVENOUS
Status: DISCONTINUED | OUTPATIENT
Start: 2023-05-19 | End: 2023-05-20 | Stop reason: HOSPADM

## 2023-05-19 RX ORDER — AMINOPHYLLINE 25 MG/ML
100 INJECTION, SOLUTION INTRAVENOUS ONCE
Status: DISCONTINUED | OUTPATIENT
Start: 2023-05-19 | End: 2023-05-20 | Stop reason: HOSPADM

## 2023-05-19 RX ORDER — METHYLPREDNISOLONE SODIUM SUCCINATE 125 MG/2ML
125 INJECTION, POWDER, LYOPHILIZED, FOR SOLUTION INTRAMUSCULAR; INTRAVENOUS
Status: DISCONTINUED | OUTPATIENT
Start: 2023-05-19 | End: 2023-05-20 | Stop reason: HOSPADM

## 2023-05-19 RX ORDER — DIAZEPAM 5 MG
5 TABLET ORAL EVERY 30 MIN PRN
Status: DISCONTINUED | OUTPATIENT
Start: 2023-05-19 | End: 2023-05-20 | Stop reason: HOSPADM

## 2023-05-19 RX ORDER — ALBUTEROL SULFATE 90 UG/1
2 AEROSOL, METERED RESPIRATORY (INHALATION) EVERY 5 MIN PRN
Status: DISCONTINUED | OUTPATIENT
Start: 2023-05-19 | End: 2023-05-20 | Stop reason: HOSPADM

## 2023-05-19 RX ORDER — DIPHENHYDRAMINE HYDROCHLORIDE 50 MG/ML
25-50 INJECTION INTRAMUSCULAR; INTRAVENOUS
Status: DISCONTINUED | OUTPATIENT
Start: 2023-05-19 | End: 2023-05-20 | Stop reason: HOSPADM

## 2023-05-19 RX ADMIN — GADOBUTROL 15 ML: 604.72 INJECTION INTRAVENOUS at 14:19

## 2023-05-19 NOTE — TELEPHONE ENCOUNTER
Received updated from Dr. Cee.  He has addended his OV note from 5\17, after review of today's MRI results.    Dr. Cee would like to add 2.5mg of Lisinopril daily to Antonio's medications.  He also will consider addition of a beta-blocker after the surgery.  Dr. Cee would like to have patient return in about 2 weeks for a follow up visit.  We should also check BMP 1 week after starting Lisinopril.    Calld to Antonio, he states he has tried Lisinopril in the past and developed the very dry persistent cough.    Writer reviewed this information with Dr. Cee, who is now recommending Antonio to start taking Losartan 12.5mg daily.    Order entered and sent to [facility].      Writer has sent message to our  team to try and find Antonio an [facility] appointment in the next couple weeks for follow up and discussion about medication changes if needed.    Called Antonio back to let him know.   I had to LVM.  He will not need labs for starting on Losartan.      Doris Mercedes RN on 5/19/2023 at 3:08 PM

## 2023-05-20 NOTE — PROGRESS NOTES
Received Completed forms Yes   Faxed Forms Faxed To: [facility]   Fax Number: [phone]   Sent to HIM (Date) 5/19/23

## 2023-05-21 ENCOUNTER — ANESTHESIA EVENT (OUTPATIENT)
Dept: SURGERY | Facility: CLINIC | Age: 53
DRG: 464 | End: 2023-05-21
Payer: COMMERCIAL

## 2023-05-21 RX ORDER — METFORMIN HCL 500 MG
1000 TABLET, EXTENDED RELEASE 24 HR ORAL
Qty: 60 TABLET | Refills: 3 | OUTPATIENT
Start: 2023-05-21

## 2023-05-21 ASSESSMENT — COPD QUESTIONNAIRES: COPD: 1

## 2023-05-21 ASSESSMENT — ENCOUNTER SYMPTOMS: SEIZURES: 0

## 2023-05-21 ASSESSMENT — LIFESTYLE VARIABLES: TOBACCO_USE: 1

## 2023-05-21 NOTE — ANESTHESIA PREPROCEDURE EVALUATION
Anesthesia Pre-Procedure Evaluation    Patient: Antonio Canseco   MRN: [id number] : 1970        Procedure : Procedure(s):  EXCISION, NEOPLASM, RIGHT LOWER EXTREMITY          Past Medical History:   Diagnosis Date     Acrochordon 2021     CARDIOVASCULAR SCREENING; LDL GOAL LESS THAN 160 2012     Colon adenoma      Controlled type 2 diabetes mellitus without complication, without long-term current use of insulin (H) 09/16/2019    X 1     Cough 2014     Degeneration of thoracic intervertebral disc 2013     Dysfunction of both eustachian tubes 2019     Elevated blood pressure 2014     Elevated hemoglobin A1c 09/16/2019    X 1     Gastroesophageal reflux disease      Hamstring strain, right, subsequent encounter 2022     Hepatic cyst 2018     Hepatic steatosis 2013     History of colonic polyps 2013     History of drainage of abscess 2019     Hypertension      Irritable bowel syndrome without diarrhea 2018     Low libido 2019     Lumbar radiculopathy 2022     Microscopic hematuria 2013     Nephrolithiasis      Obesity 2012     BRIAN on CPAP 2012     Other irritable bowel syndrome 2018     Pain in thoracic spine 2013     Pulmonary nodule 2018     Right-sided chest pain 2018     Sarcoma (H) 2023     Sleep apnea      Tinea pedis of both feet 2021     Tinnitus, unspecified laterality 2019     Uncontrolled diabetes mellitus with hyperglycemia, without long-term current use of insulin (H) 2023      Past Surgical History:   Procedure Laterality Date     COLONOSCOPY       COLONOSCOPY N/A 2021    Procedure: COLONOSCOPY (fv);  Surgeon: Ean Alcantara MD;  Location: [facility] OR     CYSTOSCOPY  2014    Procedure: CYSTOSCOPY;   Video Cystoscopy with Exam Under Anesthesia;  Surgeon: Chente Moeller MD;  Location: [facility] OR     IR CVC TUNNEL PLACEMENT > 5 YRS OF AGE   1/27/2023     IR CVC TUNNEL REMOVAL RIGHT  3/13/2023     LEG SURGERY Left 2019    Suregy r/t infection     wisdom teeth        Allergies   Allergen Reactions     Emend [Aprepitant] Shortness Of Breath     Couldn't breathe and started to pass out during 1st administration      Kiwi Itching     Lisinopril Cough     Cough that would not stop      Social History     Tobacco Use     Smoking status: Former     Packs/day: 1.00     Years: 25.00     Pack years: 25.00     Types: Cigarettes     Start date: 4/23/1988     Quit date: 4/23/2013     Years since quitting: 10.0     Smokeless tobacco: Never   Vaping Use     Vaping status: Never Used   Substance Use Topics     Alcohol use: Not Currently     Comment: one drink every 6 months or less      Wt Readings from Last 1 Encounters:   05/17/23 118.5 kg (261 lb 4.8 oz)        Anesthesia Evaluation   Pt has had prior anesthetic. Type: General.    No history of anesthetic complications       ROS/MED HX  ENT/Pulmonary: Comment: Pulmonary nodule    (+) sleep apnea, uses CPAP, tobacco use, Past use, COPD (family reported, patient denies),  (-) recent URI   Neurologic: Comment: Tinnitus bilateral    (+) no peripheral neuropathy  (-) no seizures and no CVA   Cardiovascular:     (+) Dyslipidemia hypertension-----REGAN. Previous cardiac testing   Echo: Date: 1/26/2023 Results:  Interpretation Summary   Left ventricular systolic function is normal.   The visual ejection fraction is estimated at 55%.   (The upper limit of normal is 5.6cm for left ventricular size in end-diastole.)   No hemodynamically significant valvular abnormalities on 2D or color flow imaging. Technically difficult, suboptimal study.  Stress Test: Date: 7/2/2014 Results:  Interpretation Summary   1.  Mildly reduced functional capacity.    2.  Negative stress EKG for ischemia.    3.  Negative stress echocardiogram for ischemia.      4.  Sensitivity of study is reduced as patient imaged below 85% MPHR.  ECG Reviewed:  Date:  2/11/2023 Results:  Sinus tachycardia    Otherwise normal ECG    When compared with ECG of 07-FEB-2023 15:21,    Nonspecific T wave abnormality, improved in Lateral leads    QT has lengthened ()  Cath:  Date: Results:      METS/Exercise Tolerance: 3 - Able to walk 1-2 blocks without stopping    Hematologic:     (+) anemia, history of blood transfusion, no previous transfusion reaction, Known PRBC Anitbodies:No     Musculoskeletal: Comment: Back pain      GI/Hepatic: Comment: IBS    (+) GERD (intermittent), liver disease (Hepatic steatosis),     Renal/Genitourinary:     (+) Nephrolithiasis ,     Endo: Comment: Hypophosphatemia    (+) type II DM, Last HgA1c: 11.7, date: 1/18/2023, Not using insulin, - not using insulin pump. not previously admitted for DM/DKA. Obesity,     Psychiatric/Substance Use:  - neg psychiatric ROS     Infectious Disease:  - neg infectious disease ROS  (-) Recent Fever   Malignancy:   (+) Malignancy, History of Other.Other CA Sarcoma Active status post Chemo.    Other:  - neg other ROS          Physical Exam    Airway        Mallampati: II   TM distance: > 3 FB   Neck ROM: full   Mouth opening: > 3 cm    Respiratory Devices and Support         Dental       (+) Minor Abnormalities - some fillings, tiny chips      Cardiovascular   cardiovascular exam normal          Pulmonary   pulmonary exam normal                OUTSIDE LABS:  CBC:   Lab Results   Component Value Date    WBC 6.1 05/01/2023    WBC 8.0 03/24/2023    HGB 8.6 (L) 05/01/2023    HGB 9.1 (L) 03/24/2023    HCT 29.7 (L) 05/01/2023    HCT 30.8 (L) 03/24/2023     05/01/2023     03/24/2023     BMP:   Lab Results   Component Value Date     05/01/2023     03/24/2023    POTASSIUM 4.4 05/01/2023    POTASSIUM 4.2 03/24/2023    CHLORIDE 99 05/01/2023    CHLORIDE 102 03/24/2023    CO2 29 05/01/2023    CO2 26 03/24/2023    BUN 16.2 05/01/2023    BUN 13.2 03/24/2023    CR 0.80 05/01/2023    CR 0.82 03/24/2023    GLC  134 (H) 05/01/2023     (H) 03/24/2023     COAGS:   Lab Results   Component Value Date    INR 1.44 (H) 01/31/2023    FIBR 872 (H) 01/31/2023     POC:   Lab Results   Component Value Date    BGM 80 03/12/2021     HEPATIC:   Lab Results   Component Value Date    ALBUMIN 3.6 05/01/2023    PROTTOTAL 8.6 (H) 05/01/2023    ALT 9 (L) 05/01/2023    AST 13 05/01/2023    ALKPHOS 60 05/01/2023    BILITOTAL 0.5 05/01/2023     OTHER:   Lab Results   Component Value Date    LACT 0.8 02/14/2023    A1C 5.7 (H) 05/01/2023    DORIAN 9.7 05/01/2023    PHOS 3.8 05/01/2023    MAG 2.2 03/24/2023    TSH 2.11 12/28/2021       Anesthesia Plan    ASA Status:  3   NPO Status:  Will be NPO Appropriate at ...    Anesthesia Type: General.     - Airway: ETT   Induction: Intravenous.   Maintenance: Balanced.   Techniques and Equipment:     - Lines/Monitors: 2nd IV, Arterial Line, BIS     Consents    Anesthesia Plan(s) and associated risks, benefits, and realistic alternatives discussed. Questions answered and patient/representative(s) expressed understanding.     - Discussed: Risks, Benefits and Alternatives for the PROCEDURE were discussed     - Discussed with:  Patient      - Extended Intubation/Ventilatory Support Discussed: No.      - Patient is DNR/DNI Status: No    Use of blood products discussed: Yes.     - Discussed with: Patient.     - Consented: consented to blood products            Reason for refusal: other.     Postoperative Care    Pain management: Multi-modal analgesia, IV analgesics, Oral pain medications.   PONV prophylaxis: Ondansetron (or other 5HT-3), Dexamethasone or Solumedrol     Comments:                Vlad Gregory MD

## 2023-05-22 ENCOUNTER — HOSPITAL ENCOUNTER (INPATIENT)
Facility: CLINIC | Age: 53
LOS: 1 days | Discharge: HOME | DRG: 464 | End: 2023-05-23
Attending: ORTHOPAEDIC SURGERY | Admitting: ORTHOPAEDIC SURGERY
Payer: COMMERCIAL

## 2023-05-22 ENCOUNTER — ANESTHESIA (OUTPATIENT)
Dept: SURGERY | Facility: CLINIC | Age: 53
DRG: 464 | End: 2023-05-22
Payer: COMMERCIAL

## 2023-05-22 DIAGNOSIS — R68.82 LOW LIBIDO: ICD-10-CM

## 2023-05-22 DIAGNOSIS — C49.9 SARCOMA OF SOFT TISSUE (H): Primary | ICD-10-CM

## 2023-05-22 DIAGNOSIS — C49.9 SARCOMA (H): ICD-10-CM

## 2023-05-22 LAB
ABO/RH(D): NORMAL
ANTIBODY SCREEN: NEGATIVE
BASE EXCESS BLDA CALC-SCNC: 1.5 MMOL/L (ref -9.6–2)
BASE EXCESS BLDA CALC-SCNC: 1.9 MMOL/L (ref -9.6–2)
BASE EXCESS BLDA CALC-SCNC: 2 MMOL/L (ref -9.6–2)
CA-I BLD-MCNC: 4.6 MG/DL (ref 4.4–5.2)
CA-I BLD-MCNC: 4.7 MG/DL (ref 4.4–5.2)
CA-I BLD-MCNC: 4.7 MG/DL (ref 4.4–5.2)
GLUCOSE BLD-MCNC: 121 MG/DL (ref 70–99)
GLUCOSE BLD-MCNC: 138 MG/DL (ref 70–99)
GLUCOSE BLD-MCNC: 154 MG/DL (ref 70–99)
GLUCOSE BLDC GLUCOMTR-MCNC: 109 MG/DL (ref 70–99)
GLUCOSE BLDC GLUCOMTR-MCNC: 157 MG/DL (ref 70–99)
HCO3 BLDA-SCNC: 27 MMOL/L (ref 21–28)
HGB BLD-MCNC: 8.4 G/DL (ref 13.3–17.7)
HGB BLD-MCNC: 8.5 G/DL (ref 13.3–17.7)
HGB BLD-MCNC: 8.6 G/DL (ref 13.3–17.7)
HGB BLD-MCNC: 8.6 G/DL (ref 13.3–17.7)
LACTATE BLD-SCNC: 0.7 MMOL/L
LACTATE BLD-SCNC: 1 MMOL/L
LACTATE BLD-SCNC: 1 MMOL/L
O2/TOTAL GAS SETTING VFR VENT: 60 %
PCO2 BLDA: 46 MM HG (ref 35–45)
PCO2 BLDA: 46 MM HG (ref 35–45)
PCO2 BLDA: 47 MM HG (ref 35–45)
PH BLDA: 7.37 [PH] (ref 7.35–7.45)
PH BLDA: 7.38 [PH] (ref 7.35–7.45)
PH BLDA: 7.38 [PH] (ref 7.35–7.45)
PO2 BLDA: 104 MM HG (ref 80–105)
PO2 BLDA: 83 MM HG (ref 80–105)
PO2 BLDA: 97 MM HG (ref 80–105)
POTASSIUM BLD-SCNC: 4.2 MMOL/L (ref 3.5–5)
POTASSIUM BLD-SCNC: 4.4 MMOL/L (ref 3.5–5)
POTASSIUM BLD-SCNC: 4.5 MMOL/L (ref 3.5–5)
SODIUM BLD-SCNC: 140 MMOL/L (ref 133–144)
SODIUM BLD-SCNC: 140 MMOL/L (ref 133–144)
SODIUM BLD-SCNC: 141 MMOL/L (ref 133–144)
SPECIMEN EXPIRATION DATE: NORMAL

## 2023-05-22 PROCEDURE — 250N000009 HC RX 250: Performed by: ORTHOPAEDIC SURGERY

## 2023-05-22 PROCEDURE — 82962 GLUCOSE BLOOD TEST: CPT

## 2023-05-22 PROCEDURE — 250N000013 HC RX MED GY IP 250 OP 250 PS 637: Performed by: STUDENT IN AN ORGANIZED HEALTH CARE EDUCATION/TRAINING PROGRAM

## 2023-05-22 PROCEDURE — 250N000011 HC RX IP 250 OP 636: Performed by: STUDENT IN AN ORGANIZED HEALTH CARE EDUCATION/TRAINING PROGRAM

## 2023-05-22 PROCEDURE — 258N000003 HC RX IP 258 OP 636: Performed by: STUDENT IN AN ORGANIZED HEALTH CARE EDUCATION/TRAINING PROGRAM

## 2023-05-22 PROCEDURE — 99222 1ST HOSP IP/OBS MODERATE 55: CPT | Performed by: INTERNAL MEDICINE

## 2023-05-22 PROCEDURE — 360N000077 HC SURGERY LEVEL 4, PER MIN: Performed by: ORTHOPAEDIC SURGERY

## 2023-05-22 PROCEDURE — 0JBL0ZZ EXCISION OF RIGHT UPPER LEG SUBCUTANEOUS TISSUE AND FASCIA, OPEN APPROACH: ICD-10-PCS | Performed by: ORTHOPAEDIC SURGERY

## 2023-05-22 PROCEDURE — 85018 HEMOGLOBIN: CPT | Performed by: ANESTHESIOLOGY

## 2023-05-22 PROCEDURE — 88309 TISSUE EXAM BY PATHOLOGIST: CPT | Mod: 26 | Performed by: PATHOLOGY

## 2023-05-22 PROCEDURE — 710N000010 HC RECOVERY PHASE 1, LEVEL 2, PER MIN: Performed by: ORTHOPAEDIC SURGERY

## 2023-05-22 PROCEDURE — 250N000011 HC RX IP 250 OP 636: Performed by: PHYSICIAN ASSISTANT

## 2023-05-22 PROCEDURE — 84132 ASSAY OF SERUM POTASSIUM: CPT

## 2023-05-22 PROCEDURE — 272N000001 HC OR GENERAL SUPPLY STERILE: Performed by: ORTHOPAEDIC SURGERY

## 2023-05-22 PROCEDURE — 27364 RESECT THIGH/KNEE TUM 5 CM/>: CPT | Mod: RT | Performed by: ORTHOPAEDIC SURGERY

## 2023-05-22 PROCEDURE — 250N000009 HC RX 250: Performed by: STUDENT IN AN ORGANIZED HEALTH CARE EDUCATION/TRAINING PROGRAM

## 2023-05-22 PROCEDURE — 120N000002 HC R&B MED SURG/OB UMMC

## 2023-05-22 PROCEDURE — 250N000013 HC RX MED GY IP 250 OP 250 PS 637: Performed by: PHYSICIAN ASSISTANT

## 2023-05-22 PROCEDURE — 370N000017 HC ANESTHESIA TECHNICAL FEE, PER MIN: Performed by: ORTHOPAEDIC SURGERY

## 2023-05-22 PROCEDURE — 88309 TISSUE EXAM BY PATHOLOGIST: CPT | Mod: TC | Performed by: ORTHOPAEDIC SURGERY

## 2023-05-22 PROCEDURE — 250N000025 HC SEVOFLURANE, PER MIN: Performed by: ORTHOPAEDIC SURGERY

## 2023-05-22 PROCEDURE — 86850 RBC ANTIBODY SCREEN: CPT | Performed by: ANESTHESIOLOGY

## 2023-05-22 PROCEDURE — 83605 ASSAY OF LACTIC ACID: CPT

## 2023-05-22 PROCEDURE — 999N000015 HC STATISTIC ARTERIAL MONITORING DAILY

## 2023-05-22 PROCEDURE — 999N000141 HC STATISTIC PRE-PROCEDURE NURSING ASSESSMENT: Performed by: ORTHOPAEDIC SURGERY

## 2023-05-22 PROCEDURE — 999N000157 HC STATISTIC RCP TIME EA 10 MIN

## 2023-05-22 RX ORDER — SODIUM CHLORIDE, SODIUM LACTATE, POTASSIUM CHLORIDE, CALCIUM CHLORIDE 600; 310; 30; 20 MG/100ML; MG/100ML; MG/100ML; MG/100ML
INJECTION, SOLUTION INTRAVENOUS CONTINUOUS
Status: DISCONTINUED | OUTPATIENT
Start: 2023-05-22 | End: 2023-05-23 | Stop reason: HOSPADM

## 2023-05-22 RX ORDER — SODIUM CHLORIDE 9 MG/ML
INJECTION, SOLUTION INTRAVENOUS CONTINUOUS PRN
Status: DISCONTINUED | OUTPATIENT
Start: 2023-05-22 | End: 2023-05-22

## 2023-05-22 RX ORDER — POLYETHYLENE GLYCOL 3350 17 G/17G
17 POWDER, FOR SOLUTION ORAL DAILY
Status: DISCONTINUED | OUTPATIENT
Start: 2023-05-23 | End: 2023-05-23 | Stop reason: HOSPADM

## 2023-05-22 RX ORDER — LIDOCAINE HYDROCHLORIDE 20 MG/ML
INJECTION, SOLUTION INFILTRATION; PERINEURAL PRN
Status: DISCONTINUED | OUTPATIENT
Start: 2023-05-22 | End: 2023-05-22

## 2023-05-22 RX ORDER — FENTANYL CITRATE 50 UG/ML
INJECTION, SOLUTION INTRAMUSCULAR; INTRAVENOUS PRN
Status: DISCONTINUED | OUTPATIENT
Start: 2023-05-22 | End: 2023-05-22

## 2023-05-22 RX ORDER — AMOXICILLIN 250 MG
1 CAPSULE ORAL 2 TIMES DAILY
Status: DISCONTINUED | OUTPATIENT
Start: 2023-05-22 | End: 2023-05-23 | Stop reason: HOSPADM

## 2023-05-22 RX ORDER — NALOXONE HYDROCHLORIDE 0.4 MG/ML
0.2 INJECTION, SOLUTION INTRAMUSCULAR; INTRAVENOUS; SUBCUTANEOUS
Status: DISCONTINUED | OUTPATIENT
Start: 2023-05-22 | End: 2023-05-23 | Stop reason: HOSPADM

## 2023-05-22 RX ORDER — TRIAMCINOLONE ACETONIDE 1 MG/G
OINTMENT TOPICAL 2 TIMES DAILY
COMMUNITY
End: 2023-06-23

## 2023-05-22 RX ORDER — NALOXONE HYDROCHLORIDE 0.4 MG/ML
0.4 INJECTION, SOLUTION INTRAMUSCULAR; INTRAVENOUS; SUBCUTANEOUS
Status: DISCONTINUED | OUTPATIENT
Start: 2023-05-22 | End: 2023-05-23 | Stop reason: HOSPADM

## 2023-05-22 RX ORDER — ONDANSETRON 4 MG/1
4 TABLET, ORALLY DISINTEGRATING ORAL EVERY 30 MIN PRN
Status: DISCONTINUED | OUTPATIENT
Start: 2023-05-22 | End: 2023-05-22 | Stop reason: HOSPADM

## 2023-05-22 RX ORDER — HALOPERIDOL 5 MG/ML
1 INJECTION INTRAMUSCULAR
Status: DISCONTINUED | OUTPATIENT
Start: 2023-05-22 | End: 2023-05-22 | Stop reason: HOSPADM

## 2023-05-22 RX ORDER — ONDANSETRON 2 MG/ML
4 INJECTION INTRAMUSCULAR; INTRAVENOUS EVERY 6 HOURS PRN
Status: DISCONTINUED | OUTPATIENT
Start: 2023-05-22 | End: 2023-05-23 | Stop reason: HOSPADM

## 2023-05-22 RX ORDER — OXYCODONE HYDROCHLORIDE 10 MG/1
10 TABLET ORAL
Status: DISCONTINUED | OUTPATIENT
Start: 2023-05-22 | End: 2023-05-22 | Stop reason: HOSPADM

## 2023-05-22 RX ORDER — OXYCODONE HYDROCHLORIDE 5 MG/1
5 TABLET ORAL
Status: COMPLETED | OUTPATIENT
Start: 2023-05-22 | End: 2023-05-22

## 2023-05-22 RX ORDER — CEFAZOLIN SODIUM 2 G/100ML
2 INJECTION, SOLUTION INTRAVENOUS EVERY 8 HOURS
Status: COMPLETED | OUTPATIENT
Start: 2023-05-22 | End: 2023-05-23

## 2023-05-22 RX ORDER — ACETAMINOPHEN 325 MG/1
650 TABLET ORAL EVERY 4 HOURS PRN
Status: DISCONTINUED | OUTPATIENT
Start: 2023-05-25 | End: 2023-05-23 | Stop reason: HOSPADM

## 2023-05-22 RX ORDER — METHOCARBAMOL 750 MG/1
750 TABLET, FILM COATED ORAL EVERY 6 HOURS PRN
Status: DISCONTINUED | OUTPATIENT
Start: 2023-05-22 | End: 2023-05-23 | Stop reason: HOSPADM

## 2023-05-22 RX ORDER — PROCHLORPERAZINE MALEATE 10 MG
10 TABLET ORAL EVERY 6 HOURS PRN
Status: DISCONTINUED | OUTPATIENT
Start: 2023-05-22 | End: 2023-05-23 | Stop reason: HOSPADM

## 2023-05-22 RX ORDER — FERROUS SULFATE 325(65) MG
325 TABLET ORAL
COMMUNITY
End: 2023-06-23

## 2023-05-22 RX ORDER — TRANEXAMIC ACID 650 MG/1
1950 TABLET ORAL ONCE
Status: COMPLETED | OUTPATIENT
Start: 2023-05-22 | End: 2023-05-22

## 2023-05-22 RX ORDER — CEFAZOLIN SODIUM/WATER 3 G/30 ML
3 SYRINGE (ML) INTRAVENOUS
Status: COMPLETED | OUTPATIENT
Start: 2023-05-22 | End: 2023-05-22

## 2023-05-22 RX ORDER — CEFAZOLIN SODIUM/WATER 3 G/30 ML
3 SYRINGE (ML) INTRAVENOUS SEE ADMIN INSTRUCTIONS
Status: DISCONTINUED | OUTPATIENT
Start: 2023-05-22 | End: 2023-05-22 | Stop reason: HOSPADM

## 2023-05-22 RX ORDER — SODIUM CHLORIDE, SODIUM LACTATE, POTASSIUM CHLORIDE, CALCIUM CHLORIDE 600; 310; 30; 20 MG/100ML; MG/100ML; MG/100ML; MG/100ML
INJECTION, SOLUTION INTRAVENOUS CONTINUOUS
Status: DISCONTINUED | OUTPATIENT
Start: 2023-05-22 | End: 2023-05-22 | Stop reason: HOSPADM

## 2023-05-22 RX ORDER — ONDANSETRON 2 MG/ML
INJECTION INTRAMUSCULAR; INTRAVENOUS PRN
Status: DISCONTINUED | OUTPATIENT
Start: 2023-05-22 | End: 2023-05-22

## 2023-05-22 RX ORDER — LABETALOL HYDROCHLORIDE 5 MG/ML
10 INJECTION, SOLUTION INTRAVENOUS
Status: DISCONTINUED | OUTPATIENT
Start: 2023-05-22 | End: 2023-05-22 | Stop reason: HOSPADM

## 2023-05-22 RX ORDER — ACETAMINOPHEN 325 MG/1
975 TABLET ORAL EVERY 8 HOURS
Status: DISCONTINUED | OUTPATIENT
Start: 2023-05-22 | End: 2023-05-23 | Stop reason: HOSPADM

## 2023-05-22 RX ORDER — ONDANSETRON 2 MG/ML
4 INJECTION INTRAMUSCULAR; INTRAVENOUS EVERY 30 MIN PRN
Status: DISCONTINUED | OUTPATIENT
Start: 2023-05-22 | End: 2023-05-22 | Stop reason: HOSPADM

## 2023-05-22 RX ORDER — HYDROMORPHONE HYDROCHLORIDE 1 MG/ML
0.5 INJECTION, SOLUTION INTRAMUSCULAR; INTRAVENOUS; SUBCUTANEOUS
Status: DISCONTINUED | OUTPATIENT
Start: 2023-05-22 | End: 2023-05-23 | Stop reason: HOSPADM

## 2023-05-22 RX ORDER — LIDOCAINE 40 MG/G
CREAM TOPICAL
Status: DISCONTINUED | OUTPATIENT
Start: 2023-05-22 | End: 2023-05-23 | Stop reason: HOSPADM

## 2023-05-22 RX ORDER — CEFAZOLIN SODIUM/WATER 2 G/20 ML
2 SYRINGE (ML) INTRAVENOUS
Status: DISCONTINUED | OUTPATIENT
Start: 2023-05-22 | End: 2023-05-22 | Stop reason: HOSPADM

## 2023-05-22 RX ORDER — MORPHINE SULFATE 15 MG/1
30 TABLET, FILM COATED, EXTENDED RELEASE ORAL 3 TIMES DAILY
Status: DISCONTINUED | OUTPATIENT
Start: 2023-05-22 | End: 2023-05-23 | Stop reason: HOSPADM

## 2023-05-22 RX ORDER — DULOXETIN HYDROCHLORIDE 60 MG/1
60 CAPSULE, DELAYED RELEASE ORAL DAILY
Status: DISCONTINUED | OUTPATIENT
Start: 2023-05-22 | End: 2023-05-23 | Stop reason: HOSPADM

## 2023-05-22 RX ORDER — DEXAMETHASONE SODIUM PHOSPHATE 4 MG/ML
INJECTION, SOLUTION INTRA-ARTICULAR; INTRALESIONAL; INTRAMUSCULAR; INTRAVENOUS; SOFT TISSUE PRN
Status: DISCONTINUED | OUTPATIENT
Start: 2023-05-22 | End: 2023-05-22

## 2023-05-22 RX ORDER — KETOROLAC TROMETHAMINE 30 MG/ML
30 INJECTION, SOLUTION INTRAMUSCULAR; INTRAVENOUS EVERY 6 HOURS PRN
Status: DISCONTINUED | OUTPATIENT
Start: 2023-05-22 | End: 2023-05-23 | Stop reason: HOSPADM

## 2023-05-22 RX ORDER — PREGABALIN 25 MG/1
25 CAPSULE ORAL 3 TIMES DAILY
COMMUNITY
End: 2023-06-23

## 2023-05-22 RX ORDER — HYDROMORPHONE HYDROCHLORIDE 1 MG/ML
0.2 INJECTION, SOLUTION INTRAMUSCULAR; INTRAVENOUS; SUBCUTANEOUS EVERY 5 MIN PRN
Status: DISCONTINUED | OUTPATIENT
Start: 2023-05-22 | End: 2023-05-22 | Stop reason: HOSPADM

## 2023-05-22 RX ORDER — ASPIRIN 81 MG/1
81 TABLET ORAL DAILY
Status: DISCONTINUED | OUTPATIENT
Start: 2023-05-23 | End: 2023-05-23 | Stop reason: HOSPADM

## 2023-05-22 RX ORDER — SODIUM CHLORIDE, SODIUM LACTATE, POTASSIUM CHLORIDE, CALCIUM CHLORIDE 600; 310; 30; 20 MG/100ML; MG/100ML; MG/100ML; MG/100ML
INJECTION, SOLUTION INTRAVENOUS CONTINUOUS PRN
Status: DISCONTINUED | OUTPATIENT
Start: 2023-05-22 | End: 2023-05-22

## 2023-05-22 RX ORDER — FENTANYL CITRATE 50 UG/ML
25 INJECTION, SOLUTION INTRAMUSCULAR; INTRAVENOUS EVERY 5 MIN PRN
Status: DISCONTINUED | OUTPATIENT
Start: 2023-05-22 | End: 2023-05-22 | Stop reason: HOSPADM

## 2023-05-22 RX ORDER — MAGNESIUM HYDROXIDE 1200 MG/15ML
LIQUID ORAL PRN
Status: DISCONTINUED | OUTPATIENT
Start: 2023-05-22 | End: 2023-05-22 | Stop reason: HOSPADM

## 2023-05-22 RX ORDER — ONDANSETRON 4 MG/1
4 TABLET, ORALLY DISINTEGRATING ORAL EVERY 6 HOURS PRN
Status: DISCONTINUED | OUTPATIENT
Start: 2023-05-22 | End: 2023-05-23 | Stop reason: HOSPADM

## 2023-05-22 RX ORDER — CEFAZOLIN SODIUM/WATER 2 G/20 ML
2 SYRINGE (ML) INTRAVENOUS SEE ADMIN INSTRUCTIONS
Status: DISCONTINUED | OUTPATIENT
Start: 2023-05-22 | End: 2023-05-22 | Stop reason: HOSPADM

## 2023-05-22 RX ORDER — PROPOFOL 10 MG/ML
INJECTION, EMULSION INTRAVENOUS PRN
Status: DISCONTINUED | OUTPATIENT
Start: 2023-05-22 | End: 2023-05-22

## 2023-05-22 RX ORDER — HYDROMORPHONE HYDROCHLORIDE 1 MG/ML
0.4 INJECTION, SOLUTION INTRAMUSCULAR; INTRAVENOUS; SUBCUTANEOUS EVERY 5 MIN PRN
Status: DISCONTINUED | OUTPATIENT
Start: 2023-05-22 | End: 2023-05-22 | Stop reason: HOSPADM

## 2023-05-22 RX ORDER — HYDROMORPHONE HYDROCHLORIDE 1 MG/ML
0.2 INJECTION, SOLUTION INTRAMUSCULAR; INTRAVENOUS; SUBCUTANEOUS
Status: DISCONTINUED | OUTPATIENT
Start: 2023-05-22 | End: 2023-05-23 | Stop reason: HOSPADM

## 2023-05-22 RX ORDER — FENTANYL CITRATE 50 UG/ML
50 INJECTION, SOLUTION INTRAMUSCULAR; INTRAVENOUS EVERY 5 MIN PRN
Status: DISCONTINUED | OUTPATIENT
Start: 2023-05-22 | End: 2023-05-22 | Stop reason: HOSPADM

## 2023-05-22 RX ORDER — HYDROXYZINE HYDROCHLORIDE 25 MG/1
25 TABLET, FILM COATED ORAL EVERY 6 HOURS PRN
Status: DISCONTINUED | OUTPATIENT
Start: 2023-05-22 | End: 2023-05-23 | Stop reason: HOSPADM

## 2023-05-22 RX ORDER — BUPIVACAINE HYDROCHLORIDE AND EPINEPHRINE 5; 5 MG/ML; UG/ML
INJECTION, SOLUTION PERINEURAL PRN
Status: DISCONTINUED | OUTPATIENT
Start: 2023-05-22 | End: 2023-05-22 | Stop reason: HOSPADM

## 2023-05-22 RX ORDER — BISACODYL 10 MG
10 SUPPOSITORY, RECTAL RECTAL DAILY PRN
Status: DISCONTINUED | OUTPATIENT
Start: 2023-05-22 | End: 2023-05-23 | Stop reason: HOSPADM

## 2023-05-22 RX ORDER — SUCRALFATE ORAL 1 G/10ML
1 SUSPENSION ORAL 4 TIMES DAILY PRN
COMMUNITY
End: 2023-06-23

## 2023-05-22 RX ADMIN — ACETAMINOPHEN 975 MG: 325 TABLET, FILM COATED ORAL at 13:47

## 2023-05-22 RX ADMIN — PROPOFOL 200 MG: 10 INJECTION, EMULSION INTRAVENOUS at 07:41

## 2023-05-22 RX ADMIN — SENNOSIDES AND DOCUSATE SODIUM 1 TABLET: 50; 8.6 TABLET ORAL at 20:43

## 2023-05-22 RX ADMIN — CEFAZOLIN SODIUM 2 G: 2 INJECTION, SOLUTION INTRAVENOUS at 17:10

## 2023-05-22 RX ADMIN — SUGAMMADEX 200 MG: 100 INJECTION, SOLUTION INTRAVENOUS at 11:18

## 2023-05-22 RX ADMIN — Medication 50 MG: at 07:41

## 2023-05-22 RX ADMIN — TRANEXAMIC ACID 1950 MG: 650 TABLET ORAL at 06:28

## 2023-05-22 RX ADMIN — HYDROMORPHONE HYDROCHLORIDE 1 MG: 1 INJECTION, SOLUTION INTRAMUSCULAR; INTRAVENOUS; SUBCUTANEOUS at 09:57

## 2023-05-22 RX ADMIN — DULOXETINE HYDROCHLORIDE 60 MG: 60 CAPSULE, DELAYED RELEASE ORAL at 16:32

## 2023-05-22 RX ADMIN — MORPHINE SULFATE 30 MG: 15 TABLET, EXTENDED RELEASE ORAL at 20:44

## 2023-05-22 RX ADMIN — ONDANSETRON 4 MG: 2 INJECTION INTRAMUSCULAR; INTRAVENOUS at 11:00

## 2023-05-22 RX ADMIN — LIDOCAINE HYDROCHLORIDE 100 MG: 20 INJECTION, SOLUTION INFILTRATION; PERINEURAL at 07:41

## 2023-05-22 RX ADMIN — ACETAMINOPHEN 975 MG: 325 TABLET, FILM COATED ORAL at 20:44

## 2023-05-22 RX ADMIN — SODIUM CHLORIDE: 9 INJECTION, SOLUTION INTRAVENOUS at 07:57

## 2023-05-22 RX ADMIN — SODIUM CHLORIDE, POTASSIUM CHLORIDE, SODIUM LACTATE AND CALCIUM CHLORIDE: 600; 310; 30; 20 INJECTION, SOLUTION INTRAVENOUS at 07:29

## 2023-05-22 RX ADMIN — FENTANYL CITRATE 100 MCG: 50 INJECTION, SOLUTION INTRAMUSCULAR; INTRAVENOUS at 07:41

## 2023-05-22 RX ADMIN — DEXAMETHASONE SODIUM PHOSPHATE 6 MG: 4 INJECTION, SOLUTION INTRA-ARTICULAR; INTRALESIONAL; INTRAMUSCULAR; INTRAVENOUS; SOFT TISSUE at 07:59

## 2023-05-22 RX ADMIN — MORPHINE SULFATE 30 MG: 15 TABLET, EXTENDED RELEASE ORAL at 16:32

## 2023-05-22 RX ADMIN — HYDROMORPHONE HYDROCHLORIDE 1 MG: 1 INJECTION, SOLUTION INTRAMUSCULAR; INTRAVENOUS; SUBCUTANEOUS at 08:15

## 2023-05-22 RX ADMIN — OXYCODONE HYDROCHLORIDE 5 MG: 5 TABLET ORAL at 13:39

## 2023-05-22 RX ADMIN — Medication 3 G: at 07:47

## 2023-05-22 ASSESSMENT — ACTIVITIES OF DAILY LIVING (ADL)
ADLS_ACUITY_SCORE: 22
DRESSING/BATHING_DIFFICULTY: NO
ADLS_ACUITY_SCORE: 35
DIFFICULTY_EATING/SWALLOWING: NO
DOING_ERRANDS_INDEPENDENTLY_DIFFICULTY: NO
CONCENTRATING,_REMEMBERING_OR_MAKING_DECISIONS_DIFFICULTY: NO
ADLS_ACUITY_SCORE: 22
ADLS_ACUITY_SCORE: 22
TOILETING_ISSUES: NO
VISION_MANAGEMENT: GLASSES
ADLS_ACUITY_SCORE: 22
ADLS_ACUITY_SCORE: 35
DIFFICULTY_COMMUNICATING: NO
ADLS_ACUITY_SCORE: 37
WEAR_GLASSES_OR_BLIND: YES
CHANGE_IN_FUNCTIONAL_STATUS_SINCE_ONSET_OF_CURRENT_ILLNESS/INJURY: YES
ADLS_ACUITY_SCORE: 35
ADLS_ACUITY_SCORE: 22
WALKING_OR_CLIMBING_STAIRS_DIFFICULTY: NO

## 2023-05-22 NOTE — OR NURSING
PACU to Inpatient Nursing Handoff    Patient Antonio Canseco is a 52 year old male who speaks English.   Procedure Procedure(s):  EXCISION, NEOPLASM, RIGHT POSTERIOR THIGH   Surgeon(s) Primary: Zach Salgado MD  Resident - Assisting: Dewey Dubois MD     Allergies   Allergen Reactions     Emend [Aprepitant] Shortness Of Breath     Couldn't breathe and started to pass out during 1st administration      Kiwi Itching     Lisinopril Cough     Cough that would not stop       Isolation  [unfilled]     Past Medical History   has a past medical history of Acrochordon (02/11/2021), CARDIOVASCULAR SCREENING; LDL GOAL LESS THAN 160 (03/27/2012), Colon adenoma, Controlled type 2 diabetes mellitus without complication, without long-term current use of insulin (H) (09/16/2019), Cough (02/04/2014), Degeneration of thoracic intervertebral disc (12/11/2013), Dysfunction of both eustachian tubes (04/12/2019), Elevated blood pressure (12/22/2014), Elevated hemoglobin A1c (09/16/2019), Gastroesophageal reflux disease, Hamstring strain, right, subsequent encounter (12/19/2022), Hepatic cyst (05/22/2018), Hepatic steatosis (12/11/2013), History of colonic polyps (12/11/2013), History of drainage of abscess (09/16/2019), Hypertension, Irritable bowel syndrome without diarrhea (06/01/2018), Low libido (04/12/2019), Lumbar radiculopathy (12/19/2022), Microscopic hematuria (12/19/2013), Nephrolithiasis, Obesity (03/27/2012), BRIAN on CPAP (03/27/2012), Other irritable bowel syndrome (05/22/2018), Pain in thoracic spine (05/09/2013), Pulmonary nodule (05/22/2018), Right-sided chest pain (04/13/2018), 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    Anesthesia General   Dermatome Level     Preop Meds TXA 1950 - time given: 0630   Nerve block  Not applicable   Intraop Meds dexamethasone (Decadron)  fentanyl (Sublimaze): 100 mcg total  hydromorphone (Dilaudid): 2 mg total  ondansetron (Zofran): last given at 1100   Local Meds Yes   Antibiotics cefazolin (Ancef) - last given at 3gm at 0747     Pain Patient Currently in Pain: yes   PACU meds  acetaminophen (Tylenol): 975 mg (total dose) last given at 1347   oxycodone (Roxicodone): 5 mg (total dose) last given at 1339    PCA / epidural No   Capnography     Telemetry ECG Rhythm: Normal sinus rhythm   Inpatient Telemetry Monitor Ordered? No        Labs Glucose Lab Results   Component Value Date     05/22/2023     05/22/2023     04/24/2022     02/11/2021       Hgb Lab Results   Component Value Date    HGB 8.4 05/22/2023    HGB 8.5 05/22/2023    HGB 14.2 12/26/2019       INR Lab Results   Component Value Date    INR 1.44 01/31/2023      PACU Imaging Not applicable     Wound/Incision Wound Thigh Other (comment) (Active)   Number of days: 98       Incision/Surgical Site 02/11/14 Urethral meatus (Active)   Number of days: 3387       Incision/Surgical Site 03/12/21 Rectum (Active)   Number of days: 801       Incision/Surgical Site 05/22/23 Right;Posterior;Midline Thigh (Active)   Incision Assessment UTV 05/22/23 1300   Tanesha-Incision Assessment UTV 05/22/23 1245   Closure LEOLA 05/22/23 1300   Incision Drainage Amount UTV 05/22/23 1300   Dressing Intervention Clean, dry, intact 05/22/23 1300   Number of days: 0      CMS        Equipment ice pack   Other LDA       IV Access Peripheral IV 05/22/23 Right Hand (Active)   Site Assessment WDL 05/22/23 1300   Line Status Infusing 05/22/23 1300   Dressing Transparent 05/22/23 1300   Dressing Status clean;dry;intact 05/22/23 1300   Phlebitis Scale 0-->no symptoms 05/22/23 1300   Number of days: 0       Peripheral IV 05/22/23 Left Hand (Active)   Site Assessment WD 05/22/23 1300   Line Status Saline locked 05/22/23 1300   Dressing Transparent 05/22/23  1300   Dressing Status clean;intact;dry 05/22/23 1300   Phlebitis Scale 0-->no symptoms 05/22/23 1300   Number of days: 0      Blood Products Not applicable  mL   Intake/Output Date 05/22/23 0700 - 05/23/23 0659   Shift 3797-3382 5436-9528 7911-9266 24 Hour Total   INTAKE   P.O. 60   60   I.V. 1000   1000   Shift Total(mL/kg) 1060(8.83)   1060(8.83)   OUTPUT   Urine 300   300   Shift Total(mL/kg) 300(2.5)   300(2.5)   Weight (kg) 120.1 120.1 120.1 120.1      Drains / [place] Closed/Suction Drain 1 Right;Posterior Thigh Bulb 15 Kiswahili (Active)   Site Description UTV 05/22/23 1300   Dressing Status Normal: Clean, Dry & Intact 05/22/23 1300   Drainage Appearance Bloody/Bright Red 05/22/23 1300   Status To bulb suction 05/22/23 1300   Number of days: 0      Time of void PreOp Time of Void Prior to Procedure: 0430 (05/22/23 0630)    PostOp Voided (mL): 300 mL (05/22/23 1325)    Diapered? No   Bladder Scan     PO 60 mL (05/22/23 1300)  tolerating sips and crackers     Vitals    B/P: 131/77  T: 98.1  F (36.7  C)    Temp src: Oral  P:  Pulse: 88 (05/22/23 1315)          R: 10  O2:  SpO2: 96 %    O2 Device: Nasal cannula (05/22/23 1300)    Oxygen Delivery: 2 LPM (05/22/23 1300)         Family/support present significant other   Patient belongings     Patient transported on cart and air mat   DC meds/scripts (obs/outpt) Not applicable   Inpatient Pain Meds Released? Yes       Special needs/considerations None   Tasks needing completion None       Maryanne Ventura, RN  ASCOM [id number]

## 2023-05-22 NOTE — OP NOTE
Preop diagnosis: Sarcoma right posterior thigh    Postoperative diagnosis: Same    Procedure performed: Removal of tumor, 25 cm, deep, right posterior thigh    Patient was interviewed in the preoperative area risk and benefits were reviewed.  Consent was signed the surgical site was marked with my initials and line of intended incision.    The patient was taken the operating room preoperative briefing been performed.  The prone position the right leg was prepped and draped sterilely from the buttock to the ankle.  Surgical timeout was performed.    A posterior thigh incision extending from the ischium to approximately 4 inches above the knee crease was made.  Sharp dissection was taken down through skin subcutaneous tissue and superficial fascia.  Sequentially and repetitively the soft tissues around the medial aspect of the tumor, distal portion of the tumor and lateral portion of the tumor were progressively dissected free of the tumor.  This did take dissection circumferentially was taken up to the insertion of the semitendinosis on the ischium.  The tendon was detached.  Specimen was lifted from the wound after obtaining hemostasis.  Upon inspection the sciatic nerve is free and the semimember gnosis and biceps muscles were retained.    The wounds irrigated and closed with fascial subcutaneous and skin layers.  A drain was placed.,  15 channel.    Postoperative debrief was performed.    Postoperative plan: 1.  Hospitalization overnight for pain management and physical therapy.  2.  To be discharged home with the drain in place.  3.  Low-dose, 81 mg of aspirin for DVT prophylaxis.  3.  Follow-up in clinic in 1 week for inspection of drainage in the wound.

## 2023-05-22 NOTE — BRIEF OP NOTE
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  Complications: None.  Implants: * No implants in log *      Plan:      Antonio Canseco is a 52 year old male with 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 Salgado.     Goals for 05/22/23:  -24h ancef  -Pain control (patient on 30 mg PO morphine BID)    Ortho Primary  Pain: Scheduled Tylenol, IV Dilaudid PRN, oxycodone PRN, Flexeril PRN  - Activity: Up with assist until independent..  - Weightbearing Status: WBAT.   - Diet: ADAT   - Pain Management: Transition from IV to PO as tolerated. Ok for NSAIDs.   - Antibiotics: Ancef 2 g Q8H x2 doses.  - Diet: Begin with clear fluids and progress diet as tolerated.   - DVT Prophylaxis: SCDs. ASA 81 mg qDAY x4 weeks  - Imaging: None  - Labs: POD1 Hgb  - Bracing/Splinting: None.  - Dressings: Keep Aquacell C/D/I x 7 days.  - Drains: Document output per shift; will be discontinued at orthopedic surgery discretion.  - Physical Therapy/Occupational Therapy: Evaluation and treatment.  - Cultures: PATHOLOGY.    - Consults: Hospitalist.  - Follow-up: Clinic with Dr. Salgado in 2 weeks.    - Disposition: Pending progress with therapies, pain control on orals, and medical stability; anticipate discharge to home on POD1-3.    Orthopedic surgery staff for this patient is Dr. Salgado.    -----------------------------------------------------------------------------------------    Dewey Dubois MD  Orthopedic Surgery  PGY4  [phone]    Please page me directly with any questions/concerns during regular weekday hours before 5 pm. If there is no response, if it is a weekend, or if it is during evening hours then please page the orthopedic surgery resident on call.         Dewey Dubois MD  Orthopaedic Surgery Resident, PGY1  Pager: [phone]

## 2023-05-22 NOTE — CONSULTS
"[facility]  Consult Note - Hospitalist Service, GOLD TEAM 18  Date of Admission:  5/22/2023  Consult Requested by: Orthopedic surgery  Reason for Consult: Medical Co-Mgt    Assessment & Plan   Antonio Canseco is a 52 year old male admitted on 5/22/2023. He has a pmh of Sarcoma of right lower extremity, hypertension, diabetes mellitus, previous history of tobacco use disorder, new reduced EF based on (possible cardiomyopathy), BRIAN on CPAP.  Patient presents for excision of neoplasm of right lower extremity.    #Right lower extremity sarcoma  -S/p excision of neoplasm  -EBL ~100 ML  -Postop care per Ortho  -PT, OT consult  -Check postop H&H    #New reduced EF  #Cardiomyopathy, heart failure with reduced ejection fraction  -Okay to resume PTA losartan 12.5 mg daily tomorrow  -Check BMP tomorrow    #Diabetes mellitus  -Okay to resume PTA metformin tomorrow.    # IBS  -Okay to resume PTA duloxetine.    #Anemia  -Based on work-up this likely represents anemia of inflammation given significantly elevated ferritin level while TIBC is low and iron is low.  -Transfuse for Hgb <7.0    #BRIAN - home CPAP           Clinically Significant Risk Factors Present on Admission                  # Hypertension: Noted on problem list      # Obesity: Estimated body mass index is 36.93 kg/m  as calculated from the following:    Height as of this encounter: 1.803 m (5' 11\").    Weight as of this encounter: 120.1 kg (264 lb 12.4 oz).            RAVI SPANN MD  Hospitalist Service, GOLD TEAM 18  Securely message with Anim[employer] Touch (more info)  Text page via [employer] Paging/Directory   See signed in provider for up to date coverage information  ______________________________________________________________________    History of Present Illness   Antonio Canseco is a 52 year old male who has a pmh of Sarcoma of right lower extremity, hypertension, diabetes mellitus, previous history of tobacco use disorder, " new reduced EF based on (possible cardiomyopathy), BRIAN on CPAP.  Patient presents for excision of neoplasm of right lower extremity.    Patient reports that about a year ago last may he thought he injured his hamstring; had PT put pain persisted. PCP ended up ordering better imaging and he was found to have mass in RLE. He has been taking oxycodone and morphine prior to arrival to this admission.      Past Medical History    Past Medical History:   Diagnosis Date     Acrochordon 02/11/2021     CARDIOVASCULAR SCREENING; LDL GOAL LESS THAN 160 03/27/2012     Colon adenoma      Controlled type 2 diabetes mellitus without complication, without long-term current use of insulin (H) 09/16/2019    X 1     Cough 02/04/2014     Degeneration of thoracic intervertebral disc 12/11/2013     Dysfunction of both eustachian tubes 04/12/2019     Elevated blood pressure 12/22/2014     Elevated hemoglobin A1c 09/16/2019    X 1     Gastroesophageal reflux disease      Hamstring strain, right, subsequent encounter 12/19/2022     Hepatic cyst 05/22/2018     Hepatic steatosis 12/11/2013     History of colonic polyps 12/11/2013     History of drainage of abscess 09/16/2019     Hypertension      Irritable bowel syndrome without diarrhea 06/01/2018     Low libido 04/12/2019     Lumbar radiculopathy 12/19/2022     Microscopic hematuria 12/19/2013     Nephrolithiasis      Obesity 03/27/2012     BRIAN on CPAP 03/27/2012     Other irritable bowel syndrome 05/22/2018     Pain in thoracic spine 05/09/2013     Pulmonary nodule 05/22/2018     Right-sided chest pain 04/13/2018     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     Sleep apnea      Tinea pedis of both feet 02/11/2021     Tinnitus, unspecified laterality 04/12/2019     Uncontrolled diabetes mellitus with hyperglycemia, without long-term current use of insulin (H) 01/23/2023       Past Surgical History   Past Surgical History:   Procedure Laterality Date     COLONOSCOPY       COLONOSCOPY N/A 03/12/2021    Procedure:  COLONOSCOPY (fv);  Surgeon: Ean Alcantara MD;  Location: [facility] OR     CYSTOSCOPY  02/11/2014    Procedure: CYSTOSCOPY;   Video Cystoscopy with Exam Under Anesthesia;  Surgeon: Chente Moeller MD;  Location: [facility] OR     IR CVC TUNNEL PLACEMENT > 5 YRS OF AGE  1/27/2023     IR CVC TUNNEL REMOVAL RIGHT  3/13/2023     LEG SURGERY Left 2019    Suregy r/t infection     wisdom teeth         Medications   I have reviewed this patient's current medications  Medications Prior to Admission   Medication Sig Dispense Refill Last Dose     ACCU-CHEK GUIDE test strip Use to test blood sugar 3 times daily. 100 strip 4 Past Week     Continuous Blood Gluc Sensor (FREESTYLE MORENITA 3 SENSOR) MISC 1 each every 14 days Use 1 sensor every 14 days. Use to read blood sugars per 's instructions. 2 each 5 Past Month     DULoxetine (CYMBALTA) 60 MG capsule TAKE 1 CAPSULE (60 MG) BY MOUTH DAILY 90 capsule 1 5/21/2023 at 0845     HYDROmorphone (DILAUDID) 2 MG tablet Take 2 tablets (4 mg) by mouth every 6 hours as needed for pain 30 tablet 0 Past Week     losartan (COZAAR) 25 MG tablet Take 1\2 tablet,  12.5mg,  daily 45 tablet 3 Unknown     metFORMIN (GLUCOPHAGE XR) 500 MG 24 hr tablet Take 3 tablets (1,500 mg) by mouth daily (with dinner) 270 tablet 1 5/21/2023 at 1830     morphine (MS CONTIN) 15 MG CR tablet Take 1 tablet (15 mg) by mouth daily for 60 days Take at midday; in addition to 30mg in AM and PM 60 tablet 0      morphine (MS CONTIN) 30 MG CR tablet Take 1 tablet (30 mg) by mouth 3 times daily 90 tablet 0 5/21/2023 at 0845     morphine (MS CONTIN) 30 MG CR tablet Take 1 tablet (30 mg) by mouth every 12 hours 60 tablet 0 5/21/2023 at 1830     naloxone (NARCAN) 4 MG/0.1ML nasal spray Spray 1 spray (4 mg) into one nostril alternating nostrils as needed for opioid reversal every 2-3 minutes until assistance arrives 2 each 1 Unknown     sennosides (SENOKOT) 8.6 MG tablet Take 1 tablet by mouth daily as needed for  constipation   Past Month            Physical Exam   Vital Signs: Temp: 97  F (36.1  C) Temp src: Oral BP: 114/65 Pulse: 71   Resp: 12 SpO2: 95 % O2 Device: None (Room air) Oxygen Delivery: 2 LPM  Weight: 264 lbs 12.36 oz    General Appearance: Lying comfortably in bed, on room air, in no acute distress of discomfort  HEENT: PERRL: EOMI; moist mucous membrane w/o lesions  Neck: No JVD  Pulmonary: Normal WOB  GI: soft nontender, no rebound or guarding   Extremities: RLE in dressing.   Skin: No rashes or lesions  Neurologic: A&O x3      Medical Decision Making       45 MINUTES SPENT BY ME on the date of service doing chart review, history, exam, documentation & further activities per the note.      Data   ------------------------- PAST 24 HR DATA REVIEWED -----------------------------------------------    I have personally reviewed the following data over the past 24 hrs:    N/A  \   8.4 (L)   / N/A     140 N/A N/A /  157 (H)   4.5 N/A N/A \       Procal: N/A CRP: N/A Lactic Acid: 0.7         Imaging results reviewed over the past 24 hrs:   No results found for this or any previous visit (from the past 24 hour(s)).

## 2023-05-22 NOTE — ANESTHESIA PROCEDURE NOTES
Airway       Patient location during procedure: OR       Procedure Start/Stop Times: 5/22/2023 7:45 AM  Staff -        Anesthesiologist:  Vernon Gilmore MD       Resident/Fellow: Vlad Gregory MD       Performed By: resident  Consent for Airway        Urgency: elective  Indications and Patient Condition       Indications for airway management: liban-procedural       Induction type:intravenous       Mask difficulty assessment: 1 - vent by mask    Final Airway Details       Final airway type: endotracheal airway       Successful airway: ETT - single and Oral  Endotracheal Airway Details        ETT size (mm): 7.5       Cuffed: yes       Successful intubation technique: direct laryngoscopy       DL Blade Type: MAC 4       Grade View of Cords: 1       Adjucts: stylet       Position: Right       Measured from: lips       Secured at (cm): 23       Bite block used: Soft    Post intubation assessment        Placement verified by: capnometry, equal breath sounds and chest rise        Number of attempts at approach: 1       Number of other approaches attempted: 0       Secured with: pink tape and silk tape       Ease of procedure: easy       Dentition: Intact and Unchanged    Medication(s) Administered   Medication Administration Time: 5/22/2023 7:45 AM

## 2023-05-22 NOTE — PROGRESS NOTES
"  VS: /65 (BP Location: Left arm)   Pulse 71   Temp 97  F (36.1  C) (Oral)   Resp 12   Ht 1.803 m (5' 11\")   Wt 120.1 kg (264 lb 12.4 oz)   SpO2 95%   BMI 36.93 kg/m     O2: Room air saturations 95%. Pt is using IS to level of 3700. Pt seems to have a very good understanding of plan of cares for postop recovery.    Output:    Last BM:    Activity:    Skin: Pale.    Pain: Pt normally takes MS Contin at home schedule and Dilaudid PO/PRN for breakthrough. Spoke with Dr Dubois and he wants to leave the way the pain med's are ordered. Please report to oncoming RN's about plan of cares for pain management.    CMS: Some numbness in right lower leg. Ace wrap over incision    Dressing: Right leg is wrapped in ace wrap. J.P. to suction   Diet: Regular.    LAD: IV is infusing   Equipment: Capnography, frequent VS, IS, Patients own C pap set up if patient wants to use.    Plan: Patient states\" I hope to go home tomorrow if every thing goes ok\"    Additional Info: Status of patient will continue to be monitored.        "

## 2023-05-22 NOTE — ANESTHESIA CARE TRANSFER NOTE
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    Anesthesia Type:   General     Note:    Oropharynx: oropharynx clear of all foreign objects  Level of Consciousness: awake  Oxygen Supplementation: room air    Independent Airway: airway patency satisfactory and stable  Dentition: dentition unchanged  Vital Signs Stable: post-procedure vital signs reviewed and stable  Report to RN Given: handoff report given  Patient transferred to: PACU    Handoff Report: Identifed the Patient, Identified the Reponsible Provider, Reviewed the pertinent medical history, Discussed the surgical course, Reviewed Intra-OP anesthesia mangement and issues during anesthesia, Set expectations for post-procedure period and Allowed opportunity for questions and acknowledgement of understanding      Vitals:  Vitals Value Taken Time   /78 05/22/23 1300   Temp 36.7  C (98.1  F) 05/22/23 1300   Pulse 84 05/22/23 1307   Resp 8 05/22/23 1307   SpO2 96 % 05/22/23 1307   Vitals shown include unvalidated device data.    Electronically Signed By: Vernon Gilmore MD  May 22, 2023  1:09 PM

## 2023-05-22 NOTE — ANESTHESIA PROCEDURE NOTES
Arterial Line Procedure Note    Pre-Procedure   Staff -        Anesthesiologist:  Vernon Gilmore MD       Resident/Fellow: Vlad Gregory MD       Performed By: resident       Location: OR       Pre-Anesthestic Checklist: patient identified, IV checked, risks and benefits discussed, informed consent, monitors and equipment checked, pre-op evaluation and at physician/surgeon's request  Timeout:       Correct Patient: Yes        Correct Procedure: Yes        Correct Site: Yes        Correct Position: Yes   Line Placement:   This line was placed Post Induction  Procedure   Procedure: arterial line       Laterality: right       Insertion Site: radial.  Sterile Prep        Standard elements of sterile barrier followed       Skin prep: Chloraprep  Insertion/Injection        Technique: Seldinger Technique and ultrasound guided        1. Ultrasound was used to evaluate the access site.       2. Artery evaluated via ultrasound for patency/adequacy.       3. Using real-time ultrasound the needle/catheter was observed entering the artery/vein.       5. The visualized structures were anatomically normal.       6. There were no apparent abnormal pathologic findings.       Catheter Type/Size: 20 G, 12 cm  Narrative         Secured by: suture       Tegaderm dressing used.       Complications: None apparent,        Arterial waveform: Yes        IBP within 10% of NIBP: Yes

## 2023-05-22 NOTE — ANESTHESIA POSTPROCEDURE EVALUATION
Patient: Antonio Canseco    Procedure: Procedure(s):  EXCISION, NEOPLASM, RIGHT POSTERIOR THIGH       Anesthesia Type:  General    Note:  Disposition: Outpatient   Postop Pain Control: Uneventful            Sign Out: Well controlled pain   PONV: No   Neuro/Psych: Uneventful            Sign Out: Acceptable/Baseline neuro status   Airway/Respiratory: Uneventful            Sign Out: Acceptable/Baseline resp. status   CV/Hemodynamics: Uneventful            Sign Out: Acceptable CV status; No obvious hypovolemia; No obvious fluid overload   Other NRE: NONE   DID A NON-ROUTINE EVENT OCCUR? No           Last vitals:  Vitals Value Taken Time   /78 05/22/23 1300   Temp 36.7  C (98.1  F) 05/22/23 1300   Pulse 85 05/22/23 1305   Resp 8 05/22/23 1305   SpO2 97 % 05/22/23 1305   Vitals shown include unvalidated device data.    Electronically Signed By: Vernon Gilmore MD  May 22, 2023  1:07 PM

## 2023-05-23 ENCOUNTER — APPOINTMENT (OUTPATIENT)
Dept: PHYSICAL THERAPY | Facility: CLINIC | Age: 53
DRG: 464 | End: 2023-05-23
Attending: ORTHOPAEDIC SURGERY
Payer: COMMERCIAL

## 2023-05-23 VITALS
TEMPERATURE: 97.1 F | HEIGHT: 71 IN | BODY MASS INDEX: 37.07 KG/M2 | HEART RATE: 65 BPM | WEIGHT: 264.77 LBS | DIASTOLIC BLOOD PRESSURE: 69 MMHG | RESPIRATION RATE: 14 BRPM | SYSTOLIC BLOOD PRESSURE: 121 MMHG | OXYGEN SATURATION: 98 %

## 2023-05-23 LAB
ANION GAP SERPL CALCULATED.3IONS-SCNC: 9 MMOL/L (ref 7–15)
BUN SERPL-MCNC: 13.6 MG/DL (ref 6–20)
CALCIUM SERPL-MCNC: 9 MG/DL (ref 8.6–10)
CHLORIDE SERPL-SCNC: 103 MMOL/L (ref 98–107)
CREAT SERPL-MCNC: 0.73 MG/DL (ref 0.67–1.17)
DEPRECATED HCO3 PLAS-SCNC: 27 MMOL/L (ref 22–29)
GFR SERPL CREATININE-BSD FRML MDRD: >90 ML/MIN/1.73M2
GLUCOSE BLDC GLUCOMTR-MCNC: 119 MG/DL (ref 70–99)
GLUCOSE SERPL-MCNC: 114 MG/DL (ref 70–99)
HCT VFR BLD AUTO: 26.4 % (ref 40–53)
HGB BLD-MCNC: 7.8 G/DL (ref 13.3–17.7)
POTASSIUM SERPL-SCNC: 4.3 MMOL/L (ref 3.4–5.3)
SODIUM SERPL-SCNC: 139 MMOL/L (ref 136–145)

## 2023-05-23 PROCEDURE — 250N000013 HC RX MED GY IP 250 OP 250 PS 637: Performed by: INTERNAL MEDICINE

## 2023-05-23 PROCEDURE — 97161 PT EVAL LOW COMPLEX 20 MIN: CPT | Mod: GP

## 2023-05-23 PROCEDURE — 85018 HEMOGLOBIN: CPT | Performed by: INTERNAL MEDICINE

## 2023-05-23 PROCEDURE — 250N000013 HC RX MED GY IP 250 OP 250 PS 637: Performed by: STUDENT IN AN ORGANIZED HEALTH CARE EDUCATION/TRAINING PROGRAM

## 2023-05-23 PROCEDURE — 97530 THERAPEUTIC ACTIVITIES: CPT | Mod: GP

## 2023-05-23 PROCEDURE — 99232 SBSQ HOSP IP/OBS MODERATE 35: CPT | Performed by: INTERNAL MEDICINE

## 2023-05-23 PROCEDURE — 999N000111 HC STATISTIC OT IP EVAL DEFER

## 2023-05-23 PROCEDURE — 36415 COLL VENOUS BLD VENIPUNCTURE: CPT | Performed by: INTERNAL MEDICINE

## 2023-05-23 PROCEDURE — 250N000011 HC RX IP 250 OP 636: Performed by: STUDENT IN AN ORGANIZED HEALTH CARE EDUCATION/TRAINING PROGRAM

## 2023-05-23 PROCEDURE — 80048 BASIC METABOLIC PNL TOTAL CA: CPT | Performed by: INTERNAL MEDICINE

## 2023-05-23 RX ORDER — FERROUS SULFATE 325(65) MG
325 TABLET ORAL
Status: DISCONTINUED | OUTPATIENT
Start: 2023-05-23 | End: 2023-05-23 | Stop reason: HOSPADM

## 2023-05-23 RX ORDER — SUCRALFATE ORAL 1 G/10ML
1 SUSPENSION ORAL 4 TIMES DAILY PRN
Status: DISCONTINUED | OUTPATIENT
Start: 2023-05-23 | End: 2023-05-23 | Stop reason: HOSPADM

## 2023-05-23 RX ORDER — PREGABALIN 25 MG/1
25 CAPSULE ORAL 3 TIMES DAILY
Status: DISCONTINUED | OUTPATIENT
Start: 2023-05-23 | End: 2023-05-23 | Stop reason: HOSPADM

## 2023-05-23 RX ORDER — HYDROXYZINE HYDROCHLORIDE 25 MG/1
25 TABLET, FILM COATED ORAL EVERY 6 HOURS PRN
Qty: 30 TABLET | Refills: 0 | Status: SHIPPED | OUTPATIENT
Start: 2023-05-23 | End: 2023-07-28

## 2023-05-23 RX ORDER — MORPHINE SULFATE 30 MG/1
30 TABLET, FILM COATED, EXTENDED RELEASE ORAL 3 TIMES DAILY
Qty: 26 TABLET | Refills: 0 | Status: SHIPPED | OUTPATIENT
Start: 2023-05-23 | End: 2023-06-23

## 2023-05-23 RX ORDER — METHOCARBAMOL 750 MG/1
750 TABLET, FILM COATED ORAL EVERY 6 HOURS PRN
Qty: 40 TABLET | Refills: 0 | Status: SHIPPED | OUTPATIENT
Start: 2023-05-23 | End: 2023-06-23

## 2023-05-23 RX ORDER — ACETAMINOPHEN 325 MG/1
650 TABLET ORAL EVERY 4 HOURS PRN
Qty: 60 TABLET | Refills: 0 | Status: SHIPPED | OUTPATIENT
Start: 2023-05-25 | End: 2023-07-28

## 2023-05-23 RX ORDER — AMOXICILLIN 250 MG
1 CAPSULE ORAL 2 TIMES DAILY
Qty: 30 TABLET | Refills: 0 | Status: SHIPPED | OUTPATIENT
Start: 2023-05-23 | End: 2023-06-23

## 2023-05-23 RX ORDER — POLYETHYLENE GLYCOL 3350 17 G/17G
17 POWDER, FOR SOLUTION ORAL DAILY
Qty: 10 PACKET | Refills: 0 | Status: SHIPPED | OUTPATIENT
Start: 2023-05-23 | End: 2023-06-23

## 2023-05-23 RX ORDER — PANTOPRAZOLE SODIUM 40 MG/1
40 TABLET, DELAYED RELEASE ORAL DAILY PRN
Status: DISCONTINUED | OUTPATIENT
Start: 2023-05-23 | End: 2023-05-23 | Stop reason: HOSPADM

## 2023-05-23 RX ADMIN — SENNOSIDES AND DOCUSATE SODIUM 1 TABLET: 50; 8.6 TABLET ORAL at 08:26

## 2023-05-23 RX ADMIN — PREGABALIN 25 MG: 25 CAPSULE ORAL at 09:20

## 2023-05-23 RX ADMIN — CEFAZOLIN SODIUM 2 G: 2 INJECTION, SOLUTION INTRAVENOUS at 00:19

## 2023-05-23 RX ADMIN — Medication 12.5 MG: at 09:20

## 2023-05-23 RX ADMIN — ACETAMINOPHEN 975 MG: 325 TABLET, FILM COATED ORAL at 04:24

## 2023-05-23 RX ADMIN — ASPIRIN 81 MG: 81 TABLET, COATED ORAL at 08:27

## 2023-05-23 RX ADMIN — FERROUS SULFATE TAB 325 MG (65 MG ELEMENTAL FE) 325 MG: 325 (65 FE) TAB at 09:20

## 2023-05-23 RX ADMIN — DULOXETINE HYDROCHLORIDE 60 MG: 60 CAPSULE, DELAYED RELEASE ORAL at 08:26

## 2023-05-23 RX ADMIN — MORPHINE SULFATE 30 MG: 15 TABLET, EXTENDED RELEASE ORAL at 08:27

## 2023-05-23 RX ADMIN — POLYETHYLENE GLYCOL 3350 17 G: 17 POWDER, FOR SOLUTION ORAL at 08:27

## 2023-05-23 ASSESSMENT — ACTIVITIES OF DAILY LIVING (ADL)
ADLS_ACUITY_SCORE: 22
ADLS_ACUITY_SCORE: 27
ADLS_ACUITY_SCORE: 22

## 2023-05-23 NOTE — PLAN OF CARE
Goal Outcome Evaluation:    VS: VSS   O2: O2 SATS >90% - Contn Pulse Ox  Denies CP or SBO    uses CPAP overnight but needed O2 and stayed on nasal canula with 3LPM for oxygen desats to 84%   Output: Voids adequately using bedside urinal   Last BM: BS present all x4 quadrants    Activity: SBA  with walker and GB.  Weight bearing as tolerated        Skin: R- thigh surgical incision   Keep heels off bed  Elevate calves with pillows   Pain: Managed with scheduled tylenol    CMS: AO4  Intact, denies numbness and tingling    Dressing: CDI except small dried drainage    Diet: Regular diet    LDA: R PIV infusing LR 50ML/hr   L PIV saline locked   ALMA drain 50ml   Equipment: IV Pole GB, walker, PCDs, Capno and CPAP    Plan: TBD    Additional Info: RN Managed K+, Labs completed this morning.   Plan- discharge TBD       Pt has been cleared to discharge today. Pt will discharge with ALMA drain. He is okay discharging today after clearance by therapy.

## 2023-05-23 NOTE — PLAN OF CARE
Occupational Therapy: Orders received. Chart reviewed and discussed with Physical Therapy.? Occupational Therapy not indicated due to pt up SBA, good support at home, no skilled ADL needs at this time, more appropriate for 1 discipline to follow with which PT will take lead.? Defer discharge recommendations to medical team/ortho.? Will complete orders.

## 2023-05-23 NOTE — PLAN OF CARE
Goal Outcome Evaluation:      Plan of Care Reviewed With: patient    Overall Patient Progress: improving    VS: VSS. Denies CP/SOB. A/O x4.   O2: >90% on RA while awake, uses CPAP overnight. Lung sounds clear.    Output: Voiding adequate amounts w/o pain or difficulty    Last BM: 5/21   Activity: Up with 1 assist, GB and walker. WBAT    Skin: R posterior thigh surgical incision    Pain: Pain in R thigh managing with scheduled medications    CMS: Intact    Dressing: CDI    Diet: Regular, tolerating well.    LDA: PIV removed before discharge. ALMA drain, output of 30 mLs this shift, pt and wife were educated on drain cares before discharge.    Equipment: IV pole, PCDs, walker, personal belongings    Additional Info:      DISCHARGE SUMMARY    Pt discharging to: Home   Transportation: Wife, car  AVS given and discussed: Yes, no further questions.   Stoplight Tool given and discussed: Yes, no further questions.  Medications given: Yes, discussed. No further questions.   Belongings returned: Yes, ensured all belongings packed and sent with pt. No items in security.   Comments: Wife present for discharge instructions. Escorted safely to elevators.

## 2023-05-23 NOTE — PROGRESS NOTES
05/23/23 0800   Appointment Info   Signing Clinician's Name / Credentials (PT) Nyasia Juan DPT   Rehab Comments (PT) WBAT R LE   Living Environment   People in Home spouse;child(hitesh), dependent  (wife, 12y/o, 15y/o; supervision 24/7)   Current Living Arrangements house   Home Accessibility stairs to enter home;stairs within home   Number of Stairs, Main Entrance 1  (one stoop to enter home, walker could fit)   Stair Railings, Main Entrance none   Number of Stairs, Within Home, Primary five  (5 up and 5 down; R rail; not needed right away)   Stair Railings, Within Home, Primary railing on right side (ascending)  (could reach wall w/ opp hand)   Transportation Anticipated family or friend will provide   Living Environment Comments small home, has no cocnerns about mobilizing in home   Self-Care   Usual Activity Tolerance good  (no AD)   Current Activity Tolerance good  (w/ use of FWW)   Equipment Currently Used at Home none  (has SEC and crutch; needs FWW)   Fall history within last six months yes   Number of times patient has fallen within last six months 1  (slipped on wooden stair d/t socks; no injuries)   Activity/Exercise/Self-Care Comment ind prior w/ no issues   General Information   Onset of Illness/Injury or Date of Surgery 05/22/23   Referring Physician Dewey Dubois MD   Patient/Family Therapy Goals Statement (PT) To return home today   Pertinent History of Current Problem (include personal factors and/or comorbidities that impact the POC) Antonio Canseco is a 52 year old male with 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 Salgado.   Existing Precautions/Restrictions fall;weight bearing   Weight-Bearing Status - LUE full weight-bearing   Weight-Bearing Status - RUE full weight-bearing   Weight-Bearing Status - LLE full weight-bearing   Weight-Bearing Status - RLE weight-bearing as tolerated   General Observations Pt has already been up and amb w/ nursing staff, doing  very well mobility-wise and reports pain being decently controlled.   Cognition   Affect/Mental Status (Cognition) WFL   Pain Assessment   Patient Currently in Pain Yes, see Vital Sign flowsheet   Integumentary/Edema   Integumentary/Edema Comments Pt has ACE wrap and drain to R LE   Posture    Posture Forward head position;Protracted shoulders   Range of Motion (ROM)   Range of Motion ROM deficits secondary to surgical procedure;ROM deficits secondary to pain  (decreased but WFL on R LE)   Strength (Manual Muscle Testing)   Strength (Manual Muscle Testing) No deficits observed during functional mobility   Bed Mobility   Comment, (Bed Mobility) Ind bed mob   Transfers   Comment, (Transfers) SBA w/ transfers w/ FWW   Gait/Stairs (Locomotion)   Comment, (Gait/Stairs) SBA w/ amb w/ FWW, CGA stairs   Balance   Balance no deficits were identified   Sensory Examination   Sensory Perception patient reports no sensory changes   Clinical Impression   Criteria for Skilled Therapeutic Intervention Evaluation only  (1x evaluation and treatment)   PT Diagnosis (PT) impaired functional mobility   Influenced by the following impairments pain, decreased ROM, post-op precautions   Functional limitations due to impairments needs AD to improve safety and tolerance to mobility   Clinical Presentation (PT Evaluation Complexity) Stable/Uncomplicated   Clinical Presentation Rationale per clinical judgment   Clinical Decision Making (Complexity) low complexity   Planned Therapy Interventions (PT) bed mobility training;gait training;transfer training;stair training   Anticipated Equipment Needs at Discharge (PT) walker, rolling   Risk & Benefits of therapy have been explained evaluation/treatment results reviewed;care plan/treatment goals reviewed;risks/benefits reviewed;current/potential barriers reviewed;participants voiced agreement with care plan;participants included;patient   Clinical Impression Comments Pt mobilizing well and  demonstrates safe mobility to DC home when ready medically   PT Total Evaluation Time   PT Eval, Low Complexity Minutes (48258) 5   Plan of Care Review   Plan of Care Reviewed With patient   Physical Therapy Goals   PT Frequency One time eval and treatment only   PT Predicted Duration/Target Date for Goal Attainment 05/23/23   PT Goals Bed Mobility;Transfers;Gait;Stairs   PT: Bed Mobility Independent;Supine to/from sit;Rolling;Goal Met   PT: Transfers Supervision/stand-by assist;Sit to/from stand;Bed to/from chair;Assistive device;Within precautions;Goal Met   PT: Gait Supervision/stand-by assist;Assistive device;Rolling walker;Within precautions;Greater than 200 feet;Goal Met   PT: Stairs Supervision/stand-by assist;Within precautions;Assistive device;1 stair;4 stairs;Goal Met  (1 platform stair w/ FWW and 4 stairs w/ R railing)   Interventions   Interventions Quick Adds Therapeutic Activity   Therapeutic Activity   Therapeutic Activities: dynamic activities to improve functional performance Minutes (13814) 28   Symptoms Noted During/After Treatment None   Treatment Detail/Skilled Intervention Pt supine in bed on PT arrival. Agrees to participate stating that pain is well controlled overall. Pt prefers to get out of bed to his L as feels less pain on R LE this way. Pt ind w/ sup>sit EOB. STS at FWW w/ SBA. Pt amb 100ft into PT gym w/ FWW and SBA. Pt sat briefly for PT to adjust sock and for PT to demonstrate safe stairs on platform step as well as staircase. Pt agrees to complete and denies questions. Started w/ FWW on platform step and pt able to complete step to pattern ascend/descend x2 w/ only 1 brief cue for sequencing otherwise CGA-SBA w/ good stability. Pt then agrees to complete 4 stairs w/ R rail and L hand against L rail simulating wall, completed these w/ step to gait pattern and no evidence of instability or safety concerns. Pt then amb 200ft w/ FWW and SBA and we go back into room. He asks questions  about offloading surgical site while seated on toilet and we did go into BR and had him sit w/ slight built up cushion under R thigh and reports increased comfort w/ this, we discused sit<>stand w/ extending R LE slightly to help decrease strain on LE. Did also discuss positioning of setting foot on something while seated to help offload pressure of thigh as well and pt reports comfort w/ this. Completes STS at FWW and amb back to bed, completing SPT w/ SBA. Pt was ind w/ sit>sup and was made comfortable in bed. Educated pt to try to keep his LE in more extension compared to built up flexion of pillows d/t concern for hamstring shortening and he verbalized understanding and did have comfort w/ ext position. Pt denies any further PT needs at this time.   PT Discharge Planning   PT Plan DC PT   PT Discharge Recommendation (DC Rec) home with assist   PT Rationale for DC Rec Pt mobilizing well and denies any concerns, has good family support at home   PT Brief overview of current status ind bed mob, SBA transfer and amb w/ FWW   Total Session Time   Timed Code Treatment Minutes 28   Total Session Time (sum of timed and untimed services) 33

## 2023-05-23 NOTE — PHARMACY-ADMISSION MEDICATION HISTORY
Please see Admission Medication History completed on 5/1/2023 under previous encounter at [facility]  for information regarding prior to admission medications.     Narciso Odom, DionneD

## 2023-05-23 NOTE — PLAN OF CARE
"VS: /64 (BP Location: Left arm)   Pulse 68   Temp (!) 96.6  F (35.9  C) (Oral)   Resp 16   Ht 1.803 m (5' 11\")   Wt 120.1 kg (264 lb 12.4 oz)   SpO2 94%   BMI 36.93 kg/m      O2: O2 SATS >90% denies chest pain,  or SBO  uses CPAP over night   Output: Voids adequately using urinal   Last BM:  BS present all x4 quadrants    Activity: Up ad casey, Assist /SBA  with walker and gait belt. Ambulated on the hallway x1 400 feet   Up for meals? Yes    Skin: Intact except surgical incision on right posterior thigh   Pain: Managed with  scheduled MS contin  and tylenol    CMS: Intact denies numbness and tingling    Dressing: CDI except small dried drainage    Diet: Regular diet    LDA: R PIV infusing with LR  and L PIV saline locked    Equipment: IV Pole Gait Belt, walker, PCDs, Capno and CPAP    Plan: TBD    Additional Info:                              "

## 2023-05-23 NOTE — PROGRESS NOTES
"Orthopedic Surgery Progress Note: 05/23/2023    Subjective:   No acute events overnight. Pain well-controlled. Tolerating a diet. No new concerns or complaints. Denies SOB, chest Pain, fevers, chills.     Objective:   /59 (BP Location: Left arm)   Pulse 74   Temp 96.9  F (36.1  C) (Oral)   Resp 14   Ht 1.803 m (5' 11\")   Wt 120.1 kg (264 lb 12.4 oz)   SpO2 98%   BMI 36.93 kg/m    No intake/output data recorded.  General: NAD. Resting comfortably in bed.  Respiratory: Breathing comfortably on RA.  Musculoskeletal:    Focused Exam of Right Lower Extremity  Dressing C/D/I. ACE in place.  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 Salgado.      Goals for TODAY:  -24h ancef completion  -Pain control (patient on 30 mg PO morphine BID), currently on TID. Pain well controlled  -Ok to discharge today with drain     Ortho Primary  Pain: Scheduled Tylenol, IV Dilaudid PRN, oxycodone PRN, Flexeril PRN  - Activity: Up with assist until independent..  - Weightbearing Status: WBAT.   - Diet: ADAT   - Pain Management: Transition from IV to PO as tolerated. Ok for NSAIDs.   - Antibiotics: Ancef 2 g Q8H x2 doses.  - Diet: Begin with clear fluids and progress diet as tolerated.   - DVT Prophylaxis: SCDs. ASA 81 mg qDAY x4 weeks  - Imaging: None  - Labs: POD1 Hgb  - Bracing/Splinting: None.  - Dressings: Keep Aquacell C/D/I x 7 days.  - Drains: Document output per shift; will be discontinued at orthopedic surgery discretion.  - Physical Therapy/Occupational Therapy: Evaluation and treatment.  - Cultures: PATHOLOGY.    - Consults: Hospitalist.  - Follow-up: Clinic " with Dr. Salgado this Thursday or Friday.     - Disposition: Home today    Orthopedic surgery staff for this patient is Dr. Salgado.     -----------------------------------------------------------------------------------------     Dewey Dubois MD  Orthopedic Surgery PGY4  [phone]     Please page me directly with any questions/concerns during regular weekday hours before 5 pm. If there is no response, if it is a weekend, or if it is during evening hours then please page the orthopedic surgery resident on call.       ------------------------------------------------------------------------------------------    Dewey Dubois MD  Orthopedic Surgery PGY4  [phone]      FOLLOWUP:    Future Appointments   Date Time Provider Department Center   5/23/2023  8:15 AM Nyasia Juan, PT [facility] [place]   5/23/2023  8:45 AM Opal Jarvis OT [facility] [place]   5/30/2023  2:45 PM [id number] [place] [facility]   6/1/2023  2:45 PM [facility] Salgado MD [facility]   6/6/2023  9:45 AM Eric Cee MD [facility]   6/13/2023 12:00 PM Michael Laboy MD [facility]   6/23/2023  9:00 AM Daphney Larios APRN CNP [facility]

## 2023-05-24 NOTE — DISCHARGE SUMMARY
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 Salgado.     HOSPITAL COURSE:    The patient was admitted following the above listed procedures for pain control and rehabilitation. Antonio Canseco did well post-operatively. Medicine was consulted post operatively to aid in management of medical co-morbidities. The patient received routine nursing cares and at the time of discharge was medically stable. Vital signs were stable throughout admission. The patient is tolerating a regular diet and is voiding spontaneously. All PT/OT goals have been met for safe mobility. Pain is now controlled on oral medications which will be available on discharge. Stool softeners have been used while taking pain medications to help prevent constipation. Antonio Canseco is deemed medically safe to discharge.     Antibiotics:  Ancef given periop and 24 hours postop.   DVT prophylaxis:  ASA initiated after surgery and will be continued for 4 weeks.  PT Progress:  Has met PT/OT goals for safe mobility.    Pain Meds:  Weaned off all IV pain meds by discharge.  Inpatient Events: No significant events or complications.     PHYSICAL EXAM:    General: NAD. Resting comfortably in bed.  Respiratory: Breathing comfortably on RA.  Musculoskeletal:     Focused Exam of Right Lower Extremity  Dressing C/D/I. ACE in place.  Fires EHL, FHL, TA, GSC, Quad  SILT DP, SP, Saph, Sural, Tibial Nerve Distributions  2+ DP pulse, foot WWP       FOLLOWUP:    Follow up with Dr. Salgado at 2 weeks postoperatively.    Future Appointments   Date Time  Provider Department Center   5/30/2023  2:45 PM [id number] [facility] [place] [facility]   6/1/2023  2:45 PM Zach Salgado MD [facility]   6/6/2023  9:45 AM Eric Cee MD [facility]   6/13/2023 12:00 PM Michael Laboy MD [facility]   6/23/2023  9:00 AM Daphney Larios APRN CNP [facility]       Orthopaedic Surgery appointments are at the [facility] ([address] [place] [address]). Call [phone] to schedule a follow-up appointment at this location with your provider.     PLANNED DISCHARGE ORDERS:       Wound Care: see Below      Discharge Medication List as of 5/23/2023 11:02 AM      START taking these medications    Details   acetaminophen (TYLENOL) 325 MG tablet Take 2 tablets (650 mg) by mouth every 4 hours as needed for other, Disp-60 tablet, R-0, E-Prescribe      aspirin (ASA) 81 MG EC tablet Take 1 tablet (81 mg) by mouth daily for 30 days, Disp-30 tablet, R-0, E-Prescribe      hydrOXYzine (ATARAX) 25 MG tablet Take 1 tablet (25 mg) by mouth every 6 hours as needed for other (adjuvant pain), Disp-30 tablet, R-0, E-Prescribe      methocarbamol (ROBAXIN) 750 MG tablet Take 1 tablet (750 mg) by mouth every 6 hours as needed for muscle spasms, Disp-40 tablet, R-0, E-Prescribe      polyethylene glycol (MIRALAX) 17 g packet Take 17 g by mouth daily, Disp-10 packet, R-0, E-Prescribe      senna-docusate (SENOKOT-S/PERICOLACE) 8.6-50 MG tablet Take 1 tablet by mouth 2 times daily, Disp-30 tablet, R-0, E-Prescribe         CONTINUE these medications which have CHANGED    Details   morphine (MS CONTIN) 30 MG CR tablet Take 1 tablet (30 mg) by mouth 3 times daily, Disp-26 tablet, R-0, E-Prescribe         CONTINUE these medications which have NOT CHANGED    Details   DULoxetine (CYMBALTA) 60 MG capsule TAKE 1 CAPSULE (60 MG) BY MOUTH DAILY, Disp-90 capsule, R-1, E-Prescribe      ferrous sulfate (FEROSUL) 325 (65 Fe) MG tablet Take 325 mg by mouth daily  (with breakfast), Historical      losartan (COZAAR) 25 MG tablet Take 1tablet,  12.5mg,  daily, Disp-45 tablet, R-3, E-PrescribeTake 12.5mg daily to start with      metFORMIN (GLUCOPHAGE XR) 500 MG 24 hr tablet Take 3 tablets (1,500 mg) by mouth daily (with dinner), Disp-270 tablet, R-1, E-Prescribe      naloxone (NARCAN) 4 MG/0.1ML nasal spray Spray 1 spray (4 mg) into one nostril alternating nostrils as needed for opioid reversal every 2-3 minutes until assistance arrives, Disp-2 each, R-1, E-Prescribe      omeprazole (PRILOSEC) 20 MG DR capsule Take 20 mg by mouth daily as needed, Historical      pregabalin (LYRICA) 25 MG capsule Take 25 mg by mouth 3 times daily, Historical      sennosides (SENOKOT) 8.6 MG tablet Take 1 tablet by mouth daily as needed for constipation, Historical      sucralfate (CARAFATE) 1 GM/10ML suspension Take 1 g by mouth 4 times daily as needed for nausea, Historical      triamcinolone (KENALOG) 0.1 % external ointment Apply topically 2 times daily Apply topically twice dailyHistorical               Discharge Procedure Orders   Reason for your hospital stay   Order Comments: S/p Removal of tumor, 25 cm, deep, right posterior thigh     When to contact your care team   Order Comments: Call Dr. Salgado  if you have any of the following: temperature greater than 101.3  or less than 96.5,  increased shortness of breath, increased drainage, increased swelling, or increased pain.    Contact phone number is      Wound care and dressings   Order Comments: Instructions to care for your wound at home: ice to area for comfort, keep wound clean and dry, may get incision wet in shower but do not soak or scrub, reinforce dressing as needed, and remove dressing in 7 days.     Tubes and drains   Order Comments: You are going home with the following tubes or drains: ALMA.  Follow these instructions  to care for your tube    Drain will remain in place until follow up visit.    Strip drain tubing  daily.  Empty drain daily and record output.     Activity   Order Comments: Your activity upon discharge: activity as tolerated    Activity: Up with assist until independent..  - Weightbearing Status: WBAT.     Order Specific Question Answer Comments   Is discharge order? Yes      Adult [facility] Follow-up and recommended labs and tests   Order Comments: Follow up with Dr Salgado or Stefany Sanches in one week for wound check. (June 1 or 2nd)    Appointments at [facility] at [address] [place] [id number] ([facility])     Call [phone] if you haven't heard regarding these appointments within 7 days of discharge.     Crutches DME   Order Comments: DME Documentation: Describe the reason for need to support medical necessity: Impaired gait status post knee surgery. I, the undersigned, certify that the above prescribed supplies are medically necessary for this patient and is both reasonable and necessary in reference to accepted standards of medical practice in the treatment of this patient's condition and is not prescribed as a convenience.     Order Specific Question Answer Comments   DME Provider: [employer]    Crutch Type: Standard    Crutches Add On: NA    Length of Need: Lifetime      Cane DME   Order Comments: DME Documentation: Describe the reason for need to support medical necessity: Impaired gait status post knee surgery. I, the undersigned, certify that the above prescribed supplies are medically necessary for this patient and is both reasonable and necessary in reference to accepted standards of medical practice in the treatment of this patient's condition and is not prescribed as a convenience.     Order Specific Question Answer Comments   DME Provider: [employer]    Cane Type: Single Tip    Reminder: Patient can typically get 1 every 5 years      Walker DME   Order Comments: DME Documentation: Describe the reason for need to support medical necessity:  Impaired gait status post knee surgery. I, the undersigned, certify that the above prescribed supplies are medically necessary for this patient and is both reasonable and necessary in reference to accepted standards of medical practice in the treatment of this patient's condition and is not prescribed as a convenience.     Order Specific Question Answer Comments   DME Provider: [employer]    Walker Type: Standard (2 Wheel)    Accessories: N/A      Diet   Order Comments: Follow this diet upon discharge: Orders Placed This Encounter      Advance Diet as Tolerated: Regular Diet Adult     Order Specific Question Answer Comments   Is discharge order? Yes            Dewey Dubois MD  Orthopaedic Surgery Resident, PGY4  Pager: [phone]

## 2023-05-25 ENCOUNTER — PATIENT OUTREACH (OUTPATIENT)
Dept: CARE COORDINATION | Facility: CLINIC | Age: 53
End: 2023-05-25
Payer: COMMERCIAL

## 2023-05-25 NOTE — PROGRESS NOTES
"CHW offered Clinic Care Coordination to an established [facility] eligible patient and patient declined CCC at this time.    Clinic Care Coordination Contact  [facility]: Post-Discharge Note  SITUATION                                                      Admission:    Admission Date: 05/22/23   Reason for Admission: Sarcoma of soft tissue  Discharge:   Discharge Date: 05/23/23  Discharge Diagnosis: Sarcoma of soft tissue (H), PROCEDURES: Procedure(s): EXCISION, NEOPLASM, RIGHT POSTERIOR THIGH on 5/22/2023    BACKGROUND                                                      Per hospital discharge summary and inpatient provider notes:    PROCEDURES: Procedure(s):  EXCISION, NEOPLASM, RIGHT POSTERIOR THIGH on 5/22/2023     BRIEF HISTORY:  Antonio Canseco is a 52 year old male with 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 Salgado.      HOSPITAL COURSE:    The patient was admitted following the above listed procedures for pain control and rehabilitation. Antonio Canseco did well post-operatively. Medicine was consulted post operatively to aid in management of medical co-morbidities. The patient received routine nursing cares and at the time of discharge was medically stable. Vital signs were stable throughout admission. The patient is tolerating a regular diet and is voiding spontaneously. All PT/OT goals have been met for safe mobility. Pain is now controlled on oral medications which will be available on discharge. Stool softeners have been used while taking pain medications to help prevent constipation. Antonio Canseco is deemed medically safe to discharge.     ASSESSMENT      Discharge Assessment  How are you doing now that you are home?: \"Good. I think I'm doing pretty well yeah.\"  How are your symptoms? (Red Flag symptoms escalate to triage hotline per guidelines): Improved  Do you feel your condition is stable enough to be safe at home until your provider visit?: Yes  Does the patient have " their discharge instructions? : Yes  Does the patient have questions regarding their discharge instructions? : No  Were you started on any new medications or were there changes to any of your previous medications? : Yes  Does the patient have all of their medications?: Yes  Do you have questions regarding any of your medications? : No  Do you have all of your needed medical supplies or equipment (DME)?  (i.e. oxygen tank, CPAP, cane, etc.): Yes  Discharge follow-up appointment scheduled within 14 calendar days? : Yes  Discharge Follow Up Appointment Date: 05/30/23  Discharge Follow Up Appointment Scheduled with?: Specialty Care Provider (Radiology appt. 5/30, Orthopedics appt. 6/1.)    Post-op ([facility] CTA Only)  If the patient had a surgery or procedure, do they have any questions for a nurse?: No    PLAN                                                      Outpatient Plan:      Adult [facility] Follow-up and recommended labs and tests    Follow up with Dr Salgado or Stefany Sanches in one week for wound check. (June 1 or 2nd)    Appointments at [facility] at [address] [place] [address] ([facility])    Call [phone] if you haven't heard regarding these appointments within 7 days of discharge.    Future Appointments   Date Time Provider Department Center   5/30/2023  2:45 PM [id number] [facility]   6/1/2023  2:45 PM Zach Salgado MD Pending sale to [facility]   6/6/2023  9:45 AM Eric Cee MD [facility]   6/13/2023 12:00 PM Michael Laboy MD [facility]   6/23/2023  9:00 AM Daphney Larios APRN CNP [facility]         For any urgent concerns, please contact our 24 hour nurse triage line: [phone] ([phone])         MATTY Flor  [phone]  Connected Care Resource [facility]

## 2023-05-30 ENCOUNTER — HOSPITAL ENCOUNTER (OUTPATIENT)
Dept: PET IMAGING | Facility: CLINIC | Age: 53
Discharge: HOME | End: 2023-05-30
Attending: STUDENT IN AN ORGANIZED HEALTH CARE EDUCATION/TRAINING PROGRAM | Admitting: STUDENT IN AN ORGANIZED HEALTH CARE EDUCATION/TRAINING PROGRAM
Payer: COMMERCIAL

## 2023-05-30 DIAGNOSIS — C49.9 UNDIFFERENTIATED PLEOMORPHIC SARCOMA (H): ICD-10-CM

## 2023-05-30 PROCEDURE — 78816 PET IMAGE W/CT FULL BODY: CPT | Mod: PS

## 2023-05-30 PROCEDURE — 343N000001 HC RX 343: Performed by: STUDENT IN AN ORGANIZED HEALTH CARE EDUCATION/TRAINING PROGRAM

## 2023-05-30 PROCEDURE — A9552 F18 FDG: HCPCS | Performed by: STUDENT IN AN ORGANIZED HEALTH CARE EDUCATION/TRAINING PROGRAM

## 2023-05-30 RX ADMIN — FLUDEOXYGLUCOSE F-18 12.39 MILLICURIE: 500 INJECTION, SOLUTION INTRAVENOUS at 14:22

## 2023-05-31 LAB
PATH REPORT.COMMENTS IMP SPEC: ABNORMAL
PATH REPORT.COMMENTS IMP SPEC: ABNORMAL
PATH REPORT.COMMENTS IMP SPEC: YES
PATH REPORT.FINAL DX SPEC: ABNORMAL
PATH REPORT.GROSS SPEC: ABNORMAL
PATH REPORT.MICROSCOPIC SPEC OTHER STN: ABNORMAL
PATH REPORT.RELEVANT HX SPEC: ABNORMAL
PATHOLOGY SYNOPTIC REPORT: ABNORMAL
PHOTO IMAGE: ABNORMAL

## 2023-06-01 ENCOUNTER — OFFICE VISIT (OUTPATIENT)
Dept: ORTHOPEDICS | Facility: CLINIC | Age: 53
End: 2023-06-01
Payer: COMMERCIAL

## 2023-06-01 DIAGNOSIS — C49.9 SARCOMA OF SOFT TISSUE (H): Primary | ICD-10-CM

## 2023-06-01 PROCEDURE — 99214 OFFICE O/P EST MOD 30 MIN: CPT | Mod: 24 | Performed by: ORTHOPAEDIC SURGERY

## 2023-06-01 NOTE — PROGRESS NOTES
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  Preoperative radiation and surgical excision.  Final surgical margin determined to be microscopically positive proximally.    Patient was seen today with his wife to determine whether the drain should stay in place, to discuss the biopsy results and to review his recent PET CT scan.    His drain is putting out 80 cc/day consistently.  We will leave it in.  His dressing was changed and his wound looks excellent.    I reviewed the pathology which does show a positive margin proximally.  This was reviewed with her multispecialty group today.  It is our opinion that we proceed with observation particularly in light of his recent chest CT scan findings.    I reviewed his PET/CT today myself and with the patient.  I also reviewed the radiologist impression.  The previous nodule that have been followed is enlarging and he has new nodules bilaterally.  These are all subcentimeter but very concerning for distant disease.    I again reviewed his MRI preoperatively to remind him of the magnitude of his original tumor.  He appreciated this.    Impression: Doing well following excision of a large high-grade sarcoma from the posterior thigh.  Staging studies suggest there may be pulmonary disease.    Plan: 1.  Return next Wednesday to see Stefany Sanches for evaluation of his wound drainage.  2.  He is already set up to see Dr. Mendiola on June 14.    This should be considered a an established patient but not a postoperative visit because of the review of his imaging studies and pathology findings.  The total length of the visit was greater than 30 minutes.

## 2023-06-01 NOTE — NURSING NOTE
Chief Complaint   Patient presents with     Surgical Followup     1 wk post-op right posterior thigh tumor removal DOS 5/22/23 // possible drain removal        52 year old  1970            Pain Assessment  Patient Currently in Pain: Yes  0-10 Pain Scale: 2  Primary Pain Location:  (right thigh)             [facility] [place] - [address] [id number] - [place], [place] - [address] [address] [facility]        Allergies   Allergen Reactions     Emend [Aprepitant] Shortness Of Breath     Couldn't breathe and started to pass out during 1st administration      Kiwi Itching     Lisinopril Cough     Cough that would not stop           Current Outpatient Medications   Medication     acetaminophen (TYLENOL) 325 MG tablet     aspirin (ASA) 81 MG EC tablet     DULoxetine (CYMBALTA) 60 MG capsule     ferrous sulfate (FEROSUL) 325 (65 Fe) MG tablet     hydrOXYzine (ATARAX) 25 MG tablet     losartan (COZAAR) 25 MG tablet     metFORMIN (GLUCOPHAGE XR) 500 MG 24 hr tablet     methocarbamol (ROBAXIN) 750 MG tablet     morphine (MS CONTIN) 30 MG CR tablet     naloxone (NARCAN) 4 MG/0.1ML nasal spray     omeprazole (PRILOSEC) 20 MG DR capsule     polyethylene glycol (MIRALAX) 17 g packet     pregabalin (LYRICA) 25 MG capsule     senna-docusate (SENOKOT-S/PERICOLACE) 8.6-50 MG tablet     sennosides (SENOKOT) 8.6 MG tablet     sucralfate (CARAFATE) 1 GM/10ML suspension     triamcinolone (KENALOG) 0.1 % external ointment     No current facility-administered medications for this visit.

## 2023-06-01 NOTE — LETTER
6/1/2023         RE: Antonio Canseco  [address]  [place] [address]        Dear Colleague,    Thank you for referring your patient, Antonio Canseco, to the [facility]. Please see a copy of my visit note below.    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  Preoperative radiation and surgical excision.  Final surgical margin determined to be microscopically positive proximally.    Patient was seen today with his wife to determine whether the drain should stay in place, to discuss the biopsy results and to review his recent PET CT scan.    His drain is putting out 80 cc/day consistently.  We will leave it in.  His dressing was changed and his wound looks excellent.    I reviewed the pathology which does show a positive margin proximally.  This was reviewed with her multispecialty group today.  It is our opinion that we proceed with observation particularly in light of his recent chest CT scan findings.    I reviewed his PET/CT today myself and with the patient.  I also reviewed the radiologist impression.  The previous nodule that have been followed is enlarging and he has new nodules bilaterally.  These are all subcentimeter but very concerning for distant disease.    I again reviewed his MRI preoperatively to remind him of the magnitude of his original tumor.  He appreciated this.    Impression: Doing well following excision of a large high-grade sarcoma from the posterior thigh.  Staging studies suggest there may be pulmonary disease.    Plan: 1.  Return next Wednesday to see Stefany Sanches for evaluation of his wound drainage.  2.  He is already set up to see Dr. Mendiola on June 14.    This should be considered a an established patient but not a postoperative visit because of the review of his imaging studies and pathology findings.  The total length of the visit was greater than 30  minutes.          Zach Salgado MD

## 2023-06-02 ENCOUNTER — PATIENT OUTREACH (OUTPATIENT)
Dept: FAMILY MEDICINE | Facility: CLINIC | Age: 53
End: 2023-06-02
Payer: COMMERCIAL

## 2023-06-02 DIAGNOSIS — E11.65 UNCONTROLLED TYPE 2 DIABETES MELLITUS WITH HYPERGLYCEMIA, WITHOUT LONG-TERM CURRENT USE OF INSULIN (H): ICD-10-CM

## 2023-06-02 RX ORDER — METFORMIN HCL 500 MG
1500 TABLET, EXTENDED RELEASE 24 HR ORAL
Qty: 270 TABLET | Refills: 1 | Status: CANCELLED | OUTPATIENT
Start: 2023-06-02

## 2023-06-02 NOTE — TELEPHONE ENCOUNTER
PAL received incoming call from pt's wife, Yari ([facility])  -Requesting refill of pt's Metformin, states that [facility] cancelled order and needs new one  -Pt has enough to last until Monday 6/5  -Wanting new order be sent to [facility] pharmacy   -Advised pt schedule visit with Dr. Mason soon, due   -Introduced as assigned PAL, educated on role, provided direct line [phone]    metFORMIN (GLUCOPHAGE XR) 500 MG 24 hr tablet 270 tablet 1 2/23/2023  No   Sig - Route: Take 3 tablets (1,500 mg) by mouth daily (with dinner) - Oral     PAL called [facility] pharmacy and spoke with staff member   -Staff member states that Metformin rx was transferred over to  [facility] pharmacy early this May, no longer have order    PAL called [facility] pharmacy and spoke with staff member  -Staff member states that they received other medication transfers, but never Metformin, need new order    PAL pended order, routing to Dr. Mason to review and sign     Chrissy LOPEZ RN  Patient Advocate Liaison - PAL RN  [facility]  [phone]

## 2023-06-04 DIAGNOSIS — E11.65 UNCONTROLLED TYPE 2 DIABETES MELLITUS WITH HYPERGLYCEMIA, WITHOUT LONG-TERM CURRENT USE OF INSULIN (H): ICD-10-CM

## 2023-06-04 RX ORDER — METFORMIN HCL 500 MG
1500 TABLET, EXTENDED RELEASE 24 HR ORAL
Qty: 270 TABLET | Refills: 3 | Status: SHIPPED | OUTPATIENT
Start: 2023-06-04 | End: 2024-05-16

## 2023-06-06 ENCOUNTER — OFFICE VISIT (OUTPATIENT)
Dept: CARDIOLOGY | Facility: CLINIC | Age: 53
End: 2023-06-06
Payer: COMMERCIAL

## 2023-06-06 VITALS
SYSTOLIC BLOOD PRESSURE: 108 MMHG | BODY MASS INDEX: 36.93 KG/M2 | DIASTOLIC BLOOD PRESSURE: 73 MMHG | WEIGHT: 263.8 LBS | HEIGHT: 71 IN | HEART RATE: 84 BPM

## 2023-06-06 DIAGNOSIS — I42.9 SECONDARY CARDIOMYOPATHY (H): Primary | ICD-10-CM

## 2023-06-06 PROCEDURE — 99214 OFFICE O/P EST MOD 30 MIN: CPT | Performed by: INTERNAL MEDICINE

## 2023-06-06 RX ORDER — BLOOD-GLUCOSE SENSOR
EACH MISCELLANEOUS
COMMUNITY
Start: 2023-05-30 | End: 2023-08-16

## 2023-06-06 RX ORDER — METOPROLOL SUCCINATE 25 MG/1
12.5 TABLET, EXTENDED RELEASE ORAL DAILY
Qty: 45 TABLET | Refills: 1 | Status: SHIPPED | OUTPATIENT
Start: 2023-06-06 | End: 2023-07-28

## 2023-06-06 NOTE — LETTER
6/6/2023    [place]no [place]  [address] [facility] [address]    RE: Antonio Jordanrasheeda       Dear Colleague,     I had the pleasure of seeing Antonio Canseco in the [facility].  HPI and Plan:   Mr Canseco is a very pleasant 52-year-old gentleman with history of sarcoma involving the right lower extremity.  I saw the patient about a month ago this was prior to his excision surgery.  He underwent a Lexiscan stress test that showed small area of possible apical infarct versus artifact with LVEF of 36% at rest and 39% with stress.  LV enlargement was noted.  Echocardiogram done earlier this year showed LVEF of 55% with borderline dilated LV.  Patient cardiac status wise was doing well without any symptoms or signs of decompensated congestive heart failure or angina.  He underwent cardiac MRI that showed LVEF of 40%, mildly dilated LV without any abnormal late gadolinium enhancement.  He underwent successful excision of the sarcoma.  He was started on low-dose losartan 12.5 mg daily prior to the surgery.  Subsequent BMP showed normal kidney functions.  Today is coming for follow-up accompanied by his wife.  He is still having some intermittent pain from the surgery.  No chest discomfort or shortness of breath dizziness presyncope or syncope.  No orthopnea.  It blood pressures on the lower side with systolic 108.  He does not use any tobacco or alcohol.  To be noted he received 1 cycle of chemotherapy with doxorubicin and ifosamide.    Assessment and plan  1.  Nonischemic cardiomyopathy with moderately reduced LV systolic function with LVEF of 40%.  NYHA class I-II.  Clinically not in congestive heart failure.  Appears euvolemic.  Etiology not clear could be chemotherapy induced.  Blood pressure on the low side.  Recommend adding Toprol-XL 12.5 mg daily.  Subsequently both Toprol-XL and losartan can be slowly increase and if there is enough of losartan on board can consider switching to  Entresto.  2.  Right lower extremity sarcoma status post 1 cycle of chemotherapy earlier this year s/p surgical excision last month.    Recommendations  Continue losartan 12.5 mg daily  Add Toprol-XL 12.5 mg daily.  Continue close follow-up in cardiology call clinic with the goal of further increase in cardiomyopathy medications.  Once he is on reasonable stable dose of cardiomyopathy occasion can repeat echocardiogram subsequently to reevaluate LV function.    No orders of the defined types were placed in this encounter.      Orders Placed This Encounter   Medications    Continuous Blood Gluc Sensor (FREESTYLE MORENITA 3 SENSOR) MISC    metoprolol succinate ER (TOPROL XL) 25 MG 24 hr tablet     Sig: Take 0.5 tablets (12.5 mg) by mouth daily     Dispense:  45 tablet     Refill:  1       There are no discontinued medications.      Encounter Diagnosis   Name Primary?    Secondary cardiomyopathy (H) Yes       CURRENT MEDICATIONS:  Current Outpatient Medications   Medication Sig Dispense Refill    acetaminophen (TYLENOL) 325 MG tablet Take 2 tablets (650 mg) by mouth every 4 hours as needed for other 60 tablet 0    aspirin (ASA) 81 MG EC tablet Take 1 tablet (81 mg) by mouth daily for 30 days 30 tablet 0    DULoxetine (CYMBALTA) 60 MG capsule TAKE 1 CAPSULE (60 MG) BY MOUTH DAILY 90 capsule 1    ferrous sulfate (FEROSUL) 325 (65 Fe) MG tablet Take 325 mg by mouth daily (with breakfast)      hydrOXYzine (ATARAX) 25 MG tablet Take 1 tablet (25 mg) by mouth every 6 hours as needed for other (adjuvant pain) 30 tablet 0    losartan (COZAAR) 25 MG tablet Take 1\2 tablet,  12.5mg,  daily 45 tablet 3    metFORMIN (GLUCOPHAGE XR) 500 MG 24 hr tablet Take 3 tablets (1,500 mg) by mouth daily (with dinner) 270 tablet 3    methocarbamol (ROBAXIN) 750 MG tablet Take 1 tablet (750 mg) by mouth every 6 hours as needed for muscle spasms 40 tablet 0    metoprolol succinate ER (TOPROL XL) 25 MG 24 hr tablet Take 0.5 tablets (12.5 mg) by  mouth daily 45 tablet 1    morphine (MS CONTIN) 30 MG CR tablet Take 1 tablet (30 mg) by mouth 3 times daily 26 tablet 0    naloxone (NARCAN) 4 MG/0.1ML nasal spray Spray 1 spray (4 mg) into one nostril alternating nostrils as needed for opioid reversal every 2-3 minutes until assistance arrives 2 each 1    omeprazole (PRILOSEC) 20 MG DR capsule Take 20 mg by mouth daily as needed      polyethylene glycol (MIRALAX) 17 g packet Take 17 g by mouth daily 10 packet 0    pregabalin (LYRICA) 25 MG capsule Take 25 mg by mouth 3 times daily      senna-docusate (SENOKOT-S/PERICOLACE) 8.6-50 MG tablet Take 1 tablet by mouth 2 times daily 30 tablet 0    sennosides (SENOKOT) 8.6 MG tablet Take 1 tablet by mouth daily as needed for constipation      sucralfate (CARAFATE) 1 GM/10ML suspension Take 1 g by mouth 4 times daily as needed for nausea      triamcinolone (KENALOG) 0.1 % external ointment Apply topically 2 times daily Apply topically twice daily      Continuous Blood Gluc Sensor (Brand EmbassySTYLE MORENITA 3 SENSOR) MISC          ALLERGIES     Allergies   Allergen Reactions    Emend [Aprepitant] Shortness Of Breath     Couldn't breathe and started to pass out during 1st administration     Kiwi Itching    Lisinopril Cough     Cough that would not stop       PAST MEDICAL HISTORY:  Past Medical History:   Diagnosis Date    Acrochordon 02/11/2021    CARDIOVASCULAR SCREENING; LDL GOAL LESS THAN 160 03/27/2012    Colon adenoma     Controlled type 2 diabetes mellitus without complication, without long-term current use of insulin (H) 09/16/2019    X 1    Cough 02/04/2014    Degeneration of thoracic intervertebral disc 12/11/2013    Dysfunction of both eustachian tubes 04/12/2019    Elevated blood pressure 12/22/2014    Elevated hemoglobin A1c 09/16/2019    X 1    Gastroesophageal reflux disease     Hamstring strain, right, subsequent encounter 12/19/2022    Hepatic cyst 05/22/2018    Hepatic steatosis 12/11/2013    History of colonic polyps  12/11/2013    History of drainage of abscess 09/16/2019    Hypertension     Irritable bowel syndrome without diarrhea 06/01/2018    Low libido 04/12/2019    Lumbar radiculopathy 12/19/2022    Microscopic hematuria 12/19/2013    Nephrolithiasis     Obesity 03/27/2012    BRIAN on CPAP 03/27/2012    Other irritable bowel syndrome 05/22/2018    Pain in thoracic spine 05/09/2013    Pulmonary nodule 05/22/2018    Right-sided chest pain 04/13/2018    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    Sleep apnea     Tinea pedis of both feet 02/11/2021    Tinnitus, unspecified laterality 04/12/2019    Uncontrolled diabetes mellitus with hyperglycemia, without long-term current use of insulin (H) 01/23/2023       PAST SURGICAL HISTORY:  Past Surgical History:   Procedure Laterality Date    COLONOSCOPY      COLONOSCOPY N/A 03/12/2021    Procedure: COLONOSCOPY (fv);  Surgeon: Ean Alcantara MD;  Location: [facility] OR    CYSTOSCOPY  02/11/2014    Procedure: CYSTOSCOPY;   Video Cystoscopy with Exam Under Anesthesia;  Surgeon: Chente Moeller MD;  Location: [facility] OR    IR CVC TUNNEL PLACEMENT > 5 YRS OF AGE  1/27/2023    IR CVC TUNNEL REMOVAL RIGHT  3/13/2023    LEG SURGERY Left 2019    Suregy r/t infection    RESECT TUMOR LOWER EXTREMITY Right 5/22/2023    Procedure: EXCISION, NEOPLASM, RIGHT POSTERIOR THIGH;  Surgeon: Zach Salgado MD;  Location: [facility] OR    wisdom teeth         FAMILY HISTORY:  Family History   Problem Relation Age of Onset    Family History Negative Mother     Obesity Mother     Deep Vein Thrombosis (DVT) Mother     Cancer Father         lymphoma    Other Cancer Father     Other Cancer Paternal Grandfather     Deep Vein Thrombosis (DVT) Paternal Uncle     Anesthesia Reaction No family hx of        SOCIAL HISTORY:  Social History     Socioeconomic History    Marital status:      Spouse name: Yari    Number of children: 2    Years of education: None    Highest education level: Some college, no degree   Occupational History     Occupation: [employer]   Tobacco Use    Smoking status: Former     Packs/day: 1.00     Years: 25.00     Pack years: 25.00     Types: Cigarettes     Start date: 4/23/1988     Quit date: 4/23/2013     Years since quitting: 10.1    Smokeless tobacco: Never   Vaping Use    Vaping status: Never Used   Substance and Sexual Activity    Alcohol use: Not Currently     Comment: one drink every 6 months or less    Drug use: No    Sexual activity: Yes     Partners: Female     Social Determinants of Health     Financial Resource Strain: Low Risk  (2/17/2023)    Overall Financial Resource Strain (CARDIA)     Difficulty of Paying Living Expenses: Not very hard   Food Insecurity: No Food Insecurity (2/17/2023)    Hunger Vital Sign     Worried About Running Out of Food in the Last Year: Never true     Ran Out of Food in the Last Year: Never true   Transportation Needs: No Transportation Needs (2/17/2023)    PRAPARE - Transportation     Lack of Transportation (Medical): No     Lack of Transportation (Non-Medical): No   Stress: No Stress Concern Present (3/7/2021)    [place] [employer] - Occupational Stress Questionnaire     Feeling of Stress : Not at all   Social Connections: Socially Isolated (3/7/2021)    Social Connection and Isolation Panel [NHANES]     Frequency of Communication with Friends and Family: Never     Frequency of Social Gatherings with Friends and Family: Never     Attends Temple Services: Never     Active Member of Clubs or Organizations: No     Attends Club or Organization Meetings: Never     Marital Status:    Housing Stability: Low Risk  (2/17/2023)    Housing Stability Vital Sign     Unable to Pay for Housing in the Last Year: No     Number of Places Lived in the Last Year: 1     Unstable Housing in the Last Year: No       Review of Systems:  Skin:          Eyes:         ENT:         Respiratory:  Positive for sleep apnea;CPAP     Cardiovascular:  Negative;palpitations;chest  "pain;dizziness;lightheadedness;syncope or near-syncope;cyanosis;edema fatigue;Positive for    Gastroenterology:        Genitourinary:         Musculoskeletal:         Neurologic:         Psychiatric:         Heme/Lymph/Imm:         Endocrine:  Positive for diabetes      Physical Exam:  Vitals: /73   Pulse 84   Ht 1.803 m (5' 11\")   Wt 119.7 kg (263 lb 12.8 oz)   BMI 36.79 kg/m      General patient appears comfortable  Neck normal JVP, negative hepatojugular reflux  Cardiovascular system S1-S2 normal no murmur rub or gallop  Respiratory system clear to auscultation  Extremities no edema      CC  No referring provider defined for this encounter.                      Thank you for allowing me to participate in the care of your patient.      Sincerely,     Eric Cee MD     [facility]  "

## 2023-06-06 NOTE — PROGRESS NOTES
HPI and Plan:   Mr Canseco is a very pleasant 52-year-old gentleman with history of sarcoma involving the right lower extremity.  I saw the patient about a month ago this was prior to his excision surgery.  He underwent a Lexiscan stress test that showed small area of possible apical infarct versus artifact with LVEF of 36% at rest and 39% with stress.  LV enlargement was noted.  Echocardiogram done earlier this year showed LVEF of 55% with borderline dilated LV.  Patient cardiac status wise was doing well without any symptoms or signs of decompensated congestive heart failure or angina.  He underwent cardiac MRI that showed LVEF of 40%, mildly dilated LV without any abnormal late gadolinium enhancement.  He underwent successful excision of the sarcoma.  He was started on low-dose losartan 12.5 mg daily prior to the surgery.  Subsequent BMP showed normal kidney functions.  Today is coming for follow-up accompanied by his wife.  He is still having some intermittent pain from the surgery.  No chest discomfort or shortness of breath dizziness presyncope or syncope.  No orthopnea.  It blood pressures on the lower side with systolic 108.  He does not use any tobacco or alcohol.  To be noted he received 1 cycle of chemotherapy with doxorubicin and ifosamide.    Assessment and plan  1.  Nonischemic cardiomyopathy with moderately reduced LV systolic function with LVEF of 40%.  NYHA class I-II.  Clinically not in congestive heart failure.  Appears euvolemic.  Etiology not clear could be chemotherapy induced.  Blood pressure on the low side.  Recommend adding Toprol-XL 12.5 mg daily.  Subsequently both Toprol-XL and losartan can be slowly increase and if there is enough of losartan on board can consider switching to Entresto.  2.  Right lower extremity sarcoma status post 1 cycle of chemotherapy earlier this year s/p surgical excision last month.    Recommendations  Continue losartan 12.5 mg daily  Add Toprol-XL 12.5 mg  daily.  Continue close follow-up in cardiology call clinic with the goal of further increase in cardiomyopathy medications.  Once he is on reasonable stable dose of cardiomyopathy occasion can repeat echocardiogram subsequently to reevaluate LV function.    No orders of the defined types were placed in this encounter.      Orders Placed This Encounter   Medications     Continuous Blood Gluc Sensor (FREESTYLE MORENITA 3 SENSOR) MISC     metoprolol succinate ER (TOPROL XL) 25 MG 24 hr tablet     Sig: Take 0.5 tablets (12.5 mg) by mouth daily     Dispense:  45 tablet     Refill:  1       There are no discontinued medications.      Encounter Diagnosis   Name Primary?     Secondary cardiomyopathy (H) Yes       CURRENT MEDICATIONS:  Current Outpatient Medications   Medication Sig Dispense Refill     acetaminophen (TYLENOL) 325 MG tablet Take 2 tablets (650 mg) by mouth every 4 hours as needed for other 60 tablet 0     aspirin (ASA) 81 MG EC tablet Take 1 tablet (81 mg) by mouth daily for 30 days 30 tablet 0     DULoxetine (CYMBALTA) 60 MG capsule TAKE 1 CAPSULE (60 MG) BY MOUTH DAILY 90 capsule 1     ferrous sulfate (FEROSUL) 325 (65 Fe) MG tablet Take 325 mg by mouth daily (with breakfast)       hydrOXYzine (ATARAX) 25 MG tablet Take 1 tablet (25 mg) by mouth every 6 hours as needed for other (adjuvant pain) 30 tablet 0     losartan (COZAAR) 25 MG tablet Take 1\2 tablet,  12.5mg,  daily 45 tablet 3     metFORMIN (GLUCOPHAGE XR) 500 MG 24 hr tablet Take 3 tablets (1,500 mg) by mouth daily (with dinner) 270 tablet 3     methocarbamol (ROBAXIN) 750 MG tablet Take 1 tablet (750 mg) by mouth every 6 hours as needed for muscle spasms 40 tablet 0     metoprolol succinate ER (TOPROL XL) 25 MG 24 hr tablet Take 0.5 tablets (12.5 mg) by mouth daily 45 tablet 1     morphine (MS CONTIN) 30 MG CR tablet Take 1 tablet (30 mg) by mouth 3 times daily 26 tablet 0     naloxone (NARCAN) 4 MG/0.1ML nasal spray Spray 1 spray (4 mg) into one  nostril alternating nostrils as needed for opioid reversal every 2-3 minutes until assistance arrives 2 each 1     omeprazole (PRILOSEC) 20 MG DR capsule Take 20 mg by mouth daily as needed       polyethylene glycol (MIRALAX) 17 g packet Take 17 g by mouth daily 10 packet 0     pregabalin (LYRICA) 25 MG capsule Take 25 mg by mouth 3 times daily       senna-docusate (SENOKOT-S/PERICOLACE) 8.6-50 MG tablet Take 1 tablet by mouth 2 times daily 30 tablet 0     sennosides (SENOKOT) 8.6 MG tablet Take 1 tablet by mouth daily as needed for constipation       sucralfate (CARAFATE) 1 GM/10ML suspension Take 1 g by mouth 4 times daily as needed for nausea       triamcinolone (KENALOG) 0.1 % external ointment Apply topically 2 times daily Apply topically twice daily       Continuous Blood Gluc Sensor (FREESTYLE MORENITA 3 SENSOR) MISC          ALLERGIES     Allergies   Allergen Reactions     Emend [Aprepitant] Shortness Of Breath     Couldn't breathe and started to pass out during 1st administration      Kiwi Itching     Lisinopril Cough     Cough that would not stop       PAST MEDICAL HISTORY:  Past Medical History:   Diagnosis Date     Acrochordon 02/11/2021     CARDIOVASCULAR SCREENING; LDL GOAL LESS THAN 160 03/27/2012     Colon adenoma      Controlled type 2 diabetes mellitus without complication, without long-term current use of insulin (H) 09/16/2019    X 1     Cough 02/04/2014     Degeneration of thoracic intervertebral disc 12/11/2013     Dysfunction of both eustachian tubes 04/12/2019     Elevated blood pressure 12/22/2014     Elevated hemoglobin A1c 09/16/2019    X 1     Gastroesophageal reflux disease      Hamstring strain, right, subsequent encounter 12/19/2022     Hepatic cyst 05/22/2018     Hepatic steatosis 12/11/2013     History of colonic polyps 12/11/2013     History of drainage of abscess 09/16/2019     Hypertension      Irritable bowel syndrome without diarrhea 06/01/2018     Low libido 04/12/2019     Lumbar  radiculopathy 12/19/2022     Microscopic hematuria 12/19/2013     Nephrolithiasis      Obesity 03/27/2012     BRIAN on CPAP 03/27/2012     Other irritable bowel syndrome 05/22/2018     Pain in thoracic spine 05/09/2013     Pulmonary nodule 05/22/2018     Right-sided chest pain 04/13/2018     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     Sleep apnea      Tinea pedis of both feet 02/11/2021     Tinnitus, unspecified laterality 04/12/2019     Uncontrolled diabetes mellitus with hyperglycemia, without long-term current use of insulin (H) 01/23/2023       PAST SURGICAL HISTORY:  Past Surgical History:   Procedure Laterality Date     COLONOSCOPY       COLONOSCOPY N/A 03/12/2021    Procedure: COLONOSCOPY (fv);  Surgeon: Ean Alcantara MD;  Location: [facility] OR     CYSTOSCOPY  02/11/2014    Procedure: CYSTOSCOPY;   Video Cystoscopy with Exam Under Anesthesia;  Surgeon: Chente Moeller MD;  Location: [facility] OR     IR CVC TUNNEL PLACEMENT > 5 YRS OF AGE  1/27/2023     IR CVC TUNNEL REMOVAL RIGHT  3/13/2023     LEG SURGERY Left 2019    Suregy r/t infection     RESECT TUMOR LOWER EXTREMITY Right 5/22/2023    Procedure: EXCISION, NEOPLASM, RIGHT POSTERIOR THIGH;  Surgeon: Zach Salgado MD;  Location: [facility] OR     wisdom teeth         FAMILY HISTORY:  Family History   Problem Relation Age of Onset     Family History Negative Mother      Obesity Mother      Deep Vein Thrombosis (DVT) Mother      Cancer Father         lymphoma     Other Cancer Father      Other Cancer Paternal Grandfather      Deep Vein Thrombosis (DVT) Paternal Uncle      Anesthesia Reaction No family hx of        SOCIAL HISTORY:  Social History     Socioeconomic History     Marital status:      Spouse name: Yari     Number of children: 2     Years of education: None     Highest education level: Some college, no degree   Occupational History     Occupation: [employer]   Tobacco Use     Smoking status: Former     Packs/day: 1.00     Years: 25.00     Pack years: 25.00      Types: Cigarettes     Start date: 4/23/1988     Quit date: 4/23/2013     Years since quitting: 10.1     Smokeless tobacco: Never   Vaping Use     Vaping status: Never Used   Substance and Sexual Activity     Alcohol use: Not Currently     Comment: one drink every 6 months or less     Drug use: No     Sexual activity: Yes     Partners: Female     Social Determinants of Health     Financial Resource Strain: Low Risk  (2/17/2023)    Overall Financial Resource Strain (CARDIA)      Difficulty of Paying Living Expenses: Not very hard   Food Insecurity: No Food Insecurity (2/17/2023)    Hunger Vital Sign      Worried About Running Out of Food in the Last Year: Never true      Ran Out of Food in the Last Year: Never true   Transportation Needs: No Transportation Needs (2/17/2023)    PRAPARE - Transportation      Lack of Transportation (Medical): No      Lack of Transportation (Non-Medical): No   Stress: No Stress Concern Present (3/7/2021)    [place] [employer] - Occupational Stress Questionnaire      Feeling of Stress : Not at all   Social Connections: Socially Isolated (3/7/2021)    Social Connection and Isolation Panel [NHANES]      Frequency of Communication with Friends and Family: Never      Frequency of Social Gatherings with Friends and Family: Never      Attends Jewish Services: Never      Active Member of Clubs or Organizations: No      Attends Club or Organization Meetings: Never      Marital Status:    Housing Stability: Low Risk  (2/17/2023)    Housing Stability Vital Sign      Unable to Pay for Housing in the Last Year: No      Number of Places Lived in the Last Year: 1      Unstable Housing in the Last Year: No       Review of Systems:  Skin:          Eyes:         ENT:         Respiratory:  Positive for sleep apnea;CPAP     Cardiovascular:  Negative;palpitations;chest pain;dizziness;lightheadedness;syncope or near-syncope;cyanosis;edema fatigue;Positive for   "  Gastroenterology:        Genitourinary:         Musculoskeletal:         Neurologic:         Psychiatric:         Heme/Lymph/Imm:         Endocrine:  Positive for diabetes      Physical Exam:  Vitals: /73   Pulse 84   Ht 1.803 m (5' 11\")   Wt 119.7 kg (263 lb 12.8 oz)   BMI 36.79 kg/m      General patient appears comfortable  Neck normal JVP, negative hepatojugular reflux  Cardiovascular system S1-S2 normal no murmur rub or gallop  Respiratory system clear to auscultation  Extremities no edema      CC  No referring provider defined for this encounter.                  "

## 2023-06-07 ENCOUNTER — LAB (OUTPATIENT)
Dept: LAB | Facility: CLINIC | Age: 53
End: 2023-06-07
Payer: COMMERCIAL

## 2023-06-07 ENCOUNTER — OFFICE VISIT (OUTPATIENT)
Dept: ORTHOPEDICS | Facility: CLINIC | Age: 53
End: 2023-06-07
Payer: COMMERCIAL

## 2023-06-07 ENCOUNTER — TELEPHONE (OUTPATIENT)
Dept: ORTHOPEDICS | Facility: CLINIC | Age: 53
End: 2023-06-07

## 2023-06-07 DIAGNOSIS — C49.9 SARCOMA OF SOFT TISSUE (H): Primary | ICD-10-CM

## 2023-06-07 DIAGNOSIS — C49.9 SARCOMA OF SOFT TISSUE (H): ICD-10-CM

## 2023-06-07 LAB
ALBUMIN UR-MCNC: 20 MG/DL
APPEARANCE UR: CLEAR
BASOPHILS # BLD AUTO: 0 10E3/UL (ref 0–0.2)
BASOPHILS NFR BLD AUTO: 0 %
BILIRUB UR QL STRIP: NEGATIVE
COLOR UR AUTO: YELLOW
EOSINOPHIL # BLD AUTO: 0.2 10E3/UL (ref 0–0.7)
EOSINOPHIL NFR BLD AUTO: 2 %
ERYTHROCYTE [DISTWIDTH] IN BLOOD BY AUTOMATED COUNT: 19.6 % (ref 10–15)
GLUCOSE UR STRIP-MCNC: NEGATIVE MG/DL
HCT VFR BLD AUTO: 39.1 % (ref 40–53)
HGB BLD-MCNC: 11.8 G/DL (ref 13.3–17.7)
HGB UR QL STRIP: ABNORMAL
IMM GRANULOCYTES # BLD: 0 10E3/UL
IMM GRANULOCYTES NFR BLD: 0 %
KETONES UR STRIP-MCNC: NEGATIVE MG/DL
LEUKOCYTE ESTERASE UR QL STRIP: NEGATIVE
LYMPHOCYTES # BLD AUTO: 0.9 10E3/UL (ref 0.8–5.3)
LYMPHOCYTES NFR BLD AUTO: 10 %
MCH RBC QN AUTO: 26.2 PG (ref 26.5–33)
MCHC RBC AUTO-ENTMCNC: 30.2 G/DL (ref 31.5–36.5)
MCV RBC AUTO: 87 FL (ref 78–100)
MONOCYTES # BLD AUTO: 1 10E3/UL (ref 0–1.3)
MONOCYTES NFR BLD AUTO: 11 %
MUCOUS THREADS #/AREA URNS LPF: PRESENT /LPF
NEUTROPHILS # BLD AUTO: 6.5 10E3/UL (ref 1.6–8.3)
NEUTROPHILS NFR BLD AUTO: 77 %
NITRATE UR QL: NEGATIVE
NRBC # BLD AUTO: 0 10E3/UL
NRBC BLD AUTO-RTO: 0 /100
PH UR STRIP: 5.5 [PH] (ref 5–7)
PLATELET # BLD AUTO: 315 10E3/UL (ref 150–450)
RBC # BLD AUTO: 4.5 10E6/UL (ref 4.4–5.9)
RBC URINE: 1 /HPF
SP GR UR STRIP: 1.03 (ref 1–1.03)
SQUAMOUS EPITHELIAL: 1 /HPF
UROBILINOGEN UR STRIP-MCNC: NORMAL MG/DL
WBC # BLD AUTO: 8.6 10E3/UL (ref 4–11)
WBC URINE: 11 /HPF

## 2023-06-07 PROCEDURE — 81001 URINALYSIS AUTO W/SCOPE: CPT | Performed by: PATHOLOGY

## 2023-06-07 PROCEDURE — 36415 COLL VENOUS BLD VENIPUNCTURE: CPT | Performed by: PATHOLOGY

## 2023-06-07 PROCEDURE — 85025 COMPLETE CBC W/AUTO DIFF WBC: CPT | Performed by: PATHOLOGY

## 2023-06-07 PROCEDURE — 99024 POSTOP FOLLOW-UP VISIT: CPT | Performed by: PHYSICIAN ASSISTANT

## 2023-06-07 NOTE — PROGRESS NOTES
Chief Complaint: wound check/drain removal  Preop diagnosis: Sarcoma right posterior thigh     5/22/23 Procedure performed: Removal of tumor, 25 cm, deep, right posterior thigh    HPI: Antonio is a 52-year-old man here with his wife today for follow-up and wound check of his right posterior thigh sarcoma excision.  He still has a drain in place.  Since we saw him last he is now having approximately 50 to 60 mL of yellowish drainage.  He is having some increased pain in the thigh.  He did have a fever up to 101 yesterday.  Low-grade fever this morning with chills overnight.  They did give Tylenol a few times yesterday.  He had dropped down on his morphine from 90mEq to 30 mEq/day.  He was more active over the weekend.  He has been wrapping the thigh.  He is not using anything for gait assistance.  He denies any chest pain, cough, shortness of breath, nausea, vomiting, diarrhea.  He does have occasional pain with urination.  He states this has been on and off for a few months and he has seen urology about this.  He denies any calf pain.    Physical Exam: Antonio is a 52-year-old man who is alert and oriented in mild distress.  He has an antalgic gait without gait assistance today.  His Ace wrap and right posterior thigh dressings were removed.  His incision actually looks excellent.  It is completely healed.  There is no erythema or drainage.  His drain site looks excellent with no erythema or drainage.  Within the drainage bulb is serous drainage with some debris.  There is no odor.  He does have some swelling in the posterior inner thigh just above where the Ace wrap ended.  This is mildly tender.  There is no significant erythema or induration.  No calf tenderness and calf is soft and supple.    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  I see no concerns for infection at this time.  He is still having a fair amount of serous drainage, I would like to keep his drain in place for 1 more week.  Obviously, this is also a potential source for infection, so we need to keep an eye on this.  Given his urinary symptoms, I think we should check a urinalysis today to make sure he is not having an infection.  We will also check a CBC as last time is hemoglobin was low at 7.6, and he is still feeling quite fatigued.  I would like to see him back in 1 week for a wound check and removal of drain.  If it anytime he he starts experiencing increased pain with redness or change in the drainage type or amount, they should let us know.  Also if he has any shortness of breath or chest pain, he should go to the ER.  We would prefer he go to [facility] for any of his care if possible.  They agree with this plan.  I will call him with the results today.  They will monitor his temperature and update us with any changes.  All questions answered

## 2023-06-07 NOTE — NURSING NOTE
Reason For Visit:   Chief Complaint   Patient presents with     RECHECK     Drain removal // still drains about 50-60 ml a day // reports a fever yesterday        There were no vitals taken for this visit.    Pain Assessment  Patient Currently in Pain: Yes  0-10 Pain Scale: 5  Primary Pain Location:  (around surgical area)        Mason Cabezas ATC

## 2023-06-07 NOTE — LETTER
6/7/2023         RE: Antonio Canseco  [address] [place]  [facility] [address]        Dear Colleague,    Thank you for referring your patient, Antonio Canseco, to the [facility] [place]. Please see a copy of my visit note below.    Chief Complaint: wound check/drain removal  Preop diagnosis: Sarcoma right posterior thigh     5/22/23 Procedure performed: Removal of tumor, 25 cm, deep, right posterior thigh    HPI: Antonio is a 52-year-old man here with his wife today for follow-up and wound check of his right posterior thigh sarcoma excision.  He still has a drain in place.  Since we saw him last he is now having approximately 50 to 60 mL of yellowish drainage.  He is having some increased pain in the thigh.  He did have a fever up to 101 yesterday.  Low-grade fever this morning with chills overnight.  They did give Tylenol a few times yesterday.  He had dropped down on his morphine from 90mEq to 30 mEq/day.  He was more active over the weekend.  He has been wrapping the thigh.  He is not using anything for gait assistance.  He denies any chest pain, cough, shortness of breath, nausea, vomiting, diarrhea.  He does have occasional pain with urination.  He states this has been on and off for a few months and he has seen urology about this.  He denies any calf pain.    Physical Exam: Antonio is a 52-year-old man who is alert and oriented in mild distress.  He has an antalgic gait without gait assistance today.  His Ace wrap and right posterior thigh dressings were removed.  His incision actually looks excellent.  It is completely healed.  There is no erythema or drainage.  His drain site looks excellent with no erythema or drainage.  Within the drainage bulb is serous drainage with some debris.  There is no odor.  He does have some swelling in the posterior inner thigh just above where the Ace wrap ended.  This is mildly tender.  There is no significant erythema or induration.  No calf  tenderness and calf is soft and supple.    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  I see no concerns for infection at this time.  He is still having a fair amount of serous drainage, I would like to keep his drain in place for 1 more week.  Obviously, this is also a potential source for infection, so we need to keep an eye on this.  Given his urinary symptoms, I think we should check a urinalysis today to make sure he is not having an infection.  We will also check a CBC as last time is hemoglobin was low at 7.6, and he is still feeling quite fatigued.  I would like to see him back in 1 week for a wound check and removal of drain.  If it anytime he he starts experiencing increased pain with redness or change in the drainage type or amount, they should let us know.  Also if he has any shortness of breath or chest pain, he should go to the ER.  We would prefer he go to [facility] for any of his care if possible.  They agree with this plan.  I will call him with the results today.  They will monitor his temperature and update us with any changes.  All questions answered        Meliza Sanches PA-C

## 2023-06-07 NOTE — TELEPHONE ENCOUNTER
I spoke with the patient to let him know that his lab results showed continued abnormalities of his urine as they have been over the last couple months, slightly improved, but currently no sign of urinary tract infection.  His hemoglobin is now up to 11.7, which is improved from 2 weeks ago.  And also his white blood cell count is normal, so that is reassuring for no infection as well.

## 2023-06-13 ENCOUNTER — ONCOLOGY VISIT (OUTPATIENT)
Dept: ONCOLOGY | Facility: CLINIC | Age: 53
End: 2023-06-13
Attending: INTERNAL MEDICINE
Payer: COMMERCIAL

## 2023-06-13 VITALS
DIASTOLIC BLOOD PRESSURE: 77 MMHG | TEMPERATURE: 98 F | HEART RATE: 95 BPM | SYSTOLIC BLOOD PRESSURE: 113 MMHG | BODY MASS INDEX: 36.6 KG/M2 | WEIGHT: 262.4 LBS | RESPIRATION RATE: 18 BRPM | OXYGEN SATURATION: 100 %

## 2023-06-13 DIAGNOSIS — C49.9 UNDIFFERENTIATED PLEOMORPHIC SARCOMA (H): ICD-10-CM

## 2023-06-13 PROCEDURE — 99215 OFFICE O/P EST HI 40 MIN: CPT | Performed by: STUDENT IN AN ORGANIZED HEALTH CARE EDUCATION/TRAINING PROGRAM

## 2023-06-13 PROCEDURE — G0463 HOSPITAL OUTPT CLINIC VISIT: HCPCS | Performed by: STUDENT IN AN ORGANIZED HEALTH CARE EDUCATION/TRAINING PROGRAM

## 2023-06-13 RX ORDER — HYDROMORPHONE HYDROCHLORIDE 2 MG/1
2 TABLET ORAL EVERY 6 HOURS PRN
COMMUNITY
End: 2023-06-23

## 2023-06-13 ASSESSMENT — PAIN SCALES - GENERAL: PAINLEVEL: MODERATE PAIN (4)

## 2023-06-13 NOTE — PROGRESS NOTES
[facility] CANCER CLINIC    FOLLOW UP VISIT NOTE    PATIENT NAME: Antonio Canseco MRN # [id number]  DATE OF VISIT: June 13 , 2023 YOB: 1970    REFERRING PROVIDER: Ari Nascimento PA-C  [facility] [place] [address]    CANCER TYPE: High grade sarcoma       CHIEF COMPLAIN   Thigh pain     HISTORY OF PRESENT ILLNESS   52 year old male with PMH of DM and recent Dx of large 27 cm mass in the posterior R thigh. He reports that he first noticed a node in the posterior thigh on 5/2022, we was evaluated at OSH where he got an Xray and he was told that this was sciatica and was started on physical therapy. Tumor continued to grow rapidly during this time. MRI done on 1/6/2023 showing a large mass in the posterior thigh. 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   He reports having pain and limit mobility. He has been taking daily meloxican with some relief, however he needs to be resting most of the time to avoid pain.     C1 of doxil/ifosfamide on 1/30 follow up MRI showing increase in size for mass without significant areas of necrosis. We decided to discontinue chemotherapy.  Corey cycle of chemotherapy was complicated with neutropenic fever requiring admission and she also developed disseminated herpes zoster. Additionally work up done after chemotherapy revealed that the ejection fraction after chemotherapy had decreased.    He started Preoperative RT on 3/15 and completed on 4/18 . PET done on 3/14 just before starting RT, showed decrease in the metabolic activity of the mass.     5/22 Surgical resection of posterior thigh mass.     Interval History  Antonio comes to the office with his wife.  He reports he is recovering well from surgery, he is able to walk better, and has recovered his mobility fairly quickly in the past 3 weeks. He no longer has skin rash and feels well.      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       CURRENT OUTPATIENT MEDICATIONS     Current Outpatient Medications   Medication Sig Dispense Refill     acetaminophen (TYLENOL) 325 MG tablet Take 2 tablets (650 mg) by mouth every 4 hours as needed for other 60 tablet 0     aspirin (ASA) 81 MG EC tablet Take 1 tablet (81 mg) by mouth daily for 30 days 30 tablet 0     Continuous Blood Gluc Sensor ([employer] MORENITA 3 SENSOR) MISC        ferrous sulfate (FEROSUL) 325 (65 Fe) MG tablet Take 325 mg by mouth daily (with breakfast)       HYDROmorphone (DILAUDID) 2 MG tablet Take 2 mg by mouth every 6 hours as needed for severe pain       hydrOXYzine (ATARAX) 25 MG tablet Take 1 tablet (25 mg) by mouth every 6 hours as needed for other (adjuvant pain) 30 tablet 0     losartan (COZAAR) 25 MG tablet Take 1\2 tablet,  12.5mg,  daily 45 tablet 3     metFORMIN (GLUCOPHAGE XR) 500 MG 24 hr tablet Take 3 tablets (1,500 mg) by mouth daily (with dinner) 270 tablet 3     metoprolol succinate ER (TOPROL XL) 25 MG 24 hr tablet Take 0.5 tablets (12.5 mg) by mouth daily 45 tablet 1     morphine (MS CONTIN) 30 MG CR tablet Take 1 tablet (30 mg) by mouth 3 times daily (Patient taking differently: Take 30 mg by mouth 2 times daily) 26 tablet 0     naloxone (NARCAN) 4 MG/0.1ML nasal spray Spray 1 spray (4 mg) into one nostril alternating nostrils as needed for opioid reversal every 2-3 minutes until  assistance arrives 2 each 1     senna-docusate (SENOKOT-S/PERICOLACE) 8.6-50 MG tablet Take 1 tablet by mouth 2 times daily 30 tablet 0     sennosides (SENOKOT) 8.6 MG tablet Take 1 tablet by mouth daily as needed for constipation       DULoxetine (CYMBALTA) 60 MG capsule TAKE 1 CAPSULE (60 MG) BY MOUTH DAILY (Patient not taking: Reported on 6/13/2023) 90 capsule 1     methocarbamol (ROBAXIN) 750 MG tablet Take 1 tablet (750 mg) by mouth every 6 hours as needed for muscle spasms (Patient not taking: Reported on 6/13/2023) 40 tablet 0     omeprazole (PRILOSEC) 20 MG DR capsule Take 20 mg by mouth daily as needed (Patient not taking: Reported on 6/13/2023)       polyethylene glycol (MIRALAX) 17 g packet Take 17 g by mouth daily (Patient not taking: Reported on 6/13/2023) 10 packet 0     pregabalin (LYRICA) 25 MG capsule Take 25 mg by mouth 3 times daily (Patient not taking: Reported on 6/13/2023)       sucralfate (CARAFATE) 1 GM/10ML suspension Take 1 g by mouth 4 times daily as needed for nausea (Patient not taking: Reported on 6/13/2023)       triamcinolone (KENALOG) 0.1 % external ointment Apply topically 2 times daily Apply topically twice daily (Patient not taking: Reported on 6/13/2023)            REVIEW OF SYSTEMS   As above in the HPI, o/w complete 12-point ROS was negative.     PHYSICAL EXAM   /77   Pulse 95   Temp 98  F (36.7  C) (Oral)   Resp 18   Wt 119 kg (262 lb 6.4 oz)   SpO2 100%   BMI 36.60 kg/m      Virtual visit     LABORATORY AND IMAGING STUDIES     Lab Results   Component Value Date    WBC 8.0 03/24/2023    WBC 8.6 12/26/2019     Lab Results   Component Value Date    RBC 3.74 03/24/2023    RBC 4.96 12/26/2019     Lab Results   Component Value Date    HGB 9.1 03/24/2023    HGB 14.2 12/26/2019     Lab Results   Component Value Date    HCT 30.8 03/24/2023    HCT 44.6 12/26/2019     Lab Results   Component Value Date    MCV 82 03/24/2023    MCV 90 12/26/2019     Lab Results   Component  Value Date    MCH 24.3 03/24/2023    MCH 28.6 12/26/2019     Lab Results   Component Value Date    MCHC 29.5 03/24/2023    MCHC 31.8 12/26/2019     Lab Results   Component Value Date    RDW 20.3 03/24/2023    RDW 14.3 12/26/2019     Lab Results   Component Value Date     03/24/2023     12/26/2019     Last Comprehensive Metabolic Panel:  Sodium   Date Value Ref Range Status   05/23/2023 139 136 - 145 mmol/L Final   02/11/2021 140 133 - 144 mmol/L Final     Potassium   Date Value Ref Range Status   05/23/2023 4.3 3.4 - 5.3 mmol/L Final   04/24/2022 4.1 3.4 - 5.3 mmol/L Final   02/11/2021 4.4 3.4 - 5.3 mmol/L Final     Potassium POCT   Date Value Ref Range Status   05/22/2023 4.5 3.5 - 5.0 mmol/L Final     Chloride   Date Value Ref Range Status   05/23/2023 103 98 - 107 mmol/L Final   04/24/2022 101 94 - 109 mmol/L Final   02/11/2021 107 94 - 109 mmol/L Final     Carbon Dioxide   Date Value Ref Range Status   02/11/2021 27 20 - 32 mmol/L Final     Carbon Dioxide (CO2)   Date Value Ref Range Status   05/23/2023 27 22 - 29 mmol/L Final   04/24/2022 29 20 - 32 mmol/L Final     Anion Gap   Date Value Ref Range Status   05/23/2023 9 7 - 15 mmol/L Final   04/24/2022 2 (L) 3 - 14 mmol/L Final   02/11/2021 6 3 - 14 mmol/L Final     Glucose   Date Value Ref Range Status   05/23/2023 114 (H) 70 - 99 mg/dL Final   04/24/2022 143 (H) 70 - 99 mg/dL Final   02/11/2021 135 (H) 70 - 99 mg/dL Final     Comment:     Non Fasting     GLUCOSE BY METER POCT   Date Value Ref Range Status   05/23/2023 119 (H) 70 - 99 mg/dL Final     Urea Nitrogen   Date Value Ref Range Status   05/23/2023 13.6 6.0 - 20.0 mg/dL Final   04/24/2022 11 7 - 30 mg/dL Final   02/11/2021 13 7 - 30 mg/dL Final     Creatinine   Date Value Ref Range Status   05/23/2023 0.73 0.67 - 1.17 mg/dL Final   02/11/2021 0.94 0.66 - 1.25 mg/dL Final     GFR Estimate   Date Value Ref Range Status   05/23/2023 >90 >60 mL/min/1.73m2 Final     Comment:     eGFR  calculated using 2021 CKD-EPI equation.   02/11/2021 >90 >60 mL/min/[1.73_m2] Final     Comment:     Non  GFR Calc  Starting 12/18/2018, serum creatinine based estimated GFR (eGFR) will be   calculated using the Chronic Kidney Disease Epidemiology Collaboration   (CKD-EPI) equation.       Calcium   Date Value Ref Range Status   05/23/2023 9.0 8.6 - 10.0 mg/dL Final   02/11/2021 9.1 8.5 - 10.1 mg/dL Final     Bilirubin Total   Date Value Ref Range Status   05/01/2023 0.5 <=1.2 mg/dL Final   02/11/2021 0.7 0.2 - 1.3 mg/dL Final     Alkaline Phosphatase   Date Value Ref Range Status   05/01/2023 60 40 - 129 U/L Final   02/11/2021 43 40 - 150 U/L Final     ALT   Date Value Ref Range Status   05/01/2023 9 (L) 10 - 50 U/L Final   02/11/2021 43 0 - 70 U/L Final     AST   Date Value Ref Range Status   05/01/2023 13 10 - 50 U/L Final   02/11/2021 23 0 - 45 U/L Final       Results for orders placed or performed during the hospital encounter of 01/06/23   MR Femur Thigh Right wo & w Contrast     Value    Radiologist flags Large thigh mass    Narrative    EXAMINATION: MR FEMUR THIGH RIGHT W/O & W CONTRAST  1/6/2023  9:07 AM     CLINICAL HISTORY:  Hamstring strain, right, subsequent encounter     COMPARISON:    TECHNIQUE: Multiplanar multi-sequence MR imaging was obtained using  standard sequences in three orthogonal planes the administration of  intravenous or intra-articular gadolinium contrast agent.    FINDINGS:   Right femur:    There is a very large soft tissue mass measuring 27 x 7.4 x 12 cm in  maximal caudocranial, AP and transverse dimensions (series 20, image  21 and series 38, image 32), respectively.    Mass is originating just distal to the conjoined hamstring tendon and  the mass mostly occupies the space of the hamstring musculature.  Proximally the mass is located medial to the gluteus maximums muscle  belly and distally occupies partially the space of the biceps femoris  and the semimembranosus  muscle bellies and entirely the  semitendinosus. The semitendinosus muscle belly is also involved the  furthest distally. Proximally the lesion protrudes anteriorly and  appears to invade the obturator externus muscle (series 38, image 27)    The large lesion reveals highly heterogeneous signal with mostly T2  hyperintense areas, mildly T1 hyperintense areas and septations  throughout. Following the administration of intravenous gadolinium  contrast, there is a central necrotic area occupying an area of about  7 x 5 x 14 cm in transverse, AP and CC dimensions.    At the level of the midshaft of the femur, there is very close  proximity and adherence to multiple prominent vessels (series 38,  image 49).    The sciatic nerve travels alongside the anterior margins of the lesion  over a distance of about 20 cm. However, there is a preserved fat  plane between the sciatic nerve and the tibial border of the mass.    Osseous structures:    The bilateral femora appear normal, this red marrow conversion. No  distinct osseous lesions are identified..    Hip joint: The hip joints are not well assessed at the edge of the  field-of-view. However, no significant abnormalities are identified.    Knee joint: The knee joint is not well assessed.    The left femur appears unremarkable. Including musculatures.      Impression    IMPRESSION:   1. Large soft tissue mass in the posterior compartment of the right  thigh measuring 27 x 7.4 x 12 cm in maximal caudocranial, AP and  transverse dimensions, respectively. The mass originates just distal  to the conjoined hamstring tendon, infiltrates the hamstring muscle  bellies and extends distally within the semitendinosus muscle belly  proximal to the knee joint. However, the semitendinosus tendon sheaths  appears to have increased signal to the level of the knee joint. A  dedicated knee MRI could be useful to identify at the most distal  extent of the lesion.  2. The lesion is  heterogeneous, septated, with myxoid and lipoid  components, vividly enhancing with a large central area of necrosis  which measures 7 x 5 x 14 cm. Imaging findings are highly suggestive  of a malignant mass from the spectrum of myxoid liposarcomatous type.  Further evaluation at the [facility] Orthopedic oncology  is recommended.  3. The lesion occupies the posterior muscle compartment of the thigh  just posterior to the sciatic nerve with a preserved fat plane in  between.  4. At the level of the mid shaft of the femur there is very close  proximity and adherence to multiple vessels, that entered the mass,  best seen on series 38 image 49.    Mass is originating just distal to the conjoined hamstring tendon and  the mass mostly occupies the space of the hamstring musculature.  Proximally the mass is located medial to the gluteus maximums muscle  belly and distally occupies partially the space of the biceps femoris  and the semimembranosus muscle bellies and entirely the  semitendinosus. The semitendinosus muscle belly is also involved the  furthest distally. Proximally the lesion protrudes anteriorly and  appears to invade the obturator externus muscle (series 38, image 27).    [Consider Follow Up: Large thigh mass     This report will be copied to the [facility] to ensure a  provider acknowledges the finding. [facility] Center is available Monday  through Friday 8am-3:30 pm.    JUTTA ELLERMANN, MD         SYSTEM ID:  [id number]     1/6/2023 MRI right femur thigh: Large soft tissue mass in the posterior compartment of the right thigh measuring 27 x 7.4 x 12 cm.  The mass lies primarily in the hamstring muscle belly.  Heterogeneous signal with areas of necrosis noted.    2/20/23 MRI R femur                                                                   IMPRESSION: In this patient with high grade sarcoma status  post-neoadjuvant chemotherapy;     Increased in size of the 11 x 15.7 x 21.7 cm  heterogeneously enhancing  soft tissue mass with myxoid and lipoid components in the posterior  compartment of the right thigh since MRI from 1/6/2023, previously  measured 7.4 x 12 x 20 cm.  *  Distal portion of the mass anteriorly abuts the sciatic nerve and  deep femoral artery/vein without definite invasion.  *  Increased edema within the adductor rosy when compared to prior  study, possibly neurogenic.      PET scan 3/14/23  IMPRESSION:     1. 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      2. Subcentimeter pulmonary nodule in the left lower lobe, which is too small to accurately characterize by PET/CT and warrants continued imaging surveillance.    4/19/23 MRI femur R                                                                 IMPRESSION: In this patient with high grade sarcoma status  post-neoadjuvant chemotherapy;     Slightly increase in the size of the heterogeneously enhancing soft  tissue mass with myxoid and lipoid components in the posterior  compartment of the right thigh since MRI from 2/20/2023, measuring 11  x 16.4 x 21 cm, previously measured 11 x 15.7 x 21.7 cm.   *  Distal portion of the mass anteriorly abuts the sciatic nerve and  deep femoral artery/vein without definite invasion.  *  Slightly increased edema within the adductor rosy when compared  to prior study.    PET 5/30/23  IMPRESSION:     1. Sequelae of post treatment inflammatory change in the right posterior thigh soft tissues without convincing evidence of active neoplasm.     2. Slightly increased size of the non-FDG avid nodule in the left lower lobe and development of additional non-FDG avid subcentimeter nodules in the inferior left upper lobe, right middle lobe, and right lower lobe. While their exact etiologies remain   indeterminate, the interval growth/development of additional nodules raises suspicion for early pulmonary  metastases.     PATHOLOGY         Final Diagnosis   A.  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   Electronically signed by Vineet Coello MD on 1/17/2023 at 10:17 AM   Comment   UUMAYO   The neoplasm is comprised of highly pleomorphic spindle cells with stromal hyalinization and extensive areas of necrosis.  Focal areas with scattered lipoblasts are seen.  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  MDM-2 gene amplification study by FISH is in progress and result will be provided separately.       I reviewed the medical records including medical records, laboratory studies, and have personally reviewed imaging including MRI of the R thigh which demonstrates a large 27 cm mass in the posterior compartment of the R thigh.      ASSESSMENT AND PLAN     Mr. 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     He received C1 on 1/30. Patient did experienced worsening pain shortly after starting chemotherapy. MRI done on 2/20/23 showing increase in size of large posterior thigh sarcoma. The comparison of this MRI is to the baseline done on 1/6/23, and in a patient with high grade sarcoma the mass is presumed to have grown in size in the month before starting chemotherapy. However, given that the patient had increase in pain and there is no areas of necrosis observed in the recent MRI, I will consider this to be progression of disease and recommend to plan for surgery with consideration for preoperative radiation therapy.      He started radiation therapy on 3/15.  I reviewed the PET scan done on 3/14 showing some tumor shrinkage at 14 x 14 x 18 from prior 11 x 15.7 x 21 and improved metabolic activity. We discussed that given some response was seen, if  needed in the future this will still be an option. Right now, given prior complications from chemo (admission for neutropenic fever, bleeding in the urine presumed to be 2/2 to ifosfamide, and presumed cardiomyopathy with decrease in ejection fraction on echo), we will hold on further chemo.    Antonio has completed preoperative RT and underwent surgical resection on  5/22. He is healing well and recovering his mobility. Rash has resolved.   Recent PET with slight worsening in size of pulmonary nodule and development of new nodules, indeterminate etiology for now.    We discussed about this findings and need for short interval follow up with imaging.      Plan:    -PET in 1 month.  -Follow up after PET.     40 minutes spent on the date of the encounter doing chart review, review of test results, interpretation of tests, patient visit, documentation and discussion with other provider(s)     Michael Laboy MD   of Medicine  Hematology, Oncology and Transplantation

## 2023-06-13 NOTE — NURSING NOTE
"Oncology Rooming Note    June 13, 2023 12:02 PM   Antonio Canseco is a 52 year old male who presents for:    Chief Complaint   Patient presents with     Oncology Clinic Visit     Sarcoma        Initial Vitals: /77   Pulse 95   Temp 98  F (36.7  C) (Oral)   Resp 18   Wt 119 kg (262 lb 6.4 oz)   SpO2 100%   BMI 36.60 kg/m   Estimated body mass index is 36.6 kg/m  as calculated from the following:    Height as of 6/6/23: 1.803 m (5' 11\").    Weight as of this encounter: 119 kg (262 lb 6.4 oz). Body surface area is 2.44 meters squared.  Moderate Pain (4) Comment: Data Unavailable   No LMP for male patient.  Allergies reviewed: Yes  Medications reviewed: Yes    Medications: Medication refills not needed today.  Pharmacy name entered into [facility]: [facility] [place], [place] - [address]    Clinical concerns: none.       Maryanne Mathews CMA            "

## 2023-06-13 NOTE — LETTER
6/13/2023         RE: Antonio Canseco  [address] [place]  [facility] [address]        Dear Colleague,    Thank you for referring your patient, Antonio Canseco, to the [facility]. Please see a copy of my visit note below.        [facility]    FOLLOW UP VISIT NOTE    PATIENT NAME: Antonio Canseco MRN # [id number]  DATE OF VISIT: June 13 , 2023 YOB: 1970    REFERRING PROVIDER: Ari Nascimento PA-C  [facility] [place] [address]    CANCER TYPE: High grade sarcoma       CHIEF COMPLAIN   Thigh pain     HISTORY OF PRESENT ILLNESS   52 year old male with PMH of DM and recent Dx of large 27 cm mass in the posterior R thigh. He reports that he first noticed a node in the posterior thigh on 5/2022, we was evaluated at OSH where he got an Xray and he was told that this was sciatica and was started on physical therapy. Tumor continued to grow rapidly during this time. MRI done on 1/6/2023 showing a large mass in the posterior thigh. 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   He reports having pain and limit mobility. He has been taking daily meloxican with some relief, however he needs to be resting most of the time to avoid pain.     C1 of doxil/ifosfamide on 1/30 follow up MRI showing increase in size for mass without significant areas of necrosis. We decided to discontinue chemotherapy.  Corey cycle of chemotherapy was complicated with neutropenic fever requiring admission and she also developed disseminated herpes zoster. Additionally work up done after chemotherapy revealed that the ejection fraction after chemotherapy had decreased.    He started Preoperative RT on 3/15 and completed on 4/18 . PET done on 3/14 just before starting RT, showed decrease in the metabolic activity of the mass.     5/22 Surgical resection of posterior thigh mass.     Interval History  Antonio comes to the office with his wife.  He  reports he is recovering well from surgery, he is able to walk better, and has recovered his mobility fairly quickly in the past 3 weeks. He no longer has skin rash and feels well.      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       CURRENT OUTPATIENT MEDICATIONS     Current Outpatient Medications   Medication Sig Dispense Refill    acetaminophen (TYLENOL) 325 MG tablet Take 2 tablets (650 mg) by mouth every 4 hours as needed for other 60 tablet 0    aspirin (ASA) 81 MG EC tablet Take 1 tablet (81 mg) by mouth daily for 30 days 30 tablet 0    Continuous Blood Gluc Sensor (MeilleurMobileSTYLE MORENITA 3 SENSOR) MISC       ferrous sulfate (FEROSUL) 325 (65 Fe) MG tablet Take 325 mg by mouth daily (with breakfast)      HYDROmorphone (DILAUDID) 2 MG tablet Take 2 mg by mouth every 6 hours as needed for severe pain      hydrOXYzine (ATARAX) 25 MG tablet Take 1 tablet (25 mg) by mouth every 6 hours as needed for other (adjuvant pain) 30 tablet 0    losartan (COZAAR) 25 MG tablet Take 1\2 tablet,  12.5mg,  daily 45 tablet 3    metFORMIN (GLUCOPHAGE XR) 500 MG 24 hr tablet Take 3 tablets (1,500 mg) by mouth daily (with dinner) 270 tablet 3    metoprolol succinate ER (TOPROL XL) 25 MG 24 hr tablet Take 0.5 tablets (12.5 mg) by mouth daily 45 tablet 1    morphine (MS CONTIN) 30 MG CR tablet Take 1 tablet (30 mg) by mouth 3 times  daily (Patient taking differently: Take 30 mg by mouth 2 times daily) 26 tablet 0    naloxone (NARCAN) 4 MG/0.1ML nasal spray Spray 1 spray (4 mg) into one nostril alternating nostrils as needed for opioid reversal every 2-3 minutes until assistance arrives 2 each 1    senna-docusate (SENOKOT-S/PERICOLACE) 8.6-50 MG tablet Take 1 tablet by mouth 2 times daily 30 tablet 0    sennosides (SENOKOT) 8.6 MG tablet Take 1 tablet by mouth daily as needed for constipation      DULoxetine (CYMBALTA) 60 MG capsule TAKE 1 CAPSULE (60 MG) BY MOUTH DAILY (Patient not taking: Reported on 6/13/2023) 90 capsule 1    methocarbamol (ROBAXIN) 750 MG tablet Take 1 tablet (750 mg) by mouth every 6 hours as needed for muscle spasms (Patient not taking: Reported on 6/13/2023) 40 tablet 0    omeprazole (PRILOSEC) 20 MG DR capsule Take 20 mg by mouth daily as needed (Patient not taking: Reported on 6/13/2023)      polyethylene glycol (MIRALAX) 17 g packet Take 17 g by mouth daily (Patient not taking: Reported on 6/13/2023) 10 packet 0    pregabalin (LYRICA) 25 MG capsule Take 25 mg by mouth 3 times daily (Patient not taking: Reported on 6/13/2023)      sucralfate (CARAFATE) 1 GM/10ML suspension Take 1 g by mouth 4 times daily as needed for nausea (Patient not taking: Reported on 6/13/2023)      triamcinolone (KENALOG) 0.1 % external ointment Apply topically 2 times daily Apply topically twice daily (Patient not taking: Reported on 6/13/2023)            REVIEW OF SYSTEMS   As above in the HPI, o/w complete 12-point ROS was negative.     PHYSICAL EXAM   /77   Pulse 95   Temp 98  F (36.7  C) (Oral)   Resp 18   Wt 119 kg (262 lb 6.4 oz)   SpO2 100%   BMI 36.60 kg/m      Virtual visit     LABORATORY AND IMAGING STUDIES     Lab Results   Component Value Date    WBC 8.0 03/24/2023    WBC 8.6 12/26/2019     Lab Results   Component Value Date    RBC 3.74 03/24/2023    RBC 4.96 12/26/2019     Lab Results   Component Value Date    HGB 9.1  03/24/2023    HGB 14.2 12/26/2019     Lab Results   Component Value Date    HCT 30.8 03/24/2023    HCT 44.6 12/26/2019     Lab Results   Component Value Date    MCV 82 03/24/2023    MCV 90 12/26/2019     Lab Results   Component Value Date    MCH 24.3 03/24/2023    MCH 28.6 12/26/2019     Lab Results   Component Value Date    MCHC 29.5 03/24/2023    MCHC 31.8 12/26/2019     Lab Results   Component Value Date    RDW 20.3 03/24/2023    RDW 14.3 12/26/2019     Lab Results   Component Value Date     03/24/2023     12/26/2019     Last Comprehensive Metabolic Panel:  Sodium   Date Value Ref Range Status   05/23/2023 139 136 - 145 mmol/L Final   02/11/2021 140 133 - 144 mmol/L Final     Potassium   Date Value Ref Range Status   05/23/2023 4.3 3.4 - 5.3 mmol/L Final   04/24/2022 4.1 3.4 - 5.3 mmol/L Final   02/11/2021 4.4 3.4 - 5.3 mmol/L Final     Potassium POCT   Date Value Ref Range Status   05/22/2023 4.5 3.5 - 5.0 mmol/L Final     Chloride   Date Value Ref Range Status   05/23/2023 103 98 - 107 mmol/L Final   04/24/2022 101 94 - 109 mmol/L Final   02/11/2021 107 94 - 109 mmol/L Final     Carbon Dioxide   Date Value Ref Range Status   02/11/2021 27 20 - 32 mmol/L Final     Carbon Dioxide (CO2)   Date Value Ref Range Status   05/23/2023 27 22 - 29 mmol/L Final   04/24/2022 29 20 - 32 mmol/L Final     Anion Gap   Date Value Ref Range Status   05/23/2023 9 7 - 15 mmol/L Final   04/24/2022 2 (L) 3 - 14 mmol/L Final   02/11/2021 6 3 - 14 mmol/L Final     Glucose   Date Value Ref Range Status   05/23/2023 114 (H) 70 - 99 mg/dL Final   04/24/2022 143 (H) 70 - 99 mg/dL Final   02/11/2021 135 (H) 70 - 99 mg/dL Final     Comment:     Non Fasting     GLUCOSE BY METER POCT   Date Value Ref Range Status   05/23/2023 119 (H) 70 - 99 mg/dL Final     Urea Nitrogen   Date Value Ref Range Status   05/23/2023 13.6 6.0 - 20.0 mg/dL Final   04/24/2022 11 7 - 30 mg/dL Final   02/11/2021 13 7 - 30 mg/dL Final     Creatinine    Date Value Ref Range Status   05/23/2023 0.73 0.67 - 1.17 mg/dL Final   02/11/2021 0.94 0.66 - 1.25 mg/dL Final     GFR Estimate   Date Value Ref Range Status   05/23/2023 >90 >60 mL/min/1.73m2 Final     Comment:     eGFR calculated using 2021 CKD-EPI equation.   02/11/2021 >90 >60 mL/min/[1.73_m2] Final     Comment:     Non  GFR Calc  Starting 12/18/2018, serum creatinine based estimated GFR (eGFR) will be   calculated using the Chronic Kidney Disease Epidemiology Collaboration   (CKD-EPI) equation.       Calcium   Date Value Ref Range Status   05/23/2023 9.0 8.6 - 10.0 mg/dL Final   02/11/2021 9.1 8.5 - 10.1 mg/dL Final     Bilirubin Total   Date Value Ref Range Status   05/01/2023 0.5 <=1.2 mg/dL Final   02/11/2021 0.7 0.2 - 1.3 mg/dL Final     Alkaline Phosphatase   Date Value Ref Range Status   05/01/2023 60 40 - 129 U/L Final   02/11/2021 43 40 - 150 U/L Final     ALT   Date Value Ref Range Status   05/01/2023 9 (L) 10 - 50 U/L Final   02/11/2021 43 0 - 70 U/L Final     AST   Date Value Ref Range Status   05/01/2023 13 10 - 50 U/L Final   02/11/2021 23 0 - 45 U/L Final       Results for orders placed or performed during the hospital encounter of 01/06/23   MR Femur Thigh Right wo & w Contrast     Value    Radiologist flags Large thigh mass    Narrative    EXAMINATION: MR FEMUR THIGH RIGHT W/O & W CONTRAST  1/6/2023  9:07 AM     CLINICAL HISTORY:  Hamstring strain, right, subsequent encounter     COMPARISON:    TECHNIQUE: Multiplanar multi-sequence MR imaging was obtained using  standard sequences in three orthogonal planes the administration of  intravenous or intra-articular gadolinium contrast agent.    FINDINGS:   Right femur:    There is a very large soft tissue mass measuring 27 x 7.4 x 12 cm in  maximal caudocranial, AP and transverse dimensions (series 20, image  21 and series 38, image 32), respectively.    Mass is originating just distal to the conjoined hamstring tendon and  the  mass mostly occupies the space of the hamstring musculature.  Proximally the mass is located medial to the gluteus maximums muscle  belly and distally occupies partially the space of the biceps femoris  and the semimembranosus muscle bellies and entirely the  semitendinosus. The semitendinosus muscle belly is also involved the  furthest distally. Proximally the lesion protrudes anteriorly and  appears to invade the obturator externus muscle (series 38, image 27)    The large lesion reveals highly heterogeneous signal with mostly T2  hyperintense areas, mildly T1 hyperintense areas and septations  throughout. Following the administration of intravenous gadolinium  contrast, there is a central necrotic area occupying an area of about  7 x 5 x 14 cm in transverse, AP and CC dimensions.    At the level of the midshaft of the femur, there is very close  proximity and adherence to multiple prominent vessels (series 38,  image 49).    The sciatic nerve travels alongside the anterior margins of the lesion  over a distance of about 20 cm. However, there is a preserved fat  plane between the sciatic nerve and the tibial border of the mass.    Osseous structures:    The bilateral femora appear normal, this red marrow conversion. No  distinct osseous lesions are identified..    Hip joint: The hip joints are not well assessed at the edge of the  field-of-view. However, no significant abnormalities are identified.    Knee joint: The knee joint is not well assessed.    The left femur appears unremarkable. Including musculatures.      Impression    IMPRESSION:   1. Large soft tissue mass in the posterior compartment of the right  thigh measuring 27 x 7.4 x 12 cm in maximal caudocranial, AP and  transverse dimensions, respectively. The mass originates just distal  to the conjoined hamstring tendon, infiltrates the hamstring muscle  bellies and extends distally within the semitendinosus muscle belly  proximal to the knee joint.  However, the semitendinosus tendon sheaths  appears to have increased signal to the level of the knee joint. A  dedicated knee MRI could be useful to identify at the most distal  extent of the lesion.  2. The lesion is heterogeneous, septated, with myxoid and lipoid  components, vividly enhancing with a large central area of necrosis  which measures 7 x 5 x 14 cm. Imaging findings are highly suggestive  of a malignant mass from the spectrum of myxoid liposarcomatous type.  Further evaluation at the [facility] Orthopedic oncology  is recommended.  3. The lesion occupies the posterior muscle compartment of the thigh  just posterior to the sciatic nerve with a preserved fat plane in  between.  4. At the level of the mid shaft of the femur there is very close  proximity and adherence to multiple vessels, that entered the mass,  best seen on series 38 image 49.    Mass is originating just distal to the conjoined hamstring tendon and  the mass mostly occupies the space of the hamstring musculature.  Proximally the mass is located medial to the gluteus maximums muscle  belly and distally occupies partially the space of the biceps femoris  and the semimembranosus muscle bellies and entirely the  semitendinosus. The semitendinosus muscle belly is also involved the  furthest distally. Proximally the lesion protrudes anteriorly and  appears to invade the obturator externus muscle (series 38, image 27).    [Consider Follow Up: Large thigh mass     This report will be copied to the [facility] to ensure a  provider acknowledges the finding. Access Center is available Monday  through Friday 8am-3:30 pm.    JUTTA ELLERMANN, MD         SYSTEM ID:  [id number]     1/6/2023 MRI right femur thigh: Large soft tissue mass in the posterior compartment of the right thigh measuring 27 x 7.4 x 12 cm.  The mass lies primarily in the hamstring muscle belly.  Heterogeneous signal with areas of necrosis noted.    2/20/23  MRI R femur                                                                   IMPRESSION: In this patient with high grade sarcoma status  post-neoadjuvant chemotherapy;     Increased in size of the 11 x 15.7 x 21.7 cm heterogeneously enhancing  soft tissue mass with myxoid and lipoid components in the posterior  compartment of the right thigh since MRI from 1/6/2023, previously  measured 7.4 x 12 x 20 cm.  *  Distal portion of the mass anteriorly abuts the sciatic nerve and  deep femoral artery/vein without definite invasion.  *  Increased edema within the adductor rosy when compared to prior  study, possibly neurogenic.      PET scan 3/14/23  IMPRESSION:     1. 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      2. Subcentimeter pulmonary nodule in the left lower lobe, which is too small to accurately characterize by PET/CT and warrants continued imaging surveillance.    4/19/23 MRI femur R                                                                 IMPRESSION: In this patient with high grade sarcoma status  post-neoadjuvant chemotherapy;     Slightly increase in the size of the heterogeneously enhancing soft  tissue mass with myxoid and lipoid components in the posterior  compartment of the right thigh since MRI from 2/20/2023, measuring 11  x 16.4 x 21 cm, previously measured 11 x 15.7 x 21.7 cm.   *  Distal portion of the mass anteriorly abuts the sciatic nerve and  deep femoral artery/vein without definite invasion.  *  Slightly increased edema within the adductor rosy when compared  to prior study.    PET 5/30/23  IMPRESSION:     1. Sequelae of post treatment inflammatory change in the right posterior thigh soft tissues without convincing evidence of active neoplasm.     2. Slightly increased size of the non-FDG avid nodule in the left lower lobe and development of additional non-FDG avid subcentimeter nodules in the  inferior left upper lobe, right middle lobe, and right lower lobe. While their exact etiologies remain   indeterminate, the interval growth/development of additional nodules raises suspicion for early pulmonary metastases.     PATHOLOGY         Final Diagnosis   A.  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   Electronically signed by Vineet Coello MD on 1/17/2023 at 10:17 AM   Comment   UUMAYO   The neoplasm is comprised of highly pleomorphic spindle cells with stromal hyalinization and extensive areas of necrosis.  Focal areas with scattered lipoblasts are seen.  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  MDM-2 gene amplification study by FISH is in progress and result will be provided separately.       I reviewed the medical records including medical records, laboratory studies, and have personally reviewed imaging including MRI of the R thigh which demonstrates a large 27 cm mass in the posterior compartment of the R thigh.      ASSESSMENT AND PLAN     Mr. 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     He received C1 on 1/30. Patient did experienced worsening pain shortly after starting chemotherapy. MRI done on 2/20/23 showing increase in size of large posterior thigh sarcoma. The comparison of this MRI is to the baseline done on 1/6/23, and in a patient with high grade sarcoma the mass is presumed to have grown in size in the month before starting chemotherapy. However, given that the patient had increase in pain and there is no areas of necrosis observed in the recent MRI, I will consider this to be progression of disease and recommend to plan for surgery with consideration for preoperative radiation therapy.      He started radiation  therapy on 3/15.  I reviewed the PET scan done on 3/14 showing some tumor shrinkage at 14 x 14 x 18 from prior 11 x 15.7 x 21 and improved metabolic activity. We discussed that given some response was seen, if needed in the future this will still be an option. Right now, given prior complications from chemo (admission for neutropenic fever, bleeding in the urine presumed to be 2/2 to ifosfamide, and presumed cardiomyopathy with decrease in ejection fraction on echo), we will hold on further chemo.    Antonio has completed preoperative RT and underwent surgical resection on  5/22. He is healing well and recovering his mobility. Rash has resolved.   Recent PET with slight worsening in size of pulmonary nodule and development of new nodules, indeterminate etiology for now.    We discussed about this findings and need for short interval follow up with imaging.      Plan:    -PET in 1 month.  -Follow up after PET.     40 minutes spent on the date of the encounter doing chart review, review of test results, interpretation of tests, patient visit, documentation and discussion with other provider(s)     Michael Laboy MD   of Medicine  Hematology, Oncology and Transplantation

## 2023-06-14 ENCOUNTER — TELEPHONE (OUTPATIENT)
Dept: ORTHOPEDICS | Facility: CLINIC | Age: 53
End: 2023-06-14
Payer: COMMERCIAL

## 2023-06-14 ENCOUNTER — NURSE TRIAGE (OUTPATIENT)
Dept: NURSING | Facility: CLINIC | Age: 53
End: 2023-06-14
Payer: COMMERCIAL

## 2023-06-14 NOTE — TELEPHONE ENCOUNTER
[facility] Call Center    Phone Message    May a detailed message be left on voicemail: yes     Reason for Call: Other: Per [facility] nurse advisor - drainage is pulled out about an inch. There is one stitch holding it, should they pull it out or wait until tomorrow's appointment? Please contact spouse, Yari back asap.    Action Taken: Message routed to:  [facility] ([facility] Orthopedics    Travel Screening: Not Applicable

## 2023-06-14 NOTE — TELEPHONE ENCOUNTER
Returned patient call. Reviewed with Stefany Sanches. Advised to keep covered and drain will be removed at appointment tomorrow. Patient will call with any other questions or concerns.    Tara Holter, RNCC

## 2023-06-14 NOTE — TELEPHONE ENCOUNTER
Nurse Triage SBAR    Is this a 2nd Level Triage? NO    Situation: Patient's wife calling with questions about drain tubing that is coming out and leaking air.  Consent: on file in chart    Background: Patient had surgery on 5/22 and has a drainage tube in place. Patient noticed that the bulb is not maintaining suction and making a whistling sound. Removed dressing and saw that the drain had pulled out a little - one stitch holding in place    Assessment:   Left side of incision is edematous and a little yellow  15-30mls liquid out in drain every 24 hours    Protocol Recommended Disposition:   Discuss with PCP and callback by nurse today    Recommendation: Transferred caller to Alanis at the Orthopedic clinic to relay message to provider.      Namrata Vail RN [facility] 6/14/2023 1:20 PM    Reason for Disposition    Nursing judgment    Protocols used: INFORMATION ONLY CALL - NO TRIAGE-A-OH

## 2023-06-15 ENCOUNTER — OFFICE VISIT (OUTPATIENT)
Dept: ORTHOPEDICS | Facility: CLINIC | Age: 53
End: 2023-06-15
Payer: COMMERCIAL

## 2023-06-15 DIAGNOSIS — C49.9 SARCOMA OF SOFT TISSUE (H): Primary | ICD-10-CM

## 2023-06-15 PROCEDURE — 99024 POSTOP FOLLOW-UP VISIT: CPT | Performed by: PHYSICIAN ASSISTANT

## 2023-06-15 RX ORDER — MORPHINE SULFATE 15 MG/1
15 TABLET, FILM COATED, EXTENDED RELEASE ORAL EVERY 12 HOURS
Qty: 8 TABLET | Refills: 0 | Status: SHIPPED | OUTPATIENT
Start: 2023-06-15 | End: 2023-06-23

## 2023-06-15 NOTE — NURSING NOTE
Reason For Visit:   Chief Complaint   Patient presents with     RECHECK     Possible drain removal        There were no vitals taken for this visit.    Pain Assessment  Patient Currently in Pain: Yes  0-10 Pain Scale: 3  Primary Pain Location:  (right thigh)      Mason Cabezas ATC

## 2023-06-15 NOTE — LETTER
6/15/2023         RE: Antonio Canseco  [address] [place]  [facility] [address]        Dear Colleague,    Thank you for referring your patient, Antonio Canseco, to the [facility] Post. Please see a copy of my visit note below.    Chief Complaint: Wound check and drain removal  Preop diagnosis: Sarcoma right posterior thigh     5/22/23 Procedure performed: Removal of tumor, 25 cm, deep, right posterior thigh    HPI: Antonio is a 52-year-old man here with his wife today for follow-up and wound check of his right posterior thigh sarcoma excision and drain removal.  He reports after I saw him last week, he had 130 mL of drainage suddenly the next day, and then it slowly tapered off.  Now he has had 15 to 30 cc earlier this week.  Yesterday, it seemed that the drain had pulled out and was not removing any fluid.  Wife noted a bit of an odor.  He is not having any increased pain.  In fact, his thigh is feeling better than last week.  No episodes of fever or chills.  He is getting around a little better.  No other concerns.    Physical Exam: Dontae is a pleasant 52-year-old male who is alert and oriented no apparent distress.  He has an antalgic gait without gait assistance today.  His right posterior thigh incision is healing well with no erythema or drainage.  There is some woodiness to the tissues and some firmness to palpation.  Mild to moderate swelling.  Minimal tenderness.  His drain pretty much fell out, I did cut out the drain stitch and cleaned the drain site.  There is no erythema or odor today.  A Mepilex dressing was placed over the wound.    Impression: 52-year-old male doing well status post excision of large right posterior thigh sarcoma    Plan: Antonio can leave the Mepilex dressing in place for 3 days and then remove it.  He can then shower over the entire wound at that time.  No soaking in a tub or pool for another week.  He should avoid prolonged sitting on hard benches.   He should avoid prolonged standing or walking or repetitive activities.  He can slowly progress back to walking, stair climbing and gentle stretching.  I think he would probably benefit from physical therapy.  I will reach out to Dr. Salgado to see when he wants to initiate that.  I showed Antonio some gentle stretches he can do as well.  He is still taking MS Contin twice a day.  We went over a weaning strategy for that to hopefully have him off those narcotics in about a week and a half.  I did give him a refill of a lower dose so that he can slowly wean down.  He should take Tylenol in between as needed.  He has a PET scan for mid July.  We will get a baseline MRI with and without contrast of the right thigh at that time as well.  We will see him back either that day or a few days after to go over the scans and plan going forward.  He can call with any concerns.  He agrees with the plan.  All questions answered.      Sincerely,        Meliza Sanches PA-C

## 2023-06-15 NOTE — PROGRESS NOTES
Chief Complaint: Wound check and drain removal  Preop diagnosis: Sarcoma right posterior thigh     5/22/23 Procedure performed: Removal of tumor, 25 cm, deep, right posterior thigh    HPI: Antonio is a 52-year-old man here with his wife today for follow-up and wound check of his right posterior thigh sarcoma excision and drain removal.  He reports after I saw him last week, he had 130 mL of drainage suddenly the next day, and then it slowly tapered off.  Now he has had 15 to 30 cc earlier this week.  Yesterday, it seemed that the drain had pulled out and was not removing any fluid.  Wife noted a bit of an odor.  He is not having any increased pain.  In fact, his thigh is feeling better than last week.  No episodes of fever or chills.  He is getting around a little better.  No other concerns.    Physical Exam: Dontae is a pleasant 52-year-old male who is alert and oriented no apparent distress.  He has an antalgic gait without gait assistance today.  His right posterior thigh incision is healing well with no erythema or drainage.  There is some woodiness to the tissues and some firmness to palpation.  Mild to moderate swelling.  Minimal tenderness.  His drain pretty much fell out, I did cut out the drain stitch and cleaned the drain site.  There is no erythema or odor today.  A Mepilex dressing was placed over the wound.    Impression: 52-year-old male doing well status post excision of large right posterior thigh sarcoma    Plan: Antonio can leave the Mepilex dressing in place for 3 days and then remove it.  He can then shower over the entire wound at that time.  No soaking in a tub or pool for another week.  He should avoid prolonged sitting on hard benches.  He should avoid prolonged standing or walking or repetitive activities.  He can slowly progress back to walking, stair climbing and gentle stretching.  I think he would probably benefit from physical therapy.  I will reach out to Dr. Salgado to see when he wants to  initiate that.  I showed Antonio some gentle stretches he can do as well.  He is still taking MS Contin twice a day.  We went over a weaning strategy for that to hopefully have him off those narcotics in about a week and a half.  I did give him a refill of a lower dose so that he can slowly wean down.  He should take Tylenol in between as needed.  He has a PET scan for mid July.  We will get a baseline MRI with and without contrast of the right thigh at that time as well.  We will see him back either that day or a few days after to go over the scans and plan going forward.  He can call with any concerns.  He agrees with the plan.  All questions answered.

## 2023-06-15 NOTE — PATIENT INSTRUCTIONS
Take 15 mg MS Contin (Morphine) every 12 hours for 2 days.  Then take 7.5mg (1/2 tab) MS contin every 12 hours for 2 days.  Then take 75mg (1/2 tab) MS contin only at night for 2 days, then discontinue completely.  Take tylenol for breakthru pain, or 1 dilaudid tab around noon if needed.

## 2023-06-19 DIAGNOSIS — C49.9 SARCOMA OF SOFT TISSUE (H): Primary | ICD-10-CM

## 2023-06-19 RX ORDER — FERROUS SULFATE 325(65) MG
TABLET ORAL
Qty: 30 TABLET | Refills: 1 | Status: SHIPPED | OUTPATIENT
Start: 2023-06-19 | End: 2023-06-23

## 2023-06-21 ENCOUNTER — THERAPY VISIT (OUTPATIENT)
Dept: PHYSICAL THERAPY | Facility: CLINIC | Age: 53
End: 2023-06-21
Attending: PHYSICIAN ASSISTANT
Payer: COMMERCIAL

## 2023-06-21 DIAGNOSIS — Z98.890 STATUS POST SURGERY: ICD-10-CM

## 2023-06-21 DIAGNOSIS — C49.9 SARCOMA OF SOFT TISSUE (H): ICD-10-CM

## 2023-06-21 PROCEDURE — 97161 PT EVAL LOW COMPLEX 20 MIN: CPT | Mod: GP | Performed by: PHYSICAL THERAPIST

## 2023-06-21 PROCEDURE — 97110 THERAPEUTIC EXERCISES: CPT | Mod: GP | Performed by: PHYSICAL THERAPIST

## 2023-06-21 PROCEDURE — 97530 THERAPEUTIC ACTIVITIES: CPT | Mod: GP | Performed by: PHYSICAL THERAPIST

## 2023-06-21 ASSESSMENT — ACTIVITIES OF DAILY LIVING (ADL): PLEASE_INDICATE_YOR_PRIMARY_REASON_FOR_REFERRAL_TO_THERAPY:: KNEE

## 2023-06-21 NOTE — PROGRESS NOTES
PHYSICAL THERAPY EVALUATION  Type of Visit: Evaluation    See electronic medical record for Abuse and Falls Screening details.    Subjective         Pt c/o R L/E stiffness and weakness S/P R post thigh sarcoma removal 5/22/2023.  MD order date 6/19/2023-cleared for activity as tolerated.    Presenting condition or subjective complaint: Surgery R thigh  Date of onset: 05/22/23    Relevant medical history: Cancer; COPD; Diabetes; Emphysema; Hearing problems; High blood pressure; Overweight; Sleep disorder like apnea; Smoking   Dates & types of surgery: sarcoma removal 5/22/2023    Prior diagnostic imaging/testing results: MRI; CT scan     Prior therapy history for the same diagnosis, illness or injury: Yes pre surgical but no improvement noted    Prior Level of Function   Transfers: Independent  Ambulation: Independent  ADL: Independent  IADL:     Living Environment  Social support: With a significant other or spouse   Type of home: House; 2-story   Stairs to enter the home: No       Ramp: No   Stairs inside the home: Yes -2 Is there a railing: Yes   Help at home: None  Equipment owned: Straight Cane; Walker     Employment: Yes Desk job  Hobbies/Interests: golf    Patient goals for therapy: more mobile and decrease pain    Pain assessment: Pain present     Objective   KNEE EVALUATION  PAIN: Pain Level at Rest: 4/10  Pain Level with Use: 5/10  Pain Location: R post thigh  Pain Quality: Aching  Pain Frequency: constant  Pain is Worst: daytime  Pain is Exacerbated By: sitting, walking, standing, bending  Pain is Relieved By: rest, stretch and pain meds  Pain Progression: Improved  INTEGUMENTARY (edema, incisions): well healing incision post thigh with min swelling noted  POSTURE:   GAIT:  Weightbearing Status: WBAT  Assistive Device(s): None  Gait Deviations: Antalgic  Stride length decreased  Knee extension decreased R  BALANCE/PROPRIOCEPTION: Single Leg Stance Eyes Open (seconds): 30sec L, 10-15 sec R with increased mm  activity  WEIGHTBEARING ALIGNMENT: WFL  NON-WEIGHTBEARING ALIGNMENT:   ROM:   (Degrees) Left AROM Left PROM  Right AROM Right PROM   Knee Flexion   105 115   Knee Extension   0 0     Hip ROM: flex 100, ER 30, IR 25, abd 35  Pain:   End feel:     STRENGTH: R knee flex 4-/5 +, ext 4+/5 +/-.  Hip flex 4-/5, abd 4/5, ext 3+/5 ++.  Fair core stab recruitment  FLEXIBILITY: decreased R >L HS, R gastroc, hip flexor  SPECIAL TESTS:   FUNCTIONAL TESTS: Double Leg Squat: Anterior knee translation, Knee valgus, Hip internal rotation, Improper use of glutes/hips and wieght shift L  PALPATION: incisional R post thigh, HS  JOINT MOBILITY: WNL    Assessment & Plan   CLINICAL IMPRESSIONS   Medical Diagnosis: S/P R post thigh sarcoma removal    Treatment Diagnosis: R thigh weakness/stiffness   Impression/Assessment: Patient is a 52 year old male with R leg tightness and weakness S/P R post thigh sarcoma removal complaints.  The following significant findings have been identified: Pain, Decreased ROM/flexibility, Decreased strength, Decreased proprioception, Edema, Impaired gait, Impaired muscle performance and Decreased activity tolerance. These impairments interfere with their ability to perform self care tasks, work tasks, recreational activities, household chores, driving , household mobility and community mobility as compared to previous level of function.     Clinical Decision Making (Complexity):   Clinical Presentation: Stable/Uncomplicated  Clinical Presentation Rationale: based on medical and personal factors listed in PT evaluation  Clinical Decision Making (Complexity): Low complexity    PLAN OF CARE  Treatment Interventions:  Modalities: Cryotherapy  Interventions: Manual Therapy, Neuromuscular Re-education, Therapeutic Activity, Therapeutic Exercise    Long Term Goals     PT Goal 1  Goal Identifier: Ambulation:  Walking 10' lacking TKE and decreased stride length R pain 5/10 at IE  Goal Description: Be able to ambulate  60 minutes without deviation or AD  Rationale:  (for safe household ambulation;for safe community ambulation;to maintain proper body mechanics/posture while ambulating to avoid additional compensatory injury due to improper gait mechanics;to promote a healthy and active lifestyle)  Target Date: 09/21/23      Frequency of Treatment: 2x/month  Duration of Treatment: 3 months    Recommended Referrals to Other Professionals:   Education Assessment:   Learner/Method: Patient;Demonstration;Pictures/Video  Education Comments: online    Risks and benefits of evaluation/treatment have been explained.   Patient/Family/caregiver agrees with Plan of Care.     Evaluation Time:     PT Eval, Low Complexity Minutes (53404): 15      Signing Clinician: Asif Rios PT

## 2023-06-22 ENCOUNTER — HOSPITAL ENCOUNTER (OUTPATIENT)
Dept: MRI IMAGING | Facility: CLINIC | Age: 53
Discharge: HOME | End: 2023-06-22
Attending: PHYSICIAN ASSISTANT | Admitting: PHYSICIAN ASSISTANT
Payer: COMMERCIAL

## 2023-06-22 DIAGNOSIS — C49.9 SARCOMA OF SOFT TISSUE (H): ICD-10-CM

## 2023-06-22 PROCEDURE — 73720 MRI LWR EXTREMITY W/O&W/DYE: CPT | Mod: RT

## 2023-06-22 PROCEDURE — A9585 GADOBUTROL INJECTION: HCPCS | Performed by: PHYSICIAN ASSISTANT

## 2023-06-22 PROCEDURE — 255N000002 HC RX 255 OP 636: Performed by: PHYSICIAN ASSISTANT

## 2023-06-22 PROCEDURE — 73720 MRI LWR EXTREMITY W/O&W/DYE: CPT | Mod: 26 | Performed by: RADIOLOGY

## 2023-06-22 RX ORDER — GADOBUTROL 604.72 MG/ML
12 INJECTION INTRAVENOUS ONCE
Status: COMPLETED | OUTPATIENT
Start: 2023-06-22 | End: 2023-06-22

## 2023-06-22 RX ADMIN — GADOBUTROL 12 ML: 604.72 INJECTION INTRAVENOUS at 08:50

## 2023-06-23 ENCOUNTER — OFFICE VISIT (OUTPATIENT)
Dept: CARDIOLOGY | Facility: CLINIC | Age: 53
End: 2023-06-23
Attending: INTERNAL MEDICINE
Payer: COMMERCIAL

## 2023-06-23 VITALS
OXYGEN SATURATION: 96 % | HEART RATE: 76 BPM | WEIGHT: 261.6 LBS | DIASTOLIC BLOOD PRESSURE: 68 MMHG | BODY MASS INDEX: 36.62 KG/M2 | HEIGHT: 71 IN | SYSTOLIC BLOOD PRESSURE: 116 MMHG

## 2023-06-23 DIAGNOSIS — I10 ESSENTIAL HYPERTENSION: ICD-10-CM

## 2023-06-23 DIAGNOSIS — G47.33 OSA ON CPAP: ICD-10-CM

## 2023-06-23 DIAGNOSIS — C49.9 SARCOMA (H): ICD-10-CM

## 2023-06-23 DIAGNOSIS — E66.01 MORBID OBESITY (H): ICD-10-CM

## 2023-06-23 DIAGNOSIS — I42.8 NICM (NONISCHEMIC CARDIOMYOPATHY) (H): Primary | ICD-10-CM

## 2023-06-23 PROCEDURE — 99214 OFFICE O/P EST MOD 30 MIN: CPT | Performed by: NURSE PRACTITIONER

## 2023-06-23 RX ORDER — LOSARTAN POTASSIUM 25 MG/1
25 TABLET ORAL DAILY
Qty: 90 TABLET | Refills: 3 | Status: SHIPPED | OUTPATIENT
Start: 2023-06-23 | End: 2023-06-23 | Stop reason: DRUGHIGH

## 2023-06-23 NOTE — LETTER
6/23/2023    Mynor Mason MD  [id number] [facility] [id number]    RE: Antonio Cansceo       Dear Colleague,     I had the pleasure of seeing Antonio Canseco in the [facility].  Cardiology Clinic Progress Note  Antonio Canseco MRN# [id number]   YOB: 1970 Age: 52 year old     Reason For Visit:  Medication uptitration   Primary Cardiologist:   Dr. Cee          History of Presenting Illness:      Antonio Canseco is a pleasant 52 year old patient who carries a past medical history significant for right thigh seroma  status post chemotherapy followed by excision on 5/22/2023, nonischemic cardiomyopathy, hypertension, diabetes, remote smoker, emphysema, obstructive sleep apnea on CPAP and obesity.    He was initially seen by Dr. Cee on 5/17/2023 as part of a preop evaluation prior to his seroma excision.  He underwent Lexiscan that showed no evidence of ischemia but a small possible apical infarct versus artifact.  LV function was noted to be 36% at rest and 39% with stress.  Echocardiogram done prior showed a normal ejection fraction estimated at 55%.  He subsequently underwent a cardiac MRI that showed a moderately reduced LV systolic function with an EF of 40% and no evidence of MI.  He was started on low-dose losartan 12.5 mg daily.  He successfully underwent excision of the seroma on 5/22/2023.    At his follow-up visit on 6/6/2023 GDMT was further uptitrated with the addition of Toprol 12.5 mg daily.  He returns to the office today accompanied by his wife for a 2-week follow-up and further medication up titration.  He offers no cardiac complaint, denies chest pain, shortness of breath, palpitations, PND, orthopnea, presyncope, syncope, or lower extremity edema.  He continues to have some soreness of his right leg.  His primary complaint is mild numbness to fingers and toes and overall cold feeling at night.    Blood pressure is well controlled at 116/68  Last BMP showed a  sodium of 139, potassium 4.3, BUN 13.6, creatinine 0.73 and GFR greater than 90  Hemoglobin improved from 7.8-11.8, hematocrit 39.1, and platelet 315.  A1c 5.7  BMI 36.49, 261 pounds    Remains compliant with all medications.                   Assessment and Plan:       1.  Nonischemic cardiomyopathy -  Cardiac MRI that showed a moderately reduced LV systolic function with an EF of 40% and no evidence of MI.  To optimize GDMT I will increase losartan to 25 mg daily and continue on current dose of Toprol 12.5 mg daily.  Once optimal medical therapy is achieved we will reassess LV function with a limited echocardiogram.  2.  Hypertension -well-controlled  3.  Diabetes -A1c 5.7, managed on metformin  4.  Obesity -encourage exercise and weight loss  5.  Right thigh seroma -status post excision.  Follows with oncology  6.  Obstructive sleep apnea -on CPAP         Thank you for allowing me to participate in this delightful patient's care. I have recommended he follow up in 1 month with [facility] for further uptitration of GDMT.       Daphney Larios, APRN CNP         Review of Systems:     Review of Systems:  Skin:        Eyes:       ENT:       Respiratory:  Positive for sleep apnea;CPAP  Cardiovascular:    fatigue;Positive for  Gastroenterology:      Genitourinary:       Musculoskeletal:       Neurologic:       Psychiatric:       Heme/Lymph/Imm:  Positive for night sweats  Endocrine:                   Physical Exam:     GEN:  In general, this is a obese male in no acute distress.  HEENT:  Pupils equal, round. Sclerae nonicteric. Clear oropharynx. Mucous membranes moist.  NECK: Supple, no masses appreciated. Trachea midline.  No JVD   C/V:  Regular rate and rhythm, no murmur, rub or gallop. No S3 or RV heave.   RESP: Respirations are unlabored. No use of accessory muscles. Clear to auscultation bilaterally without wheezing, rales, or rhonchi.  GI: Abdomen soft, nontender, nondistended. No HSM appreciated.   EXTREM: No LE edema.  No cyanosis or clubbing.  NEURO: Alert and oriented, cooperative. No obvious focal deficits.   PSYCH: Normal affect.  SKIN: Warm and dry. No rashes or petechiae appreciated.          Past Medical History:     Past Medical History:   Diagnosis Date    Acrochordon 02/11/2021    CARDIOVASCULAR SCREENING; LDL GOAL LESS THAN 160 03/27/2012    Colon adenoma     Controlled type 2 diabetes mellitus without complication, without long-term current use of insulin (H) 09/16/2019    X 1    Cough 02/04/2014    Degeneration of thoracic intervertebral disc 12/11/2013    Dysfunction of both eustachian tubes 04/12/2019    Elevated blood pressure 12/22/2014    Elevated hemoglobin A1c 09/16/2019    X 1    Gastroesophageal reflux disease     Hamstring strain, right, subsequent encounter 12/19/2022    Hepatic cyst 05/22/2018    Hepatic steatosis 12/11/2013    History of colonic polyps 12/11/2013    History of drainage of abscess 09/16/2019    Hypertension     Irritable bowel syndrome without diarrhea 06/01/2018    Low libido 04/12/2019    Lumbar radiculopathy 12/19/2022    Microscopic hematuria 12/19/2013    Nephrolithiasis     Obesity 03/27/2012    BRIAN on CPAP 03/27/2012    Other irritable bowel syndrome 05/22/2018    Pain in thoracic spine 05/09/2013    Pulmonary nodule 05/22/2018    Right-sided chest pain 04/13/2018    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    Sleep apnea     Tinea pedis of both feet 02/11/2021    Tinnitus, unspecified laterality 04/12/2019    Uncontrolled diabetes mellitus with hyperglycemia, without long-term current use of insulin (H) 01/23/2023              Past Surgical History:     Past Surgical History:   Procedure Laterality Date    COLONOSCOPY      COLONOSCOPY N/A 03/12/2021    Procedure: COLONOSCOPY (fv);  Surgeon: Ean Alcantara MD;  Location: [facility] OR    CYSTOSCOPY  02/11/2014    Procedure: CYSTOSCOPY;   Video Cystoscopy with Exam Under Anesthesia;  Surgeon: Chente Moeller MD;  Location:  OR    IR CVC TUNNEL  PLACEMENT > 5 YRS OF AGE  1/27/2023    IR CVC TUNNEL REMOVAL RIGHT  3/13/2023    LEG SURGERY Left 2019    Suregy r/t infection    RESECT TUMOR LOWER EXTREMITY Right 5/22/2023    Procedure: EXCISION, NEOPLASM, RIGHT POSTERIOR THIGH;  Surgeon: Zach Salgado MD;  Location: [facility] OR    wisdom teeth                Allergies:   Emend [aprepitant], Kiwi, and Lisinopril       Data:   All laboratory data reviewed:    LAST CHOLESTEROL:  Lab Results   Component Value Date    CHOL 133 01/18/2023    CHOL 139 02/11/2021     Lab Results   Component Value Date    HDL 38 01/18/2023    HDL 46 02/11/2021     Lab Results   Component Value Date    LDL 74 01/18/2023    LDL 49 02/11/2021     Lab Results   Component Value Date    TRIG 103 01/18/2023    TRIG 222 02/11/2021     Lab Results   Component Value Date    CHOLHDLRATIO 2.8 08/25/2015       LAST BMP:  Lab Results   Component Value Date     05/23/2023     02/11/2021      Lab Results   Component Value Date    POTASSIUM 4.3 05/23/2023    POTASSIUM 4.5 05/22/2023    POTASSIUM 4.1 04/24/2022    POTASSIUM 4.4 02/11/2021     Lab Results   Component Value Date    CHLORIDE 103 05/23/2023    CHLORIDE 101 04/24/2022    CHLORIDE 107 02/11/2021     Lab Results   Component Value Date    DORIAN 9.0 05/23/2023    DORIAN 9.1 02/11/2021     Lab Results   Component Value Date    CO2 27 05/23/2023    CO2 29 04/24/2022    CO2 27 02/11/2021     Lab Results   Component Value Date    BUN 13.6 05/23/2023    BUN 11 04/24/2022    BUN 13 02/11/2021     Lab Results   Component Value Date    CR 0.73 05/23/2023    CR 0.94 02/11/2021     Lab Results   Component Value Date     05/23/2023     05/23/2023     04/24/2022     02/11/2021       LAST CBC:  Lab Results   Component Value Date    WBC 8.6 06/07/2023    WBC 8.6 12/26/2019     Lab Results   Component Value Date    RBC 4.50 06/07/2023    RBC 4.96 12/26/2019     Lab Results   Component Value Date    HGB 11.8 06/07/2023    HGB  14.2 12/26/2019     Lab Results   Component Value Date    HCT 39.1 06/07/2023    HCT 44.6 12/26/2019     Lab Results   Component Value Date    MCV 87 06/07/2023    MCV 90 12/26/2019     Lab Results   Component Value Date    MCH 26.2 06/07/2023    MCH 28.6 12/26/2019     Lab Results   Component Value Date    MCHC 30.2 06/07/2023    MCHC 31.8 12/26/2019     Lab Results   Component Value Date    RDW 19.6 06/07/2023    RDW 14.3 12/26/2019     Lab Results   Component Value Date     06/07/2023     12/26/2019               Thank you for allowing me to participate in the care of your patient.      Sincerely,     JEFFREY [facility]  cc:   Eric Cee MD  [id number]  [place] [id number]

## 2023-06-23 NOTE — PROGRESS NOTES
Cardiology Clinic Progress Note  Antonio Canseco MRN# [id number]   YOB: 1970 Age: 52 year old     Reason For Visit:  Medication uptitration   Primary Cardiologist:   Dr. Cee          History of Presenting Illness:      Antonio Canseco is a pleasant 52 year old patient who carries a past medical history significant for right thigh seroma  status post chemotherapy followed by excision on 5/22/2023, nonischemic cardiomyopathy, hypertension, diabetes, remote smoker, emphysema, obstructive sleep apnea on CPAP and obesity.    He was initially seen by Dr. Cee on 5/17/2023 as part of a preop evaluation prior to his seroma excision.  He underwent Lexiscan that showed no evidence of ischemia but a small possible apical infarct versus artifact.  LV function was noted to be 36% at rest and 39% with stress.  Echocardiogram done prior showed a normal ejection fraction estimated at 55%.  He subsequently underwent a cardiac MRI that showed a moderately reduced LV systolic function with an EF of 40% and no evidence of MI.  He was started on low-dose losartan 12.5 mg daily.  He successfully underwent excision of the seroma on 5/22/2023.    At his follow-up visit on 6/6/2023 GDMT was further uptitrated with the addition of Toprol 12.5 mg daily.  He returns to the office today accompanied by his wife for a 2-week follow-up and further medication up titration.  He offers no cardiac complaint, denies chest pain, shortness of breath, palpitations, PND, orthopnea, presyncope, syncope, or lower extremity edema.  He continues to have some soreness of his right leg.  His primary complaint is mild numbness to fingers and toes and overall cold feeling at night.    Blood pressure is well controlled at 116/68  Last BMP showed a sodium of 139, potassium 4.3, BUN 13.6, creatinine 0.73 and GFR greater than 90  Hemoglobin improved from 7.8-11.8, hematocrit 39.1, and platelet 315.  A1c 5.7  BMI 36.49, 261 pounds    Remains compliant with  all medications.                   Assessment and Plan:       1.  Nonischemic cardiomyopathy -  Cardiac MRI that showed a moderately reduced LV systolic function with an EF of 40% and no evidence of MI.  To optimize GDMT I will increase losartan to 25 mg daily and continue on current dose of Toprol 12.5 mg daily.  Once optimal medical therapy is achieved we will reassess LV function with a limited echocardiogram.  2.  Hypertension -well-controlled  3.  Diabetes -A1c 5.7, managed on metformin  4.  Obesity -encourage exercise and weight loss  5.  Right thigh seroma -status post excision.  Follows with oncology  6.  Obstructive sleep apnea -on CPAP         Thank you for allowing me to participate in this delightful patient's care. I have recommended he follow up in 1 month with [facility] for further uptitration of GDMT.       Daphney Larios, JEFFREY CNP         Review of Systems:     Review of Systems:  Skin:        Eyes:       ENT:       Respiratory:  Positive for sleep apnea;CPAP  Cardiovascular:    fatigue;Positive for  Gastroenterology:      Genitourinary:       Musculoskeletal:       Neurologic:       Psychiatric:       Heme/Lymph/Imm:  Positive for night sweats  Endocrine:                   Physical Exam:     GEN:  In general, this is a obese male in no acute distress.  HEENT:  Pupils equal, round. Sclerae nonicteric. Clear oropharynx. Mucous membranes moist.  NECK: Supple, no masses appreciated. Trachea midline.  No JVD   C/V:  Regular rate and rhythm, no murmur, rub or gallop. No S3 or RV heave.   RESP: Respirations are unlabored. No use of accessory muscles. Clear to auscultation bilaterally without wheezing, rales, or rhonchi.  GI: Abdomen soft, nontender, nondistended. No HSM appreciated.   EXTREM: No LE edema. No cyanosis or clubbing.  NEURO: Alert and oriented, cooperative. No obvious focal deficits.   PSYCH: Normal affect.  SKIN: Warm and dry. No rashes or petechiae appreciated.          Past Medical History:      Past Medical History:   Diagnosis Date     Acrochordon 02/11/2021     CARDIOVASCULAR SCREENING; LDL GOAL LESS THAN 160 03/27/2012     Colon adenoma      Controlled type 2 diabetes mellitus without complication, without long-term current use of insulin (H) 09/16/2019    X 1     Cough 02/04/2014     Degeneration of thoracic intervertebral disc 12/11/2013     Dysfunction of both eustachian tubes 04/12/2019     Elevated blood pressure 12/22/2014     Elevated hemoglobin A1c 09/16/2019    X 1     Gastroesophageal reflux disease      Hamstring strain, right, subsequent encounter 12/19/2022     Hepatic cyst 05/22/2018     Hepatic steatosis 12/11/2013     History of colonic polyps 12/11/2013     History of drainage of abscess 09/16/2019     Hypertension      Irritable bowel syndrome without diarrhea 06/01/2018     Low libido 04/12/2019     Lumbar radiculopathy 12/19/2022     Microscopic hematuria 12/19/2013     Nephrolithiasis      Obesity 03/27/2012     BRIAN on CPAP 03/27/2012     Other irritable bowel syndrome 05/22/2018     Pain in thoracic spine 05/09/2013     Pulmonary nodule 05/22/2018     Right-sided chest pain 04/13/2018     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     Sleep apnea      Tinea pedis of both feet 02/11/2021     Tinnitus, unspecified laterality 04/12/2019     Uncontrolled diabetes mellitus with hyperglycemia, without long-term current use of insulin (H) 01/23/2023              Past Surgical History:     Past Surgical History:   Procedure Laterality Date     COLONOSCOPY       COLONOSCOPY N/A 03/12/2021    Procedure: COLONOSCOPY (fv);  Surgeon: Ean Alcantara MD;  Location: [facility] OR     CYSTOSCOPY  02/11/2014    Procedure: CYSTOSCOPY;   Video Cystoscopy with Exam Under Anesthesia;  Surgeon: Chente Moeller MD;  Location: [facility] OR     IR CVC TUNNEL PLACEMENT > 5 YRS OF AGE  1/27/2023     IR CVC TUNNEL REMOVAL RIGHT  3/13/2023     LEG SURGERY Left 2019    Suregy r/t infection     RESECT TUMOR LOWER EXTREMITY Right  5/22/2023    Procedure: EXCISION, NEOPLASM, RIGHT POSTERIOR THIGH;  Surgeon: Zach Salgado MD;  Location: [facility] OR     wisdom teeth                Allergies:   Emend [aprepitant], Kiwi, and Lisinopril       Data:   All laboratory data reviewed:    LAST CHOLESTEROL:  Lab Results   Component Value Date    CHOL 133 01/18/2023    CHOL 139 02/11/2021     Lab Results   Component Value Date    HDL 38 01/18/2023    HDL 46 02/11/2021     Lab Results   Component Value Date    LDL 74 01/18/2023    LDL 49 02/11/2021     Lab Results   Component Value Date    TRIG 103 01/18/2023    TRIG 222 02/11/2021     Lab Results   Component Value Date    CHOLHDLRATIO 2.8 08/25/2015       LAST BMP:  Lab Results   Component Value Date     05/23/2023     02/11/2021      Lab Results   Component Value Date    POTASSIUM 4.3 05/23/2023    POTASSIUM 4.5 05/22/2023    POTASSIUM 4.1 04/24/2022    POTASSIUM 4.4 02/11/2021     Lab Results   Component Value Date    CHLORIDE 103 05/23/2023    CHLORIDE 101 04/24/2022    CHLORIDE 107 02/11/2021     Lab Results   Component Value Date    DORIAN 9.0 05/23/2023    DORIAN 9.1 02/11/2021     Lab Results   Component Value Date    CO2 27 05/23/2023    CO2 29 04/24/2022    CO2 27 02/11/2021     Lab Results   Component Value Date    BUN 13.6 05/23/2023    BUN 11 04/24/2022    BUN 13 02/11/2021     Lab Results   Component Value Date    CR 0.73 05/23/2023    CR 0.94 02/11/2021     Lab Results   Component Value Date     05/23/2023     05/23/2023     04/24/2022     02/11/2021       LAST CBC:  Lab Results   Component Value Date    WBC 8.6 06/07/2023    WBC 8.6 12/26/2019     Lab Results   Component Value Date    RBC 4.50 06/07/2023    RBC 4.96 12/26/2019     Lab Results   Component Value Date    HGB 11.8 06/07/2023    HGB 14.2 12/26/2019     Lab Results   Component Value Date    HCT 39.1 06/07/2023    HCT 44.6 12/26/2019     Lab Results   Component Value Date    MCV 87 06/07/2023     MCV 90 12/26/2019     Lab Results   Component Value Date    MCH 26.2 06/07/2023    MCH 28.6 12/26/2019     Lab Results   Component Value Date    MCHC 30.2 06/07/2023    MCHC 31.8 12/26/2019     Lab Results   Component Value Date    RDW 19.6 06/07/2023    RDW 14.3 12/26/2019     Lab Results   Component Value Date     06/07/2023     12/26/2019

## 2023-06-23 NOTE — PATIENT INSTRUCTIONS
Thanks for participating in a office visit with the [facility] today.    Your recent echocardiogram showed a weakness in the heart muscle (40% from previous normal of 55%)  To help improve heart function will increase losartan to 25 mg daily  Continue on metoprolol  Follow up BMP in 2 weeks  Monitor for lightheadedness or dizziness  Monitor blood pressure daily, call if systolic number is below 100      Follow up in 1 month with [facility] for further increase in medication if able.     Please call my nurse at  [phone] with any questions or concerns.    Scheduling phone number: [phone]  Reminder: Please bring in all current medications, over the counter supplements and vitamin bottles to your next appointment.

## 2023-06-27 DIAGNOSIS — C49.9 SARCOMA OF SOFT TISSUE (H): Primary | ICD-10-CM

## 2023-06-27 DIAGNOSIS — E11.9 CONTROLLED TYPE 2 DIABETES MELLITUS WITHOUT COMPLICATION, WITHOUT LONG-TERM CURRENT USE OF INSULIN (H): ICD-10-CM

## 2023-06-28 RX ORDER — ASPIRIN 81 MG/1
81 TABLET, CHEWABLE ORAL DAILY
Qty: 100 TABLET | Refills: 3 | Status: SHIPPED | OUTPATIENT
Start: 2023-06-28 | End: 2023-07-28

## 2023-07-18 ENCOUNTER — HOSPITAL ENCOUNTER (OUTPATIENT)
Dept: PET IMAGING | Facility: CLINIC | Age: 53
Discharge: HOME | End: 2023-07-18
Attending: STUDENT IN AN ORGANIZED HEALTH CARE EDUCATION/TRAINING PROGRAM | Admitting: STUDENT IN AN ORGANIZED HEALTH CARE EDUCATION/TRAINING PROGRAM
Payer: COMMERCIAL

## 2023-07-18 DIAGNOSIS — C49.9 UNDIFFERENTIATED PLEOMORPHIC SARCOMA (H): ICD-10-CM

## 2023-07-18 PROCEDURE — A9552 F18 FDG: HCPCS | Performed by: STUDENT IN AN ORGANIZED HEALTH CARE EDUCATION/TRAINING PROGRAM

## 2023-07-18 PROCEDURE — 343N000001 HC RX 343: Performed by: STUDENT IN AN ORGANIZED HEALTH CARE EDUCATION/TRAINING PROGRAM

## 2023-07-18 PROCEDURE — 78816 PET IMAGE W/CT FULL BODY: CPT | Mod: PS

## 2023-07-18 RX ORDER — LANOLIN ALCOHOL/MO/W.PET/CERES
400 CREAM (GRAM) TOPICAL DAILY
Qty: 30 TABLET | Refills: 1 | OUTPATIENT
Start: 2023-07-18

## 2023-07-18 RX ADMIN — FLUDEOXYGLUCOSE F-18 11.62 MILLICURIE: 500 INJECTION, SOLUTION INTRAVENOUS at 15:01

## 2023-07-20 RX ORDER — DIBASIC SODIUM PHOSPHATE, MONOBASIC POTASSIUM PHOSPHATE AND MONOBASIC SODIUM PHOSPHATE 852; 155; 130 MG/1; MG/1; MG/1
TABLET ORAL
Qty: 84 TABLET | Refills: 1 | OUTPATIENT
Start: 2023-07-20

## 2023-07-20 NOTE — ASSESSMENT & PLAN NOTE
LDL Cholesterol Calculated   Date Value Ref Range Status   10/07/2019 71 <100 mg/dL Final     Comment:     Desirable:       <100 mg/dl     No statin   continue to monitor

## 2023-07-24 NOTE — PROGRESS NOTES
[facility] CANCER CLINIC    FOLLOW UP VISIT NOTE    PATIENT NAME: Antonio Canseco MRN # [id number]  DATE OF VISIT: June 13 , 2023 YOB: 1970    REFERRING PROVIDER: Ari Nascimento PA-C  [facility] [place] [address]    CANCER TYPE: High grade sarcoma       CHIEF COMPLAIN   Thigh pain     HISTORY OF PRESENT ILLNESS   52 year old male with PMH of DM and recent Dx of large 27 cm mass in the posterior R thigh. He reports that he first noticed a node in the posterior thigh on 5/2022, we was evaluated at OSH where he got an Xray and he was told that this was sciatica and was started on physical therapy. Tumor continued to grow rapidly during this time. MRI done on 1/6/2023 showing a large mass in the posterior thigh. 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   He reports having pain and limit mobility. He has been taking daily meloxican with some relief, however he needs to be resting most of the time to avoid pain.     C1 of doxil/ifosfamide on 1/30 follow up MRI showing increase in size for mass without significant areas of necrosis. We decided to discontinue chemotherapy.  Corey cycle of chemotherapy was complicated with neutropenic fever requiring admission and she also developed disseminated herpes zoster. Additionally work up done after chemotherapy revealed that the ejection fraction after chemotherapy had decreased.    He started Preoperative RT on 3/15 and completed on 4/18 . PET done on 3/14 just before starting RT, showed decrease in the metabolic activity of the mass.     5/22 Surgical resection of posterior thigh mass.     Interval History  Antonio comes to the office with his wife.  He keeps recovering from surgery, his mobility has improved and he tells me that now he is golfing and walking more during the games. He still feels soreness when walking for extended periods of time and also feels some pain in the knees. He has been gaining  weight lately.      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       CURRENT OUTPATIENT MEDICATIONS     Current Outpatient Medications   Medication Sig Dispense Refill     acetaminophen (TYLENOL) 325 MG tablet Take 2 tablets (650 mg) by mouth every 4 hours as needed for other 60 tablet 0     aspirin (ASA) 81 MG chewable tablet Take 1 tablet (81 mg) by mouth daily 100 tablet 3     Continuous Blood Gluc Sensor (FREESTYLE MORENITA 3 SENSOR) MISC        hydrOXYzine (ATARAX) 25 MG tablet Take 1 tablet (25 mg) by mouth every 6 hours as needed for other (adjuvant pain) 30 tablet 0     losartan (COZAAR) 25 MG tablet Take 1 tablet (25 mg) by mouth daily 90 tablet 3     metFORMIN (GLUCOPHAGE XR) 500 MG 24 hr tablet Take 3 tablets (1,500 mg) by mouth daily (with dinner) 270 tablet 3     metoprolol succinate ER (TOPROL XL) 25 MG 24 hr tablet Take 0.5 tablets (12.5 mg) by mouth daily 45 tablet 1          REVIEW OF SYSTEMS   As above in the HPI, o/w complete 12-point ROS was negative.     PHYSICAL EXAM   There were no vitals taken for this visit.    Virtual visit     LABORATORY AND IMAGING STUDIES     Lab Results   Component Value Date    WBC 7.5 07/25/2023    WBC 8.6 12/26/2019     Lab Results   Component Value Date    RBC 4.69 07/25/2023    RBC 4.96 12/26/2019     Lab Results   Component Value Date    HGB 12.7 07/25/2023     HGB 14.2 12/26/2019     Lab Results   Component Value Date    HCT 41.3 07/25/2023    HCT 44.6 12/26/2019     Lab Results   Component Value Date    MCV 88 07/25/2023    MCV 90 12/26/2019     Lab Results   Component Value Date    MCH 27.1 07/25/2023    MCH 28.6 12/26/2019     Lab Results   Component Value Date    MCHC 30.8 07/25/2023    MCHC 31.8 12/26/2019     Lab Results   Component Value Date    RDW 17.4 07/25/2023    RDW 14.3 12/26/2019     Lab Results   Component Value Date     07/25/2023     12/26/2019     Last Comprehensive Metabolic Panel:  Sodium   Date Value Ref Range Status   07/25/2023 141 136 - 145 mmol/L Final   02/11/2021 140 133 - 144 mmol/L Final     Potassium   Date Value Ref Range Status   07/25/2023 4.2 3.4 - 5.3 mmol/L Final   04/24/2022 4.1 3.4 - 5.3 mmol/L Final   02/11/2021 4.4 3.4 - 5.3 mmol/L Final     Potassium POCT   Date Value Ref Range Status   05/22/2023 4.5 3.5 - 5.0 mmol/L Final     Chloride   Date Value Ref Range Status   07/25/2023 104 98 - 107 mmol/L Final   04/24/2022 101 94 - 109 mmol/L Final   02/11/2021 107 94 - 109 mmol/L Final     Carbon Dioxide   Date Value Ref Range Status   02/11/2021 27 20 - 32 mmol/L Final     Carbon Dioxide (CO2)   Date Value Ref Range Status   07/25/2023 26 22 - 29 mmol/L Final   04/24/2022 29 20 - 32 mmol/L Final     Anion Gap   Date Value Ref Range Status   07/25/2023 11 7 - 15 mmol/L Final   04/24/2022 2 (L) 3 - 14 mmol/L Final   02/11/2021 6 3 - 14 mmol/L Final     Glucose   Date Value Ref Range Status   07/25/2023 114 (H) 70 - 99 mg/dL Final   04/24/2022 143 (H) 70 - 99 mg/dL Final   02/11/2021 135 (H) 70 - 99 mg/dL Final     Comment:     Non Fasting     GLUCOSE BY METER POCT   Date Value Ref Range Status   05/23/2023 119 (H) 70 - 99 mg/dL Final     Urea Nitrogen   Date Value Ref Range Status   07/25/2023 16.0 6.0 - 20.0 mg/dL Final   04/24/2022 11 7 - 30 mg/dL Final   02/11/2021 13 7 - 30 mg/dL Final     Creatinine   Date Value  Ref Range Status   07/25/2023 0.96 0.67 - 1.17 mg/dL Final   02/11/2021 0.94 0.66 - 1.25 mg/dL Final     GFR Estimate   Date Value Ref Range Status   07/25/2023 >90 >60 mL/min/1.73m2 Final   02/11/2021 >90 >60 mL/min/[1.73_m2] Final     Comment:     Non  GFR Calc  Starting 12/18/2018, serum creatinine based estimated GFR (eGFR) will be   calculated using the Chronic Kidney Disease Epidemiology Collaboration   (CKD-EPI) equation.       Calcium   Date Value Ref Range Status   07/25/2023 9.4 8.6 - 10.0 mg/dL Final   02/11/2021 9.1 8.5 - 10.1 mg/dL Final     Bilirubin Total   Date Value Ref Range Status   07/25/2023 0.4 <=1.2 mg/dL Final   02/11/2021 0.7 0.2 - 1.3 mg/dL Final     Alkaline Phosphatase   Date Value Ref Range Status   07/25/2023 47 40 - 129 U/L Final   02/11/2021 43 40 - 150 U/L Final     ALT   Date Value Ref Range Status   07/25/2023 11 0 - 70 U/L Final     Comment:     Reference intervals for this test were updated on 6/12/2023 to more accurately reflect our healthy population. There may be differences in the flagging of prior results with similar values performed with this method. Interpretation of those prior results can be made in the context of the updated reference intervals.     02/11/2021 43 0 - 70 U/L Final     AST   Date Value Ref Range Status   07/25/2023 13 0 - 45 U/L Final     Comment:     Reference intervals for this test were updated on 6/12/2023 to more accurately reflect our healthy population. There may be differences in the flagging of prior results with similar values performed with this method. Interpretation of those prior results can be made in the context of the updated reference intervals.   02/11/2021 23 0 - 45 U/L Final       Results for orders placed or performed during the hospital encounter of 01/06/23   MR Femur Thigh Right wo & w Contrast     Value    Radiologist flags Large thigh mass    Narrative    EXAMINATION: MR FEMUR THIGH RIGHT W/O & W CONTRAST   1/6/2023  9:07 AM     CLINICAL HISTORY:  Hamstring strain, right, subsequent encounter     COMPARISON:    TECHNIQUE: Multiplanar multi-sequence MR imaging was obtained using  standard sequences in three orthogonal planes the administration of  intravenous or intra-articular gadolinium contrast agent.    FINDINGS:   Right femur:    There is a very large soft tissue mass measuring 27 x 7.4 x 12 cm in  maximal caudocranial, AP and transverse dimensions (series 20, image  21 and series 38, image 32), respectively.    Mass is originating just distal to the conjoined hamstring tendon and  the mass mostly occupies the space of the hamstring musculature.  Proximally the mass is located medial to the gluteus maximums muscle  belly and distally occupies partially the space of the biceps femoris  and the semimembranosus muscle bellies and entirely the  semitendinosus. The semitendinosus muscle belly is also involved the  furthest distally. Proximally the lesion protrudes anteriorly and  appears to invade the obturator externus muscle (series 38, image 27)    The large lesion reveals highly heterogeneous signal with mostly T2  hyperintense areas, mildly T1 hyperintense areas and septations  throughout. Following the administration of intravenous gadolinium  contrast, there is a central necrotic area occupying an area of about  7 x 5 x 14 cm in transverse, AP and CC dimensions.    At the level of the midshaft of the femur, there is very close  proximity and adherence to multiple prominent vessels (series 38,  image 49).    The sciatic nerve travels alongside the anterior margins of the lesion  over a distance of about 20 cm. However, there is a preserved fat  plane between the sciatic nerve and the tibial border of the mass.    Osseous structures:    The bilateral femora appear normal, this red marrow conversion. No  distinct osseous lesions are identified..    Hip joint: The hip joints are not well assessed at the edge of  the  field-of-view. However, no significant abnormalities are identified.    Knee joint: The knee joint is not well assessed.    The left femur appears unremarkable. Including musculatures.      Impression    IMPRESSION:   1. Large soft tissue mass in the posterior compartment of the right  thigh measuring 27 x 7.4 x 12 cm in maximal caudocranial, AP and  transverse dimensions, respectively. The mass originates just distal  to the conjoined hamstring tendon, infiltrates the hamstring muscle  bellies and extends distally within the semitendinosus muscle belly  proximal to the knee joint. However, the semitendinosus tendon sheaths  appears to have increased signal to the level of the knee joint. A  dedicated knee MRI could be useful to identify at the most distal  extent of the lesion.  2. The lesion is heterogeneous, septated, with myxoid and lipoid  components, vividly enhancing with a large central area of necrosis  which measures 7 x 5 x 14 cm. Imaging findings are highly suggestive  of a malignant mass from the spectrum of myxoid liposarcomatous type.  Further evaluation at the [facility] Orthopedic oncology  is recommended.  3. The lesion occupies the posterior muscle compartment of the thigh  just posterior to the sciatic nerve with a preserved fat plane in  between.  4. At the level of the mid shaft of the femur there is very close  proximity and adherence to multiple vessels, that entered the mass,  best seen on series 38 image 49.    Mass is originating just distal to the conjoined hamstring tendon and  the mass mostly occupies the space of the hamstring musculature.  Proximally the mass is located medial to the gluteus maximums muscle  belly and distally occupies partially the space of the biceps femoris  and the semimembranosus muscle bellies and entirely the  semitendinosus. The semitendinosus muscle belly is also involved the  furthest distally. Proximally the lesion protrudes anteriorly  and  appears to invade the obturator externus muscle (series 38, image 27).    [Consider Follow Up: Large thigh mass     This report will be copied to the [facility] to ensure a  provider acknowledges the finding. Access Center is available Monday  through Friday 8am-3:30 pm.    JUTTA ELLERMANN, MD         SYSTEM ID:  [id number]     1/6/2023 MRI right femur thigh: Large soft tissue mass in the posterior compartment of the right thigh measuring 27 x 7.4 x 12 cm.  The mass lies primarily in the hamstring muscle belly.  Heterogeneous signal with areas of necrosis noted.    2/20/23 MRI R femur                                                                   IMPRESSION: In this patient with high grade sarcoma status  post-neoadjuvant chemotherapy;     Increased in size of the 11 x 15.7 x 21.7 cm heterogeneously enhancing  soft tissue mass with myxoid and lipoid components in the posterior  compartment of the right thigh since MRI from 1/6/2023, previously  measured 7.4 x 12 x 20 cm.  *  Distal portion of the mass anteriorly abuts the sciatic nerve and  deep femoral artery/vein without definite invasion.  *  Increased edema within the adductor rosy when compared to prior  study, possibly neurogenic.      PET scan 3/14/23  IMPRESSION:     1. 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      2. Subcentimeter pulmonary nodule in the left lower lobe, which is too small to accurately characterize by PET/CT and warrants continued imaging surveillance.    4/19/23 MRI femur R                                                                 IMPRESSION: In this patient with high grade sarcoma status  post-neoadjuvant chemotherapy;     Slightly increase in the size of the heterogeneously enhancing soft  tissue mass with myxoid and lipoid components in the posterior  compartment of the right thigh since MRI from 2/20/2023, measuring  11  x 16.4 x 21 cm, previously measured 11 x 15.7 x 21.7 cm.   *  Distal portion of the mass anteriorly abuts the sciatic nerve and  deep femoral artery/vein without definite invasion.  *  Slightly increased edema within the adductor rosy when compared  to prior study.    PET 5/30/23  IMPRESSION:     1. Sequelae of post treatment inflammatory change in the right posterior thigh soft tissues without convincing evidence of active neoplasm.     2. Slightly increased size of the non-FDG avid nodule in the left lower lobe and development of additional non-FDG avid subcentimeter nodules in the inferior left upper lobe, right middle lobe, and right lower lobe. While their exact etiologies remain   indeterminate, the interval growth/development of additional nodules raises suspicion for early pulmonary metastases.    PET 7/18/23  Impression:  1. 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   a. Extensive regional soft tissue edema and nonmasslike enhancement,  presumably postoperative.   b. Postoperative fluid collection.  2. New periostitis/deep muscle edema along the mid to distal right  femoral shaft, query low grade stress response.     PATHOLOGY         Final Diagnosis   A.  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   Electronically signed by Vineet Coello MD on 1/17/2023 at 10:17 AM   Comment   UUMAYO   The neoplasm is comprised of highly pleomorphic spindle cells with stromal hyalinization and extensive areas of necrosis.  Focal areas with scattered lipoblasts are seen.  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  MDM-2 gene amplification study by FISH is in progress and result will be provided separately.       I  reviewed the medical records including medical records, laboratory studies, and have personally reviewed imaging including MRI of the R thigh which demonstrates a large 27 cm mass in the posterior compartment of the R thigh.      ASSESSMENT AND PLAN     Mr. 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     He received C1 on 1/30. Patient did experienced worsening pain shortly after starting chemotherapy. MRI done on 2/20/23 showing increase in size of large posterior thigh sarcoma. The comparison of this MRI is to the baseline done on 1/6/23, and in a patient with high grade sarcoma the mass is presumed to have grown in size in the month before starting chemotherapy. However, given that the patient had increase in pain and there is no areas of necrosis observed in the recent MRI, I will consider this to be progression of disease and recommend to plan for surgery with consideration for preoperative radiation therapy.      He started radiation therapy on 3/15.  I reviewed the PET scan done on 3/14 showing some tumor shrinkage at 14 x 14 x 18 from prior 11 x 15.7 x 21 and improved metabolic activity. We discussed that given some response was seen, if needed in the future this will still be an option. Right now, given prior complications from chemo (admission for neutropenic fever, bleeding in the urine presumed to be 2/2 to ifosfamide, and presumed cardiomyopathy with decrease in ejection fraction on echo), we will hold on further chemoRex Alvarez has completed preoperative RT and underwent surgical resection on  5/22. He is healing well and recovering his mobility.    I personally reviewed the most recent PET scan on 7/18/23 showing no evidence recurrence or metastatic disease. He has small subcentemeter pulmonary nodules that have remained stable since diagnosis.  I reviewed prior PET from 3/2023 where we observed that  the PET avidity of the mass was not very high SUV 6.7.     For this reason we will continue with surveillance scans with MRI thigh and CT CAP every 3 months during the first 2 years after surgery.     Plan:    -CT CAP in 3 months  -Labs in 3 months  -RTC after labs and CT CAP    40 minutes spent on the date of the encounter doing chart review, review of test results, interpretation of tests, patient visit, documentation and discussion with other provider(s)     Michael Laboy MD   of Medicine  Hematology, Oncology and Transplantation

## 2023-07-25 ENCOUNTER — ONCOLOGY VISIT (OUTPATIENT)
Dept: ONCOLOGY | Facility: CLINIC | Age: 53
End: 2023-07-25
Attending: STUDENT IN AN ORGANIZED HEALTH CARE EDUCATION/TRAINING PROGRAM
Payer: COMMERCIAL

## 2023-07-25 VITALS
WEIGHT: 276.8 LBS | HEART RATE: 70 BPM | OXYGEN SATURATION: 99 % | DIASTOLIC BLOOD PRESSURE: 84 MMHG | SYSTOLIC BLOOD PRESSURE: 135 MMHG | BODY MASS INDEX: 38.75 KG/M2 | TEMPERATURE: 98.6 F | HEIGHT: 71 IN

## 2023-07-25 DIAGNOSIS — C49.9 SARCOMA OF SOFT TISSUE (H): ICD-10-CM

## 2023-07-25 DIAGNOSIS — C49.9 UNDIFFERENTIATED PLEOMORPHIC SARCOMA (H): ICD-10-CM

## 2023-07-25 LAB
ALBUMIN SERPL BCG-MCNC: 4.1 G/DL (ref 3.5–5.2)
ALP SERPL-CCNC: 47 U/L (ref 40–129)
ALT SERPL W P-5'-P-CCNC: 11 U/L (ref 0–70)
ANION GAP SERPL CALCULATED.3IONS-SCNC: 11 MMOL/L (ref 7–15)
AST SERPL W P-5'-P-CCNC: 13 U/L (ref 0–45)
BILIRUB SERPL-MCNC: 0.4 MG/DL
BUN SERPL-MCNC: 16 MG/DL (ref 6–20)
CALCIUM SERPL-MCNC: 9.4 MG/DL (ref 8.6–10)
CHLORIDE SERPL-SCNC: 104 MMOL/L (ref 98–107)
CREAT SERPL-MCNC: 0.96 MG/DL (ref 0.67–1.17)
DEPRECATED HCO3 PLAS-SCNC: 26 MMOL/L (ref 22–29)
ERYTHROCYTE [DISTWIDTH] IN BLOOD BY AUTOMATED COUNT: 17.4 % (ref 10–15)
GFR SERPL CREATININE-BSD FRML MDRD: >90 ML/MIN/1.73M2
GLUCOSE SERPL-MCNC: 114 MG/DL (ref 70–99)
HCT VFR BLD AUTO: 41.3 % (ref 40–53)
HGB BLD-MCNC: 12.7 G/DL (ref 13.3–17.7)
MAGNESIUM SERPL-MCNC: 2 MG/DL (ref 1.7–2.3)
MCH RBC QN AUTO: 27.1 PG (ref 26.5–33)
MCHC RBC AUTO-ENTMCNC: 30.8 G/DL (ref 31.5–36.5)
MCV RBC AUTO: 88 FL (ref 78–100)
PHOSPHATE SERPL-MCNC: 3.3 MG/DL (ref 2.5–4.5)
PLATELET # BLD AUTO: 290 10E3/UL (ref 150–450)
POTASSIUM SERPL-SCNC: 4.2 MMOL/L (ref 3.4–5.3)
PROT SERPL-MCNC: 7.3 G/DL (ref 6.4–8.3)
RBC # BLD AUTO: 4.69 10E6/UL (ref 4.4–5.9)
SODIUM SERPL-SCNC: 141 MMOL/L (ref 136–145)
WBC # BLD AUTO: 7.5 10E3/UL (ref 4–11)

## 2023-07-25 PROCEDURE — 99215 OFFICE O/P EST HI 40 MIN: CPT | Performed by: STUDENT IN AN ORGANIZED HEALTH CARE EDUCATION/TRAINING PROGRAM

## 2023-07-25 PROCEDURE — 36415 COLL VENOUS BLD VENIPUNCTURE: CPT | Performed by: STUDENT IN AN ORGANIZED HEALTH CARE EDUCATION/TRAINING PROGRAM

## 2023-07-25 PROCEDURE — 83735 ASSAY OF MAGNESIUM: CPT | Performed by: STUDENT IN AN ORGANIZED HEALTH CARE EDUCATION/TRAINING PROGRAM

## 2023-07-25 PROCEDURE — 80053 COMPREHEN METABOLIC PANEL: CPT | Performed by: STUDENT IN AN ORGANIZED HEALTH CARE EDUCATION/TRAINING PROGRAM

## 2023-07-25 PROCEDURE — 84100 ASSAY OF PHOSPHORUS: CPT | Performed by: STUDENT IN AN ORGANIZED HEALTH CARE EDUCATION/TRAINING PROGRAM

## 2023-07-25 PROCEDURE — G0463 HOSPITAL OUTPT CLINIC VISIT: HCPCS | Performed by: STUDENT IN AN ORGANIZED HEALTH CARE EDUCATION/TRAINING PROGRAM

## 2023-07-25 PROCEDURE — 85014 HEMATOCRIT: CPT | Performed by: STUDENT IN AN ORGANIZED HEALTH CARE EDUCATION/TRAINING PROGRAM

## 2023-07-25 NOTE — LETTER
7/25/2023         RE: Antonio Canseco  [address] [place]  [facility] [address]        Dear Colleague,    Thank you for referring your patient, Antonio Canseco, to the [facility] CANCER CLINIC. Please see a copy of my visit note below.        [facility] CANCER CLINIC    FOLLOW UP VISIT NOTE    PATIENT NAME: Antonio Canseco MRN # [id number]  DATE OF VISIT: June 13 , 2023 YOB: 1970    REFERRING PROVIDER: Ari Nascimento PA-C  [facility] [place] [address]    CANCER TYPE: High grade sarcoma       CHIEF COMPLAIN   Thigh pain     HISTORY OF PRESENT ILLNESS   52 year old male with PMH of DM and recent Dx of large 27 cm mass in the posterior R thigh. He reports that he first noticed a node in the posterior thigh on 5/2022, we was evaluated at OSH where he got an Xray and he was told that this was sciatica and was started on physical therapy. Tumor continued to grow rapidly during this time. MRI done on 1/6/2023 showing a large mass in the posterior thigh. 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   He reports having pain and limit mobility. He has been taking daily meloxican with some relief, however he needs to be resting most of the time to avoid pain.     C1 of doxil/ifosfamide on 1/30 follow up MRI showing increase in size for mass without significant areas of necrosis. We decided to discontinue chemotherapy.  Corey cycle of chemotherapy was complicated with neutropenic fever requiring admission and she also developed disseminated herpes zoster. Additionally work up done after chemotherapy revealed that the ejection fraction after chemotherapy had decreased.    He started Preoperative RT on 3/15 and completed on 4/18 . PET done on 3/14 just before starting RT, showed decrease in the metabolic activity of the mass.     5/22 Surgical resection of posterior thigh mass.     Interval History  Antonio comes to the office with his wife.  He  keeps recovering from surgery, his mobility has improved and he tells me that now he is golfing and walking more during the games. He still feels soreness when walking for extended periods of time and also feels some pain in the knees. He has been gaining weight lately.      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       CURRENT OUTPATIENT MEDICATIONS     Current Outpatient Medications   Medication Sig Dispense Refill    acetaminophen (TYLENOL) 325 MG tablet Take 2 tablets (650 mg) by mouth every 4 hours as needed for other 60 tablet 0    aspirin (ASA) 81 MG chewable tablet Take 1 tablet (81 mg) by mouth daily 100 tablet 3    Continuous Blood Gluc Sensor ("[employer]STYLE MORENITA 3 SENSOR) MISC       hydrOXYzine (ATARAX) 25 MG tablet Take 1 tablet (25 mg) by mouth every 6 hours as needed for other (adjuvant pain) 30 tablet 0    losartan (COZAAR) 25 MG tablet Take 1 tablet (25 mg) by mouth daily 90 tablet 3    metFORMIN (GLUCOPHAGE XR) 500 MG 24 hr tablet Take 3 tablets (1,500 mg) by mouth daily (with dinner) 270 tablet 3    metoprolol succinate ER (TOPROL XL) 25 MG 24 hr tablet Take 0.5 tablets (12.5 mg) by mouth daily 45 tablet 1          REVIEW OF SYSTEMS   As above in the HPI, o/w complete 12-point ROS was negative.     PHYSICAL EXAM   There were no vitals taken for this visit.    Virtual visit     LABORATORY AND  IMAGING STUDIES     Lab Results   Component Value Date    WBC 7.5 07/25/2023    WBC 8.6 12/26/2019     Lab Results   Component Value Date    RBC 4.69 07/25/2023    RBC 4.96 12/26/2019     Lab Results   Component Value Date    HGB 12.7 07/25/2023    HGB 14.2 12/26/2019     Lab Results   Component Value Date    HCT 41.3 07/25/2023    HCT 44.6 12/26/2019     Lab Results   Component Value Date    MCV 88 07/25/2023    MCV 90 12/26/2019     Lab Results   Component Value Date    MCH 27.1 07/25/2023    MCH 28.6 12/26/2019     Lab Results   Component Value Date    MCHC 30.8 07/25/2023    MCHC 31.8 12/26/2019     Lab Results   Component Value Date    RDW 17.4 07/25/2023    RDW 14.3 12/26/2019     Lab Results   Component Value Date     07/25/2023     12/26/2019     Last Comprehensive Metabolic Panel:  Sodium   Date Value Ref Range Status   07/25/2023 141 136 - 145 mmol/L Final   02/11/2021 140 133 - 144 mmol/L Final     Potassium   Date Value Ref Range Status   07/25/2023 4.2 3.4 - 5.3 mmol/L Final   04/24/2022 4.1 3.4 - 5.3 mmol/L Final   02/11/2021 4.4 3.4 - 5.3 mmol/L Final     Potassium POCT   Date Value Ref Range Status   05/22/2023 4.5 3.5 - 5.0 mmol/L Final     Chloride   Date Value Ref Range Status   07/25/2023 104 98 - 107 mmol/L Final   04/24/2022 101 94 - 109 mmol/L Final   02/11/2021 107 94 - 109 mmol/L Final     Carbon Dioxide   Date Value Ref Range Status   02/11/2021 27 20 - 32 mmol/L Final     Carbon Dioxide (CO2)   Date Value Ref Range Status   07/25/2023 26 22 - 29 mmol/L Final   04/24/2022 29 20 - 32 mmol/L Final     Anion Gap   Date Value Ref Range Status   07/25/2023 11 7 - 15 mmol/L Final   04/24/2022 2 (L) 3 - 14 mmol/L Final   02/11/2021 6 3 - 14 mmol/L Final     Glucose   Date Value Ref Range Status   07/25/2023 114 (H) 70 - 99 mg/dL Final   04/24/2022 143 (H) 70 - 99 mg/dL Final   02/11/2021 135 (H) 70 - 99 mg/dL Final     Comment:     Non Fasting     GLUCOSE BY METER POCT   Date Value  Ref Range Status   05/23/2023 119 (H) 70 - 99 mg/dL Final     Urea Nitrogen   Date Value Ref Range Status   07/25/2023 16.0 6.0 - 20.0 mg/dL Final   04/24/2022 11 7 - 30 mg/dL Final   02/11/2021 13 7 - 30 mg/dL Final     Creatinine   Date Value Ref Range Status   07/25/2023 0.96 0.67 - 1.17 mg/dL Final   02/11/2021 0.94 0.66 - 1.25 mg/dL Final     GFR Estimate   Date Value Ref Range Status   07/25/2023 >90 >60 mL/min/1.73m2 Final   02/11/2021 >90 >60 mL/min/[1.73_m2] Final     Comment:     Non  GFR Calc  Starting 12/18/2018, serum creatinine based estimated GFR (eGFR) will be   calculated using the Chronic Kidney Disease Epidemiology Collaboration   (CKD-EPI) equation.       Calcium   Date Value Ref Range Status   07/25/2023 9.4 8.6 - 10.0 mg/dL Final   02/11/2021 9.1 8.5 - 10.1 mg/dL Final     Bilirubin Total   Date Value Ref Range Status   07/25/2023 0.4 <=1.2 mg/dL Final   02/11/2021 0.7 0.2 - 1.3 mg/dL Final     Alkaline Phosphatase   Date Value Ref Range Status   07/25/2023 47 40 - 129 U/L Final   02/11/2021 43 40 - 150 U/L Final     ALT   Date Value Ref Range Status   07/25/2023 11 0 - 70 U/L Final     Comment:     Reference intervals for this test were updated on 6/12/2023 to more accurately reflect our healthy population. There may be differences in the flagging of prior results with similar values performed with this method. Interpretation of those prior results can be made in the context of the updated reference intervals.     02/11/2021 43 0 - 70 U/L Final     AST   Date Value Ref Range Status   07/25/2023 13 0 - 45 U/L Final     Comment:     Reference intervals for this test were updated on 6/12/2023 to more accurately reflect our healthy population. There may be differences in the flagging of prior results with similar values performed with this method. Interpretation of those prior results can be made in the context of the updated reference intervals.   02/11/2021 23 0 - 45 U/L Final        Results for orders placed or performed during the hospital encounter of 01/06/23   MR Femur Thigh Right wo & w Contrast     Value    Radiologist flags Large thigh mass    Narrative    EXAMINATION: MR FEMUR THIGH RIGHT W/O & W CONTRAST  1/6/2023  9:07 AM     CLINICAL HISTORY:  Hamstring strain, right, subsequent encounter     COMPARISON:    TECHNIQUE: Multiplanar multi-sequence MR imaging was obtained using  standard sequences in three orthogonal planes the administration of  intravenous or intra-articular gadolinium contrast agent.    FINDINGS:   Right femur:    There is a very large soft tissue mass measuring 27 x 7.4 x 12 cm in  maximal caudocranial, AP and transverse dimensions (series 20, image  21 and series 38, image 32), respectively.    Mass is originating just distal to the conjoined hamstring tendon and  the mass mostly occupies the space of the hamstring musculature.  Proximally the mass is located medial to the gluteus maximums muscle  belly and distally occupies partially the space of the biceps femoris  and the semimembranosus muscle bellies and entirely the  semitendinosus. The semitendinosus muscle belly is also involved the  furthest distally. Proximally the lesion protrudes anteriorly and  appears to invade the obturator externus muscle (series 38, image 27)    The large lesion reveals highly heterogeneous signal with mostly T2  hyperintense areas, mildly T1 hyperintense areas and septations  throughout. Following the administration of intravenous gadolinium  contrast, there is a central necrotic area occupying an area of about  7 x 5 x 14 cm in transverse, AP and CC dimensions.    At the level of the midshaft of the femur, there is very close  proximity and adherence to multiple prominent vessels (series 38,  image 49).    The sciatic nerve travels alongside the anterior margins of the lesion  over a distance of about 20 cm. However, there is a preserved fat  plane between the sciatic nerve  and the tibial border of the mass.    Osseous structures:    The bilateral femora appear normal, this red marrow conversion. No  distinct osseous lesions are identified..    Hip joint: The hip joints are not well assessed at the edge of the  field-of-view. However, no significant abnormalities are identified.    Knee joint: The knee joint is not well assessed.    The left femur appears unremarkable. Including musculatures.      Impression    IMPRESSION:   1. Large soft tissue mass in the posterior compartment of the right  thigh measuring 27 x 7.4 x 12 cm in maximal caudocranial, AP and  transverse dimensions, respectively. The mass originates just distal  to the conjoined hamstring tendon, infiltrates the hamstring muscle  bellies and extends distally within the semitendinosus muscle belly  proximal to the knee joint. However, the semitendinosus tendon sheaths  appears to have increased signal to the level of the knee joint. A  dedicated knee MRI could be useful to identify at the most distal  extent of the lesion.  2. The lesion is heterogeneous, septated, with myxoid and lipoid  components, vividly enhancing with a large central area of necrosis  which measures 7 x 5 x 14 cm. Imaging findings are highly suggestive  of a malignant mass from the spectrum of myxoid liposarcomatous type.  Further evaluation at the [facility] Orthopedic oncology  is recommended.  3. The lesion occupies the posterior muscle compartment of the thigh  just posterior to the sciatic nerve with a preserved fat plane in  between.  4. At the level of the mid shaft of the femur there is very close  proximity and adherence to multiple vessels, that entered the mass,  best seen on series 38 image 49.    Mass is originating just distal to the conjoined hamstring tendon and  the mass mostly occupies the space of the hamstring musculature.  Proximally the mass is located medial to the gluteus maximums muscle  belly and distally  occupies partially the space of the biceps femoris  and the semimembranosus muscle bellies and entirely the  semitendinosus. The semitendinosus muscle belly is also involved the  furthest distally. Proximally the lesion protrudes anteriorly and  appears to invade the obturator externus muscle (series 38, image 27).    [Consider Follow Up: Large thigh mass     This report will be copied to the [facility] to ensure a  provider acknowledges the finding. [facility] Center is available Monday  through Friday 8am-3:30 pm.    JUTTA ELLERMANN, MD         SYSTEM ID:  [id number]     1/6/2023 MRI right femur thigh: Large soft tissue mass in the posterior compartment of the right thigh measuring 27 x 7.4 x 12 cm.  The mass lies primarily in the hamstring muscle belly.  Heterogeneous signal with areas of necrosis noted.    2/20/23 MRI R femur                                                                   IMPRESSION: In this patient with high grade sarcoma status  post-neoadjuvant chemotherapy;     Increased in size of the 11 x 15.7 x 21.7 cm heterogeneously enhancing  soft tissue mass with myxoid and lipoid components in the posterior  compartment of the right thigh since MRI from 1/6/2023, previously  measured 7.4 x 12 x 20 cm.  *  Distal portion of the mass anteriorly abuts the sciatic nerve and  deep femoral artery/vein without definite invasion.  *  Increased edema within the adductor rosy when compared to prior  study, possibly neurogenic.      PET scan 3/14/23  IMPRESSION:     1. 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      2. Subcentimeter pulmonary nodule in the left lower lobe, which is too small to accurately characterize by PET/CT and warrants continued imaging surveillance.    4/19/23 MRI femur R                                                                 IMPRESSION: In this patient with high grade sarcoma  status  post-neoadjuvant chemotherapy;     Slightly increase in the size of the heterogeneously enhancing soft  tissue mass with myxoid and lipoid components in the posterior  compartment of the right thigh since MRI from 2/20/2023, measuring 11  x 16.4 x 21 cm, previously measured 11 x 15.7 x 21.7 cm.   *  Distal portion of the mass anteriorly abuts the sciatic nerve and  deep femoral artery/vein without definite invasion.  *  Slightly increased edema within the adductor rosy when compared  to prior study.    PET 5/30/23  IMPRESSION:     1. Sequelae of post treatment inflammatory change in the right posterior thigh soft tissues without convincing evidence of active neoplasm.     2. Slightly increased size of the non-FDG avid nodule in the left lower lobe and development of additional non-FDG avid subcentimeter nodules in the inferior left upper lobe, right middle lobe, and right lower lobe. While their exact etiologies remain   indeterminate, the interval growth/development of additional nodules raises suspicion for early pulmonary metastases.    PET 7/18/23  Impression:  1. 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   a. Extensive regional soft tissue edema and nonmasslike enhancement,  presumably postoperative.   b. Postoperative fluid collection.  2. New periostitis/deep muscle edema along the mid to distal right  femoral shaft, query low grade stress response.     PATHOLOGY         Final Diagnosis   A.  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   Electronically signed by Vineet Coello MD on 1/17/2023 at 10:17 AM   Comment   [facility]   The neoplasm is comprised of highly pleomorphic spindle cells with stromal hyalinization and extensive areas of necrosis.  Focal areas with scattered lipoblasts are  seen.  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  MDM-2 gene amplification study by FISH is in progress and result will be provided separately.       I reviewed the medical records including medical records, laboratory studies, and have personally reviewed imaging including MRI of the R thigh which demonstrates a large 27 cm mass in the posterior compartment of the R thigh.      ASSESSMENT AND PLAN     Mr. 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     He received C1 on 1/30. Patient did experienced worsening pain shortly after starting chemotherapy. MRI done on 2/20/23 showing increase in size of large posterior thigh sarcoma. The comparison of this MRI is to the baseline done on 1/6/23, and in a patient with high grade sarcoma the mass is presumed to have grown in size in the month before starting chemotherapy. However, given that the patient had increase in pain and there is no areas of necrosis observed in the recent MRI, I will consider this to be progression of disease and recommend to plan for surgery with consideration for preoperative radiation therapy.      He started radiation therapy on 3/15.  I reviewed the PET scan done on 3/14 showing some tumor shrinkage at 14 x 14 x 18 from prior 11 x 15.7 x 21 and improved metabolic activity. We discussed that given some response was seen, if needed in the future this will still be an option. Right now, given prior complications from chemo (admission for neutropenic fever, bleeding in the urine presumed to be 2/2 to ifosfamide, and presumed cardiomyopathy with decrease in ejection fraction on echo), we will hold on further chemo.    Antonio has completed preoperative RT and underwent surgical resection on  5/22. He is healing well and recovering his mobility.    I personally  reviewed the most recent PET scan on 7/18/23 showing no evidence recurrence or metastatic disease. He has small subcentemeter pulmonary nodules that have remained stable since diagnosis.  I reviewed prior PET from 3/2023 where we observed that the PET avidity of the mass was not very high SUV 6.7.     For this reason we will continue with surveillance scans with MRI thigh and CT CAP every 3 months during the first 2 years after surgery.     Plan:    -CT CAP in 3 months  -Labs in 3 months  -RTC after labs and CT CAP    40 minutes spent on the date of the encounter doing chart review, review of test results, interpretation of tests, patient visit, documentation and discussion with other provider(s)     Michael Laboy MD   of Medicine  Hematology, Oncology and Transplantation

## 2023-07-25 NOTE — NURSING NOTE
"Oncology Rooming Note    July 25, 2023 3:29 PM   Antonio Canseco is a 53 year old male who presents for:    Chief Complaint   Patient presents with    Oncology Clinic Visit     Undifferentiated pleomorphic sarcoma (H)     Initial Vitals: /84 (BP Location: Right arm, Patient Position: Sitting, Cuff Size: Adult Regular)   Pulse 70   Temp 98.6  F (37  C) (Oral)   Ht 1.803 m (5' 10.98\")   Wt 125.6 kg (276 lb 12.8 oz)   SpO2 99%   BMI 38.62 kg/m   Estimated body mass index is 38.62 kg/m  as calculated from the following:    Height as of this encounter: 1.803 m (5' 10.98\").    Weight as of this encounter: 125.6 kg (276 lb 12.8 oz). Body surface area is 2.51 meters squared.  Data Unavailable Comment: Data Unavailable   No LMP for male patient.  Allergies reviewed: Yes  Medications reviewed: Yes    Medications: Medication refills not needed today.  Pharmacy name entered into [facility]: [facility] [place], [place] - [address]    Clinical concerns:  none      Rick Wiseman             "

## 2023-07-28 ENCOUNTER — LAB (OUTPATIENT)
Dept: LAB | Facility: CLINIC | Age: 53
End: 2023-07-28
Payer: COMMERCIAL

## 2023-07-28 ENCOUNTER — OFFICE VISIT (OUTPATIENT)
Dept: CARDIOLOGY | Facility: CLINIC | Age: 53
End: 2023-07-28
Attending: NURSE PRACTITIONER
Payer: COMMERCIAL

## 2023-07-28 VITALS
HEART RATE: 68 BPM | HEIGHT: 71 IN | SYSTOLIC BLOOD PRESSURE: 131 MMHG | BODY MASS INDEX: 38.64 KG/M2 | WEIGHT: 276 LBS | DIASTOLIC BLOOD PRESSURE: 85 MMHG

## 2023-07-28 DIAGNOSIS — I42.8 NICM (NONISCHEMIC CARDIOMYOPATHY) (H): ICD-10-CM

## 2023-07-28 DIAGNOSIS — I10 ESSENTIAL HYPERTENSION: ICD-10-CM

## 2023-07-28 DIAGNOSIS — E66.01 MORBID OBESITY (H): ICD-10-CM

## 2023-07-28 DIAGNOSIS — I42.8 NICM (NONISCHEMIC CARDIOMYOPATHY) (H): Primary | ICD-10-CM

## 2023-07-28 DIAGNOSIS — G47.33 OSA ON CPAP: ICD-10-CM

## 2023-07-28 LAB
ANION GAP SERPL CALCULATED.3IONS-SCNC: 10 MMOL/L (ref 7–15)
BUN SERPL-MCNC: 17.5 MG/DL (ref 6–20)
CALCIUM SERPL-MCNC: 9.3 MG/DL (ref 8.6–10)
CHLORIDE SERPL-SCNC: 102 MMOL/L (ref 98–107)
CREAT SERPL-MCNC: 0.91 MG/DL (ref 0.67–1.17)
DEPRECATED HCO3 PLAS-SCNC: 27 MMOL/L (ref 22–29)
GFR SERPL CREATININE-BSD FRML MDRD: >90 ML/MIN/1.73M2
GLUCOSE SERPL-MCNC: 98 MG/DL (ref 70–99)
POTASSIUM SERPL-SCNC: 4.9 MMOL/L (ref 3.4–5.3)
SODIUM SERPL-SCNC: 139 MMOL/L (ref 136–145)

## 2023-07-28 PROCEDURE — 99214 OFFICE O/P EST MOD 30 MIN: CPT | Performed by: NURSE PRACTITIONER

## 2023-07-28 PROCEDURE — 80048 BASIC METABOLIC PNL TOTAL CA: CPT | Performed by: NURSE PRACTITIONER

## 2023-07-28 PROCEDURE — 36415 COLL VENOUS BLD VENIPUNCTURE: CPT | Performed by: NURSE PRACTITIONER

## 2023-07-28 RX ORDER — METOPROLOL SUCCINATE 25 MG/1
25 TABLET, EXTENDED RELEASE ORAL DAILY
Qty: 90 TABLET | Refills: 3 | Status: SHIPPED | OUTPATIENT
Start: 2023-07-28 | End: 2024-08-14

## 2023-07-28 NOTE — LETTER
7/28/2023    Mynor Mason MD  [address] [facility] [address]    RE: Antonio Canseco       Dear Colleague,     I had the pleasure of seeing Antonio Canseco in the [facility].  Cardiology Clinic Progress Note  Antonio Canseco MRN# [id number]   YOB: 1970 Age: 52 year old     Reason For Visit: Nonischemic cardiomyopathy   Primary Cardiologist:   Dr. Cee          History of Presenting Illness:      Antonio Canseco is a pleasant 53 year old patient who carries a past medical history significant for right thigh seroma  status post chemotherapy followed by excision on 5/22/2023, nonischemic cardiomyopathy, hypertension, diabetes, remote smoker, emphysema, obstructive sleep apnea on CPAP and obesity.    Echocardiogram in January 2023 showed a normal ejection fraction of 55%.  However, as part of a preoperative evaluation for a seroma excision in May, a Lexiscan showed no evidence of ischemia but a small apical infarct versus artifact.  A follow-up cardiac MRI showed a moderately reduced LV systolic function with an EF of 40% and no evidence of MI.  He was initiated on GDMT, losartan and Toprol followed by further up titration of losartan on last office visit 6/23/2023.    He returns to the office today for a 1 month follow-up.  He offers no cardiac complaint, denies chest pain, shortness of breath, palpitations, PND, orthopnea, presyncope, syncope, or lower extremity edema.  Upon exam, lungs are clear bilaterally, heart rate and rhythm regular, no evidence of fluid overload.  Over the last month, he is starting to exercise more and gets out on the golf course at least 2-3 times per week.     Blood pressure is well controlled at 131/85  BMP today was normal with a sodium of 139, potassium 4.9, BUN 17.5, creatinine 0.91, GFR greater than 90  Hemoglobin stable at 12.7, hematocrit 41.3, platelet 290  BMI 38.49, 276 pounds, up approximately 15 pounds over the last month which he attributes  to lack of activity.    Faithful with CPAP  Remains compliant with all medications.                   Assessment and Plan:       1.  Nonischemic cardiomyopathy -  Cardiac MRI (5/2023) showed a moderately reduced LV systolic function with an EF of 40% and no evidence of MI.  To further optimize GDMT I will increase Toprol to 25 mg daily daily and continue current dose of losartan.  Follow-up limited echocardiogram in 1 month to reassess LV function.  2.  Hypertension -well-controlled  3.  Diabetes -A1c 5.7, managed on metformin  4.  Obesity -up 15 pounds over the last month.  Encourage exercise and weight loss  5.  Right thigh seroma -status post excision.  Follows with oncology  6.  Obstructive sleep apnea -on CPAP         Thank you for allowing me to participate in this delightful patient's care. I have recommended he follow up in 2 month with [facility] with limited echo prior       JEFFREY Webster CNP         Review of Systems:     Review of Systems:  Skin:        Eyes:       ENT:       Respiratory:  Negative    Cardiovascular:  Negative    Gastroenterology:      Genitourinary:       Musculoskeletal:       Neurologic:       Psychiatric:       Heme/Lymph/Imm:       Endocrine:                   Physical Exam:     GEN:  In general, this is a obese male in no acute distress.  HEENT:  Pupils equal, round. Sclerae nonicteric. Clear oropharynx. Mucous membranes moist.  NECK  No JVD   C/V:  Regular rate and rhythm, no murmur, rub or gallop. No S3 or RV heave.   RESP: Respirations are unlabored. No use of accessory muscles. Clear to auscultation bilaterally without wheezing, rales, or rhonchi.  GI: Obese abdomen  EXTREM: No LE edema. No cyanosis or clubbing.  NEURO: Alert and oriented, cooperative. No obvious focal deficits.   PSYCH: Normal affect.  SKIN: Warm and dry. No rashes or petechiae appreciated.          Past Medical History:     Past Medical History:   Diagnosis Date    Acrochordon 02/11/2021    CARDIOVASCULAR  SCREENING; LDL GOAL LESS THAN 160 03/27/2012    Colon adenoma     Controlled type 2 diabetes mellitus without complication, without long-term current use of insulin (H) 09/16/2019    X 1    Cough 02/04/2014    Degeneration of thoracic intervertebral disc 12/11/2013    Dysfunction of both eustachian tubes 04/12/2019    Elevated blood pressure 12/22/2014    Elevated hemoglobin A1c 09/16/2019    X 1    Gastroesophageal reflux disease     Hamstring strain, right, subsequent encounter 12/19/2022    Hepatic cyst 05/22/2018    Hepatic steatosis 12/11/2013    History of colonic polyps 12/11/2013    History of drainage of abscess 09/16/2019    Hypertension     Irritable bowel syndrome without diarrhea 06/01/2018    Low libido 04/12/2019    Lumbar radiculopathy 12/19/2022    Microscopic hematuria 12/19/2013    Nephrolithiasis     Obesity 03/27/2012    BRIAN on CPAP 03/27/2012    Other irritable bowel syndrome 05/22/2018    Pain in thoracic spine 05/09/2013    Pulmonary nodule 05/22/2018    Right-sided chest pain 04/13/2018    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    Sleep apnea     Tinea pedis of both feet 02/11/2021    Tinnitus, unspecified laterality 04/12/2019    Uncontrolled diabetes mellitus with hyperglycemia, without long-term current use of insulin (H) 01/23/2023              Past Surgical History:     Past Surgical History:   Procedure Laterality Date    COLONOSCOPY      COLONOSCOPY N/A 03/12/2021    Procedure: COLONOSCOPY (fv);  Surgeon: Ean Alcantara MD;  Location: [facility] OR    CYSTOSCOPY  02/11/2014    Procedure: CYSTOSCOPY;   Video Cystoscopy with Exam Under Anesthesia;  Surgeon: Chente Moeller MD;  Location: [facility] OR    IR CVC TUNNEL PLACEMENT > 5 YRS OF AGE  1/27/2023    IR CVC TUNNEL REMOVAL RIGHT  3/13/2023    LEG SURGERY Left 2019    Suregy r/t infection    RESECT TUMOR LOWER EXTREMITY Right 5/22/2023    Procedure: EXCISION, NEOPLASM, RIGHT POSTERIOR THIGH;  Surgeon: Zach Salgado MD;  Location:  OR    [place]  teeth                Allergies:   Emend [aprepitant], Kiwi, and Lisinopril       Data:   All laboratory data reviewed:    LAST CHOLESTEROL:  Lab Results   Component Value Date    CHOL 133 01/18/2023    CHOL 139 02/11/2021     Lab Results   Component Value Date    HDL 38 01/18/2023    HDL 46 02/11/2021     Lab Results   Component Value Date    LDL 74 01/18/2023    LDL 49 02/11/2021     Lab Results   Component Value Date    TRIG 103 01/18/2023    TRIG 222 02/11/2021     Lab Results   Component Value Date    CHOLHDLRATIO 2.8 08/25/2015       LAST BMP:  Lab Results   Component Value Date     05/23/2023     02/11/2021      Lab Results   Component Value Date    POTASSIUM 4.3 05/23/2023    POTASSIUM 4.5 05/22/2023    POTASSIUM 4.1 04/24/2022    POTASSIUM 4.4 02/11/2021     Lab Results   Component Value Date    CHLORIDE 103 05/23/2023    CHLORIDE 101 04/24/2022    CHLORIDE 107 02/11/2021     Lab Results   Component Value Date    DORIAN 9.0 05/23/2023    DORIAN 9.1 02/11/2021     Lab Results   Component Value Date    CO2 27 05/23/2023    CO2 29 04/24/2022    CO2 27 02/11/2021     Lab Results   Component Value Date    BUN 13.6 05/23/2023    BUN 11 04/24/2022    BUN 13 02/11/2021     Lab Results   Component Value Date    CR 0.73 05/23/2023    CR 0.94 02/11/2021     Lab Results   Component Value Date     05/23/2023     05/23/2023     04/24/2022     02/11/2021       LAST CBC:  Lab Results   Component Value Date    WBC 8.6 06/07/2023    WBC 8.6 12/26/2019     Lab Results   Component Value Date    RBC 4.50 06/07/2023    RBC 4.96 12/26/2019     Lab Results   Component Value Date    HGB 11.8 06/07/2023    HGB 14.2 12/26/2019     Lab Results   Component Value Date    HCT 39.1 06/07/2023    HCT 44.6 12/26/2019     Lab Results   Component Value Date    MCV 87 06/07/2023    MCV 90 12/26/2019     Lab Results   Component Value Date    MCH 26.2 06/07/2023    [facility] 28.6 12/26/2019     Lab Results   Component  Value Date    MCHC 30.2 06/07/2023    MCHC 31.8 12/26/2019     Lab Results   Component Value Date    RDW 19.6 06/07/2023    RDW 14.3 12/26/2019     Lab Results   Component Value Date     06/07/2023     12/26/2019           Thank you for allowing me to participate in the care of your patient.      Sincerely,     JEFFREY Webster CNP     [facility]  cc:   JEFFREY Webster CNP  [address] [place] [address]

## 2023-07-28 NOTE — PROGRESS NOTES
Cardiology Clinic Progress Note  Antonio Canseco MRN# [id number]   YOB: 1970 Age: 52 year old     Reason For Visit: Nonischemic cardiomyopathy   Primary Cardiologist:   Dr. Cee          History of Presenting Illness:      Antonio Canseco is a pleasant 53 year old patient who carries a past medical history significant for right thigh seroma  status post chemotherapy followed by excision on 5/22/2023, nonischemic cardiomyopathy, hypertension, diabetes, remote smoker, emphysema, obstructive sleep apnea on CPAP and obesity.    Echocardiogram in January 2023 showed a normal ejection fraction of 55%.  However, as part of a preoperative evaluation for a seroma excision in May, a Lexiscan showed no evidence of ischemia but a small apical infarct versus artifact.  A follow-up cardiac MRI showed a moderately reduced LV systolic function with an EF of 40% and no evidence of MI.  He was initiated on GDMT, losartan and Toprol followed by further up titration of losartan on last office visit 6/23/2023.    He returns to the office today for a 1 month follow-up.  He offers no cardiac complaint, denies chest pain, shortness of breath, palpitations, PND, orthopnea, presyncope, syncope, or lower extremity edema.  Upon exam, lungs are clear bilaterally, heart rate and rhythm regular, no evidence of fluid overload.  Over the last month, he is starting to exercise more and gets out on the golf course at least 2-3 times per week.     Blood pressure is well controlled at 131/85  BMP today was normal with a sodium of 139, potassium 4.9, BUN 17.5, creatinine 0.91, GFR greater than 90  Hemoglobin stable at 12.7, hematocrit 41.3, platelet 290  BMI 38.49, 276 pounds, up approximately 15 pounds over the last month which he attributes to lack of activity.    Faithful with CPAP  Remains compliant with all medications.                   Assessment and Plan:       1.  Nonischemic cardiomyopathy -  Cardiac MRI (5/2023) showed a  moderately reduced LV systolic function with an EF of 40% and no evidence of MI.  To further optimize GDMT I will increase Toprol to 25 mg daily daily and continue current dose of losartan.  Follow-up limited echocardiogram in 1 month to reassess LV function.  2.  Hypertension -well-controlled  3.  Diabetes -A1c 5.7, managed on metformin  4.  Obesity -up 15 pounds over the last month.  Encourage exercise and weight loss  5.  Right thigh seroma -status post excision.  Follows with oncology  6.  Obstructive sleep apnea -on CPAP         Thank you for allowing me to participate in this delightful patient's care. I have recommended he follow up in 2 month with [facility] with limited echo prior       JEFFREY Webster CNP         Review of Systems:     Review of Systems:  Skin:        Eyes:       ENT:       Respiratory:  Negative    Cardiovascular:  Negative    Gastroenterology:      Genitourinary:       Musculoskeletal:       Neurologic:       Psychiatric:       Heme/Lymph/Imm:       Endocrine:                   Physical Exam:     GEN:  In general, this is a obese male in no acute distress.  HEENT:  Pupils equal, round. Sclerae nonicteric. Clear oropharynx. Mucous membranes moist.  NECK  No JVD   C/V:  Regular rate and rhythm, no murmur, rub or gallop. No S3 or RV heave.   RESP: Respirations are unlabored. No use of accessory muscles. Clear to auscultation bilaterally without wheezing, rales, or rhonchi.  GI: Obese abdomen  EXTREM: No LE edema. No cyanosis or clubbing.  NEURO: Alert and oriented, cooperative. No obvious focal deficits.   PSYCH: Normal affect.  SKIN: Warm and dry. No rashes or petechiae appreciated.          Past Medical History:     Past Medical History:   Diagnosis Date    Acrochordon 02/11/2021    CARDIOVASCULAR SCREENING; LDL GOAL LESS THAN 160 03/27/2012    Colon adenoma     Controlled type 2 diabetes mellitus without complication, without long-term current use of insulin (H) 09/16/2019    X 1    Cough  02/04/2014    Degeneration of thoracic intervertebral disc 12/11/2013    Dysfunction of both eustachian tubes 04/12/2019    Elevated blood pressure 12/22/2014    Elevated hemoglobin A1c 09/16/2019    X 1    Gastroesophageal reflux disease     Hamstring strain, right, subsequent encounter 12/19/2022    Hepatic cyst 05/22/2018    Hepatic steatosis 12/11/2013    History of colonic polyps 12/11/2013    History of drainage of abscess 09/16/2019    Hypertension     Irritable bowel syndrome without diarrhea 06/01/2018    Low libido 04/12/2019    Lumbar radiculopathy 12/19/2022    Microscopic hematuria 12/19/2013    Nephrolithiasis     Obesity 03/27/2012    BRIAN on CPAP 03/27/2012    Other irritable bowel syndrome 05/22/2018    Pain in thoracic spine 05/09/2013    Pulmonary nodule 05/22/2018    Right-sided chest pain 04/13/2018    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    Sleep apnea     Tinea pedis of both feet 02/11/2021    Tinnitus, unspecified laterality 04/12/2019    Uncontrolled diabetes mellitus with hyperglycemia, without long-term current use of insulin (H) 01/23/2023              Past Surgical History:     Past Surgical History:   Procedure Laterality Date    COLONOSCOPY      COLONOSCOPY N/A 03/12/2021    Procedure: COLONOSCOPY (fv);  Surgeon: Ean Alcantara MD;  Location: [facility] OR    CYSTOSCOPY  02/11/2014    Procedure: CYSTOSCOPY;   Video Cystoscopy with Exam Under Anesthesia;  Surgeon: Chente Moeller MD;  Location: [facility] OR    IR CVC TUNNEL PLACEMENT > 5 YRS OF AGE  1/27/2023    IR CVC TUNNEL REMOVAL RIGHT  3/13/2023    LEG SURGERY Left 2019    Suregy r/t infection    RESECT TUMOR LOWER EXTREMITY Right 5/22/2023    Procedure: EXCISION, NEOPLASM, RIGHT POSTERIOR THIGH;  Surgeon: Zach Salgado MD;  Location: [facility] OR    [facility]                Allergies:   Emend [aprepitant], Kiwi, and Lisinopril       Data:   All laboratory data reviewed:    LAST CHOLESTEROL:  Lab Results   Component Value Date    CHOL 133 01/18/2023     CHOL 139 02/11/2021     Lab Results   Component Value Date    HDL 38 01/18/2023    HDL 46 02/11/2021     Lab Results   Component Value Date    LDL 74 01/18/2023    LDL 49 02/11/2021     Lab Results   Component Value Date    TRIG 103 01/18/2023    TRIG 222 02/11/2021     Lab Results   Component Value Date    CHOLHDLRATIO 2.8 08/25/2015       LAST BMP:  Lab Results   Component Value Date     05/23/2023     02/11/2021      Lab Results   Component Value Date    POTASSIUM 4.3 05/23/2023    POTASSIUM 4.5 05/22/2023    POTASSIUM 4.1 04/24/2022    POTASSIUM 4.4 02/11/2021     Lab Results   Component Value Date    CHLORIDE 103 05/23/2023    CHLORIDE 101 04/24/2022    CHLORIDE 107 02/11/2021     Lab Results   Component Value Date    DORIAN 9.0 05/23/2023    DORIAN 9.1 02/11/2021     Lab Results   Component Value Date    CO2 27 05/23/2023    CO2 29 04/24/2022    CO2 27 02/11/2021     Lab Results   Component Value Date    BUN 13.6 05/23/2023    BUN 11 04/24/2022    BUN 13 02/11/2021     Lab Results   Component Value Date    CR 0.73 05/23/2023    CR 0.94 02/11/2021     Lab Results   Component Value Date     05/23/2023     05/23/2023     04/24/2022     02/11/2021       LAST CBC:  Lab Results   Component Value Date    WBC 8.6 06/07/2023    WBC 8.6 12/26/2019     Lab Results   Component Value Date    RBC 4.50 06/07/2023    RBC 4.96 12/26/2019     Lab Results   Component Value Date    HGB 11.8 06/07/2023    HGB 14.2 12/26/2019     Lab Results   Component Value Date    HCT 39.1 06/07/2023    HCT 44.6 12/26/2019     Lab Results   Component Value Date    MCV 87 06/07/2023    MCV 90 12/26/2019     Lab Results   Component Value Date    MCH 26.2 06/07/2023    MCH 28.6 12/26/2019     Lab Results   Component Value Date    MCHC 30.2 06/07/2023    MCHC 31.8 12/26/2019     Lab Results   Component Value Date    RDW 19.6 06/07/2023    RDW 14.3 12/26/2019     Lab Results   Component Value Date      06/07/2023     12/26/2019

## 2023-07-28 NOTE — PATIENT INSTRUCTIONS
Thanks for participating in a office visit with the [facility] today.    Doing well on a cardiac standpoint   Blood pressures well controlled  To help improved heart function will continue to uptitrate medications. Increase Toprol to 25 mg daily.  Continue losartan at current dose.  Exercise, weight loss, and strict low salt.   Limited echocardiogram in 1 month     Follow up in 2 months with [facility]     Please call my nurse at  [phone] with any questions or concerns.    Scheduling phone number: [phone]  Reminder: Please bring in all current medications, over the counter supplements and vitamin bottles to your next appointment.

## 2023-08-14 NOTE — PROGRESS NOTES
[facility] CANCER CLINIC    NEW PATIENT VISIT NOTE    PATIENT NAME: Antonio Canseco MRN # [id number]  DATE OF VISIT: January 17, 2023 YOB: 1970    REFERRING PROVIDER: Ari Nascimento PA-C  [facility] [place] [address]    CANCER TYPE: High grade sarcoma       CHIEF COMPLAIN   Thigh pain     HISTORY OF PRESENT ILLNESS   52 year old male with PMH of DM and recent Dx of large 27 cm mass in the posterior R thigh. He reports that he first noticed a node in the posterior thigh on 5/2022, we was evaluated at OSH where he got an Xray and he was told that this was sciatica and was started on physical therapy. Tumor continued to grow rapidly during this time. He underwent biopsy by Dr. Salgado.   He reports having pain and limit mobility. He has been taking daily meloxican with some relief, however he needs to be resting most of the time to avoid pain.      PAST ONCOLOGIC HISTORY   None     PAST MEDICAL HISTORY   DM - untreated   Fatty liver disease  Hydradenitis supporativa    PAST SURGICAL HISTORY   HS infected surgery 2018     FAMILY HISTORY   Father: lymphoma, grandfather lung cancer     ALLERGIES      Allergies   Allergen Reactions     Kiwi Itching          SOCIAL HISTORY     Social History     Social History Narrative     Not on file     , no alcohol, tobacco, 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       CURRENT OUTPATIENT MEDICATIONS     Current Outpatient Medications   Medication Sig Dispense Refill     albuterol (PROAIR HFA/PROVENTIL HFA/VENTOLIN HFA) 108 (90 Base) MCG/ACT inhaler Inhale 2 puffs into the lungs every 6 hours as needed for shortness of breath / dyspnea or wheezing (Patient not taking: Reported on 12/19/2022) 18 g 0     blood glucose (NO BRAND SPECIFIED) test strip accuchek guide- Use to test blood sugar 1 times daily or as directed. 100 strip 0     blood glucose monitoring (ACCU-CHEK FASTCLIX) lancets 1 each daily Accuchek guide 100 each 0     BREO ELLIPTA  100-25 MCG/INH inhaler INHALE 1 PUFF INTO THE LUNGS DAILY 1 each 6     DULoxetine (CYMBALTA) 60 MG capsule Take 1 capsule (60 mg) by mouth daily 90 capsule 1     gabapentin (NEURONTIN) 300 MG capsule Take 1 capsule (300 mg) by mouth 3 times daily Take 1 at bedtime X 7 days, then 1 twice daily X 7 days, then 1 tid 90 capsule 3     lisinopril (ZESTRIL) 20 MG tablet Take 1 tablet (20 mg) by mouth daily 90 tablet 1     meloxicam (MOBIC) 15 MG tablet Take 1 tablet (15 mg) by mouth daily 90 tablet 0     STATIN NOT PRESCRIBED (INTENTIONAL) Please choose reason not prescribed from choices below.            REVIEW OF SYSTEMS   As above in the HPI, o/w complete 12-point ROS was negative.     PHYSICAL EXAM   There were no vitals taken for this visit.    EGO  GEN: NAD  HEENT: PERRL, EOMI, no icterus, injection or pallor. Oropharynx is clear.  NECK: no cervical or supraclavicular lymphadenopathy  LUNGS: clear bilaterally  CV: regular, no murmurs, rubs, or gallops  ABDOMEN: soft, non-tender, non-distended, normal bowel sounds, no hepatosplenomegaly by percussion or palpation  EXT: warm, well perfused, no edema, R leg circumference of 29.5 in.   NEURO: alert  SKIN: no rashes     LABORATORY AND IMAGING STUDIES     Recent Labs   Lab Test 22  1245 21  1202   * 139   POTASSIUM 4.1 4.8   CHLORIDE 101 106   CO2 29 28   ANIONGAP 2* 5   BUN 11 13   CR 0.78 0.85   * 177*   DORIAN 8.8 8.9     Recent Labs   Lab Test 22  1245 19  1745 18  0823   WBC 6.4 8.6 7.3   HGB 14.8 14.2 15.0    237 249   MCV 91 90 91   NEUTROPHIL 66  --   --      Recent Labs   Lab Test 22  1245 21  1202 21  1622   BILITOTAL 1.7* 0.8 0.7   ALKPHOS 44 47 43   ALT 43 50 43   AST 21 22 23   ALBUMIN 3.4 3.5 3.7     TSH   Date Value Ref Range Status   2021 2.11 0.40 - 4.00 mU/L Final   10/07/2019 3.07 0.40 - 4.00 mU/L Final       Results for orders placed or performed during the hospital encounter of  01/06/23   MR Femur Thigh Right wo & w Contrast     Value    Radiologist flags Large thigh mass    Narrative    EXAMINATION: MR FEMUR THIGH RIGHT W/O & W CONTRAST  1/6/2023  9:07 AM     CLINICAL HISTORY:  Hamstring strain, right, subsequent encounter     COMPARISON:    TECHNIQUE: Multiplanar multi-sequence MR imaging was obtained using  standard sequences in three orthogonal planes the administration of  intravenous or intra-articular gadolinium contrast agent.    FINDINGS:   Right femur:    There is a very large soft tissue mass measuring 27 x 7.4 x 12 cm in  maximal caudocranial, AP and transverse dimensions (series 20, image  21 and series 38, image 32), respectively.    Mass is originating just distal to the conjoined hamstring tendon and  the mass mostly occupies the space of the hamstring musculature.  Proximally the mass is located medial to the gluteus maximums muscle  belly and distally occupies partially the space of the biceps femoris  and the semimembranosus muscle bellies and entirely the  semitendinosus. The semitendinosus muscle belly is also involved the  furthest distally. Proximally the lesion protrudes anteriorly and  appears to invade the obturator externus muscle (series 38, image 27)    The large lesion reveals highly heterogeneous signal with mostly T2  hyperintense areas, mildly T1 hyperintense areas and septations  throughout. Following the administration of intravenous gadolinium  contrast, there is a central necrotic area occupying an area of about  7 x 5 x 14 cm in transverse, AP and CC dimensions.    At the level of the midshaft of the femur, there is very close  proximity and adherence to multiple prominent vessels (series 38,  image 49).    The sciatic nerve travels alongside the anterior margins of the lesion  over a distance of about 20 cm. However, there is a preserved fat  plane between the sciatic nerve and the tibial border of the mass.    Osseous structures:    The bilateral  femora appear normal, this red marrow conversion. No  distinct osseous lesions are identified..    Hip joint: The hip joints are not well assessed at the edge of the  field-of-view. However, no significant abnormalities are identified.    Knee joint: The knee joint is not well assessed.    The left femur appears unremarkable. Including musculatures.      Impression    IMPRESSION:   1. Large soft tissue mass in the posterior compartment of the right  thigh measuring 27 x 7.4 x 12 cm in maximal caudocranial, AP and  transverse dimensions, respectively. The mass originates just distal  to the conjoined hamstring tendon, infiltrates the hamstring muscle  bellies and extends distally within the semitendinosus muscle belly  proximal to the knee joint. However, the semitendinosus tendon sheaths  appears to have increased signal to the level of the knee joint. A  dedicated knee MRI could be useful to identify at the most distal  extent of the lesion.  2. The lesion is heterogeneous, septated, with myxoid and lipoid  components, vividly enhancing with a large central area of necrosis  which measures 7 x 5 x 14 cm. Imaging findings are highly suggestive  of a malignant mass from the spectrum of myxoid liposarcomatous type.  Further evaluation at the [facility] oncology  is recommended.  3. The lesion occupies the posterior muscle compartment of the thigh  just posterior to the sciatic nerve with a preserved fat plane in  between.  4. At the level of the mid shaft of the femur there is very close  proximity and adherence to multiple vessels, that entered the mass,  best seen on series 38 image 49.    Mass is originating just distal to the conjoined hamstring tendon and  the mass mostly occupies the space of the hamstring musculature.  Proximally the mass is located medial to the gluteus maximums muscle  belly and distally occupies partially the space of the biceps femoris  and the semimembranosus muscle  bellies and entirely the  semitendinosus. The semitendinosus muscle belly is also involved the  furthest distally. Proximally the lesion protrudes anteriorly and  appears to invade the obturator externus muscle (series 38, image 27).    [Consider Follow Up: Large thigh mass     This report will be copied to the [facility] to ensure a  provider acknowledges the finding. [facility] Center is available Monday  through Friday 8am-3:30 pm.    JUTTA ELLERMANN, MD         SYSTEM ID:  [id number]     1/6/2023 MRI right femur thigh: Large soft tissue mass in the posterior compartment of the right thigh measuring 27 x 7.4 x 12 cm.  The mass lies primarily in the hamstring muscle belly.  Heterogeneous signal with areas of necrosis noted     PATHOLOGY         Final Diagnosis   A.  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   Electronically signed by Vineet Coello MD on 1/17/2023 at 10:17 AM   Comment   UUMAYO   The neoplasm is comprised of highly pleomorphic spindle cells with stromal hyalinization and extensive areas of necrosis.  Focal areas with scattered lipoblasts are seen.  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  MDM-2 gene amplification study by FISH is in progress and result will be provided separately.       I reviewed the medical records including medical records, laboratory studies, and have personally reviewed imaging including MRI of the R thigh which demonstrates a large 27 cm mass in the posterior compartment of the R thigh.      ASSESSMENT AND PLAN     Mr. 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     I discussed with the patient that sarcomas are rare tumors only representing 1% of all cancers and that they are very heterogeneous  with over 170 different subtypes. When they present with localized disease they main approach to therapy is surgical resection. However, there are factors that influence the chances of recurrence and development of distant metastasis. In general tumors that are big in size (over 5 cm) and high grade (rapid cell division observed under the microscope) have higher probability of recurrence.   Given large size of the tumor, I recommend neoadjuvant chemotherapy with the goal of decreasing risk of metastatic disease and decrease tumor size to facilitate local control with surgery and radiation therapy.   I explained that chemotherapy in sarcomas is effective in about 4 out of 10 patients with sarcoma, however tumors that are high grade as in his case have better chances of response. The plan will be for 1-2 cycles of chemotherapy with doxil and ifosfamide and depending on response and tolerability we would consider completing a total of 4 cycles prior to surgery. If no adequate response, then he should proceed with local control with radiation and surgery. Case was reviewed on 1/19/23 on sarcoma tumor board and this plan in on agreement with the recommendations from the board.   I stressed the importance of diabetes control in order to decrease toxicities associated with chemotherapy.     Patient and wife agreed with this plan:    -STAT CT CAP (patient unable to get PET due to uncontrolled DM)  -Plan for tunneled cath placement, 2D ECHO   -Plan for C1 of chemo with Doxil/Ifosfamide on 1/26   -He has a lesion from HS in the R groin area, advised for topical antibiotic cream.   -After chemotherapy he should be on prophylaxis with levaquin and augmentin due to his history of HS with an active lesion.   -Plan for labs 3xweek after chemo  -Prescribed PRN oxycodone 5 mg for pain.     60 minutes spent on the date of the encounter doing chart review, review of test results, interpretation of tests, patient visit, documentation  and discussion with other provider(s)     Michael Laboy MD   of Medicine  Hematology, Oncology and Transplantation       Complex Repair And Double Advancement Flap Text: The defect edges were debeveled with a #15 scalpel blade.  The primary defect was closed partially with a complex linear closure.  Given the location of the remaining defect, shape of the defect and the proximity to free margins a double advancement flap was deemed most appropriate for complete closure of the defect.  Using a sterile surgical marker, an appropriate advancement flap was drawn incorporating the defect and placing the expected incisions within the relaxed skin tension lines where possible. The area thus outlined was incised deep to adipose tissue with a #15 scalpel blade. The skin margins were undermined to an appropriate distance in all directions utilizing iris scissors and carried over to close the primary defect.

## 2023-08-15 DIAGNOSIS — E11.65 UNCONTROLLED DIABETES MELLITUS WITH HYPERGLYCEMIA, WITHOUT LONG-TERM CURRENT USE OF INSULIN (H): Primary | ICD-10-CM

## 2023-08-15 NOTE — TELEPHONE ENCOUNTER
Routing refill request to provider for review/approval because:  Drug not on the [facility] refill protocol     Rebel HINOJOSA RN 8/15/2023 at 5:11 PM

## 2023-08-16 RX ORDER — BLOOD-GLUCOSE SENSOR
EACH MISCELLANEOUS
Qty: 6 EACH | Refills: 3 | Status: SHIPPED | OUTPATIENT
Start: 2023-08-16

## 2023-08-16 NOTE — PROGRESS NOTES
DISCHARGE  Reason for Discharge: Patient has failed to schedule further appointments.    Equipment Issued: none    Discharge Plan: Patient to continue home program.    Referring Provider:  Meliza De La Cruz*

## 2023-08-31 ENCOUNTER — HOSPITAL ENCOUNTER (OUTPATIENT)
Dept: CARDIOLOGY | Facility: CLINIC | Age: 53
Discharge: HOME | End: 2023-08-31
Attending: NURSE PRACTITIONER | Admitting: NURSE PRACTITIONER
Payer: COMMERCIAL

## 2023-08-31 DIAGNOSIS — I42.8 NICM (NONISCHEMIC CARDIOMYOPATHY) (H): ICD-10-CM

## 2023-08-31 LAB — BI-PLANE LVEF ECHO: NORMAL

## 2023-08-31 PROCEDURE — 93321 DOPPLER ECHO F-UP/LMTD STD: CPT | Mod: 26 | Performed by: INTERNAL MEDICINE

## 2023-08-31 PROCEDURE — 93308 TTE F-UP OR LMTD: CPT | Mod: 26 | Performed by: INTERNAL MEDICINE

## 2023-08-31 PROCEDURE — 93308 TTE F-UP OR LMTD: CPT

## 2023-08-31 PROCEDURE — 93325 DOPPLER ECHO COLOR FLOW MAPG: CPT

## 2023-08-31 PROCEDURE — 93325 DOPPLER ECHO COLOR FLOW MAPG: CPT | Mod: 26 | Performed by: INTERNAL MEDICINE

## 2023-09-29 ENCOUNTER — OFFICE VISIT (OUTPATIENT)
Dept: CARDIOLOGY | Facility: CLINIC | Age: 53
End: 2023-09-29
Attending: NURSE PRACTITIONER
Payer: COMMERCIAL

## 2023-09-29 VITALS
HEART RATE: 63 BPM | OXYGEN SATURATION: 96 % | DIASTOLIC BLOOD PRESSURE: 66 MMHG | SYSTOLIC BLOOD PRESSURE: 114 MMHG | BODY MASS INDEX: 40.32 KG/M2 | WEIGHT: 288 LBS | HEIGHT: 71 IN

## 2023-09-29 DIAGNOSIS — E66.01 MORBID OBESITY (H): ICD-10-CM

## 2023-09-29 DIAGNOSIS — I42.8 NICM (NONISCHEMIC CARDIOMYOPATHY) (H): Primary | ICD-10-CM

## 2023-09-29 DIAGNOSIS — I10 ESSENTIAL HYPERTENSION: ICD-10-CM

## 2023-09-29 DIAGNOSIS — G47.33 OSA ON CPAP: ICD-10-CM

## 2023-09-29 PROCEDURE — 99214 OFFICE O/P EST MOD 30 MIN: CPT | Performed by: NURSE PRACTITIONER

## 2023-09-29 NOTE — PROGRESS NOTES
CARDIOLOGY CLINIC NOTE    PRIMARY CARDIOLOGIST  Dr. Cee    PRIMARY CARE PHYSICIAN:  Mynor Mason    HISTORY OF PRESENT ILLNESS:  Antonio Canseco is a pleasant 53-year-old male who carries a past medical history significant for right thigh seroma status post chemotherapy and excision (5/2023), nonischemic cardiomyopathy, hypertension, diabetes, remote smoker, emphysema, obstructive sleep apnea on CPAP and obesity.    In January 2023, he underwent preoperative work-up for a seroma excision.  This included a normal echocardiogram with an EF of 55%, followed by a nuclear stress test that was negative for inducible myocardial ischemia or infarction.  There was a small area of mild degree of infarction in the apical segment of the LV with no reversible perfusion defect to suggest ischemia.  EF was noted to be 36% with rest and 39% with stress.  He subsequently underwent a cardiac MRI that showed a moderately reduced LV function with an EF of 40% and no MI, fibrosis or infiltrative disease.    He was initiated on GDMT including losartan and Toprol with continued uptitration over the last few months.  He recently underwent a follow-up limited echocardiogram that showed a moderately dilated LV, biplane EF of 53% and visual EF 45 to 50%.  There was global hypokinesis of the LV, normal RV size and function and no significant valvular disease.  By direct comparison from the echo in January, the heart rate was higher which gives the visual impression of a higher EF and was likely lower than that due to slower heart rate on recent study.    He returns to the office today accompanied by his wife for 3-month follow-up and review of results.  He offers no cardiac complaint, denies chest pain, shortness of breath, palpitations, PND, orthopnea, presyncope, syncope, and infrequent ankle edema.    He is up approximately 25 pounds over the last 3 months which she attributes to lack of exercise, dietary indiscretions and large portion  sizes.     Blood pressure is well controlled at 114/66  Labs in July showed an unremarkable BMP, hemoglobin 12.7 and platelet 290    Faithful with CPAP  Compliant with medications.    PAST MEDICAL HISTORY:  Past Medical History:   Diagnosis Date    Acrochordon 02/11/2021    CARDIOVASCULAR SCREENING; LDL GOAL LESS THAN 160 03/27/2012    Colon adenoma     Controlled type 2 diabetes mellitus without complication, without long-term current use of insulin (H) 09/16/2019    X 1    Cough 02/04/2014    Degeneration of thoracic intervertebral disc 12/11/2013    Dysfunction of both eustachian tubes 04/12/2019    Elevated blood pressure 12/22/2014    Elevated hemoglobin A1c 09/16/2019    X 1    Gastroesophageal reflux disease     Hamstring strain, right, subsequent encounter 12/19/2022    Hepatic cyst 05/22/2018    Hepatic steatosis 12/11/2013    History of colonic polyps 12/11/2013    History of drainage of abscess 09/16/2019    Hypertension     Irritable bowel syndrome without diarrhea 06/01/2018    Low libido 04/12/2019    Lumbar radiculopathy 12/19/2022    Microscopic hematuria 12/19/2013    Nephrolithiasis     Obesity 03/27/2012    BRIAN on CPAP 03/27/2012    Other irritable bowel syndrome 05/22/2018    Pain in thoracic spine 05/09/2013    Pulmonary nodule 05/22/2018    Right-sided chest pain 04/13/2018    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    Sleep apnea     Tinea pedis of both feet 02/11/2021    Tinnitus, unspecified laterality 04/12/2019    Uncontrolled diabetes mellitus with hyperglycemia, without long-term current use of insulin (H) 01/23/2023       MEDICATIONS:  Current Outpatient Medications   Medication    Continuous Blood Gluc Sensor (FREESTYLE MORENITA 3 SENSOR) MISC    losartan (COZAAR) 25 MG tablet    metFORMIN (GLUCOPHAGE XR) 500 MG 24 hr tablet    metoprolol succinate ER (TOPROL XL) 25 MG 24 hr tablet     No current facility-administered medications for this visit.       SOCIAL HISTORY:  I have reviewed this patient's social  history and updated it with pertinent information if needed. Antonio Canseco  reports that he quit smoking about 10 years ago. His smoking use included cigarettes. He started smoking about 35 years ago. He has a 25.00 pack-year smoking history. He has never used smokeless tobacco. He reports that he does not currently use alcohol. He reports that he does not use drugs.    PHYSICAL EXAM:  Pulse:  [63] 63  BP: (114)/(66) 114/66  SpO2:  [96 %] 96 %  288 lbs 0 oz    Constitutional: alert, no distress  Respiratory: Good bilateral air entry  Cardiovascular: Regular rate and rhythm  GI: nondistended  Neuropsychiatric: appropriate affact    ASSESSMENT: Pertinent issues addressed/ reviewed during this cardiology visit  Nonischemic cardiomyopathy  Hypertension  Diabetes mellitus.   Obesity  Obstructive sleep apnea    RECOMMENDATIONS:  Overall, he is doing well on a cardiac standpoint.  Recent echocardiogram shows a near normalization of his ejection fraction now 53%.  I will continue on losartan and metoprolol.  Limited echocardiogram in 1 year.  Blood pressure is well controlled -continue current medical therapy  Continue CPAP use  He is up a significant amount of weight over the last few months.  We reviewed need for weight loss, exercise, strict low-sodium diet and reduction of portion sizes.   Follow-up in 1 year with Dr. Cee with echocardiogram prior.    It was a pleasure seeing this patient in clinic today. Please do not hesitate to contact me with any future questions.     JEFFREY Webster, CNP  Cardiology - [facility]  September 29, 2023    Review of the result(s) of each unique test - Last echocardiogram, CBC, BMP     The level of medical decision making during this visit was of moderate complexity.    This note was completed in part using dictation via the Dragon voice recognition software. Some word and grammatical errors may occur and must be interpreted in the appropriate clinical context.  If there are any  questions pertaining to this issue, please contact me for further clarification.

## 2023-09-29 NOTE — PATIENT INSTRUCTIONS
Thanks for participating in a office visit with the [facility] clinic today.    Doing well on a cardiac standpoint  Reviewed recent echocardiogram showing a near complete recovery of heart function.   Continue on metoprolol and losartan  Up ~ 20 lbs over the last few months, strict low salt diet. Reduce portion size.  Exercise and weight loss.   Limited echo in 1 year    Follow up in 1 year with Dr. Cee     Please call my nurse at  [phone] with any questions or concerns.    Scheduling phone number: [phone]  Reminder: Please bring in all current medications, over the counter supplements and vitamin bottles to your next appointment.

## 2023-09-29 NOTE — LETTER
9/29/2023    Mynor Mason MD  [id number] [facility] [address]    RE: Antonio Canseco       Dear Colleague,     I had the pleasure of seeing Antonio Canseco in the [facility].  CARDIOLOGY CLINIC NOTE    PRIMARY CARDIOLOGIST  Dr. Cee    PRIMARY CARE PHYSICIAN:  Mynor Mason    HISTORY OF PRESENT ILLNESS:  Antonio Canseco is a pleasant 53-year-old male who carries a past medical history significant for right thigh seroma status post chemotherapy and excision (5/2023), nonischemic cardiomyopathy, hypertension, diabetes, remote smoker, emphysema, obstructive sleep apnea on CPAP and obesity.    In January 2023, he underwent preoperative work-up for a seroma excision.  This included a normal echocardiogram with an EF of 55%, followed by a nuclear stress test that was negative for inducible myocardial ischemia or infarction.  There was a small area of mild degree of infarction in the apical segment of the LV with no reversible perfusion defect to suggest ischemia.  EF was noted to be 36% with rest and 39% with stress.  He subsequently underwent a cardiac MRI that showed a moderately reduced LV function with an EF of 40% and no MI, fibrosis or infiltrative disease.    He was initiated on GDMT including losartan and Toprol with continued uptitration over the last few months.  He recently underwent a follow-up limited echocardiogram that showed a moderately dilated LV, biplane EF of 53% and visual EF 45 to 50%.  There was global hypokinesis of the LV, normal RV size and function and no significant valvular disease.  By direct comparison from the echo in January, the heart rate was higher which gives the visual impression of a higher EF and was likely lower than that due to slower heart rate on recent study.    He returns to the office today accompanied by his wife for 3-month follow-up and review of results.  He offers no cardiac complaint, denies chest pain, shortness of breath, palpitations, PND,  orthopnea, presyncope, syncope, and infrequent ankle edema.    He is up approximately 25 pounds over the last 3 months which she attributes to lack of exercise, dietary indiscretions and large portion sizes.     Blood pressure is well controlled at 114/66  Labs in July showed an unremarkable BMP, hemoglobin 12.7 and platelet 290    Faithful with CPAP  Compliant with medications.    PAST MEDICAL HISTORY:  Past Medical History:   Diagnosis Date    Acrochordon 02/11/2021    CARDIOVASCULAR SCREENING; LDL GOAL LESS THAN 160 03/27/2012    Colon adenoma     Controlled type 2 diabetes mellitus without complication, without long-term current use of insulin (H) 09/16/2019    X 1    Cough 02/04/2014    Degeneration of thoracic intervertebral disc 12/11/2013    Dysfunction of both eustachian tubes 04/12/2019    Elevated blood pressure 12/22/2014    Elevated hemoglobin A1c 09/16/2019    X 1    Gastroesophageal reflux disease     Hamstring strain, right, subsequent encounter 12/19/2022    Hepatic cyst 05/22/2018    Hepatic steatosis 12/11/2013    History of colonic polyps 12/11/2013    History of drainage of abscess 09/16/2019    Hypertension     Irritable bowel syndrome without diarrhea 06/01/2018    Low libido 04/12/2019    Lumbar radiculopathy 12/19/2022    Microscopic hematuria 12/19/2013    Nephrolithiasis     Obesity 03/27/2012    RBIAN on CPAP 03/27/2012    Other irritable bowel syndrome 05/22/2018    Pain in thoracic spine 05/09/2013    Pulmonary nodule 05/22/2018    Right-sided chest pain 04/13/2018    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    Sleep apnea     Tinea pedis of both feet 02/11/2021    Tinnitus, unspecified laterality 04/12/2019    Uncontrolled diabetes mellitus with hyperglycemia, without long-term current use of insulin (H) 01/23/2023       MEDICATIONS:  Current Outpatient Medications   Medication    Continuous Blood Gluc Sensor (FREESTYLE MORENITA 3 SENSOR) MISC    losartan (COZAAR) 25 MG tablet    metFORMIN (GLUCOPHAGE XR) 500  MG 24 hr tablet    metoprolol succinate ER (TOPROL XL) 25 MG 24 hr tablet     No current facility-administered medications for this visit.       SOCIAL HISTORY:  I have reviewed this patient's social history and updated it with pertinent information if needed. Antonio Canseco  reports that he quit smoking about 10 years ago. His smoking use included cigarettes. He started smoking about 35 years ago. He has a 25.00 pack-year smoking history. He has never used smokeless tobacco. He reports that he does not currently use alcohol. He reports that he does not use drugs.    PHYSICAL EXAM:  Pulse:  [63] 63  BP: (114)/(66) 114/66  SpO2:  [96 %] 96 %  288 lbs 0 oz    Constitutional: alert, no distress  Respiratory: Good bilateral air entry  Cardiovascular: Regular rate and rhythm  GI: nondistended  Neuropsychiatric: appropriate affact    ASSESSMENT: Pertinent issues addressed/ reviewed during this cardiology visit  Nonischemic cardiomyopathy  Hypertension  Diabetes mellitus.   Obesity  Obstructive sleep apnea    RECOMMENDATIONS:  Overall, he is doing well on a cardiac standpoint.  Recent echocardiogram shows a near normalization of his ejection fraction now 53%.  I will continue on losartan and metoprolol.  Limited echocardiogram in 1 year.  Blood pressure is well controlled -continue current medical therapy  Continue CPAP use  He is up a significant amount of weight over the last few months.  We reviewed need for weight loss, exercise, strict low-sodium diet and reduction of portion sizes.   Follow-up in 1 year with Dr. Cee with echocardiogram prior.    It was a pleasure seeing this patient in clinic today. Please do not hesitate to contact me with any future questions.     Daphney Larios, APRN, CNP  Cardiology - [facility]  September 29, 2023    Review of the result(s) of each unique test - Last echocardiogram, CBC, BMP     The level of medical decision making during this visit was of moderate complexity.    This note was  completed in part using dictation via the Dragon voice recognition software. Some word and grammatical errors may occur and must be interpreted in the appropriate clinical context.  If there are any questions pertaining to this issue, please contact me for further clarification.      Thank you for allowing me to participate in the care of your patient.      Sincerely,     JEFFREY Webster CNP     [facility]  cc:   JEFFREY Webster CNP  [address] [place] [address]

## 2023-10-23 ENCOUNTER — HOSPITAL ENCOUNTER (OUTPATIENT)
Dept: CT IMAGING | Facility: CLINIC | Age: 53
Discharge: HOME | End: 2023-10-23
Attending: PHYSICIAN ASSISTANT | Admitting: PHYSICIAN ASSISTANT
Payer: COMMERCIAL

## 2023-10-23 DIAGNOSIS — C49.9 SARCOMA OF SOFT TISSUE (H): ICD-10-CM

## 2023-10-23 PROCEDURE — 71250 CT THORAX DX C-: CPT

## 2023-10-31 ENCOUNTER — HOSPITAL ENCOUNTER (OUTPATIENT)
Dept: CT IMAGING | Facility: CLINIC | Age: 53
Discharge: HOME | End: 2023-10-31
Attending: STUDENT IN AN ORGANIZED HEALTH CARE EDUCATION/TRAINING PROGRAM
Payer: COMMERCIAL

## 2023-10-31 ENCOUNTER — LAB (OUTPATIENT)
Dept: LAB | Facility: CLINIC | Age: 53
End: 2023-10-31
Attending: STUDENT IN AN ORGANIZED HEALTH CARE EDUCATION/TRAINING PROGRAM
Payer: COMMERCIAL

## 2023-10-31 ENCOUNTER — HOSPITAL ENCOUNTER (OUTPATIENT)
Dept: MRI IMAGING | Facility: CLINIC | Age: 53
Discharge: HOME | End: 2023-10-31
Attending: STUDENT IN AN ORGANIZED HEALTH CARE EDUCATION/TRAINING PROGRAM
Payer: COMMERCIAL

## 2023-10-31 DIAGNOSIS — C49.9 SARCOMA OF SOFT TISSUE (H): ICD-10-CM

## 2023-10-31 DIAGNOSIS — C49.9 UNDIFFERENTIATED PLEOMORPHIC SARCOMA (H): ICD-10-CM

## 2023-10-31 LAB
ALBUMIN SERPL BCG-MCNC: 3.9 G/DL (ref 3.5–5.2)
ALP SERPL-CCNC: 45 U/L (ref 40–129)
ALT SERPL W P-5'-P-CCNC: 18 U/L (ref 0–70)
ANION GAP SERPL CALCULATED.3IONS-SCNC: 10 MMOL/L (ref 7–15)
AST SERPL W P-5'-P-CCNC: 16 U/L (ref 0–45)
BASOPHILS # BLD AUTO: 0 10E3/UL (ref 0–0.2)
BASOPHILS NFR BLD AUTO: 0 %
BILIRUB SERPL-MCNC: 1 MG/DL
BUN SERPL-MCNC: 16.7 MG/DL (ref 6–20)
CALCIUM SERPL-MCNC: 9.1 MG/DL (ref 8.6–10)
CHLORIDE SERPL-SCNC: 104 MMOL/L (ref 98–107)
CREAT SERPL-MCNC: 0.93 MG/DL (ref 0.67–1.17)
DEPRECATED HCO3 PLAS-SCNC: 24 MMOL/L (ref 22–29)
EGFRCR SERPLBLD CKD-EPI 2021: >90 ML/MIN/1.73M2
EOSINOPHIL # BLD AUTO: 0.1 10E3/UL (ref 0–0.7)
EOSINOPHIL NFR BLD AUTO: 3 %
ERYTHROCYTE [DISTWIDTH] IN BLOOD BY AUTOMATED COUNT: 15 % (ref 10–15)
GLUCOSE SERPL-MCNC: 102 MG/DL (ref 70–99)
HCT VFR BLD AUTO: 44.6 % (ref 40–53)
HGB BLD-MCNC: 14.2 G/DL (ref 13.3–17.7)
IMM GRANULOCYTES # BLD: 0 10E3/UL
IMM GRANULOCYTES NFR BLD: 0 %
LYMPHOCYTES # BLD AUTO: 0.8 10E3/UL (ref 0.8–5.3)
LYMPHOCYTES NFR BLD AUTO: 13 %
MAGNESIUM SERPL-MCNC: 2.1 MG/DL (ref 1.7–2.3)
MCH RBC QN AUTO: 28.3 PG (ref 26.5–33)
MCHC RBC AUTO-ENTMCNC: 31.8 G/DL (ref 31.5–36.5)
MCV RBC AUTO: 89 FL (ref 78–100)
MONOCYTES # BLD AUTO: 0.5 10E3/UL (ref 0–1.3)
MONOCYTES NFR BLD AUTO: 9 %
NEUTROPHILS # BLD AUTO: 4.2 10E3/UL (ref 1.6–8.3)
NEUTROPHILS NFR BLD AUTO: 75 %
NRBC # BLD AUTO: 0 10E3/UL
NRBC BLD AUTO-RTO: 0 /100
PHOSPHATE SERPL-MCNC: 3.3 MG/DL (ref 2.5–4.5)
PLATELET # BLD AUTO: 215 10E3/UL (ref 150–450)
POTASSIUM SERPL-SCNC: 4.1 MMOL/L (ref 3.4–5.3)
PROT SERPL-MCNC: 7.3 G/DL (ref 6.4–8.3)
RBC # BLD AUTO: 5.02 10E6/UL (ref 4.4–5.9)
SODIUM SERPL-SCNC: 138 MMOL/L (ref 135–145)
WBC # BLD AUTO: 5.7 10E3/UL (ref 4–11)

## 2023-10-31 PROCEDURE — 85025 COMPLETE CBC W/AUTO DIFF WBC: CPT

## 2023-10-31 PROCEDURE — 255N000002 HC RX 255 OP 636: Performed by: STUDENT IN AN ORGANIZED HEALTH CARE EDUCATION/TRAINING PROGRAM

## 2023-10-31 PROCEDURE — 36415 COLL VENOUS BLD VENIPUNCTURE: CPT

## 2023-10-31 PROCEDURE — 74177 CT ABD & PELVIS W/CONTRAST: CPT

## 2023-10-31 PROCEDURE — 250N000011 HC RX IP 250 OP 636: Performed by: STUDENT IN AN ORGANIZED HEALTH CARE EDUCATION/TRAINING PROGRAM

## 2023-10-31 PROCEDURE — 73720 MRI LWR EXTREMITY W/O&W/DYE: CPT | Mod: 26 | Performed by: RADIOLOGY

## 2023-10-31 PROCEDURE — 250N000009 HC RX 250: Performed by: STUDENT IN AN ORGANIZED HEALTH CARE EDUCATION/TRAINING PROGRAM

## 2023-10-31 PROCEDURE — 80053 COMPREHEN METABOLIC PANEL: CPT

## 2023-10-31 PROCEDURE — 83735 ASSAY OF MAGNESIUM: CPT

## 2023-10-31 PROCEDURE — 84100 ASSAY OF PHOSPHORUS: CPT

## 2023-10-31 PROCEDURE — 73720 MRI LWR EXTREMITY W/O&W/DYE: CPT | Mod: RT

## 2023-10-31 PROCEDURE — A9585 GADOBUTROL INJECTION: HCPCS | Performed by: STUDENT IN AN ORGANIZED HEALTH CARE EDUCATION/TRAINING PROGRAM

## 2023-10-31 RX ORDER — IOPAMIDOL 755 MG/ML
100 INJECTION, SOLUTION INTRAVASCULAR ONCE
Status: COMPLETED | OUTPATIENT
Start: 2023-10-31 | End: 2023-10-31

## 2023-10-31 RX ORDER — GADOBUTROL 604.72 MG/ML
13 INJECTION INTRAVENOUS ONCE
Status: COMPLETED | OUTPATIENT
Start: 2023-10-31 | End: 2023-10-31

## 2023-10-31 RX ADMIN — GADOBUTROL 13 ML: 604.72 INJECTION INTRAVENOUS at 08:20

## 2023-10-31 RX ADMIN — IOPAMIDOL 100 ML: 755 INJECTION, SOLUTION INTRAVENOUS at 09:57

## 2023-10-31 RX ADMIN — SODIUM CHLORIDE 65 ML: 9 INJECTION, SOLUTION INTRAVENOUS at 09:58

## 2023-11-01 ENCOUNTER — VIRTUAL VISIT (OUTPATIENT)
Dept: ONCOLOGY | Facility: CLINIC | Age: 53
End: 2023-11-01
Attending: STUDENT IN AN ORGANIZED HEALTH CARE EDUCATION/TRAINING PROGRAM
Payer: COMMERCIAL

## 2023-11-01 VITALS — HEIGHT: 71 IN | BODY MASS INDEX: 40.32 KG/M2 | WEIGHT: 288 LBS

## 2023-11-01 DIAGNOSIS — C49.9 UNDIFFERENTIATED PLEOMORPHIC SARCOMA (H): ICD-10-CM

## 2023-11-01 PROCEDURE — 99215 OFFICE O/P EST HI 40 MIN: CPT | Mod: VID | Performed by: STUDENT IN AN ORGANIZED HEALTH CARE EDUCATION/TRAINING PROGRAM

## 2023-11-01 ASSESSMENT — PAIN SCALES - GENERAL: PAINLEVEL: NO PAIN (0)

## 2023-11-01 NOTE — NURSING NOTE
Is the patient currently in the state of [place]? YES    Visit mode:VIDEO    If the visit is dropped, the patient can be reconnected by: VIDEO VISIT: Send to e-mail at: [email or web address]    Will anyone else be joining the visit? YES: How would they like to receive their invitation? Send to e-mail: [email or web address]  (If patient encounters technical issues they should call [phone]    How would you like to obtain your AVS? MyChart    Are changes needed to the allergy or medication list? No    Reason for visit: RECHECK    Dea Marin VVF      Provider in visit  notes. Created another for video notes.

## 2023-11-01 NOTE — PROGRESS NOTES
[facility]    FOLLOW UP VISIT NOTE    PATIENT NAME: Antonio Canseco MRN # [id number]  DATE OF VISIT: June 13 , 2023 YOB: 1970    REFERRING PROVIDER: Ari Nascimento PA-C  [facility] [place] [address]    CANCER TYPE: High grade sarcoma       CHIEF COMPLAIN   Thigh pain     HISTORY OF PRESENT ILLNESS   52 year old male with PMH of DM and recent Dx of large 27 cm mass in the posterior R thigh. He reports that he first noticed a node in the posterior thigh on 5/2022, we was evaluated at OSH where he got an Xray and he was told that this was sciatica and was started on physical therapy. Tumor continued to grow rapidly during this time. MRI done on 1/6/2023 showing a large mass in the posterior thigh. 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   He reports having pain and limit mobility. He has been taking daily meloxican with some relief, however he needs to be resting most of the time to avoid pain.     C1 of doxil/ifosfamide on 1/30 follow up MRI showing increase in size for mass without significant areas of necrosis. We decided to discontinue chemotherapy.  Corey cycle of chemotherapy was complicated with neutropenic fever requiring admission and she also developed disseminated herpes zoster. Additionally work up done after chemotherapy revealed that the ejection fraction after chemotherapy had decreased.    He started Preoperative RT on 3/15 and completed on 4/18 . PET done on 3/14 just before starting RT, showed decrease in the metabolic activity of the mass.     5/22 Surgical resection of posterior thigh mass.     Interval History  Antonio was seen today by virtual visit, he has been doing great, recovered completely from surgery and doing his activities with no limitations.      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 Lives with wife Yari.       CURRENT OUTPATIENT MEDICATIONS     Current Outpatient Medications   Medication Sig Dispense Refill    Continuous Blood Gluc Sensor (FREESTYLE MORENITA 3 SENSOR) MISC USE 1 SENSOR EVERY 14 DAYS 6 each 3    losartan (COZAAR) 25 MG tablet Take 1 tablet (25 mg) by mouth daily 90 tablet 3    metFORMIN (GLUCOPHAGE XR) 500 MG 24 hr tablet Take 3 tablets (1,500 mg) by mouth daily (with dinner) 270 tablet 3    metoprolol succinate ER (TOPROL XL) 25 MG 24 hr tablet Take 1 tablet (25 mg) by mouth daily 90 tablet 3          REVIEW OF SYSTEMS   As above in the HPI, o/w complete 12-point ROS was negative.     PHYSICAL EXAM   There were no vitals taken for this visit.    Virtual visit     LABORATORY AND IMAGING STUDIES     Lab Results   Component Value Date    WBC 5.7 10/31/2023    WBC 8.6 12/26/2019     Lab Results   Component Value Date    RBC 5.02 10/31/2023    RBC 4.96 12/26/2019     Lab Results   Component Value Date    HGB 14.2 10/31/2023    HGB 14.2 12/26/2019     Lab Results   Component Value Date    HCT 44.6 10/31/2023    HCT 44.6 12/26/2019     Lab Results   Component Value Date    MCV 89 10/31/2023    MCV 90 12/26/2019     Lab Results   Component Value Date    MCH 28.3 10/31/2023    MCH 28.6 12/26/2019     Lab Results   Component Value Date    MCHC 31.8 10/31/2023    MCHC 31.8 12/26/2019     Lab Results   Component Value Date    RDW 15.0 10/31/2023    RDW 14.3 12/26/2019     Lab Results    Component Value Date     10/31/2023     12/26/2019     Last Comprehensive Metabolic Panel:  Sodium   Date Value Ref Range Status   10/31/2023 138 135 - 145 mmol/L Final     Comment:     Reference intervals for this test were updated on 09/26/2023 to more accurately reflect our healthy population. There may be differences in the flagging of prior results with similar values performed with this method. Interpretation of those prior results can be made in the context of the updated reference intervals.    02/11/2021 140 133 - 144 mmol/L Final     Potassium   Date Value Ref Range Status   10/31/2023 4.1 3.4 - 5.3 mmol/L Final   04/24/2022 4.1 3.4 - 5.3 mmol/L Final   02/11/2021 4.4 3.4 - 5.3 mmol/L Final     Potassium POCT   Date Value Ref Range Status   05/22/2023 4.5 3.5 - 5.0 mmol/L Final     Chloride   Date Value Ref Range Status   10/31/2023 104 98 - 107 mmol/L Final   04/24/2022 101 94 - 109 mmol/L Final   02/11/2021 107 94 - 109 mmol/L Final     Carbon Dioxide   Date Value Ref Range Status   02/11/2021 27 20 - 32 mmol/L Final     Carbon Dioxide (CO2)   Date Value Ref Range Status   10/31/2023 24 22 - 29 mmol/L Final   04/24/2022 29 20 - 32 mmol/L Final     Anion Gap   Date Value Ref Range Status   10/31/2023 10 7 - 15 mmol/L Final   04/24/2022 2 (L) 3 - 14 mmol/L Final   02/11/2021 6 3 - 14 mmol/L Final     Glucose   Date Value Ref Range Status   10/31/2023 102 (H) 70 - 99 mg/dL Final   04/24/2022 143 (H) 70 - 99 mg/dL Final   02/11/2021 135 (H) 70 - 99 mg/dL Final     Comment:     Non Fasting     GLUCOSE BY METER POCT   Date Value Ref Range Status   05/23/2023 119 (H) 70 - 99 mg/dL Final     Urea Nitrogen   Date Value Ref Range Status   10/31/2023 16.7 6.0 - 20.0 mg/dL Final   04/24/2022 11 7 - 30 mg/dL Final   02/11/2021 13 7 - 30 mg/dL Final     Creatinine   Date Value Ref Range Status   10/31/2023 0.93 0.67 - 1.17 mg/dL Final   02/11/2021 0.94 0.66 - 1.25 mg/dL Final     GFR Estimate   Date  Value Ref Range Status   10/31/2023 >90 >60 mL/min/1.73m2 Final   02/11/2021 >90 >60 mL/min/[1.73_m2] Final     Comment:     Non  GFR Calc  Starting 12/18/2018, serum creatinine based estimated GFR (eGFR) will be   calculated using the Chronic Kidney Disease Epidemiology Collaboration   (CKD-EPI) equation.       Calcium   Date Value Ref Range Status   10/31/2023 9.1 8.6 - 10.0 mg/dL Final   02/11/2021 9.1 8.5 - 10.1 mg/dL Final     Bilirubin Total   Date Value Ref Range Status   10/31/2023 1.0 <=1.2 mg/dL Final   02/11/2021 0.7 0.2 - 1.3 mg/dL Final     Alkaline Phosphatase   Date Value Ref Range Status   10/31/2023 45 40 - 129 U/L Final   02/11/2021 43 40 - 150 U/L Final     ALT   Date Value Ref Range Status   10/31/2023 18 0 - 70 U/L Final     Comment:     Reference intervals for this test were updated on 6/12/2023 to more accurately reflect our healthy population. There may be differences in the flagging of prior results with similar values performed with this method. Interpretation of those prior results can be made in the context of the updated reference intervals.     02/11/2021 43 0 - 70 U/L Final     AST   Date Value Ref Range Status   10/31/2023 16 0 - 45 U/L Final     Comment:     Reference intervals for this test were updated on 6/12/2023 to more accurately reflect our healthy population. There may be differences in the flagging of prior results with similar values performed with this method. Interpretation of those prior results can be made in the context of the updated reference intervals.   02/11/2021 23 0 - 45 U/L Final       1/6/2023 MRI right femur thigh: Large soft tissue mass in the posterior compartment of the right thigh measuring 27 x 7.4 x 12 cm.  The mass lies primarily in the hamstring muscle belly.  Heterogeneous signal with areas of necrosis noted.    2/20/23 MRI R femur                                                                   IMPRESSION: In this patient with high  grade sarcoma status  post-neoadjuvant chemotherapy;     Increased in size of the 11 x 15.7 x 21.7 cm heterogeneously enhancing  soft tissue mass with myxoid and lipoid components in the posterior  compartment of the right thigh since MRI from 1/6/2023, previously  measured 7.4 x 12 x 20 cm.  *  Distal portion of the mass anteriorly abuts the sciatic nerve and  deep femoral artery/vein without definite invasion.  *  Increased edema within the adductor rosy when compared to prior  study, possibly neurogenic.      PET scan 3/14/23  IMPRESSION:     1. 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      2. Subcentimeter pulmonary nodule in the left lower lobe, which is too small to accurately characterize by PET/CT and warrants continued imaging surveillance.    4/19/23 MRI femur R                                                                 IMPRESSION: In this patient with high grade sarcoma status  post-neoadjuvant chemotherapy;     Slightly increase in the size of the heterogeneously enhancing soft  tissue mass with myxoid and lipoid components in the posterior  compartment of the right thigh since MRI from 2/20/2023, measuring 11  x 16.4 x 21 cm, previously measured 11 x 15.7 x 21.7 cm.   *  Distal portion of the mass anteriorly abuts the sciatic nerve and  deep femoral artery/vein without definite invasion.  *  Slightly increased edema within the adductor rosy when compared  to prior study.    PET 5/30/23  IMPRESSION:     1. Sequelae of post treatment inflammatory change in the right posterior thigh soft tissues without convincing evidence of active neoplasm.     2. Slightly increased size of the non-FDG avid nodule in the left lower lobe and development of additional non-FDG avid subcentimeter nodules in the inferior left upper lobe, right middle lobe, and right lower lobe. While their exact etiologies remain    indeterminate, the interval growth/development of additional nodules raises suspicion for early pulmonary metastases.    PET 7/18/23  Impression:  1. 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   a. Extensive regional soft tissue edema and nonmasslike enhancement,  presumably postoperative.   b. Postoperative fluid collection.  2. New periostitis/deep muscle edema along the mid to distal right  femoral shaft, query low grade stress response.    10/31/23 MRI R thigh  IMPRESSION:  1. 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 No masslike  enhancement to suggest recurrent tumor.  2. Extensive soft tissue edema without masslike enhancement throughout  the right thigh including the posterior and adductor muscles, presumed  postoperative.  3. Interval resolution of the previously noted postoperative fluid  collection as well as resolution of the periosteal edema seen along  the medial aspect of the mid femoral shaft.  4. No marrow signal changes to suggest fracture or marrow  infiltration.    10/31/23 CT CAP  IMPRESSION:  1.  No evidence of metastatic disease within the chest, abdomen, and  pelvis.   2.  Small pulmonary nodules measuring up to 4 mm are stable. No new  nodules are identified. Recommend attention on follow-up.     PATHOLOGY         Final Diagnosis   A.  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   Electronically signed by Vineet Coello MD on 1/17/2023 at 10:17 AM   Comment   UUMAYO   The neoplasm is comprised of highly pleomorphic spindle cells with stromal hyalinization and extensive areas of necrosis.  Focal areas with scattered lipoblasts are seen.  Although presence of few cells with lipoblast morphology suggests  dedifferentiated liposarcoma, MDM2 immunohistochemistry is negative.  MDM-2 gene amplification study by FISH is in progress and result will be provided separately.       I reviewed the medical records including medical records, laboratory studies, and have personally reviewed imaging including MRI of the R thigh which demonstrates a large 27 cm mass in the posterior compartment of the R thigh.      ASSESSMENT AND PLAN     Mr. 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     He received C1 on 1/30. Patient did experienced worsening pain shortly after starting chemotherapy. MRI done on 2/20/23 showing increase in size of large posterior thigh sarcoma. The comparison of this MRI is to the baseline done on 1/6/23, and in a patient with high grade sarcoma the mass is presumed to have grown in size in the month before starting chemotherapy. However, given that the patient had increase in pain and there is no areas of necrosis observed in the recent MRI, I will consider this to be progression of disease and recommend to plan for surgery with consideration for preoperative radiation therapy.      He started radiation therapy on 3/15.  I reviewed the PET scan done on 3/14 showing some tumor shrinkage at 14 x 14 x 18 from prior 11 x 15.7 x 21 and improved metabolic activity. We discussed that given some response was seen, if needed in the future this will still be an option. Right now, given prior complications from chemo (admission for neutropenic fever, bleeding in the urine presumed to be 2/2 to ifosfamide, and presumed cardiomyopathy with decrease in ejection fraction on echo), we will hold on further chemo.    Antonio has completed preoperative RT and underwent surgical resection on  5/22. He is healing well and recovering his mobility.    I personally reviewed the most recent CT CAP and MRI R thigh showing no evidence  recurrence or metastatic disease. He has small subcentemeter pulmonary nodules that have remained stable since diagnosis. For this reason we will continue with surveillance scans with MRI thigh and CT CAP every 3 months during the first 2 years after surgery.     Plan:    -CT CAP in 3 months  -Labs in 3 months  -RTC after labs and CT CAP    40 minutes spent on the date of the encounter doing chart review, review of test results, interpretation of tests, patient visit, documentation and discussion with other provider(s)     Michael Laboy MD   of Medicine  Hematology, Oncology and Transplantation

## 2023-11-01 NOTE — PROGRESS NOTES
Virtual Visit Details    Type of service:  Video Visit     Originating Location (pt. Location): Home    Distant Location (provider location):  Off-site  Platform used for Video Visit: Vishal

## 2023-11-01 NOTE — LETTER
11/1/2023         RE: Antonio Canseco  [address] [place]  [facility] [address]        Dear Colleague,    Thank you for referring your patient, Antonio Canseco, to the [facility]. Please see a copy of my visit note below.        [facility]    FOLLOW UP VISIT NOTE    PATIENT NAME: Antonio Canseco MRN # [id number]  DATE OF VISIT: June 13 , 2023 YOB: 1970    REFERRING PROVIDER: Ari Nascimento PA-C  [facility] [place] [address]    CANCER TYPE: High grade sarcoma       CHIEF COMPLAIN   Thigh pain     HISTORY OF PRESENT ILLNESS   52 year old male with PMH of DM and recent Dx of large 27 cm mass in the posterior R thigh. He reports that he first noticed a node in the posterior thigh on 5/2022, we was evaluated at OSH where he got an Xray and he was told that this was sciatica and was started on physical therapy. Tumor continued to grow rapidly during this time. MRI done on 1/6/2023 showing a large mass in the posterior thigh. 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   He reports having pain and limit mobility. He has been taking daily meloxican with some relief, however he needs to be resting most of the time to avoid pain.     C1 of doxil/ifosfamide on 1/30 follow up MRI showing increase in size for mass without significant areas of necrosis. We decided to discontinue chemotherapy.  Corey cycle of chemotherapy was complicated with neutropenic fever requiring admission and she also developed disseminated herpes zoster. Additionally work up done after chemotherapy revealed that the ejection fraction after chemotherapy had decreased.    He started Preoperative RT on 3/15 and completed on 4/18 . PET done on 3/14 just before starting RT, showed decrease in the metabolic activity of the mass.     5/22 Surgical resection of posterior thigh mass.     Interval History  Antonio was seen today by virtual visit, he has  been doing great, recovered completely from surgery and doing his activities with no limitations.      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 Lives with wife Yari.       CURRENT OUTPATIENT MEDICATIONS     Current Outpatient Medications   Medication Sig Dispense Refill    Continuous Blood Gluc Sensor (OreeSTYLE MORENITA 3 SENSOR) MISC USE 1 SENSOR EVERY 14 DAYS 6 each 3    losartan (COZAAR) 25 MG tablet Take 1 tablet (25 mg) by mouth daily 90 tablet 3    metFORMIN (GLUCOPHAGE XR) 500 MG 24 hr tablet Take 3 tablets (1,500 mg) by mouth daily (with dinner) 270 tablet 3    metoprolol succinate ER (TOPROL XL) 25 MG 24 hr tablet Take 1 tablet (25 mg) by mouth daily 90 tablet 3          REVIEW OF SYSTEMS   As above in the HPI, o/w complete 12-point ROS was negative.     PHYSICAL EXAM   There were no vitals taken for this visit.    Virtual visit     LABORATORY AND IMAGING STUDIES     Lab Results   Component Value Date    WBC 5.7 10/31/2023    WBC 8.6 12/26/2019     Lab Results   Component Value Date    RBC 5.02 10/31/2023    RBC 4.96 12/26/2019     Lab Results   Component Value Date    HGB 14.2 10/31/2023    HGB 14.2 12/26/2019     Lab Results   Component Value Date    HCT 44.6 10/31/2023    HCT 44.6 12/26/2019     Lab Results   Component Value Date    MCV 89 10/31/2023    MCV 90 12/26/2019     Lab Results    Component Value Date    MCH 28.3 10/31/2023    MCH 28.6 12/26/2019     Lab Results   Component Value Date    MCHC 31.8 10/31/2023    MCHC 31.8 12/26/2019     Lab Results   Component Value Date    RDW 15.0 10/31/2023    RDW 14.3 12/26/2019     Lab Results   Component Value Date     10/31/2023     12/26/2019     Last Comprehensive Metabolic Panel:  Sodium   Date Value Ref Range Status   10/31/2023 138 135 - 145 mmol/L Final     Comment:     Reference intervals for this test were updated on 09/26/2023 to more accurately reflect our healthy population. There may be differences in the flagging of prior results with similar values performed with this method. Interpretation of those prior results can be made in the context of the updated reference intervals.    02/11/2021 140 133 - 144 mmol/L Final     Potassium   Date Value Ref Range Status   10/31/2023 4.1 3.4 - 5.3 mmol/L Final   04/24/2022 4.1 3.4 - 5.3 mmol/L Final   02/11/2021 4.4 3.4 - 5.3 mmol/L Final     Potassium POCT   Date Value Ref Range Status   05/22/2023 4.5 3.5 - 5.0 mmol/L Final     Chloride   Date Value Ref Range Status   10/31/2023 104 98 - 107 mmol/L Final   04/24/2022 101 94 - 109 mmol/L Final   02/11/2021 107 94 - 109 mmol/L Final     Carbon Dioxide   Date Value Ref Range Status   02/11/2021 27 20 - 32 mmol/L Final     Carbon Dioxide (CO2)   Date Value Ref Range Status   10/31/2023 24 22 - 29 mmol/L Final   04/24/2022 29 20 - 32 mmol/L Final     Anion Gap   Date Value Ref Range Status   10/31/2023 10 7 - 15 mmol/L Final   04/24/2022 2 (L) 3 - 14 mmol/L Final   02/11/2021 6 3 - 14 mmol/L Final     Glucose   Date Value Ref Range Status   10/31/2023 102 (H) 70 - 99 mg/dL Final   04/24/2022 143 (H) 70 - 99 mg/dL Final   02/11/2021 135 (H) 70 - 99 mg/dL Final     Comment:     Non Fasting     GLUCOSE BY METER POCT   Date Value Ref Range Status   05/23/2023 119 (H) 70 - 99 mg/dL Final     Urea Nitrogen   Date Value Ref Range Status    10/31/2023 16.7 6.0 - 20.0 mg/dL Final   04/24/2022 11 7 - 30 mg/dL Final   02/11/2021 13 7 - 30 mg/dL Final     Creatinine   Date Value Ref Range Status   10/31/2023 0.93 0.67 - 1.17 mg/dL Final   02/11/2021 0.94 0.66 - 1.25 mg/dL Final     GFR Estimate   Date Value Ref Range Status   10/31/2023 >90 >60 mL/min/1.73m2 Final   02/11/2021 >90 >60 mL/min/[1.73_m2] Final     Comment:     Non  GFR Calc  Starting 12/18/2018, serum creatinine based estimated GFR (eGFR) will be   calculated using the Chronic Kidney Disease Epidemiology Collaboration   (CKD-EPI) equation.       Calcium   Date Value Ref Range Status   10/31/2023 9.1 8.6 - 10.0 mg/dL Final   02/11/2021 9.1 8.5 - 10.1 mg/dL Final     Bilirubin Total   Date Value Ref Range Status   10/31/2023 1.0 <=1.2 mg/dL Final   02/11/2021 0.7 0.2 - 1.3 mg/dL Final     Alkaline Phosphatase   Date Value Ref Range Status   10/31/2023 45 40 - 129 U/L Final   02/11/2021 43 40 - 150 U/L Final     ALT   Date Value Ref Range Status   10/31/2023 18 0 - 70 U/L Final     Comment:     Reference intervals for this test were updated on 6/12/2023 to more accurately reflect our healthy population. There may be differences in the flagging of prior results with similar values performed with this method. Interpretation of those prior results can be made in the context of the updated reference intervals.     02/11/2021 43 0 - 70 U/L Final     AST   Date Value Ref Range Status   10/31/2023 16 0 - 45 U/L Final     Comment:     Reference intervals for this test were updated on 6/12/2023 to more accurately reflect our healthy population. There may be differences in the flagging of prior results with similar values performed with this method. Interpretation of those prior results can be made in the context of the updated reference intervals.   02/11/2021 23 0 - 45 U/L Final       1/6/2023 MRI right femur thigh: Large soft tissue mass in the posterior compartment of the right thigh  measuring 27 x 7.4 x 12 cm.  The mass lies primarily in the hamstring muscle belly.  Heterogeneous signal with areas of necrosis noted.    2/20/23 MRI R femur                                                                   IMPRESSION: In this patient with high grade sarcoma status  post-neoadjuvant chemotherapy;     Increased in size of the 11 x 15.7 x 21.7 cm heterogeneously enhancing  soft tissue mass with myxoid and lipoid components in the posterior  compartment of the right thigh since MRI from 1/6/2023, previously  measured 7.4 x 12 x 20 cm.  *  Distal portion of the mass anteriorly abuts the sciatic nerve and  deep femoral artery/vein without definite invasion.  *  Increased edema within the adductor rosy when compared to prior  study, possibly neurogenic.      PET scan 3/14/23  IMPRESSION:     1. 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      2. Subcentimeter pulmonary nodule in the left lower lobe, which is too small to accurately characterize by PET/CT and warrants continued imaging surveillance.    4/19/23 MRI femur R                                                                 IMPRESSION: In this patient with high grade sarcoma status  post-neoadjuvant chemotherapy;     Slightly increase in the size of the heterogeneously enhancing soft  tissue mass with myxoid and lipoid components in the posterior  compartment of the right thigh since MRI from 2/20/2023, measuring 11  x 16.4 x 21 cm, previously measured 11 x 15.7 x 21.7 cm.   *  Distal portion of the mass anteriorly abuts the sciatic nerve and  deep femoral artery/vein without definite invasion.  *  Slightly increased edema within the adductor rosy when compared  to prior study.    PET 5/30/23  IMPRESSION:     1. Sequelae of post treatment inflammatory change in the right posterior thigh soft tissues without convincing evidence of active neoplasm.     2.  Slightly increased size of the non-FDG avid nodule in the left lower lobe and development of additional non-FDG avid subcentimeter nodules in the inferior left upper lobe, right middle lobe, and right lower lobe. While their exact etiologies remain   indeterminate, the interval growth/development of additional nodules raises suspicion for early pulmonary metastases.    PET 7/18/23  Impression:  1. 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   a. Extensive regional soft tissue edema and nonmasslike enhancement,  presumably postoperative.   b. Postoperative fluid collection.  2. New periostitis/deep muscle edema along the mid to distal right  femoral shaft, query low grade stress response.    10/31/23 MRI R thigh  IMPRESSION:  1. 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 No masslike  enhancement to suggest recurrent tumor.  2. Extensive soft tissue edema without masslike enhancement throughout  the right thigh including the posterior and adductor muscles, presumed  postoperative.  3. Interval resolution of the previously noted postoperative fluid  collection as well as resolution of the periosteal edema seen along  the medial aspect of the mid femoral shaft.  4. No marrow signal changes to suggest fracture or marrow  infiltration.    10/31/23 CT CAP  IMPRESSION:  1.  No evidence of metastatic disease within the chest, abdomen, and  pelvis.   2.  Small pulmonary nodules measuring up to 4 mm are stable. No new  nodules are identified. Recommend attention on follow-up.     PATHOLOGY         Final Diagnosis   A.  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   Electronically signed by Vineet Coello MD on 1/17/2023 at 10:17 AM   Ismael HOWARD    The neoplasm is comprised of highly pleomorphic spindle cells with stromal hyalinization and extensive areas of necrosis.  Focal areas with scattered lipoblasts are seen.  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  MDM-2 gene amplification study by FISH is in progress and result will be provided separately.       I reviewed the medical records including medical records, laboratory studies, and have personally reviewed imaging including MRI of the R thigh which demonstrates a large 27 cm mass in the posterior compartment of the R thigh.      ASSESSMENT AND PLAN     Mr. 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     He received C1 on 1/30. Patient did experienced worsening pain shortly after starting chemotherapy. MRI done on 2/20/23 showing increase in size of large posterior thigh sarcoma. The comparison of this MRI is to the baseline done on 1/6/23, and in a patient with high grade sarcoma the mass is presumed to have grown in size in the month before starting chemotherapy. However, given that the patient had increase in pain and there is no areas of necrosis observed in the recent MRI, I will consider this to be progression of disease and recommend to plan for surgery with consideration for preoperative radiation therapy.      He started radiation therapy on 3/15.  I reviewed the PET scan done on 3/14 showing some tumor shrinkage at 14 x 14 x 18 from prior 11 x 15.7 x 21 and improved metabolic activity. We discussed that given some response was seen, if needed in the future this will still be an option. Right now, given prior complications from chemo (admission for neutropenic fever, bleeding in the urine presumed to be 2/2 to ifosfamide, and presumed cardiomyopathy with decrease in ejection fraction on echo), we will  hold on further chemo.    Antonio has completed preoperative RT and underwent surgical resection on  5/22. He is healing well and recovering his mobility.    I personally reviewed the most recent CT CAP and MRI R thigh showing no evidence recurrence or metastatic disease. He has small subcentemeter pulmonary nodules that have remained stable since diagnosis. For this reason we will continue with surveillance scans with MRI thigh and CT CAP every 3 months during the first 2 years after surgery.     Plan:    -CT CAP in 3 months  -Labs in 3 months  -RTC after labs and CT CAP    40 minutes spent on the date of the encounter doing chart review, review of test results, interpretation of tests, patient visit, documentation and discussion with other provider(s)     Michael Laboy MD   of Medicine  Hematology, Oncology and Transplantation    Virtual Visit Details    Type of service:  Video Visit     Originating Location (pt. Location): Home    Distant Location (provider location):  Off-site  Platform used for Video Visit: Vishal

## 2023-11-26 ENCOUNTER — HEALTH MAINTENANCE LETTER (OUTPATIENT)
Age: 53
End: 2023-11-26

## 2023-12-18 SDOH — HEALTH STABILITY: PHYSICAL HEALTH: ON AVERAGE, HOW MANY MINUTES DO YOU ENGAGE IN EXERCISE AT THIS LEVEL?: 40 MIN

## 2023-12-18 SDOH — HEALTH STABILITY: PHYSICAL HEALTH: ON AVERAGE, HOW MANY DAYS PER WEEK DO YOU ENGAGE IN MODERATE TO STRENUOUS EXERCISE (LIKE A BRISK WALK)?: 3 DAYS

## 2023-12-18 ASSESSMENT — SOCIAL DETERMINANTS OF HEALTH (SDOH)
HOW OFTEN DO YOU GET TOGETHER WITH FRIENDS OR RELATIVES?: PATIENT DECLINED
HOW OFTEN DO YOU ATTENT MEETINGS OF THE CLUB OR ORGANIZATION YOU BELONG TO?: PATIENT DECLINED
DO YOU BELONG TO ANY CLUBS OR ORGANIZATIONS SUCH AS CHURCH GROUPS UNIONS, FRATERNAL OR ATHLETIC GROUPS, OR SCHOOL GROUPS?: NO
IN A TYPICAL WEEK, HOW MANY TIMES DO YOU TALK ON THE PHONE WITH FAMILY, FRIENDS, OR NEIGHBORS?: NEVER
HOW OFTEN DO YOU ATTEND CHURCH OR RELIGIOUS SERVICES?: NEVER

## 2023-12-18 ASSESSMENT — ENCOUNTER SYMPTOMS
ARTHRALGIAS: 0
FEVER: 0
SORE THROAT: 0
DIARRHEA: 0
HEADACHES: 0
PARESTHESIAS: 0
HEMATOCHEZIA: 0
HEMATURIA: 0
FREQUENCY: 0
NAUSEA: 0
ABDOMINAL PAIN: 0
CONSTIPATION: 0
CHILLS: 0
HEARTBURN: 0
MYALGIAS: 1
JOINT SWELLING: 0
DYSURIA: 0
WEAKNESS: 0
PALPITATIONS: 0
SHORTNESS OF BREATH: 0
COUGH: 0
EYE PAIN: 0
NERVOUS/ANXIOUS: 0
DIZZINESS: 0

## 2023-12-18 ASSESSMENT — LIFESTYLE VARIABLES
HOW OFTEN DO YOU HAVE SIX OR MORE DRINKS ON ONE OCCASION: NEVER
AUDIT-C TOTAL SCORE: 1
SKIP TO QUESTIONS 9-10: 1
HOW MANY STANDARD DRINKS CONTAINING ALCOHOL DO YOU HAVE ON A TYPICAL DAY: 1 OR 2
HOW OFTEN DO YOU HAVE A DRINK CONTAINING ALCOHOL: MONTHLY OR LESS

## 2023-12-21 ENCOUNTER — OFFICE VISIT (OUTPATIENT)
Dept: FAMILY MEDICINE | Facility: CLINIC | Age: 53
End: 2023-12-21
Payer: COMMERCIAL

## 2023-12-21 VITALS
SYSTOLIC BLOOD PRESSURE: 138 MMHG | HEIGHT: 71 IN | TEMPERATURE: 98.2 F | OXYGEN SATURATION: 98 % | HEART RATE: 78 BPM | DIASTOLIC BLOOD PRESSURE: 82 MMHG | BODY MASS INDEX: 40.96 KG/M2 | WEIGHT: 292.6 LBS | RESPIRATION RATE: 20 BRPM

## 2023-12-21 DIAGNOSIS — Z12.5 SCREENING FOR PROSTATE CANCER: ICD-10-CM

## 2023-12-21 DIAGNOSIS — E11.65 UNCONTROLLED DIABETES MELLITUS WITH HYPERGLYCEMIA, WITHOUT LONG-TERM CURRENT USE OF INSULIN (H): ICD-10-CM

## 2023-12-21 DIAGNOSIS — C49.9 UNDIFFERENTIATED PLEOMORPHIC SARCOMA (H): ICD-10-CM

## 2023-12-21 DIAGNOSIS — Z00.00 ROUTINE HISTORY AND PHYSICAL EXAMINATION OF ADULT: Primary | ICD-10-CM

## 2023-12-21 LAB
BASOPHILS # BLD AUTO: 0 10E3/UL (ref 0–0.2)
BASOPHILS NFR BLD AUTO: 0 %
EOSINOPHIL # BLD AUTO: 0.1 10E3/UL (ref 0–0.7)
EOSINOPHIL NFR BLD AUTO: 2 %
ERYTHROCYTE [DISTWIDTH] IN BLOOD BY AUTOMATED COUNT: 14.4 % (ref 10–15)
HBA1C MFR BLD: 5.5 % (ref 0–5.6)
HCT VFR BLD AUTO: 46 % (ref 40–53)
HGB BLD-MCNC: 15 G/DL (ref 13.3–17.7)
IMM GRANULOCYTES # BLD: 0 10E3/UL
IMM GRANULOCYTES NFR BLD: 0 %
LYMPHOCYTES # BLD AUTO: 1.1 10E3/UL (ref 0.8–5.3)
LYMPHOCYTES NFR BLD AUTO: 15 %
MCH RBC QN AUTO: 29 PG (ref 26.5–33)
MCHC RBC AUTO-ENTMCNC: 32.6 G/DL (ref 31.5–36.5)
MCV RBC AUTO: 89 FL (ref 78–100)
MONOCYTES # BLD AUTO: 0.6 10E3/UL (ref 0–1.3)
MONOCYTES NFR BLD AUTO: 9 %
NEUTROPHILS # BLD AUTO: 5.4 10E3/UL (ref 1.6–8.3)
NEUTROPHILS NFR BLD AUTO: 74 %
PLATELET # BLD AUTO: 225 10E3/UL (ref 150–450)
RBC # BLD AUTO: 5.18 10E6/UL (ref 4.4–5.9)
WBC # BLD AUTO: 7.3 10E3/UL (ref 4–11)

## 2023-12-21 PROCEDURE — 83036 HEMOGLOBIN GLYCOSYLATED A1C: CPT | Performed by: PHYSICIAN ASSISTANT

## 2023-12-21 PROCEDURE — 80061 LIPID PANEL: CPT | Performed by: PHYSICIAN ASSISTANT

## 2023-12-21 PROCEDURE — G0103 PSA SCREENING: HCPCS | Performed by: PHYSICIAN ASSISTANT

## 2023-12-21 PROCEDURE — 85025 COMPLETE CBC W/AUTO DIFF WBC: CPT | Performed by: PHYSICIAN ASSISTANT

## 2023-12-21 PROCEDURE — 99213 OFFICE O/P EST LOW 20 MIN: CPT | Mod: 25 | Performed by: PHYSICIAN ASSISTANT

## 2023-12-21 PROCEDURE — 36415 COLL VENOUS BLD VENIPUNCTURE: CPT | Performed by: PHYSICIAN ASSISTANT

## 2023-12-21 PROCEDURE — 99207 PR FOOT EXAM NO CHARGE: CPT | Performed by: PHYSICIAN ASSISTANT

## 2023-12-21 PROCEDURE — 99396 PREV VISIT EST AGE 40-64: CPT | Performed by: PHYSICIAN ASSISTANT

## 2023-12-21 PROCEDURE — 80053 COMPREHEN METABOLIC PANEL: CPT | Performed by: PHYSICIAN ASSISTANT

## 2023-12-21 PROCEDURE — 84443 ASSAY THYROID STIM HORMONE: CPT | Performed by: PHYSICIAN ASSISTANT

## 2023-12-21 RX ORDER — BLOOD-GLUCOSE SENSOR
1 EACH MISCELLANEOUS
Qty: 6 EACH | Refills: 5 | Status: SHIPPED | OUTPATIENT
Start: 2023-12-21

## 2023-12-21 ASSESSMENT — ENCOUNTER SYMPTOMS
HEADACHES: 0
ARTHRALGIAS: 0
COUGH: 0
SORE THROAT: 0
DIZZINESS: 0
HEMATOCHEZIA: 0
HEARTBURN: 0
PALPITATIONS: 0
MYALGIAS: 1
EYE PAIN: 0
FREQUENCY: 0
PARESTHESIAS: 0
DYSURIA: 0
CONSTIPATION: 0
NERVOUS/ANXIOUS: 0
ABDOMINAL PAIN: 0
NAUSEA: 0
FEVER: 0
DIARRHEA: 0
WEAKNESS: 0
CHILLS: 0
HEMATURIA: 0
SHORTNESS OF BREATH: 0
JOINT SWELLING: 0

## 2023-12-21 NOTE — PROGRESS NOTES
SUBJECTIVE:   Antonio is a 53 year old, presenting for the following:  Physical        12/21/2023     4:25 PM   Additional Questions   Roomed by Saundra Burger       Healthy Habits:     Getting at least 3 servings of Calcium per day:  Yes    Bi-annual eye exam:  NO    Dental care twice a year:  Yes    Sleep apnea or symptoms of sleep apnea:  Sleep apnea    Diet:  Diabetic    Frequency of exercise:  2-3 days/week    Duration of exercise:  15-30 minutes    Taking medications regularly:  Yes    Medication side effects:  None    Additional concerns today:  Yes            Diabetes Follow-up    How often are you checking your blood sugar? One time daily  What time of day are you checking your blood sugars (select all that apply)?  Before meals  Have you had any blood sugars above 200?  No  Have you had any blood sugars below 70?  Yes one time  What symptoms do you notice when your blood sugar is low?  None  What concerns do you have today about your diabetes? None   Do you have any of these symptoms? (Select all that apply)  No numbness or tingling in feet.  No redness, sores or blisters on feet.  No complaints of excessive thirst.  No reports of blurry vision.  No significant changes to weight.  Have you had a diabetic eye exam in the last 12 months? No        BP Readings from Last 2 Encounters:   12/21/23 138/82   09/29/23 114/66     Hemoglobin A1C (%)   Date Value   12/21/2023 5.5   05/01/2023 5.7 (H)   02/11/2021 6.6 (H)   10/07/2019 6.6 (H)     LDL Cholesterol Calculated (mg/dL)   Date Value   01/18/2023 74   12/28/2021 74   02/11/2021 49   10/07/2019 71             Have you ever done Advance Care Planning? (For example, a Health Directive, POLST, or a discussion with a medical provider or your loved ones about your wishes): No, advance care planning information given to patient to review.  Patient declined advance care planning discussion at this time.    Social History     Tobacco Use    Smoking status: Former      "Packs/day: 1.00     Years: 25.00     Additional pack years: 0.00     Total pack years: 25.00     Types: Cigarettes     Start date: 4/23/1988     Quit date: 4/23/2013     Years since quitting: 10.6    Smokeless tobacco: Never   Substance Use Topics    Alcohol use: Yes     Comment: one drink every 6 months or less             12/18/2023    11:41 AM   Alcohol Use   Prescreen: >3 drinks/day or >7 drinks/week? No       Last PSA: No results found for: \"PSA\"    Reviewed orders with patient. Reviewed health maintenance and updated orders accordingly - Yes  Lab work is in process    Reviewed and updated as needed this visit by clinical staff   Tobacco  Allergies  Meds              Reviewed and updated as needed this visit by Provider                 Past Medical History:   Diagnosis Date    Acrochordon 02/11/2021    CARDIOVASCULAR SCREENING; LDL GOAL LESS THAN 160 03/27/2012    Colon adenoma     Controlled type 2 diabetes mellitus without complication, without long-term current use of insulin (H) 09/16/2019    X 1    Cough 02/04/2014    Degeneration of thoracic intervertebral disc 12/11/2013    Dysfunction of both eustachian tubes 04/12/2019    Elevated blood pressure 12/22/2014    Elevated hemoglobin A1c 09/16/2019    X 1    Gastroesophageal reflux disease     Hamstring strain, right, subsequent encounter 12/19/2022    Hepatic cyst 05/22/2018    Hepatic steatosis 12/11/2013    History of colonic polyps 12/11/2013    History of drainage of abscess 09/16/2019    Hypertension     Irritable bowel syndrome without diarrhea 06/01/2018    Low libido 04/12/2019    Lumbar radiculopathy 12/19/2022    Microscopic hematuria 12/19/2013    Nephrolithiasis     Obesity 03/27/2012    BRIAN on CPAP 03/27/2012    Other irritable bowel syndrome 05/22/2018    Pain in thoracic spine 05/09/2013    Pulmonary nodule 05/22/2018    Right-sided chest pain 04/13/2018    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    Sleep apnea     Tinea pedis of both feet 02/11/2021    " "Tinnitus, unspecified laterality 04/12/2019    Uncontrolled diabetes mellitus with hyperglycemia, without long-term current use of insulin (H) 01/23/2023        Review of Systems   Constitutional:  Negative for chills and fever.   HENT:  Negative for congestion, ear pain, hearing loss and sore throat.    Eyes:  Negative for pain and visual disturbance.   Respiratory:  Negative for cough and shortness of breath.    Cardiovascular:  Negative for chest pain, palpitations and peripheral edema.   Gastrointestinal:  Negative for abdominal pain, constipation, diarrhea, heartburn, hematochezia and nausea.   Genitourinary:  Positive for impotence. Negative for dysuria, frequency, genital sores, hematuria, penile discharge and urgency.   Musculoskeletal:  Positive for myalgias. Negative for arthralgias and joint swelling.   Skin:  Negative for rash.   Neurological:  Negative for dizziness, weakness, headaches and paresthesias.   Psychiatric/Behavioral:  Negative for mood changes. The patient is not nervous/anxious.          OBJECTIVE:   /82 (BP Location: Right arm, Patient Position: Sitting, Cuff Size: Adult Large)   Pulse 78   Temp 98.2  F (36.8  C) (Oral)   Resp 20   Ht 1.803 m (5' 11\")   Wt 132.7 kg (292 lb 9.6 oz)   SpO2 98%   BMI 40.81 kg/m      Physical Exam  GENERAL: healthy, alert and no distress  EYES: Eyes grossly normal to inspection, PERRL and conjunctivae and sclerae normal  HENT: ear canals and TM's normal, nose and mouth without ulcers or lesions  NECK: no adenopathy, no asymmetry, masses, or scars and thyroid normal to palpation  RESP: lungs clear to auscultation - no rales, rhonchi or wheezes  CV: regular rate and rhythm, normal S1 S2, no S3 or S4, no murmur, click or rub, no peripheral edema and peripheral pulses strong  ABDOMEN: soft, nontender, no hepatosplenomegaly, no masses and bowel sounds normal  NEURO: Normal strength and tone, mentation intact and speech normal  PSYCH: mentation appears " "normal, affect normal/bright  Diabetic foot exam: normal DP and PT pulses and no trophic changes or ulcerative lesions    Diagnostic Test Results:  Labs reviewed in Epic    ASSESSMENT/PLAN:   (Z00.00) Routine history and physical examination of adult  (primary encounter diagnosis)  Comment:   Plan: HEMOGLOBIN A1C, Lipid panel reflex to direct         LDL Non-fasting, PSA, screen            (E11.65) Uncontrolled diabetes mellitus with hyperglycemia, without long-term current use of insulin (H)  Comment: await labs. 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 Had recent follow-up with oncology   Plan: OFFICE/OUTPT VISIT,EST,LEVL III            (Z12.5) Screening for prostate cancer  Comment:   Plan: PSA, screen            Patient has been advised of split billing requirements and indicates understanding: No      COUNSELING:   Reviewed preventive health counseling, as reflected in patient instructions       Regular exercise       Healthy diet/nutrition       Vision screening       Hearing screening       Immunizations  Declined all immunizations        BMI:   Estimated body mass index is 40.81 kg/m  as calculated from the following:    Height as of this encounter: 1.803 m (5' 11\").    Weight as of this encounter: 132.7 kg (292 lb 9.6 oz).   Weight management plan: Discussed healthy diet and exercise guidelines      He reports that he quit smoking about 10 years ago. His smoking use included cigarettes. He started smoking about 35 years ago. He has a 25 pack-year smoking history. He has never used smokeless tobacco.            Ely Almazan PA-C  [facility]  "

## 2023-12-22 LAB
ALBUMIN SERPL BCG-MCNC: 4.3 G/DL (ref 3.5–5.2)
ALP SERPL-CCNC: 43 U/L (ref 40–150)
ALT SERPL W P-5'-P-CCNC: 25 U/L (ref 0–70)
ANION GAP SERPL CALCULATED.3IONS-SCNC: 10 MMOL/L (ref 7–15)
AST SERPL W P-5'-P-CCNC: 22 U/L (ref 0–45)
BILIRUB SERPL-MCNC: 0.7 MG/DL
BUN SERPL-MCNC: 23.9 MG/DL (ref 6–20)
CALCIUM SERPL-MCNC: 9.5 MG/DL (ref 8.6–10)
CHLORIDE SERPL-SCNC: 103 MMOL/L (ref 98–107)
CHOLEST SERPL-MCNC: 142 MG/DL
CREAT SERPL-MCNC: 1.1 MG/DL (ref 0.67–1.17)
DEPRECATED HCO3 PLAS-SCNC: 26 MMOL/L (ref 22–29)
EGFRCR SERPLBLD CKD-EPI 2021: 80 ML/MIN/1.73M2
FASTING STATUS PATIENT QL REPORTED: NO
GLUCOSE SERPL-MCNC: 84 MG/DL (ref 70–99)
HDLC SERPL-MCNC: 51 MG/DL
LDLC SERPL CALC-MCNC: 60 MG/DL
NONHDLC SERPL-MCNC: 91 MG/DL
POTASSIUM SERPL-SCNC: 4.3 MMOL/L (ref 3.4–5.3)
PROT SERPL-MCNC: 7.3 G/DL (ref 6.4–8.3)
PSA SERPL DL<=0.01 NG/ML-MCNC: 1.14 NG/ML (ref 0–3.5)
SODIUM SERPL-SCNC: 139 MMOL/L (ref 135–145)
TRIGL SERPL-MCNC: 156 MG/DL
TSH SERPL DL<=0.005 MIU/L-ACNC: 1.96 UIU/ML (ref 0.3–4.2)

## 2024-01-05 ENCOUNTER — PATIENT OUTREACH (OUTPATIENT)
Dept: GASTROENTEROLOGY | Facility: CLINIC | Age: 54
End: 2024-01-05
Payer: COMMERCIAL

## 2024-02-05 ENCOUNTER — HOSPITAL ENCOUNTER (OUTPATIENT)
Dept: MRI IMAGING | Facility: CLINIC | Age: 54
Discharge: HOME | End: 2024-02-05
Attending: STUDENT IN AN ORGANIZED HEALTH CARE EDUCATION/TRAINING PROGRAM
Payer: COMMERCIAL

## 2024-02-05 ENCOUNTER — HOSPITAL ENCOUNTER (OUTPATIENT)
Dept: CT IMAGING | Facility: CLINIC | Age: 54
Discharge: HOME | End: 2024-02-05
Attending: STUDENT IN AN ORGANIZED HEALTH CARE EDUCATION/TRAINING PROGRAM
Payer: COMMERCIAL

## 2024-02-05 ENCOUNTER — LAB (OUTPATIENT)
Dept: ONCOLOGY | Facility: CLINIC | Age: 54
End: 2024-02-05
Attending: STUDENT IN AN ORGANIZED HEALTH CARE EDUCATION/TRAINING PROGRAM
Payer: COMMERCIAL

## 2024-02-05 DIAGNOSIS — C49.9 UNDIFFERENTIATED PLEOMORPHIC SARCOMA (H): ICD-10-CM

## 2024-02-05 DIAGNOSIS — Z00.00 ROUTINE HISTORY AND PHYSICAL EXAMINATION OF ADULT: ICD-10-CM

## 2024-02-05 LAB
ALBUMIN SERPL BCG-MCNC: 4.2 G/DL (ref 3.5–5.2)
ALP SERPL-CCNC: 42 U/L (ref 40–150)
ALT SERPL W P-5'-P-CCNC: 23 U/L (ref 0–70)
ANION GAP SERPL CALCULATED.3IONS-SCNC: 10 MMOL/L (ref 7–15)
AST SERPL W P-5'-P-CCNC: 19 U/L (ref 0–45)
BASOPHILS # BLD AUTO: 0 10E3/UL (ref 0–0.2)
BASOPHILS NFR BLD AUTO: 0 %
BILIRUB SERPL-MCNC: 0.7 MG/DL
BUN SERPL-MCNC: 21.1 MG/DL (ref 6–20)
CALCIUM SERPL-MCNC: 9.1 MG/DL (ref 8.6–10)
CHLORIDE SERPL-SCNC: 104 MMOL/L (ref 98–107)
CREAT SERPL-MCNC: 1.06 MG/DL (ref 0.67–1.17)
DEPRECATED HCO3 PLAS-SCNC: 26 MMOL/L (ref 22–29)
EGFRCR SERPLBLD CKD-EPI 2021: 84 ML/MIN/1.73M2
EOSINOPHIL # BLD AUTO: 0.1 10E3/UL (ref 0–0.7)
EOSINOPHIL NFR BLD AUTO: 2 %
ERYTHROCYTE [DISTWIDTH] IN BLOOD BY AUTOMATED COUNT: 14.1 % (ref 10–15)
GLUCOSE SERPL-MCNC: 110 MG/DL (ref 70–99)
HCT VFR BLD AUTO: 45.7 % (ref 40–53)
HGB BLD-MCNC: 14.7 G/DL (ref 13.3–17.7)
IMM GRANULOCYTES # BLD: 0 10E3/UL
IMM GRANULOCYTES NFR BLD: 0 %
LYMPHOCYTES # BLD AUTO: 0.9 10E3/UL (ref 0.8–5.3)
LYMPHOCYTES NFR BLD AUTO: 18 %
MCH RBC QN AUTO: 29.6 PG (ref 26.5–33)
MCHC RBC AUTO-ENTMCNC: 32.2 G/DL (ref 31.5–36.5)
MCV RBC AUTO: 92 FL (ref 78–100)
MONOCYTES # BLD AUTO: 0.7 10E3/UL (ref 0–1.3)
MONOCYTES NFR BLD AUTO: 13 %
NEUTROPHILS # BLD AUTO: 3.5 10E3/UL (ref 1.6–8.3)
NEUTROPHILS NFR BLD AUTO: 67 %
NRBC # BLD AUTO: 0 10E3/UL
NRBC BLD AUTO-RTO: 0 /100
PLATELET # BLD AUTO: 219 10E3/UL (ref 150–450)
POTASSIUM SERPL-SCNC: 4.7 MMOL/L (ref 3.4–5.3)
PROT SERPL-MCNC: 7.4 G/DL (ref 6.4–8.3)
RBC # BLD AUTO: 4.97 10E6/UL (ref 4.4–5.9)
SODIUM SERPL-SCNC: 140 MMOL/L (ref 135–145)
WBC # BLD AUTO: 5.2 10E3/UL (ref 4–11)

## 2024-02-05 PROCEDURE — A9585 GADOBUTROL INJECTION: HCPCS | Performed by: STUDENT IN AN ORGANIZED HEALTH CARE EDUCATION/TRAINING PROGRAM

## 2024-02-05 PROCEDURE — 73720 MRI LWR EXTREMITY W/O&W/DYE: CPT | Mod: RT

## 2024-02-05 PROCEDURE — 73720 MRI LWR EXTREMITY W/O&W/DYE: CPT | Mod: 26 | Performed by: RADIOLOGY

## 2024-02-05 PROCEDURE — 250N000009 HC RX 250: Performed by: STUDENT IN AN ORGANIZED HEALTH CARE EDUCATION/TRAINING PROGRAM

## 2024-02-05 PROCEDURE — 71260 CT THORAX DX C+: CPT

## 2024-02-05 PROCEDURE — 85004 AUTOMATED DIFF WBC COUNT: CPT | Performed by: STUDENT IN AN ORGANIZED HEALTH CARE EDUCATION/TRAINING PROGRAM

## 2024-02-05 PROCEDURE — 82040 ASSAY OF SERUM ALBUMIN: CPT | Performed by: STUDENT IN AN ORGANIZED HEALTH CARE EDUCATION/TRAINING PROGRAM

## 2024-02-05 PROCEDURE — 36415 COLL VENOUS BLD VENIPUNCTURE: CPT

## 2024-02-05 PROCEDURE — 250N000011 HC RX IP 250 OP 636: Performed by: STUDENT IN AN ORGANIZED HEALTH CARE EDUCATION/TRAINING PROGRAM

## 2024-02-05 PROCEDURE — 255N000002 HC RX 255 OP 636: Performed by: STUDENT IN AN ORGANIZED HEALTH CARE EDUCATION/TRAINING PROGRAM

## 2024-02-05 RX ORDER — IOPAMIDOL 755 MG/ML
500 INJECTION, SOLUTION INTRAVASCULAR ONCE
Status: COMPLETED | OUTPATIENT
Start: 2024-02-05 | End: 2024-02-05

## 2024-02-05 RX ORDER — GADOBUTROL 604.72 MG/ML
13 INJECTION INTRAVENOUS ONCE
Status: COMPLETED | OUTPATIENT
Start: 2024-02-05 | End: 2024-02-05

## 2024-02-05 RX ADMIN — IOPAMIDOL 100 ML: 755 INJECTION, SOLUTION INTRAVENOUS at 08:33

## 2024-02-05 RX ADMIN — SODIUM CHLORIDE 65 ML: 9 INJECTION, SOLUTION INTRAVENOUS at 08:33

## 2024-02-05 RX ADMIN — GADOBUTROL 13 ML: 604.72 INJECTION INTRAVENOUS at 08:41

## 2024-02-05 NOTE — PROGRESS NOTES
Medical Assistant Note:  Antonio Canseco presents today for blood draw.    Patient seen by provider today: No.   present during visit today: Not Applicable.    Concerns: No Concerns.    Procedure:  Lab draw site: lt antecub, Needle type: butterfly, Gauge: 23.    Post Assessment:  Labs drawn without difficulty: Yes.    Discharge Plan:  Departure Mode: Ambulatory.    Face to Face Time: 10.    Danuta Payne [facility]

## 2024-02-07 ENCOUNTER — ONCOLOGY VISIT (OUTPATIENT)
Dept: ONCOLOGY | Facility: CLINIC | Age: 54
End: 2024-02-07
Attending: STUDENT IN AN ORGANIZED HEALTH CARE EDUCATION/TRAINING PROGRAM
Payer: COMMERCIAL

## 2024-02-07 VITALS
SYSTOLIC BLOOD PRESSURE: 144 MMHG | OXYGEN SATURATION: 97 % | DIASTOLIC BLOOD PRESSURE: 83 MMHG | BODY MASS INDEX: 42.08 KG/M2 | TEMPERATURE: 98.1 F | RESPIRATION RATE: 20 BRPM | WEIGHT: 301.7 LBS | HEART RATE: 69 BPM

## 2024-02-07 DIAGNOSIS — C49.9 UNDIFFERENTIATED PLEOMORPHIC SARCOMA (H): ICD-10-CM

## 2024-02-07 PROCEDURE — 99215 OFFICE O/P EST HI 40 MIN: CPT | Performed by: STUDENT IN AN ORGANIZED HEALTH CARE EDUCATION/TRAINING PROGRAM

## 2024-02-07 PROCEDURE — 99213 OFFICE O/P EST LOW 20 MIN: CPT | Performed by: STUDENT IN AN ORGANIZED HEALTH CARE EDUCATION/TRAINING PROGRAM

## 2024-02-07 ASSESSMENT — PAIN SCALES - GENERAL: PAINLEVEL: NO PAIN (0)

## 2024-02-07 NOTE — LETTER
2/7/2024         RE: Antonio Canseco  [address] [place]  [facility] [address]        Dear Colleague,    Thank you for referring your patient, Antonio Canseco, to the [facility]. Please see a copy of my visit note below.        [facility]    FOLLOW UP VISIT NOTE    PATIENT NAME: Antonio Canseco MRN # [id number]  DATE OF VISIT: February 7, 2024 YOB: 1970    REFERRING PROVIDER: Ari Nascimento PA-C  [facility] [place] [address]    CANCER TYPE: High grade sarcoma       CHIEF COMPLAIN   Thigh pain     HISTORY OF PRESENT ILLNESS   52 year old male with PMH of DM and recent Dx of large 27 cm mass in the posterior R thigh. He reports that he first noticed a node in the posterior thigh on 5/2022, we was evaluated at OSH where he got an Xray and he was told that this was sciatica and was started on physical therapy. Tumor continued to grow rapidly during this time. MRI done on 1/6/2023 showing a large mass in the posterior thigh. 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   He reports having pain and limit mobility. He has been taking daily meloxican with some relief, however he needs to be resting most of the time to avoid pain.     C1 of doxil/ifosfamide on 1/30 follow up MRI showing increase in size for mass without significant areas of necrosis. We decided to discontinue chemotherapy.  Corey cycle of chemotherapy was complicated with neutropenic fever requiring admission and she also developed disseminated herpes zoster. Additionally work up done after chemotherapy revealed that the ejection fraction after chemotherapy had decreased.    He started Preoperative RT on 3/15 and completed on 4/18 . PET done on 3/14 just before starting RT, showed decrease in the metabolic activity of the mass.     5/22 Surgical resection of posterior thigh mass.     Interval History  Antonio comes today with his wife. He reports  feeling better, he still has some discomfort and abnormal gait since the surgery. Trying to be more active and eating better.      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 Lives with wife Yari.       CURRENT OUTPATIENT MEDICATIONS     Current Outpatient Medications   Medication Sig Dispense Refill    Continuous Blood Gluc Sensor (FREESTYLE MORENITA 3 SENSOR) MISC 1 each every 14 days Use 1 sensor every 14 days. 6 each 5    Continuous Blood Gluc Sensor (FREESTYLE MORENITA 3 SENSOR) MISC USE 1 SENSOR EVERY 14 DAYS 6 each 3    losartan (COZAAR) 25 MG tablet Take 1 tablet (25 mg) by mouth daily 90 tablet 3    metFORMIN (GLUCOPHAGE XR) 500 MG 24 hr tablet Take 3 tablets (1,500 mg) by mouth daily (with dinner) 270 tablet 3    metoprolol succinate ER (TOPROL XL) 25 MG 24 hr tablet Take 1 tablet (25 mg) by mouth daily 90 tablet 3          REVIEW OF SYSTEMS   As above in the HPI, o/w complete 12-point ROS was negative.     PHYSICAL EXAM   BP (!) 144/83 (BP Location: Right arm, Patient Position: Sitting, Cuff Size: Adult Large)   Pulse 69   Temp 98.1  F (36.7  C) (Oral)   Resp 20   Wt 136.9 kg (301 lb 11.2 oz)   SpO2 97%   BMI 42.08 kg/m      Physical Exam  Constitutional:       Appearance: Normal appearance.   HENT:      Head: Normocephalic.   Cardiovascular:      Rate and Rhythm: Normal rate.   Pulmonary:       Effort: Pulmonary effort is normal.      Breath sounds: Normal breath sounds.   Abdominal:      General: Abdomen is flat. Bowel sounds are normal.   Musculoskeletal:      Cervical back: Normal range of motion and neck supple.      Comments: Surgical site well healed, two small areas of about 3 mm of crust, healed, at the site of the surgical scar. Wife reports on and off drainage from them.    Neurological:      Mental Status: He is alert.          LABORATORY AND IMAGING STUDIES     Lab Results   Component Value Date    WBC 5.2 02/05/2024    WBC 8.6 12/26/2019     Lab Results   Component Value Date    RBC 4.97 02/05/2024    RBC 4.96 12/26/2019     Lab Results   Component Value Date    HGB 14.7 02/05/2024    HGB 14.2 12/26/2019     Lab Results   Component Value Date    HCT 45.7 02/05/2024    HCT 44.6 12/26/2019     Lab Results   Component Value Date    MCV 92 02/05/2024    MCV 90 12/26/2019     Lab Results   Component Value Date    MCH 29.6 02/05/2024    MCH 28.6 12/26/2019     Lab Results   Component Value Date    MCHC 32.2 02/05/2024    MCHC 31.8 12/26/2019     Lab Results   Component Value Date    RDW 14.1 02/05/2024    RDW 14.3 12/26/2019     Lab Results   Component Value Date     02/05/2024     12/26/2019     Last Comprehensive Metabolic Panel:  Sodium   Date Value Ref Range Status   02/05/2024 140 135 - 145 mmol/L Final     Comment:     Reference intervals for this test were updated on 09/26/2023 to more accurately reflect our healthy population. There may be differences in the flagging of prior results with similar values performed with this method. Interpretation of those prior results can be made in the context of the updated reference intervals.    02/11/2021 140 133 - 144 mmol/L Final     Potassium   Date Value Ref Range Status   02/05/2024 4.7 3.4 - 5.3 mmol/L Final   04/24/2022 4.1 3.4 - 5.3 mmol/L Final   02/11/2021 4.4 3.4 - 5.3 mmol/L Final     Potassium POCT   Date Value Ref Range  Status   05/22/2023 4.5 3.5 - 5.0 mmol/L Final     Chloride   Date Value Ref Range Status   02/05/2024 104 98 - 107 mmol/L Final   04/24/2022 101 94 - 109 mmol/L Final   02/11/2021 107 94 - 109 mmol/L Final     Carbon Dioxide   Date Value Ref Range Status   02/11/2021 27 20 - 32 mmol/L Final     Carbon Dioxide (CO2)   Date Value Ref Range Status   02/05/2024 26 22 - 29 mmol/L Final   04/24/2022 29 20 - 32 mmol/L Final     Anion Gap   Date Value Ref Range Status   02/05/2024 10 7 - 15 mmol/L Final   04/24/2022 2 (L) 3 - 14 mmol/L Final   02/11/2021 6 3 - 14 mmol/L Final     Glucose   Date Value Ref Range Status   02/05/2024 110 (H) 70 - 99 mg/dL Final   04/24/2022 143 (H) 70 - 99 mg/dL Final   02/11/2021 135 (H) 70 - 99 mg/dL Final     Comment:     Non Fasting     GLUCOSE BY METER POCT   Date Value Ref Range Status   05/23/2023 119 (H) 70 - 99 mg/dL Final     Urea Nitrogen   Date Value Ref Range Status   02/05/2024 21.1 (H) 6.0 - 20.0 mg/dL Final   04/24/2022 11 7 - 30 mg/dL Final   02/11/2021 13 7 - 30 mg/dL Final     Creatinine   Date Value Ref Range Status   02/05/2024 1.06 0.67 - 1.17 mg/dL Final   02/11/2021 0.94 0.66 - 1.25 mg/dL Final     GFR Estimate   Date Value Ref Range Status   02/05/2024 84 >60 mL/min/1.73m2 Final   02/11/2021 >90 >60 mL/min/[1.73_m2] Final     Comment:     Non  GFR Calc  Starting 12/18/2018, serum creatinine based estimated GFR (eGFR) will be   calculated using the Chronic Kidney Disease Epidemiology Collaboration   (CKD-EPI) equation.       Calcium   Date Value Ref Range Status   02/05/2024 9.1 8.6 - 10.0 mg/dL Final   02/11/2021 9.1 8.5 - 10.1 mg/dL Final     Bilirubin Total   Date Value Ref Range Status   02/05/2024 0.7 <=1.2 mg/dL Final   02/11/2021 0.7 0.2 - 1.3 mg/dL Final     Alkaline Phosphatase   Date Value Ref Range Status   02/05/2024 42 40 - 150 U/L Final     Comment:     Reference intervals for this test were updated on 11/14/2023 to more accurately reflect  our healthy population. There may be differences in the flagging of prior results with similar values performed with this method. Interpretation of those prior results can be made in the context of the updated reference intervals.   02/11/2021 43 40 - 150 U/L Final     ALT   Date Value Ref Range Status   02/05/2024 23 0 - 70 U/L Final     Comment:     Reference intervals for this test were updated on 6/12/2023 to more accurately reflect our healthy population. There may be differences in the flagging of prior results with similar values performed with this method. Interpretation of those prior results can be made in the context of the updated reference intervals.     02/11/2021 43 0 - 70 U/L Final     AST   Date Value Ref Range Status   02/05/2024 19 0 - 45 U/L Final     Comment:     Reference intervals for this test were updated on 6/12/2023 to more accurately reflect our healthy population. There may be differences in the flagging of prior results with similar values performed with this method. Interpretation of those prior results can be made in the context of the updated reference intervals.   02/11/2021 23 0 - 45 U/L Final       1/6/2023 MRI right femur thigh: Large soft tissue mass in the posterior compartment of the right thigh measuring 27 x 7.4 x 12 cm.  The mass lies primarily in the hamstring muscle belly.  Heterogeneous signal with areas of necrosis noted.    2/20/23 MRI R femur                                                                   IMPRESSION: In this patient with high grade sarcoma status  post-neoadjuvant chemotherapy;     Increased in size of the 11 x 15.7 x 21.7 cm heterogeneously enhancing  soft tissue mass with myxoid and lipoid components in the posterior  compartment of the right thigh since MRI from 1/6/2023, previously  measured 7.4 x 12 x 20 cm.  *  Distal portion of the mass anteriorly abuts the sciatic nerve and  deep femoral artery/vein without definite invasion.  *  Increased  edema within the adductor rosy when compared to prior  study, possibly neurogenic.      PET scan 3/14/23  IMPRESSION:     1. 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      2. Subcentimeter pulmonary nodule in the left lower lobe, which is too small to accurately characterize by PET/CT and warrants continued imaging surveillance.    4/19/23 MRI femur R                                                                 IMPRESSION: In this patient with high grade sarcoma status  post-neoadjuvant chemotherapy;     Slightly increase in the size of the heterogeneously enhancing soft  tissue mass with myxoid and lipoid components in the posterior  compartment of the right thigh since MRI from 2/20/2023, measuring 11  x 16.4 x 21 cm, previously measured 11 x 15.7 x 21.7 cm.   *  Distal portion of the mass anteriorly abuts the sciatic nerve and  deep femoral artery/vein without definite invasion.  *  Slightly increased edema within the adductor rosy when compared  to prior study.    PET 5/30/23  IMPRESSION:     1. Sequelae of post treatment inflammatory change in the right posterior thigh soft tissues without convincing evidence of active neoplasm.     2. Slightly increased size of the non-FDG avid nodule in the left lower lobe and development of additional non-FDG avid subcentimeter nodules in the inferior left upper lobe, right middle lobe, and right lower lobe. While their exact etiologies remain   indeterminate, the interval growth/development of additional nodules raises suspicion for early pulmonary metastases.    PET 7/18/23  Impression:  1. 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   a. Extensive regional soft tissue edema and nonmasslike enhancement,  presumably postoperative.   b. Postoperative  fluid collection.  2. New periostitis/deep muscle edema along the mid to distal right  femoral shaft, query low grade stress response.    10/31/23 MRI R thigh  IMPRESSION:  1. 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 No masslike  enhancement to suggest recurrent tumor.  2. Extensive soft tissue edema without masslike enhancement throughout  the right thigh including the posterior and adductor muscles, presumed  postoperative.  3. Interval resolution of the previously noted postoperative fluid  collection as well as resolution of the periosteal edema seen along  the medial aspect of the mid femoral shaft.  4. No marrow signal changes to suggest fracture or marrow  infiltration.    10/31/23 CT CAP  IMPRESSION:  1.  No evidence of metastatic disease within the chest, abdomen, and  pelvis.   2.  Small pulmonary nodules measuring up to 4 mm are stable. No new  nodules are identified. Recommend attention on follow-up.    2/5/24 CT CAP    IMPRESSION: No significant interval change. No evidence for metastatic  malignancy in the chest, abdomen, or pelvis.     2/5/24 MRI  Impression:     1. Stable postoperative changes of right posterior thigh mass  resection without evidence for local disease recurrence or local  regional metastasis in the right thigh.     2. Unchanged intramuscular edema, mild fatty atrophy, and non-mass  enhancement involving the posterior right thigh muscles likely  representing posttreatment change.      PATHOLOGY         Final Diagnosis   A.  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   Electronically signed by Vineet Coello MD on 1/17/2023 at 10:17 AM   Comment   UUMAYO   The neoplasm is comprised of highly pleomorphic spindle cells with stromal hyalinization and extensive areas of necrosis.  Focal areas with scattered lipoblasts are seen.  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  MDM-2 gene amplification study by FISH is in progress and result will be provided separately.       I reviewed the medical records including medical records, laboratory studies, and have personally reviewed imaging including MRI of the R thigh which demonstrates a large 27 cm mass in the posterior compartment of the R thigh.      ASSESSMENT AND PLAN     Mr. 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     He received C1 on 1/30. Patient did experienced worsening pain shortly after starting chemotherapy. MRI done on 2/20/23 showing increase in size of large posterior thigh sarcoma. The comparison of this MRI is to the baseline done on 1/6/23, and in a patient with high grade sarcoma the mass is presumed to have grown in size in the month before starting chemotherapy. However, given that the patient had increase in pain and there is no areas of necrosis observed in the recent MRI, I will consider this to be progression of disease and recommend to plan for surgery with consideration for preoperative radiation therapy.      He started radiation therapy on 3/15.  I reviewed the PET scan done on 3/14 showing some tumor shrinkage at 14 x 14 x 18 from prior 11 x 15.7 x 21 and improved metabolic activity. We discussed that given some response was seen, if needed in the future this will still be an option. Right now, given prior complications from chemo (admission for neutropenic fever, bleeding in the urine presumed to be 2/2 to ifosfamide, and presumed cardiomyopathy with decrease in ejection fraction on echo), we will hold on further chemo.    Antonio has completed preoperative RT and underwent surgical resection on  5/22/23. He is healing well and recovering his mobility.    I personally reviewed the  most recent CT CAP and MRI R thigh showing no evidence recurrence or metastatic disease. He has small subcentemeter pulmonary nodules that have remained stable since diagnosis. For this reason we will continue with surveillance scans with MRI thigh and CT CAP every 3 months during the first 2 years after surgery.     Plan:    -CT CAP, MRI leg and labs cbc, cmp on 4/15   -Follow up Dr. Price 4/16     40 minutes spent on the date of the encounter doing chart review, review of test results, interpretation of tests, patient visit, documentation and discussion with other provider(s)     Michael Laboy MD   of Medicine  Hematology, Oncology and Transplantation

## 2024-02-07 NOTE — PROGRESS NOTES
[facility] CANCER CLINIC    FOLLOW UP VISIT NOTE    PATIENT NAME: Antonio Canseco MRN # [id number]  DATE OF VISIT: February 7, 2024 YOB: 1970    REFERRING PROVIDER: Ari Nascimento PA-C  [facility] [place] [address]    CANCER TYPE: High grade sarcoma       CHIEF COMPLAIN   Thigh pain     HISTORY OF PRESENT ILLNESS   52 year old male with PMH of DM and recent Dx of large 27 cm mass in the posterior R thigh. He reports that he first noticed a node in the posterior thigh on 5/2022, we was evaluated at OSH where he got an Xray and he was told that this was sciatica and was started on physical therapy. Tumor continued to grow rapidly during this time. MRI done on 1/6/2023 showing a large mass in the posterior thigh. 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   He reports having pain and limit mobility. He has been taking daily meloxican with some relief, however he needs to be resting most of the time to avoid pain.     C1 of doxil/ifosfamide on 1/30 follow up MRI showing increase in size for mass without significant areas of necrosis. We decided to discontinue chemotherapy.  Corey cycle of chemotherapy was complicated with neutropenic fever requiring admission and she also developed disseminated herpes zoster. Additionally work up done after chemotherapy revealed that the ejection fraction after chemotherapy had decreased.    He started Preoperative RT on 3/15 and completed on 4/18 . PET done on 3/14 just before starting RT, showed decrease in the metabolic activity of the mass.     5/22 Surgical resection of posterior thigh mass.     Interval History  Antonio comes today with his wife. He reports feeling better, he still has some discomfort and abnormal gait since the surgery. Trying to be more active and eating better.      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 Lives with wife Yari.       CURRENT OUTPATIENT MEDICATIONS     Current Outpatient Medications   Medication Sig Dispense Refill    Continuous Blood Gluc Sensor (FREESTYLE MORENITA 3 SENSOR) MISC 1 each every 14 days Use 1 sensor every 14 days. 6 each 5    Continuous Blood Gluc Sensor (FREESTYLE MORENITA 3 SENSOR) MISC USE 1 SENSOR EVERY 14 DAYS 6 each 3    losartan (COZAAR) 25 MG tablet Take 1 tablet (25 mg) by mouth daily 90 tablet 3    metFORMIN (GLUCOPHAGE XR) 500 MG 24 hr tablet Take 3 tablets (1,500 mg) by mouth daily (with dinner) 270 tablet 3    metoprolol succinate ER (TOPROL XL) 25 MG 24 hr tablet Take 1 tablet (25 mg) by mouth daily 90 tablet 3          REVIEW OF SYSTEMS   As above in the HPI, o/w complete 12-point ROS was negative.     PHYSICAL EXAM   BP (!) 144/83 (BP Location: Right arm, Patient Position: Sitting, Cuff Size: Adult Large)   Pulse 69   Temp 98.1  F (36.7  C) (Oral)   Resp 20   Wt 136.9 kg (301 lb 11.2 oz)   SpO2 97%   BMI 42.08 kg/m      Physical Exam  Constitutional:       Appearance: Normal appearance.   HENT:      Head: Normocephalic.   Cardiovascular:      Rate and Rhythm: Normal rate.   Pulmonary:      Effort: Pulmonary effort is normal.      Breath sounds: Normal breath sounds.   Abdominal:      General: Abdomen is flat. Bowel sounds are normal.   Musculoskeletal:      Cervical back: Normal range of motion and neck supple.      Comments: Surgical site well  healed, two small areas of about 3 mm of crust, healed, at the site of the surgical scar. Wife reports on and off drainage from them.    Neurological:      Mental Status: He is alert.          LABORATORY AND IMAGING STUDIES     Lab Results   Component Value Date    WBC 5.2 02/05/2024    WBC 8.6 12/26/2019     Lab Results   Component Value Date    RBC 4.97 02/05/2024    RBC 4.96 12/26/2019     Lab Results   Component Value Date    HGB 14.7 02/05/2024    HGB 14.2 12/26/2019     Lab Results   Component Value Date    HCT 45.7 02/05/2024    HCT 44.6 12/26/2019     Lab Results   Component Value Date    MCV 92 02/05/2024    MCV 90 12/26/2019     Lab Results   Component Value Date    MCH 29.6 02/05/2024    MCH 28.6 12/26/2019     Lab Results   Component Value Date    MCHC 32.2 02/05/2024    MCHC 31.8 12/26/2019     Lab Results   Component Value Date    RDW 14.1 02/05/2024    RDW 14.3 12/26/2019     Lab Results   Component Value Date     02/05/2024     12/26/2019     Last Comprehensive Metabolic Panel:  Sodium   Date Value Ref Range Status   02/05/2024 140 135 - 145 mmol/L Final     Comment:     Reference intervals for this test were updated on 09/26/2023 to more accurately reflect our healthy population. There may be differences in the flagging of prior results with similar values performed with this method. Interpretation of those prior results can be made in the context of the updated reference intervals.    02/11/2021 140 133 - 144 mmol/L Final     Potassium   Date Value Ref Range Status   02/05/2024 4.7 3.4 - 5.3 mmol/L Final   04/24/2022 4.1 3.4 - 5.3 mmol/L Final   02/11/2021 4.4 3.4 - 5.3 mmol/L Final     Potassium POCT   Date Value Ref Range Status   05/22/2023 4.5 3.5 - 5.0 mmol/L Final     Chloride   Date Value Ref Range Status   02/05/2024 104 98 - 107 mmol/L Final   04/24/2022 101 94 - 109 mmol/L Final   02/11/2021 107 94 - 109 mmol/L Final     Carbon Dioxide   Date Value Ref Range Status    02/11/2021 27 20 - 32 mmol/L Final     Carbon Dioxide (CO2)   Date Value Ref Range Status   02/05/2024 26 22 - 29 mmol/L Final   04/24/2022 29 20 - 32 mmol/L Final     Anion Gap   Date Value Ref Range Status   02/05/2024 10 7 - 15 mmol/L Final   04/24/2022 2 (L) 3 - 14 mmol/L Final   02/11/2021 6 3 - 14 mmol/L Final     Glucose   Date Value Ref Range Status   02/05/2024 110 (H) 70 - 99 mg/dL Final   04/24/2022 143 (H) 70 - 99 mg/dL Final   02/11/2021 135 (H) 70 - 99 mg/dL Final     Comment:     Non Fasting     GLUCOSE BY METER POCT   Date Value Ref Range Status   05/23/2023 119 (H) 70 - 99 mg/dL Final     Urea Nitrogen   Date Value Ref Range Status   02/05/2024 21.1 (H) 6.0 - 20.0 mg/dL Final   04/24/2022 11 7 - 30 mg/dL Final   02/11/2021 13 7 - 30 mg/dL Final     Creatinine   Date Value Ref Range Status   02/05/2024 1.06 0.67 - 1.17 mg/dL Final   02/11/2021 0.94 0.66 - 1.25 mg/dL Final     GFR Estimate   Date Value Ref Range Status   02/05/2024 84 >60 mL/min/1.73m2 Final   02/11/2021 >90 >60 mL/min/[1.73_m2] Final     Comment:     Non  GFR Calc  Starting 12/18/2018, serum creatinine based estimated GFR (eGFR) will be   calculated using the Chronic Kidney Disease Epidemiology Collaboration   (CKD-EPI) equation.       Calcium   Date Value Ref Range Status   02/05/2024 9.1 8.6 - 10.0 mg/dL Final   02/11/2021 9.1 8.5 - 10.1 mg/dL Final     Bilirubin Total   Date Value Ref Range Status   02/05/2024 0.7 <=1.2 mg/dL Final   02/11/2021 0.7 0.2 - 1.3 mg/dL Final     Alkaline Phosphatase   Date Value Ref Range Status   02/05/2024 42 40 - 150 U/L Final     Comment:     Reference intervals for this test were updated on 11/14/2023 to more accurately reflect our healthy population. There may be differences in the flagging of prior results with similar values performed with this method. Interpretation of those prior results can be made in the context of the updated reference intervals.   02/11/2021 43 40  150  U/L Final     ALT   Date Value Ref Range Status   02/05/2024 23 0 - 70 U/L Final     Comment:     Reference intervals for this test were updated on 6/12/2023 to more accurately reflect our healthy population. There may be differences in the flagging of prior results with similar values performed with this method. Interpretation of those prior results can be made in the context of the updated reference intervals.     02/11/2021 43 0 - 70 U/L Final     AST   Date Value Ref Range Status   02/05/2024 19 0 - 45 U/L Final     Comment:     Reference intervals for this test were updated on 6/12/2023 to more accurately reflect our healthy population. There may be differences in the flagging of prior results with similar values performed with this method. Interpretation of those prior results can be made in the context of the updated reference intervals.   02/11/2021 23 0 - 45 U/L Final       1/6/2023 MRI right femur thigh: Large soft tissue mass in the posterior compartment of the right thigh measuring 27 x 7.4 x 12 cm.  The mass lies primarily in the hamstring muscle belly.  Heterogeneous signal with areas of necrosis noted.    2/20/23 MRI R femur                                                                   IMPRESSION: In this patient with high grade sarcoma status  post-neoadjuvant chemotherapy;     Increased in size of the 11 x 15.7 x 21.7 cm heterogeneously enhancing  soft tissue mass with myxoid and lipoid components in the posterior  compartment of the right thigh since MRI from 1/6/2023, previously  measured 7.4 x 12 x 20 cm.  *  Distal portion of the mass anteriorly abuts the sciatic nerve and  deep femoral artery/vein without definite invasion.  *  Increased edema within the adductor rosy when compared to prior  study, possibly neurogenic.      PET scan 3/14/23  IMPRESSION:     1. 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       2. Subcentimeter pulmonary nodule in the left lower lobe, which is too small to accurately characterize by PET/CT and warrants continued imaging surveillance.    4/19/23 MRI femur R                                                                 IMPRESSION: In this patient with high grade sarcoma status  post-neoadjuvant chemotherapy;     Slightly increase in the size of the heterogeneously enhancing soft  tissue mass with myxoid and lipoid components in the posterior  compartment of the right thigh since MRI from 2/20/2023, measuring 11  x 16.4 x 21 cm, previously measured 11 x 15.7 x 21.7 cm.   *  Distal portion of the mass anteriorly abuts the sciatic nerve and  deep femoral artery/vein without definite invasion.  *  Slightly increased edema within the adductor rosy when compared  to prior study.    PET 5/30/23  IMPRESSION:     1. Sequelae of post treatment inflammatory change in the right posterior thigh soft tissues without convincing evidence of active neoplasm.     2. Slightly increased size of the non-FDG avid nodule in the left lower lobe and development of additional non-FDG avid subcentimeter nodules in the inferior left upper lobe, right middle lobe, and right lower lobe. While their exact etiologies remain   indeterminate, the interval growth/development of additional nodules raises suspicion for early pulmonary metastases.    PET 7/18/23  Impression:  1. 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   a. Extensive regional soft tissue edema and nonmasslike enhancement,  presumably postoperative.   b. Postoperative fluid collection.  2. New periostitis/deep muscle edema along the mid to distal right  femoral shaft, query low grade stress response.    10/31/23 MRI R thigh  IMPRESSION:  1. 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 No masslike  enhancement to suggest recurrent tumor.  2. Extensive soft tissue edema without masslike enhancement throughout  the right thigh including the posterior and adductor muscles, presumed  postoperative.  3. Interval resolution of the previously noted postoperative fluid  collection as well as resolution of the periosteal edema seen along  the medial aspect of the mid femoral shaft.  4. No marrow signal changes to suggest fracture or marrow  infiltration.    10/31/23 CT CAP  IMPRESSION:  1.  No evidence of metastatic disease within the chest, abdomen, and  pelvis.   2.  Small pulmonary nodules measuring up to 4 mm are stable. No new  nodules are identified. Recommend attention on follow-up.    2/5/24 CT CAP    IMPRESSION: No significant interval change. No evidence for metastatic  malignancy in the chest, abdomen, or pelvis.     2/5/24 MRI  Impression:     1. Stable postoperative changes of right posterior thigh mass  resection without evidence for local disease recurrence or local  regional metastasis in the right thigh.     2. Unchanged intramuscular edema, mild fatty atrophy, and non-mass  enhancement involving the posterior right thigh muscles likely  representing posttreatment change.      PATHOLOGY         Final Diagnosis   A.  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   Electronically signed by Vineet Coello MD on 1/17/2023 at 10:17 AM   Comment   UUMAYO   The neoplasm is comprised of highly pleomorphic spindle cells with stromal hyalinization and extensive areas of necrosis.  Focal areas with scattered lipoblasts are seen.  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  MDM-2 gene amplification study by FISH is in progress and result will be provided separately.       I reviewed the medical records  including medical records, laboratory studies, and have personally reviewed imaging including MRI of the R thigh which demonstrates a large 27 cm mass in the posterior compartment of the R thigh.      ASSESSMENT AND PLAN     Mr. 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     He received C1 on 1/30. Patient did experienced worsening pain shortly after starting chemotherapy. MRI done on 2/20/23 showing increase in size of large posterior thigh sarcoma. The comparison of this MRI is to the baseline done on 1/6/23, and in a patient with high grade sarcoma the mass is presumed to have grown in size in the month before starting chemotherapy. However, given that the patient had increase in pain and there is no areas of necrosis observed in the recent MRI, I will consider this to be progression of disease and recommend to plan for surgery with consideration for preoperative radiation therapy.      He started radiation therapy on 3/15.  I reviewed the PET scan done on 3/14 showing some tumor shrinkage at 14 x 14 x 18 from prior 11 x 15.7 x 21 and improved metabolic activity. We discussed that given some response was seen, if needed in the future this will still be an option. Right now, given prior complications from chemo (admission for neutropenic fever, bleeding in the urine presumed to be 2/2 to ifosfamide, and presumed cardiomyopathy with decrease in ejection fraction on echo), we will hold on further chemo.    Antonio has completed preoperative RT and underwent surgical resection on  5/22/23. He is healing well and recovering his mobility.    I personally reviewed the most recent CT CAP and MRI R thigh showing no evidence recurrence or metastatic disease. He has small subcentemeter pulmonary nodules that have remained stable since diagnosis. For this reason we will continue with surveillance scans with MRI thigh and CT CAP  every 3 months during the first 2 years after surgery.     Plan:    -CT CAP, MRI leg and labs cbc, cmp on 4/15   -Follow up Dr. Price 4/16     40 minutes spent on the date of the encounter doing chart review, review of test results, interpretation of tests, patient visit, documentation and discussion with other provider(s)     Michael Laboy MD   of Medicine  Hematology, Oncology and Transplantation

## 2024-02-07 NOTE — NURSING NOTE
"Oncology Rooming Note    February 7, 2024 10:23 AM   Antonio Canseco is a 53 year old male who presents for:    Chief Complaint   Patient presents with    Oncology Clinic Visit     Undifferentiated pleomorphic sarcoma      Initial Vitals: BP (!) 144/83 (BP Location: Right arm, Patient Position: Sitting, Cuff Size: Adult Large)   Pulse 69   Temp 98.1  F (36.7  C) (Oral)   Resp 20   Wt 136.9 kg (301 lb 11.2 oz)   SpO2 97%   BMI 42.08 kg/m   Estimated body mass index is 42.08 kg/m  as calculated from the following:    Height as of 12/21/23: 1.803 m (5' 11\").    Weight as of this encounter: 136.9 kg (301 lb 11.2 oz). Body surface area is 2.62 meters squared.  No Pain (0) Comment: Data Unavailable   No LMP for male patient.  Allergies reviewed: Yes  Medications reviewed: Yes    Medications: Medication refills not needed today.  Pharmacy name entered into [facility]: [facility] [place], [place] - [address]    Frailty Screening:   Is the patient here for a new oncology consult visit in cancer care? 2. No      Clinical concerns: NONE       Trina Dunn              "

## 2024-03-08 ENCOUNTER — NURSE TRIAGE (OUTPATIENT)
Dept: NURSING | Facility: CLINIC | Age: 54
End: 2024-03-08
Payer: COMMERCIAL

## 2024-03-08 ENCOUNTER — TELEPHONE (OUTPATIENT)
Dept: FAMILY MEDICINE | Facility: CLINIC | Age: 54
End: 2024-03-08
Payer: COMMERCIAL

## 2024-03-08 NOTE — TELEPHONE ENCOUNTER
Pt is having ringing in his ears with sound sensitivity, he is scheduled for an appt March 25, but would like to be seen sooner if at all possible or put on a wait list. Please reach out to pt with care options.

## 2024-03-08 NOTE — TELEPHONE ENCOUNTER
S-(situation): tinnitus, ear pressure    B-(background):   Present for 2-3 weeks and getting worse. No URI symptoms    A-(assessment):   Increased tinnitus in both ears.  Extra sensitive to sound, low volume seems to sound really loud. Has some ear pain with sounds. Talking causes painful pressure.    R-(recommendations):   Be seen within the day. No openings. Pt unable to make it tonight to Urgent Care. Advised that UC open until 8 pm, cut off 7 pm. Pt has an appointment on 3/11 at [facility]. Plans to keep visit with Dr. Guzman.   FNA advised UC options after hours and weekends.  Pt verbalized understanding      Geni Recinos RN/[place] Nurse Advisor        Reason for Disposition   Ear is painful    Additional Information   Negative: Followed an ear injury   Negative: Hearing loss is main symptom   Negative: Patient sounds very sick or weak to the triager   Negative: Taking aspirin and dosage sounds high (i.e., > 1500 mg/day)   Negative: Hearing loss in one or both ears and sudden onset and present now    Protocols used: Tinnitus-A-OH

## 2024-03-11 ENCOUNTER — OFFICE VISIT (OUTPATIENT)
Dept: FAMILY MEDICINE | Facility: CLINIC | Age: 54
End: 2024-03-11
Payer: COMMERCIAL

## 2024-03-11 VITALS
SYSTOLIC BLOOD PRESSURE: 130 MMHG | RESPIRATION RATE: 21 BRPM | HEIGHT: 71 IN | TEMPERATURE: 98 F | OXYGEN SATURATION: 97 % | DIASTOLIC BLOOD PRESSURE: 70 MMHG | WEIGHT: 307 LBS | HEART RATE: 64 BPM | BODY MASS INDEX: 42.98 KG/M2

## 2024-03-11 DIAGNOSIS — H69.93 DYSFUNCTION OF BOTH EUSTACHIAN TUBES: Primary | ICD-10-CM

## 2024-03-11 DIAGNOSIS — I42.8 NICM (NONISCHEMIC CARDIOMYOPATHY) (H): ICD-10-CM

## 2024-03-11 DIAGNOSIS — E11.9 TYPE 2 DIABETES MELLITUS WITHOUT COMPLICATION, WITHOUT LONG-TERM CURRENT USE OF INSULIN (H): ICD-10-CM

## 2024-03-11 DIAGNOSIS — E66.01 MORBID OBESITY (H): ICD-10-CM

## 2024-03-11 PROBLEM — D61.810 ANTINEOPLASTIC CHEMOTHERAPY INDUCED PANCYTOPENIA (H): Status: RESOLVED | Noted: 2023-02-11 | Resolved: 2024-03-11

## 2024-03-11 PROBLEM — E11.65: Status: RESOLVED | Noted: 2023-01-23 | Resolved: 2024-03-11

## 2024-03-11 PROBLEM — T45.1X5A ANTINEOPLASTIC CHEMOTHERAPY INDUCED PANCYTOPENIA (H): Status: RESOLVED | Noted: 2023-02-11 | Resolved: 2024-03-11

## 2024-03-11 PROBLEM — D70.9 NEUTROPENIC FEVER (H): Status: RESOLVED | Noted: 2023-02-11 | Resolved: 2024-03-11

## 2024-03-11 PROBLEM — R50.81 NEUTROPENIC FEVER (H): Status: RESOLVED | Noted: 2023-02-11 | Resolved: 2024-03-11

## 2024-03-11 LAB
CREAT UR-MCNC: 136 MG/DL
MICROALBUMIN UR-MCNC: <12 MG/L
MICROALBUMIN/CREAT UR: NORMAL MG/G{CREAT}

## 2024-03-11 PROCEDURE — 82043 UR ALBUMIN QUANTITATIVE: CPT | Performed by: PHYSICIAN ASSISTANT

## 2024-03-11 PROCEDURE — 82570 ASSAY OF URINE CREATININE: CPT | Performed by: PHYSICIAN ASSISTANT

## 2024-03-11 PROCEDURE — 99213 OFFICE O/P EST LOW 20 MIN: CPT | Performed by: PHYSICIAN ASSISTANT

## 2024-03-11 RX ORDER — FLUTICASONE PROPIONATE 50 MCG
1 SPRAY, SUSPENSION (ML) NASAL DAILY
Qty: 16 G | Refills: 0 | Status: SHIPPED | OUTPATIENT
Start: 2024-03-11 | End: 2024-05-03

## 2024-03-11 NOTE — PATIENT INSTRUCTIONS
[facility] in [place], [place]  -Dr. Ramu Ramirez  - schedule through text: [phone]

## 2024-03-11 NOTE — PROGRESS NOTES
"  Assessment & Plan     Dysfunction of both eustachian tubes  Exam benign. Given short course, felt likely ETD. Flonase and otc antihistamine daily. If no improvement in 2 weeks, primary care ent contact info given to patient to go to in [place]. Sooner if worsening.   - fluticasone (FLONASE) 50 MCG/ACT nasal spray; Spray 1 spray into both nostrils daily  Medication use and side effects discussed with the patient. Patient is in complete understanding and agreement with plan.       Type 2 diabetes mellitus without complication, without long-term current use of insulin (H)    - Albumin Random Urine Quantitative with Creat Ratio; Future    NICM (nonischemic cardiomyopathy) (H)  Followed by cardiology. Thalia.     Morbid obesity (H)  Present due to bmi.           Joyce Alvarez is a 53 year old, presenting for the following health issues:  Ear Problem        3/11/2024     9:38 AM   Additional Questions   Roomed by Jennifer MONTANA CMA     History of Present Illness       Reason for visit:  Ear ringing sharp sound sensitivity    He eats 0-1 servings of fruits and vegetables daily.He consumes 0 sweetened beverage(s) daily.He exercises with enough effort to increase his heart rate 10 to 19 minutes per day.  He exercises with enough effort to increase his heart rate 3 or less days per week.   He is taking medications regularly.     Ears/Nose/Throat (ENT)  Earache: No  Ear discharge: No  Ringing in the ears (Tinnitus): YES both ears  Change in hearing: YES, a little but more sensitive   Nasal congestion: YES, but not too bad   Nasal drainage: YES   Nosebleeds: No  Sore Throat: No  Hoarseness: No  Difficulty swallowing: No  Itching of ears or eyes: No  Sneezing :No                 Objective    /70 (BP Location: Right arm, Patient Position: Sitting, Cuff Size: Adult Large)   Pulse 64   Temp 98  F (36.7  C) (Oral)   Resp 21   Ht 1.803 m (5' 11\")   Wt 139.3 kg (307 lb)   SpO2 97%   BMI 42.82 kg/m    Body mass index is " 42.82 kg/m .  Physical Exam   GENERAL: alert and no distress  EYES: Eyes grossly normal to inspection, PERRL and conjunctivae and sclerae normal  HENT: ear canals and TM's normal, nose and mouth without ulcers or lesions  NECK: no adenopathy, no asymmetry, masses, or scars, thyroid normal to palpation, and no carotid bruits  CV: regular rates and rhythm  NEURO: Normal strength and tone, mentation intact and speech normal  PSYCH: mentation appears normal, affect normal/bright          Signed Electronically by: Fortunato Guzman PA-C

## 2024-03-28 ENCOUNTER — TRANSFERRED RECORDS (OUTPATIENT)
Dept: HEALTH INFORMATION MANAGEMENT | Facility: CLINIC | Age: 54
End: 2024-03-28
Payer: COMMERCIAL

## 2024-04-15 ENCOUNTER — LAB (OUTPATIENT)
Dept: LAB | Facility: CLINIC | Age: 54
End: 2024-04-15
Attending: PHYSICIAN ASSISTANT
Payer: COMMERCIAL

## 2024-04-15 ENCOUNTER — HOSPITAL ENCOUNTER (OUTPATIENT)
Dept: MRI IMAGING | Facility: CLINIC | Age: 54
Discharge: HOME | End: 2024-04-15
Attending: PHYSICIAN ASSISTANT
Payer: COMMERCIAL

## 2024-04-15 ENCOUNTER — HOSPITAL ENCOUNTER (OUTPATIENT)
Dept: CT IMAGING | Facility: CLINIC | Age: 54
Discharge: HOME | End: 2024-04-15
Attending: STUDENT IN AN ORGANIZED HEALTH CARE EDUCATION/TRAINING PROGRAM
Payer: COMMERCIAL

## 2024-04-15 DIAGNOSIS — Z00.00 ROUTINE HISTORY AND PHYSICAL EXAMINATION OF ADULT: ICD-10-CM

## 2024-04-15 DIAGNOSIS — C49.9 SARCOMA OF SOFT TISSUE (H): ICD-10-CM

## 2024-04-15 DIAGNOSIS — C49.9 UNDIFFERENTIATED PLEOMORPHIC SARCOMA (H): ICD-10-CM

## 2024-04-15 LAB
ALBUMIN SERPL BCG-MCNC: 4.2 G/DL (ref 3.5–5.2)
ALP SERPL-CCNC: 41 U/L (ref 40–150)
ALT SERPL W P-5'-P-CCNC: 31 U/L (ref 0–70)
ANION GAP SERPL CALCULATED.3IONS-SCNC: 13 MMOL/L (ref 7–15)
AST SERPL W P-5'-P-CCNC: 21 U/L (ref 0–45)
BASOPHILS # BLD AUTO: 0 10E3/UL (ref 0–0.2)
BASOPHILS NFR BLD AUTO: 1 %
BILIRUB SERPL-MCNC: 0.7 MG/DL
BUN SERPL-MCNC: 18.2 MG/DL (ref 6–20)
CALCIUM SERPL-MCNC: 9.2 MG/DL (ref 8.6–10)
CHLORIDE SERPL-SCNC: 108 MMOL/L (ref 98–107)
CREAT SERPL-MCNC: 1.07 MG/DL (ref 0.67–1.17)
DEPRECATED HCO3 PLAS-SCNC: 23 MMOL/L (ref 22–29)
EGFRCR SERPLBLD CKD-EPI 2021: 83 ML/MIN/1.73M2
EOSINOPHIL # BLD AUTO: 0.2 10E3/UL (ref 0–0.7)
EOSINOPHIL NFR BLD AUTO: 3 %
ERYTHROCYTE [DISTWIDTH] IN BLOOD BY AUTOMATED COUNT: 13.8 % (ref 10–15)
GLUCOSE SERPL-MCNC: 106 MG/DL (ref 70–99)
HCT VFR BLD AUTO: 45.7 % (ref 40–53)
HGB BLD-MCNC: 14.6 G/DL (ref 13.3–17.7)
IMM GRANULOCYTES # BLD: 0 10E3/UL
IMM GRANULOCYTES NFR BLD: 0 %
LYMPHOCYTES # BLD AUTO: 1.3 10E3/UL (ref 0.8–5.3)
LYMPHOCYTES NFR BLD AUTO: 23 %
MCH RBC QN AUTO: 29.5 PG (ref 26.5–33)
MCHC RBC AUTO-ENTMCNC: 31.9 G/DL (ref 31.5–36.5)
MCV RBC AUTO: 92 FL (ref 78–100)
MONOCYTES # BLD AUTO: 0.5 10E3/UL (ref 0–1.3)
MONOCYTES NFR BLD AUTO: 9 %
NEUTROPHILS # BLD AUTO: 3.6 10E3/UL (ref 1.6–8.3)
NEUTROPHILS NFR BLD AUTO: 64 %
NRBC # BLD AUTO: 0 10E3/UL
NRBC BLD AUTO-RTO: 0 /100
PLATELET # BLD AUTO: 221 10E3/UL (ref 150–450)
POTASSIUM SERPL-SCNC: 4.6 MMOL/L (ref 3.4–5.3)
PROT SERPL-MCNC: 6.9 G/DL (ref 6.4–8.3)
RBC # BLD AUTO: 4.95 10E6/UL (ref 4.4–5.9)
SODIUM SERPL-SCNC: 144 MMOL/L (ref 135–145)
WBC # BLD AUTO: 5.5 10E3/UL (ref 4–11)

## 2024-04-15 PROCEDURE — 73720 MRI LWR EXTREMITY W/O&W/DYE: CPT | Mod: RT

## 2024-04-15 PROCEDURE — 250N000011 HC RX IP 250 OP 636: Performed by: STUDENT IN AN ORGANIZED HEALTH CARE EDUCATION/TRAINING PROGRAM

## 2024-04-15 PROCEDURE — 255N000002 HC RX 255 OP 636: Performed by: PHYSICIAN ASSISTANT

## 2024-04-15 PROCEDURE — 71260 CT THORAX DX C+: CPT

## 2024-04-15 PROCEDURE — 84450 TRANSFERASE (AST) (SGOT): CPT

## 2024-04-15 PROCEDURE — 250N000009 HC RX 250: Performed by: STUDENT IN AN ORGANIZED HEALTH CARE EDUCATION/TRAINING PROGRAM

## 2024-04-15 PROCEDURE — 85025 COMPLETE CBC W/AUTO DIFF WBC: CPT

## 2024-04-15 PROCEDURE — 36415 COLL VENOUS BLD VENIPUNCTURE: CPT

## 2024-04-15 PROCEDURE — 73720 MRI LWR EXTREMITY W/O&W/DYE: CPT | Mod: 26 | Performed by: RADIOLOGY

## 2024-04-15 PROCEDURE — A9585 GADOBUTROL INJECTION: HCPCS | Performed by: PHYSICIAN ASSISTANT

## 2024-04-15 RX ORDER — GADOBUTROL 604.72 MG/ML
14 INJECTION INTRAVENOUS ONCE
Status: COMPLETED | OUTPATIENT
Start: 2024-04-15 | End: 2024-04-15

## 2024-04-15 RX ORDER — IOPAMIDOL 755 MG/ML
135 INJECTION, SOLUTION INTRAVASCULAR ONCE
Status: COMPLETED | OUTPATIENT
Start: 2024-04-15 | End: 2024-04-15

## 2024-04-15 RX ADMIN — IOPAMIDOL 135 ML: 755 INJECTION, SOLUTION INTRAVENOUS at 10:47

## 2024-04-15 RX ADMIN — GADOBUTROL 14 ML: 604.72 INJECTION INTRAVENOUS at 10:29

## 2024-04-15 RX ADMIN — SODIUM CHLORIDE 79 ML: 9 INJECTION, SOLUTION INTRAVENOUS at 10:46

## 2024-04-17 ENCOUNTER — ONCOLOGY VISIT (OUTPATIENT)
Dept: ONCOLOGY | Facility: CLINIC | Age: 54
End: 2024-04-17
Attending: STUDENT IN AN ORGANIZED HEALTH CARE EDUCATION/TRAINING PROGRAM
Payer: COMMERCIAL

## 2024-04-17 VITALS
DIASTOLIC BLOOD PRESSURE: 84 MMHG | HEART RATE: 90 BPM | RESPIRATION RATE: 16 BRPM | WEIGHT: 311.3 LBS | TEMPERATURE: 98.6 F | OXYGEN SATURATION: 97 % | SYSTOLIC BLOOD PRESSURE: 130 MMHG | BODY MASS INDEX: 43.42 KG/M2

## 2024-04-17 DIAGNOSIS — C49.9 UNDIFFERENTIATED PLEOMORPHIC SARCOMA (H): ICD-10-CM

## 2024-04-17 PROCEDURE — 99215 OFFICE O/P EST HI 40 MIN: CPT | Performed by: STUDENT IN AN ORGANIZED HEALTH CARE EDUCATION/TRAINING PROGRAM

## 2024-04-17 PROCEDURE — 99213 OFFICE O/P EST LOW 20 MIN: CPT | Performed by: STUDENT IN AN ORGANIZED HEALTH CARE EDUCATION/TRAINING PROGRAM

## 2024-04-17 ASSESSMENT — PAIN SCALES - GENERAL: PAINLEVEL: NO PAIN (0)

## 2024-04-17 NOTE — PROGRESS NOTES
[facility] CANCER CLINIC    FOLLOW UP VISIT NOTE    PATIENT NAME: Antonio Canseco MRN # [id number]  DATE OF VISIT: April 16, 2024 YOB: 1970    REFERRING PROVIDER: Ari Nascimento PA-C  [facility] [place] [place] [address]    CANCER TYPE: High grade sarcoma       CHIEF COMPLAIN   Thigh pain     HISTORY OF PRESENT ILLNESS   52 year old male with PMH of DM and recent Dx of large 27 cm mass in the posterior R thigh. He reports that he first noticed a node in the posterior thigh on 5/2022, we was evaluated at OSH where he got an Xray and he was told that this was sciatica and was started on physical therapy. Tumor continued to grow rapidly during this time. MRI done on 1/6/2023 showing a large mass in the posterior thigh. 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   He reports having pain and limit mobility. He has been taking daily meloxican with some relief, however he needs to be resting most of the time to avoid pain.     C1 of doxil/ifosfamide on 1/30 follow up MRI showing increase in size for mass without significant areas of necrosis. We decided to discontinue chemotherapy.  First cycle of chemotherapy was complicated with neutropenic fever requiring admission and he also developed disseminated herpes zoster. Additionally work up done after chemotherapy revealed that the ejection fraction after chemotherapy had decreased.    He started Preoperative RT on 3/15 and completed on 4/18 . PET done on 3/14 just before starting RT, showed decrease in the metabolic activity of the mass.     5/22/23 Surgical resection of posterior thigh mass.     Interval History  Antonio comes today. He reports feeling better, he still has some discomfort and abnormal gait since the surgery. Trying to be more active and eating better.      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 Lives with wife Yari.       CURRENT OUTPATIENT MEDICATIONS     Current Outpatient Medications   Medication Sig Dispense Refill    Continuous Blood Gluc Sensor (FREESTYLE MORENITA 3 SENSOR) MISC 1 each every 14 days Use 1 sensor every 14 days. 6 each 5    Continuous Blood Gluc Sensor (FREESTYLE MORENITA 3 SENSOR) MISC USE 1 SENSOR EVERY 14 DAYS 6 each 3    fluticasone (FLONASE) 50 MCG/ACT nasal spray Spray 1 spray into both nostrils daily 16 g 0    losartan (COZAAR) 25 MG tablet Take 1 tablet (25 mg) by mouth daily 90 tablet 3    metFORMIN (GLUCOPHAGE XR) 500 MG 24 hr tablet Take 3 tablets (1,500 mg) by mouth daily (with dinner) 270 tablet 3    metoprolol succinate ER (TOPROL XL) 25 MG 24 hr tablet Take 1 tablet (25 mg) by mouth daily 90 tablet 3          REVIEW OF SYSTEMS   As above in the HPI, o/w complete 12-point ROS was negative.     PHYSICAL EXAM       Physical Exam  Constitutional:       Appearance: Normal appearance.   HENT:      Head: Normocephalic.   Cardiovascular:      Rate and Rhythm: Normal rate.   Pulmonary:      Effort: Pulmonary effort is normal.      Breath sounds: Normal breath sounds.   Abdominal:      General: Abdomen is flat. Bowel sounds are normal.   Musculoskeletal:      Cervical back: Normal range of motion and neck supple.      Comments: Surgical site well healed, area of opening and drainage has healed completely.    Neurological:      Mental Status: He is alert.           LABORATORY AND IMAGING STUDIES     Lab Results   Component Value Date    WBC 5.2 02/05/2024    WBC 8.6 12/26/2019     Lab Results   Component Value Date    RBC 4.97 02/05/2024    RBC 4.96 12/26/2019     Lab Results   Component Value Date    HGB 14.7 02/05/2024    HGB 14.2 12/26/2019     Lab Results   Component Value Date    HCT 45.7 02/05/2024    HCT 44.6 12/26/2019     Lab Results   Component Value Date    MCV 92 02/05/2024    MCV 90 12/26/2019     Lab Results   Component Value Date    MCH 29.6 02/05/2024    MCH 28.6 12/26/2019     Lab Results   Component Value Date    MCHC 32.2 02/05/2024    MCHC 31.8 12/26/2019     Lab Results   Component Value Date    RDW 14.1 02/05/2024    RDW 14.3 12/26/2019     Lab Results   Component Value Date     02/05/2024     12/26/2019     Last Comprehensive Metabolic Panel:  Sodium   Date Value Ref Range Status   04/15/2024 144 135 - 145 mmol/L Final     Comment:     Reference intervals for this test were updated on 09/26/2023 to more accurately reflect our healthy population. There may be differences in the flagging of prior results with similar values performed with this method. Interpretation of those prior results can be made in the context of the updated reference intervals.    02/11/2021 140 133 - 144 mmol/L Final     Potassium   Date Value Ref Range Status   04/15/2024 4.6 3.4 - 5.3 mmol/L Final   04/24/2022 4.1 3.4 - 5.3 mmol/L Final   02/11/2021 4.4 3.4 - 5.3 mmol/L Final     Potassium POCT   Date Value Ref Range Status   05/22/2023 4.5 3.5 - 5.0 mmol/L Final     Chloride   Date Value Ref Range Status   04/15/2024 108 (H) 98 - 107 mmol/L Final   04/24/2022 101 94 - 109 mmol/L Final   02/11/2021 107 94 - 109 mmol/L Final     Carbon Dioxide   Date Value Ref Range Status   02/11/2021 27 20 - 32 mmol/L Final     Carbon Dioxide (CO2)   Date Value Ref Range Status   04/15/2024 23 22 - 29 mmol/L Final   04/24/2022 29 20 - 32 mmol/L Final     Anion Gap   Date Value Ref  Range Status   04/15/2024 13 7 - 15 mmol/L Final   04/24/2022 2 (L) 3 - 14 mmol/L Final   02/11/2021 6 3 - 14 mmol/L Final     Glucose   Date Value Ref Range Status   04/15/2024 106 (H) 70 - 99 mg/dL Final   04/24/2022 143 (H) 70 - 99 mg/dL Final   02/11/2021 135 (H) 70 - 99 mg/dL Final     Comment:     Non Fasting     GLUCOSE BY METER POCT   Date Value Ref Range Status   05/23/2023 119 (H) 70 - 99 mg/dL Final     Urea Nitrogen   Date Value Ref Range Status   04/15/2024 18.2 6.0 - 20.0 mg/dL Final   04/24/2022 11 7 - 30 mg/dL Final   02/11/2021 13 7 - 30 mg/dL Final     Creatinine   Date Value Ref Range Status   04/15/2024 1.07 0.67 - 1.17 mg/dL Final   02/11/2021 0.94 0.66 - 1.25 mg/dL Final     GFR Estimate   Date Value Ref Range Status   04/15/2024 83 >60 mL/min/1.73m2 Final   02/11/2021 >90 >60 mL/min/[1.73_m2] Final     Comment:     Non  GFR Calc  Starting 12/18/2018, serum creatinine based estimated GFR (eGFR) will be   calculated using the Chronic Kidney Disease Epidemiology Collaboration   (CKD-EPI) equation.       Calcium   Date Value Ref Range Status   04/15/2024 9.2 8.6 - 10.0 mg/dL Final   02/11/2021 9.1 8.5 - 10.1 mg/dL Final     Bilirubin Total   Date Value Ref Range Status   04/15/2024 0.7 <=1.2 mg/dL Final   02/11/2021 0.7 0.2 - 1.3 mg/dL Final     Alkaline Phosphatase   Date Value Ref Range Status   04/15/2024 41 40 - 150 U/L Final     Comment:     Reference intervals for this test were updated on 11/14/2023 to more accurately reflect our healthy population. There may be differences in the flagging of prior results with similar values performed with this method. Interpretation of those prior results can be made in the context of the updated reference intervals.   02/11/2021 43 40 - 150 U/L Final     ALT   Date Value Ref Range Status   04/15/2024 31 0 - 70 U/L Final     Comment:     Reference intervals for this test were updated on 6/12/2023 to more accurately reflect our healthy  population. There may be differences in the flagging of prior results with similar values performed with this method. Interpretation of those prior results can be made in the context of the updated reference intervals.     02/11/2021 43 0 - 70 U/L Final     AST   Date Value Ref Range Status   04/15/2024 21 0 - 45 U/L Final     Comment:     Reference intervals for this test were updated on 6/12/2023 to more accurately reflect our healthy population. There may be differences in the flagging of prior results with similar values performed with this method. Interpretation of those prior results can be made in the context of the updated reference intervals.   02/11/2021 23 0 - 45 U/L Final       1/6/2023 MRI right femur thigh: Large soft tissue mass in the posterior compartment of the right thigh measuring 27 x 7.4 x 12 cm.  The mass lies primarily in the hamstring muscle belly.  Heterogeneous signal with areas of necrosis noted.    2/20/23 MRI R femur                                                                   IMPRESSION: In this patient with high grade sarcoma status  post-neoadjuvant chemotherapy;     Increased in size of the 11 x 15.7 x 21.7 cm heterogeneously enhancing  soft tissue mass with myxoid and lipoid components in the posterior  compartment of the right thigh since MRI from 1/6/2023, previously  measured 7.4 x 12 x 20 cm.  *  Distal portion of the mass anteriorly abuts the sciatic nerve and  deep femoral artery/vein without definite invasion.  *  Increased edema within the adductor rosy when compared to prior  study, possibly neurogenic.      PET scan 3/14/23  IMPRESSION:     1. 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      2. Subcentimeter pulmonary nodule in the left lower lobe, which is too small to accurately characterize by PET/CT and warrants continued imaging surveillance.    4/19/23 MRI femur R                                                                  IMPRESSION: In this patient with high grade sarcoma status  post-neoadjuvant chemotherapy;     Slightly increase in the size of the heterogeneously enhancing soft  tissue mass with myxoid and lipoid components in the posterior  compartment of the right thigh since MRI from 2/20/2023, measuring 11  x 16.4 x 21 cm, previously measured 11 x 15.7 x 21.7 cm.   *  Distal portion of the mass anteriorly abuts the sciatic nerve and  deep femoral artery/vein without definite invasion.  *  Slightly increased edema within the adductor rosy when compared  to prior study.    PET 5/30/23  IMPRESSION:     1. Sequelae of post treatment inflammatory change in the right posterior thigh soft tissues without convincing evidence of active neoplasm.     2. Slightly increased size of the non-FDG avid nodule in the left lower lobe and development of additional non-FDG avid subcentimeter nodules in the inferior left upper lobe, right middle lobe, and right lower lobe. While their exact etiologies remain   indeterminate, the interval growth/development of additional nodules raises suspicion for early pulmonary metastases.    PET 7/18/23  Impression:  1. 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   a. Extensive regional soft tissue edema and nonmasslike enhancement,  presumably postoperative.   b. Postoperative fluid collection.  2. New periostitis/deep muscle edema along the mid to distal right  femoral shaft, query low grade stress response.    10/31/23 MRI R thigh  IMPRESSION:  1. 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 No masslike  enhancement to suggest recurrent tumor.  2. Extensive soft tissue edema without masslike enhancement throughout  the right thigh including the posterior and  adductor muscles, presumed  postoperative.  3. Interval resolution of the previously noted postoperative fluid  collection as well as resolution of the periosteal edema seen along  the medial aspect of the mid femoral shaft.  4. No marrow signal changes to suggest fracture or marrow  infiltration.    10/31/23 CT CAP  IMPRESSION:  1.  No evidence of metastatic disease within the chest, abdomen, and  pelvis.   2.  Small pulmonary nodules measuring up to 4 mm are stable. No new  nodules are identified. Recommend attention on follow-up.    2/5/24 CT CAP    IMPRESSION: No significant interval change. No evidence for metastatic  malignancy in the chest, abdomen, or pelvis.     2/5/24 MRI  Impression:     1. Stable postoperative changes of right posterior thigh mass  resection without evidence for local disease recurrence or local  regional metastasis in the right thigh.     2. Unchanged intramuscular edema, mild fatty atrophy, and non-mass  enhancement involving the posterior right thigh muscles likely  representing posttreatment change.     CT 4/15/24  IMPRESSION:  1.  No evidence of metastatic disease in the chest, abdomen, or  pelvis.  2.  Scattered small pulmonary nodules unchanged for several years and  likely benign.     MRI Femur thigh 4/15/24  Impression:     Stable postsurgical changes of mass resection from the posterior  compartment of the right thigh without evidence of local recurrent  tumor. Decreased non mass-like enhancing edema throughout the  posterior compartment of the thigh favored to represent resolving  posttreatment change.     MRI 4/15/24  Impression:     Stable postsurgical changes of mass resection from the posterior  compartment of the right thigh without evidence of local recurrent  tumor. Decreased non mass-like enhancing edema throughout the  posterior compartment of the thigh favored to represent resolving  posttreatment change.     PATHOLOGY         Final Diagnosis   A.  SOFT TISSUE, RIGHT  THIGH MASS, BIOPSY:  -HIGH-GRADE SARCOMA WITH EXTENSIVE NECROSIS AND HYALINIZATION, see comment.   Electronically signed by Vineet Coello MD on 1/17/2023 at 10:17 AM   Comment   UUMAYO   The neoplasm is comprised of highly pleomorphic spindle cells with stromal hyalinization and extensive areas of necrosis.  Focal areas with scattered lipoblasts are seen.  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  MDM-2 gene amplification study by FISH is in progress and result will be provided separately.          ASSESSMENT AND PLAN     Mr. 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     He received C1 on 1/30. Patient did experienced worsening pain shortly after starting chemotherapy. MRI done on 2/20/23 showing increase in size of large posterior thigh sarcoma. The comparison of this MRI is to the baseline done on 1/6/23, and in a patient with high grade sarcoma the mass is presumed to have grown in size in the month before starting chemotherapy. However, given that the patient had increase in pain and there is no areas of necrosis observed in the recent MRI, I will consider this to be progression of disease and recommend to plan for surgery with consideration for preoperative radiation therapy.      He started radiation therapy on 3/15.  I reviewed the PET scan done on 3/14 showing some tumor shrinkage at 14 x 14 x 18 from prior 11 x 15.7 x 21 and improved metabolic activity. We discussed that given some response was seen, if needed in the future this will still be an option. Right now, given prior complications from chemo (admission for neutropenic fever, bleeding in the urine presumed to be 2/2 to ifosfamide, and presumed cardiomyopathy with decrease in ejection fraction on echo), we will hold on further chemo.    Antonio has  completed preoperative RT and underwent surgical resection on  5/22/23. He is healing well and recovering his mobility.    I personally reviewed the most recent CT CAP and MRI R thigh showing no evidence recurrence or metastatic disease. He has small subcentemeter pulmonary nodules that have remained stable since diagnosis. For this reason we will continue with surveillance scans with MRI thigh and CT CAP every 3 months during the first 2 years after surgery.     Plan:    -CT CAP, Mri R thigh and labs 8/5   -Follow up Dr. Price 8/6     40 minutes spent on the date of the encounter doing chart review, review of test results, interpretation of tests, patient visit, documentation and discussion with other provider(s)     Michael Laboy MD   of Medicine  Hematology, Oncology and Transplantation

## 2024-04-17 NOTE — LETTER
4/17/2024         RE: Antonio Canseco  [address] [place]  [facility] [address]        Dear Colleague,    Thank you for referring your patient, Antonio Canseco, to the [facility]. Please see a copy of my visit note below.        [facility]    FOLLOW UP VISIT NOTE    PATIENT NAME: Antonio Canseco MRN # [id number]  DATE OF VISIT: April 16, 2024 YOB: 1970    REFERRING PROVIDER: Ari Nascimento PA-C  [facility][note 3][address] [place] [address]    CANCER TYPE: High grade sarcoma       CHIEF COMPLAIN   Thigh pain     HISTORY OF PRESENT ILLNESS   52 year old male with PMH of DM and recent Dx of large 27 cm mass in the posterior R thigh. He reports that he first noticed a node in the posterior thigh on 5/2022, we was evaluated at OSH where he got an Xray and he was told that this was sciatica and was started on physical therapy. Tumor continued to grow rapidly during this time. MRI done on 1/6/2023 showing a large mass in the posterior thigh. 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   He reports having pain and limit mobility. He has been taking daily meloxican with some relief, however he needs to be resting most of the time to avoid pain.     C1 of doxil/ifosfamide on 1/30 follow up MRI showing increase in size for mass without significant areas of necrosis. We decided to discontinue chemotherapy.  First cycle of chemotherapy was complicated with neutropenic fever requiring admission and he also developed disseminated herpes zoster. Additionally work up done after chemotherapy revealed that the ejection fraction after chemotherapy had decreased.    He started Preoperative RT on 3/15 and completed on 4/18 . PET done on 3/14 just before starting RT, showed decrease in the metabolic activity of the mass.     5/22/23 Surgical resection of posterior thigh mass.     Interval History  Antonio comes today. He reports feeling better,  he still has some discomfort and abnormal gait since the surgery. Trying to be more active and eating better.      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 Lives with wife Yari.       CURRENT OUTPATIENT MEDICATIONS     Current Outpatient Medications   Medication Sig Dispense Refill    Continuous Blood Gluc Sensor (FREESTYLE MORENITA 3 SENSOR) MISC 1 each every 14 days Use 1 sensor every 14 days. 6 each 5    Continuous Blood Gluc Sensor (FREESTYLE MORENITA 3 SENSOR) MISC USE 1 SENSOR EVERY 14 DAYS 6 each 3    fluticasone (FLONASE) 50 MCG/ACT nasal spray Spray 1 spray into both nostrils daily 16 g 0    losartan (COZAAR) 25 MG tablet Take 1 tablet (25 mg) by mouth daily 90 tablet 3    metFORMIN (GLUCOPHAGE XR) 500 MG 24 hr tablet Take 3 tablets (1,500 mg) by mouth daily (with dinner) 270 tablet 3    metoprolol succinate ER (TOPROL XL) 25 MG 24 hr tablet Take 1 tablet (25 mg) by mouth daily 90 tablet 3          REVIEW OF SYSTEMS   As above in the HPI, o/w complete 12-point ROS was negative.     PHYSICAL EXAM       Physical Exam  Constitutional:       Appearance: Normal appearance.   HENT:      Head: Normocephalic.   Cardiovascular:      Rate and Rhythm: Normal rate.   Pulmonary:      Effort: Pulmonary effort is normal.      Breath sounds: Normal breath sounds.   Abdominal:      General: Abdomen is flat. Bowel sounds  are normal.   Musculoskeletal:      Cervical back: Normal range of motion and neck supple.      Comments: Surgical site well healed, area of opening and drainage has healed completely.    Neurological:      Mental Status: He is alert.          LABORATORY AND IMAGING STUDIES     Lab Results   Component Value Date    WBC 5.2 02/05/2024    WBC 8.6 12/26/2019     Lab Results   Component Value Date    RBC 4.97 02/05/2024    RBC 4.96 12/26/2019     Lab Results   Component Value Date    HGB 14.7 02/05/2024    HGB 14.2 12/26/2019     Lab Results   Component Value Date    HCT 45.7 02/05/2024    HCT 44.6 12/26/2019     Lab Results   Component Value Date    MCV 92 02/05/2024    MCV 90 12/26/2019     Lab Results   Component Value Date    MCH 29.6 02/05/2024    MCH 28.6 12/26/2019     Lab Results   Component Value Date    MCHC 32.2 02/05/2024    MCHC 31.8 12/26/2019     Lab Results   Component Value Date    RDW 14.1 02/05/2024    RDW 14.3 12/26/2019     Lab Results   Component Value Date     02/05/2024     12/26/2019     Last Comprehensive Metabolic Panel:  Sodium   Date Value Ref Range Status   04/15/2024 144 135 - 145 mmol/L Final     Comment:     Reference intervals for this test were updated on 09/26/2023 to more accurately reflect our healthy population. There may be differences in the flagging of prior results with similar values performed with this method. Interpretation of those prior results can be made in the context of the updated reference intervals.    02/11/2021 140 133 - 144 mmol/L Final     Potassium   Date Value Ref Range Status   04/15/2024 4.6 3.4 - 5.3 mmol/L Final   04/24/2022 4.1 3.4 - 5.3 mmol/L Final   02/11/2021 4.4 3.4 - 5.3 mmol/L Final     Potassium POCT   Date Value Ref Range Status   05/22/2023 4.5 3.5 - 5.0 mmol/L Final     Chloride   Date Value Ref Range Status   04/15/2024 108 (H) 98 - 107 mmol/L Final   04/24/2022 101 94 - 109 mmol/L Final   02/11/2021 107 94 - 109 mmol/L Final      Carbon Dioxide   Date Value Ref Range Status   02/11/2021 27 20 - 32 mmol/L Final     Carbon Dioxide (CO2)   Date Value Ref Range Status   04/15/2024 23 22 - 29 mmol/L Final   04/24/2022 29 20 - 32 mmol/L Final     Anion Gap   Date Value Ref Range Status   04/15/2024 13 7 - 15 mmol/L Final   04/24/2022 2 (L) 3 - 14 mmol/L Final   02/11/2021 6 3 - 14 mmol/L Final     Glucose   Date Value Ref Range Status   04/15/2024 106 (H) 70 - 99 mg/dL Final   04/24/2022 143 (H) 70 - 99 mg/dL Final   02/11/2021 135 (H) 70 - 99 mg/dL Final     Comment:     Non Fasting     GLUCOSE BY METER POCT   Date Value Ref Range Status   05/23/2023 119 (H) 70 - 99 mg/dL Final     Urea Nitrogen   Date Value Ref Range Status   04/15/2024 18.2 6.0 - 20.0 mg/dL Final   04/24/2022 11 7 - 30 mg/dL Final   02/11/2021 13 7 - 30 mg/dL Final     Creatinine   Date Value Ref Range Status   04/15/2024 1.07 0.67 - 1.17 mg/dL Final   02/11/2021 0.94 0.66 - 1.25 mg/dL Final     GFR Estimate   Date Value Ref Range Status   04/15/2024 83 >60 mL/min/1.73m2 Final   02/11/2021 >90 >60 mL/min/[1.73_m2] Final     Comment:     Non  GFR Calc  Starting 12/18/2018, serum creatinine based estimated GFR (eGFR) will be   calculated using the Chronic Kidney Disease Epidemiology Collaboration   (CKD-EPI) equation.       Calcium   Date Value Ref Range Status   04/15/2024 9.2 8.6 - 10.0 mg/dL Final   02/11/2021 9.1 8.5 - 10.1 mg/dL Final     Bilirubin Total   Date Value Ref Range Status   04/15/2024 0.7 <=1.2 mg/dL Final   02/11/2021 0.7 0.2 - 1.3 mg/dL Final     Alkaline Phosphatase   Date Value Ref Range Status   04/15/2024 41 40 - 150 U/L Final     Comment:     Reference intervals for this test were updated on 11/14/2023 to more accurately reflect our healthy population. There may be differences in the flagging of prior results with similar values performed with this method. Interpretation of those prior results can be made in the context of the updated  reference intervals.   02/11/2021 43 40 - 150 U/L Final     ALT   Date Value Ref Range Status   04/15/2024 31 0 - 70 U/L Final     Comment:     Reference intervals for this test were updated on 6/12/2023 to more accurately reflect our healthy population. There may be differences in the flagging of prior results with similar values performed with this method. Interpretation of those prior results can be made in the context of the updated reference intervals.     02/11/2021 43 0 - 70 U/L Final     AST   Date Value Ref Range Status   04/15/2024 21 0 - 45 U/L Final     Comment:     Reference intervals for this test were updated on 6/12/2023 to more accurately reflect our healthy population. There may be differences in the flagging of prior results with similar values performed with this method. Interpretation of those prior results can be made in the context of the updated reference intervals.   02/11/2021 23 0 - 45 U/L Final       1/6/2023 MRI right femur thigh: Large soft tissue mass in the posterior compartment of the right thigh measuring 27 x 7.4 x 12 cm.  The mass lies primarily in the hamstring muscle belly.  Heterogeneous signal with areas of necrosis noted.    2/20/23 MRI R femur                                                                   IMPRESSION: In this patient with high grade sarcoma status  post-neoadjuvant chemotherapy;     Increased in size of the 11 x 15.7 x 21.7 cm heterogeneously enhancing  soft tissue mass with myxoid and lipoid components in the posterior  compartment of the right thigh since MRI from 1/6/2023, previously  measured 7.4 x 12 x 20 cm.  *  Distal portion of the mass anteriorly abuts the sciatic nerve and  deep femoral artery/vein without definite invasion.  *  Increased edema within the adductor rosy when compared to prior  study, possibly neurogenic.      PET scan 3/14/23  IMPRESSION:     1. 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      2. Subcentimeter pulmonary nodule in the left lower lobe, which is too small to accurately characterize by PET/CT and warrants continued imaging surveillance.    4/19/23 MRI femur R                                                                 IMPRESSION: In this patient with high grade sarcoma status  post-neoadjuvant chemotherapy;     Slightly increase in the size of the heterogeneously enhancing soft  tissue mass with myxoid and lipoid components in the posterior  compartment of the right thigh since MRI from 2/20/2023, measuring 11  x 16.4 x 21 cm, previously measured 11 x 15.7 x 21.7 cm.   *  Distal portion of the mass anteriorly abuts the sciatic nerve and  deep femoral artery/vein without definite invasion.  *  Slightly increased edema within the adductor rosy when compared  to prior study.    PET 5/30/23  IMPRESSION:     1. Sequelae of post treatment inflammatory change in the right posterior thigh soft tissues without convincing evidence of active neoplasm.     2. Slightly increased size of the non-FDG avid nodule in the left lower lobe and development of additional non-FDG avid subcentimeter nodules in the inferior left upper lobe, right middle lobe, and right lower lobe. While their exact etiologies remain   indeterminate, the interval growth/development of additional nodules raises suspicion for early pulmonary metastases.    PET 7/18/23  Impression:  1. 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   a. Extensive regional soft tissue edema and nonmasslike enhancement,  presumably postoperative.   b. Postoperative fluid collection.  2. New periostitis/deep muscle edema along the mid to distal right  femoral shaft, query low grade stress response.    10/31/23 MRI R thigh  IMPRESSION:  1. 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 No masslike  enhancement to suggest recurrent tumor.  2. Extensive soft tissue edema without masslike enhancement throughout  the right thigh including the posterior and adductor muscles, presumed  postoperative.  3. Interval resolution of the previously noted postoperative fluid  collection as well as resolution of the periosteal edema seen along  the medial aspect of the mid femoral shaft.  4. No marrow signal changes to suggest fracture or marrow  infiltration.    10/31/23 CT CAP  IMPRESSION:  1.  No evidence of metastatic disease within the chest, abdomen, and  pelvis.   2.  Small pulmonary nodules measuring up to 4 mm are stable. No new  nodules are identified. Recommend attention on follow-up.    2/5/24 CT CAP    IMPRESSION: No significant interval change. No evidence for metastatic  malignancy in the chest, abdomen, or pelvis.     2/5/24 MRI  Impression:     1. Stable postoperative changes of right posterior thigh mass  resection without evidence for local disease recurrence or local  regional metastasis in the right thigh.     2. Unchanged intramuscular edema, mild fatty atrophy, and non-mass  enhancement involving the posterior right thigh muscles likely  representing posttreatment change.     CT 4/15/24  IMPRESSION:  1.  No evidence of metastatic disease in the chest, abdomen, or  pelvis.  2.  Scattered small pulmonary nodules unchanged for several years and  likely benign.     MRI Femur thigh 4/15/24  Impression:     Stable postsurgical changes of mass resection from the posterior  compartment of the right thigh without evidence of local recurrent  tumor. Decreased non mass-like enhancing edema throughout the  posterior compartment of the thigh favored to represent resolving  posttreatment change.     MRI 4/15/24  Impression:     Stable postsurgical changes of mass resection from the posterior  compartment of the right  thigh without evidence of local recurrent  tumor. Decreased non mass-like enhancing edema throughout the  posterior compartment of the thigh favored to represent resolving  posttreatment change.     PATHOLOGY         Final Diagnosis   A.  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   Electronically signed by Vineet Coello MD on 1/17/2023 at 10:17 AM   Comment   UUMAYO   The neoplasm is comprised of highly pleomorphic spindle cells with stromal hyalinization and extensive areas of necrosis.  Focal areas with scattered lipoblasts are seen.  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  MDM-2 gene amplification study by FISH is in progress and result will be provided separately.          ASSESSMENT AND PLAN     Mr. 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     He received C1 on 1/30. Patient did experienced worsening pain shortly after starting chemotherapy. MRI done on 2/20/23 showing increase in size of large posterior thigh sarcoma. The comparison of this MRI is to the baseline done on 1/6/23, and in a patient with high grade sarcoma the mass is presumed to have grown in size in the month before starting chemotherapy. However, given that the patient had increase in pain and there is no areas of necrosis observed in the recent MRI, I will consider this to be progression of disease and recommend to plan for surgery with consideration for preoperative radiation therapy.      He started radiation therapy on 3/15.  I reviewed the PET scan done on 3/14 showing some tumor shrinkage at 14 x 14 x 18 from prior 11 x 15.7 x 21 and improved metabolic activity. We discussed that given some response was seen, if needed in the future this will still be an  option. Right now, given prior complications from chemo (admission for neutropenic fever, bleeding in the urine presumed to be 2/2 to ifosfamide, and presumed cardiomyopathy with decrease in ejection fraction on echo), we will hold on further chemo.    Antonio has completed preoperative RT and underwent surgical resection on  5/22/23. He is healing well and recovering his mobility.    I personally reviewed the most recent CT CAP and MRI R thigh showing no evidence recurrence or metastatic disease. He has small subcentemeter pulmonary nodules that have remained stable since diagnosis. For this reason we will continue with surveillance scans with MRI thigh and CT CAP every 3 months during the first 2 years after surgery.     Plan:    -CT CAP, Mri R thigh and labs 8/5   -Follow up Dr. Price 8/6     40 minutes spent on the date of the encounter doing chart review, review of test results, interpretation of tests, patient visit, documentation and discussion with other provider(s)     Michael Laboy MD   of Medicine  Hematology, Oncology and Transplantation

## 2024-04-17 NOTE — NURSING NOTE
"Oncology Rooming Note    April 17, 2024 10:33 AM   Antonio Canseco is a 53 year old male who presents for:    Chief Complaint   Patient presents with    Oncology Clinic Visit     Undifferentiated pleomorphic sarcoma     Initial Vitals: /84 (BP Location: Right arm, Patient Position: Sitting, Cuff Size: Adult Large)   Pulse 90   Temp 98.6  F (37  C) (Oral)   Resp 16   Wt 141.2 kg (311 lb 4.8 oz)   SpO2 97%   BMI 43.42 kg/m   Estimated body mass index is 43.42 kg/m  as calculated from the following:    Height as of 3/11/24: 1.803 m (5' 11\").    Weight as of this encounter: 141.2 kg (311 lb 4.8 oz). Body surface area is 2.66 meters squared.  No Pain (0) Comment: Data Unavailable   No LMP for male patient.  Allergies reviewed: Yes  Medications reviewed: Yes    Medications: Medication refills not needed today.  Pharmacy name entered into [employer]: [place], [place] - [address]    Frailty Screening:   Is the patient here for a new oncology consult visit in cancer care? 2. No      Clinical concerns: none       Meena Shah              "

## 2024-05-03 DIAGNOSIS — H69.93 DYSFUNCTION OF BOTH EUSTACHIAN TUBES: ICD-10-CM

## 2024-05-03 RX ORDER — FLUTICASONE PROPIONATE 50 MCG
1 SPRAY, SUSPENSION (ML) NASAL DAILY
Qty: 16 G | Refills: 3 | Status: SHIPPED | OUTPATIENT
Start: 2024-05-03

## 2024-05-16 DIAGNOSIS — E11.65 UNCONTROLLED TYPE 2 DIABETES MELLITUS WITH HYPERGLYCEMIA, WITHOUT LONG-TERM CURRENT USE OF INSULIN (H): ICD-10-CM

## 2024-05-16 RX ORDER — METFORMIN HCL 500 MG
1500 TABLET, EXTENDED RELEASE 24 HR ORAL
Qty: 270 TABLET | Refills: 0 | Status: SHIPPED | OUTPATIENT
Start: 2024-05-16 | End: 2024-07-14

## 2024-06-23 ENCOUNTER — HEALTH MAINTENANCE LETTER (OUTPATIENT)
Age: 54
End: 2024-06-23

## 2024-06-27 ENCOUNTER — TELEPHONE (OUTPATIENT)
Dept: FAMILY MEDICINE | Facility: CLINIC | Age: 54
End: 2024-06-27
Payer: COMMERCIAL

## 2024-06-27 NOTE — TELEPHONE ENCOUNTER
Patient Quality Outreach    Patient is due for the following:   Diabetes -  Eye Exam    Next Steps:   No follow up needed at this time.    Type of outreach:    Sent letter.      Questions for provider review:    None           Adelita Ames MA

## 2024-07-14 ENCOUNTER — MYC REFILL (OUTPATIENT)
Dept: FAMILY MEDICINE | Facility: CLINIC | Age: 54
End: 2024-07-14
Payer: COMMERCIAL

## 2024-07-14 DIAGNOSIS — E11.65 UNCONTROLLED TYPE 2 DIABETES MELLITUS WITH HYPERGLYCEMIA, WITHOUT LONG-TERM CURRENT USE OF INSULIN (H): ICD-10-CM

## 2024-07-14 DIAGNOSIS — I10 ESSENTIAL HYPERTENSION: ICD-10-CM

## 2024-07-15 RX ORDER — METFORMIN HCL 500 MG
1500 TABLET, EXTENDED RELEASE 24 HR ORAL
Qty: 270 TABLET | Refills: 0 | Status: SHIPPED | OUTPATIENT
Start: 2024-07-15 | End: 2024-08-29

## 2024-07-15 RX ORDER — LOSARTAN POTASSIUM 25 MG/1
25 TABLET ORAL DAILY
Qty: 90 TABLET | Refills: 1 | Status: SHIPPED | OUTPATIENT
Start: 2024-07-15

## 2024-07-15 NOTE — TELEPHONE ENCOUNTER
Please call patient. He is due for diabetes follow-up. Sent 90 days. Could do virtual with me end of August if he can come do labs a couple days before.    Michael Shaw PA-C on 7/15/2024 at 10:04 AM (covering for Dr. Mason)

## 2024-08-01 ENCOUNTER — TRANSFERRED RECORDS (OUTPATIENT)
Dept: MULTI SPECIALTY CLINIC | Facility: CLINIC | Age: 54
End: 2024-08-01
Payer: COMMERCIAL

## 2024-08-01 LAB — RETINOPATHY: NORMAL

## 2024-08-14 DIAGNOSIS — I10 HTN (HYPERTENSION): Primary | ICD-10-CM

## 2024-08-14 RX ORDER — METOPROLOL SUCCINATE 25 MG/1
25 TABLET, EXTENDED RELEASE ORAL DAILY
Qty: 90 TABLET | Refills: 1 | Status: SHIPPED | OUTPATIENT
Start: 2024-08-14

## 2024-08-15 ENCOUNTER — LAB (OUTPATIENT)
Dept: ONCOLOGY | Facility: CLINIC | Age: 54
End: 2024-08-15
Attending: PHYSICIAN ASSISTANT
Payer: COMMERCIAL

## 2024-08-15 ENCOUNTER — HOSPITAL ENCOUNTER (OUTPATIENT)
Dept: CT IMAGING | Facility: CLINIC | Age: 54
Discharge: HOME | End: 2024-08-15
Attending: STUDENT IN AN ORGANIZED HEALTH CARE EDUCATION/TRAINING PROGRAM | Admitting: FAMILY MEDICINE
Payer: COMMERCIAL

## 2024-08-15 ENCOUNTER — HOSPITAL ENCOUNTER (OUTPATIENT)
Dept: MRI IMAGING | Facility: CLINIC | Age: 54
Discharge: HOME | End: 2024-08-15
Attending: STUDENT IN AN ORGANIZED HEALTH CARE EDUCATION/TRAINING PROGRAM | Admitting: FAMILY MEDICINE
Payer: COMMERCIAL

## 2024-08-15 DIAGNOSIS — C49.9 UNDIFFERENTIATED PLEOMORPHIC SARCOMA (H): ICD-10-CM

## 2024-08-15 DIAGNOSIS — Z00.00 ROUTINE HISTORY AND PHYSICAL EXAMINATION OF ADULT: ICD-10-CM

## 2024-08-15 DIAGNOSIS — E11.65 UNCONTROLLED TYPE 2 DIABETES MELLITUS WITH HYPERGLYCEMIA, WITHOUT LONG-TERM CURRENT USE OF INSULIN (H): ICD-10-CM

## 2024-08-15 LAB
ALBUMIN SERPL BCG-MCNC: 4.1 G/DL (ref 3.5–5.2)
ALP SERPL-CCNC: 42 U/L (ref 40–150)
ALT SERPL W P-5'-P-CCNC: 43 U/L (ref 0–70)
ANION GAP SERPL CALCULATED.3IONS-SCNC: 12 MMOL/L (ref 7–15)
AST SERPL W P-5'-P-CCNC: 27 U/L (ref 0–45)
BASOPHILS # BLD AUTO: 0 10E3/UL (ref 0–0.2)
BASOPHILS NFR BLD AUTO: 0 %
BILIRUB SERPL-MCNC: 0.9 MG/DL
BUN SERPL-MCNC: 12.6 MG/DL (ref 6–20)
CALCIUM SERPL-MCNC: 9.3 MG/DL (ref 8.8–10.4)
CHLORIDE SERPL-SCNC: 103 MMOL/L (ref 98–107)
CREAT BLD-MCNC: 1.1 MG/DL (ref 0.7–1.3)
CREAT SERPL-MCNC: 1.11 MG/DL (ref 0.67–1.17)
EGFRCR SERPLBLD CKD-EPI 2021: 79 ML/MIN/1.73M2
EGFRCR SERPLBLD CKD-EPI 2021: >60 ML/MIN/1.73M2
EOSINOPHIL # BLD AUTO: 0.1 10E3/UL (ref 0–0.7)
EOSINOPHIL NFR BLD AUTO: 2 %
ERYTHROCYTE [DISTWIDTH] IN BLOOD BY AUTOMATED COUNT: 13.5 % (ref 10–15)
GLUCOSE SERPL-MCNC: 90 MG/DL (ref 70–99)
HBA1C MFR BLD: 5.5 %
HCO3 SERPL-SCNC: 26 MMOL/L (ref 22–29)
HCT VFR BLD AUTO: 47 % (ref 40–53)
HGB BLD-MCNC: 15.3 G/DL (ref 13.3–17.7)
IMM GRANULOCYTES # BLD: 0 10E3/UL
IMM GRANULOCYTES NFR BLD: 0 %
LYMPHOCYTES # BLD AUTO: 1.2 10E3/UL (ref 0.8–5.3)
LYMPHOCYTES NFR BLD AUTO: 19 %
MCH RBC QN AUTO: 30.1 PG (ref 26.5–33)
MCHC RBC AUTO-ENTMCNC: 32.6 G/DL (ref 31.5–36.5)
MCV RBC AUTO: 93 FL (ref 78–100)
MONOCYTES # BLD AUTO: 0.6 10E3/UL (ref 0–1.3)
MONOCYTES NFR BLD AUTO: 10 %
NEUTROPHILS # BLD AUTO: 4.2 10E3/UL (ref 1.6–8.3)
NEUTROPHILS NFR BLD AUTO: 68 %
NRBC # BLD AUTO: 0 10E3/UL
NRBC BLD AUTO-RTO: 0 /100
PLATELET # BLD AUTO: 210 10E3/UL (ref 150–450)
POTASSIUM SERPL-SCNC: 4.5 MMOL/L (ref 3.4–5.3)
PROT SERPL-MCNC: 7.1 G/DL (ref 6.4–8.3)
RBC # BLD AUTO: 5.08 10E6/UL (ref 4.4–5.9)
SODIUM SERPL-SCNC: 141 MMOL/L (ref 135–145)
WBC # BLD AUTO: 6.2 10E3/UL (ref 4–11)

## 2024-08-15 PROCEDURE — 83036 HEMOGLOBIN GLYCOSYLATED A1C: CPT | Performed by: PHYSICIAN ASSISTANT

## 2024-08-15 PROCEDURE — 255N000002 HC RX 255 OP 636: Performed by: STUDENT IN AN ORGANIZED HEALTH CARE EDUCATION/TRAINING PROGRAM

## 2024-08-15 PROCEDURE — 82374 ASSAY BLOOD CARBON DIOXIDE: CPT | Performed by: STUDENT IN AN ORGANIZED HEALTH CARE EDUCATION/TRAINING PROGRAM

## 2024-08-15 PROCEDURE — 82565 ASSAY OF CREATININE: CPT | Mod: 91

## 2024-08-15 PROCEDURE — 36415 COLL VENOUS BLD VENIPUNCTURE: CPT

## 2024-08-15 PROCEDURE — 71260 CT THORAX DX C+: CPT

## 2024-08-15 PROCEDURE — A9585 GADOBUTROL INJECTION: HCPCS | Performed by: STUDENT IN AN ORGANIZED HEALTH CARE EDUCATION/TRAINING PROGRAM

## 2024-08-15 PROCEDURE — 85004 AUTOMATED DIFF WBC COUNT: CPT | Performed by: STUDENT IN AN ORGANIZED HEALTH CARE EDUCATION/TRAINING PROGRAM

## 2024-08-15 PROCEDURE — 250N000009 HC RX 250: Performed by: STUDENT IN AN ORGANIZED HEALTH CARE EDUCATION/TRAINING PROGRAM

## 2024-08-15 PROCEDURE — 73720 MRI LWR EXTREMITY W/O&W/DYE: CPT | Mod: RT

## 2024-08-15 PROCEDURE — 250N000011 HC RX IP 250 OP 636: Performed by: STUDENT IN AN ORGANIZED HEALTH CARE EDUCATION/TRAINING PROGRAM

## 2024-08-15 RX ORDER — GADOBUTROL 604.72 MG/ML
14 INJECTION INTRAVENOUS ONCE
Status: COMPLETED | OUTPATIENT
Start: 2024-08-15 | End: 2024-08-15

## 2024-08-15 RX ORDER — IOPAMIDOL 755 MG/ML
500 INJECTION, SOLUTION INTRAVASCULAR ONCE
Status: COMPLETED | OUTPATIENT
Start: 2024-08-15 | End: 2024-08-15

## 2024-08-15 RX ADMIN — GADOBUTROL 14 ML: 604.72 INJECTION INTRAVENOUS at 13:50

## 2024-08-15 RX ADMIN — SODIUM CHLORIDE 65 ML: 9 INJECTION, SOLUTION INTRAVENOUS at 14:55

## 2024-08-15 RX ADMIN — IOPAMIDOL 100 ML: 755 INJECTION, SOLUTION INTRAVENOUS at 14:55

## 2024-08-15 NOTE — PROGRESS NOTES
Medical Assistant Note:  Antonio Canseco presents today for blood draw.    Patient seen by provider today: No.   present during visit today: Not Applicable.    Concerns: No Concerns.    Procedure:  Lab draw site: right antecub, Needle type: butterfly, Gauge: 23.    Post Assessment:  Labs drawn without difficulty: Yes.    Discharge Plan:  Departure Mode: Ambulatory.    Face to Face Time: 10.    Bela Mckeon, CMA

## 2024-08-19 DIAGNOSIS — C49.9 SARCOMA OF SOFT TISSUE (H): Primary | ICD-10-CM

## 2024-08-19 NOTE — PROGRESS NOTES
[facility] CANCER CLINIC    FOLLOW UP VISIT NOTE    PATIENT NAME: Antonio Canseco MRN # [id number]  DATE OF VISIT: August 20, 2024 YOB: 1970    REFERRING PROVIDER: Ari Nascimento PA-C  [facility] [place] [address]    CANCER TYPE: High grade sarcoma       CHIEF COMPLAIN   Thigh pain     HISTORY OF PRESENT ILLNESS   52 year old male with PMH of DM and recent Dx of large 27 cm mass in the posterior R thigh. He reports that he first noticed a node in the posterior thigh on 5/2022, we was evaluated at OSH where he got an Xray and he was told that this was sciatica and was started on physical therapy. Tumor continued to grow rapidly during this time. MRI done on 1/6/2023 showing a large mass in the posterior thigh. 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   He reports having pain and limit mobility. He has been taking daily meloxican with some relief, however he needs to be resting most of the time to avoid pain.     C1 of doxil/ifosfamide on 1/30 follow up MRI showing increase in size for mass without significant areas of necrosis. We decided to discontinue chemotherapy.  First cycle of chemotherapy was complicated with neutropenic fever requiring admission and he also developed disseminated herpes zoster. Additionally work up done after chemotherapy revealed that the ejection fraction after chemotherapy had decreased.    He started Preoperative RT on 3/15 and completed on 4/18 . PET done on 3/14 just before starting RT, showed decrease in the metabolic activity of the mass.     5/22/23 Surgical resection of posterior thigh mass.     Interval History  Antonio reports feeling great, only occasional cramping of the R leg, no pain, he is exercising more and biking. He had a fall from the bike yesterday with minor abrasion, no significant pain today.      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 Lives with wife Yari.       CURRENT OUTPATIENT MEDICATIONS     Current Outpatient Medications   Medication Sig Dispense Refill    Continuous Blood Gluc Sensor (FREESTYLE MORENITA 3 SENSOR) MISC 1 each every 14 days Use 1 sensor every 14 days. 6 each 5    Continuous Blood Gluc Sensor (FREESTYLE MORENITA 3 SENSOR) MISC USE 1 SENSOR EVERY 14 DAYS 6 each 3    fluticasone (FLONASE) 50 MCG/ACT nasal spray SPRAY 1 SPRAY INTO BOTH NOSTRILS DAILY 16 g 3    losartan (COZAAR) 25 MG tablet Take 1 tablet (25 mg) by mouth daily 90 tablet 1    metFORMIN (GLUCOPHAGE XR) 500 MG 24 hr tablet Take 3 tablets (1,500 mg) by mouth daily (with dinner) 270 tablet 0    metoprolol succinate ER (TOPROL XL) 25 MG 24 hr tablet Take 1 tablet (25 mg) by mouth daily 90 tablet 1          REVIEW OF SYSTEMS   As above in the HPI, o/w complete 12-point ROS was negative.     PHYSICAL EXAM       Physical Exam  Constitutional:       Appearance: Normal appearance.   HENT:      Head: Normocephalic.   Cardiovascular:      Rate and Rhythm: Normal rate.   Pulmonary:      Effort: Pulmonary effort is normal.      Breath sounds: Normal breath sounds.   Abdominal:      General: Abdomen is flat. Bowel sounds are normal.   Musculoskeletal:      Cervical back: Normal range of motion and neck supple.      Comments: Surgical site well healed, area of opening and drainage has healed completely.    Neurological:      Mental  Status: He is alert.          LABORATORY AND IMAGING STUDIES     Lab Results   Component Value Date    WBC 6.2 08/15/2024    WBC 8.6 12/26/2019     Lab Results   Component Value Date    RBC 5.08 08/15/2024    RBC 4.96 12/26/2019     Lab Results   Component Value Date    HGB 15.3 08/15/2024    HGB 14.2 12/26/2019     Lab Results   Component Value Date    HCT 47.0 08/15/2024    HCT 44.6 12/26/2019     Lab Results   Component Value Date    MCV 93 08/15/2024    MCV 90 12/26/2019     Lab Results   Component Value Date    MCH 30.1 08/15/2024    MCH 28.6 12/26/2019     Lab Results   Component Value Date    MCHC 32.6 08/15/2024    MCHC 31.8 12/26/2019     Lab Results   Component Value Date    RDW 13.5 08/15/2024    RDW 14.3 12/26/2019     Lab Results   Component Value Date     08/15/2024     12/26/2019     Last Comprehensive Metabolic Panel:  Sodium   Date Value Ref Range Status   08/15/2024 141 135 - 145 mmol/L Final   02/11/2021 140 133 - 144 mmol/L Final     Potassium   Date Value Ref Range Status   08/15/2024 4.5 3.4 - 5.3 mmol/L Final   04/24/2022 4.1 3.4 - 5.3 mmol/L Final   02/11/2021 4.4 3.4 - 5.3 mmol/L Final     Potassium POCT   Date Value Ref Range Status   05/22/2023 4.5 3.5 - 5.0 mmol/L Final     Chloride   Date Value Ref Range Status   08/15/2024 103 98 - 107 mmol/L Final   04/24/2022 101 94 - 109 mmol/L Final   02/11/2021 107 94 - 109 mmol/L Final     Carbon Dioxide   Date Value Ref Range Status   02/11/2021 27 20 - 32 mmol/L Final     Carbon Dioxide (CO2)   Date Value Ref Range Status   08/15/2024 26 22 - 29 mmol/L Final   04/24/2022 29 20 - 32 mmol/L Final     Anion Gap   Date Value Ref Range Status   08/15/2024 12 7 - 15 mmol/L Final   04/24/2022 2 (L) 3 - 14 mmol/L Final   02/11/2021 6 3 - 14 mmol/L Final     Glucose   Date Value Ref Range Status   08/15/2024 90 70 - 99 mg/dL Final   04/24/2022 143 (H) 70 - 99 mg/dL Final   02/11/2021 135 (H) 70 - 99 mg/dL Final     Comment:     Non Fasting      GLUCOSE BY METER POCT   Date Value Ref Range Status   05/23/2023 119 (H) 70 - 99 mg/dL Final     Urea Nitrogen   Date Value Ref Range Status   08/15/2024 12.6 6.0 - 20.0 mg/dL Final   04/24/2022 11 7 - 30 mg/dL Final   02/11/2021 13 7 - 30 mg/dL Final     Creatinine   Date Value Ref Range Status   08/15/2024 1.11 0.67 - 1.17 mg/dL Final   02/11/2021 0.94 0.66 - 1.25 mg/dL Final     Creatinine POCT   Date Value Ref Range Status   08/15/2024 1.1 0.7 - 1.3 mg/dL Final     GFR Estimate   Date Value Ref Range Status   08/15/2024 79 >60 mL/min/1.73m2 Final     Comment:     eGFR calculated using 2021 CKD-EPI equation.   02/11/2021 >90 >60 mL/min/[1.73_m2] Final     Comment:     Non  GFR Calc  Starting 12/18/2018, serum creatinine based estimated GFR (eGFR) will be   calculated using the Chronic Kidney Disease Epidemiology Collaboration   (CKD-EPI) equation.       GFR, ESTIMATED POCT   Date Value Ref Range Status   08/15/2024 >60 >60 mL/min/1.73m2 Final     Calcium   Date Value Ref Range Status   08/15/2024 9.3 8.8 - 10.4 mg/dL Final     Comment:     Reference intervals for this test were updated on 7/16/2024 to reflect our healthy population more accurately. There may be differences in the flagging of prior results with similar values performed with this method. Those prior results can be interpreted in the context of the updated reference intervals.   02/11/2021 9.1 8.5 - 10.1 mg/dL Final     Bilirubin Total   Date Value Ref Range Status   08/15/2024 0.9 <=1.2 mg/dL Final   02/11/2021 0.7 0.2 - 1.3 mg/dL Final     Alkaline Phosphatase   Date Value Ref Range Status   08/15/2024 42 40 - 150 U/L Final   02/11/2021 43 40 - 150 U/L Final     ALT   Date Value Ref Range Status   08/15/2024 43 0 - 70 U/L Final   02/11/2021 43 0 - 70 U/L Final     AST   Date Value Ref Range Status   08/15/2024 27 0 - 45 U/L Final   02/11/2021 23 0 - 45 U/L Final       1/6/2023 MRI right femur thigh: Large soft tissue mass in  the posterior compartment of the right thigh measuring 27 x 7.4 x 12 cm.  The mass lies primarily in the hamstring muscle belly.  Heterogeneous signal with areas of necrosis noted.    2/20/23 MRI R femur                                                                   IMPRESSION: In this patient with high grade sarcoma status  post-neoadjuvant chemotherapy;     Increased in size of the 11 x 15.7 x 21.7 cm heterogeneously enhancing  soft tissue mass with myxoid and lipoid components in the posterior  compartment of the right thigh since MRI from 1/6/2023, previously  measured 7.4 x 12 x 20 cm.  *  Distal portion of the mass anteriorly abuts the sciatic nerve and  deep femoral artery/vein without definite invasion.  *  Increased edema within the adductor rosy when compared to prior  study, possibly neurogenic.      PET scan 3/14/23  IMPRESSION:     1. 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      2. Subcentimeter pulmonary nodule in the left lower lobe, which is too small to accurately characterize by PET/CT and warrants continued imaging surveillance.    4/19/23 MRI femur R                                                                 IMPRESSION: In this patient with high grade sarcoma status  post-neoadjuvant chemotherapy;     Slightly increase in the size of the heterogeneously enhancing soft  tissue mass with myxoid and lipoid components in the posterior  compartment of the right thigh since MRI from 2/20/2023, measuring 11  x 16.4 x 21 cm, previously measured 11 x 15.7 x 21.7 cm.   *  Distal portion of the mass anteriorly abuts the sciatic nerve and  deep femoral artery/vein without definite invasion.  *  Slightly increased edema within the adductor rosy when compared  to prior study.    PET 5/30/23  IMPRESSION:     1. Sequelae of post treatment inflammatory change in the right posterior thigh soft tissues without  convincing evidence of active neoplasm.     2. Slightly increased size of the non-FDG avid nodule in the left lower lobe and development of additional non-FDG avid subcentimeter nodules in the inferior left upper lobe, right middle lobe, and right lower lobe. While their exact etiologies remain   indeterminate, the interval growth/development of additional nodules raises suspicion for early pulmonary metastases.    PET 7/18/23  Impression:  1. 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   a. Extensive regional soft tissue edema and nonmasslike enhancement,  presumably postoperative.   b. Postoperative fluid collection.  2. New periostitis/deep muscle edema along the mid to distal right  femoral shaft, query low grade stress response.    10/31/23 MRI R thigh  IMPRESSION:  1. 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 No masslike  enhancement to suggest recurrent tumor.  2. Extensive soft tissue edema without masslike enhancement throughout  the right thigh including the posterior and adductor muscles, presumed  postoperative.  3. Interval resolution of the previously noted postoperative fluid  collection as well as resolution of the periosteal edema seen along  the medial aspect of the mid femoral shaft.  4. No marrow signal changes to suggest fracture or marrow  infiltration.    10/31/23 CT CAP  IMPRESSION:  1.  No evidence of metastatic disease within the chest, abdomen, and  pelvis.   2.  Small pulmonary nodules measuring up to 4 mm are stable. No new  nodules are identified. Recommend attention on follow-up.    2/5/24 CT CAP    IMPRESSION: No significant interval change. No evidence for metastatic  malignancy in the chest, abdomen, or pelvis.     2/5/24 MRI  Impression:     1. Stable postoperative changes of right  posterior thigh mass  resection without evidence for local disease recurrence or local  regional metastasis in the right thigh.     2. Unchanged intramuscular edema, mild fatty atrophy, and non-mass  enhancement involving the posterior right thigh muscles likely  representing posttreatment change.     CT 4/15/24  IMPRESSION:  1.  No evidence of metastatic disease in the chest, abdomen, or  pelvis.  2.  Scattered small pulmonary nodules unchanged for several years and  likely benign.     MRI Femur thigh 4/15/24  Impression:     Stable postsurgical changes of mass resection from the posterior  compartment of the right thigh without evidence of local recurrent  tumor. Decreased non mass-like enhancing edema throughout the  posterior compartment of the thigh favored to represent resolving  posttreatment change.     MRI 4/15/24  Impression:     Stable postsurgical changes of mass resection from the posterior  compartment of the right thigh without evidence of local recurrent  tumor. Decreased non mass-like enhancing edema throughout the  posterior compartment of the thigh favored to represent resolving  posttreatment change.    CT CAP 8/15/24  IMPRESSION:  1.  Stable exam without evidence for new disease.  2.  Stable pulmonary nodules.    MR Femur 8/15/24  IMPRESSION:  1.  Postoperative changes of pleomorphic sarcoma resection from the right posterior thigh without evidence of residual or locally recurrent tumor.   2.  Slightly decreased non-mass-like edema in the medial and posterior compartment musculature of the right thigh compatible with evolving post-treatment change.     PATHOLOGY         Final Diagnosis   A.  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   Electronically signed by Vineet Coello MD on 1/17/2023 at 10:17 AM   Comment   UUMAYO   The neoplasm is comprised of highly pleomorphic spindle cells with stromal hyalinization and  extensive areas of necrosis.  Focal areas with scattered lipoblasts are seen.  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  MDM-2 gene amplification study by FISH is in progress and result will be provided separately.          ASSESSMENT AND PLAN     Mr. 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     He received C1 on 1/30. Patient did experienced worsening pain shortly after starting chemotherapy. MRI done on 2/20/23 showing increase in size of large posterior thigh sarcoma. The comparison of this MRI is to the baseline done on 1/6/23, and in a patient with high grade sarcoma the mass is presumed to have grown in size in the month before starting chemotherapy. However, given that the patient had increase in pain and there is no areas of necrosis observed in the recent MRI, I will consider this to be progression of disease and recommend to plan for surgery with consideration for preoperative radiation therapy.      He started radiation therapy on 3/15.  I reviewed the PET scan done on 3/14 showing some tumor shrinkage at 14 x 14 x 18 from prior 11 x 15.7 x 21 and improved metabolic activity. We discussed that given some response was seen, if needed in the future this will still be an option. Right now, given prior complications from chemo (admission for neutropenic fever, bleeding in the urine presumed to be 2/2 to ifosfamide, and presumed cardiomyopathy with decrease in ejection fraction on echo), we will hold on further chemo.    Antonio has completed preoperative RT and underwent surgical resection on  5/22/23. He is healing well and recovering his mobility.    I personally reviewed the most recent CT CAP and MRI R thigh on 8/15 showing no evidence recurrence or metastatic disease. He has small subcentemeter pulmonary nodules  that have remained stable since diagnosis. For this reason we will continue with surveillance scans with MRI thigh and CT CAP every 4 months for the second year after surgery and then move to every 4-6 month until year 5. I advised on drinking plenty of water the day before, of and after contrast.     Plan:    -CT CAP, Mri R thigh and labs 12/13   -Follow up Dr. Price 12/17     40 minutes spent on the date of the encounter doing chart review, review of test results, interpretation of tests, patient visit, documentation and discussion with other provider(s)     Michael Laboy MD   of Medicine  Hematology, Oncology and Transplantation

## 2024-08-20 ENCOUNTER — ONCOLOGY VISIT (OUTPATIENT)
Dept: ONCOLOGY | Facility: CLINIC | Age: 54
End: 2024-08-20
Attending: STUDENT IN AN ORGANIZED HEALTH CARE EDUCATION/TRAINING PROGRAM
Payer: COMMERCIAL

## 2024-08-20 VITALS
WEIGHT: 315 LBS | SYSTOLIC BLOOD PRESSURE: 132 MMHG | HEART RATE: 64 BPM | TEMPERATURE: 98.2 F | DIASTOLIC BLOOD PRESSURE: 82 MMHG | RESPIRATION RATE: 16 BRPM | OXYGEN SATURATION: 96 % | BODY MASS INDEX: 44.76 KG/M2

## 2024-08-20 DIAGNOSIS — C49.9 SARCOMA OF SOFT TISSUE (H): Primary | ICD-10-CM

## 2024-08-20 PROCEDURE — 99213 OFFICE O/P EST LOW 20 MIN: CPT | Performed by: STUDENT IN AN ORGANIZED HEALTH CARE EDUCATION/TRAINING PROGRAM

## 2024-08-20 PROCEDURE — 99215 OFFICE O/P EST HI 40 MIN: CPT | Performed by: STUDENT IN AN ORGANIZED HEALTH CARE EDUCATION/TRAINING PROGRAM

## 2024-08-20 ASSESSMENT — PAIN SCALES - GENERAL: PAINLEVEL: NO PAIN (0)

## 2024-08-20 NOTE — NURSING NOTE
"Oncology Rooming Note    August 20, 2024 10:27 AM   Antonio Canseco is a 54 year old male who presents for:    Chief Complaint   Patient presents with    Oncology Clinic Visit     Sarcoma     Initial Vitals: /82 (BP Location: Right arm, Patient Position: Sitting, Cuff Size: Adult Large)   Pulse 64   Temp 98.2  F (36.8  C) (Oral)   Resp 16   Wt 145.6 kg (320 lb 14.4 oz)   SpO2 96%   BMI 44.76 kg/m   Estimated body mass index is 44.76 kg/m  as calculated from the following:    Height as of 3/11/24: 1.803 m (5' 11\").    Weight as of this encounter: 145.6 kg (320 lb 14.4 oz). Body surface area is 2.7 meters squared.  No Pain (0) Comment: Data Unavailable   No LMP for male patient.  Allergies reviewed: Yes  Medications reviewed: Yes    Medications: Medication refills not needed today.  Pharmacy name entered into [facility]: [facility] [place], [place] - [id number]    Frailty Screening:   Is the patient here for a new oncology consult visit in cancer care? 2. No      Clinical concerns: Pt said that lab orders are out of date and need to be updated.      Tammy Borges, EMT  8/20/2024              "

## 2024-08-20 NOTE — LETTER
8/20/2024      Antonio Canseco  [address] [place]  [facility] [address]      Dear Colleague,    Thank you for referring your patient, Antonio Canseco, to the [facility]. Please see a copy of my visit note below.        [facility]    FOLLOW UP VISIT NOTE    PATIENT NAME: Antonio Canseco MRN # [id number]  DATE OF VISIT: August 20, 2024 YOB: 1970    REFERRING PROVIDER: Ari Nascimento PA-C  [facility] [place] [address]    CANCER TYPE: High grade sarcoma       CHIEF COMPLAIN   Thigh pain     HISTORY OF PRESENT ILLNESS   52 year old male with PMH of DM and recent Dx of large 27 cm mass in the posterior R thigh. He reports that he first noticed a node in the posterior thigh on 5/2022, we was evaluated at OSH where he got an Xray and he was told that this was sciatica and was started on physical therapy. Tumor continued to grow rapidly during this time. MRI done on 1/6/2023 showing a large mass in the posterior thigh. 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   He reports having pain and limit mobility. He has been taking daily meloxican with some relief, however he needs to be resting most of the time to avoid pain.     C1 of doxil/ifosfamide on 1/30 follow up MRI showing increase in size for mass without significant areas of necrosis. We decided to discontinue chemotherapy.  First cycle of chemotherapy was complicated with neutropenic fever requiring admission and he also developed disseminated herpes zoster. Additionally work up done after chemotherapy revealed that the ejection fraction after chemotherapy had decreased.    He started Preoperative RT on 3/15 and completed on 4/18 . PET done on 3/14 just before starting RT, showed decrease in the metabolic activity of the mass.     5/22/23 Surgical resection of posterior thigh mass.     Interval History  Antonio reports feeling great, only occasional cramping of  the R leg, no pain, he is exercising more and biking. He had a fall from the bike yesterday with minor abrasion, no significant pain today.      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 Lives with wife Yari.       CURRENT OUTPATIENT MEDICATIONS     Current Outpatient Medications   Medication Sig Dispense Refill     Continuous Blood Gluc Sensor (FREESTYLE MORENITA 3 SENSOR) MISC 1 each every 14 days Use 1 sensor every 14 days. 6 each 5     Continuous Blood Gluc Sensor (FREESTYLE MORENITA 3 SENSOR) MISC USE 1 SENSOR EVERY 14 DAYS 6 each 3     fluticasone (FLONASE) 50 MCG/ACT nasal spray SPRAY 1 SPRAY INTO BOTH NOSTRILS DAILY 16 g 3     losartan (COZAAR) 25 MG tablet Take 1 tablet (25 mg) by mouth daily 90 tablet 1     metFORMIN (GLUCOPHAGE XR) 500 MG 24 hr tablet Take 3 tablets (1,500 mg) by mouth daily (with dinner) 270 tablet 0     metoprolol succinate ER (TOPROL XL) 25 MG 24 hr tablet Take 1 tablet (25 mg) by mouth daily 90 tablet 1          REVIEW OF SYSTEMS   As above in the HPI, o/w complete 12-point ROS was negative.     PHYSICAL EXAM       Physical Exam  Constitutional:       Appearance: Normal appearance.   HENT:      Head: Normocephalic.   Cardiovascular:      Rate and Rhythm: Normal rate.   Pulmonary:      Effort: Pulmonary effort is normal.      Breath sounds: Normal breath sounds.   Abdominal:       General: Abdomen is flat. Bowel sounds are normal.   Musculoskeletal:      Cervical back: Normal range of motion and neck supple.      Comments: Surgical site well healed, area of opening and drainage has healed completely.    Neurological:      Mental Status: He is alert.          LABORATORY AND IMAGING STUDIES     Lab Results   Component Value Date    WBC 6.2 08/15/2024    WBC 8.6 12/26/2019     Lab Results   Component Value Date    RBC 5.08 08/15/2024    RBC 4.96 12/26/2019     Lab Results   Component Value Date    HGB 15.3 08/15/2024    HGB 14.2 12/26/2019     Lab Results   Component Value Date    HCT 47.0 08/15/2024    HCT 44.6 12/26/2019     Lab Results   Component Value Date    MCV 93 08/15/2024    MCV 90 12/26/2019     Lab Results   Component Value Date    MCH 30.1 08/15/2024    MCH 28.6 12/26/2019     Lab Results   Component Value Date    MCHC 32.6 08/15/2024    MCHC 31.8 12/26/2019     Lab Results   Component Value Date    RDW 13.5 08/15/2024    RDW 14.3 12/26/2019     Lab Results   Component Value Date     08/15/2024     12/26/2019     Last Comprehensive Metabolic Panel:  Sodium   Date Value Ref Range Status   08/15/2024 141 135 - 145 mmol/L Final   02/11/2021 140 133 - 144 mmol/L Final     Potassium   Date Value Ref Range Status   08/15/2024 4.5 3.4 - 5.3 mmol/L Final   04/24/2022 4.1 3.4 - 5.3 mmol/L Final   02/11/2021 4.4 3.4 - 5.3 mmol/L Final     Potassium POCT   Date Value Ref Range Status   05/22/2023 4.5 3.5 - 5.0 mmol/L Final     Chloride   Date Value Ref Range Status   08/15/2024 103 98 - 107 mmol/L Final   04/24/2022 101 94 - 109 mmol/L Final   02/11/2021 107 94 - 109 mmol/L Final     Carbon Dioxide   Date Value Ref Range Status   02/11/2021 27 20 - 32 mmol/L Final     Carbon Dioxide (CO2)   Date Value Ref Range Status   08/15/2024 26 22 - 29 mmol/L Final   04/24/2022 29 20 - 32 mmol/L Final     Anion Gap   Date Value Ref Range Status   08/15/2024 12 7 - 15 mmol/L Final    04/24/2022 2 (L) 3 - 14 mmol/L Final   02/11/2021 6 3 - 14 mmol/L Final     Glucose   Date Value Ref Range Status   08/15/2024 90 70 - 99 mg/dL Final   04/24/2022 143 (H) 70 - 99 mg/dL Final   02/11/2021 135 (H) 70 - 99 mg/dL Final     Comment:     Non Fasting     GLUCOSE BY METER POCT   Date Value Ref Range Status   05/23/2023 119 (H) 70 - 99 mg/dL Final     Urea Nitrogen   Date Value Ref Range Status   08/15/2024 12.6 6.0 - 20.0 mg/dL Final   04/24/2022 11 7 - 30 mg/dL Final   02/11/2021 13 7 - 30 mg/dL Final     Creatinine   Date Value Ref Range Status   08/15/2024 1.11 0.67 - 1.17 mg/dL Final   02/11/2021 0.94 0.66 - 1.25 mg/dL Final     Creatinine POCT   Date Value Ref Range Status   08/15/2024 1.1 0.7 - 1.3 mg/dL Final     GFR Estimate   Date Value Ref Range Status   08/15/2024 79 >60 mL/min/1.73m2 Final     Comment:     eGFR calculated using 2021 CKD-EPI equation.   02/11/2021 >90 >60 mL/min/[1.73_m2] Final     Comment:     Non  GFR Calc  Starting 12/18/2018, serum creatinine based estimated GFR (eGFR) will be   calculated using the Chronic Kidney Disease Epidemiology Collaboration   (CKD-EPI) equation.       GFR, ESTIMATED POCT   Date Value Ref Range Status   08/15/2024 >60 >60 mL/min/1.73m2 Final     Calcium   Date Value Ref Range Status   08/15/2024 9.3 8.8 - 10.4 mg/dL Final     Comment:     Reference intervals for this test were updated on 7/16/2024 to reflect our healthy population more accurately. There may be differences in the flagging of prior results with similar values performed with this method. Those prior results can be interpreted in the context of the updated reference intervals.   02/11/2021 9.1 8.5 - 10.1 mg/dL Final     Bilirubin Total   Date Value Ref Range Status   08/15/2024 0.9 <=1.2 mg/dL Final   02/11/2021 0.7 0.2 - 1.3 mg/dL Final     Alkaline Phosphatase   Date Value Ref Range Status   08/15/2024 42 40 - 150 U/L Final   02/11/2021 43 40 - 150 U/L Final     ALT    Date Value Ref Range Status   08/15/2024 43 0 - 70 U/L Final   02/11/2021 43 0 - 70 U/L Final     AST   Date Value Ref Range Status   08/15/2024 27 0 - 45 U/L Final   02/11/2021 23 0 - 45 U/L Final       1/6/2023 MRI right femur thigh: Large soft tissue mass in the posterior compartment of the right thigh measuring 27 x 7.4 x 12 cm.  The mass lies primarily in the hamstring muscle belly.  Heterogeneous signal with areas of necrosis noted.    2/20/23 MRI R femur                                                                   IMPRESSION: In this patient with high grade sarcoma status  post-neoadjuvant chemotherapy;     Increased in size of the 11 x 15.7 x 21.7 cm heterogeneously enhancing  soft tissue mass with myxoid and lipoid components in the posterior  compartment of the right thigh since MRI from 1/6/2023, previously  measured 7.4 x 12 x 20 cm.  *  Distal portion of the mass anteriorly abuts the sciatic nerve and  deep femoral artery/vein without definite invasion.  *  Increased edema within the adductor rosy when compared to prior  study, possibly neurogenic.      PET scan 3/14/23  IMPRESSION:     1. 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      2. Subcentimeter pulmonary nodule in the left lower lobe, which is too small to accurately characterize by PET/CT and warrants continued imaging surveillance.    4/19/23 MRI femur R                                                                 IMPRESSION: In this patient with high grade sarcoma status  post-neoadjuvant chemotherapy;     Slightly increase in the size of the heterogeneously enhancing soft  tissue mass with myxoid and lipoid components in the posterior  compartment of the right thigh since MRI from 2/20/2023, measuring 11  x 16.4 x 21 cm, previously measured 11 x 15.7 x 21.7 cm.   *  Distal portion of the mass anteriorly abuts the sciatic nerve and  deep  femoral artery/vein without definite invasion.  *  Slightly increased edema within the adductor rosy when compared  to prior study.    PET 5/30/23  IMPRESSION:     1. Sequelae of post treatment inflammatory change in the right posterior thigh soft tissues without convincing evidence of active neoplasm.     2. Slightly increased size of the non-FDG avid nodule in the left lower lobe and development of additional non-FDG avid subcentimeter nodules in the inferior left upper lobe, right middle lobe, and right lower lobe. While their exact etiologies remain   indeterminate, the interval growth/development of additional nodules raises suspicion for early pulmonary metastases.    PET 7/18/23  Impression:  1. 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   a. Extensive regional soft tissue edema and nonmasslike enhancement,  presumably postoperative.   b. Postoperative fluid collection.  2. New periostitis/deep muscle edema along the mid to distal right  femoral shaft, query low grade stress response.    10/31/23 MRI R thigh  IMPRESSION:  1. 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 No masslike  enhancement to suggest recurrent tumor.  2. Extensive soft tissue edema without masslike enhancement throughout  the right thigh including the posterior and adductor muscles, presumed  postoperative.  3. Interval resolution of the previously noted postoperative fluid  collection as well as resolution of the periosteal edema seen along  the medial aspect of the mid femoral shaft.  4. No marrow signal changes to suggest fracture or marrow  infiltration.    10/31/23 CT CAP  IMPRESSION:  1.  No evidence of metastatic disease within the chest, abdomen, and  pelvis.   2.  Small pulmonary nodules measuring up to 4 mm are stable. No  new  nodules are identified. Recommend attention on follow-up.    2/5/24 CT CAP    IMPRESSION: No significant interval change. No evidence for metastatic  malignancy in the chest, abdomen, or pelvis.     2/5/24 MRI  Impression:     1. Stable postoperative changes of right posterior thigh mass  resection without evidence for local disease recurrence or local  regional metastasis in the right thigh.     2. Unchanged intramuscular edema, mild fatty atrophy, and non-mass  enhancement involving the posterior right thigh muscles likely  representing posttreatment change.     CT 4/15/24  IMPRESSION:  1.  No evidence of metastatic disease in the chest, abdomen, or  pelvis.  2.  Scattered small pulmonary nodules unchanged for several years and  likely benign.     MRI Femur thigh 4/15/24  Impression:     Stable postsurgical changes of mass resection from the posterior  compartment of the right thigh without evidence of local recurrent  tumor. Decreased non mass-like enhancing edema throughout the  posterior compartment of the thigh favored to represent resolving  posttreatment change.     MRI 4/15/24  Impression:     Stable postsurgical changes of mass resection from the posterior  compartment of the right thigh without evidence of local recurrent  tumor. Decreased non mass-like enhancing edema throughout the  posterior compartment of the thigh favored to represent resolving  posttreatment change.    CT CAP 8/15/24  IMPRESSION:  1.  Stable exam without evidence for new disease.  2.  Stable pulmonary nodules.    MR Femur 8/15/24  IMPRESSION:  1.  Postoperative changes of pleomorphic sarcoma resection from the right posterior thigh without evidence of residual or locally recurrent tumor.   2.  Slightly decreased non-mass-like edema in the medial and posterior compartment musculature of the right thigh compatible with evolving post-treatment change.     PATHOLOGY         Final Diagnosis   A.  SOFT TISSUE, RIGHT THIGH MASS,  BIOPSY:  -HIGH-GRADE SARCOMA WITH EXTENSIVE NECROSIS AND HYALINIZATION, see comment.   Electronically signed by Vineet Coello MD on 1/17/2023 at 10:17 AM   Comment   [facility]   The neoplasm is comprised of highly pleomorphic spindle cells with stromal hyalinization and extensive areas of necrosis.  Focal areas with scattered lipoblasts are seen.  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  MDM-2 gene amplification study by FISH is in progress and result will be provided separately.          ASSESSMENT AND PLAN     Mr. 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     He received C1 on 1/30. Patient did experienced worsening pain shortly after starting chemotherapy. MRI done on 2/20/23 showing increase in size of large posterior thigh sarcoma. The comparison of this MRI is to the baseline done on 1/6/23, and in a patient with high grade sarcoma the mass is presumed to have grown in size in the month before starting chemotherapy. However, given that the patient had increase in pain and there is no areas of necrosis observed in the recent MRI, I will consider this to be progression of disease and recommend to plan for surgery with consideration for preoperative radiation therapy.      He started radiation therapy on 3/15.  I reviewed the PET scan done on 3/14 showing some tumor shrinkage at 14 x 14 x 18 from prior 11 x 15.7 x 21 and improved metabolic activity. We discussed that given some response was seen, if needed in the future this will still be an option. Right now, given prior complications from chemo (admission for neutropenic fever, bleeding in the urine presumed to be 2/2 to ifosfamide, and presumed cardiomyopathy with decrease in ejection fraction on echo), we will hold on further chemo.    Antonio has completed  preoperative RT and underwent surgical resection on  5/22/23. He is healing well and recovering his mobility.    I personally reviewed the most recent CT CAP and MRI R thigh on 8/15 showing no evidence recurrence or metastatic disease. He has small subcentemeter pulmonary nodules that have remained stable since diagnosis. For this reason we will continue with surveillance scans with MRI thigh and CT CAP every 4 months for the second year after surgery and then move to every 4-6 month until year 5. I advised on drinking plenty of water the day before, of and after contrast.     Plan:    -CT CAP, Mri R thigh and labs 12/13   -Follow up Dr. Price 12/17     40 minutes spent on the date of the encounter doing chart review, review of test results, interpretation of tests, patient visit, documentation and discussion with other provider(s)     iMchael Laboy MD   of Medicine  Hematology, Oncology and Transplantation      Again, thank you for allowing me to participate in the care of your patient.        Sincerely,        Michael Laboy MD

## 2024-08-29 ENCOUNTER — VIRTUAL VISIT (OUTPATIENT)
Dept: FAMILY MEDICINE | Facility: CLINIC | Age: 54
End: 2024-08-29
Payer: COMMERCIAL

## 2024-08-29 DIAGNOSIS — E11.9 TYPE 2 DIABETES MELLITUS WITHOUT COMPLICATION, WITHOUT LONG-TERM CURRENT USE OF INSULIN (H): Primary | ICD-10-CM

## 2024-08-29 PROCEDURE — 99213 OFFICE O/P EST LOW 20 MIN: CPT | Mod: 95 | Performed by: PHYSICIAN ASSISTANT

## 2024-08-29 PROCEDURE — G2211 COMPLEX E/M VISIT ADD ON: HCPCS | Mod: 95 | Performed by: PHYSICIAN ASSISTANT

## 2024-08-29 RX ORDER — METFORMIN HCL 500 MG
1000 TABLET, EXTENDED RELEASE 24 HR ORAL
Qty: 180 TABLET | Refills: 1 | Status: SHIPPED | OUTPATIENT
Start: 2024-08-29

## 2024-08-29 NOTE — PATIENT INSTRUCTIONS
Decrease your metformin to 1000 mg daily (2 tablets).    Follow-up in 6 months with Nallely Will PA-C.

## 2024-08-29 NOTE — PROGRESS NOTES
Antonio is a 54 year old who is being evaluated via a billable video visit.    How would you like to obtain your AVS? MyChart  If the video visit is dropped, the invitation should be resent by: Text to cell phone: [phone]  Will anyone else be joining your video visit? No        Assessment & Plan     Type 2 diabetes mellitus without complication, without long-term current use of insulin (H)    Recent A1c is showing excellent control and likely is over-treated. Will decrease metformin to 1000 mg daily and follow-up in 6 months for physical. Will refill joseph sensors when he runs out but he states he has plenty at home for now.    - metFORMIN (GLUCOPHAGE XR) 500 MG 24 hr tablet; Take 2 tablets (1,000 mg) by mouth daily (with dinner).  - PRIMARY CARE FOLLOW-UP SCHEDULING; Future      The longitudinal plan of care for the diagnosis(es)/condition(s) as documented were addressed during this visit. Due to the added complexity in care, I will continue to support Antonio in the subsequent management and with ongoing continuity of care.          Subjective   Antonio is a 54 year old, presenting for the following health issues:  Diabetes        8/29/2024     9:18 AM   Additional Questions   Roomed by orlando Burger       Via the Health Maintenance questionnaire, the patient has reported the following services have been completed -Eye Exam: [facility] 2024-08-01, this information has been sent to the abstraction team.  History of Present Illness       Diabetes:   He presents for follow up of diabetes.   He is checking home blood glucose with a continuous glucose monitor.   He checks blood glucose at bedtime.  Blood glucose is never over 200 and sometimes under 70. He is aware of hypoglycemia symptoms including shakiness.    He has no concerns regarding his diabetes at this time.   He is not experiencing numbness or burning in feet, excessive thirst, blurry vision, weight changes or redness, sores or blisters on feet. The  "patient has had a diabetic eye exam in the last 12 months. Eye exam performed on 8/1/2024. Location of last eye exam [facility].        He eats 2-3 servings of fruits and vegetables daily.He consumes 0 sweetened beverage(s) daily.He exercises with enough effort to increase his heart rate 10 to 19 minutes per day.  He exercises with enough effort to increase his heart rate 3 or less days per week.   He is taking medications regularly.       [facility]: [facility]      Review of Systems  Constitutional, HEENT, cardiovascular, pulmonary, gi and gu systems are negative, except as otherwise noted.      Objective    Vitals - Patient Reported  Weight (Patient Reported): 145.2 kg (320 lb)  Height (Patient Reported): 180.3 cm (5' 11\")  BMI (Based on Pt Reported Ht/Wt): 44.63      Vitals:  No vitals were obtained today due to virtual visit.      Physical Exam   GENERAL: alert and no distress  EYES: Eyes grossly normal to inspection.  No discharge or erythema, or obvious scleral/conjunctival abnormalities.  HENT: Normal cephalic/atraumatic.  External ears, nose and mouth without ulcers or lesions.  No nasal drainage visible.  NECK: No asymmetry, visible masses or scars  RESP: No audible wheeze, cough, or visible cyanosis.    SKIN: Visible skin clear. No significant rash, abnormal pigmentation or lesions.  NEURO: Cranial nerves grossly intact.  Mentation and speech appropriate for age.  PSYCH: Appropriate affect, tone, and pace of words    A1c is 5.5%      Video-Visit Details    Type of service:  Video Visit   Originating Location (pt. Location): Home    Distant Location (provider location):  On-site  Platform used for Video Visit: Vishal  Signed Electronically by: Michael Shaw PA-C    "

## 2024-09-04 ENCOUNTER — TELEPHONE (OUTPATIENT)
Dept: FAMILY MEDICINE | Facility: CLINIC | Age: 54
End: 2024-09-04
Payer: COMMERCIAL

## 2024-09-04 NOTE — TELEPHONE ENCOUNTER
Patient Quality Outreach    Patient is due for the following:   Diabetes -  Eye Exam    Next Steps:   No follow up needed at this time.    Type of outreach:    Chart review performed, no outreach needed. and patient has been seen with [facility] on 08-      Questions for provider review:    None           Adelita Ames MA

## 2024-10-04 DIAGNOSIS — E11.9 TYPE 2 DIABETES MELLITUS WITHOUT COMPLICATION, WITHOUT LONG-TERM CURRENT USE OF INSULIN (H): Primary | ICD-10-CM

## 2024-10-04 RX ORDER — HYDROCHLOROTHIAZIDE 12.5 MG/1
CAPSULE ORAL
COMMUNITY
End: 2024-10-04

## 2024-10-07 RX ORDER — HYDROCHLOROTHIAZIDE 12.5 MG/1
2 CAPSULE ORAL
Qty: 6 EACH | Refills: 3 | Status: SHIPPED | OUTPATIENT
Start: 2024-10-07

## 2024-11-22 ENCOUNTER — MYC REFILL (OUTPATIENT)
Dept: FAMILY MEDICINE | Facility: CLINIC | Age: 54
End: 2024-11-22
Payer: COMMERCIAL

## 2024-11-22 DIAGNOSIS — E11.9 TYPE 2 DIABETES MELLITUS WITHOUT COMPLICATION, WITHOUT LONG-TERM CURRENT USE OF INSULIN (H): ICD-10-CM

## 2024-11-25 RX ORDER — METFORMIN HYDROCHLORIDE 500 MG/1
1000 TABLET, EXTENDED RELEASE ORAL
Qty: 180 TABLET | Refills: 1 | OUTPATIENT
Start: 2024-11-25

## 2024-12-13 ENCOUNTER — ANCILLARY PROCEDURE (OUTPATIENT)
Dept: MRI IMAGING | Facility: CLINIC | Age: 54
End: 2024-12-13
Attending: STUDENT IN AN ORGANIZED HEALTH CARE EDUCATION/TRAINING PROGRAM
Payer: COMMERCIAL

## 2024-12-13 ENCOUNTER — ANCILLARY PROCEDURE (OUTPATIENT)
Dept: CT IMAGING | Facility: CLINIC | Age: 54
End: 2024-12-13
Attending: STUDENT IN AN ORGANIZED HEALTH CARE EDUCATION/TRAINING PROGRAM
Payer: COMMERCIAL

## 2024-12-13 ENCOUNTER — LAB (OUTPATIENT)
Dept: LAB | Facility: CLINIC | Age: 54
End: 2024-12-13
Attending: STUDENT IN AN ORGANIZED HEALTH CARE EDUCATION/TRAINING PROGRAM
Payer: COMMERCIAL

## 2024-12-13 DIAGNOSIS — C49.9 SARCOMA OF SOFT TISSUE (H): ICD-10-CM

## 2024-12-13 LAB
ALBUMIN SERPL BCG-MCNC: 3.9 G/DL (ref 3.5–5.2)
ALP SERPL-CCNC: 41 U/L (ref 40–150)
ALT SERPL W P-5'-P-CCNC: 43 U/L (ref 0–70)
ANION GAP SERPL CALCULATED.3IONS-SCNC: 12 MMOL/L (ref 7–15)
AST SERPL W P-5'-P-CCNC: 33 U/L (ref 0–45)
BASOPHILS # BLD AUTO: 0 10E3/UL (ref 0–0.2)
BASOPHILS NFR BLD AUTO: 1 %
BILIRUB SERPL-MCNC: 0.8 MG/DL
BUN SERPL-MCNC: 14.2 MG/DL (ref 6–20)
CALCIUM SERPL-MCNC: 8.9 MG/DL (ref 8.8–10.4)
CHLORIDE SERPL-SCNC: 103 MMOL/L (ref 98–107)
CREAT BLD-MCNC: 1.2 MG/DL (ref 0.7–1.3)
CREAT SERPL-MCNC: 1 MG/DL (ref 0.67–1.17)
EGFRCR SERPLBLD CKD-EPI 2021: 89 ML/MIN/1.73M2
EGFRCR SERPLBLD CKD-EPI 2021: >60 ML/MIN/1.73M2
EOSINOPHIL # BLD AUTO: 0.1 10E3/UL (ref 0–0.7)
EOSINOPHIL NFR BLD AUTO: 2 %
ERYTHROCYTE [DISTWIDTH] IN BLOOD BY AUTOMATED COUNT: 13.6 % (ref 10–15)
GLUCOSE SERPL-MCNC: 110 MG/DL (ref 70–99)
HCO3 SERPL-SCNC: 22 MMOL/L (ref 22–29)
HCT VFR BLD AUTO: 46.9 % (ref 40–53)
HGB BLD-MCNC: 15.2 G/DL (ref 13.3–17.7)
IMM GRANULOCYTES # BLD: 0 10E3/UL
IMM GRANULOCYTES NFR BLD: 0 %
LYMPHOCYTES # BLD AUTO: 1.1 10E3/UL (ref 0.8–5.3)
LYMPHOCYTES NFR BLD AUTO: 20 %
MCH RBC QN AUTO: 29.3 PG (ref 26.5–33)
MCHC RBC AUTO-ENTMCNC: 32.4 G/DL (ref 31.5–36.5)
MCV RBC AUTO: 90 FL (ref 78–100)
MONOCYTES # BLD AUTO: 0.6 10E3/UL (ref 0–1.3)
MONOCYTES NFR BLD AUTO: 11 %
NEUTROPHILS # BLD AUTO: 3.9 10E3/UL (ref 1.6–8.3)
NEUTROPHILS NFR BLD AUTO: 67 %
NRBC # BLD AUTO: 0 10E3/UL
NRBC BLD AUTO-RTO: 0 /100
PLATELET # BLD AUTO: 195 10E3/UL (ref 150–450)
POTASSIUM SERPL-SCNC: 4.9 MMOL/L (ref 3.4–5.3)
PROT SERPL-MCNC: 7.1 G/DL (ref 6.4–8.3)
RBC # BLD AUTO: 5.19 10E6/UL (ref 4.4–5.9)
SODIUM SERPL-SCNC: 137 MMOL/L (ref 135–145)
WBC # BLD AUTO: 5.9 10E3/UL (ref 4–11)

## 2024-12-13 PROCEDURE — 71260 CT THORAX DX C+: CPT | Performed by: RADIOLOGY

## 2024-12-13 PROCEDURE — 73720 MRI LWR EXTREMITY W/O&W/DYE: CPT | Mod: RT | Performed by: RADIOLOGY

## 2024-12-13 PROCEDURE — A9585 GADOBUTROL INJECTION: HCPCS | Performed by: RADIOLOGY

## 2024-12-13 PROCEDURE — 85004 AUTOMATED DIFF WBC COUNT: CPT

## 2024-12-13 PROCEDURE — 80053 COMPREHEN METABOLIC PANEL: CPT | Performed by: PATHOLOGY

## 2024-12-13 PROCEDURE — 36415 COLL VENOUS BLD VENIPUNCTURE: CPT

## 2024-12-13 PROCEDURE — 80053 COMPREHEN METABOLIC PANEL: CPT

## 2024-12-13 PROCEDURE — 74177 CT ABD & PELVIS W/CONTRAST: CPT | Performed by: RADIOLOGY

## 2024-12-13 RX ORDER — IOPAMIDOL 755 MG/ML
149 INJECTION, SOLUTION INTRAVASCULAR ONCE
Status: COMPLETED | OUTPATIENT
Start: 2024-12-13 | End: 2024-12-13

## 2024-12-13 RX ORDER — GADOBUTROL 604.72 MG/ML
15 INJECTION INTRAVENOUS ONCE
Status: COMPLETED | OUTPATIENT
Start: 2024-12-13 | End: 2024-12-13

## 2024-12-13 RX ADMIN — GADOBUTROL 14.5 ML: 604.72 INJECTION INTRAVENOUS at 14:34

## 2024-12-13 RX ADMIN — IOPAMIDOL 149 ML: 755 INJECTION, SOLUTION INTRAVASCULAR at 13:39

## 2024-12-13 NOTE — NURSING NOTE
Chief Complaint   Patient presents with    Labs Only     Labs drawn with PIV start by RN. Pt tolerated well.      Meme Alvarez RN

## 2024-12-13 NOTE — DISCHARGE INSTRUCTIONS
MRI Contrast Discharge Instructions    The IV contrast you received today will pass out of your body in your  urine. This will happen in the next 24 hours. You will not feel this process.  Your urine will not change color.    Drink at least 4 extra glasses of water or juice today (unless your doctor  has restricted your fluids). This reduces the stress on your kidneys.  You may take your regular medicines.    If you are on dialysis: It is best to have dialysis today.    If you have a reaction: Most reactions happen right away. If you have  any new symptoms after leaving the hospital (such as hives or swelling),  call your hospital at the correct number below. Or call your family doctor.  If you have breathing distress or wheezing, call 911.    Special instructions: ***    I have read and understand the above information.    Signature:______________________________________ Date:___________    Staff:__________________________________________ Date:___________     Time:__________    [place] [facility] Departments:    ___[place]: [phone]  ___[facility]: [phone]  ___[place]: [phone] ___[facility]: [phone]  ___[facility]: [phone]  ___[facility]: [phone]  ___[phone]  ___[facility]: [phone]  ___[phone]

## 2024-12-13 NOTE — DISCHARGE INSTRUCTIONS

## 2024-12-15 NOTE — PROGRESS NOTES
Virtual Visit Details     Type of service:  Video Visit     Originating Location (pt. Location): Home    Distant Location (provider location):  On-site  Platform used for Video Visit: Vishal       [facility]    FOLLOW UP VISIT NOTE    PATIENT NAME: Antonio Canseco MRN # [id number]  DATE OF VISIT: August 20, 2024 YOB: 1970    REFERRING PROVIDER: Ari Nascimento PA-C  [facility] [place] [address]    CANCER TYPE: High grade sarcoma       CHIEF COMPLAIN   Thigh pain     HISTORY OF PRESENT ILLNESS   52 year old male with PMH of DM and recent Dx of large 27 cm mass in the posterior R thigh. He reports that he first noticed a node in the posterior thigh on 5/2022, we was evaluated at OSH where he got an Xray and he was told that this was sciatica and was started on physical therapy. Tumor continued to grow rapidly during this time. MRI done on 1/6/2023 showing a large mass in the posterior thigh. 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   He reports having pain and limit mobility. He has been taking daily meloxican with some relief, however he needs to be resting most of the time to avoid pain.     C1 of doxil/ifosfamide on 1/30 follow up MRI showing increase in size for mass without significant areas of necrosis. We decided to discontinue chemotherapy.  First cycle of chemotherapy was complicated with neutropenic fever requiring admission and he also developed disseminated herpes zoster. Additionally work up done after chemotherapy revealed that the ejection fraction after chemotherapy had decreased.    He started Preoperative RT on 3/15 and completed on 4/18 . PET done on 3/14 just before starting RT, showed decrease in the metabolic activity of the mass.     5/22/23 Surgical resection of posterior thigh mass.     Interval History  Antonio reports feeling well, he has noticed some knee pain when riding his bike but no other discomfort.       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 Lives with wife Yari.       CURRENT OUTPATIENT MEDICATIONS     Current Outpatient Medications   Medication Sig Dispense Refill    Continuous Blood Gluc Sensor (FREESTYLE MORENITA 3 SENSOR) MISC 1 each every 14 days Use 1 sensor every 14 days. 6 each 5    Continuous Blood Gluc Sensor (FREESTYLE MORENITA 3 SENSOR) MISC USE 1 SENSOR EVERY 14 DAYS 6 each 3    Continuous Glucose Sensor (FREESTYLE MORENITA 3 PLUS SENSOR) MISC 2 each every 14 days. Change every 15 days. 6 each 3    fluticasone (FLONASE) 50 MCG/ACT nasal spray SPRAY 1 SPRAY INTO BOTH NOSTRILS DAILY 16 g 3    losartan (COZAAR) 25 MG tablet Take 1 tablet (25 mg) by mouth daily 90 tablet 1    metFORMIN (GLUCOPHAGE XR) 500 MG 24 hr tablet Take 2 tablets (1,000 mg) by mouth daily (with dinner). 180 tablet 1    metoprolol succinate ER (TOPROL XL) 25 MG 24 hr tablet Take 1 tablet (25 mg) by mouth daily 90 tablet 1          REVIEW OF SYSTEMS   As above in the HPI, o/w complete 12-point ROS was negative.     PHYSICAL EXAM     Prior physical exam    Physical Exam  Constitutional:       Appearance: Normal appearance.   HENT:      Head: Normocephalic.   Cardiovascular:      Rate and Rhythm: Normal rate.   Pulmonary:      Effort: Pulmonary effort is normal.      Breath sounds: Normal breath sounds.   Abdominal:      General:  Abdomen is flat. Bowel sounds are normal.   Musculoskeletal:      Cervical back: Normal range of motion and neck supple.      Comments: Surgical site well healed, area of opening and drainage has healed completely.    Neurological:      Mental Status: He is alert.          LABORATORY AND IMAGING STUDIES     Lab Results   Component Value Date    WBC 5.9 12/13/2024    WBC 8.6 12/26/2019     Lab Results   Component Value Date    RBC 5.19 12/13/2024    RBC 4.96 12/26/2019     Lab Results   Component Value Date    HGB 15.2 12/13/2024    HGB 14.2 12/26/2019     Lab Results   Component Value Date    HCT 46.9 12/13/2024    HCT 44.6 12/26/2019     Lab Results   Component Value Date    MCV 90 12/13/2024    MCV 90 12/26/2019     Lab Results   Component Value Date    MCH 29.3 12/13/2024    MCH 28.6 12/26/2019     Lab Results   Component Value Date    MCHC 32.4 12/13/2024    MCHC 31.8 12/26/2019     Lab Results   Component Value Date    RDW 13.6 12/13/2024    RDW 14.3 12/26/2019     Lab Results   Component Value Date     12/13/2024     12/26/2019         Last Comprehensive Metabolic Panel:  Sodium   Date Value Ref Range Status   12/13/2024 137 135 - 145 mmol/L Final   02/11/2021 140 133 - 144 mmol/L Final     Potassium   Date Value Ref Range Status   12/13/2024 4.9 3.4 - 5.3 mmol/L Final   04/24/2022 4.1 3.4 - 5.3 mmol/L Final   02/11/2021 4.4 3.4 - 5.3 mmol/L Final     Potassium POCT   Date Value Ref Range Status   05/22/2023 4.5 3.5 - 5.0 mmol/L Final     Chloride   Date Value Ref Range Status   12/13/2024 103 98 - 107 mmol/L Final   04/24/2022 101 94 - 109 mmol/L Final   02/11/2021 107 94 - 109 mmol/L Final     Carbon Dioxide   Date Value Ref Range Status   02/11/2021 27 20 - 32 mmol/L Final     Carbon Dioxide (CO2)   Date Value Ref Range Status   12/13/2024 22 22 - 29 mmol/L Final   04/24/2022 29 20 - 32 mmol/L Final     Anion Gap   Date Value Ref Range Status   12/13/2024 12 7 - 15 mmol/L Final   04/24/2022 2  (L) 3 - 14 mmol/L Final   02/11/2021 6 3 - 14 mmol/L Final     Glucose   Date Value Ref Range Status   12/13/2024 110 (H) 70 - 99 mg/dL Final   04/24/2022 143 (H) 70 - 99 mg/dL Final   02/11/2021 135 (H) 70 - 99 mg/dL Final     Comment:     Non Fasting     GLUCOSE BY METER POCT   Date Value Ref Range Status   05/23/2023 119 (H) 70 - 99 mg/dL Final     Urea Nitrogen   Date Value Ref Range Status   12/13/2024 14.2 6.0 - 20.0 mg/dL Final   04/24/2022 11 7 - 30 mg/dL Final   02/11/2021 13 7 - 30 mg/dL Final     Creatinine   Date Value Ref Range Status   12/13/2024 1.00 0.67 - 1.17 mg/dL Final   02/11/2021 0.94 0.66 - 1.25 mg/dL Final     Creatinine POCT   Date Value Ref Range Status   12/13/2024 1.2 0.7 - 1.3 mg/dL Final     GFR Estimate   Date Value Ref Range Status   12/13/2024 89 >60 mL/min/1.73m2 Final     Comment:     eGFR calculated using 2021 CKD-EPI equation.   02/11/2021 >90 >60 mL/min/[1.73_m2] Final     Comment:     Non  GFR Calc  Starting 12/18/2018, serum creatinine based estimated GFR (eGFR) will be   calculated using the Chronic Kidney Disease Epidemiology Collaboration   (CKD-EPI) equation.       GFR, ESTIMATED POCT   Date Value Ref Range Status   12/13/2024 >60 >60 mL/min/1.73m2 Final     Calcium   Date Value Ref Range Status   12/13/2024 8.9 8.8 - 10.4 mg/dL Final     Comment:     Reference intervals for this test were updated on 7/16/2024 to reflect our healthy population more accurately. There may be differences in the flagging of prior results with similar values performed with this method. Those prior results can be interpreted in the context of the updated reference intervals.   02/11/2021 9.1 8.5 - 10.1 mg/dL Final     Bilirubin Total   Date Value Ref Range Status   12/13/2024 0.8 <=1.2 mg/dL Final   02/11/2021 0.7 0.2 - 1.3 mg/dL Final     Alkaline Phosphatase   Date Value Ref Range Status   12/13/2024 41 40 - 150 U/L Final   02/11/2021 43 40 - 150 U/L Final     ALT   Date Value  Ref Range Status   12/13/2024 43 0 - 70 U/L Final   02/11/2021 43 0 - 70 U/L Final     AST   Date Value Ref Range Status   12/13/2024 33 0 - 45 U/L Final   02/11/2021 23 0 - 45 U/L Final       1/6/2023 MRI right femur thigh: Large soft tissue mass in the posterior compartment of the right thigh measuring 27 x 7.4 x 12 cm.  The mass lies primarily in the hamstring muscle belly.  Heterogeneous signal with areas of necrosis noted.    2/20/23 MRI R femur                                                                   IMPRESSION: In this patient with high grade sarcoma status  post-neoadjuvant chemotherapy;     Increased in size of the 11 x 15.7 x 21.7 cm heterogeneously enhancing  soft tissue mass with myxoid and lipoid components in the posterior  compartment of the right thigh since MRI from 1/6/2023, previously  measured 7.4 x 12 x 20 cm.  *  Distal portion of the mass anteriorly abuts the sciatic nerve and  deep femoral artery/vein without definite invasion.  *  Increased edema within the adductor rosy when compared to prior  study, possibly neurogenic.      PET scan 3/14/23  IMPRESSION:     1. 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      2. Subcentimeter pulmonary nodule in the left lower lobe, which is too small to accurately characterize by PET/CT and warrants continued imaging surveillance.    4/19/23 MRI femur R                                                                 IMPRESSION: In this patient with high grade sarcoma status  post-neoadjuvant chemotherapy;     Slightly increase in the size of the heterogeneously enhancing soft  tissue mass with myxoid and lipoid components in the posterior  compartment of the right thigh since MRI from 2/20/2023, measuring 11  x 16.4 x 21 cm, previously measured 11 x 15.7 x 21.7 cm.   *  Distal portion of the mass anteriorly abuts the sciatic nerve and  deep femoral  artery/vein without definite invasion.  *  Slightly increased edema within the adductor rosy when compared  to prior study.    PET 5/30/23  IMPRESSION:     1. Sequelae of post treatment inflammatory change in the right posterior thigh soft tissues without convincing evidence of active neoplasm.     2. Slightly increased size of the non-FDG avid nodule in the left lower lobe and development of additional non-FDG avid subcentimeter nodules in the inferior left upper lobe, right middle lobe, and right lower lobe. While their exact etiologies remain   indeterminate, the interval growth/development of additional nodules raises suspicion for early pulmonary metastases.    PET 7/18/23  Impression:  1. 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   a. Extensive regional soft tissue edema and nonmasslike enhancement,  presumably postoperative.   b. Postoperative fluid collection.  2. New periostitis/deep muscle edema along the mid to distal right  femoral shaft, query low grade stress response.    10/31/23 MRI R thigh  IMPRESSION:  1. 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 No masslike  enhancement to suggest recurrent tumor.  2. Extensive soft tissue edema without masslike enhancement throughout  the right thigh including the posterior and adductor muscles, presumed  postoperative.  3. Interval resolution of the previously noted postoperative fluid  collection as well as resolution of the periosteal edema seen along  the medial aspect of the mid femoral shaft.  4. No marrow signal changes to suggest fracture or marrow  infiltration.    10/31/23 CT CAP  IMPRESSION:  1.  No evidence of metastatic disease within the chest, abdomen, and  pelvis.   2.  Small pulmonary nodules measuring up to 4 mm are stable. No new  nodules are  identified. Recommend attention on follow-up.    2/5/24 CT CAP    IMPRESSION: No significant interval change. No evidence for metastatic  malignancy in the chest, abdomen, or pelvis.     2/5/24 MRI  Impression:     1. Stable postoperative changes of right posterior thigh mass  resection without evidence for local disease recurrence or local  regional metastasis in the right thigh.     2. Unchanged intramuscular edema, mild fatty atrophy, and non-mass  enhancement involving the posterior right thigh muscles likely  representing posttreatment change.     CT 4/15/24  IMPRESSION:  1.  No evidence of metastatic disease in the chest, abdomen, or  pelvis.  2.  Scattered small pulmonary nodules unchanged for several years and  likely benign.     MRI Femur thigh 4/15/24  Impression:     Stable postsurgical changes of mass resection from the posterior  compartment of the right thigh without evidence of local recurrent  tumor. Decreased non mass-like enhancing edema throughout the  posterior compartment of the thigh favored to represent resolving  posttreatment change.     MRI 4/15/24  Impression:     Stable postsurgical changes of mass resection from the posterior  compartment of the right thigh without evidence of local recurrent  tumor. Decreased non mass-like enhancing edema throughout the  posterior compartment of the thigh favored to represent resolving  posttreatment change.    CT CAP 8/15/24  IMPRESSION:  1.  Stable exam without evidence for new disease.  2.  Stable pulmonary nodules.    MR Femur 8/15/24  IMPRESSION:  1.  Postoperative changes of pleomorphic sarcoma resection from the right posterior thigh without evidence of residual or locally recurrent tumor.   2.  Slightly decreased non-mass-like edema in the medial and posterior compartment musculature of the right thigh compatible with evolving post-treatment change.    MRI Thigh 12/13/25  Impression:     Stable postoperative changes of right posterior thigh mass  resection  without evidence for local disease recurrence or local regional  metastasis in the right thigh.       CT CAP 12/13/24  IMPRESSION:  1.  No convincing evidence of metastatic disease in the chest, abdomen and pelvis.  2.  Stable multiple bilateral pulmonary nodules, not significantly changed as compared to 2/5/2024 exam. No new pulmonary nodule visualized.   3.  Hepatic steatosis.  4.  Cholelithiasis without CT evidence of acute cholecystitis.  5.  Colonic diverticulosis without CT evidence of acute diverticulitis.    MRI Thigh R 12/13/24   Impression:     Stable postoperative changes of right posterior thigh mass resection  without evidence for local disease recurrence or local regional  metastasis in the right thigh.       I have personally reviewed the examination and initial interpretation  and I agree with the findings.        PATHOLOGY     5/22/23  Final Diagnosis   SOFT TISSUE, RIGHT THIGH TUMOR, RESECTION:  -HIGH-GRADE MYXOFIBROSARCOMA with therapy-related changes, FNCLCC grade 2, 22.4 cm in greatest dimension.  -Tumor post-treatment viability: approx. 70%.  -Tumor extends to inked proximal margin and it is less than 1 mm from deep/anterior margin, 1 mm from lateral and 2 mm from medial and superficial/posterior margins.  -AJCC pathologic staging is ypT4.  -See synoptic report.            Final Diagnosis   A.  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   Electronically signed by Vineet Coello MD on 1/17/2023 at 10:17 AM   Comment   UUMAYO   The neoplasm is comprised of highly pleomorphic spindle cells with stromal hyalinization and extensive areas of necrosis.  Focal areas with scattered lipoblasts are seen.  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  MDM-2 gene amplification study by FISH is in progress and result will be  provided separately.          ASSESSMENT AND PLAN     Mr. 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     He received C1 on 1/30. Patient did experienced worsening pain shortly after starting chemotherapy. MRI done on 2/20/23 showing increase in size of large posterior thigh sarcoma. The comparison of this MRI is to the baseline done on 1/6/23, and in a patient with high grade sarcoma the mass is presumed to have grown in size in the month before starting chemotherapy. However, given that the patient had increase in pain and there is no areas of necrosis observed in the recent MRI, I will consider this to be progression of disease and recommend to plan for surgery with consideration for preoperative radiation therapy.      He started radiation therapy on 3/15.  I reviewed the PET scan done on 3/14 showing some tumor shrinkage at 14 x 14 x 18 from prior 11 x 15.7 x 21 and improved metabolic activity. We discussed that given some response was seen, if needed in the future this will still be an option. Right now, given prior complications from chemo (admission for neutropenic fever, bleeding in the urine presumed to be 2/2 to ifosfamide, and presumed cardiomyopathy with decrease in ejection fraction on echo), we will hold on further chemo.    Antonio completed preoperative RT and underwent surgical resection on  5/22/23.   I personally reviewed the most recent CT CAP and MRI R thigh on 12/13/24 showing no evidence recurrence or metastatic disease. He has small subcentemeter pulmonary nodules that have remained stable since diagnosis. We reviewed the MRI showing a deep partial thickness chondral loss in the medial patellar facet. He is only having some mild discomfort when he rides the bike. I advised on weight loss and weight training exercises for the legs to increase the muscle mass and minimize further joint damage.      For this reason we will continue with surveillance scans with MRI thigh and CT CAP every 4 months for the second year after surgery and then move to every 4-6 month until year 5.       Plan:    -CT CAP, Mri R thigh and labs 4/4/25  -Follow up Dr. Price 4/8/25    40 minutes spent on the date of the encounter doing chart review, review of test results, interpretation of tests, patient visit, documentation and discussion with other provider(s)     Michael Laboy MD   of Medicine  Hematology, Oncology and Transplantation

## 2024-12-17 ENCOUNTER — VIRTUAL VISIT (OUTPATIENT)
Dept: ONCOLOGY | Facility: CLINIC | Age: 54
End: 2024-12-17
Attending: STUDENT IN AN ORGANIZED HEALTH CARE EDUCATION/TRAINING PROGRAM
Payer: COMMERCIAL

## 2024-12-17 VITALS — HEIGHT: 71 IN | BODY MASS INDEX: 44.1 KG/M2 | WEIGHT: 315 LBS

## 2024-12-17 DIAGNOSIS — C49.9 SARCOMA OF SOFT TISSUE (H): Primary | ICD-10-CM

## 2024-12-17 PROCEDURE — 99215 OFFICE O/P EST HI 40 MIN: CPT | Mod: 95 | Performed by: STUDENT IN AN ORGANIZED HEALTH CARE EDUCATION/TRAINING PROGRAM

## 2024-12-17 ASSESSMENT — PAIN SCALES - GENERAL: PAINLEVEL_OUTOF10: NO PAIN (1)

## 2024-12-17 NOTE — PROGRESS NOTES
Virtual Visit Details    Type of service:  Video Visit     Originating Location (pt. Location): Home  {PROVIDER LOCATION On-site should be selected for visits conducted from your clinic location or adjoining [facility] hospital, academic office, or other nearby [facility] building. Off-site should be selected for all other provider locations, including home:[id number]}  Distant Location (provider location):  On-site  Platform used for Video Visit: T3 Search

## 2024-12-17 NOTE — NURSING NOTE
Current patient location: [address] [place] [address] [address]  [facility] [id number]    Is the patient currently in the state of [place]? YES    Visit mode:VIDEO    If the visit is dropped, the patient can be reconnected by:VIDEO VISIT: Text to cell phone:   Telephone Information:   Mobile [phone]       Will anyone else be joining the visit? NO  (If patient encounters technical issues they should call [phone]    Are changes needed to the allergy or medication list? Pt stated no changes to allergies and Pt stated no med changes    Are refills needed on medications prescribed by this physician? NO    Rooming Documentation:  Unable to complete questionnaire(s) due to time    Reason for visit: ALVARADO FLORES

## 2024-12-17 NOTE — LETTER
12/17/2024      Antonio Canseco  [address]  [facility] [address]      Dear Colleague,    Thank you for referring your patient, Antonio Canseco, to the [facility]. Please see a copy of my visit note below.    Virtual Visit Details     Type of service:  Video Visit     Originating Location (pt. Location): Home    Distant Location (provider location):  On-site  Platform used for Video Visit: [facility]    FOLLOW UP VISIT NOTE    PATIENT NAME: Antonio Canseco MRN # [id number]  DATE OF VISIT: August 20, 2024 YOB: 1970    REFERRING PROVIDER: Ari Nascimento PA-C  [facility] [place] [address]    CANCER TYPE: High grade sarcoma       CHIEF COMPLAIN   Thigh pain     HISTORY OF PRESENT ILLNESS   52 year old male with PMH of DM and recent Dx of large 27 cm mass in the posterior R thigh. He reports that he first noticed a node in the posterior thigh on 5/2022, we was evaluated at OSH where he got an Xray and he was told that this was sciatica and was started on physical therapy. Tumor continued to grow rapidly during this time. MRI done on 1/6/2023 showing a large mass in the posterior thigh. 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   He reports having pain and limit mobility. He has been taking daily meloxican with some relief, however he needs to be resting most of the time to avoid pain.     C1 of doxil/ifosfamide on 1/30 follow up MRI showing increase in size for mass without significant areas of necrosis. We decided to discontinue chemotherapy.  First cycle of chemotherapy was complicated with neutropenic fever requiring admission and he also developed disseminated herpes zoster. Additionally work up done after chemotherapy revealed that the ejection fraction after chemotherapy had decreased.    He started Preoperative RT on 3/15 and completed on 4/18 . PET done on 3/14 just before starting  RT, showed decrease in the metabolic activity of the mass.     5/22/23 Surgical resection of posterior thigh mass.     Interval History  Antonio reports feeling well, he has noticed some knee pain when riding his bike but no other discomfort.      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 Lives with wife Yari.       CURRENT OUTPATIENT MEDICATIONS     Current Outpatient Medications   Medication Sig Dispense Refill     Continuous Blood Gluc Sensor (FREESTYLE MORENITA 3 SENSOR) MISC 1 each every 14 days Use 1 sensor every 14 days. 6 each 5     Continuous Blood Gluc Sensor (FREESTYLE MORENITA 3 SENSOR) MISC USE 1 SENSOR EVERY 14 DAYS 6 each 3     Continuous Glucose Sensor (FREESTYLE MORENITA 3 PLUS SENSOR) MISC 2 each every 14 days. Change every 15 days. 6 each 3     fluticasone (FLONASE) 50 MCG/ACT nasal spray SPRAY 1 SPRAY INTO BOTH NOSTRILS DAILY 16 g 3     losartan (COZAAR) 25 MG tablet Take 1 tablet (25 mg) by mouth daily 90 tablet 1     metFORMIN (GLUCOPHAGE XR) 500 MG 24 hr tablet Take 2 tablets (1,000 mg) by mouth daily (with dinner). 180 tablet 1     metoprolol succinate ER (TOPROL XL) 25 MG 24 hr tablet Take 1 tablet (25 mg) by mouth daily 90 tablet 1          REVIEW OF SYSTEMS   As above in the HPI, o/w complete 12-point ROS was negative.     PHYSICAL EXAM     Prior physical exam    Physical  Exam  Constitutional:       Appearance: Normal appearance.   HENT:      Head: Normocephalic.   Cardiovascular:      Rate and Rhythm: Normal rate.   Pulmonary:      Effort: Pulmonary effort is normal.      Breath sounds: Normal breath sounds.   Abdominal:      General: Abdomen is flat. Bowel sounds are normal.   Musculoskeletal:      Cervical back: Normal range of motion and neck supple.      Comments: Surgical site well healed, area of opening and drainage has healed completely.    Neurological:      Mental Status: He is alert.          LABORATORY AND IMAGING STUDIES     Lab Results   Component Value Date    WBC 5.9 12/13/2024    WBC 8.6 12/26/2019     Lab Results   Component Value Date    RBC 5.19 12/13/2024    RBC 4.96 12/26/2019     Lab Results   Component Value Date    HGB 15.2 12/13/2024    HGB 14.2 12/26/2019     Lab Results   Component Value Date    HCT 46.9 12/13/2024    HCT 44.6 12/26/2019     Lab Results   Component Value Date    MCV 90 12/13/2024    MCV 90 12/26/2019     Lab Results   Component Value Date    MCH 29.3 12/13/2024    MCH 28.6 12/26/2019     Lab Results   Component Value Date    MCHC 32.4 12/13/2024    MCHC 31.8 12/26/2019     Lab Results   Component Value Date    RDW 13.6 12/13/2024    RDW 14.3 12/26/2019     Lab Results   Component Value Date     12/13/2024     12/26/2019         Last Comprehensive Metabolic Panel:  Sodium   Date Value Ref Range Status   12/13/2024 137 135 - 145 mmol/L Final   02/11/2021 140 133 - 144 mmol/L Final     Potassium   Date Value Ref Range Status   12/13/2024 4.9 3.4 - 5.3 mmol/L Final   04/24/2022 4.1 3.4 - 5.3 mmol/L Final   02/11/2021 4.4 3.4 - 5.3 mmol/L Final     Potassium POCT   Date Value Ref Range Status   05/22/2023 4.5 3.5 - 5.0 mmol/L Final     Chloride   Date Value Ref Range Status   12/13/2024 103 98 - 107 mmol/L Final   04/24/2022 101 94 - 109 mmol/L Final   02/11/2021 107 94 - 109 mmol/L Final     Carbon Dioxide   Date Value Ref Range  Status   02/11/2021 27 20 - 32 mmol/L Final     Carbon Dioxide (CO2)   Date Value Ref Range Status   12/13/2024 22 22 - 29 mmol/L Final   04/24/2022 29 20 - 32 mmol/L Final     Anion Gap   Date Value Ref Range Status   12/13/2024 12 7 - 15 mmol/L Final   04/24/2022 2 (L) 3 - 14 mmol/L Final   02/11/2021 6 3 - 14 mmol/L Final     Glucose   Date Value Ref Range Status   12/13/2024 110 (H) 70 - 99 mg/dL Final   04/24/2022 143 (H) 70 - 99 mg/dL Final   02/11/2021 135 (H) 70 - 99 mg/dL Final     Comment:     Non Fasting     GLUCOSE BY METER POCT   Date Value Ref Range Status   05/23/2023 119 (H) 70 - 99 mg/dL Final     Urea Nitrogen   Date Value Ref Range Status   12/13/2024 14.2 6.0 - 20.0 mg/dL Final   04/24/2022 11 7 - 30 mg/dL Final   02/11/2021 13 7 - 30 mg/dL Final     Creatinine   Date Value Ref Range Status   12/13/2024 1.00 0.67 - 1.17 mg/dL Final   02/11/2021 0.94 0.66 - 1.25 mg/dL Final     Creatinine POCT   Date Value Ref Range Status   12/13/2024 1.2 0.7 - 1.3 mg/dL Final     GFR Estimate   Date Value Ref Range Status   12/13/2024 89 >60 mL/min/1.73m2 Final     Comment:     eGFR calculated using 2021 CKD-EPI equation.   02/11/2021 >90 >60 mL/min/[1.73_m2] Final     Comment:     Non  GFR Calc  Starting 12/18/2018, serum creatinine based estimated GFR (eGFR) will be   calculated using the Chronic Kidney Disease Epidemiology Collaboration   (CKD-EPI) equation.       GFR, ESTIMATED POCT   Date Value Ref Range Status   12/13/2024 >60 >60 mL/min/1.73m2 Final     Calcium   Date Value Ref Range Status   12/13/2024 8.9 8.8 - 10.4 mg/dL Final     Comment:     Reference intervals for this test were updated on 7/16/2024 to reflect our healthy population more accurately. There may be differences in the flagging of prior results with similar values performed with this method. Those prior results can be interpreted in the context of the updated reference intervals.   02/11/2021 9.1 8.5 - 10.1 mg/dL Final      Bilirubin Total   Date Value Ref Range Status   12/13/2024 0.8 <=1.2 mg/dL Final   02/11/2021 0.7 0.2 - 1.3 mg/dL Final     Alkaline Phosphatase   Date Value Ref Range Status   12/13/2024 41 40 - 150 U/L Final   02/11/2021 43 40 - 150 U/L Final     ALT   Date Value Ref Range Status   12/13/2024 43 0 - 70 U/L Final   02/11/2021 43 0 - 70 U/L Final     AST   Date Value Ref Range Status   12/13/2024 33 0 - 45 U/L Final   02/11/2021 23 0 - 45 U/L Final       1/6/2023 MRI right femur thigh: Large soft tissue mass in the posterior compartment of the right thigh measuring 27 x 7.4 x 12 cm.  The mass lies primarily in the hamstring muscle belly.  Heterogeneous signal with areas of necrosis noted.    2/20/23 MRI R femur                                                                   IMPRESSION: In this patient with high grade sarcoma status  post-neoadjuvant chemotherapy;     Increased in size of the 11 x 15.7 x 21.7 cm heterogeneously enhancing  soft tissue mass with myxoid and lipoid components in the posterior  compartment of the right thigh since MRI from 1/6/2023, previously  measured 7.4 x 12 x 20 cm.  *  Distal portion of the mass anteriorly abuts the sciatic nerve and  deep femoral artery/vein without definite invasion.  *  Increased edema within the adductor rosy when compared to prior  study, possibly neurogenic.      PET scan 3/14/23  IMPRESSION:     1. 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      2. Subcentimeter pulmonary nodule in the left lower lobe, which is too small to accurately characterize by PET/CT and warrants continued imaging surveillance.    4/19/23 MRI femur R                                                                 IMPRESSION: In this patient with high grade sarcoma status  post-neoadjuvant chemotherapy;     Slightly increase in the size of the heterogeneously enhancing soft  tissue mass with  myxoid and lipoid components in the posterior  compartment of the right thigh since MRI from 2/20/2023, measuring 11  x 16.4 x 21 cm, previously measured 11 x 15.7 x 21.7 cm.   *  Distal portion of the mass anteriorly abuts the sciatic nerve and  deep femoral artery/vein without definite invasion.  *  Slightly increased edema within the adductor rosy when compared  to prior study.    PET 5/30/23  IMPRESSION:     1. Sequelae of post treatment inflammatory change in the right posterior thigh soft tissues without convincing evidence of active neoplasm.     2. Slightly increased size of the non-FDG avid nodule in the left lower lobe and development of additional non-FDG avid subcentimeter nodules in the inferior left upper lobe, right middle lobe, and right lower lobe. While their exact etiologies remain   indeterminate, the interval growth/development of additional nodules raises suspicion for early pulmonary metastases.    PET 7/18/23  Impression:  1. 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   a. Extensive regional soft tissue edema and nonmasslike enhancement,  presumably postoperative.   b. Postoperative fluid collection.  2. New periostitis/deep muscle edema along the mid to distal right  femoral shaft, query low grade stress response.    10/31/23 MRI R thigh  IMPRESSION:  1. 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 No masslike  enhancement to suggest recurrent tumor.  2. Extensive soft tissue edema without masslike enhancement throughout  the right thigh including the posterior and adductor muscles, presumed  postoperative.  3. Interval resolution of the previously noted postoperative fluid  collection as well as resolution of the periosteal edema seen along  the medial aspect of the mid femoral shaft.  4. No  marrow signal changes to suggest fracture or marrow  infiltration.    10/31/23 CT CAP  IMPRESSION:  1.  No evidence of metastatic disease within the chest, abdomen, and  pelvis.   2.  Small pulmonary nodules measuring up to 4 mm are stable. No new  nodules are identified. Recommend attention on follow-up.    2/5/24 CT CAP    IMPRESSION: No significant interval change. No evidence for metastatic  malignancy in the chest, abdomen, or pelvis.     2/5/24 MRI  Impression:     1. Stable postoperative changes of right posterior thigh mass  resection without evidence for local disease recurrence or local  regional metastasis in the right thigh.     2. Unchanged intramuscular edema, mild fatty atrophy, and non-mass  enhancement involving the posterior right thigh muscles likely  representing posttreatment change.     CT 4/15/24  IMPRESSION:  1.  No evidence of metastatic disease in the chest, abdomen, or  pelvis.  2.  Scattered small pulmonary nodules unchanged for several years and  likely benign.     MRI Femur thigh 4/15/24  Impression:     Stable postsurgical changes of mass resection from the posterior  compartment of the right thigh without evidence of local recurrent  tumor. Decreased non mass-like enhancing edema throughout the  posterior compartment of the thigh favored to represent resolving  posttreatment change.     MRI 4/15/24  Impression:     Stable postsurgical changes of mass resection from the posterior  compartment of the right thigh without evidence of local recurrent  tumor. Decreased non mass-like enhancing edema throughout the  posterior compartment of the thigh favored to represent resolving  posttreatment change.    CT CAP 8/15/24  IMPRESSION:  1.  Stable exam without evidence for new disease.  2.  Stable pulmonary nodules.    MR Femur 8/15/24  IMPRESSION:  1.  Postoperative changes of pleomorphic sarcoma resection from the right posterior thigh without evidence of residual or locally recurrent tumor.    2.  Slightly decreased non-mass-like edema in the medial and posterior compartment musculature of the right thigh compatible with evolving post-treatment change.    MRI Thigh 12/13/25  Impression:     Stable postoperative changes of right posterior thigh mass resection  without evidence for local disease recurrence or local regional  metastasis in the right thigh.       CT CAP 12/13/24  IMPRESSION:  1.  No convincing evidence of metastatic disease in the chest, abdomen and pelvis.  2.  Stable multiple bilateral pulmonary nodules, not significantly changed as compared to 2/5/2024 exam. No new pulmonary nodule visualized.   3.  Hepatic steatosis.  4.  Cholelithiasis without CT evidence of acute cholecystitis.  5.  Colonic diverticulosis without CT evidence of acute diverticulitis.    MRI Thigh R 12/13/24   Impression:     Stable postoperative changes of right posterior thigh mass resection  without evidence for local disease recurrence or local regional  metastasis in the right thigh.       I have personally reviewed the examination and initial interpretation  and I agree with the findings.        PATHOLOGY     5/22/23  Final Diagnosis   SOFT TISSUE, RIGHT THIGH TUMOR, RESECTION:  -HIGH-GRADE MYXOFIBROSARCOMA with therapy-related changes, FNCLCC grade 2, 22.4 cm in greatest dimension.  -Tumor post-treatment viability: approx. 70%.  -Tumor extends to inked proximal margin and it is less than 1 mm from deep/anterior margin, 1 mm from lateral and 2 mm from medial and superficial/posterior margins.  -AJCC pathologic staging is ypT4.  -See synoptic report.            Final Diagnosis   A.  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   Electronically signed by Vineet Coello MD on 1/17/2023 at 10:17 AM   Comment   UUMAYO   The neoplasm is comprised of highly pleomorphic spindle cells with stromal hyalinization and extensive areas of necrosis.   Focal areas with scattered lipoblasts are seen.  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  MDM-2 gene amplification study by FISH is in progress and result will be provided separately.          ASSESSMENT AND PLAN     Mr. 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     He received C1 on 1/30. Patient did experienced worsening pain shortly after starting chemotherapy. MRI done on 2/20/23 showing increase in size of large posterior thigh sarcoma. The comparison of this MRI is to the baseline done on 1/6/23, and in a patient with high grade sarcoma the mass is presumed to have grown in size in the month before starting chemotherapy. However, given that the patient had increase in pain and there is no areas of necrosis observed in the recent MRI, I will consider this to be progression of disease and recommend to plan for surgery with consideration for preoperative radiation therapy.      He started radiation therapy on 3/15.  I reviewed the PET scan done on 3/14 showing some tumor shrinkage at 14 x 14 x 18 from prior 11 x 15.7 x 21 and improved metabolic activity. We discussed that given some response was seen, if needed in the future this will still be an option. Right now, given prior complications from chemo (admission for neutropenic fever, bleeding in the urine presumed to be 2/2 to ifosfamide, and presumed cardiomyopathy with decrease in ejection fraction on echo), we will hold on further chemo.    Antonio completed preoperative RT and underwent surgical resection on  5/22/23.   I personally reviewed the most recent CT CAP and MRI R thigh on 12/13/24 showing no evidence recurrence or metastatic disease. He has small subcentemeter pulmonary nodules that have remained stable since diagnosis. We reviewed the MRI showing a deep  partial thickness chondral loss in the medial patellar facet. He is only having some mild discomfort when he rides the bike. I advised on weight loss and weight training exercises for the legs to increase the muscle mass and minimize further joint damage.     For this reason we will continue with surveillance scans with MRI thigh and CT CAP every 4 months for the second year after surgery and then move to every 4-6 month until year 5.       Plan:    -CT CAP, Mri R thigh and labs 4/4/25  -Follow up Dr. Price 4/8/25    40 minutes spent on the date of the encounter doing chart review, review of test results, interpretation of tests, patient visit, documentation and discussion with other provider(s)     Michael Laboy MD   of Medicine  Hematology, Oncology and Transplantation      Again, thank you for allowing me to participate in the care of your patient.        Sincerely,        Michael Laboy MD

## 2024-12-27 DIAGNOSIS — I42.8 NICM (NONISCHEMIC CARDIOMYOPATHY) (H): Primary | ICD-10-CM

## 2024-12-27 DIAGNOSIS — I10 ESSENTIAL HYPERTENSION: ICD-10-CM

## 2024-12-30 RX ORDER — LOSARTAN POTASSIUM 25 MG/1
25 TABLET ORAL DAILY
Qty: 90 TABLET | Refills: 1 | Status: SHIPPED | OUTPATIENT
Start: 2024-12-30

## 2025-02-26 SDOH — HEALTH STABILITY: PHYSICAL HEALTH: ON AVERAGE, HOW MANY MINUTES DO YOU ENGAGE IN EXERCISE AT THIS LEVEL?: 10 MIN

## 2025-02-26 SDOH — HEALTH STABILITY: PHYSICAL HEALTH: ON AVERAGE, HOW MANY DAYS PER WEEK DO YOU ENGAGE IN MODERATE TO STRENUOUS EXERCISE (LIKE A BRISK WALK)?: 2 DAYS

## 2025-02-26 ASSESSMENT — SOCIAL DETERMINANTS OF HEALTH (SDOH): HOW OFTEN DO YOU GET TOGETHER WITH FRIENDS OR RELATIVES?: PATIENT DECLINED

## 2025-03-02 ENCOUNTER — HEALTH MAINTENANCE LETTER (OUTPATIENT)
Age: 55
End: 2025-03-02

## 2025-03-02 ASSESSMENT — ANXIETY QUESTIONNAIRES
GAD7 TOTAL SCORE: 0
1. FEELING NERVOUS, ANXIOUS, OR ON EDGE: NOT AT ALL
IF YOU CHECKED OFF ANY PROBLEMS ON THIS QUESTIONNAIRE, HOW DIFFICULT HAVE THESE PROBLEMS MADE IT FOR YOU TO DO YOUR WORK, TAKE CARE OF THINGS AT HOME, OR GET ALONG WITH OTHER PEOPLE: NOT DIFFICULT AT ALL
5. BEING SO RESTLESS THAT IT IS HARD TO SIT STILL: NOT AT ALL
3. WORRYING TOO MUCH ABOUT DIFFERENT THINGS: NOT AT ALL
6. BECOMING EASILY ANNOYED OR IRRITABLE: NOT AT ALL
7. FEELING AFRAID AS IF SOMETHING AWFUL MIGHT HAPPEN: NOT AT ALL
2. NOT BEING ABLE TO STOP OR CONTROL WORRYING: NOT AT ALL

## 2025-03-02 ASSESSMENT — PATIENT HEALTH QUESTIONNAIRE - PHQ9: 5. POOR APPETITE OR OVEREATING: NOT AT ALL

## 2025-03-03 ENCOUNTER — OFFICE VISIT (OUTPATIENT)
Dept: FAMILY MEDICINE | Facility: CLINIC | Age: 55
End: 2025-03-03
Attending: PHYSICIAN ASSISTANT
Payer: COMMERCIAL

## 2025-03-03 VITALS
HEART RATE: 69 BPM | DIASTOLIC BLOOD PRESSURE: 84 MMHG | TEMPERATURE: 98.6 F | SYSTOLIC BLOOD PRESSURE: 130 MMHG | HEIGHT: 71 IN | RESPIRATION RATE: 20 BRPM | BODY MASS INDEX: 44.1 KG/M2 | WEIGHT: 315 LBS | OXYGEN SATURATION: 99 %

## 2025-03-03 DIAGNOSIS — Z00.00 ROUTINE GENERAL MEDICAL EXAMINATION AT A HEALTH CARE FACILITY: Primary | ICD-10-CM

## 2025-03-03 DIAGNOSIS — Z23 NEED FOR SHINGLES VACCINE: ICD-10-CM

## 2025-03-03 DIAGNOSIS — Z13.6 CARDIOVASCULAR SCREENING; LDL GOAL LESS THAN 160: ICD-10-CM

## 2025-03-03 DIAGNOSIS — E11.9 TYPE 2 DIABETES MELLITUS WITHOUT COMPLICATION, WITHOUT LONG-TERM CURRENT USE OF INSULIN (H): ICD-10-CM

## 2025-03-03 LAB
CHOLEST SERPL-MCNC: 130 MG/DL
CREAT UR-MCNC: 154 MG/DL
EST. AVERAGE GLUCOSE BLD GHB EST-MCNC: 131 MG/DL
HBA1C MFR BLD: 6.2 % (ref 0–5.6)
HDLC SERPL-MCNC: 49 MG/DL
HOLD SPECIMEN: NORMAL
LDLC SERPL CALC-MCNC: 65 MG/DL
MICROALBUMIN UR-MCNC: 15 MG/L
MICROALBUMIN/CREAT UR: 9.74 MG/G CR (ref 0–17)
NONHDLC SERPL-MCNC: 81 MG/DL
TRIGL SERPL-MCNC: 78 MG/DL

## 2025-03-03 PROCEDURE — 36415 COLL VENOUS BLD VENIPUNCTURE: CPT

## 2025-03-03 PROCEDURE — 83036 HEMOGLOBIN GLYCOSYLATED A1C: CPT

## 2025-03-03 PROCEDURE — 3079F DIAST BP 80-89 MM HG: CPT

## 2025-03-03 PROCEDURE — 99396 PREV VISIT EST AGE 40-64: CPT | Mod: 25

## 2025-03-03 PROCEDURE — 90750 HZV VACC RECOMBINANT IM: CPT

## 2025-03-03 PROCEDURE — 82570 ASSAY OF URINE CREATININE: CPT

## 2025-03-03 PROCEDURE — 80061 LIPID PANEL: CPT

## 2025-03-03 PROCEDURE — 3075F SYST BP GE 130 - 139MM HG: CPT

## 2025-03-03 PROCEDURE — G2211 COMPLEX E/M VISIT ADD ON: HCPCS

## 2025-03-03 PROCEDURE — 90471 IMMUNIZATION ADMIN: CPT

## 2025-03-03 PROCEDURE — 82043 UR ALBUMIN QUANTITATIVE: CPT

## 2025-03-03 PROCEDURE — 99213 OFFICE O/P EST LOW 20 MIN: CPT | Mod: 25

## 2025-03-03 RX ORDER — METFORMIN HYDROCHLORIDE 500 MG/1
1000 TABLET, EXTENDED RELEASE ORAL
Qty: 180 TABLET | Refills: 1 | Status: SHIPPED | OUTPATIENT
Start: 2025-05-05

## 2025-03-03 ASSESSMENT — ANXIETY QUESTIONNAIRES: GAD7 TOTAL SCORE: 0

## 2025-03-03 ASSESSMENT — PATIENT HEALTH QUESTIONNAIRE - PHQ9: SUM OF ALL RESPONSES TO PHQ QUESTIONS 1-9: 0

## 2025-03-03 NOTE — PROGRESS NOTES
"Preventive Care Visit  [facility]  Nallely Will PA-C, Family Medicine  Mar 3, 2025    Assessment & Plan     (Z00.00) Routine general medical examination at a health care facility  (primary encounter diagnosis)  Stable exam. Routine screening labs. Follow up in 1 year for annual wellness; sooner as needed for acute concerns.  Plan: Lipid panel reflex to direct LDL Fasting,         PRIMARY CARE FOLLOW-UP SCHEDULING          (E11.9) Type 2 diabetes mellitus without complication, without long-term current use of insulin (H)  A1c continues to be well controlled even with reduction in Metformin use to 1000 mg daily. Continue on 1000 mg daily use for now. Urine albumin pending. Will continue to monitor.   Plan: HEMOGLOBIN A1C, Albumin Random Urine         Quantitative with Creat Ratio, Extra Tube,         metFORMIN (GLUCOPHAGE XR) 500 MG 24 hr tablet          (Z13.6) CARDIOVASCULAR SCREENING; LDL GOAL LESS THAN 160  Plan: Lipid panel reflex to direct LDL Fasting          (Z23) Need for shingles vaccine  Plan: ZOSTER RECOMBINANT ADJUVANTED (SHINGRIX)    The longitudinal plan of care for the diagnosis(es)/condition(s) as documented were addressed during this visit. Due to the added complexity in care, I will continue to support Antonio in the subsequent management and with ongoing continuity of care.          Patient has been advised of split billing requirements and indicates understanding: Yes    BMI  Estimated body mass index is 46.86 kg/m  as calculated from the following:    Height as of this encounter: 1.803 m (5' 11\").    Weight as of this encounter: 152.4 kg (336 lb).   Weight management plan: Discussed healthy diet and exercise guidelines    Counseling  Appropriate preventive services were addressed with this patient via screening, questionnaire, or discussion as appropriate for fall prevention, nutrition, physical activity, Tobacco-use cessation, social engagement, weight loss and cognition. "  Checklist reviewing preventive services available has been given to the patient.  Reviewed patient's diet, addressing concerns and/or questions.   He is at risk for lack of exercise and has been provided with information to increase physical activity for the benefit of his well-being.   The patient was instructed to see the dentist every 6 months.     Follow up in 6 months for diabetes follow up; sooner with any acute concerns.    Joyce Alvarez is a 54 year old, presenting for the following:  Physical        3/3/2025     9:05 AM   Additional Questions   Roomed by Oumou LAZARO    Advance Care Planning  Patient does not have a Health Care Directive: Discussed advance care planning with patient; however, patient declined at this time.      2/26/2025   General Health   How would you rate your overall physical health? (!) FAIR   Feel stress (tense, anxious, or unable to sleep) Only a little   (!) STRESS CONCERN        2/26/2025   Nutrition   Three or more servings of calcium each day? (!) NO   Diet: Diabetic    I don't know   How many servings of fruit and vegetables per day? (!) 0-1   How many sweetened beverages each day? 0-1       Multiple values from one day are sorted in reverse-chronological order         2/26/2025   Exercise   Days per week of moderate/strenous exercise 2 days   Average minutes spent exercising at this level 10 min   (!) EXERCISE CONCERN      2/26/2025   Social Factors   Frequency of gathering with friends or relatives Patient declined   Worry food won't last until get money to buy more No   Food not last or not have enough money for food? No   Do you have housing? (Housing is defined as stable permanent housing and does not include staying ouside in a car, in a tent, in an abandoned building, in an overnight shelter, or couch-surfing.) Yes   Are you worried about losing your housing? No   Lack of transportation? No   Unable to get utilities (heat,electricity)? No         2/26/2025    Fall Risk   Fallen 2 or more times in the past year? No   Trouble with walking or balance? No          2025   Dental   Dentist two times every year? (!) NO         2024   TB Screening   Were you born outside of the [place]? No     Today's PHQ-2 Score:       3/2/2025     7:31 PM   PHQ-2 (  [employer])   Q1: Little interest or pleasure in doing things 0   Q2: Feeling down, depressed or hopeless 0   PHQ-2 Score 0    Q1: Little interest or pleasure in doing things Not at all   Q2: Feeling down, depressed or hopeless Not at all   PHQ-2 Score 0       Patient-reported         2025   Substance Use   Alcohol more than 3/day or more than 7/wk No   Do you use any other substances recreationally? No     Social History     Tobacco Use    Smoking status: Former     Current packs/day: 0.00     Average packs/day: 1 pack/day for 25.0 years (25.0 ttl pk-yrs)     Types: Cigarettes     Start date: 1988     Quit date: 2013     Years since quittin.8     Passive exposure: Past    Smokeless tobacco: Never   Vaping Use    Vaping status: Never Used   Substance Use Topics    Alcohol use: Yes     Comment: one drink every 6 months or less    Drug use: No         2025   STI Screening   New sexual partner(s) since last STI/HIV test? No     ASCVD Risk   The 10-year ASCVD risk score (Franklyn BERNADRO, et al., 2019) is: 7.6%    Values used to calculate the score:      Age: 54 years      Sex: Male      Is Non- : No      Diabetic: Yes      Tobacco smoker: No      Systolic Blood Pressure: 130 mmHg      Is BP treated: Yes      HDL Cholesterol: 51 mg/dL      Total Cholesterol: 142 mg/dL    Reviewed and updated as needed this visit by Provider   Tobacco  Allergies  Meds  Problems  Med Hx  Surg Hx  Fam Hx            Past Medical History:   Diagnosis Date    Acrochordon 2021    CARDIOVASCULAR SCREENING; LDL GOAL LESS THAN 160 2012    Colon adenoma     Controlled type 2 diabetes  mellitus without complication, without long-term current use of insulin (H) 09/16/2019    X 1    Cough 02/04/2014    Degeneration of thoracic intervertebral disc 12/11/2013    Dysfunction of both eustachian tubes 04/12/2019    Elevated blood pressure 12/22/2014    Elevated hemoglobin A1c 09/16/2019    X 1    Gastroesophageal reflux disease     Hamstring strain, right, subsequent encounter 12/19/2022    Hepatic cyst 05/22/2018    Hepatic steatosis 12/11/2013    History of colonic polyps 12/11/2013    History of drainage of abscess 09/16/2019    Hypertension     Irritable bowel syndrome without diarrhea 06/01/2018    Low libido 04/12/2019    Lumbar radiculopathy 12/19/2022    Microscopic hematuria 12/19/2013    Nephrolithiasis     Obesity 03/27/2012    BRIAN on CPAP 03/27/2012    Other irritable bowel syndrome 05/22/2018    Pain in thoracic spine 05/09/2013    Pulmonary nodule 05/22/2018    Right-sided chest pain 04/13/2018    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    Sleep apnea     Tinea pedis of both feet 02/11/2021    Tinnitus, unspecified laterality 04/12/2019    Uncontrolled diabetes mellitus with hyperglycemia, without long-term current use of insulin (H) 01/23/2023     Past Surgical History:   Procedure Laterality Date    COLONOSCOPY      COLONOSCOPY N/A 03/12/2021    Procedure: COLONOSCOPY (fv);  Surgeon: Ean Alcantara MD;  Location: [facility] OR    CYSTOSCOPY  02/11/2014    Procedure: CYSTOSCOPY;   Video Cystoscopy with Exam Under Anesthesia;  Surgeon: Chente Moeller MD;  Location: [facility] OR    IR CVC TUNNEL PLACEMENT > 5 YRS OF AGE  1/27/2023    IR CVC TUNNEL REMOVAL RIGHT  3/13/2023    LEG SURGERY Left 2019    Suregy r/t infection    RESECT TUMOR LOWER EXTREMITY Right 5/22/2023    Procedure: EXCISION, NEOPLASM, RIGHT POSTERIOR THIGH;  Surgeon: Zach Salgado MD;  Location: [facility] OR    wisdom teeth       BP Readings from Last 3 Encounters:   03/03/25 130/84   08/20/24 132/82   04/17/24 130/84    Wt Readings from Last 3  Encounters:   03/03/25 (!) 152.4 kg (336 lb)   12/17/24 (!) 158.8 kg (350 lb)   08/20/24 145.6 kg (320 lb 14.4 oz)                  Patient Active Problem List   Diagnosis    CARDIOVASCULAR SCREENING; LDL GOAL LESS THAN 160    BRIAN on CPAP    Degeneration of thoracic intervertebral disc    History of colonic polyps    Hepatic steatosis    Microscopic hematuria    Cough    Plantar fasciitis    Morbid obesity (H)    Right-sided chest pain    Pulmonary nodule    Hepatic cyst    Irritable bowel syndrome without diarrhea    Tinnitus, unspecified laterality    Dysfunction of both eustachian tubes    Low libido    Essential hypertension    Type 2 diabetes mellitus without complication, without long-term current use of insulin (H)    Acrochordon    Tinea pedis of both feet    Personal history of tobacco use    Encounter for immunization    Sarcoma of soft tissue (H)    Undifferentiated pleomorphic sarcoma (H)    Antineoplastic drugs causing adverse effect, subsequent encounter    Encounter for antineoplastic chemotherapy    Iron deficiency anemia secondary to inadequate dietary iron intake    Status post surgery    NICM (nonischemic cardiomyopathy) (H)     Past Surgical History:   Procedure Laterality Date    COLONOSCOPY      COLONOSCOPY N/A 03/12/2021    Procedure: COLONOSCOPY (fv);  Surgeon: Ean Alcantara MD;  Location: [facility]    CYSTOSCOPY  02/11/2014    Procedure: CYSTOSCOPY;   Video Cystoscopy with Exam Under Anesthesia;  Surgeon: Chente Moeller MD;  Location: [facility]    IR CVC TUNNEL PLACEMENT > 5 YRS OF AGE  1/27/2023    IR CVC TUNNEL REMOVAL RIGHT  3/13/2023    LEG SURGERY Left 2019    Suregy r/t infection    RESECT TUMOR LOWER EXTREMITY Right 5/22/2023    Procedure: EXCISION, NEOPLASM, RIGHT POSTERIOR THIGH;  Surgeon: Zach Salgado MD;  Location: [facility] OR    wisdom teeth         Social History     Tobacco Use    Smoking status: Former     Current packs/day: 0.00     Average packs/day: 1 pack/day for  25.0 years (25.0 ttl pk-yrs)     Types: Cigarettes     Start date: 1988     Quit date: 2013     Years since quittin.8     Passive exposure: Past    Smokeless tobacco: Never   Substance Use Topics    Alcohol use: Yes     Comment: one drink every 6 months or less     Family History   Problem Relation Age of Onset    Family History Negative Mother     Obesity Mother     Deep Vein Thrombosis (DVT) Mother     Cancer Father         lymphoma    Other Cancer Father     Other Cancer Paternal Grandfather     Deep Vein Thrombosis (DVT) Paternal Uncle     Anesthesia Reaction No family hx of          Current Outpatient Medications   Medication Sig Dispense Refill    Continuous Blood Gluc Sensor (FREESTYLE MORENITA 3 SENSOR) MISC 1 each every 14 days Use 1 sensor every 14 days. 6 each 5    Continuous Blood Gluc Sensor (FREESTYLE MORENITA 3 SENSOR) MISC USE 1 SENSOR EVERY 14 DAYS 6 each 3    Continuous Glucose Sensor (FREESTYLE MORENITA 3 PLUS SENSOR) MISC 2 each every 14 days. Change every 15 days. 6 each 3    losartan (COZAAR) 25 MG tablet Take 1 tablet (25 mg) by mouth daily. 90 tablet 1    metFORMIN (GLUCOPHAGE XR) 500 MG 24 hr tablet Take 2 tablets (1,000 mg) by mouth daily (with dinner). 180 tablet 1    metoprolol succinate ER (TOPROL XL) 25 MG 24 hr tablet Take 1 tablet (25 mg) by mouth daily. 90 tablet 1    fluticasone (FLONASE) 50 MCG/ACT nasal spray SPRAY 1 SPRAY INTO BOTH NOSTRILS DAILY 16 g 3     Allergies   Allergen Reactions    Emend [Aprepitant] Shortness Of Breath     Couldn't breathe and started to pass out during 1st administration     Kiwi Itching    Lisinopril Cough     Cough that would not stop     Review of Systems  Constitutional, HEENT, cardiovascular, pulmonary, GI, , musculoskeletal, neuro, skin, endocrine and psych systems are negative, except as otherwise noted.       Objective    Exam  /84 (BP Location: Right arm, Patient Position: Sitting, Cuff Size: Adult Large)   Pulse 69   Temp 98.6  " F (37  C) (Oral)   Resp 20   Ht 1.803 m (5' 11\")   Wt (!) 152.4 kg (336 lb)   SpO2 99%   BMI 46.86 kg/m     Estimated body mass index is 46.86 kg/m  as calculated from the following:    Height as of this encounter: 1.803 m (5' 11\").    Weight as of this encounter: 152.4 kg (336 lb).    Physical Exam  GENERAL: alert and no distress  EYES: Eyes grossly normal to inspection, conjunctivae and sclerae normal  HENT: ear canals and TM's normal, nose and mouth without ulcers or lesions  NECK: no adenopathy, no asymmetry, masses, or scars  RESP: lungs clear to auscultation - no rales, rhonchi or wheezes  CV: regular rate and rhythm, normal S1 S2, no S3 or S4, no murmur  MS: no gross musculoskeletal defects noted  SKIN: no suspicious lesions or rashes  NEURO: Normal strength and tone, mentation intact and speech normal  PSYCH: mentation appears normal, affect normal/bright      Signed Electronically by: Nallely Will PA-C    "

## 2025-03-03 NOTE — PATIENT INSTRUCTIONS
Patient Education   Preventive Care Advice   This is general advice given by our system to help you stay healthy. However, your care team may have specific advice just for you. Please talk to your care team about your preventive care needs.  Nutrition  Eat 5 or more servings of fruits and vegetables each day.  Try wheat bread, brown rice and whole grain pasta (instead of white bread, rice, and pasta).  Get enough calcium and vitamin D. Check the label on foods and aim for 100% of the RDA (recommended daily allowance).  Lifestyle  Exercise at least 150 minutes each week  (30 minutes a day, 5 days a week).  Do muscle strengthening activities 2 days a week. These help control your weight and prevent disease.  No smoking.  Wear sunscreen to prevent skin cancer.  Have a dental exam and cleaning every 6 months.  Yearly exams  See your health care team every year to talk about:  Any changes in your health.  Any medicines your care team has prescribed.  Preventive care, family planning, and ways to prevent chronic diseases.  Shots (vaccines)   HPV shots (up to age 26), if you've never had them before.  Hepatitis B shots (up to age 59), if you've never had them before.  COVID-19 shot: Get this shot when it's due.  Flu shot: Get a flu shot every year.  Tetanus shot: Get a tetanus shot every 10 years.  Pneumococcal, hepatitis A, and RSV shots: Ask your care team if you need these based on your risk.  Shingles shot (for age 50 and up)  General health tests  Diabetes screening:  Starting at age 35, Get screened for diabetes at least every 3 years.  If you are younger than age 35, ask your care team if you should be screened for diabetes.  Cholesterol test: At age 39, start having a cholesterol test every 5 years, or more often if advised.  Bone density scan (DEXA): At age 50, ask your care team if you should have this scan for osteoporosis (brittle bones).  Hepatitis C: Get tested at least once in your life.  STIs (sexually  transmitted infections)  Before age 24: Ask your care team if you should be screened for STIs.  After age 24: Get screened for STIs if you're at risk. You are at risk for STIs (including HIV) if:  You are sexually active with more than one person.  You don't use condoms every time.  You or a partner was diagnosed with a sexually transmitted infection.  If you are at risk for HIV, ask about PrEP medicine to prevent HIV.  Get tested for HIV at least once in your life, whether you are at risk for HIV or not.  Cancer screening tests  Cervical cancer screening: If you have a cervix, begin getting regular cervical cancer screening tests starting at age 21.  Breast cancer scan (mammogram): If you've ever had breasts, begin having regular mammograms starting at age 40. This is a scan to check for breast cancer.  Colon cancer screening: It is important to start screening for colon cancer at age 45.  Have a colonoscopy test every 10 years (or more often if you're at risk) Or, ask your provider about stool tests like a FIT test every year or Cologuard test every 3 years.  To learn more about your testing options, visit:   .  For help making a decision, visit:   https://bit.ly/xg23954.  Prostate cancer screening test: If you have a prostate, ask your care team if a prostate cancer screening test (PSA) at age 55 is right for you.  Lung cancer screening: If you are a current or former smoker ages 50 to 80, ask your care team if ongoing lung cancer screenings are right for you.  For informational purposes only. Not to replace the advice of your health care provider. Copyright   2023 [employer]. All rights reserved. Clinically reviewed by the [facility] Transitions Program. CENX [id number] - REV 01/24.

## 2025-03-06 NOTE — H&P (VIEW-ONLY)
Pre-Operative H & P     CC:  Preoperative exam to assess for increased cardiopulmonary risk while undergoing surgery and anesthesia.    Date of Encounter: 5/1/2023  Primary Care Physician:  Mynor Mason     Reason for visit:   Encounter Diagnoses   Name Primary?     Pre-op evaluation Yes     Sarcoma of soft tissue (H)      Anemia, unspecified type      Type 2 diabetes mellitus without complication, without long-term current use of insulin (H)      Dyspnea on exertion      Morbid obesity (H)      Uncontrolled diabetes mellitus with hyperglycemia, without long-term current use of insulin (H)        HPI  Antonio Canseco is a 52 year old male who presents for pre-operative H & P in preparation for  Procedure Information     Date/Time: 5/22/2022, 7:30 AM     Procedure: Excision, neoplasm, right lower extremity    Anesthesia type: General    Pre-op diagnosis: Sarcoma of soft tissue    Location: [facility]    Providers: Zach Salgado MD        Patient is being evaluated for comorbid conditions of  hyperlipdemia, HTN, BRIAN with CPAP, pulmonary nodule, anemia, tinnitus, chronic back pain, poorly controlled type 2 diabetes, obesity, GERD, IBS, hepatic steatosis, nephrolithiasis, and hypophosphatemia.      Patient reports that he first noticed a node in his posterior right thigh in May, 2022. He was evaluated at OSH ED where an xray was obtained and patient was diagosed with sciatica and started on physical therapy. Over time, the lump continued to grow rapidly and he was found to have a large mass on MRI completed in January, 2023. 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 He has been following with oncology and orthopedic surgery and is on chemo and radiation therapy. He is now scheduled for surgery as listed above.      History is obtained from the patient, his family member, and chart review    Hx of abnormal bleeding or anti-platelet  Hi Dr Kidd,    Pt had a colpo done with you on 2/12/25. Pap hx below. Covering provider did not indicate on result of colpo follow up plan. Please advise with follow up plan and I will update pap tracking.    Pap hx:  2/12/25 COLP-Nondiagnostic due to insufficient cellularity.  . ECC- ANNIE 1. Plan pending provider review    Thanks!  Micah Simpson, BSN, RN, PHN  Cervical Cancer Resulting RN  (CCR-RN)     use: None    Past Medical History  Past Medical History:   Diagnosis Date     Acrochordon 02/11/2021     CARDIOVASCULAR SCREENING; LDL GOAL LESS THAN 160 03/27/2012     Colon adenoma      Controlled type 2 diabetes mellitus without complication, without long-term current use of insulin (H) 09/16/2019    X 1     Cough 02/04/2014     Degeneration of thoracic intervertebral disc 12/11/2013     Dysfunction of both eustachian tubes 04/12/2019     Elevated blood pressure 12/22/2014     Elevated hemoglobin A1c 09/16/2019    X 1     Gastroesophageal reflux disease      Hamstring strain, right, subsequent encounter 12/19/2022     Hepatic cyst 05/22/2018     Hepatic steatosis 12/11/2013     History of colonic polyps 12/11/2013     History of drainage of abscess 09/16/2019     Hypertension      Irritable bowel syndrome without diarrhea 06/01/2018     Low libido 04/12/2019     Lumbar radiculopathy 12/19/2022     Microscopic hematuria 12/19/2013     Nephrolithiasis      Obesity 03/27/2012     BRIAN on CPAP 03/27/2012     Other irritable bowel syndrome 05/22/2018     Pain in thoracic spine 05/09/2013     Pulmonary nodule 05/22/2018     Right-sided chest pain 04/13/2018     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     Sleep apnea      Tinea pedis of both feet 02/11/2021     Tinnitus, unspecified laterality 04/12/2019     Uncontrolled diabetes mellitus with hyperglycemia, without long-term current use of insulin (H) 01/23/2023       Past Surgical History  Past Surgical History:   Procedure Laterality Date     COLONOSCOPY       COLONOSCOPY N/A 03/12/2021    Procedure: COLONOSCOPY (fv);  Surgeon: Ean Alcantara MD;  Location: [facility] OR     CYSTOSCOPY  02/11/2014    Procedure: CYSTOSCOPY;   Video Cystoscopy with Exam Under Anesthesia;  Surgeon: Chente Moeller MD;  Location: [facility] OR     IR CVC TUNNEL PLACEMENT > 5 YRS OF AGE  1/27/2023     IR CVC TUNNEL REMOVAL RIGHT  3/13/2023     LEG SURGERY Left 2019    Suregy r/t infection     wisdom teeth          Prior to Admission Medications  Current Outpatient Medications   Medication Sig Dispense Refill     DULoxetine (CYMBALTA) 60 MG capsule TAKE 1 CAPSULE (60 MG) BY MOUTH DAILY 90 capsule 1     ferrous sulfate (FEROSUL) 325 (65 Fe) MG tablet Take 1 tablet (325 mg) by mouth daily (with breakfast) 30 tablet 1     metFORMIN (GLUCOPHAGE XR) 500 MG 24 hr tablet Take 3 tablets (1,500 mg) by mouth daily (with dinner) 270 tablet 1     morphine (MS CONTIN) 15 MG CR tablet Take 1 tablet (15 mg) by mouth daily for 60 days Take at midday; in addition to 30mg in AM and PM 60 tablet 0     morphine (MS CONTIN) 30 MG CR tablet Take 1 tablet (30 mg) by mouth every 12 hours 60 tablet 0     omeprazole 20 MG tablet Take 20 mg by mouth daily as needed       pregabalin (LYRICA) 25 MG capsule Take 1 capsule (25 mg) by mouth 3 times daily 90 capsule 1     sennosides (SENOKOT) 8.6 MG tablet Take 1 tablet by mouth daily as needed for constipation       sucralfate (CARAFATE) 1 GM/10ML suspension Take 10 mLs (1 g) by mouth 4 times daily as needed for nausea (or indigestion) 414 mL 0     triamcinolone (KENALOG) 0.1 % external ointment Apply topically 2 times daily 454 g 1     valACYclovir (VALTREX) 1000 mg tablet Take 1 tablet (1,000 mg) by mouth 3 times daily for 10 days 30 tablet 0     ACCU-CHEK GUIDE test strip Use to test blood sugar 3 times daily. 100 strip 4     clindamycin (CLINDAMAX) 1 % external gel Apply topically 2 times daily , to HS lesion 30 g 0     Continuous Blood Gluc Sensor (FREESTYLE MORENITA 3 SENSOR) [facility] 1 each every 14 days Use 1 sensor every 14 days. Use to read blood sugars per 's instructions. 2 each 5     folic acid (FOLVITE) 400 MCG tablet Take 1 tablet (400 mcg) by mouth daily (Patient not taking: Reported on 4/28/2023) 30 tablet 1     heparin lock flush 10 UNIT/ML SOLN injection 3 mLs by Intracatheter route every 24 hours Flush each lumen with 3 mLs (total 6mL in 24 hours period) (Patient not taking:  Reported on 4/26/2023) 180 mL 0     HYDROmorphone (DILAUDID) 2 MG tablet Take 2 tablets (4 mg) by mouth every 6 hours as needed for pain (Patient not taking: Reported on 4/28/2023) 30 tablet 0     morphine (MS CONTIN) 30 MG CR tablet Take 1 tablet (30 mg) by mouth 3 times daily (Patient not taking: Reported on 4/28/2023) 90 tablet 0     naloxone (NARCAN) 4 MG/0.1ML nasal spray Spray 1 spray (4 mg) into one nostril alternating nostrils as needed for opioid reversal every 2-3 minutes until assistance arrives 2 each 1     phosphorus tablet 250 mg 250 MG per tablet Take 2 tablets (500 mg) by mouth 3 times daily Take until directed otherwise (Patient not taking: Reported on 4/28/2023) 84 tablet 1       Allergies  Allergies   Allergen Reactions     Emend [Aprepitant] Shortness Of Breath     Couldn't breathe and started to pass out during 1st administration      Kiwi Itching       Social History  Social History     Socioeconomic History     Marital status:      Spouse name: Yari     Number of children: 2     Years of education: Not on file     Highest education level: Some college, no degree   Occupational History     Occupation: [employer]   Tobacco Use     Smoking status: Former     Packs/day: 1.00     Years: 25.00     Pack years: 25.00     Types: Cigarettes     Start date: 4/23/1988     Quit date: 4/23/2013     Years since quitting: 10.0     Smokeless tobacco: Never   Vaping Use     Vaping status: Never Used   Substance and Sexual Activity     Alcohol use: Not Currently     Comment: one drink every 6 months     Drug use: No     Sexual activity: Yes     Partners: Female   Other Topics Concern     Parent/sibling w/ CABG, MI or angioplasty before 65F 55M? Not Asked   Social History Narrative     Not on file     Social Determinants of Health     Financial Resource Strain: Low Risk  (2/17/2023)    Overall Financial Resource Strain (CARDIA)      Difficulty of Paying Living Expenses: Not very hard   Food Insecurity: No  Food Insecurity (2/17/2023)    Hunger Vital Sign      Worried About Running Out of Food in the Last Year: Never true      Ran Out of Food in the Last Year: Never true   Transportation Needs: No Transportation Needs (2/17/2023)    PRAPARE - Transportation      Lack of Transportation (Medical): No      Lack of Transportation (Non-Medical): No   Physical Activity: Not on file   Stress: No Stress Concern Present (3/7/2021)    [employer] - Occupational Stress Questionnaire      Feeling of Stress : Not at all   Social Connections: Socially Isolated (3/7/2021)    Social Connection and Isolation Panel [NHANES]      Frequency of Communication with Friends and Family: Never      Frequency of Social Gatherings with Friends and Family: Never      Attends Jewish Services: Never      Active Member of Clubs or Organizations: No      Attends Club or Organization Meetings: Never      Marital Status:    Intimate Partner Violence: Not on file   Housing Stability: Low Risk  (2/17/2023)    Housing Stability Vital Sign      Unable to Pay for Housing in the Last Year: No      Number of Places Lived in the Last Year: 1      Unstable Housing in the Last Year: No       Family History  Family History   Problem Relation Age of Onset     Family History Negative Mother      Obesity Mother      Deep Vein Thrombosis (DVT) Mother      Cancer Father         lymphoma     Other Cancer Father      Other Cancer Paternal Grandfather      Deep Vein Thrombosis (DVT) Paternal Uncle      Anesthesia Reaction No family hx of        Review of Systems  The complete review of systems is negative other than noted in the HPI or here.   Anesthesia Evaluation   Pt has had prior anesthetic. Type: General.    No history of anesthetic complications       ROS/MED HX  ENT/Pulmonary: Comment: Pulmonary nodule    (+) sleep apnea, uses CPAP, tobacco use, Past use, COPD (family reported, patient denies),  (-) recent URI   Neurologic:  Comment: Tinnitus bilateral    (+) no peripheral neuropathy  (-) no seizures and no CVA   Cardiovascular:     (+) Dyslipidemia hypertension-----REGAN. Previous cardiac testing   Echo: Date: 1/26/2023 Results:  Interpretation Summary   Left ventricular systolic function is normal.   The visual ejection fraction is estimated at 55%.   (The upper limit of normal is 5.6cm for left ventricular size in end-diastole.)   No hemodynamically significant valvular abnormalities on 2D or color flow imaging. Technically difficult, suboptimal study.  Stress Test: Date: 7/2/2014 Results:  Interpretation Summary   1.  Mildly reduced functional capacity.    2.  Negative stress EKG for ischemia.    3.  Negative stress echocardiogram for ischemia.      4.  Sensitivity of study is reduced as patient imaged below 85% MPHR.  ECG Reviewed:  Date: 2/11/2023 Results:  Sinus tachycardia    Otherwise normal ECG    When compared with ECG of 07-FEB-2023 15:21,    Nonspecific T wave abnormality, improved in Lateral leads    QT has lengthened ()  Cath:  Date: Results:      METS/Exercise Tolerance: 3 - Able to walk 1-2 blocks without stopping    Hematologic:     (+) anemia, history of blood transfusion, no previous transfusion reaction, Known PRBC Anitbodies:No     Musculoskeletal: Comment: Back pain      GI/Hepatic: Comment: IBS    (+) GERD (intermittent), liver disease (Hepatic steatosis),     Renal/Genitourinary:     (+) Nephrolithiasis ,     Endo: Comment: Hypophosphatemia    (+) type II DM, Last HgA1c: 11.7, date: 1/18/2023, Not using insulin, - not using insulin pump. not previously admitted for DM/DKA. Obesity,     Psychiatric/Substance Use:  - neg psychiatric ROS     Infectious Disease:  - neg infectious disease ROS  (-) Recent Fever   Malignancy:   (+) Malignancy, History of Other.Other CA Sarcoma Active status post Chemo.    Other:  - neg other ROS          /77 (BP Location: Right arm, Patient Position: Sitting, Cuff Size: Adult  "Large)   Pulse 93   Temp 98  F (36.7  C) (Oral)   Resp 16   Ht 1.803 m (5' 11\")   Wt 120.4 kg (265 lb 8 oz)   SpO2 99%   BMI 37.03 kg/m      Physical Exam   Constitutional: Pleasant male, no apparent distress, and appears older than stated age.  Eyes: Pupils equal, round and reactive to light, extra ocular muscles intact, sclera clear, conjunctiva normal.  HENT: Normocephalic and a traumatic, oral pharynx with moist mucus membranes, good dentition. No goiter appreciated.   Respiratory: Clear to auscultation bilaterally, no crackles or wheezing.  Cardiovascular: Regular rate and rhythm, normal S1 and S2, and no murmur noted.  Carotids +2, no bruits. No edema. Palpable pulses to radial  DP and PT arteries.   GI: Morbidly obese. Normal bowel sounds, soft, non-distended, non-tender, no masses palpated, no hepatosplenomegaly.    Lymph/Hematologic: No cervical lymphadenopathy and no supraclavicular lymphadenopathy.  Genitourinary:  Deferred  Skin: Warm and dry.  No rashes on exposed skin.  Musculoskeletal: Full ROM of neck. There is no redness, warmth, or swelling of visible joints. Gross motor strength is normal.    Neurologic: Awake, alert, oriented to name, place and time. Cranial nerves II-XII are grossly intact. Gait is normal.   Neuropsychiatric: Calm, cooperative. Normal affect.     Prior Labs/Diagnostic Studies   All labs and imaging personally reviewed      Latest Reference Range & Units 03/24/23 09:46   Sodium 136 - 145 mmol/L 140   Potassium 3.4 - 5.3 mmol/L 4.2   Chloride 98 - 107 mmol/L 102   Carbon Dioxide (CO2) 22 - 29 mmol/L 26   Urea Nitrogen 6.0 - 20.0 mg/dL 13.2   Creatinine 0.67 - 1.17 mg/dL 0.82   GFR Estimate >60 mL/min/1.73m2 >90   Calcium 8.6 - 10.0 mg/dL 9.3   Anion Gap 7 - 15 mmol/L 12   Magnesium 1.7 - 2.3 mg/dL 2.2   Phosphorus 2.5 - 4.5 mg/dL 4.3   Albumin 3.5 - 5.2 g/dL 3.4 (L)   Protein Total 6.4 - 8.3 g/dL 8.0   Alkaline Phosphatase 40 - 129 U/L 48   ALT 10 - 50 U/L 5 (L)   AST 10 - " 50 U/L 17   Bilirubin Total <=1.2 mg/dL 0.6   Glucose 70 - 99 mg/dL 150 (H)   WBC 4.0 - 11.0 10e3/uL 8.0   Hemoglobin 13.3 - 17.7 g/dL 9.1 (L)   Hematocrit 40.0 - 53.0 % 30.8 (L)   Platelet Count 150 - 450 10e3/uL 380   RBC Count 4.40 - 5.90 10e6/uL 3.74 (L)   MCV 78 - 100 fL 82   MCH 26.5 - 33.0 pg 24.3 (L)   MCHC 31.5 - 36.5 g/dL 29.5 (L)   RDW 10.0 - 15.0 % 20.3 (H)   (L): Data is abnormally low  (H): Data is abnormally high    EKG/ stress test - if available please see in ROS above   Echo result w/o MOPS: Interpretation Summary Left ventricular systolic function is normal.The visual ejection fraction is estimated at 55%.(The upper limit of normal is 5.6cm for left ventricular size in end-diastole.)No hemodynamically significant valvular abnormalities on 2D or color flowimaging. Technically difficult, suboptimal study.    MRI Femur 4/19/2023   IMPRESSION: In this patient with high grade sarcoma status post-neoadjuvant chemotherapy;   Slightly increase in the size of the heterogeneously enhancing soft tissue mass with myxoid and lipoid components in the posterior compartment of the right thigh since MRI from 2/20/2023, measuring 11 x 16.4 x 21 cm, previously measured 11 x 15.7 x 21.7 cm.    *  Distal portion of the mass anteriorly abuts the sciatic nerve and deep femoral artery/vein without definite invasion.   *  Slightly increased edema within the adductor rosy when compared to prior study.     The patient's records and results personally reviewed by this provider.     Outside records reviewed from: Care Everywhere    LAB/DIAGNOSTIC STUDIES TODAY:    CBC w/ iron studies, A1c, T&S    Assessment  Antonio Canseco is a 52 year old male seen as a PAC referral for risk assessment and optimization for anesthesia.    Plan/Recommendations  Pt will be optimized for the proposed procedure.  See below for details on the assessment, risk, and preoperative recommendations    NEUROLOGY  - No history of TIA, CVA or seizure  -  Bilateral tinnitus     - Chronic Pain  - OUTPATIENT MEDICATIONS (related to pain management):  -- Long-acting opioid: ms contin 30mg by mouth three times daily by the time of the procedure  -- Short-acting opioid: HYDROmorphone 4mg by mouth every 6 hour   -- Intrathecal pump: None     Average daily oral morphine equivalent (OME): 90 mg of OME/day    Please access the PAC pharmacist's note for full details.    -Post Op delirium risk factors:  No risk identified    ENT  - No current airway concerns.  Will need to be reassessed day of surgery.  Mallampati: I  TM: > 3    CARDIAC  - Hypertension  Well controlled without medication. Patient had been on lisinopril, but this was discontinued due to cough.   - Hyperlipidemia  Well controlled on home regimen  - REGAN: patient reports that his activity has been limited due to pain and dyspnea on exertion. He is unable to walk more than a couple blocks without stopping and is unable to walk up a couple flights of stairs. Last stress test completed in 2014. Due to new symptoms and upcoming intermediate risk surgery, recommend patient complete Lexiscan prior to surgery. Will help patient arrange.   - METS (Metabolic Equivalents)  Patient CANNOT perform 4 METS exercise without symptoms            Total Score: 1    Functional Capacity: Unable to complete 4 METS      RCRI-Very low risk: Class 1 0.4% complication rate            Total Score: 0        PULMONARY  - Obstructive Sleep Apnea  BRIAN with home CPAP.  Patient will be instructed to bring their home CPAP device to the hospital with them.    - Denies asthma or inhaler use   - Patient's family member reports that he has emphysema. Chest CT completed 2/11/23 without evidence of this. Lungs CTA on exam and SpO2 99% on room air today.   - Tobacco History    History   Smoking Status     Former     Packs/day: 1.00     Years: 25.00     Types: Cigarettes     Start date: 4/23/1988     Quit date: 4/23/2013   Smokeless Tobacco     Never  "      GI  - GERD  Intermittent. Controlled with PRN omeprazole.  PONV Medium Risk  Total Score: 2           1 AN PONV: Patient is not a current smoker    1 AN PONV: Intended Post Op Opioids      IBS on cymbalta, patient denies concerns today.  Hepatic steatosis    /RENAL  - Baseline Creatinine  0.82  - History of nephrolithiasis    ENDOCRINE    - BMI: Estimated body mass index is 37.03 kg/m  as calculated from the following:    Height as of this encounter: 1.803 m (5' 11\").    Weight as of this encounter: 120.4 kg (265 lb 8 oz).  Class 3 Obesity (BMI > 40)  - Diabetes  Hemoglobin A1C (%)   Date Value   05/01/2023 5.7 (H)   02/11/2021 6.6 (H)     Diabetes Mellitus, Type 2, non-insulin dependent. Last A1c 11.7. Patient reports improved BG control with recent average between 120-150. Will recheck A1c today.  Hold morning oral hypoglycemic medications. Recommend close monitoring of the patient's blood glucose levels throughout the perioperative period.  - Hypophosphatemia, repeat phosphorus today    HEME  VTE Medium Risk 1.8%            Total Score: 7    VTE: Male    VTE: Current cancer      - No history of abnormal bleeding or antiplatelet use.  - Chronic anemia  Recommend perioperative use of blood conservation techniques intraoperatively and close monitoring for postoperative bleeding.  A type and screen has been ordered for this patient  Iron studies have been ordered and iron infusions were discussed with the patient. Infusions may be ordered preoperatively based on ferritin value  *ADDENDUM: Patient did not qualify for iron infusions. Message sent to surgical team, they anticipate that patient will need blood. T&S completed day of PAC visit as above.  Sadaf Villeda PA-C on 5/9/2023 at 7:20 AM       MSK  - Low back pain    OTHER  - Sarcoma RLE: surgery as scheduled above    Different anesthesia methods/types have been discussed with the patient, but they are aware that the final plan will be decided by the " "assigned anesthesia provider on the date of service.  Patient was discussed with Dr Cam DURAND with information on surgery time/arrival time, meds and NPO status given by nursing staff. No further diagnostic testing indicated.      On the day of service:     Prep time: 20 minutes  Visit time: 20 minutes  Documentation time: 20 minutes  ------------------------------------------  Total time: 60 minutes      Sadaf Villeda PA-C  Preoperative Assessment Center  [facility]  Phone: [phone]  Fax: [phone]      ADDENDUM: A1c yesterday improved at 5.7 and phosphorus within normal limits.  Lexiscan has been scheduled for 5/12, will follow results.  Sadaf Villeda PA-C on 5/1/2023 at 7:15 AM    ADDENDUM: Lexiscan completed 5/12 and notable for reduced EF 36-39%, discussed with Dr. Varma - this new finding is concerning for possible cardiomyopathy. Patient is not currently on ACE or ARB. Will place urgent cardiology referral and update surgical team.    Sadaf Villeda PA-C on 5/15/2023 at 8:34 AM    ADDENDUM: Patient completed recommended cardiology follow-up on 5/17 with Dr. Cee,  recommendations as below.     \"Cardiac MRI. Further recommendation will depend upon that.  If LV systolic function is mild to moderately reduced overall he will be intermediate risk for upcoming excision surgery.  He will need to be started on cardiomyopathy medication if cardiomyopathy is confirmed on cardiac MRI but this is best accomplished after the surgery as he will need to be started on beta-blocker ACE inhibitor with some gradual up titration.  I recommend follow-up with an [facility] in a month.\"     Sadaf Sierra PA-C on 5/18/2023 at 3:08 PM        " Normal for race

## 2025-03-12 ENCOUNTER — HOSPITAL ENCOUNTER (OUTPATIENT)
Dept: CARDIOLOGY | Facility: CLINIC | Age: 55
Discharge: HOME | End: 2025-03-12
Attending: NURSE PRACTITIONER
Payer: COMMERCIAL

## 2025-03-12 DIAGNOSIS — I42.8 NICM (NONISCHEMIC CARDIOMYOPATHY) (H): ICD-10-CM

## 2025-03-12 DIAGNOSIS — I10 ESSENTIAL HYPERTENSION: ICD-10-CM

## 2025-03-12 LAB — LVEF ECHO: NORMAL

## 2025-03-12 PROCEDURE — 93306 TTE W/DOPPLER COMPLETE: CPT

## 2025-03-12 PROCEDURE — 93306 TTE W/DOPPLER COMPLETE: CPT | Mod: 26 | Performed by: INTERNAL MEDICINE

## 2025-03-13 ENCOUNTER — OFFICE VISIT (OUTPATIENT)
Dept: CARDIOLOGY | Facility: CLINIC | Age: 55
End: 2025-03-13
Attending: NURSE PRACTITIONER
Payer: COMMERCIAL

## 2025-03-13 VITALS
SYSTOLIC BLOOD PRESSURE: 126 MMHG | OXYGEN SATURATION: 97 % | HEART RATE: 83 BPM | DIASTOLIC BLOOD PRESSURE: 80 MMHG | HEIGHT: 71 IN | WEIGHT: 315 LBS | BODY MASS INDEX: 44.1 KG/M2

## 2025-03-13 DIAGNOSIS — E11.9 TYPE 2 DIABETES MELLITUS WITHOUT COMPLICATION, WITHOUT LONG-TERM CURRENT USE OF INSULIN (H): ICD-10-CM

## 2025-03-13 DIAGNOSIS — E66.01 MORBID OBESITY (H): ICD-10-CM

## 2025-03-13 DIAGNOSIS — G47.33 OSA ON CPAP: ICD-10-CM

## 2025-03-13 DIAGNOSIS — I10 ESSENTIAL HYPERTENSION: ICD-10-CM

## 2025-03-13 DIAGNOSIS — I42.8 NICM (NONISCHEMIC CARDIOMYOPATHY) (H): Primary | ICD-10-CM

## 2025-03-13 RX ORDER — LOSARTAN POTASSIUM 25 MG/1
25 TABLET ORAL DAILY
Qty: 90 TABLET | Refills: 3 | Status: SHIPPED | OUTPATIENT
Start: 2025-03-13

## 2025-03-13 RX ORDER — METOPROLOL SUCCINATE 25 MG/1
25 TABLET, EXTENDED RELEASE ORAL DAILY
Qty: 90 TABLET | Refills: 3 | Status: SHIPPED | OUTPATIENT
Start: 2025-03-13

## 2025-03-13 NOTE — PROGRESS NOTES
"CARDIOLOGY CLINIC NOTE    PRIMARY CARDIOLOGIST  Dr. 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    In January 2023, he underwent a preoperative workup for a seroma excision.  Echocardiogram was normal.  Nuclear stress test was negative for inducible myocardial ischemia or infarction.  There was a small area of a mild degree of infarction in the apical segment of the LV with no reversible perfusion defect to suggest ischemia.  EF was noted to be moderately reduced at 36%.    Cardiac MRI showed a moderately reduced LV function with an EF of 40% and no evidence of MI, fibrosis or infiltrative disease.    He was initiated on GDMT including losartan and Toprol followed by up titration.    Echocardiogram on 8/31/2023 showed a EF of 45 to 50%, biplane 53%, mild global hypokinesis of the LV, normal RV size and function and no valvular disease.    Echocardiogram on 1/12/2025 showed a mildly reduced LV function estimated at 45 to 50%, mild global hypokinesis of the LV, normal RV size and function and trace mitral and tricuspid regurgitation.    He returns to the office today for review of results and overdue follow-up.  He does not endorse any cardiac symptoms including chest pain, shortness of breath, palpitations, PND, orthopnea, presyncope, syncope or lower extremity edema.  He is significantly up and weight over the last 18 months, approximately 50 pounds.  Weight 337 pounds.  He attributes this to lack of activity and \" laziness\".  He does not engage in any routine exercise.  He works with computers at a desk job.    Blood pressure is well-controlled 126/80, managed on losartan and metoprolol.  Labs were reviewed.  " Last BMP and CBC were unremarkable.  A1c 6.2  Lipid panel showed total cholesterol 130, HDL 49, LDL 65 and triglycerides 78    Faithful with CPAP  He does not smoke or use alcohol  Compliant with all medications.    PAST MEDICAL HISTORY:  Past Medical History:   Diagnosis Date    Acrochordon 02/11/2021    CARDIOVASCULAR SCREENING; LDL GOAL LESS THAN 160 03/27/2012    Colon adenoma     Controlled type 2 diabetes mellitus without complication, without long-term current use of insulin (H) 09/16/2019    X 1    Cough 02/04/2014    Degeneration of thoracic intervertebral disc 12/11/2013    Dysfunction of both eustachian tubes 04/12/2019    Elevated blood pressure 12/22/2014    Elevated hemoglobin A1c 09/16/2019    X 1    Gastroesophageal reflux disease     Hamstring strain, right, subsequent encounter 12/19/2022    Hepatic cyst 05/22/2018    Hepatic steatosis 12/11/2013    History of colonic polyps 12/11/2013    History of drainage of abscess 09/16/2019    Hypertension     Irritable bowel syndrome without diarrhea 06/01/2018    Low libido 04/12/2019    Lumbar radiculopathy 12/19/2022    Microscopic hematuria 12/19/2013    Nephrolithiasis     Obesity 03/27/2012    BRIAN on CPAP 03/27/2012    Other irritable bowel syndrome 05/22/2018    Pain in thoracic spine 05/09/2013    Pulmonary nodule 05/22/2018    Right-sided chest pain 04/13/2018    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    Sleep apnea     Tinea pedis of both feet 02/11/2021    Tinnitus, unspecified laterality 04/12/2019    Uncontrolled diabetes mellitus with hyperglycemia, without long-term current use of insulin (H) 01/23/2023       MEDICATIONS:  Current Outpatient Medications   Medication Sig Dispense Refill    Continuous Blood Gluc Sensor (FREESTYLE MORENITA 3 SENSOR) [facility] 1 each every 14 days Use 1 sensor every 14 days. 6 each 5    Continuous Blood Gluc Sensor (FREESTYLE MORENITA 3 SENSOR) MISC USE 1 SENSOR EVERY 14 DAYS 6 each 3    Continuous Glucose Sensor (FREESTYLE MORENITA 3 PLUS  SENSOR) MISC 2 each every 14 days. Change every 15 days. 6 each 3    losartan (COZAAR) 25 MG tablet Take 1 tablet (25 mg) by mouth daily. 90 tablet 3    [START ON 5/5/2025] metFORMIN (GLUCOPHAGE XR) 500 MG 24 hr tablet Take 2 tablets (1,000 mg) by mouth daily (with dinner). 180 tablet 1    metoprolol succinate ER (TOPROL XL) 25 MG 24 hr tablet Take 1 tablet (25 mg) by mouth daily. 90 tablet 3     No current facility-administered medications for this visit.       SOCIAL HISTORY:  I have reviewed this patient's social history and updated it with pertinent information if needed. Antonio Canseco  reports that he quit smoking about 11 years ago. His smoking use included cigarettes. He started smoking about 36 years ago. He has a 25 pack-year smoking history. He has been exposed to tobacco smoke. He has never used smokeless tobacco. He reports current alcohol use. He reports that he does not use drugs.    PHYSICAL EXAM:  Pulse:  [83] 83  BP: (126)/(80) 126/80  SpO2:  [97 %] 97 %  337 lbs 11.2 oz    Constitutional: alert, no distress  Respiratory: Good bilateral air entry  Cardiovascular: Regular rate and rhythm  GI: nondistended  Neuropsychiatric: appropriate affact    ASSESSMENT/PLAN:  Pertinent issues addressed/ reviewed during this cardiology visit  Nonischemic cardiomyopathy - Echocardiogram on 1/12/2025 showed a mildly reduced LV function estimated at 45 to 50%, mild global hypokinesis of the LV, normal RV size and function and trace mitral and tricuspid regurgitation.  Continue losartan and metoprolol.  Hypertension -well-controlled  Type 2 diabetes -A1c 6.2, on metformin  Morbid obesity -up approximately 50 pounds over the last 18 months, likely caloric and lack of activity.  He does not appear to be fluid overloaded.  Educated on need for weight loss, exercise, smaller portion sizes and strict heart healthy diet.  Obstructive sleep apnea -on CPAP    Follow-up in 1 year with Dr. Cee     It was a pleasure seeing  this patient in clinic today. Please do not hesitate to contact me with any future questions.     JEFFREY Webster, CNP  Cardiology - [facility]  03/13/2025       The level of medical decision making during this visit was of moderate complexity.    This note was completed in part using dictation via the Dragon voice recognition software. Some word and grammatical errors may occur and must be interpreted in the appropriate clinical context.  If there are any questions pertaining to this issue, please contact me for further clarification.

## 2025-03-13 NOTE — PATIENT INSTRUCTIONS
Thanks for participating in a office visit with the [facility] clinic today.    Stable on a cardiac standpoint   Reviewed echocardiogram showing heart function is mildly reduced at 45-50%.   Continue current medical therapy.   You are up ~ 50 lbs over the last 18 months. Encourage aggressive exercise and weight loss.   Heart healthy diet.     Follow up in 12 months with Dr. Cee     Please call my nurse at   [phone]. Call with any questions or concerns.    Scheduling phone number: [phone]  Reminder: Please bring in all current medications, over the counter supplements and vitamin bottles to your next appointment.

## 2025-03-13 NOTE — LETTER
3/13/2025    Nallely Will PA-C  [address] [facility] [id number]    RE: Antonio L Ajith       Dear Colleague,     I had the pleasure of seeing Antonio Canseco in the [facility].  CARDIOLOGY CLINIC NOTE    PRIMARY CARDIOLOGIST  Dr. 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    In January 2023, he underwent a preoperative workup for a seroma excision.  Echocardiogram was normal.  Nuclear stress test was negative for inducible myocardial ischemia or infarction.  There was a small area of a mild degree of infarction in the apical segment of the LV with no reversible perfusion defect to suggest ischemia.  EF was noted to be moderately reduced at 36%.    Cardiac MRI showed a moderately reduced LV function with an EF of 40% and no evidence of MI, fibrosis or infiltrative disease.    He was initiated on GDMT including losartan and Toprol followed by up titration.    Echocardiogram on 8/31/2023 showed a EF of 45 to 50%, biplane 53%, mild global hypokinesis of the LV, normal RV size and function and no valvular disease.    Echocardiogram on 1/12/2025 showed a mildly reduced LV function estimated at 45 to 50%, mild global hypokinesis of the LV, normal RV size and function and trace mitral and tricuspid regurgitation.    He returns to the office today for review of results and overdue follow-up.  He does not endorse any cardiac symptoms including chest pain, shortness of breath, palpitations, PND, orthopnea, presyncope, syncope or lower extremity edema.  He is significantly up and weight over the last 18 months, approximately 50 pounds.  Weight 337 pounds.  He attributes this to lack of activity  "and \" laziness\".  He does not engage in any routine exercise.  He works with computers at a desk job.    Blood pressure is well-controlled 126/80, managed on losartan and metoprolol.  Labs were reviewed.  Last BMP and CBC were unremarkable.  A1c 6.2  Lipid panel showed total cholesterol 130, HDL 49, LDL 65 and triglycerides 78    Faithful with CPAP  He does not smoke or use alcohol  Compliant with all medications.    PAST MEDICAL HISTORY:  Past Medical History:   Diagnosis Date     Acrochordon 02/11/2021     CARDIOVASCULAR SCREENING; LDL GOAL LESS THAN 160 03/27/2012     Colon adenoma      Controlled type 2 diabetes mellitus without complication, without long-term current use of insulin (H) 09/16/2019    X 1     Cough 02/04/2014     Degeneration of thoracic intervertebral disc 12/11/2013     Dysfunction of both eustachian tubes 04/12/2019     Elevated blood pressure 12/22/2014     Elevated hemoglobin A1c 09/16/2019    X 1     Gastroesophageal reflux disease      Hamstring strain, right, subsequent encounter 12/19/2022     Hepatic cyst 05/22/2018     Hepatic steatosis 12/11/2013     History of colonic polyps 12/11/2013     History of drainage of abscess 09/16/2019     Hypertension      Irritable bowel syndrome without diarrhea 06/01/2018     Low libido 04/12/2019     Lumbar radiculopathy 12/19/2022     Microscopic hematuria 12/19/2013     Nephrolithiasis      Obesity 03/27/2012     BRIAN on CPAP 03/27/2012     Other irritable bowel syndrome 05/22/2018     Pain in thoracic spine 05/09/2013     Pulmonary nodule 05/22/2018     Right-sided chest pain 04/13/2018     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     Sleep apnea      Tinea pedis of both feet 02/11/2021     Tinnitus, unspecified laterality 04/12/2019     Uncontrolled diabetes mellitus with hyperglycemia, without long-term current use of insulin (H) 01/23/2023       MEDICATIONS:  Current Outpatient Medications   Medication Sig Dispense Refill     Continuous Blood Gluc Sensor (FREESTYLE " MORENITA 3 SENSOR) MISC 1 each every 14 days Use 1 sensor every 14 days. 6 each 5     Continuous Blood Gluc Sensor (FREESTYLE MORENITA 3 SENSOR) MISC USE 1 SENSOR EVERY 14 DAYS 6 each 3     Continuous Glucose Sensor (FREESTYLE MORENITA 3 PLUS SENSOR) MISC 2 each every 14 days. Change every 15 days. 6 each 3     losartan (COZAAR) 25 MG tablet Take 1 tablet (25 mg) by mouth daily. 90 tablet 3     [START ON 5/5/2025] metFORMIN (GLUCOPHAGE XR) 500 MG 24 hr tablet Take 2 tablets (1,000 mg) by mouth daily (with dinner). 180 tablet 1     metoprolol succinate ER (TOPROL XL) 25 MG 24 hr tablet Take 1 tablet (25 mg) by mouth daily. 90 tablet 3     No current facility-administered medications for this visit.       SOCIAL HISTORY:  I have reviewed this patient's social history and updated it with pertinent information if needed. Antonio Canseco  reports that he quit smoking about 11 years ago. His smoking use included cigarettes. He started smoking about 36 years ago. He has a 25 pack-year smoking history. He has been exposed to tobacco smoke. He has never used smokeless tobacco. He reports current alcohol use. He reports that he does not use drugs.    PHYSICAL EXAM:  Pulse:  [83] 83  BP: (126)/(80) 126/80  SpO2:  [97 %] 97 %  337 lbs 11.2 oz    Constitutional: alert, no distress  Respiratory: Good bilateral air entry  Cardiovascular: Regular rate and rhythm  GI: nondistended  Neuropsychiatric: appropriate affact    ASSESSMENT/PLAN:  Pertinent issues addressed/ reviewed during this cardiology visit  Nonischemic cardiomyopathy - Echocardiogram on 1/12/2025 showed a mildly reduced LV function estimated at 45 to 50%, mild global hypokinesis of the LV, normal RV size and function and trace mitral and tricuspid regurgitation.  Continue losartan and metoprolol.  Hypertension -well-controlled  Type 2 diabetes -A1c 6.2, on metformin  Morbid obesity -up approximately 50 pounds over the last 18 months, likely caloric and lack of activity.  He does  not appear to be fluid overloaded.  Educated on need for weight loss, exercise, smaller portion sizes and strict heart healthy diet.  Obstructive sleep apnea -on CPAP    Follow-up in 1 year with Dr. Cee     It was a pleasure seeing this patient in clinic today. Please do not hesitate to contact me with any future questions.     JEFFREY Webster, CNP  Cardiology - [facility]  03/13/2025       The level of medical decision making during this visit was of moderate complexity.    This note was completed in part using dictation via the Dragon voice recognition software. Some word and grammatical errors may occur and must be interpreted in the appropriate clinical context.  If there are any questions pertaining to this issue, please contact me for further clarification.      Thank you for allowing me to participate in the care of your patient.      Sincerely,     JEFFREY Webster CNP     [facility]  cc:   JEFFREY Webster CNP  [address] [place] [address]

## 2025-04-01 ENCOUNTER — LAB (OUTPATIENT)
Dept: ONCOLOGY | Facility: CLINIC | Age: 55
End: 2025-04-01
Attending: STUDENT IN AN ORGANIZED HEALTH CARE EDUCATION/TRAINING PROGRAM
Payer: COMMERCIAL

## 2025-04-01 ENCOUNTER — TELEPHONE (OUTPATIENT)
Dept: FAMILY MEDICINE | Facility: CLINIC | Age: 55
End: 2025-04-01

## 2025-04-01 ENCOUNTER — HOSPITAL ENCOUNTER (OUTPATIENT)
Dept: MRI IMAGING | Facility: CLINIC | Age: 55
Discharge: HOME | End: 2025-04-01
Attending: STUDENT IN AN ORGANIZED HEALTH CARE EDUCATION/TRAINING PROGRAM
Payer: COMMERCIAL

## 2025-04-01 ENCOUNTER — HOSPITAL ENCOUNTER (OUTPATIENT)
Dept: CT IMAGING | Facility: CLINIC | Age: 55
Discharge: HOME | End: 2025-04-01
Attending: STUDENT IN AN ORGANIZED HEALTH CARE EDUCATION/TRAINING PROGRAM
Payer: COMMERCIAL

## 2025-04-01 DIAGNOSIS — C49.9 SARCOMA OF SOFT TISSUE (H): ICD-10-CM

## 2025-04-01 DIAGNOSIS — E11.9 TYPE 2 DIABETES MELLITUS WITHOUT COMPLICATION, WITHOUT LONG-TERM CURRENT USE OF INSULIN (H): Primary | ICD-10-CM

## 2025-04-01 LAB
ALBUMIN SERPL BCG-MCNC: 4 G/DL (ref 3.5–5.2)
ALP SERPL-CCNC: 45 U/L (ref 40–150)
ALT SERPL W P-5'-P-CCNC: 55 U/L (ref 0–70)
ANION GAP SERPL CALCULATED.3IONS-SCNC: 11 MMOL/L (ref 7–15)
AST SERPL W P-5'-P-CCNC: 29 U/L (ref 0–45)
BASOPHILS # BLD AUTO: 0 10E3/UL (ref 0–0.2)
BASOPHILS NFR BLD AUTO: 1 %
BILIRUB SERPL-MCNC: 0.7 MG/DL
BUN SERPL-MCNC: 15.5 MG/DL (ref 6–20)
CALCIUM SERPL-MCNC: 9 MG/DL (ref 8.8–10.4)
CHLORIDE SERPL-SCNC: 104 MMOL/L (ref 98–107)
CREAT SERPL-MCNC: 1.09 MG/DL (ref 0.67–1.17)
EGFRCR SERPLBLD CKD-EPI 2021: 81 ML/MIN/1.73M2
EOSINOPHIL # BLD AUTO: 0.1 10E3/UL (ref 0–0.7)
EOSINOPHIL NFR BLD AUTO: 2 %
ERYTHROCYTE [DISTWIDTH] IN BLOOD BY AUTOMATED COUNT: 13.6 % (ref 10–15)
GLUCOSE SERPL-MCNC: 117 MG/DL (ref 70–99)
HCO3 SERPL-SCNC: 23 MMOL/L (ref 22–29)
HCT VFR BLD AUTO: 46.1 % (ref 40–53)
HGB BLD-MCNC: 15 G/DL (ref 13.3–17.7)
IMM GRANULOCYTES # BLD: 0 10E3/UL
IMM GRANULOCYTES NFR BLD: 0 %
LYMPHOCYTES # BLD AUTO: 1 10E3/UL (ref 0.8–5.3)
LYMPHOCYTES NFR BLD AUTO: 18 %
MCH RBC QN AUTO: 29.5 PG (ref 26.5–33)
MCHC RBC AUTO-ENTMCNC: 32.5 G/DL (ref 31.5–36.5)
MCV RBC AUTO: 91 FL (ref 78–100)
MONOCYTES # BLD AUTO: 0.5 10E3/UL (ref 0–1.3)
MONOCYTES NFR BLD AUTO: 9 %
NEUTROPHILS # BLD AUTO: 4 10E3/UL (ref 1.6–8.3)
NEUTROPHILS NFR BLD AUTO: 70 %
NRBC # BLD AUTO: 0 10E3/UL
NRBC BLD AUTO-RTO: 0 /100
PLATELET # BLD AUTO: 208 10E3/UL (ref 150–450)
POTASSIUM SERPL-SCNC: 4.8 MMOL/L (ref 3.4–5.3)
PROT SERPL-MCNC: 7 G/DL (ref 6.4–8.3)
RBC # BLD AUTO: 5.09 10E6/UL (ref 4.4–5.9)
SODIUM SERPL-SCNC: 138 MMOL/L (ref 135–145)
WBC # BLD AUTO: 5.7 10E3/UL (ref 4–11)

## 2025-04-01 PROCEDURE — 73720 MRI LWR EXTREMITY W/O&W/DYE: CPT | Mod: RT

## 2025-04-01 PROCEDURE — A9585 GADOBUTROL INJECTION: HCPCS | Performed by: STUDENT IN AN ORGANIZED HEALTH CARE EDUCATION/TRAINING PROGRAM

## 2025-04-01 PROCEDURE — 73720 MRI LWR EXTREMITY W/O&W/DYE: CPT | Mod: 26 | Performed by: RADIOLOGY

## 2025-04-01 PROCEDURE — 255N000002 HC RX 255 OP 636: Performed by: STUDENT IN AN ORGANIZED HEALTH CARE EDUCATION/TRAINING PROGRAM

## 2025-04-01 PROCEDURE — 71260 CT THORAX DX C+: CPT

## 2025-04-01 PROCEDURE — 250N000011 HC RX IP 250 OP 636: Performed by: STUDENT IN AN ORGANIZED HEALTH CARE EDUCATION/TRAINING PROGRAM

## 2025-04-01 PROCEDURE — 85025 COMPLETE CBC W/AUTO DIFF WBC: CPT | Performed by: STUDENT IN AN ORGANIZED HEALTH CARE EDUCATION/TRAINING PROGRAM

## 2025-04-01 PROCEDURE — 250N000009 HC RX 250: Performed by: STUDENT IN AN ORGANIZED HEALTH CARE EDUCATION/TRAINING PROGRAM

## 2025-04-01 PROCEDURE — 80053 COMPREHEN METABOLIC PANEL: CPT | Performed by: STUDENT IN AN ORGANIZED HEALTH CARE EDUCATION/TRAINING PROGRAM

## 2025-04-01 PROCEDURE — 36415 COLL VENOUS BLD VENIPUNCTURE: CPT

## 2025-04-01 PROCEDURE — 74177 CT ABD & PELVIS W/CONTRAST: CPT

## 2025-04-01 RX ORDER — IOPAMIDOL 755 MG/ML
500 INJECTION, SOLUTION INTRAVASCULAR ONCE
Status: COMPLETED | OUTPATIENT
Start: 2025-04-01 | End: 2025-04-01

## 2025-04-01 RX ORDER — GADOBUTROL 604.72 MG/ML
15 INJECTION INTRAVENOUS ONCE
Status: COMPLETED | OUTPATIENT
Start: 2025-04-01 | End: 2025-04-01

## 2025-04-01 RX ADMIN — GADOBUTROL 15 ML: 604.72 INJECTION INTRAVENOUS at 07:55

## 2025-04-01 RX ADMIN — SODIUM CHLORIDE 65 ML: 9 INJECTION, SOLUTION INTRAVENOUS at 08:06

## 2025-04-01 RX ADMIN — IOPAMIDOL 100 ML: 755 INJECTION, SOLUTION INTRAVENOUS at 08:06

## 2025-04-01 NOTE — TELEPHONE ENCOUNTER
Please do not close this encounter until this has been addressed.  (prior auth approved/denied, prescriber refusal to complete prior auth or medication changed/discontinued)    Prior Authorization needed on: Freestyle Marty 3 PLUS sensors  Drug NDC: [address]-50     Insurance: [employer]  Bin:  [id number]  Grp:  [id number]  Member ID: [id number]   Insurance phone #: [phone]    Pharmacy NPI: [id number]  Pharmacy Phone #: [phone]  Pharmacy Fax #: [phone]    Please let us know if the PA gets approved or denied or if medication is changed    Thanks,  Jesika Lopez CPhT  [facility]  [phone]

## 2025-04-01 NOTE — TELEPHONE ENCOUNTER
PRIOR AUTHORIZATION DENIED    Medication: FREESTYLE MORENITA 3 PLUS SENSOR MISC    Insurance Company: [employer] Non-Specialty PA's - Phone [phone] Fax [phone]    Denial Date: 4/1/2025    Denial Reason(s): Patient is not using insulin or has a documented hypoglycemia event in the last 6 months.       Appeal Information:

## 2025-04-01 NOTE — TELEPHONE ENCOUNTER
PA Initiation    Medication: FREESTYLE MORENITA 3 PLUS SENSOR MISC  Insurance Company: [employer] Non-Specialty PA's - Phone [phone] Fax [phone]  Pharmacy Filling the Rx: [place], [place] - [address]  Filling Pharmacy Phone: [phone]  Filling Pharmacy Fax: [phone]  Start Date: 4/1/2025

## 2025-04-01 NOTE — PROGRESS NOTES
Medical Assistant Note:  Antonio Canseco presents today for blood draw.    Patient seen by provider today: No.   present during visit today: Not Applicable.    Concerns: No Concerns.    Procedure:  Lab draw site: left antecub, Needle type: butterfly, Gauge: 23 g.    Post Assessment:  Labs drawn without difficulty: Yes.    Discharge Plan:  Departure Mode: Ambulatory.    Face to Face Time: 10.    Katty Golden [facility]

## 2025-04-02 NOTE — TELEPHONE ENCOUNTER
Routing to Nallely Will PA-C-  Rx pended. Please review and sign. Thanks!    RN called and spoke with patient-    Notified of denied PA and relayed message from Nancy Darby PA-C (see previous note). Patient is agreeable for provider to send glucometer. Sent MyChart message with denial info per patient request.     Chrissy AGUIRRE RN  [facility]

## 2025-04-02 NOTE — TELEPHONE ENCOUNTER
Please let patient know insurance will not cover.  He can pay out of pocket or we can send glucometer for him to check glucose.

## 2025-04-02 NOTE — TELEPHONE ENCOUNTER
Signed orders for glucometer and supplies if we can let the patient know.    Thanks!  Nallely Will PA-C

## 2025-04-07 NOTE — PROGRESS NOTES
Virtual Visit Details    Type of service:  Video Visit - 25 min    Originating Location (pt. Location): Home    Distant Location (provider location):  On-site  Platform used for Video Visit: Vishal         [facility]    FOLLOW UP VISIT NOTE    PATIENT NAME: Antonio Canseco MRN # [id number]  DATE OF VISIT: April 8, 2025 YOB: 1970    REFERRING PROVIDER: Ari Nascimento PA-C  [facility] [place] [address]    CANCER TYPE: High grade sarcoma       CHIEF COMPLAIN   Thigh pain     HISTORY OF PRESENT ILLNESS   54 year old male with PMH of DM and recent Dx of large 27 cm mass in the posterior R thigh. He reports that he first noticed a node in the posterior thigh on 5/2022, we was evaluated at OSH where he got an Xray and he was told that this was sciatica and was started on physical therapy. Tumor continued to grow rapidly during this time. MRI done on 1/6/2023 showing a large mass in the posterior thigh. 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   He reports having pain and limit mobility. He has been taking daily meloxican with some relief, however he needs to be resting most of the time to avoid pain.     C1 of doxil/ifosfamide on 1/30 follow up MRI showing increase in size for mass without significant areas of necrosis. We decided to discontinue chemotherapy.  First cycle of chemotherapy was complicated with neutropenic fever requiring admission and he also developed disseminated herpes zoster. Additionally work up done after chemotherapy revealed that the ejection fraction after chemotherapy had decreased.    He started Preoperative RT on 3/15 and completed on 4/18 . PET done on 3/14 just before starting RT, showed decrease in the metabolic activity of the mass.     5/22/23 Surgical resection of posterior thigh mass.     Interval History  Antonio reports feeling well, he reports being sedentary with occasional walking and tells me that he  plans on ridding his bike once the weather is better.      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 Lives with wife Yari.       CURRENT OUTPATIENT MEDICATIONS     Current Outpatient Medications   Medication Sig Dispense Refill    blood glucose (NO BRAND SPECIFIED) lancets standard Use to test blood sugar once daily or as directed. 100 Lancet 1    blood glucose (NO BRAND SPECIFIED) test strip Use to test blood sugar once daily or as directed. 100 strip 1    blood glucose monitoring (NO BRAND SPECIFIED) meter device kit Use to test blood sugar once daily or as directed. 1 kit 0    Continuous Blood Gluc Sensor (FREESTYLE MORENITA 3 SENSOR) MISC 1 each every 14 days Use 1 sensor every 14 days. 6 each 5    Continuous Blood Gluc Sensor (FREESTYLE MORENITA 3 SENSOR) MISC USE 1 SENSOR EVERY 14 DAYS 6 each 3    Continuous Glucose Sensor (FREESTYLE MORENITA 3 PLUS SENSOR) MISC 2 each every 14 days. Change every 15 days. 6 each 3    losartan (COZAAR) 25 MG tablet Take 1 tablet (25 mg) by mouth daily. 90 tablet 3    [START ON 5/5/2025] metFORMIN (GLUCOPHAGE XR) 500 MG 24 hr tablet Take 2 tablets (1,000 mg) by mouth daily (with dinner). 180 tablet 1    metoprolol succinate ER (TOPROL XL) 25 MG 24 hr tablet Take 1 tablet (25 mg) by mouth daily. 90 tablet 3          REVIEW OF SYSTEMS   As above in the HPI, o/w complete 12-point  ROS was negative.     PHYSICAL EXAM     Prior physical exam    Physical Exam  Constitutional:       Appearance: Normal appearance.   HENT:      Head: Normocephalic.   Cardiovascular:      Rate and Rhythm: Normal rate.   Pulmonary:      Effort: Pulmonary effort is normal.      Breath sounds: Normal breath sounds.   Abdominal:      General: Abdomen is flat. Bowel sounds are normal.   Musculoskeletal:      Cervical back: Normal range of motion and neck supple.      Comments: Surgical site well healed, area of opening and drainage has healed completely.    Neurological:      Mental Status: He is alert.          LABORATORY AND IMAGING STUDIES     Lab Results   Component Value Date    WBC 5.7 04/01/2025    WBC 8.6 12/26/2019     Lab Results   Component Value Date    RBC 5.09 04/01/2025    RBC 4.96 12/26/2019     Lab Results   Component Value Date    HGB 15.0 04/01/2025    HGB 14.2 12/26/2019     Lab Results   Component Value Date    HCT 46.1 04/01/2025    HCT 44.6 12/26/2019     Lab Results   Component Value Date    MCV 91 04/01/2025    MCV 90 12/26/2019     Lab Results   Component Value Date    MCH 29.5 04/01/2025    MCH 28.6 12/26/2019     Lab Results   Component Value Date    MCHC 32.5 04/01/2025    MCHC 31.8 12/26/2019     Lab Results   Component Value Date    RDW 13.6 04/01/2025    RDW 14.3 12/26/2019     Lab Results   Component Value Date     04/01/2025     12/26/2019           Last Comprehensive Metabolic Panel:  Sodium   Date Value Ref Range Status   04/01/2025 138 135 - 145 mmol/L Final   02/11/2021 140 133 - 144 mmol/L Final     Potassium   Date Value Ref Range Status   04/01/2025 4.8 3.4 - 5.3 mmol/L Final   04/24/2022 4.1 3.4 - 5.3 mmol/L Final   02/11/2021 4.4 3.4 - 5.3 mmol/L Final     Potassium POCT   Date Value Ref Range Status   05/22/2023 4.5 3.5 - 5.0 mmol/L Final     Chloride   Date Value Ref Range Status   04/01/2025 104 98 - 107 mmol/L Final   04/24/2022 101 94 - 109 mmol/L Final    02/11/2021 107 94 - 109 mmol/L Final     Carbon Dioxide   Date Value Ref Range Status   02/11/2021 27 20 - 32 mmol/L Final     Carbon Dioxide (CO2)   Date Value Ref Range Status   04/01/2025 23 22 - 29 mmol/L Final   04/24/2022 29 20 - 32 mmol/L Final     Anion Gap   Date Value Ref Range Status   04/01/2025 11 7 - 15 mmol/L Final   04/24/2022 2 (L) 3 - 14 mmol/L Final   02/11/2021 6 3 - 14 mmol/L Final     Glucose   Date Value Ref Range Status   04/01/2025 117 (H) 70 - 99 mg/dL Final   04/24/2022 143 (H) 70 - 99 mg/dL Final   02/11/2021 135 (H) 70 - 99 mg/dL Final     Comment:     Non Fasting     GLUCOSE BY METER POCT   Date Value Ref Range Status   05/23/2023 119 (H) 70 - 99 mg/dL Final     Urea Nitrogen   Date Value Ref Range Status   04/01/2025 15.5 6.0 - 20.0 mg/dL Final   04/24/2022 11 7 - 30 mg/dL Final   02/11/2021 13 7 - 30 mg/dL Final     Creatinine   Date Value Ref Range Status   04/01/2025 1.09 0.67 - 1.17 mg/dL Final   02/11/2021 0.94 0.66 - 1.25 mg/dL Final     GFR Estimate   Date Value Ref Range Status   04/01/2025 81 >60 mL/min/1.73m2 Final     Comment:     eGFR calculated using 2021 CKD-EPI equation.   02/11/2021 >90 >60 mL/min/[1.73_m2] Final     Comment:     Non  GFR Calc  Starting 12/18/2018, serum creatinine based estimated GFR (eGFR) will be   calculated using the Chronic Kidney Disease Epidemiology Collaboration   (CKD-EPI) equation.       GFR, ESTIMATED POCT   Date Value Ref Range Status   12/13/2024 >60 >60 mL/min/1.73m2 Final     Calcium   Date Value Ref Range Status   04/01/2025 9.0 8.8 - 10.4 mg/dL Final   02/11/2021 9.1 8.5 - 10.1 mg/dL Final     Bilirubin Total   Date Value Ref Range Status   04/01/2025 0.7 <=1.2 mg/dL Final   02/11/2021 0.7 0.2 - 1.3 mg/dL Final     Alkaline Phosphatase   Date Value Ref Range Status   04/01/2025 45 40 - 150 U/L Final   02/11/2021 43 40 - 150 U/L Final     ALT   Date Value Ref Range Status   04/01/2025 55 0 - 70 U/L Final   02/11/2021  43 0 - 70 U/L Final     AST   Date Value Ref Range Status   04/01/2025 29 0 - 45 U/L Final   02/11/2021 23 0 - 45 U/L Final       1/6/2023 MRI right femur thigh: Large soft tissue mass in the posterior compartment of the right thigh measuring 27 x 7.4 x 12 cm.  The mass lies primarily in the hamstring muscle belly.  Heterogeneous signal with areas of necrosis noted.    2/20/23 MRI R femur                                                                   IMPRESSION: In this patient with high grade sarcoma status  post-neoadjuvant chemotherapy;     Increased in size of the 11 x 15.7 x 21.7 cm heterogeneously enhancing  soft tissue mass with myxoid and lipoid components in the posterior  compartment of the right thigh since MRI from 1/6/2023, previously  measured 7.4 x 12 x 20 cm.  *  Distal portion of the mass anteriorly abuts the sciatic nerve and  deep femoral artery/vein without definite invasion.  *  Increased edema within the adductor rosy when compared to prior  study, possibly neurogenic.      PET scan 3/14/23  IMPRESSION:     1. 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      2. Subcentimeter pulmonary nodule in the left lower lobe, which is too small to accurately characterize by PET/CT and warrants continued imaging surveillance.    4/19/23 MRI femur R                                                                 IMPRESSION: In this patient with high grade sarcoma status  post-neoadjuvant chemotherapy;     Slightly increase in the size of the heterogeneously enhancing soft  tissue mass with myxoid and lipoid components in the posterior  compartment of the right thigh since MRI from 2/20/2023, measuring 11  x 16.4 x 21 cm, previously measured 11 x 15.7 x 21.7 cm.   *  Distal portion of the mass anteriorly abuts the sciatic nerve and  deep femoral artery/vein without definite invasion.  *  Slightly increased edema  within the adductor rosy when compared  to prior study.    PET 5/30/23  IMPRESSION:     1. Sequelae of post treatment inflammatory change in the right posterior thigh soft tissues without convincing evidence of active neoplasm.     2. Slightly increased size of the non-FDG avid nodule in the left lower lobe and development of additional non-FDG avid subcentimeter nodules in the inferior left upper lobe, right middle lobe, and right lower lobe. While their exact etiologies remain   indeterminate, the interval growth/development of additional nodules raises suspicion for early pulmonary metastases.    PET 7/18/23  Impression:  1. 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   a. Extensive regional soft tissue edema and nonmasslike enhancement,  presumably postoperative.   b. Postoperative fluid collection.  2. New periostitis/deep muscle edema along the mid to distal right  femoral shaft, query low grade stress response.    10/31/23 MRI R thigh  IMPRESSION:  1. 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 No masslike  enhancement to suggest recurrent tumor.  2. Extensive soft tissue edema without masslike enhancement throughout  the right thigh including the posterior and adductor muscles, presumed  postoperative.  3. Interval resolution of the previously noted postoperative fluid  collection as well as resolution of the periosteal edema seen along  the medial aspect of the mid femoral shaft.  4. No marrow signal changes to suggest fracture or marrow  infiltration.    10/31/23 CT CAP  IMPRESSION:  1.  No evidence of metastatic disease within the chest, abdomen, and  pelvis.   2.  Small pulmonary nodules measuring up to 4 mm are stable. No new  nodules are identified. Recommend attention on follow-up.    2/5/24 CT  CAP    IMPRESSION: No significant interval change. No evidence for metastatic  malignancy in the chest, abdomen, or pelvis.     2/5/24 MRI  Impression:     1. Stable postoperative changes of right posterior thigh mass  resection without evidence for local disease recurrence or local  regional metastasis in the right thigh.     2. Unchanged intramuscular edema, mild fatty atrophy, and non-mass  enhancement involving the posterior right thigh muscles likely  representing posttreatment change.     CT 4/15/24  IMPRESSION:  1.  No evidence of metastatic disease in the chest, abdomen, or  pelvis.  2.  Scattered small pulmonary nodules unchanged for several years and  likely benign.     MRI Femur thigh 4/15/24  Impression:     Stable postsurgical changes of mass resection from the posterior  compartment of the right thigh without evidence of local recurrent  tumor. Decreased non mass-like enhancing edema throughout the  posterior compartment of the thigh favored to represent resolving  posttreatment change.     MRI 4/15/24  Impression:     Stable postsurgical changes of mass resection from the posterior  compartment of the right thigh without evidence of local recurrent  tumor. Decreased non mass-like enhancing edema throughout the  posterior compartment of the thigh favored to represent resolving  posttreatment change.    CT CAP 8/15/24  IMPRESSION:  1.  Stable exam without evidence for new disease.  2.  Stable pulmonary nodules.    MR Femur 8/15/24  IMPRESSION:  1.  Postoperative changes of pleomorphic sarcoma resection from the right posterior thigh without evidence of residual or locally recurrent tumor.   2.  Slightly decreased non-mass-like edema in the medial and posterior compartment musculature of the right thigh compatible with evolving post-treatment change.    MRI Thigh 12/13/25  Impression:     Stable postoperative changes of right posterior thigh mass resection  without evidence for local disease recurrence  or local regional  metastasis in the right thigh.       CT CAP 12/13/24  IMPRESSION:  1.  No convincing evidence of metastatic disease in the chest, abdomen and pelvis.  2.  Stable multiple bilateral pulmonary nodules, not significantly changed as compared to 2/5/2024 exam. No new pulmonary nodule visualized.   3.  Hepatic steatosis.  4.  Cholelithiasis without CT evidence of acute cholecystitis.  5.  Colonic diverticulosis without CT evidence of acute diverticulitis.    MRI Thigh R 12/13/24   Impression:     Stable postoperative changes of right posterior thigh mass resection  without evidence for local disease recurrence or local regional  metastasis in the right thigh.       I have personally reviewed the examination and initial interpretation  and I agree with the findings.    MRI R Thigh 4/1/25  IMPRESSION:  1. Postsurgical changes of resection of a biopsy-proven high-grade  myxofibrosarcoma. No enhancing masslike lesion to suggest recurrent  disease.  2. No marrow signal abnormalities to suggest fracture or marrow  infiltration.     CT CAP 4/1/25  IMPRESSION:  1.  A cystic lesion at the liver appears simple by CT, but is larger compared to prior. It does not appear to show any soft tissue components. As it is larger, suggest liver MRI for further characterization to ensure no worrisome features.   2.  Stable pulmonary nodules.  3.  No new disease otherwise seen.  4.  Cholelithiasis and fatty liver.     PATHOLOGY     5/22/23  Final Diagnosis   SOFT TISSUE, RIGHT THIGH TUMOR, RESECTION:  -HIGH-GRADE MYXOFIBROSARCOMA with therapy-related changes, FNCLCC grade 2, 22.4 cm in greatest dimension.  -Tumor post-treatment viability: approx. 70%.  -Tumor extends to inked proximal margin and it is less than 1 mm from deep/anterior margin, 1 mm from lateral and 2 mm from medial and superficial/posterior margins.  -AJCC pathologic staging is ypT4.  -See synoptic report.            Final Diagnosis   A.  SOFT TISSUE, RIGHT  THIGH MASS, BIOPSY:  -HIGH-GRADE SARCOMA WITH EXTENSIVE NECROSIS AND HYALINIZATION, see comment.   Electronically signed by Vineet Coello MD on 1/17/2023 at 10:17 AM   Comment   UUMAYO   The neoplasm is comprised of highly pleomorphic spindle cells with stromal hyalinization and extensive areas of necrosis.  Focal areas with scattered lipoblasts are seen.  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  MDM-2 gene amplification study by FISH is in progress and result will be provided separately.          ASSESSMENT AND PLAN     Mr. 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     He received C1 on 1/30. Patient did experienced worsening pain shortly after starting chemotherapy. MRI done on 2/20/23 showing increase in size of large posterior thigh sarcoma. The comparison of this MRI is to the baseline done on 1/6/23, and in a patient with high grade sarcoma the mass is presumed to have grown in size in the month before starting chemotherapy. However, given that the patient had increase in pain and there is no areas of necrosis observed in the recent MRI, I will consider this to be progression of disease and recommend to plan for surgery with consideration for preoperative radiation therapy.      He started radiation therapy on 3/15.  I reviewed the PET scan done on 3/14 showing some tumor shrinkage at 14 x 14 x 18 from prior 11 x 15.7 x 21 and improved metabolic activity. We discussed that given some response was seen, if needed in the future this will still be an option. Right now, given prior complications from chemo (admission for neutropenic fever, bleeding in the urine presumed to be 2/2 to ifosfamide, and presumed cardiomyopathy with decrease in ejection fraction on echo), we will hold on further chemo.    Antonio  completed preoperative RT and underwent surgical resection on  5/22/23.   I personally reviewed the most recent CT CAP and MRI R thigh on 4/1/25 showing no evidence recurrence or metastatic disease. However, he does have enlargement of a liver cyst that has bene present since diagnosis on 2023. He also has evidence of fatty liver infiltration, he weights 330 pounds and this has been a progressive increase over the past year after surgery. We extensively discussed today about the importance of weight loss and the possible consequences of fatty liver including future cirrhosis and predisposition for tumors of the liver. He tells me that he is moderately motivated and that all his life he has been lazy, but would like to talk to nutrition and start doing more exercise.   We'll do a liver MRI as recommended by radiology and send a referral for nutrition.     He is 1.5 years out from surgery, we will continue with surveillance scans with MRI thigh and CT CAP every 4 months for the second year after surgery and then move to every 4-6 month until year 5.       Plan:    -MRI liver within 1 month   -[facility] visit after MRI to follow up on result   -Referral to Nutrition ASAP for morbid obesity resulting in fatty liver   -CT CAP, MRI thigh and labs (CBC, CMP) on 8/18   -Follow up Dr. Price 8/20/25     55 minutes spent on the date of the encounter doing chart review, review of test results, interpretation of tests, patient visit, documentation and discussion with other provider(s)     Michael Laboy MD   of Medicine  Hematology, Oncology and Transplantation

## 2025-04-08 ENCOUNTER — VIRTUAL VISIT (OUTPATIENT)
Dept: ONCOLOGY | Facility: CLINIC | Age: 55
End: 2025-04-08
Attending: STUDENT IN AN ORGANIZED HEALTH CARE EDUCATION/TRAINING PROGRAM
Payer: COMMERCIAL

## 2025-04-08 VITALS — HEIGHT: 71 IN | BODY MASS INDEX: 44.1 KG/M2 | WEIGHT: 315 LBS

## 2025-04-08 DIAGNOSIS — C49.9 SARCOMA OF SOFT TISSUE (H): Primary | ICD-10-CM

## 2025-04-08 ASSESSMENT — PAIN SCALES - GENERAL: PAINLEVEL_OUTOF10: NO PAIN (0)

## 2025-04-08 NOTE — NURSING NOTE
Current patient location: [address] [place] [address] DR RUVALCABA  [facility] [address]    Is the patient currently in the state of [place]? YES    Visit mode: VIDEO    If the visit is dropped, the patient can be reconnected by:VIDEO VISIT: Text to cell phone:   Telephone Information:   Mobile [phone]       Will anyone else be joining the visit? NO  (If patient encounters technical issues they should call [phone])    Are changes needed to the allergy or medication list? Pt stated no changes to allergies and Pt stated no med changes    Are refills needed on medications prescribed by this physician? NO    Rooming Documentation:  Questionnaire(s) completed    Reason for visit: ALVARADO FLORES

## 2025-04-08 NOTE — LETTER
4/8/2025      Antonio Canseco  [address]  [facility] [address]      Dear Colleague,    Thank you for referring your patient, Antonio Canseco, to the [facility]. Please see a copy of my visit note below.    Virtual Visit Details    Type of service:  Video Visit - 25 min    Originating Location (pt. Location): Home    Distant Location (provider location):  On-site  Platform used for Video Visit: [facility]    FOLLOW UP VISIT NOTE    PATIENT NAME: Antonio Canseco MRN # [id number]  DATE OF VISIT: April 8, 2025 YOB: 1970    REFERRING PROVIDER: Ari Nascimento PA-C  [facility] [place] [address]    CANCER TYPE: High grade sarcoma       CHIEF COMPLAIN   Thigh pain     HISTORY OF PRESENT ILLNESS   54 year old male with PMH of DM and recent Dx of large 27 cm mass in the posterior R thigh. He reports that he first noticed a node in the posterior thigh on 5/2022, we was evaluated at OSH where he got an Xray and he was told that this was sciatica and was started on physical therapy. Tumor continued to grow rapidly during this time. MRI done on 1/6/2023 showing a large mass in the posterior thigh. 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   He reports having pain and limit mobility. He has been taking daily meloxican with some relief, however he needs to be resting most of the time to avoid pain.     C1 of doxil/ifosfamide on 1/30 follow up MRI showing increase in size for mass without significant areas of necrosis. We decided to discontinue chemotherapy.  First cycle of chemotherapy was complicated with neutropenic fever requiring admission and he also developed disseminated herpes zoster. Additionally work up done after chemotherapy revealed that the ejection fraction after chemotherapy had decreased.    He started Preoperative RT on 3/15 and completed on 4/18 . PET done on 3/14 just before  starting RT, showed decrease in the metabolic activity of the mass.     5/22/23 Surgical resection of posterior thigh mass.     Interval History  Antonio reports feeling well, he reports being sedentary with occasional walking and tells me that he plans on ridding his bike once the weather is better.      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 Lives with wife Yari.       CURRENT OUTPATIENT MEDICATIONS     Current Outpatient Medications   Medication Sig Dispense Refill     blood glucose (NO BRAND SPECIFIED) lancets standard Use to test blood sugar once daily or as directed. 100 Lancet 1     blood glucose (NO BRAND SPECIFIED) test strip Use to test blood sugar once daily or as directed. 100 strip 1     blood glucose monitoring (NO BRAND SPECIFIED) meter device kit Use to test blood sugar once daily or as directed. 1 kit 0     Continuous Blood Gluc Sensor (FREESTYLE MORENITA 3 SENSOR) MISC 1 each every 14 days Use 1 sensor every 14 days. 6 each 5     Continuous Blood Gluc Sensor (FREESTYLE MORENITA 3 SENSOR) MISC USE 1 SENSOR EVERY 14 DAYS 6 each 3     Continuous Glucose Sensor (FREESTYLE MORENITA 3 PLUS SENSOR) MISC 2 each every 14 days. Change every 15 days. 6 each 3     losartan (COZAAR) 25 MG tablet Take 1 tablet (25 mg) by mouth daily. 90 tablet 3     [START ON 5/5/2025] metFORMIN (GLUCOPHAGE XR) 500 MG 24 hr  tablet Take 2 tablets (1,000 mg) by mouth daily (with dinner). 180 tablet 1     metoprolol succinate ER (TOPROL XL) 25 MG 24 hr tablet Take 1 tablet (25 mg) by mouth daily. 90 tablet 3          REVIEW OF SYSTEMS   As above in the HPI, o/w complete 12-point ROS was negative.     PHYSICAL EXAM     Prior physical exam    Physical Exam  Constitutional:       Appearance: Normal appearance.   HENT:      Head: Normocephalic.   Cardiovascular:      Rate and Rhythm: Normal rate.   Pulmonary:      Effort: Pulmonary effort is normal.      Breath sounds: Normal breath sounds.   Abdominal:      General: Abdomen is flat. Bowel sounds are normal.   Musculoskeletal:      Cervical back: Normal range of motion and neck supple.      Comments: Surgical site well healed, area of opening and drainage has healed completely.    Neurological:      Mental Status: He is alert.          LABORATORY AND IMAGING STUDIES     Lab Results   Component Value Date    WBC 5.7 04/01/2025    WBC 8.6 12/26/2019     Lab Results   Component Value Date    RBC 5.09 04/01/2025    RBC 4.96 12/26/2019     Lab Results   Component Value Date    HGB 15.0 04/01/2025    HGB 14.2 12/26/2019     Lab Results   Component Value Date    HCT 46.1 04/01/2025    HCT 44.6 12/26/2019     Lab Results   Component Value Date    MCV 91 04/01/2025    MCV 90 12/26/2019     Lab Results   Component Value Date    MCH 29.5 04/01/2025    MCH 28.6 12/26/2019     Lab Results   Component Value Date    MCHC 32.5 04/01/2025    MCHC 31.8 12/26/2019     Lab Results   Component Value Date    RDW 13.6 04/01/2025    RDW 14.3 12/26/2019     Lab Results   Component Value Date     04/01/2025     12/26/2019           Last Comprehensive Metabolic Panel:  Sodium   Date Value Ref Range Status   04/01/2025 138 135 - 145 mmol/L Final   02/11/2021 140 133 - 144 mmol/L Final     Potassium   Date Value Ref Range Status   04/01/2025 4.8 3.4 - 5.3 mmol/L Final   04/24/2022 4.1 3.4 - 5.3 mmol/L Final    02/11/2021 4.4 3.4 - 5.3 mmol/L Final     Potassium POCT   Date Value Ref Range Status   05/22/2023 4.5 3.5 - 5.0 mmol/L Final     Chloride   Date Value Ref Range Status   04/01/2025 104 98 - 107 mmol/L Final   04/24/2022 101 94 - 109 mmol/L Final   02/11/2021 107 94 - 109 mmol/L Final     Carbon Dioxide   Date Value Ref Range Status   02/11/2021 27 20 - 32 mmol/L Final     Carbon Dioxide (CO2)   Date Value Ref Range Status   04/01/2025 23 22 - 29 mmol/L Final   04/24/2022 29 20 - 32 mmol/L Final     Anion Gap   Date Value Ref Range Status   04/01/2025 11 7 - 15 mmol/L Final   04/24/2022 2 (L) 3 - 14 mmol/L Final   02/11/2021 6 3 - 14 mmol/L Final     Glucose   Date Value Ref Range Status   04/01/2025 117 (H) 70 - 99 mg/dL Final   04/24/2022 143 (H) 70 - 99 mg/dL Final   02/11/2021 135 (H) 70 - 99 mg/dL Final     Comment:     Non Fasting     GLUCOSE BY METER POCT   Date Value Ref Range Status   05/23/2023 119 (H) 70 - 99 mg/dL Final     Urea Nitrogen   Date Value Ref Range Status   04/01/2025 15.5 6.0 - 20.0 mg/dL Final   04/24/2022 11 7 - 30 mg/dL Final   02/11/2021 13 7 - 30 mg/dL Final     Creatinine   Date Value Ref Range Status   04/01/2025 1.09 0.67 - 1.17 mg/dL Final   02/11/2021 0.94 0.66 - 1.25 mg/dL Final     GFR Estimate   Date Value Ref Range Status   04/01/2025 81 >60 mL/min/1.73m2 Final     Comment:     eGFR calculated using 2021 CKD-EPI equation.   02/11/2021 >90 >60 mL/min/[1.73_m2] Final     Comment:     Non  GFR Calc  Starting 12/18/2018, serum creatinine based estimated GFR (eGFR) will be   calculated using the Chronic Kidney Disease Epidemiology Collaboration   (CKD-EPI) equation.       GFR, ESTIMATED POCT   Date Value Ref Range Status   12/13/2024 >60 >60 mL/min/1.73m2 Final     Calcium   Date Value Ref Range Status   04/01/2025 9.0 8.8 - 10.4 mg/dL Final   02/11/2021 9.1 8.5 - 10.1 mg/dL Final     Bilirubin Total   Date Value Ref Range Status   04/01/2025 0.7 <=1.2 mg/dL Final    02/11/2021 0.7 0.2 - 1.3 mg/dL Final     Alkaline Phosphatase   Date Value Ref Range Status   04/01/2025 45 40 - 150 U/L Final   02/11/2021 43 40 - 150 U/L Final     ALT   Date Value Ref Range Status   04/01/2025 55 0 - 70 U/L Final   02/11/2021 43 0 - 70 U/L Final     AST   Date Value Ref Range Status   04/01/2025 29 0 - 45 U/L Final   02/11/2021 23 0 - 45 U/L Final       1/6/2023 MRI right femur thigh: Large soft tissue mass in the posterior compartment of the right thigh measuring 27 x 7.4 x 12 cm.  The mass lies primarily in the hamstring muscle belly.  Heterogeneous signal with areas of necrosis noted.    2/20/23 MRI R femur                                                                   IMPRESSION: In this patient with high grade sarcoma status  post-neoadjuvant chemotherapy;     Increased in size of the 11 x 15.7 x 21.7 cm heterogeneously enhancing  soft tissue mass with myxoid and lipoid components in the posterior  compartment of the right thigh since MRI from 1/6/2023, previously  measured 7.4 x 12 x 20 cm.  *  Distal portion of the mass anteriorly abuts the sciatic nerve and  deep femoral artery/vein without definite invasion.  *  Increased edema within the adductor rosy when compared to prior  study, possibly neurogenic.      PET scan 3/14/23  IMPRESSION:     1. 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      2. Subcentimeter pulmonary nodule in the left lower lobe, which is too small to accurately characterize by PET/CT and warrants continued imaging surveillance.    4/19/23 MRI femur R                                                                 IMPRESSION: In this patient with high grade sarcoma status  post-neoadjuvant chemotherapy;     Slightly increase in the size of the heterogeneously enhancing soft  tissue mass with myxoid and lipoid components in the posterior  compartment of the right thigh since  MRI from 2/20/2023, measuring 11  x 16.4 x 21 cm, previously measured 11 x 15.7 x 21.7 cm.   *  Distal portion of the mass anteriorly abuts the sciatic nerve and  deep femoral artery/vein without definite invasion.  *  Slightly increased edema within the adductor rosy when compared  to prior study.    PET 5/30/23  IMPRESSION:     1. Sequelae of post treatment inflammatory change in the right posterior thigh soft tissues without convincing evidence of active neoplasm.     2. Slightly increased size of the non-FDG avid nodule in the left lower lobe and development of additional non-FDG avid subcentimeter nodules in the inferior left upper lobe, right middle lobe, and right lower lobe. While their exact etiologies remain   indeterminate, the interval growth/development of additional nodules raises suspicion for early pulmonary metastases.    PET 7/18/23  Impression:  1. 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   a. Extensive regional soft tissue edema and nonmasslike enhancement,  presumably postoperative.   b. Postoperative fluid collection.  2. New periostitis/deep muscle edema along the mid to distal right  femoral shaft, query low grade stress response.    10/31/23 MRI R thigh  IMPRESSION:  1. 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 No masslike  enhancement to suggest recurrent tumor.  2. Extensive soft tissue edema without masslike enhancement throughout  the right thigh including the posterior and adductor muscles, presumed  postoperative.  3. Interval resolution of the previously noted postoperative fluid  collection as well as resolution of the periosteal edema seen along  the medial aspect of the mid femoral shaft.  4. No marrow signal changes to suggest fracture or marrow  infiltration.    10/31/23 CT  CAP  IMPRESSION:  1.  No evidence of metastatic disease within the chest, abdomen, and  pelvis.   2.  Small pulmonary nodules measuring up to 4 mm are stable. No new  nodules are identified. Recommend attention on follow-up.    2/5/24 CT CAP    IMPRESSION: No significant interval change. No evidence for metastatic  malignancy in the chest, abdomen, or pelvis.     2/5/24 MRI  Impression:     1. Stable postoperative changes of right posterior thigh mass  resection without evidence for local disease recurrence or local  regional metastasis in the right thigh.     2. Unchanged intramuscular edema, mild fatty atrophy, and non-mass  enhancement involving the posterior right thigh muscles likely  representing posttreatment change.     CT 4/15/24  IMPRESSION:  1.  No evidence of metastatic disease in the chest, abdomen, or  pelvis.  2.  Scattered small pulmonary nodules unchanged for several years and  likely benign.     MRI Femur thigh 4/15/24  Impression:     Stable postsurgical changes of mass resection from the posterior  compartment of the right thigh without evidence of local recurrent  tumor. Decreased non mass-like enhancing edema throughout the  posterior compartment of the thigh favored to represent resolving  posttreatment change.     MRI 4/15/24  Impression:     Stable postsurgical changes of mass resection from the posterior  compartment of the right thigh without evidence of local recurrent  tumor. Decreased non mass-like enhancing edema throughout the  posterior compartment of the thigh favored to represent resolving  posttreatment change.    CT CAP 8/15/24  IMPRESSION:  1.  Stable exam without evidence for new disease.  2.  Stable pulmonary nodules.    MR Femur 8/15/24  IMPRESSION:  1.  Postoperative changes of pleomorphic sarcoma resection from the right posterior thigh without evidence of residual or locally recurrent tumor.   2.  Slightly decreased non-mass-like edema in the medial and posterior  compartment musculature of the right thigh compatible with evolving post-treatment change.    MRI Thigh 12/13/25  Impression:     Stable postoperative changes of right posterior thigh mass resection  without evidence for local disease recurrence or local regional  metastasis in the right thigh.       CT CAP 12/13/24  IMPRESSION:  1.  No convincing evidence of metastatic disease in the chest, abdomen and pelvis.  2.  Stable multiple bilateral pulmonary nodules, not significantly changed as compared to 2/5/2024 exam. No new pulmonary nodule visualized.   3.  Hepatic steatosis.  4.  Cholelithiasis without CT evidence of acute cholecystitis.  5.  Colonic diverticulosis without CT evidence of acute diverticulitis.    MRI Thigh R 12/13/24   Impression:     Stable postoperative changes of right posterior thigh mass resection  without evidence for local disease recurrence or local regional  metastasis in the right thigh.       I have personally reviewed the examination and initial interpretation  and I agree with the findings.    MRI R Thigh 4/1/25  IMPRESSION:  1. Postsurgical changes of resection of a biopsy-proven high-grade  myxofibrosarcoma. No enhancing masslike lesion to suggest recurrent  disease.  2. No marrow signal abnormalities to suggest fracture or marrow  infiltration.     CT CAP 4/1/25  IMPRESSION:  1.  A cystic lesion at the liver appears simple by CT, but is larger compared to prior. It does not appear to show any soft tissue components. As it is larger, suggest liver MRI for further characterization to ensure no worrisome features.   2.  Stable pulmonary nodules.  3.  No new disease otherwise seen.  4.  Cholelithiasis and fatty liver.     PATHOLOGY     5/22/23  Final Diagnosis   SOFT TISSUE, RIGHT THIGH TUMOR, RESECTION:  -HIGH-GRADE MYXOFIBROSARCOMA with therapy-related changes, FNCLCC grade 2, 22.4 cm in greatest dimension.  -Tumor post-treatment viability: approx. 70%.  -Tumor extends to inked proximal  margin and it is less than 1 mm from deep/anterior margin, 1 mm from lateral and 2 mm from medial and superficial/posterior margins.  -AJCC pathologic staging is ypT4.  -See synoptic report.            Final Diagnosis   A.  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   Electronically signed by Vineet Coello MD on 1/17/2023 at 10:17 AM   Comment   UUMAYO   The neoplasm is comprised of highly pleomorphic spindle cells with stromal hyalinization and extensive areas of necrosis.  Focal areas with scattered lipoblasts are seen.  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  MDM-2 gene amplification study by FISH is in progress and result will be provided separately.          ASSESSMENT AND PLAN     Mr. Canseco is a 55 yo male with PMH of untreated DM, obesity, fatty liver and recent Dx of large 27 cm high grade sarcoma in the posterior R thigh.     He received C1 on 1/30. Patient did experienced worsening pain shortly after starting chemotherapy. MRI done on 2/20/23 showing increase in size of large posterior thigh sarcoma. The comparison of this MRI is to the baseline done on 1/6/23, and in a patient with high grade sarcoma the mass is presumed to have grown in size in the month before starting chemotherapy. However, given that the patient had increase in pain and there is no areas of necrosis observed in the recent MRI, I will consider this to be progression of disease and recommend to plan for surgery with consideration for preoperative radiation therapy.      He started radiation therapy on 3/15.  I reviewed the PET scan done on 3/14 showing some tumor shrinkage at 14 x 14 x 18 from prior 11 x 15.7 x 21 and improved metabolic activity. We discussed that given some response was seen, if needed in the future this will still be an option. Right now,  given prior complications from chemo (admission for neutropenic fever, bleeding in the urine presumed to be 2/2 to ifosfamide, and presumed cardiomyopathy with decrease in ejection fraction on echo), we will hold on further chemo.    Antonio completed preoperative RT and underwent surgical resection on  5/22/23.   I personally reviewed the most recent CT CAP and MRI R thigh on 4/1/25 showing no evidence recurrence or metastatic disease. However, he does have enlargement of a liver cyst that has bene present since diagnosis on 2023. He also has evidence of fatty liver infiltration, he weights 330 pounds and this has been a progressive increase over the past year after surgery. We extensively discussed today about the importance of weight loss and the possible consequences of fatty liver including future cirrhosis and predisposition for tumors of the liver. He tells me that he is moderately motivated and that all his life he has been lazy, but would like to talk to nutrition and start doing more exercise.   We'll do a liver MRI as recommended by radiology and send a referral for nutrition.     He is 1.5 years out from surgery, we will continue with surveillance scans with MRI thigh and CT CAP every 4 months for the second year after surgery and then move to every 4-6 month until year 5.       Plan:    -MRI liver within 1 month   -[facility] visit after MRI to follow up on result   -Referral to Nutrition ASAP for morbid obesity resulting in fatty liver   -CT CAP, MRI thigh and labs (CBC, CMP) on 8/18   -Follow up Dr. Price 8/20/25     55 minutes spent on the date of the encounter doing chart review, review of test results, interpretation of tests, patient visit, documentation and discussion with other provider(s)     Michael Laboy MD   of Medicine  Hematology, Oncology and Transplantation      Again, thank you for allowing me to participate in the care of your patient.         Sincerely,        Michael Laboy MD    Electronically signed

## 2025-04-26 ENCOUNTER — HOSPITAL ENCOUNTER (OUTPATIENT)
Dept: MRI IMAGING | Facility: CLINIC | Age: 55
Discharge: HOME | End: 2025-04-26
Attending: STUDENT IN AN ORGANIZED HEALTH CARE EDUCATION/TRAINING PROGRAM | Admitting: STUDENT IN AN ORGANIZED HEALTH CARE EDUCATION/TRAINING PROGRAM
Payer: COMMERCIAL

## 2025-04-26 DIAGNOSIS — C49.9 SARCOMA OF SOFT TISSUE (H): ICD-10-CM

## 2025-04-26 PROCEDURE — 255N000002 HC RX 255 OP 636: Performed by: STUDENT IN AN ORGANIZED HEALTH CARE EDUCATION/TRAINING PROGRAM

## 2025-04-26 PROCEDURE — 74183 MRI ABD W/O CNTR FLWD CNTR: CPT

## 2025-04-26 PROCEDURE — A9585 GADOBUTROL INJECTION: HCPCS | Performed by: STUDENT IN AN ORGANIZED HEALTH CARE EDUCATION/TRAINING PROGRAM

## 2025-04-26 RX ORDER — GADOBUTROL 604.72 MG/ML
15 INJECTION INTRAVENOUS ONCE
Status: COMPLETED | OUTPATIENT
Start: 2025-04-26 | End: 2025-04-26

## 2025-04-26 RX ADMIN — GADOBUTROL 15 ML: 604.72 INJECTION INTRAVENOUS at 09:15

## 2025-07-01 DIAGNOSIS — E11.9 TYPE 2 DIABETES MELLITUS WITHOUT COMPLICATION, WITHOUT LONG-TERM CURRENT USE OF INSULIN (H): ICD-10-CM

## 2025-07-01 RX ORDER — BLOOD-GLUCOSE METER
EACH MISCELLANEOUS
Qty: 1 KIT | Refills: 0 | Status: SHIPPED | OUTPATIENT
Start: 2025-07-01

## 2025-08-14 ENCOUNTER — PATIENT OUTREACH (OUTPATIENT)
Dept: ONCOLOGY | Facility: CLINIC | Age: 55
End: 2025-08-14
Payer: COMMERCIAL

## 2025-08-17 ENCOUNTER — HOSPITAL ENCOUNTER (OUTPATIENT)
Dept: MRI IMAGING | Facility: CLINIC | Age: 55
Discharge: HOME | End: 2025-08-17
Attending: STUDENT IN AN ORGANIZED HEALTH CARE EDUCATION/TRAINING PROGRAM
Payer: COMMERCIAL

## 2025-08-17 ENCOUNTER — HOSPITAL ENCOUNTER (OUTPATIENT)
Dept: CT IMAGING | Facility: CLINIC | Age: 55
Discharge: HOME | End: 2025-08-17
Attending: STUDENT IN AN ORGANIZED HEALTH CARE EDUCATION/TRAINING PROGRAM
Payer: COMMERCIAL

## 2025-08-17 DIAGNOSIS — C49.9 SARCOMA OF SOFT TISSUE (H): ICD-10-CM

## 2025-08-17 PROCEDURE — 255N000002 HC RX 255 OP 636: Performed by: STUDENT IN AN ORGANIZED HEALTH CARE EDUCATION/TRAINING PROGRAM

## 2025-08-17 PROCEDURE — 73720 MRI LWR EXTREMITY W/O&W/DYE: CPT | Mod: 26 | Performed by: RADIOLOGY

## 2025-08-17 PROCEDURE — 73720 MRI LWR EXTREMITY W/O&W/DYE: CPT | Mod: RT

## 2025-08-17 PROCEDURE — A9585 GADOBUTROL INJECTION: HCPCS | Performed by: STUDENT IN AN ORGANIZED HEALTH CARE EDUCATION/TRAINING PROGRAM

## 2025-08-17 PROCEDURE — 71250 CT THORAX DX C-: CPT

## 2025-08-17 RX ORDER — GADOBUTROL 604.72 MG/ML
15 INJECTION INTRAVENOUS ONCE
Status: COMPLETED | OUTPATIENT
Start: 2025-08-17 | End: 2025-08-17

## 2025-08-17 RX ADMIN — GADOBUTROL 15 ML: 604.72 INJECTION INTRAVENOUS at 08:39

## 2025-08-18 ENCOUNTER — LAB (OUTPATIENT)
Dept: ONCOLOGY | Facility: CLINIC | Age: 55
End: 2025-08-18
Attending: STUDENT IN AN ORGANIZED HEALTH CARE EDUCATION/TRAINING PROGRAM
Payer: COMMERCIAL

## 2025-08-18 DIAGNOSIS — C49.9 SARCOMA OF SOFT TISSUE (H): ICD-10-CM

## 2025-08-18 LAB
ALBUMIN SERPL BCG-MCNC: 3.9 G/DL (ref 3.5–5.2)
ALP SERPL-CCNC: 43 U/L (ref 40–150)
ALT SERPL W P-5'-P-CCNC: 42 U/L (ref 0–70)
ANION GAP SERPL CALCULATED.3IONS-SCNC: 11 MMOL/L (ref 7–15)
AST SERPL W P-5'-P-CCNC: 28 U/L (ref 0–45)
BASOPHILS # BLD AUTO: <0.03 10E3/UL (ref 0–0.2)
BASOPHILS NFR BLD AUTO: 0.4 %
BILIRUB SERPL-MCNC: 1 MG/DL
BUN SERPL-MCNC: 14.7 MG/DL (ref 6–20)
CALCIUM SERPL-MCNC: 8.7 MG/DL (ref 8.8–10.4)
CHLORIDE SERPL-SCNC: 105 MMOL/L (ref 98–107)
CREAT SERPL-MCNC: 1.07 MG/DL (ref 0.67–1.17)
EGFRCR SERPLBLD CKD-EPI 2021: 82 ML/MIN/1.73M2
EOSINOPHIL # BLD AUTO: 0.17 10E3/UL (ref 0–0.7)
EOSINOPHIL NFR BLD AUTO: 3.1 %
ERYTHROCYTE [DISTWIDTH] IN BLOOD BY AUTOMATED COUNT: 13.9 % (ref 10–15)
GLUCOSE SERPL-MCNC: 150 MG/DL (ref 70–99)
HCO3 SERPL-SCNC: 25 MMOL/L (ref 22–29)
HCT VFR BLD AUTO: 44.8 % (ref 40–53)
HGB BLD-MCNC: 14.8 G/DL (ref 13.3–17.7)
IMM GRANULOCYTES # BLD: <0.03 10E3/UL
IMM GRANULOCYTES NFR BLD: 0.4 %
LYMPHOCYTES # BLD AUTO: 1.13 10E3/UL (ref 0.8–5.3)
LYMPHOCYTES NFR BLD AUTO: 20.7 %
MCH RBC QN AUTO: 30.1 PG (ref 26.5–33)
MCHC RBC AUTO-ENTMCNC: 33 G/DL (ref 31.5–36.5)
MCV RBC AUTO: 91.2 FL (ref 78–100)
MONOCYTES # BLD AUTO: 0.59 10E3/UL (ref 0–1.3)
MONOCYTES NFR BLD AUTO: 10.8 %
NEUTROPHILS # BLD AUTO: 3.54 10E3/UL (ref 1.6–8.3)
NEUTROPHILS NFR BLD AUTO: 64.6 %
NRBC # BLD AUTO: <0.03 10E3/UL
NRBC BLD AUTO-RTO: 0 /100
PLATELET # BLD AUTO: 197 10E3/UL (ref 150–450)
POTASSIUM SERPL-SCNC: 4.5 MMOL/L (ref 3.4–5.3)
PROT SERPL-MCNC: 6.5 G/DL (ref 6.4–8.3)
RBC # BLD AUTO: 4.91 10E6/UL (ref 4.4–5.9)
SODIUM SERPL-SCNC: 141 MMOL/L (ref 135–145)
WBC # BLD AUTO: 5.47 10E3/UL (ref 4–11)

## 2025-08-18 PROCEDURE — 36415 COLL VENOUS BLD VENIPUNCTURE: CPT

## 2025-08-18 PROCEDURE — 85004 AUTOMATED DIFF WBC COUNT: CPT | Performed by: STUDENT IN AN ORGANIZED HEALTH CARE EDUCATION/TRAINING PROGRAM

## 2025-08-18 PROCEDURE — 80053 COMPREHEN METABOLIC PANEL: CPT | Performed by: STUDENT IN AN ORGANIZED HEALTH CARE EDUCATION/TRAINING PROGRAM

## 2025-08-20 ENCOUNTER — VIRTUAL VISIT (OUTPATIENT)
Dept: ONCOLOGY | Facility: CLINIC | Age: 55
End: 2025-08-20
Attending: STUDENT IN AN ORGANIZED HEALTH CARE EDUCATION/TRAINING PROGRAM
Payer: COMMERCIAL

## 2025-08-20 VITALS — BODY MASS INDEX: 44.1 KG/M2 | WEIGHT: 315 LBS | HEIGHT: 71 IN

## 2025-08-20 DIAGNOSIS — C49.9 UNDIFFERENTIATED PLEOMORPHIC SARCOMA (H): Primary | ICD-10-CM

## 2025-08-20 ASSESSMENT — PAIN SCALES - GENERAL: PAINLEVEL_OUTOF10: NO PAIN (0)

## (undated) DEVICE — SU SILK 2-0 TIE 12X30" A305H

## (undated) DEVICE — CLIP HORIZON MED BLUE 002200

## (undated) DEVICE — DRSG AQUACEL AG HYDROFIBER  3.5X10" 422605

## (undated) DEVICE — SU VICRYL 2-0 CT-1 27" UND J259H

## (undated) DEVICE — GLOVE BIOGEL PI MICRO SZ 7.5 48575

## (undated) DEVICE — DRAPE U-DRAPE 1015NSD NON-STERILE

## (undated) DEVICE — BAG CLEAR TRASH 1.3M 39X33" P4040C

## (undated) DEVICE — DRSG TEGADERM 4X4 3/4" 1626W

## (undated) DEVICE — Device

## (undated) DEVICE — STRAP KNEE/BODY 31143004

## (undated) DEVICE — GLOVE BIOGEL PI MICRO INDICATOR UNDERGLOVE SZ 8.0 48980

## (undated) DEVICE — SU ETHILON 3-0 PS-1 18" 1663H

## (undated) DEVICE — SU VICRYL 0 CT-1 27" UND J260H

## (undated) DEVICE — ESU PENCIL W/SMOKE EVAC NEPTUNE STRYKER 0703-046-000

## (undated) DEVICE — ESU LIGASURE IMPACT OPEN SEALER/DVDR CVD LG JAW LF4418

## (undated) DEVICE — SOL WATER IRRIG 1000ML BOTTLE 2F7114

## (undated) DEVICE — DRSG AQUACEL AG 3.5X14"  422607

## (undated) DEVICE — LINEN TOWEL PACK X5 5464

## (undated) DEVICE — DRAIN JACKSON PRATT RESERVOIR 400ML SU130-1000

## (undated) DEVICE — SU MONOCRYL 4-0 PS-2 18" UND Y496G

## (undated) DEVICE — BASIN SET MAJOR

## (undated) DEVICE — SUCTION CANISTER MEDIVAC LINER 3000ML W/LID 65651-530

## (undated) DEVICE — GOWN XLG DISP 9545

## (undated) DEVICE — SUCTION MANIFOLD NEPTUNE 2 SYS 4 PORT 0702-020-000

## (undated) DEVICE — BNDG ELASTIC 6"X5YDS STERILE 6611-6S

## (undated) DEVICE — TUBING SUCTION MEDI-VAC SOFT 3/16"X20' N520A

## (undated) DEVICE — STPL SKIN 35W ROTATING HEAD PRW35

## (undated) DEVICE — DRSG STERI STRIP 1/2X4" R1547

## (undated) DEVICE — ESU GROUND PAD UNIVERSAL W/O CORD

## (undated) DEVICE — SPONGE RAY-TEC 4X8" 7318

## (undated) DEVICE — COVER CAMERA IN-LIGHT DISP LT-C02

## (undated) DEVICE — COVER FOOTSWITCH W/CINCH 20X24" 923267

## (undated) DEVICE — PACK UNIVERSAL SPLIT 29131

## (undated) DEVICE — DECANTER TRANSFER DEVICE 2008S

## (undated) DEVICE — SU SILK 3-0 SH 30" K832H

## (undated) DEVICE — SOL NACL 0.9% IRRIG 1000ML BOTTLE 2F7124

## (undated) DEVICE — PREP CHLORAPREP 26ML TINTED HI-LITE ORANGE 930815

## (undated) DEVICE — BLADE KNIFE SURG 15 371115

## (undated) DEVICE — BAG RED BIOHAZARD 37X50" 40GAL A7450PR

## (undated) DEVICE — LINEN ORTHO PACK 5446

## (undated) DEVICE — DRSG DRAIN 4X4" 7086

## (undated) DEVICE — DRAIN JACKSON PRATT CHANNEL 15FR ROUND HUBLESS SIL JP-2228

## (undated) DEVICE — COVER FOOTSWITCH URO

## (undated) RX ORDER — DEXAMETHASONE SODIUM PHOSPHATE 4 MG/ML
INJECTION, SOLUTION INTRA-ARTICULAR; INTRALESIONAL; INTRAMUSCULAR; INTRAVENOUS; SOFT TISSUE
Status: DISPENSED
Start: 2023-05-22

## (undated) RX ORDER — HYDROMORPHONE HYDROCHLORIDE 1 MG/ML
INJECTION, SOLUTION INTRAMUSCULAR; INTRAVENOUS; SUBCUTANEOUS
Status: DISPENSED
Start: 2023-05-22

## (undated) RX ORDER — FENTANYL CITRATE 50 UG/ML
INJECTION, SOLUTION INTRAMUSCULAR; INTRAVENOUS
Status: DISPENSED
Start: 2023-05-22

## (undated) RX ORDER — PROPOFOL 10 MG/ML
INJECTION, EMULSION INTRAVENOUS
Status: DISPENSED
Start: 2023-05-22

## (undated) RX ORDER — SODIUM CHLORIDE 9 MG/ML
INJECTION, SOLUTION INTRAVENOUS
Status: DISPENSED
Start: 2023-01-27

## (undated) RX ORDER — SCOLOPAMINE TRANSDERMAL SYSTEM 1 MG/1
PATCH, EXTENDED RELEASE TRANSDERMAL
Status: DISPENSED
Start: 2023-05-22

## (undated) RX ORDER — LIDOCAINE HYDROCHLORIDE 10 MG/ML
INJECTION, SOLUTION INFILTRATION; PERINEURAL
Status: DISPENSED
Start: 2023-01-12

## (undated) RX ORDER — TRANEXAMIC ACID 650 MG/1
TABLET ORAL
Status: DISPENSED
Start: 2023-05-22

## (undated) RX ORDER — OXYCODONE HYDROCHLORIDE 5 MG/1
TABLET ORAL
Status: DISPENSED
Start: 2023-05-22

## (undated) RX ORDER — FENTANYL CITRATE-0.9 % NACL/PF 10 MCG/ML
PLASTIC BAG, INJECTION (ML) INTRAVENOUS
Status: DISPENSED
Start: 2023-05-22

## (undated) RX ORDER — BUPIVACAINE HYDROCHLORIDE AND EPINEPHRINE 5; 5 MG/ML; UG/ML
INJECTION, SOLUTION PERINEURAL
Status: DISPENSED
Start: 2023-05-22

## (undated) RX ORDER — ONDANSETRON 2 MG/ML
INJECTION INTRAMUSCULAR; INTRAVENOUS
Status: DISPENSED
Start: 2023-05-22

## (undated) RX ORDER — CEFAZOLIN SODIUM IN 0.9 % NACL 3 G/100 ML
INTRAVENOUS SOLUTION, PIGGYBACK (ML) INTRAVENOUS
Status: DISPENSED
Start: 2023-01-27

## (undated) RX ORDER — ACETAMINOPHEN 325 MG/1
TABLET ORAL
Status: DISPENSED
Start: 2023-05-22

## (undated) RX ORDER — HEPARIN SODIUM,PORCINE 10 UNIT/ML
VIAL (ML) INTRAVENOUS
Status: DISPENSED
Start: 2023-01-27

## (undated) RX ORDER — CEFAZOLIN SODIUM/WATER 3 G/30 ML
SYRINGE (ML) INTRAVENOUS
Status: DISPENSED
Start: 2023-05-22

## (undated) RX ORDER — LIDOCAINE HYDROCHLORIDE 10 MG/ML
INJECTION, SOLUTION EPIDURAL; INFILTRATION; INTRACAUDAL; PERINEURAL
Status: DISPENSED
Start: 2023-01-27

## (undated) RX ORDER — FENTANYL CITRATE 50 UG/ML
INJECTION, SOLUTION INTRAMUSCULAR; INTRAVENOUS
Status: DISPENSED
Start: 2023-01-27

## (undated) RX ORDER — HEPARIN SODIUM (PORCINE) LOCK FLUSH IV SOLN 100 UNIT/ML 100 UNIT/ML
SOLUTION INTRAVENOUS
Status: DISPENSED
Start: 2023-01-27

## (undated) RX ORDER — SODIUM CHLORIDE, SODIUM LACTATE, POTASSIUM CHLORIDE, CALCIUM CHLORIDE 600; 310; 30; 20 MG/100ML; MG/100ML; MG/100ML; MG/100ML
INJECTION, SOLUTION INTRAVENOUS
Status: DISPENSED
Start: 2023-05-22